# Patient Record
Sex: FEMALE | Race: WHITE | Employment: OTHER | URBAN - NONMETROPOLITAN AREA
[De-identification: names, ages, dates, MRNs, and addresses within clinical notes are randomized per-mention and may not be internally consistent; named-entity substitution may affect disease eponyms.]

---

## 2017-01-06 ENCOUNTER — TELEPHONE (OUTPATIENT)
Dept: PRIMARY CARE CLINIC | Age: 72
End: 2017-01-06

## 2017-01-06 RX ORDER — GUAIFENESIN 600 MG/1
1200 TABLET, EXTENDED RELEASE ORAL 2 TIMES DAILY
COMMUNITY

## 2017-01-06 RX ORDER — ISOSORBIDE DINITRATE 30 MG/1
30 TABLET ORAL 4 TIMES DAILY
COMMUNITY

## 2017-01-06 RX ORDER — VITAMIN E 268 MG
400 CAPSULE ORAL DAILY
COMMUNITY

## 2017-01-06 RX ORDER — THEOPHYLLINE ANHYDROUS 100 MG
100 TABLET, EXTENDED RELEASE 12 HR ORAL 2 TIMES DAILY
COMMUNITY

## 2017-01-06 RX ORDER — CLONAZEPAM 0.5 MG/1
0.5 TABLET ORAL 2 TIMES DAILY PRN
COMMUNITY
End: 2023-01-01

## 2017-01-06 RX ORDER — MEMANTINE HYDROCHLORIDE 5 MG/1
5 TABLET ORAL 2 TIMES DAILY
COMMUNITY
End: 2023-01-01

## 2017-01-06 RX ORDER — FLUTICASONE PROPIONATE 50 MCG
2 SPRAY, SUSPENSION (ML) NASAL DAILY
COMMUNITY

## 2017-01-06 RX ORDER — PROMETHAZINE HYDROCHLORIDE 25 MG/1
25 TABLET ORAL EVERY 6 HOURS PRN
COMMUNITY

## 2017-01-06 RX ORDER — SUCRALFATE 1 G/1
1 TABLET ORAL 4 TIMES DAILY
COMMUNITY

## 2017-01-06 RX ORDER — NITROGLYCERIN 0.4 MG/1
0.4 TABLET SUBLINGUAL
COMMUNITY

## 2017-01-06 RX ORDER — DEXLANSOPRAZOLE 60 MG/1
60 CAPSULE, DELAYED RELEASE ORAL DAILY
COMMUNITY

## 2017-01-06 RX ORDER — MIRTAZAPINE 15 MG/1
15 TABLET, ORALLY DISINTEGRATING ORAL NIGHTLY
COMMUNITY

## 2017-01-06 RX ORDER — TIZANIDINE HYDROCHLORIDE 4 MG/1
4 CAPSULE, GELATIN COATED ORAL 3 TIMES DAILY
COMMUNITY

## 2017-01-06 RX ORDER — OXYCODONE AND ACETAMINOPHEN 7.5; 325 MG/1; MG/1
1 TABLET ORAL EVERY 6 HOURS PRN
COMMUNITY

## 2017-01-06 RX ORDER — AZELASTINE 1 MG/ML
2 SPRAY, METERED NASAL 2 TIMES DAILY
COMMUNITY
End: 2023-01-01 | Stop reason: SDUPTHER

## 2017-01-06 RX ORDER — DULOXETIN HYDROCHLORIDE 60 MG/1
60 CAPSULE, DELAYED RELEASE ORAL DAILY
COMMUNITY
End: 2023-01-01 | Stop reason: SDUPTHER

## 2017-01-06 RX ORDER — FUROSEMIDE 40 MG/1
40 TABLET ORAL 2 TIMES DAILY
COMMUNITY
End: 2023-01-01 | Stop reason: SDUPTHER

## 2017-01-10 ENCOUNTER — HOSPITAL ENCOUNTER (OUTPATIENT)
Dept: GENERAL RADIOLOGY | Age: 72
Discharge: HOME | End: 2017-01-10
Payer: MEDICARE

## 2017-01-10 ENCOUNTER — HOSPITAL ENCOUNTER (OUTPATIENT)
Dept: WOMENS IMAGING | Age: 72
Discharge: HOME | End: 2017-01-10
Payer: MEDICARE

## 2017-01-10 ENCOUNTER — TELEPHONE (OUTPATIENT)
Dept: PRIMARY CARE CLINIC | Age: 72
End: 2017-01-10

## 2017-01-10 DIAGNOSIS — Z78.0 POSTMENOPAUSAL: ICD-10-CM

## 2017-01-10 DIAGNOSIS — M25.552 LEFT HIP PAIN: ICD-10-CM

## 2017-01-10 DIAGNOSIS — Z12.31 ENCOUNTER FOR SCREENING MAMMOGRAM FOR BREAST CANCER: ICD-10-CM

## 2017-01-10 DIAGNOSIS — Z13.820 SCREENING FOR OSTEOPOROSIS: ICD-10-CM

## 2017-01-10 PROCEDURE — 77063 BREAST TOMOSYNTHESIS BI: CPT

## 2017-01-10 PROCEDURE — 77080 DXA BONE DENSITY AXIAL: CPT

## 2017-01-10 PROCEDURE — 73521 X-RAY EXAM HIPS BI 2 VIEWS: CPT

## 2017-01-12 ENCOUNTER — TELEPHONE (OUTPATIENT)
Dept: PRIMARY CARE CLINIC | Age: 72
End: 2017-01-12

## 2017-01-20 ENCOUNTER — OFFICE VISIT (OUTPATIENT)
Dept: PRIMARY CARE CLINIC | Age: 72
End: 2017-01-20
Payer: MEDICARE

## 2017-01-20 VITALS
SYSTOLIC BLOOD PRESSURE: 110 MMHG | DIASTOLIC BLOOD PRESSURE: 70 MMHG | WEIGHT: 117.25 LBS | BODY MASS INDEX: 23.02 KG/M2 | HEIGHT: 60 IN | OXYGEN SATURATION: 97 % | TEMPERATURE: 98.6 F | HEART RATE: 78 BPM

## 2017-01-20 DIAGNOSIS — J44.9 CHRONIC OBSTRUCTIVE PULMONARY DISEASE, UNSPECIFIED COPD TYPE (HCC): ICD-10-CM

## 2017-01-20 DIAGNOSIS — R41.3 MEMORY LOSS: ICD-10-CM

## 2017-01-20 DIAGNOSIS — F41.1 GAD (GENERALIZED ANXIETY DISORDER): ICD-10-CM

## 2017-01-20 DIAGNOSIS — G89.29 CHRONIC BILATERAL LOW BACK PAIN WITH LEFT-SIDED SCIATICA: ICD-10-CM

## 2017-01-20 DIAGNOSIS — Z72.0 TOBACCO ABUSE: ICD-10-CM

## 2017-01-20 DIAGNOSIS — J00 ACUTE NASOPHARYNGITIS: Primary | ICD-10-CM

## 2017-01-20 DIAGNOSIS — M54.42 CHRONIC BILATERAL LOW BACK PAIN WITH LEFT-SIDED SCIATICA: ICD-10-CM

## 2017-01-20 PROCEDURE — G8599 NO ASA/ANTIPLAT THER USE RNG: HCPCS | Performed by: NURSE PRACTITIONER

## 2017-01-20 PROCEDURE — 3014F SCREEN MAMMO DOC REV: CPT | Performed by: NURSE PRACTITIONER

## 2017-01-20 PROCEDURE — 99214 OFFICE O/P EST MOD 30 MIN: CPT | Performed by: NURSE PRACTITIONER

## 2017-01-20 PROCEDURE — 4040F PNEUMOC VAC/ADMIN/RCVD: CPT | Performed by: NURSE PRACTITIONER

## 2017-01-20 PROCEDURE — 1090F PRES/ABSN URINE INCON ASSESS: CPT | Performed by: NURSE PRACTITIONER

## 2017-01-20 PROCEDURE — G8419 CALC BMI OUT NRM PARAM NOF/U: HCPCS | Performed by: NURSE PRACTITIONER

## 2017-01-20 PROCEDURE — 3023F SPIROM DOC REV: CPT | Performed by: NURSE PRACTITIONER

## 2017-01-20 PROCEDURE — G8926 SPIRO NO PERF OR DOC: HCPCS | Performed by: NURSE PRACTITIONER

## 2017-01-20 PROCEDURE — 3017F COLORECTAL CA SCREEN DOC REV: CPT | Performed by: NURSE PRACTITIONER

## 2017-01-20 PROCEDURE — 1123F ACP DISCUSS/DSCN MKR DOCD: CPT | Performed by: NURSE PRACTITIONER

## 2017-01-20 PROCEDURE — G8427 DOCREV CUR MEDS BY ELIG CLIN: HCPCS | Performed by: NURSE PRACTITIONER

## 2017-01-20 PROCEDURE — 4004F PT TOBACCO SCREEN RCVD TLK: CPT | Performed by: NURSE PRACTITIONER

## 2017-01-20 PROCEDURE — G8484 FLU IMMUNIZE NO ADMIN: HCPCS | Performed by: NURSE PRACTITIONER

## 2017-01-20 PROCEDURE — G8399 PT W/DXA RESULTS DOCUMENT: HCPCS | Performed by: NURSE PRACTITIONER

## 2017-01-20 RX ORDER — OXYCODONE AND ACETAMINOPHEN 10; 325 MG/1; MG/1
TABLET ORAL
COMMUNITY
Start: 2017-01-06 | End: 2017-08-29

## 2017-01-20 RX ORDER — ALBUTEROL SULFATE 90 UG/1
2 AEROSOL, METERED RESPIRATORY (INHALATION) EVERY 6 HOURS PRN
Qty: 1 INHALER | Refills: 5 | Status: SHIPPED | OUTPATIENT
Start: 2017-01-20 | End: 2017-10-25 | Stop reason: SDUPTHER

## 2017-01-20 RX ORDER — TIZANIDINE 4 MG/1
4 TABLET ORAL EVERY 8 HOURS PRN
Qty: 90 TABLET | Refills: 3 | Status: SHIPPED | OUTPATIENT
Start: 2017-01-20 | End: 2017-07-11 | Stop reason: SDUPTHER

## 2017-01-20 RX ORDER — MEMANTINE HYDROCHLORIDE 5 MG/1
5 TABLET ORAL 2 TIMES DAILY
Qty: 60 TABLET | Refills: 3 | Status: SHIPPED | OUTPATIENT
Start: 2017-01-20 | End: 2017-05-26 | Stop reason: SDUPTHER

## 2017-01-20 RX ORDER — CLONAZEPAM 0.5 MG/1
0.5 TABLET ORAL 3 TIMES DAILY PRN
Qty: 75 TABLET | Refills: 0 | Status: SHIPPED | OUTPATIENT
Start: 2017-01-20 | End: 2017-02-21 | Stop reason: SDUPTHER

## 2017-01-20 ASSESSMENT — ENCOUNTER SYMPTOMS
SORE THROAT: 0
SHORTNESS OF BREATH: 1
COUGH: 1
EYE REDNESS: 0
SINUS PRESSURE: 0
NAUSEA: 0
DIARRHEA: 0
RHINORRHEA: 1
CONSTIPATION: 1
BACK PAIN: 1
VOMITING: 0
ABDOMINAL PAIN: 1

## 2017-02-08 ENCOUNTER — TELEPHONE (OUTPATIENT)
Dept: PRIMARY CARE CLINIC | Age: 72
End: 2017-02-08

## 2017-02-16 ENCOUNTER — OFFICE VISIT (OUTPATIENT)
Dept: PRIMARY CARE CLINIC | Age: 72
End: 2017-02-16
Payer: MEDICARE

## 2017-02-16 VITALS
HEART RATE: 72 BPM | DIASTOLIC BLOOD PRESSURE: 80 MMHG | WEIGHT: 117 LBS | BODY MASS INDEX: 21.53 KG/M2 | OXYGEN SATURATION: 92 % | HEIGHT: 62 IN | SYSTOLIC BLOOD PRESSURE: 118 MMHG | TEMPERATURE: 98.3 F

## 2017-02-16 DIAGNOSIS — J02.9 SORE THROAT: ICD-10-CM

## 2017-02-16 DIAGNOSIS — R05.9 COUGH: ICD-10-CM

## 2017-02-16 DIAGNOSIS — R11.0 NAUSEA: ICD-10-CM

## 2017-02-16 DIAGNOSIS — B34.9 VIRAL ILLNESS: Primary | ICD-10-CM

## 2017-02-16 LAB
INFLUENZA A ANTIBODY: NORMAL
INFLUENZA B ANTIBODY: NORMAL
S PYO AG THROAT QL: NORMAL

## 2017-02-16 PROCEDURE — 4040F PNEUMOC VAC/ADMIN/RCVD: CPT | Performed by: NURSE PRACTITIONER

## 2017-02-16 PROCEDURE — G8599 NO ASA/ANTIPLAT THER USE RNG: HCPCS | Performed by: NURSE PRACTITIONER

## 2017-02-16 PROCEDURE — 3014F SCREEN MAMMO DOC REV: CPT | Performed by: NURSE PRACTITIONER

## 2017-02-16 PROCEDURE — 99213 OFFICE O/P EST LOW 20 MIN: CPT | Performed by: NURSE PRACTITIONER

## 2017-02-16 PROCEDURE — 87880 STREP A ASSAY W/OPTIC: CPT | Performed by: NURSE PRACTITIONER

## 2017-02-16 PROCEDURE — 4004F PT TOBACCO SCREEN RCVD TLK: CPT | Performed by: NURSE PRACTITIONER

## 2017-02-16 PROCEDURE — 87804 INFLUENZA ASSAY W/OPTIC: CPT | Performed by: NURSE PRACTITIONER

## 2017-02-16 PROCEDURE — 1123F ACP DISCUSS/DSCN MKR DOCD: CPT | Performed by: NURSE PRACTITIONER

## 2017-02-16 PROCEDURE — G8427 DOCREV CUR MEDS BY ELIG CLIN: HCPCS | Performed by: NURSE PRACTITIONER

## 2017-02-16 PROCEDURE — 3017F COLORECTAL CA SCREEN DOC REV: CPT | Performed by: NURSE PRACTITIONER

## 2017-02-16 PROCEDURE — G8419 CALC BMI OUT NRM PARAM NOF/U: HCPCS | Performed by: NURSE PRACTITIONER

## 2017-02-16 PROCEDURE — G8399 PT W/DXA RESULTS DOCUMENT: HCPCS | Performed by: NURSE PRACTITIONER

## 2017-02-16 PROCEDURE — G8484 FLU IMMUNIZE NO ADMIN: HCPCS | Performed by: NURSE PRACTITIONER

## 2017-02-16 PROCEDURE — 1090F PRES/ABSN URINE INCON ASSESS: CPT | Performed by: NURSE PRACTITIONER

## 2017-02-16 RX ORDER — ONDANSETRON 4 MG/1
4 TABLET, FILM COATED ORAL EVERY 12 HOURS PRN
Qty: 30 TABLET | Refills: 1 | Status: SHIPPED | OUTPATIENT
Start: 2017-02-16 | End: 2017-06-02 | Stop reason: SDUPTHER

## 2017-02-16 RX ORDER — BENZONATATE 200 MG/1
200 CAPSULE ORAL 3 TIMES DAILY PRN
Qty: 30 CAPSULE | Refills: 0 | Status: SHIPPED | OUTPATIENT
Start: 2017-02-16 | End: 2017-02-21

## 2017-02-16 ASSESSMENT — ENCOUNTER SYMPTOMS
EYE REDNESS: 0
RHINORRHEA: 1
DIARRHEA: 1
SHORTNESS OF BREATH: 1
CONSTIPATION: 0
VOMITING: 1
COUGH: 1
SORE THROAT: 0
NAUSEA: 1
ABDOMINAL PAIN: 1

## 2017-02-17 ENCOUNTER — TELEPHONE (OUTPATIENT)
Dept: PRIMARY CARE CLINIC | Age: 72
End: 2017-02-17

## 2017-02-21 ENCOUNTER — OFFICE VISIT (OUTPATIENT)
Dept: PRIMARY CARE CLINIC | Age: 72
End: 2017-02-21
Payer: MEDICARE

## 2017-02-21 VITALS
HEART RATE: 96 BPM | OXYGEN SATURATION: 96 % | BODY MASS INDEX: 23.22 KG/M2 | WEIGHT: 118.25 LBS | HEIGHT: 60 IN | TEMPERATURE: 97.5 F | DIASTOLIC BLOOD PRESSURE: 70 MMHG | SYSTOLIC BLOOD PRESSURE: 110 MMHG

## 2017-02-21 DIAGNOSIS — F51.05 INSOMNIA DUE TO OTHER MENTAL DISORDER: Primary | ICD-10-CM

## 2017-02-21 DIAGNOSIS — R05.9 COUGH: ICD-10-CM

## 2017-02-21 DIAGNOSIS — Z95.828 PORT CATHETER IN PLACE: ICD-10-CM

## 2017-02-21 DIAGNOSIS — F99 INSOMNIA DUE TO OTHER MENTAL DISORDER: Primary | ICD-10-CM

## 2017-02-21 DIAGNOSIS — F41.1 GAD (GENERALIZED ANXIETY DISORDER): ICD-10-CM

## 2017-02-21 PROCEDURE — 1123F ACP DISCUSS/DSCN MKR DOCD: CPT | Performed by: NURSE PRACTITIONER

## 2017-02-21 PROCEDURE — G8484 FLU IMMUNIZE NO ADMIN: HCPCS | Performed by: NURSE PRACTITIONER

## 2017-02-21 PROCEDURE — G8599 NO ASA/ANTIPLAT THER USE RNG: HCPCS | Performed by: NURSE PRACTITIONER

## 2017-02-21 PROCEDURE — 4040F PNEUMOC VAC/ADMIN/RCVD: CPT | Performed by: NURSE PRACTITIONER

## 2017-02-21 PROCEDURE — G8399 PT W/DXA RESULTS DOCUMENT: HCPCS | Performed by: NURSE PRACTITIONER

## 2017-02-21 PROCEDURE — G8427 DOCREV CUR MEDS BY ELIG CLIN: HCPCS | Performed by: NURSE PRACTITIONER

## 2017-02-21 PROCEDURE — 3017F COLORECTAL CA SCREEN DOC REV: CPT | Performed by: NURSE PRACTITIONER

## 2017-02-21 PROCEDURE — 3014F SCREEN MAMMO DOC REV: CPT | Performed by: NURSE PRACTITIONER

## 2017-02-21 PROCEDURE — 99213 OFFICE O/P EST LOW 20 MIN: CPT | Performed by: NURSE PRACTITIONER

## 2017-02-21 PROCEDURE — 1090F PRES/ABSN URINE INCON ASSESS: CPT | Performed by: NURSE PRACTITIONER

## 2017-02-21 PROCEDURE — G8420 CALC BMI NORM PARAMETERS: HCPCS | Performed by: NURSE PRACTITIONER

## 2017-02-21 PROCEDURE — 4004F PT TOBACCO SCREEN RCVD TLK: CPT | Performed by: NURSE PRACTITIONER

## 2017-02-21 RX ORDER — CLONAZEPAM 0.5 MG/1
0.5 TABLET ORAL 3 TIMES DAILY PRN
Qty: 75 TABLET | Refills: 0 | Status: SHIPPED | OUTPATIENT
Start: 2017-02-21 | End: 2017-03-28 | Stop reason: SDUPTHER

## 2017-02-21 RX ORDER — TRAZODONE HYDROCHLORIDE 50 MG/1
50 TABLET ORAL NIGHTLY
Qty: 60 TABLET | Refills: 1 | Status: SHIPPED | OUTPATIENT
Start: 2017-02-21 | End: 2017-03-02 | Stop reason: SDUPTHER

## 2017-02-21 RX ORDER — GUAIFENESIN/DEXTROMETHORPHAN 100-10MG/5
10 SYRUP ORAL 3 TIMES DAILY PRN
Qty: 236 ML | Refills: 0 | Status: SHIPPED | OUTPATIENT
Start: 2017-02-21 | End: 2017-03-03

## 2017-02-21 ASSESSMENT — ENCOUNTER SYMPTOMS
SHORTNESS OF BREATH: 0
COUGH: 1
EYE REDNESS: 0
ABDOMINAL PAIN: 1
VOMITING: 0
RHINORRHEA: 1
DIARRHEA: 0
NAUSEA: 1
SORE THROAT: 0
CONSTIPATION: 0

## 2017-02-22 ENCOUNTER — TELEPHONE (OUTPATIENT)
Dept: PRIMARY CARE CLINIC | Age: 72
End: 2017-02-22

## 2017-02-22 DIAGNOSIS — Z00.00 VISIT FOR PREVENTIVE HEALTH EXAMINATION: ICD-10-CM

## 2017-02-22 LAB
ALBUMIN SERPL-MCNC: 3.7 G/DL (ref 3.5–5.2)
ALP BLD-CCNC: 76 U/L (ref 35–104)
ALT SERPL-CCNC: 10 U/L (ref 5–33)
ANION GAP SERPL CALCULATED.3IONS-SCNC: 15 MMOL/L (ref 7–19)
AST SERPL-CCNC: 17 U/L (ref 5–32)
BASOPHILS ABSOLUTE: 0 K/UL (ref 0–0.2)
BASOPHILS RELATIVE PERCENT: 0.4 % (ref 0–1)
BILIRUB SERPL-MCNC: <0.2 MG/DL (ref 0.2–1.2)
BUN BLDV-MCNC: 16 MG/DL (ref 8–23)
CALCIUM SERPL-MCNC: 8.7 MG/DL (ref 8.8–10.2)
CHLORIDE BLD-SCNC: 90 MMOL/L (ref 98–111)
CHOLESTEROL, TOTAL: 172 MG/DL (ref 160–199)
CO2: 22 MMOL/L (ref 22–29)
CREAT SERPL-MCNC: 0.7 MG/DL (ref 0.5–0.9)
EOSINOPHILS ABSOLUTE: 0.1 K/UL (ref 0–0.6)
EOSINOPHILS RELATIVE PERCENT: 2 % (ref 0–5)
GFR NON-AFRICAN AMERICAN: >60
GLOBULIN: 2.7 G/DL
GLUCOSE BLD-MCNC: 86 MG/DL (ref 74–109)
HCT VFR BLD CALC: 31.3 % (ref 37–47)
HDLC SERPL-MCNC: 32 MG/DL (ref 65–121)
HEMOGLOBIN: 10.2 G/DL (ref 12–16)
LDL CHOLESTEROL CALCULATED: 92 MG/DL
LYMPHOCYTES ABSOLUTE: 1.6 K/UL (ref 1.1–4.5)
LYMPHOCYTES RELATIVE PERCENT: 22.8 % (ref 20–40)
MCH RBC QN AUTO: 30.9 PG (ref 27–31)
MCHC RBC AUTO-ENTMCNC: 32.6 G/DL (ref 33–37)
MCV RBC AUTO: 94.8 FL (ref 81–99)
MONOCYTES ABSOLUTE: 0.7 K/UL (ref 0–0.9)
MONOCYTES RELATIVE PERCENT: 10.1 % (ref 0–10)
NEUTROPHILS ABSOLUTE: 4.4 K/UL (ref 1.5–7.5)
NEUTROPHILS RELATIVE PERCENT: 64.7 % (ref 50–65)
PDW BLD-RTO: 14.3 % (ref 11.5–14.5)
PLATELET # BLD: 315 K/UL (ref 130–400)
PMV BLD AUTO: 9.8 FL (ref 7.4–10.4)
POTASSIUM SERPL-SCNC: 4.5 MMOL/L (ref 3.5–5)
RBC # BLD: 3.3 M/UL (ref 4.2–5.4)
SODIUM BLD-SCNC: 127 MMOL/L (ref 136–145)
T4 FREE: 0.9 NG/ML (ref 0.9–1.7)
TOTAL PROTEIN: 6.4 G/DL (ref 6.6–8.7)
TRIGL SERPL-MCNC: 238 MG/DL (ref 150–199)
TSH SERPL DL<=0.05 MIU/L-ACNC: 1.96 UIU/ML (ref 0.27–4.2)
WBC # BLD: 6.8 K/UL (ref 4.8–10.8)

## 2017-02-24 ENCOUNTER — TELEPHONE (OUTPATIENT)
Dept: PRIMARY CARE CLINIC | Age: 72
End: 2017-02-24

## 2017-02-24 ENCOUNTER — OFFICE VISIT (OUTPATIENT)
Dept: PRIMARY CARE CLINIC | Age: 72
End: 2017-02-24
Payer: MEDICARE

## 2017-02-24 VITALS
OXYGEN SATURATION: 94 % | WEIGHT: 120.75 LBS | HEART RATE: 70 BPM | HEIGHT: 60 IN | SYSTOLIC BLOOD PRESSURE: 140 MMHG | DIASTOLIC BLOOD PRESSURE: 90 MMHG | BODY MASS INDEX: 23.71 KG/M2 | TEMPERATURE: 98.5 F

## 2017-02-24 DIAGNOSIS — E87.1 HYPONATREMIA: Primary | ICD-10-CM

## 2017-02-24 LAB
ALBUMIN SERPL-MCNC: 3.6 G/DL (ref 3.5–5.2)
ALP BLD-CCNC: 74 U/L (ref 35–104)
ALT SERPL-CCNC: 7 U/L (ref 5–33)
ANION GAP SERPL CALCULATED.3IONS-SCNC: 15 MMOL/L (ref 7–19)
AST SERPL-CCNC: 14 U/L (ref 5–32)
BILIRUB SERPL-MCNC: <0.2 MG/DL (ref 0.2–1.2)
BUN BLDV-MCNC: 18 MG/DL (ref 8–23)
CALCIUM SERPL-MCNC: 9.1 MG/DL (ref 8.8–10.2)
CHLORIDE BLD-SCNC: 93 MMOL/L (ref 98–111)
CO2: 24 MMOL/L (ref 22–29)
CREAT SERPL-MCNC: 0.8 MG/DL (ref 0.5–0.9)
GFR NON-AFRICAN AMERICAN: >60
GLOBULIN: 2.8 G/DL
GLUCOSE BLD-MCNC: 83 MG/DL (ref 74–109)
POTASSIUM SERPL-SCNC: 4.8 MMOL/L (ref 3.5–5)
SODIUM BLD-SCNC: 132 MMOL/L (ref 136–145)
TOTAL PROTEIN: 6.4 G/DL (ref 6.6–8.7)

## 2017-02-24 PROCEDURE — 3014F SCREEN MAMMO DOC REV: CPT | Performed by: NURSE PRACTITIONER

## 2017-02-24 PROCEDURE — G8399 PT W/DXA RESULTS DOCUMENT: HCPCS | Performed by: NURSE PRACTITIONER

## 2017-02-24 PROCEDURE — 1090F PRES/ABSN URINE INCON ASSESS: CPT | Performed by: NURSE PRACTITIONER

## 2017-02-24 PROCEDURE — G8599 NO ASA/ANTIPLAT THER USE RNG: HCPCS | Performed by: NURSE PRACTITIONER

## 2017-02-24 PROCEDURE — 4040F PNEUMOC VAC/ADMIN/RCVD: CPT | Performed by: NURSE PRACTITIONER

## 2017-02-24 PROCEDURE — 1123F ACP DISCUSS/DSCN MKR DOCD: CPT | Performed by: NURSE PRACTITIONER

## 2017-02-24 PROCEDURE — G8484 FLU IMMUNIZE NO ADMIN: HCPCS | Performed by: NURSE PRACTITIONER

## 2017-02-24 PROCEDURE — 4004F PT TOBACCO SCREEN RCVD TLK: CPT | Performed by: NURSE PRACTITIONER

## 2017-02-24 PROCEDURE — G8428 CUR MEDS NOT DOCUMENT: HCPCS | Performed by: NURSE PRACTITIONER

## 2017-02-24 PROCEDURE — 3017F COLORECTAL CA SCREEN DOC REV: CPT | Performed by: NURSE PRACTITIONER

## 2017-02-24 PROCEDURE — 99213 OFFICE O/P EST LOW 20 MIN: CPT | Performed by: NURSE PRACTITIONER

## 2017-02-24 PROCEDURE — G8420 CALC BMI NORM PARAMETERS: HCPCS | Performed by: NURSE PRACTITIONER

## 2017-02-24 RX ORDER — LINACLOTIDE 145 UG/1
CAPSULE, GELATIN COATED ORAL
COMMUNITY
Start: 2017-02-04 | End: 2017-03-10 | Stop reason: SDUPTHER

## 2017-02-24 ASSESSMENT — ENCOUNTER SYMPTOMS
NAUSEA: 1
SHORTNESS OF BREATH: 0
CONSTIPATION: 0
VOMITING: 0
SORE THROAT: 0
EYE REDNESS: 0
ABDOMINAL PAIN: 1
RHINORRHEA: 1
COUGH: 1
DIARRHEA: 0

## 2017-03-02 ENCOUNTER — OFFICE VISIT (OUTPATIENT)
Dept: PRIMARY CARE CLINIC | Age: 72
End: 2017-03-02
Payer: MEDICARE

## 2017-03-02 VITALS
BODY MASS INDEX: 23.46 KG/M2 | DIASTOLIC BLOOD PRESSURE: 70 MMHG | TEMPERATURE: 98.2 F | HEART RATE: 80 BPM | OXYGEN SATURATION: 95 % | SYSTOLIC BLOOD PRESSURE: 110 MMHG | HEIGHT: 60 IN | WEIGHT: 119.5 LBS

## 2017-03-02 DIAGNOSIS — M81.0 OSTEOPOROSIS: ICD-10-CM

## 2017-03-02 DIAGNOSIS — R05.9 COUGH: ICD-10-CM

## 2017-03-02 DIAGNOSIS — F51.05 INSOMNIA DUE TO OTHER MENTAL DISORDER: ICD-10-CM

## 2017-03-02 DIAGNOSIS — F99 INSOMNIA DUE TO OTHER MENTAL DISORDER: ICD-10-CM

## 2017-03-02 DIAGNOSIS — N39.45 CONTINUOUS LEAKAGE OF URINE: Primary | ICD-10-CM

## 2017-03-02 PROCEDURE — G8420 CALC BMI NORM PARAMETERS: HCPCS | Performed by: NURSE PRACTITIONER

## 2017-03-02 PROCEDURE — G8599 NO ASA/ANTIPLAT THER USE RNG: HCPCS | Performed by: NURSE PRACTITIONER

## 2017-03-02 PROCEDURE — 4004F PT TOBACCO SCREEN RCVD TLK: CPT | Performed by: NURSE PRACTITIONER

## 2017-03-02 PROCEDURE — 4040F PNEUMOC VAC/ADMIN/RCVD: CPT | Performed by: NURSE PRACTITIONER

## 2017-03-02 PROCEDURE — G8399 PT W/DXA RESULTS DOCUMENT: HCPCS | Performed by: NURSE PRACTITIONER

## 2017-03-02 PROCEDURE — 0509F URINE INCON PLAN DOCD: CPT | Performed by: NURSE PRACTITIONER

## 2017-03-02 PROCEDURE — 3014F SCREEN MAMMO DOC REV: CPT | Performed by: NURSE PRACTITIONER

## 2017-03-02 PROCEDURE — 96372 THER/PROPH/DIAG INJ SC/IM: CPT | Performed by: NURSE PRACTITIONER

## 2017-03-02 PROCEDURE — 1123F ACP DISCUSS/DSCN MKR DOCD: CPT | Performed by: NURSE PRACTITIONER

## 2017-03-02 PROCEDURE — 3017F COLORECTAL CA SCREEN DOC REV: CPT | Performed by: NURSE PRACTITIONER

## 2017-03-02 PROCEDURE — G8427 DOCREV CUR MEDS BY ELIG CLIN: HCPCS | Performed by: NURSE PRACTITIONER

## 2017-03-02 PROCEDURE — 99213 OFFICE O/P EST LOW 20 MIN: CPT | Performed by: NURSE PRACTITIONER

## 2017-03-02 PROCEDURE — G8484 FLU IMMUNIZE NO ADMIN: HCPCS | Performed by: NURSE PRACTITIONER

## 2017-03-02 PROCEDURE — 1090F PRES/ABSN URINE INCON ASSESS: CPT | Performed by: NURSE PRACTITIONER

## 2017-03-02 RX ORDER — BENZONATATE 200 MG/1
200 CAPSULE ORAL 3 TIMES DAILY PRN
Qty: 60 CAPSULE | Refills: 0 | Status: SHIPPED | OUTPATIENT
Start: 2017-03-02 | End: 2017-05-08

## 2017-03-02 RX ORDER — TRAZODONE HYDROCHLORIDE 50 MG/1
150 TABLET ORAL NIGHTLY
Qty: 90 TABLET | Refills: 1 | Status: SHIPPED | OUTPATIENT
Start: 2017-03-02 | End: 2017-04-28

## 2017-03-02 RX ORDER — TOLTERODINE 4 MG/1
4 CAPSULE, EXTENDED RELEASE ORAL DAILY
Qty: 30 CAPSULE | Refills: 3 | Status: SHIPPED | OUTPATIENT
Start: 2017-03-02 | End: 2017-04-28 | Stop reason: SDUPTHER

## 2017-03-02 ASSESSMENT — ENCOUNTER SYMPTOMS
NAUSEA: 1
SORE THROAT: 0
ABDOMINAL PAIN: 1
CONSTIPATION: 0
RHINORRHEA: 0
EYE REDNESS: 0
COUGH: 1
DIARRHEA: 0
SHORTNESS OF BREATH: 0
VOMITING: 0

## 2017-03-10 RX ORDER — LINACLOTIDE 145 UG/1
CAPSULE, GELATIN COATED ORAL
Qty: 30 CAPSULE | Refills: 5 | Status: SHIPPED | OUTPATIENT
Start: 2017-03-10 | End: 2017-05-26

## 2017-03-28 ENCOUNTER — OFFICE VISIT (OUTPATIENT)
Dept: PRIMARY CARE CLINIC | Age: 72
End: 2017-03-28
Payer: MEDICARE

## 2017-03-28 VITALS
HEIGHT: 61 IN | HEART RATE: 86 BPM | OXYGEN SATURATION: 94 % | WEIGHT: 114.5 LBS | SYSTOLIC BLOOD PRESSURE: 106 MMHG | DIASTOLIC BLOOD PRESSURE: 74 MMHG | BODY MASS INDEX: 21.62 KG/M2 | TEMPERATURE: 98.3 F

## 2017-03-28 DIAGNOSIS — Z95.828 PORT CATHETER IN PLACE: ICD-10-CM

## 2017-03-28 DIAGNOSIS — N30.00 ACUTE CYSTITIS WITHOUT HEMATURIA: Primary | ICD-10-CM

## 2017-03-28 DIAGNOSIS — G89.29 CHRONIC BILATERAL LOW BACK PAIN WITH LEFT-SIDED SCIATICA: ICD-10-CM

## 2017-03-28 DIAGNOSIS — F32.9 REACTIVE DEPRESSION: ICD-10-CM

## 2017-03-28 DIAGNOSIS — M54.42 CHRONIC BILATERAL LOW BACK PAIN WITH LEFT-SIDED SCIATICA: ICD-10-CM

## 2017-03-28 DIAGNOSIS — F03.90 DEMENTIA WITHOUT BEHAVIORAL DISTURBANCE, UNSPECIFIED DEMENTIA TYPE: ICD-10-CM

## 2017-03-28 DIAGNOSIS — F41.1 GAD (GENERALIZED ANXIETY DISORDER): ICD-10-CM

## 2017-03-28 DIAGNOSIS — R30.0 DYSURIA: ICD-10-CM

## 2017-03-28 LAB
APPEARANCE FLUID: ABNORMAL
BILIRUBIN, POC: ABNORMAL
BLOOD URINE, POC: ABNORMAL
CLARITY, POC: ABNORMAL
COLOR, POC: YELLOW
GLUCOSE URINE, POC: ABNORMAL
KETONES, POC: ABNORMAL
LEUKOCYTE EST, POC: ABNORMAL
NITRITE, POC: ABNORMAL
PH, POC: 5.5
PROTEIN, POC: ABNORMAL
SPECIFIC GRAVITY, POC: 1.01
UROBILINOGEN, POC: 0.2

## 2017-03-28 PROCEDURE — G8419 CALC BMI OUT NRM PARAM NOF/U: HCPCS | Performed by: NURSE PRACTITIONER

## 2017-03-28 PROCEDURE — G8399 PT W/DXA RESULTS DOCUMENT: HCPCS | Performed by: NURSE PRACTITIONER

## 2017-03-28 PROCEDURE — 1123F ACP DISCUSS/DSCN MKR DOCD: CPT | Performed by: NURSE PRACTITIONER

## 2017-03-28 PROCEDURE — G8428 CUR MEDS NOT DOCUMENT: HCPCS | Performed by: NURSE PRACTITIONER

## 2017-03-28 PROCEDURE — G8599 NO ASA/ANTIPLAT THER USE RNG: HCPCS | Performed by: NURSE PRACTITIONER

## 2017-03-28 PROCEDURE — 3017F COLORECTAL CA SCREEN DOC REV: CPT | Performed by: NURSE PRACTITIONER

## 2017-03-28 PROCEDURE — 99214 OFFICE O/P EST MOD 30 MIN: CPT | Performed by: NURSE PRACTITIONER

## 2017-03-28 PROCEDURE — G8484 FLU IMMUNIZE NO ADMIN: HCPCS | Performed by: NURSE PRACTITIONER

## 2017-03-28 PROCEDURE — 4004F PT TOBACCO SCREEN RCVD TLK: CPT | Performed by: NURSE PRACTITIONER

## 2017-03-28 PROCEDURE — 4040F PNEUMOC VAC/ADMIN/RCVD: CPT | Performed by: NURSE PRACTITIONER

## 2017-03-28 PROCEDURE — 81002 URINALYSIS NONAUTO W/O SCOPE: CPT | Performed by: NURSE PRACTITIONER

## 2017-03-28 PROCEDURE — 1090F PRES/ABSN URINE INCON ASSESS: CPT | Performed by: NURSE PRACTITIONER

## 2017-03-28 PROCEDURE — 3014F SCREEN MAMMO DOC REV: CPT | Performed by: NURSE PRACTITIONER

## 2017-03-28 RX ORDER — DONEPEZIL HYDROCHLORIDE 5 MG/1
5 TABLET, FILM COATED ORAL NIGHTLY
Qty: 30 TABLET | Refills: 3 | Status: SHIPPED | OUTPATIENT
Start: 2017-03-28 | End: 2017-08-15 | Stop reason: SDUPTHER

## 2017-03-28 RX ORDER — DULOXETIN HYDROCHLORIDE 60 MG/1
60 CAPSULE, DELAYED RELEASE ORAL 2 TIMES DAILY
Qty: 60 CAPSULE | Refills: 3 | Status: SHIPPED | OUTPATIENT
Start: 2017-03-28 | End: 2017-10-30 | Stop reason: SDUPTHER

## 2017-03-28 RX ORDER — CEFDINIR 300 MG/1
300 CAPSULE ORAL 2 TIMES DAILY
Qty: 20 CAPSULE | Refills: 0 | Status: SHIPPED | OUTPATIENT
Start: 2017-03-28 | End: 2017-04-07

## 2017-03-28 RX ORDER — CLONAZEPAM 0.5 MG/1
0.5 TABLET ORAL 3 TIMES DAILY PRN
Qty: 75 TABLET | Refills: 0 | Status: SHIPPED | OUTPATIENT
Start: 2017-03-28 | End: 2017-04-28 | Stop reason: SDUPTHER

## 2017-03-28 RX ORDER — CLONAZEPAM 0.5 MG/1
0.5 TABLET ORAL 3 TIMES DAILY PRN
Qty: 75 TABLET | Refills: 0 | Status: CANCELLED | OUTPATIENT
Start: 2017-03-28

## 2017-03-28 RX ORDER — CIPROFLOXACIN 500 MG/1
500 TABLET, FILM COATED ORAL 2 TIMES DAILY
Qty: 20 TABLET | Refills: 0 | Status: CANCELLED | OUTPATIENT
Start: 2017-03-28 | End: 2017-04-07

## 2017-03-28 ASSESSMENT — ENCOUNTER SYMPTOMS
DIARRHEA: 0
CONSTIPATION: 0
NAUSEA: 1
VOMITING: 0
EYE REDNESS: 0
SORE THROAT: 0
SHORTNESS OF BREATH: 0
ABDOMINAL PAIN: 1
RHINORRHEA: 0

## 2017-04-28 ENCOUNTER — OFFICE VISIT (OUTPATIENT)
Dept: PRIMARY CARE CLINIC | Age: 72
End: 2017-04-28
Payer: MEDICARE

## 2017-04-28 VITALS
OXYGEN SATURATION: 94 % | SYSTOLIC BLOOD PRESSURE: 154 MMHG | HEART RATE: 98 BPM | DIASTOLIC BLOOD PRESSURE: 94 MMHG | BODY MASS INDEX: 21.76 KG/M2 | TEMPERATURE: 98.9 F | HEIGHT: 62 IN | WEIGHT: 118.25 LBS

## 2017-04-28 DIAGNOSIS — F41.1 GAD (GENERALIZED ANXIETY DISORDER): Primary | ICD-10-CM

## 2017-04-28 DIAGNOSIS — N39.45 CONTINUOUS LEAKAGE OF URINE: ICD-10-CM

## 2017-04-28 DIAGNOSIS — I10 ESSENTIAL HYPERTENSION: ICD-10-CM

## 2017-04-28 DIAGNOSIS — F51.04 PSYCHOPHYSIOLOGICAL INSOMNIA: ICD-10-CM

## 2017-04-28 DIAGNOSIS — Z95.828 PORT CATHETER IN PLACE: ICD-10-CM

## 2017-04-28 PROCEDURE — 1090F PRES/ABSN URINE INCON ASSESS: CPT | Performed by: NURSE PRACTITIONER

## 2017-04-28 PROCEDURE — 4040F PNEUMOC VAC/ADMIN/RCVD: CPT | Performed by: NURSE PRACTITIONER

## 2017-04-28 PROCEDURE — 0509F URINE INCON PLAN DOCD: CPT | Performed by: NURSE PRACTITIONER

## 2017-04-28 PROCEDURE — 4004F PT TOBACCO SCREEN RCVD TLK: CPT | Performed by: NURSE PRACTITIONER

## 2017-04-28 PROCEDURE — G8427 DOCREV CUR MEDS BY ELIG CLIN: HCPCS | Performed by: NURSE PRACTITIONER

## 2017-04-28 PROCEDURE — 1123F ACP DISCUSS/DSCN MKR DOCD: CPT | Performed by: NURSE PRACTITIONER

## 2017-04-28 PROCEDURE — 99213 OFFICE O/P EST LOW 20 MIN: CPT | Performed by: NURSE PRACTITIONER

## 2017-04-28 PROCEDURE — 3017F COLORECTAL CA SCREEN DOC REV: CPT | Performed by: NURSE PRACTITIONER

## 2017-04-28 PROCEDURE — 3014F SCREEN MAMMO DOC REV: CPT | Performed by: NURSE PRACTITIONER

## 2017-04-28 PROCEDURE — G8599 NO ASA/ANTIPLAT THER USE RNG: HCPCS | Performed by: NURSE PRACTITIONER

## 2017-04-28 PROCEDURE — G8419 CALC BMI OUT NRM PARAM NOF/U: HCPCS | Performed by: NURSE PRACTITIONER

## 2017-04-28 PROCEDURE — G8399 PT W/DXA RESULTS DOCUMENT: HCPCS | Performed by: NURSE PRACTITIONER

## 2017-04-28 RX ORDER — TOLTERODINE 4 MG/1
4 CAPSULE, EXTENDED RELEASE ORAL DAILY
Qty: 30 CAPSULE | Refills: 3 | Status: SHIPPED | OUTPATIENT
Start: 2017-04-28 | End: 2017-05-08 | Stop reason: SDUPTHER

## 2017-04-28 RX ORDER — CLONAZEPAM 0.5 MG/1
0.5 TABLET ORAL 3 TIMES DAILY PRN
Qty: 75 TABLET | Refills: 0 | Status: SHIPPED | OUTPATIENT
Start: 2017-04-28 | End: 2017-05-26 | Stop reason: SDUPTHER

## 2017-04-28 RX ORDER — HYDROXYZINE 50 MG/1
50 TABLET, FILM COATED ORAL NIGHTLY PRN
Qty: 30 TABLET | Refills: 3 | Status: SHIPPED | OUTPATIENT
Start: 2017-04-28 | End: 2017-07-07 | Stop reason: SDUPTHER

## 2017-04-28 ASSESSMENT — ENCOUNTER SYMPTOMS
NAUSEA: 1
DIARRHEA: 1
RHINORRHEA: 0
CONSTIPATION: 0
VOMITING: 0
EYE REDNESS: 0
SORE THROAT: 0
SHORTNESS OF BREATH: 0

## 2017-05-04 RX ORDER — FUROSEMIDE 40 MG/1
40 TABLET ORAL 2 TIMES DAILY
Qty: 60 TABLET | Refills: 11 | Status: SHIPPED | OUTPATIENT
Start: 2017-05-04 | End: 2018-01-02 | Stop reason: SDUPTHER

## 2017-05-08 ENCOUNTER — OFFICE VISIT (OUTPATIENT)
Dept: PRIMARY CARE CLINIC | Age: 72
End: 2017-05-08
Payer: MEDICARE

## 2017-05-08 VITALS
BODY MASS INDEX: 20.24 KG/M2 | TEMPERATURE: 98.7 F | WEIGHT: 110 LBS | SYSTOLIC BLOOD PRESSURE: 132 MMHG | HEART RATE: 84 BPM | HEIGHT: 62 IN | OXYGEN SATURATION: 93 % | DIASTOLIC BLOOD PRESSURE: 84 MMHG

## 2017-05-08 DIAGNOSIS — N30.01 ACUTE CYSTITIS WITH HEMATURIA: Primary | ICD-10-CM

## 2017-05-08 DIAGNOSIS — R30.0 DYSURIA: ICD-10-CM

## 2017-05-08 DIAGNOSIS — N39.45 CONTINUOUS LEAKAGE OF URINE: ICD-10-CM

## 2017-05-08 LAB
APPEARANCE FLUID: ABNORMAL
BILIRUBIN, POC: ABNORMAL
BLOOD URINE, POC: ABNORMAL
CLARITY, POC: ABNORMAL
COLOR, POC: ABNORMAL
GLUCOSE URINE, POC: ABNORMAL
KETONES, POC: ABNORMAL
LEUKOCYTE EST, POC: ABNORMAL
NITRITE, POC: ABNORMAL
PH, POC: 5.5
PROTEIN, POC: ABNORMAL
SPECIFIC GRAVITY, POC: 1.02
UROBILINOGEN, POC: 0.2

## 2017-05-08 PROCEDURE — G8599 NO ASA/ANTIPLAT THER USE RNG: HCPCS | Performed by: NURSE PRACTITIONER

## 2017-05-08 PROCEDURE — 99213 OFFICE O/P EST LOW 20 MIN: CPT | Performed by: NURSE PRACTITIONER

## 2017-05-08 PROCEDURE — 1123F ACP DISCUSS/DSCN MKR DOCD: CPT | Performed by: NURSE PRACTITIONER

## 2017-05-08 PROCEDURE — G8427 DOCREV CUR MEDS BY ELIG CLIN: HCPCS | Performed by: NURSE PRACTITIONER

## 2017-05-08 PROCEDURE — G8419 CALC BMI OUT NRM PARAM NOF/U: HCPCS | Performed by: NURSE PRACTITIONER

## 2017-05-08 PROCEDURE — 4004F PT TOBACCO SCREEN RCVD TLK: CPT | Performed by: NURSE PRACTITIONER

## 2017-05-08 PROCEDURE — 4040F PNEUMOC VAC/ADMIN/RCVD: CPT | Performed by: NURSE PRACTITIONER

## 2017-05-08 PROCEDURE — G8399 PT W/DXA RESULTS DOCUMENT: HCPCS | Performed by: NURSE PRACTITIONER

## 2017-05-08 PROCEDURE — 3014F SCREEN MAMMO DOC REV: CPT | Performed by: NURSE PRACTITIONER

## 2017-05-08 PROCEDURE — 81002 URINALYSIS NONAUTO W/O SCOPE: CPT | Performed by: NURSE PRACTITIONER

## 2017-05-08 PROCEDURE — 3017F COLORECTAL CA SCREEN DOC REV: CPT | Performed by: NURSE PRACTITIONER

## 2017-05-08 PROCEDURE — 1090F PRES/ABSN URINE INCON ASSESS: CPT | Performed by: NURSE PRACTITIONER

## 2017-05-08 RX ORDER — CEFDINIR 300 MG/1
300 CAPSULE ORAL 2 TIMES DAILY
Qty: 20 CAPSULE | Refills: 0 | Status: SHIPPED | OUTPATIENT
Start: 2017-05-08 | End: 2017-05-18

## 2017-05-08 RX ORDER — TOLTERODINE 4 MG/1
4 CAPSULE, EXTENDED RELEASE ORAL DAILY
Qty: 30 CAPSULE | Refills: 3 | Status: SHIPPED | OUTPATIENT
Start: 2017-05-08 | End: 2018-01-02 | Stop reason: SDUPTHER

## 2017-05-08 ASSESSMENT — ENCOUNTER SYMPTOMS
NAUSEA: 0
SORE THROAT: 0
DIARRHEA: 0
CONSTIPATION: 0
SINUS PRESSURE: 0
RHINORRHEA: 0
SHORTNESS OF BREATH: 0
VOMITING: 0
COUGH: 0
TROUBLE SWALLOWING: 0
ABDOMINAL PAIN: 0

## 2017-05-17 ENCOUNTER — OFFICE VISIT (OUTPATIENT)
Dept: GASTROENTEROLOGY | Facility: CLINIC | Age: 72
End: 2017-05-17

## 2017-05-17 VITALS
DIASTOLIC BLOOD PRESSURE: 70 MMHG | BODY MASS INDEX: 21.9 KG/M2 | HEART RATE: 74 BPM | WEIGHT: 119 LBS | SYSTOLIC BLOOD PRESSURE: 120 MMHG | HEIGHT: 62 IN | TEMPERATURE: 97 F

## 2017-05-17 DIAGNOSIS — R10.84 GENERALIZED ABDOMINAL PAIN: Primary | ICD-10-CM

## 2017-05-17 DIAGNOSIS — R63.4 WEIGHT LOSS: ICD-10-CM

## 2017-05-17 DIAGNOSIS — R11.0 NAUSEA: ICD-10-CM

## 2017-05-17 PROCEDURE — 99214 OFFICE O/P EST MOD 30 MIN: CPT | Performed by: INTERNAL MEDICINE

## 2017-05-17 RX ORDER — HYDROCODONE BITARTRATE AND ACETAMINOPHEN 10; 325 MG/1; MG/1
1 TABLET ORAL EVERY 6 HOURS PRN
COMMUNITY

## 2017-05-26 ENCOUNTER — OFFICE VISIT (OUTPATIENT)
Dept: PRIMARY CARE CLINIC | Age: 72
End: 2017-05-26
Payer: MEDICARE

## 2017-05-26 VITALS
TEMPERATURE: 98.5 F | HEART RATE: 81 BPM | OXYGEN SATURATION: 96 % | WEIGHT: 110 LBS | DIASTOLIC BLOOD PRESSURE: 94 MMHG | SYSTOLIC BLOOD PRESSURE: 126 MMHG | BODY MASS INDEX: 20.24 KG/M2 | HEIGHT: 62 IN

## 2017-05-26 DIAGNOSIS — M54.50 CHRONIC BILATERAL LOW BACK PAIN WITHOUT SCIATICA: ICD-10-CM

## 2017-05-26 DIAGNOSIS — G89.29 CHRONIC BILATERAL LOW BACK PAIN WITHOUT SCIATICA: ICD-10-CM

## 2017-05-26 DIAGNOSIS — G25.81 RLS (RESTLESS LEGS SYNDROME): Primary | ICD-10-CM

## 2017-05-26 DIAGNOSIS — R41.3 MEMORY LOSS: ICD-10-CM

## 2017-05-26 DIAGNOSIS — K92.1 BLOOD IN STOOL: ICD-10-CM

## 2017-05-26 DIAGNOSIS — F41.1 GAD (GENERALIZED ANXIETY DISORDER): ICD-10-CM

## 2017-05-26 DIAGNOSIS — Z95.828 PORT CATHETER IN PLACE: ICD-10-CM

## 2017-05-26 LAB
ALBUMIN SERPL-MCNC: 4.1 G/DL (ref 3.5–5.2)
ALP BLD-CCNC: 47 U/L (ref 35–104)
ALT SERPL-CCNC: 8 U/L (ref 5–33)
ANION GAP SERPL CALCULATED.3IONS-SCNC: 14 MMOL/L (ref 7–19)
AST SERPL-CCNC: 14 U/L (ref 5–32)
BASOPHILS ABSOLUTE: 0 K/UL (ref 0–0.2)
BASOPHILS RELATIVE PERCENT: 0.5 % (ref 0–1)
BILIRUB SERPL-MCNC: <0.2 MG/DL (ref 0.2–1.2)
BUN BLDV-MCNC: 16 MG/DL (ref 8–23)
CALCIUM SERPL-MCNC: 8.6 MG/DL (ref 8.8–10.2)
CHLORIDE BLD-SCNC: 97 MMOL/L (ref 98–111)
CO2: 21 MMOL/L (ref 22–29)
CREAT SERPL-MCNC: 0.7 MG/DL (ref 0.5–0.9)
EOSINOPHILS ABSOLUTE: 0.1 K/UL (ref 0–0.6)
EOSINOPHILS RELATIVE PERCENT: 1.1 % (ref 0–5)
GFR NON-AFRICAN AMERICAN: >60
GLUCOSE BLD-MCNC: 92 MG/DL (ref 74–109)
HCT VFR BLD CALC: 32.6 % (ref 37–47)
HEMOGLOBIN: 10.3 G/DL (ref 12–16)
LYMPHOCYTES ABSOLUTE: 1.4 K/UL (ref 1.1–4.5)
LYMPHOCYTES RELATIVE PERCENT: 25.5 % (ref 20–40)
MCH RBC QN AUTO: 30.2 PG (ref 27–31)
MCHC RBC AUTO-ENTMCNC: 31.6 G/DL (ref 33–37)
MCV RBC AUTO: 95.6 FL (ref 81–99)
MONOCYTES ABSOLUTE: 0.4 K/UL (ref 0–0.9)
MONOCYTES RELATIVE PERCENT: 6.3 % (ref 0–10)
NEUTROPHILS ABSOLUTE: 3.7 K/UL (ref 1.5–7.5)
NEUTROPHILS RELATIVE PERCENT: 66.2 % (ref 50–65)
PDW BLD-RTO: 15.2 % (ref 11.5–14.5)
PLATELET # BLD: 208 K/UL (ref 130–400)
PMV BLD AUTO: 9.8 FL (ref 7.4–10.4)
POTASSIUM SERPL-SCNC: 4.3 MMOL/L (ref 3.5–5)
RBC # BLD: 3.41 M/UL (ref 4.2–5.4)
SODIUM BLD-SCNC: 132 MMOL/L (ref 136–145)
TOTAL PROTEIN: 6.6 G/DL (ref 6.6–8.7)
WBC # BLD: 5.5 K/UL (ref 4.8–10.8)

## 2017-05-26 PROCEDURE — 1123F ACP DISCUSS/DSCN MKR DOCD: CPT | Performed by: NURSE PRACTITIONER

## 2017-05-26 PROCEDURE — G8599 NO ASA/ANTIPLAT THER USE RNG: HCPCS | Performed by: NURSE PRACTITIONER

## 2017-05-26 PROCEDURE — 4040F PNEUMOC VAC/ADMIN/RCVD: CPT | Performed by: NURSE PRACTITIONER

## 2017-05-26 PROCEDURE — 3014F SCREEN MAMMO DOC REV: CPT | Performed by: NURSE PRACTITIONER

## 2017-05-26 PROCEDURE — G8399 PT W/DXA RESULTS DOCUMENT: HCPCS | Performed by: NURSE PRACTITIONER

## 2017-05-26 PROCEDURE — G8427 DOCREV CUR MEDS BY ELIG CLIN: HCPCS | Performed by: NURSE PRACTITIONER

## 2017-05-26 PROCEDURE — 3017F COLORECTAL CA SCREEN DOC REV: CPT | Performed by: NURSE PRACTITIONER

## 2017-05-26 PROCEDURE — 1090F PRES/ABSN URINE INCON ASSESS: CPT | Performed by: NURSE PRACTITIONER

## 2017-05-26 PROCEDURE — G8419 CALC BMI OUT NRM PARAM NOF/U: HCPCS | Performed by: NURSE PRACTITIONER

## 2017-05-26 PROCEDURE — 4004F PT TOBACCO SCREEN RCVD TLK: CPT | Performed by: NURSE PRACTITIONER

## 2017-05-26 PROCEDURE — 99214 OFFICE O/P EST MOD 30 MIN: CPT | Performed by: NURSE PRACTITIONER

## 2017-05-26 RX ORDER — MEMANTINE HYDROCHLORIDE 5 MG/1
5 TABLET ORAL 2 TIMES DAILY
Qty: 60 TABLET | Refills: 3 | Status: SHIPPED | OUTPATIENT
Start: 2017-05-26 | End: 2017-12-12 | Stop reason: SDUPTHER

## 2017-05-26 RX ORDER — CLONAZEPAM 0.5 MG/1
0.5 TABLET ORAL 3 TIMES DAILY PRN
Qty: 75 TABLET | Refills: 0 | Status: SHIPPED | OUTPATIENT
Start: 2017-05-26 | End: 2017-07-11 | Stop reason: SDUPTHER

## 2017-05-26 RX ORDER — ROPINIROLE 0.25 MG/1
0.25 TABLET, FILM COATED ORAL NIGHTLY
Qty: 60 TABLET | Refills: 3 | Status: SHIPPED | OUTPATIENT
Start: 2017-05-26 | End: 2018-05-15 | Stop reason: SDUPTHER

## 2017-05-26 ASSESSMENT — ENCOUNTER SYMPTOMS
VOMITING: 0
DIARRHEA: 1
WHEEZING: 0
BACK PAIN: 0
SORE THROAT: 0
NAUSEA: 0
SHORTNESS OF BREATH: 0
EYE PAIN: 0
EYE DISCHARGE: 0
CHEST TIGHTNESS: 0
COUGH: 0
ANAL BLEEDING: 0
ABDOMINAL PAIN: 0
BLOOD IN STOOL: 1
RHINORRHEA: 0
EYE REDNESS: 0
CONSTIPATION: 0

## 2017-05-30 ENCOUNTER — TELEPHONE (OUTPATIENT)
Dept: PRIMARY CARE CLINIC | Age: 72
End: 2017-05-30

## 2017-05-30 RX ORDER — TRAZODONE HYDROCHLORIDE 50 MG/1
TABLET ORAL
Qty: 90 TABLET | Refills: 0 | OUTPATIENT
Start: 2017-05-30

## 2017-06-02 ENCOUNTER — TELEPHONE (OUTPATIENT)
Dept: PRIMARY CARE CLINIC | Age: 72
End: 2017-06-02

## 2017-06-02 ENCOUNTER — HOSPITAL ENCOUNTER (OUTPATIENT)
Dept: GENERAL RADIOLOGY | Facility: HOSPITAL | Age: 72
Discharge: HOME | End: 2017-06-02
Attending: INTERNAL MEDICINE

## 2017-06-02 DIAGNOSIS — R11.0 NAUSEA: ICD-10-CM

## 2017-06-02 RX ORDER — ONDANSETRON 4 MG/1
4 TABLET, FILM COATED ORAL EVERY 12 HOURS PRN
Qty: 30 TABLET | Refills: 1 | Status: SHIPPED | OUTPATIENT
Start: 2017-06-02 | End: 2017-08-15

## 2017-06-06 ENCOUNTER — HOSPITAL ENCOUNTER (OUTPATIENT)
Dept: GENERAL RADIOLOGY | Facility: HOSPITAL | Age: 72
Discharge: HOME | End: 2017-06-06
Attending: INTERNAL MEDICINE | Admitting: INTERNAL MEDICINE

## 2017-06-06 PROCEDURE — 74240 X-RAY XM UPR GI TRC 1CNTRST: CPT

## 2017-06-19 ENCOUNTER — PREP FOR SURGERY (OUTPATIENT)
Dept: OTHER | Facility: HOSPITAL | Age: 72
End: 2017-06-19

## 2017-06-19 ENCOUNTER — TELEPHONE (OUTPATIENT)
Dept: GASTROENTEROLOGY | Facility: CLINIC | Age: 72
End: 2017-06-19

## 2017-06-19 DIAGNOSIS — R13.10 DYSPHAGIA, UNSPECIFIED TYPE: Primary | ICD-10-CM

## 2017-06-19 NOTE — TELEPHONE ENCOUNTER
Spoke with pt on phone.    She continues to experience problems  Dr Moon reviewed UGI, recommends proceeding with EGD.  Pt agreeable to this.  She is not on ASA or blood thinner.  Advised her to avoid use of Vit E, MVI, NSAIDs x 5 days prior to procedure.    Please call pt and place on schedule for EGD next week    i have placed order in computer  ty

## 2017-06-27 ENCOUNTER — TELEPHONE (OUTPATIENT)
Dept: GASTROENTEROLOGY | Facility: CLINIC | Age: 72
End: 2017-06-27

## 2017-07-07 DIAGNOSIS — F51.04 PSYCHOPHYSIOLOGICAL INSOMNIA: ICD-10-CM

## 2017-07-07 RX ORDER — HYDROXYZINE 50 MG/1
50 TABLET, FILM COATED ORAL NIGHTLY PRN
Qty: 30 TABLET | Refills: 3 | Status: SHIPPED | OUTPATIENT
Start: 2017-07-07 | End: 2017-09-15 | Stop reason: SDUPTHER

## 2017-07-11 ENCOUNTER — OFFICE VISIT (OUTPATIENT)
Dept: PRIMARY CARE CLINIC | Age: 72
End: 2017-07-11
Payer: MEDICARE

## 2017-07-11 ENCOUNTER — TELEPHONE (OUTPATIENT)
Dept: PRIMARY CARE CLINIC | Age: 72
End: 2017-07-11

## 2017-07-11 VITALS
WEIGHT: 116 LBS | OXYGEN SATURATION: 92 % | SYSTOLIC BLOOD PRESSURE: 132 MMHG | DIASTOLIC BLOOD PRESSURE: 84 MMHG | BODY MASS INDEX: 21.35 KG/M2 | TEMPERATURE: 97.8 F | HEART RATE: 78 BPM | HEIGHT: 62 IN

## 2017-07-11 DIAGNOSIS — F41.1 GAD (GENERALIZED ANXIETY DISORDER): ICD-10-CM

## 2017-07-11 DIAGNOSIS — G89.29 CHRONIC BILATERAL LOW BACK PAIN WITH LEFT-SIDED SCIATICA: ICD-10-CM

## 2017-07-11 DIAGNOSIS — M54.42 CHRONIC BILATERAL LOW BACK PAIN WITH LEFT-SIDED SCIATICA: ICD-10-CM

## 2017-07-11 PROCEDURE — G8420 CALC BMI NORM PARAMETERS: HCPCS | Performed by: NURSE PRACTITIONER

## 2017-07-11 PROCEDURE — 99213 OFFICE O/P EST LOW 20 MIN: CPT | Performed by: NURSE PRACTITIONER

## 2017-07-11 PROCEDURE — 3014F SCREEN MAMMO DOC REV: CPT | Performed by: NURSE PRACTITIONER

## 2017-07-11 PROCEDURE — 3017F COLORECTAL CA SCREEN DOC REV: CPT | Performed by: NURSE PRACTITIONER

## 2017-07-11 PROCEDURE — G8599 NO ASA/ANTIPLAT THER USE RNG: HCPCS | Performed by: NURSE PRACTITIONER

## 2017-07-11 PROCEDURE — 1123F ACP DISCUSS/DSCN MKR DOCD: CPT | Performed by: NURSE PRACTITIONER

## 2017-07-11 PROCEDURE — G8427 DOCREV CUR MEDS BY ELIG CLIN: HCPCS | Performed by: NURSE PRACTITIONER

## 2017-07-11 PROCEDURE — 4004F PT TOBACCO SCREEN RCVD TLK: CPT | Performed by: NURSE PRACTITIONER

## 2017-07-11 PROCEDURE — 1090F PRES/ABSN URINE INCON ASSESS: CPT | Performed by: NURSE PRACTITIONER

## 2017-07-11 PROCEDURE — 4040F PNEUMOC VAC/ADMIN/RCVD: CPT | Performed by: NURSE PRACTITIONER

## 2017-07-11 PROCEDURE — G8399 PT W/DXA RESULTS DOCUMENT: HCPCS | Performed by: NURSE PRACTITIONER

## 2017-07-11 RX ORDER — CLONAZEPAM 0.5 MG/1
0.5 TABLET ORAL 3 TIMES DAILY PRN
Qty: 75 TABLET | Refills: 0 | Status: SHIPPED | OUTPATIENT
Start: 2017-07-11 | End: 2017-08-15 | Stop reason: SDUPTHER

## 2017-07-11 RX ORDER — TIZANIDINE 4 MG/1
4 TABLET ORAL EVERY 8 HOURS PRN
Qty: 90 TABLET | Refills: 3 | Status: SHIPPED | OUTPATIENT
Start: 2017-07-11 | End: 2017-07-11 | Stop reason: SDUPTHER

## 2017-07-11 RX ORDER — TIZANIDINE 4 MG/1
4 TABLET ORAL EVERY 8 HOURS PRN
Qty: 90 TABLET | Refills: 3 | Status: SHIPPED | OUTPATIENT
Start: 2017-07-11 | End: 2017-08-15 | Stop reason: SDUPTHER

## 2017-07-11 ASSESSMENT — ENCOUNTER SYMPTOMS
CHEST TIGHTNESS: 0
EYE PAIN: 0
VOMITING: 0
NAUSEA: 0
COUGH: 0
ABDOMINAL PAIN: 0
SORE THROAT: 0
EYE DISCHARGE: 0
ANAL BLEEDING: 0
RHINORRHEA: 0
WHEEZING: 0
CONSTIPATION: 0
EYE REDNESS: 0
SHORTNESS OF BREATH: 0

## 2017-07-24 ENCOUNTER — HOSPITAL ENCOUNTER (OUTPATIENT)
Facility: HOSPITAL | Age: 72
Setting detail: HOSPITAL OUTPATIENT SURGERY
Discharge: HOME | End: 2017-07-24
Attending: INTERNAL MEDICINE | Admitting: INTERNAL MEDICINE

## 2017-07-24 ENCOUNTER — ANESTHESIA (OUTPATIENT)
Dept: GASTROENTEROLOGY | Facility: HOSPITAL | Age: 72
End: 2017-07-24

## 2017-07-24 ENCOUNTER — ANESTHESIA EVENT (OUTPATIENT)
Dept: GASTROENTEROLOGY | Facility: HOSPITAL | Age: 72
End: 2017-07-24

## 2017-07-24 VITALS
DIASTOLIC BLOOD PRESSURE: 79 MMHG | SYSTOLIC BLOOD PRESSURE: 127 MMHG | BODY MASS INDEX: 21.16 KG/M2 | RESPIRATION RATE: 22 BRPM | HEIGHT: 62 IN | TEMPERATURE: 97.6 F | OXYGEN SATURATION: 97 % | HEART RATE: 74 BPM | WEIGHT: 115 LBS

## 2017-07-24 DIAGNOSIS — R13.10 DYSPHAGIA, UNSPECIFIED TYPE: ICD-10-CM

## 2017-07-24 PROCEDURE — 43239 EGD BIOPSY SINGLE/MULTIPLE: CPT | Performed by: INTERNAL MEDICINE

## 2017-07-24 PROCEDURE — 25010000002 PROPOFOL 10 MG/ML EMULSION: Performed by: NURSE ANESTHETIST, CERTIFIED REGISTERED

## 2017-07-24 PROCEDURE — 87081 CULTURE SCREEN ONLY: CPT | Performed by: INTERNAL MEDICINE

## 2017-07-24 PROCEDURE — 25010000002 HEPARIN FLUSH (PORCINE) 100 UNIT/ML SOLUTION: Performed by: NURSE ANESTHETIST, CERTIFIED REGISTERED

## 2017-07-24 RX ORDER — SODIUM CHLORIDE 0.9 % (FLUSH) 0.9 %
10 SYRINGE (ML) INJECTION AS NEEDED
Status: DISCONTINUED | OUTPATIENT
Start: 2017-07-24 | End: 2017-07-24 | Stop reason: HOSPADM

## 2017-07-24 RX ORDER — LIDOCAINE HYDROCHLORIDE 10 MG/ML
0.5 INJECTION, SOLUTION INFILTRATION; PERINEURAL ONCE AS NEEDED
Status: DISCONTINUED | OUTPATIENT
Start: 2017-07-24 | End: 2017-07-24 | Stop reason: HOSPADM

## 2017-07-24 RX ORDER — LIDOCAINE HYDROCHLORIDE 20 MG/ML
INJECTION, SOLUTION INFILTRATION; PERINEURAL AS NEEDED
Status: DISCONTINUED | OUTPATIENT
Start: 2017-07-24 | End: 2017-07-24 | Stop reason: SURG

## 2017-07-24 RX ORDER — SODIUM CHLORIDE 0.9 % (FLUSH) 0.9 %
3 SYRINGE (ML) INJECTION AS NEEDED
Status: DISCONTINUED | OUTPATIENT
Start: 2017-07-24 | End: 2017-07-24 | Stop reason: HOSPADM

## 2017-07-24 RX ORDER — SODIUM CHLORIDE 9 MG/ML
500 INJECTION, SOLUTION INTRAVENOUS CONTINUOUS PRN
Status: DISCONTINUED | OUTPATIENT
Start: 2017-07-24 | End: 2017-07-24 | Stop reason: HOSPADM

## 2017-07-24 RX ORDER — PROPOFOL 10 MG/ML
VIAL (ML) INTRAVENOUS AS NEEDED
Status: DISCONTINUED | OUTPATIENT
Start: 2017-07-24 | End: 2017-07-24 | Stop reason: SURG

## 2017-07-24 RX ADMIN — LIDOCAINE HYDROCHLORIDE 120 MG: 20 INJECTION, SOLUTION INFILTRATION; PERINEURAL at 12:25

## 2017-07-24 RX ADMIN — SODIUM CHLORIDE 500 ML: 9 INJECTION, SOLUTION INTRAVENOUS at 11:48

## 2017-07-24 RX ADMIN — PROPOFOL 100 MG: 10 INJECTION, EMULSION INTRAVENOUS at 12:25

## 2017-07-24 RX ADMIN — Medication 500 UNITS: at 13:12

## 2017-07-24 NOTE — PLAN OF CARE
Problem: GI Endoscopy (Adult)  Goal: Signs and Symptoms of Listed Potential Problems Will be Absent or Manageable (GI Endoscopy)  Outcome: Outcome(s) achieved Date Met:  07/24/17 07/24/17 1302   GI Endoscopy   Problems Assessed (GI Endoscopy) all   Problems Present (GI Endoscopy) none

## 2017-07-24 NOTE — ANESTHESIA PREPROCEDURE EVALUATION
Anesthesia Evaluation     Patient summary reviewed   NPO Solid Status: > 8 hours  NPO Liquid Status: > 2 hours     Airway   Mallampati: II  TM distance: >3 FB  Neck ROM: full  no difficulty expected  Dental    (+) edentulous    Pulmonary - normal exam   (+) COPD mild, asthma,   Cardiovascular - normal exam  Exercise tolerance: good (4-7 METS)    (+) past MI  >12 months, CAD,       Neuro/Psych  GI/Hepatic/Renal/Endo      Musculoskeletal     Abdominal  - normal exam   Substance History      OB/GYN          Other   (+) arthritis                                     Anesthesia Plan    ASA 3     intravenous induction   Anesthetic plan and risks discussed with patient.    Plan discussed with CRNA.

## 2017-07-24 NOTE — PLAN OF CARE
Problem: Patient Care Overview (Adult)  Goal: Plan of Care Review  Outcome: Outcome(s) achieved Date Met:  07/24/17 07/24/17 1302   Coping/Psychosocial Response Interventions   Plan Of Care Reviewed With patient;friend   Patient Care Overview   Progress improving   Outcome Evaluation   Outcome Summary/Follow up Plan DISCHARGE CRITERIA MET

## 2017-07-24 NOTE — H&P
[facility] Gastroenterology  Pre Procedure History & Physical    Chief Complaint:   Pain    Subjective     HPI:   Nausea    Past Medical History:   Past Medical History:   Diagnosis Date   • Anemia    • Arthritis    • Asthma    • CAD (coronary artery disease)    • CHF (congestive heart failure)    • COPD (chronic obstructive pulmonary disease)    • Depression    • Heart disease    • MI (myocardial infarction)        Past Surgical History:  [unfilled]    Family History:  Family History   Problem Relation Age of Onset   • Colon polyps Neg Hx    • Esophageal cancer Neg Hx        Social History:   reports that she has been smoking Cigarettes.  She has a 36.00 pack-year smoking history. She has never used smokeless tobacco. She reports that she does not drink alcohol or use illicit drugs.    Medications:   Prior to Admission medications    Medication Sig Start Date End Date Taking? Authorizing Provider   azelastine (ASTELIN) 0.1 % nasal spray 2 sprays into each nostril 2 (Two) Times a Day. Use in each nostril as directed   Yes Historical Provider, MD   clonazePAM (KlonoPIN) 0.5 MG tablet Take 0.5 mg by mouth 2 (Two) Times a Day As Needed for seizures.   Yes Historical Provider, MD   dexlansoprazole (DEXILANT) 60 MG capsule Take 60 mg by mouth Daily.   Yes Historical Provider, MD   DULoxetine (CYMBALTA) 60 MG capsule Take 60 mg by mouth Daily.   Yes Historical Provider, MD   fluticasone (FLONASE) 50 MCG/ACT nasal spray 2 sprays into each nostril Daily.   Yes Historical Provider, MD   furosemide (LASIX) 40 MG tablet Take 40 mg by mouth 2 (Two) Times a Day.   Yes Historical Provider, MD   guaiFENesin (MUCINEX) 600 MG 12 hr tablet Take 1,200 mg by mouth 2 (Two) Times a Day.   Yes Historical Provider, MD   HYDROcodone-acetaminophen (NORCO)  MG per tablet Take 1 tablet by mouth Every 6 (Six) Hours As Needed for Moderate Pain (4-6).   Yes Historical Provider, MD   isosorbide dinitrate (ISORDIL) 30 MG tablet Take 30 mg by  "mouth 4 (Four) Times a Day.   Yes Historical Provider, MD   linaclotide (LINZESS) 290 MCG capsule capsule Take 290 mcg by mouth Every Morning Before Breakfast.   Yes Historical Provider, MD   memantine (NAMENDA) 5 MG tablet Take 5 mg by mouth 2 (Two) Times a Day.   Yes Historical Provider, MD   mirtazapine (REMERON SOL-TAB) 15 MG disintegrating tablet Take 15 mg by mouth Every Night.   Yes Historical Provider, MD   mupirocin (BACTROBAN) 2 % ointment Apply  topically 3 (Three) Times a Day.   Yes Historical Provider, MD   oxyCODONE-acetaminophen (PERCOCET) 7.5-325 MG per tablet Take 1 tablet by mouth Every 6 (Six) Hours As Needed.   Yes Historical Provider, MD   Potassium 99 MG tablet Take  by mouth.   Yes Historical Provider, MD   promethazine (PHENERGAN) 25 MG tablet Take 25 mg by mouth Every 6 (Six) Hours As Needed for nausea or vomiting.   Yes Historical Provider, MD   sucralfate (CARAFATE) 1 G tablet Take 1 g by mouth 4 (Four) Times a Day.   Yes Historical Provider, MD   theophylline (THEODUR) 100 MG 12 hr tablet Take 100 mg by mouth 2 (Two) Times a Day.   Yes Historical Provider, MD   TiZANidine (ZANAFLEX) 4 MG capsule Take 4 mg by mouth 3 (Three) Times a Day.   Yes Historical Provider, MD   vitamin E 400 UNIT capsule Take 400 Units by mouth Daily.   Yes Historical Provider, MD   nitroglycerin (NITROSTAT) 0.4 MG SL tablet Place 0.4 mg under the tongue Every 5 (Five) Minutes As Needed for chest pain. Take no more than 3 doses in 15 minutes.    Historical Provider, MD       Allergies:  Codeine and Sulfa antibiotics    Objective     Blood pressure 142/73, pulse 80, temperature 97.6 °F (36.4 °C), temperature source Temporal Artery , resp. rate 20, height 62\" (157.5 cm), weight 115 lb (52.2 kg), SpO2 92 %.    Physical Exam   Constitutional: Pt is oriented to person, place, and in no distress.   HENT: Mouth/Throat: Oropharynx is clear.   Cardiovascular: Normal rate, regular rhythm.    Pulmonary/Chest: Effort normal. " No respiratory distress. No  wheezes.   Abdominal: Soft. Non-distended.  Skin: Skin is warm and dry.   Psychiatric: Mood, memory, affect and judgment appear normal.     Assessment/Plan     Diagnosis:  Nausea    Anticipated Surgical Procedure:  EGD    The risks, benefits, and alternatives of this procedure have been discussed with the patient or the responsible party- the patient understands and agrees to proceed.

## 2017-07-24 NOTE — ANESTHESIA POSTPROCEDURE EVALUATION
"Patient: Christ Obrien    Procedure Summary     Date Anesthesia Start Anesthesia Stop Room / Location    07/24/17 1215 1231  [facility] / [facility]       Procedure Diagnosis Surgeon Provider    ESOPHAGOGASTRODUODENOSCOPY WITH ANESTHESIA (N/A Esophagus) Dysphagia, unspecified type  (Dysphagia, unspecified type [R13.10]) DO Bladimir Frank CRNA          Anesthesia Type: No value filed.  Last vitals  BP        Temp        Pulse       Resp        SpO2   92 % (07/24/17 1143)      Post Anesthesia Care and Evaluation    Patient location during evaluation: PACU  Patient participation: complete - patient participated  Level of consciousness: awake and alert  Pain score: 0  Pain management: adequate  Airway patency: patent  Anesthetic complications: No anesthetic complications    Cardiovascular status: acceptable  Respiratory status: acceptable  Hydration status: acceptable    Comments: Blood pressure 142/73, pulse 80, temperature 97.6 °F (36.4 °C), temperature source Temporal Artery , resp. rate 20, height 62\" (157.5 cm), weight 115 lb (52.2 kg), SpO2 92 %.        "

## 2017-07-24 NOTE — PLAN OF CARE
Problem: Patient Care Overview (Adult)  Goal: Plan of Care Review  Outcome: Ongoing (interventions implemented as appropriate)    07/24/17 1231   Coping/Psychosocial Response Interventions   Plan Of Care Reviewed With patient   Patient Care Overview   Progress progress toward functional goals as expected   Outcome Evaluation   Outcome Summary/Follow up Plan tolerated procedure well

## 2017-07-24 NOTE — PLAN OF CARE
Problem: GI Endoscopy (Adult)  Goal: Signs and Symptoms of Listed Potential Problems Will be Absent or Manageable (GI Endoscopy)  Outcome: Ongoing (interventions implemented as appropriate)    07/24/17 1231   GI Endoscopy   Problems Assessed (GI Endoscopy) all   Problems Present (GI Endoscopy) none

## 2017-07-25 LAB — UREASE TISS QL: NEGATIVE

## 2017-08-15 ENCOUNTER — OFFICE VISIT (OUTPATIENT)
Dept: PRIMARY CARE CLINIC | Age: 72
End: 2017-08-15
Payer: MEDICARE

## 2017-08-15 VITALS
HEIGHT: 62 IN | WEIGHT: 118.25 LBS | HEART RATE: 80 BPM | OXYGEN SATURATION: 96 % | SYSTOLIC BLOOD PRESSURE: 136 MMHG | DIASTOLIC BLOOD PRESSURE: 84 MMHG | BODY MASS INDEX: 21.76 KG/M2 | TEMPERATURE: 98.3 F

## 2017-08-15 DIAGNOSIS — M54.50 CHRONIC BILATERAL LOW BACK PAIN WITHOUT SCIATICA: ICD-10-CM

## 2017-08-15 DIAGNOSIS — L30.9 DERMATITIS: Primary | ICD-10-CM

## 2017-08-15 DIAGNOSIS — K21.9 GASTROESOPHAGEAL REFLUX DISEASE, ESOPHAGITIS PRESENCE NOT SPECIFIED: ICD-10-CM

## 2017-08-15 DIAGNOSIS — F03.90 DEMENTIA WITHOUT BEHAVIORAL DISTURBANCE, UNSPECIFIED DEMENTIA TYPE: ICD-10-CM

## 2017-08-15 DIAGNOSIS — Z95.828 PORT CATHETER IN PLACE: ICD-10-CM

## 2017-08-15 DIAGNOSIS — G89.29 CHRONIC BILATERAL LOW BACK PAIN WITHOUT SCIATICA: ICD-10-CM

## 2017-08-15 DIAGNOSIS — F41.1 GAD (GENERALIZED ANXIETY DISORDER): ICD-10-CM

## 2017-08-15 DIAGNOSIS — R11.0 NAUSEA: ICD-10-CM

## 2017-08-15 PROCEDURE — G8427 DOCREV CUR MEDS BY ELIG CLIN: HCPCS | Performed by: NURSE PRACTITIONER

## 2017-08-15 PROCEDURE — 4040F PNEUMOC VAC/ADMIN/RCVD: CPT | Performed by: NURSE PRACTITIONER

## 2017-08-15 PROCEDURE — 3014F SCREEN MAMMO DOC REV: CPT | Performed by: NURSE PRACTITIONER

## 2017-08-15 PROCEDURE — 4004F PT TOBACCO SCREEN RCVD TLK: CPT | Performed by: NURSE PRACTITIONER

## 2017-08-15 PROCEDURE — 1123F ACP DISCUSS/DSCN MKR DOCD: CPT | Performed by: NURSE PRACTITIONER

## 2017-08-15 PROCEDURE — G8399 PT W/DXA RESULTS DOCUMENT: HCPCS | Performed by: NURSE PRACTITIONER

## 2017-08-15 PROCEDURE — 3017F COLORECTAL CA SCREEN DOC REV: CPT | Performed by: NURSE PRACTITIONER

## 2017-08-15 PROCEDURE — 1090F PRES/ABSN URINE INCON ASSESS: CPT | Performed by: NURSE PRACTITIONER

## 2017-08-15 PROCEDURE — 99214 OFFICE O/P EST MOD 30 MIN: CPT | Performed by: NURSE PRACTITIONER

## 2017-08-15 PROCEDURE — G8599 NO ASA/ANTIPLAT THER USE RNG: HCPCS | Performed by: NURSE PRACTITIONER

## 2017-08-15 PROCEDURE — G8420 CALC BMI NORM PARAMETERS: HCPCS | Performed by: NURSE PRACTITIONER

## 2017-08-15 RX ORDER — ONDANSETRON 4 MG/1
4 TABLET, FILM COATED ORAL DAILY PRN
Qty: 30 TABLET | Refills: 1 | Status: SHIPPED | OUTPATIENT
Start: 2017-08-15 | End: 2017-10-25 | Stop reason: SDUPTHER

## 2017-08-15 RX ORDER — DEXLANSOPRAZOLE 60 MG/1
60 CAPSULE, DELAYED RELEASE ORAL DAILY
Qty: 30 CAPSULE | Refills: 5 | Status: SHIPPED | OUTPATIENT
Start: 2017-08-15 | End: 2018-01-02 | Stop reason: SDUPTHER

## 2017-08-15 RX ORDER — TIZANIDINE 4 MG/1
4 TABLET ORAL EVERY 8 HOURS PRN
Qty: 90 TABLET | Refills: 3 | Status: SHIPPED | OUTPATIENT
Start: 2017-08-15 | End: 2018-01-02 | Stop reason: SDUPTHER

## 2017-08-15 RX ORDER — CLONAZEPAM 0.5 MG/1
0.5 TABLET ORAL 3 TIMES DAILY PRN
Qty: 75 TABLET | Refills: 0 | Status: SHIPPED | OUTPATIENT
Start: 2017-08-15 | End: 2017-09-15 | Stop reason: SDUPTHER

## 2017-08-15 RX ORDER — DONEPEZIL HYDROCHLORIDE 5 MG/1
5 TABLET, FILM COATED ORAL NIGHTLY
Qty: 30 TABLET | Refills: 3 | Status: SHIPPED | OUTPATIENT
Start: 2017-08-15 | End: 2017-11-28 | Stop reason: SDUPTHER

## 2017-08-15 ASSESSMENT — ENCOUNTER SYMPTOMS
CONSTIPATION: 0
VOMITING: 0
BACK PAIN: 1
ABDOMINAL PAIN: 1
DIARRHEA: 0
SHORTNESS OF BREATH: 0
SORE THROAT: 0
NAUSEA: 1
COUGH: 0
RHINORRHEA: 0
EYE REDNESS: 0

## 2017-08-25 ENCOUNTER — TELEPHONE (OUTPATIENT)
Dept: PRIMARY CARE CLINIC | Age: 72
End: 2017-08-25

## 2017-08-29 ENCOUNTER — HOSPITAL ENCOUNTER (EMERGENCY)
Age: 72
Discharge: HOME | End: 2017-08-29
Attending: EMERGENCY MEDICINE
Payer: MEDICARE

## 2017-08-29 ENCOUNTER — APPOINTMENT (OUTPATIENT)
Dept: GENERAL RADIOLOGY | Age: 72
End: 2017-08-29
Payer: MEDICARE

## 2017-08-29 VITALS
RESPIRATION RATE: 18 BRPM | SYSTOLIC BLOOD PRESSURE: 112 MMHG | OXYGEN SATURATION: 93 % | HEART RATE: 92 BPM | TEMPERATURE: 98.3 F | HEIGHT: 62 IN | DIASTOLIC BLOOD PRESSURE: 85 MMHG | BODY MASS INDEX: 21.71 KG/M2 | WEIGHT: 118 LBS

## 2017-08-29 DIAGNOSIS — T40.604A OPIATE OVERDOSE, UNDETERMINED INTENT, INITIAL ENCOUNTER (HCC): Primary | ICD-10-CM

## 2017-08-29 LAB
ALBUMIN SERPL-MCNC: 3.8 G/DL (ref 3.5–5.2)
ALP BLD-CCNC: 70 U/L (ref 35–104)
ALT SERPL-CCNC: 7 U/L (ref 5–33)
ANION GAP SERPL CALCULATED.3IONS-SCNC: 14 MMOL/L (ref 7–19)
AST SERPL-CCNC: 14 U/L (ref 5–32)
BASE EXCESS ARTERIAL: 4.9 MMOL/L (ref -2–2)
BILIRUB SERPL-MCNC: 0.3 MG/DL (ref 0.2–1.2)
BUN BLDV-MCNC: 20 MG/DL (ref 8–23)
CALCIUM SERPL-MCNC: 9.6 MG/DL (ref 8.8–10.2)
CARBOXYHEMOGLOBIN ARTERIAL: 1.3 % (ref 0–5)
CHLORIDE BLD-SCNC: 96 MMOL/L (ref 98–111)
CO2: 27 MMOL/L (ref 22–29)
CREAT SERPL-MCNC: 0.7 MG/DL (ref 0.5–0.9)
GFR NON-AFRICAN AMERICAN: >60
GLUCOSE BLD-MCNC: 112 MG/DL (ref 74–109)
HCO3 ARTERIAL: 29.2 MMOL/L (ref 22–26)
HCT VFR BLD CALC: 36.2 % (ref 37–47)
HEMOGLOBIN, ART, EXTENDED: 11.8 G/DL (ref 12–16)
HEMOGLOBIN: 11.5 G/DL (ref 12–16)
MCH RBC QN AUTO: 31.8 PG (ref 27–31)
MCHC RBC AUTO-ENTMCNC: 31.8 G/DL (ref 33–37)
MCV RBC AUTO: 100 FL (ref 81–99)
METHEMOGLOBIN ARTERIAL: 1.7 %
O2 CONTENT ARTERIAL: 15.9 ML/DL
O2 SAT, ARTERIAL: 94.9 %
O2 THERAPY: ABNORMAL
PCO2 ARTERIAL: 41 MMHG (ref 35–45)
PDW BLD-RTO: 14.5 % (ref 11.5–14.5)
PH ARTERIAL: 7.46 (ref 7.35–7.45)
PLATELET # BLD: 233 K/UL (ref 130–400)
PMV BLD AUTO: 9.2 FL (ref 9.4–12.3)
PO2 ARTERIAL: 96 MMHG (ref 80–100)
POTASSIUM SERPL-SCNC: 4.2 MMOL/L (ref 3.5–5)
POTASSIUM, WHOLE BLOOD: 4.1
RBC # BLD: 3.62 M/UL (ref 4.2–5.4)
SODIUM BLD-SCNC: 137 MMOL/L (ref 136–145)
TOTAL PROTEIN: 7.2 G/DL (ref 6.6–8.7)
TSH SERPL DL<=0.05 MIU/L-ACNC: 0.81 UIU/ML (ref 0.27–4.2)
WBC # BLD: 7.2 K/UL (ref 4.8–10.8)

## 2017-08-29 PROCEDURE — 99282 EMERGENCY DEPT VISIT SF MDM: CPT | Performed by: EMERGENCY MEDICINE

## 2017-08-29 PROCEDURE — 36600 WITHDRAWAL OF ARTERIAL BLOOD: CPT

## 2017-08-29 PROCEDURE — 99285 EMERGENCY DEPT VISIT HI MDM: CPT

## 2017-08-29 PROCEDURE — 85027 COMPLETE CBC AUTOMATED: CPT

## 2017-08-29 PROCEDURE — 84132 ASSAY OF SERUM POTASSIUM: CPT

## 2017-08-29 PROCEDURE — 82803 BLOOD GASES ANY COMBINATION: CPT

## 2017-08-29 PROCEDURE — 36415 COLL VENOUS BLD VENIPUNCTURE: CPT

## 2017-08-29 PROCEDURE — 84443 ASSAY THYROID STIM HORMONE: CPT

## 2017-08-29 PROCEDURE — 71010 XR CHEST PORTABLE: CPT

## 2017-08-29 PROCEDURE — 6360000002 HC RX W HCPCS

## 2017-08-29 PROCEDURE — 80053 COMPREHEN METABOLIC PANEL: CPT

## 2017-08-29 PROCEDURE — 93005 ELECTROCARDIOGRAM TRACING: CPT

## 2017-08-29 RX ORDER — HEPARIN SODIUM (PORCINE) LOCK FLUSH IV SOLN 100 UNIT/ML 100 UNIT/ML
300 SOLUTION INTRAVENOUS ONCE
Status: COMPLETED | OUTPATIENT
Start: 2017-08-29 | End: 2017-08-29

## 2017-08-29 RX ORDER — HEPARIN SODIUM (PORCINE) LOCK FLUSH IV SOLN 100 UNIT/ML 100 UNIT/ML
SOLUTION INTRAVENOUS
Status: COMPLETED
Start: 2017-08-29 | End: 2017-08-29

## 2017-08-29 RX ORDER — MORPHINE SULFATE 15 MG/1
15 TABLET ORAL EVERY 8 HOURS PRN
COMMUNITY
End: 2017-10-25

## 2017-08-29 RX ADMIN — HEPARIN SODIUM (PORCINE) LOCK FLUSH IV SOLN 100 UNIT/ML 300 UNITS: 100 SOLUTION at 13:16

## 2017-08-29 RX ADMIN — SODIUM CHLORIDE, PRESERVATIVE FREE 300 UNITS: 5 INJECTION INTRAVENOUS at 13:16

## 2017-08-29 ASSESSMENT — ENCOUNTER SYMPTOMS
EYE PAIN: 0
CONSTIPATION: 0
ALLERGIC/IMMUNOLOGIC NEGATIVE: 1
EYE DISCHARGE: 0
COUGH: 0
ANAL BLEEDING: 0
RESPIRATORY NEGATIVE: 1
CHOKING: 0
APNEA: 0
EYE REDNESS: 0
ABDOMINAL DISTENTION: 0
ABDOMINAL PAIN: 0

## 2017-08-29 ASSESSMENT — PAIN DESCRIPTION - LOCATION: LOCATION: EYE

## 2017-08-29 ASSESSMENT — PAIN SCALES - GENERAL: PAINLEVEL_OUTOF10: 10

## 2017-08-29 ASSESSMENT — PAIN DESCRIPTION - ORIENTATION: ORIENTATION: RIGHT

## 2017-08-29 ASSESSMENT — PAIN DESCRIPTION - PAIN TYPE: TYPE: ACUTE PAIN

## 2017-08-30 LAB
EKG P AXIS: 75 DEGREES
EKG P-R INTERVAL: 133 MS
EKG Q-T INTERVAL: 372 MS
EKG QRS DURATION: 89 MS
EKG QTC CALCULATION (BAZETT): 445 MS
EKG T AXIS: 67 DEGREES

## 2017-09-15 ENCOUNTER — OFFICE VISIT (OUTPATIENT)
Dept: PRIMARY CARE CLINIC | Age: 72
End: 2017-09-15
Payer: MEDICARE

## 2017-09-15 VITALS
WEIGHT: 119 LBS | TEMPERATURE: 98.7 F | HEIGHT: 62 IN | DIASTOLIC BLOOD PRESSURE: 70 MMHG | SYSTOLIC BLOOD PRESSURE: 110 MMHG | BODY MASS INDEX: 21.9 KG/M2 | HEART RATE: 88 BPM | OXYGEN SATURATION: 91 %

## 2017-09-15 DIAGNOSIS — M81.0 AGE-RELATED OSTEOPOROSIS WITHOUT CURRENT PATHOLOGICAL FRACTURE: ICD-10-CM

## 2017-09-15 DIAGNOSIS — F41.1 GAD (GENERALIZED ANXIETY DISORDER): Primary | ICD-10-CM

## 2017-09-15 DIAGNOSIS — K21.9 GASTROESOPHAGEAL REFLUX DISEASE, ESOPHAGITIS PRESENCE NOT SPECIFIED: ICD-10-CM

## 2017-09-15 DIAGNOSIS — Z72.0 TOBACCO ABUSE: ICD-10-CM

## 2017-09-15 DIAGNOSIS — F51.04 PSYCHOPHYSIOLOGICAL INSOMNIA: ICD-10-CM

## 2017-09-15 DIAGNOSIS — Z95.828 PORT CATHETER IN PLACE: ICD-10-CM

## 2017-09-15 PROCEDURE — G8599 NO ASA/ANTIPLAT THER USE RNG: HCPCS | Performed by: NURSE PRACTITIONER

## 2017-09-15 PROCEDURE — 4004F PT TOBACCO SCREEN RCVD TLK: CPT | Performed by: NURSE PRACTITIONER

## 2017-09-15 PROCEDURE — 3014F SCREEN MAMMO DOC REV: CPT | Performed by: NURSE PRACTITIONER

## 2017-09-15 PROCEDURE — 4005F PHARM THX FOR OP RXD: CPT | Performed by: NURSE PRACTITIONER

## 2017-09-15 PROCEDURE — G8427 DOCREV CUR MEDS BY ELIG CLIN: HCPCS | Performed by: NURSE PRACTITIONER

## 2017-09-15 PROCEDURE — 4040F PNEUMOC VAC/ADMIN/RCVD: CPT | Performed by: NURSE PRACTITIONER

## 2017-09-15 PROCEDURE — 3017F COLORECTAL CA SCREEN DOC REV: CPT | Performed by: NURSE PRACTITIONER

## 2017-09-15 PROCEDURE — 1123F ACP DISCUSS/DSCN MKR DOCD: CPT | Performed by: NURSE PRACTITIONER

## 2017-09-15 PROCEDURE — 1090F PRES/ABSN URINE INCON ASSESS: CPT | Performed by: NURSE PRACTITIONER

## 2017-09-15 PROCEDURE — G8399 PT W/DXA RESULTS DOCUMENT: HCPCS | Performed by: NURSE PRACTITIONER

## 2017-09-15 PROCEDURE — 96372 THER/PROPH/DIAG INJ SC/IM: CPT | Performed by: NURSE PRACTITIONER

## 2017-09-15 PROCEDURE — G8420 CALC BMI NORM PARAMETERS: HCPCS | Performed by: NURSE PRACTITIONER

## 2017-09-15 PROCEDURE — 99214 OFFICE O/P EST MOD 30 MIN: CPT | Performed by: NURSE PRACTITIONER

## 2017-09-15 RX ORDER — HYDROXYZINE 50 MG/1
50 TABLET, FILM COATED ORAL NIGHTLY PRN
Qty: 30 TABLET | Refills: 3 | Status: SHIPPED | OUTPATIENT
Start: 2017-09-15 | End: 2017-11-28 | Stop reason: SDUPTHER

## 2017-09-15 RX ORDER — PROMETHAZINE HYDROCHLORIDE 25 MG/1
25 TABLET ORAL
COMMUNITY
End: 2018-12-18

## 2017-09-15 RX ORDER — CLONAZEPAM 0.5 MG/1
0.5 TABLET ORAL 3 TIMES DAILY PRN
Qty: 75 TABLET | Refills: 0 | Status: SHIPPED | OUTPATIENT
Start: 2017-09-15 | End: 2017-10-25 | Stop reason: SDUPTHER

## 2017-09-15 ASSESSMENT — ENCOUNTER SYMPTOMS
CONSTIPATION: 0
COUGH: 0
ABDOMINAL PAIN: 0
SHORTNESS OF BREATH: 0
NAUSEA: 0
RHINORRHEA: 0
SORE THROAT: 0
VOMITING: 0
DIARRHEA: 0
EYE REDNESS: 0
BACK PAIN: 1

## 2017-10-25 ENCOUNTER — OFFICE VISIT (OUTPATIENT)
Dept: PRIMARY CARE CLINIC | Age: 72
End: 2017-10-25
Payer: MEDICARE

## 2017-10-25 VITALS
OXYGEN SATURATION: 90 % | TEMPERATURE: 98.6 F | BODY MASS INDEX: 21.39 KG/M2 | SYSTOLIC BLOOD PRESSURE: 126 MMHG | HEIGHT: 62 IN | DIASTOLIC BLOOD PRESSURE: 88 MMHG | WEIGHT: 116.25 LBS | HEART RATE: 85 BPM

## 2017-10-25 DIAGNOSIS — M54.41 CHRONIC BILATERAL LOW BACK PAIN WITH BILATERAL SCIATICA: ICD-10-CM

## 2017-10-25 DIAGNOSIS — R11.0 NAUSEA: ICD-10-CM

## 2017-10-25 DIAGNOSIS — Z23 NEED FOR INFLUENZA VACCINATION: ICD-10-CM

## 2017-10-25 DIAGNOSIS — H10.13 ALLERGIC CONJUNCTIVITIS OF BOTH EYES: ICD-10-CM

## 2017-10-25 DIAGNOSIS — Z23 NEED FOR PNEUMOCOCCAL VACCINATION: ICD-10-CM

## 2017-10-25 DIAGNOSIS — J44.9 CHRONIC OBSTRUCTIVE PULMONARY DISEASE, UNSPECIFIED COPD TYPE (HCC): ICD-10-CM

## 2017-10-25 DIAGNOSIS — M54.42 CHRONIC BILATERAL LOW BACK PAIN WITH BILATERAL SCIATICA: ICD-10-CM

## 2017-10-25 DIAGNOSIS — Z95.828 PORT CATHETER IN PLACE: ICD-10-CM

## 2017-10-25 DIAGNOSIS — F41.1 GAD (GENERALIZED ANXIETY DISORDER): Primary | ICD-10-CM

## 2017-10-25 DIAGNOSIS — G89.29 CHRONIC BILATERAL LOW BACK PAIN WITH BILATERAL SCIATICA: ICD-10-CM

## 2017-10-25 PROCEDURE — 3017F COLORECTAL CA SCREEN DOC REV: CPT | Performed by: NURSE PRACTITIONER

## 2017-10-25 PROCEDURE — 4040F PNEUMOC VAC/ADMIN/RCVD: CPT | Performed by: NURSE PRACTITIONER

## 2017-10-25 PROCEDURE — G8926 SPIRO NO PERF OR DOC: HCPCS | Performed by: NURSE PRACTITIONER

## 2017-10-25 PROCEDURE — 1090F PRES/ABSN URINE INCON ASSESS: CPT | Performed by: NURSE PRACTITIONER

## 2017-10-25 PROCEDURE — 90662 IIV NO PRSV INCREASED AG IM: CPT | Performed by: NURSE PRACTITIONER

## 2017-10-25 PROCEDURE — 99214 OFFICE O/P EST MOD 30 MIN: CPT | Performed by: NURSE PRACTITIONER

## 2017-10-25 PROCEDURE — 4004F PT TOBACCO SCREEN RCVD TLK: CPT | Performed by: NURSE PRACTITIONER

## 2017-10-25 PROCEDURE — 3023F SPIROM DOC REV: CPT | Performed by: NURSE PRACTITIONER

## 2017-10-25 PROCEDURE — G0009 ADMIN PNEUMOCOCCAL VACCINE: HCPCS | Performed by: NURSE PRACTITIONER

## 2017-10-25 PROCEDURE — G8427 DOCREV CUR MEDS BY ELIG CLIN: HCPCS | Performed by: NURSE PRACTITIONER

## 2017-10-25 PROCEDURE — 3014F SCREEN MAMMO DOC REV: CPT | Performed by: NURSE PRACTITIONER

## 2017-10-25 PROCEDURE — G0008 ADMIN INFLUENZA VIRUS VAC: HCPCS | Performed by: NURSE PRACTITIONER

## 2017-10-25 PROCEDURE — G8420 CALC BMI NORM PARAMETERS: HCPCS | Performed by: NURSE PRACTITIONER

## 2017-10-25 PROCEDURE — 1123F ACP DISCUSS/DSCN MKR DOCD: CPT | Performed by: NURSE PRACTITIONER

## 2017-10-25 PROCEDURE — G8399 PT W/DXA RESULTS DOCUMENT: HCPCS | Performed by: NURSE PRACTITIONER

## 2017-10-25 PROCEDURE — G8599 NO ASA/ANTIPLAT THER USE RNG: HCPCS | Performed by: NURSE PRACTITIONER

## 2017-10-25 PROCEDURE — G8484 FLU IMMUNIZE NO ADMIN: HCPCS | Performed by: NURSE PRACTITIONER

## 2017-10-25 PROCEDURE — 90732 PPSV23 VACC 2 YRS+ SUBQ/IM: CPT | Performed by: NURSE PRACTITIONER

## 2017-10-25 RX ORDER — ONDANSETRON 4 MG/1
4 TABLET, FILM COATED ORAL DAILY PRN
Qty: 30 TABLET | Refills: 1 | Status: SHIPPED | OUTPATIENT
Start: 2017-10-25 | End: 2017-11-28 | Stop reason: SDUPTHER

## 2017-10-25 RX ORDER — OLOPATADINE HYDROCHLORIDE 2 MG/ML
1 SOLUTION/ DROPS OPHTHALMIC DAILY
Qty: 1 BOTTLE | Refills: 1 | Status: SHIPPED | OUTPATIENT
Start: 2017-10-25 | End: 2018-04-06

## 2017-10-25 RX ORDER — CLONAZEPAM 0.5 MG/1
0.5 TABLET ORAL 3 TIMES DAILY PRN
Qty: 75 TABLET | Refills: 0 | Status: SHIPPED | OUTPATIENT
Start: 2017-10-25 | End: 2017-11-28 | Stop reason: SDUPTHER

## 2017-10-25 RX ORDER — OXYCODONE AND ACETAMINOPHEN 10; 325 MG/1; MG/1
1 TABLET ORAL EVERY 8 HOURS PRN
COMMUNITY
End: 2018-03-13 | Stop reason: ALTCHOICE

## 2017-10-25 RX ORDER — ALBUTEROL SULFATE 90 UG/1
2 AEROSOL, METERED RESPIRATORY (INHALATION) EVERY 6 HOURS PRN
Qty: 1 INHALER | Refills: 5 | Status: SHIPPED | OUTPATIENT
Start: 2017-10-25 | End: 2018-01-02 | Stop reason: SDUPTHER

## 2017-10-25 ASSESSMENT — ENCOUNTER SYMPTOMS
EYE REDNESS: 0
VOMITING: 0
CONSTIPATION: 0
SHORTNESS OF BREATH: 0
COUGH: 0
NAUSEA: 0
BACK PAIN: 1
EYE DISCHARGE: 1
RHINORRHEA: 0
DIARRHEA: 0
SORE THROAT: 0
ABDOMINAL PAIN: 0

## 2017-10-25 NOTE — PATIENT INSTRUCTIONS
most bronchodilators, so they start to act quickly. Always carry your quick-relief inhaler with you in case you need it while you are away from home. ¨ Corticosteroids (prednisone, budesonide). These reduce airway inflammation. They come in pill or inhaled form. You must take these medicines every day for them to work well. · A spacer may help you get more inhaled medicine to your lungs. Ask your doctor or pharmacist if a spacer is right for you. If it is, ask how to use it properly. · Do not take any vitamins, over-the-counter medicine, or herbal products without talking to your doctor first.  · If your doctor prescribed antibiotics, take them as directed. Do not stop taking them just because you feel better. You need to take the full course of antibiotics. · Oxygen therapy boosts the amount of oxygen in your blood and helps you breathe easier. Use the flow rate your doctor has recommended, and do not change it without talking to your doctor first.  Activity  · Get regular exercise. Walking is an easy way to get exercise. Start out slowly, and walk a little more each day. · Pay attention to your breathing. You are exercising too hard if you cannot talk while you are exercising. · Take short rest breaks when doing household chores and other activities. · Learn breathing methods--such as breathing through pursed lips--to help you become less short of breath. · If your doctor has not set you up with a pulmonary rehabilitation program, talk to him or her about whether rehab is right for you. Rehab includes exercise programs, education about your disease and how to manage it, help with diet and other changes, and emotional support. Diet  · Eat regular, healthy meals. Use bronchodilators about 1 hour before you eat to make it easier to eat. Eat several small meals instead of three large ones. Drink beverages at the end of the meal. Avoid foods that are hard to chew.   · Eat foods that contain protein so that you

## 2017-10-25 NOTE — PROGRESS NOTES
05/26/2017 14    Office Visit on 05/08/2017   Component Date Value    Color, UA 05/08/2017 dark yellow     Clarity, UA 05/08/2017 cloud     Glucose, UA POC 05/08/2017 neg     Bilirubin, UA 05/08/2017 neg     Ketones, UA 05/08/2017 neg     Spec Grav, UA 05/08/2017 1.020     Blood, UA POC 05/08/2017 small     pH, UA 05/08/2017 5.5     Protein, UA POC 05/08/2017 30mg     Urobilinogen, UA 05/08/2017 0.2     Leukocytes, UA 05/08/2017 small     Nitrite, UA 05/08/2017 neg     Appearance, Fluid 05/08/2017 Cloudy*     Copies of these are in the chart. Prior to Visit Medications    Medication Sig Taking? Authorizing Provider   oxyCODONE-acetaminophen (PERCOCET)  MG per tablet Take 1 tablet by mouth every 8 hours as needed for Pain .  Yes Historical Provider, MD   clonazePAM (KLONOPIN) 0.5 MG tablet Take 1 tablet by mouth 3 times daily as needed for Anxiety Yes NAVEEN Dior   ondansetron (ZOFRAN) 4 MG tablet Take 1 tablet by mouth daily as needed for Nausea or Vomiting Yes NAVEEN Dior   albuterol sulfate HFA (PROAIR HFA) 108 (90 Base) MCG/ACT inhaler Inhale 2 puffs into the lungs every 6 hours as needed for Wheezing Yes NAVEEN Dior   umeclidinium-vilanterol (ANORO ELLIPTA) 62.5-25 MCG/INH AEPB inhaler Inhale 1 puff into the lungs daily Yes NAVEEN Dior   olopatadine (PATADAY) 0.2 % SOLN ophthalmic solution Apply 1 drop to eye daily Yes NAVEEN Dior   mupirocin (BACTROBAN) 2 % ointment Apply topically Yes Historical Provider, MD   promethazine (PHENERGAN) 25 MG tablet Take 25 mg by mouth Yes Historical Provider, MD   VOLTAREN 1 % GEL  Yes Historical Provider, MD   PROLIA 60 MG/ML SOLN SC injection INJECT 60MG SUBCUTANEOUSLY EVERY 6 MONTHS Yes NAVEEN Dior   donepezil (ARICEPT) 5 MG tablet Take 1 tablet by mouth nightly Yes NAVEEN Dior   tiZANidine (ZANAFLEX) 4 MG tablet Take 1 tablet by mouth every 8 hours as needed (muscle spasm) Yes NAVEEN Peoples dexlansoprazole (DEXILANT) 60 MG CPDR delayed release capsule Take 1 capsule by mouth daily Yes NAVEEN Dior   memantine (NAMENDA) 5 MG tablet Take 1 tablet by mouth 2 times daily Yes NAVEEN Dior   rOPINIRole (REQUIP) 0.25 MG tablet Take 1 tablet by mouth nightly Yes NAVEEN Dior   tolterodine (DETROL LA) 4 MG extended release capsule Take 1 capsule by mouth daily Yes NAVEEN Rodney   furosemide (LASIX) 40 MG tablet Take 1 tablet by mouth 2 times daily Yes NAVEEN Dior   DULoxetine (CYMBALTA) 60 MG extended release capsule Take 1 capsule by mouth 2 times daily Yes NAVEEN Dior   Calcium Carb-Cholecalciferol (CALCIUM 600 + D PO) Take by mouth Yes Historical Provider, MD   azelastine (ASTELIN) 0.1 % nasal spray 2 sprays by Nasal route 2 times daily Use in each nostril as directed Yes NAVEEN Dior   mirtazapine (REMERON) 15 MG tablet  Yes Historical Provider, MD   fluticasone (FLONASE) 50 MCG/ACT nasal spray 1 spray by Nasal route daily Yes NAVEEN Dior   guaiFENesin (MUCINEX) 600 MG extended release tablet Take 1 tablet by mouth 2 times daily Yes NAVEEN Dior   sucralfate (CARAFATE) 1 GM tablet Take 1 g by mouth 3 times daily Yes Historical Provider, MD   isosorbide dinitrate (ISORDIL) 30 MG tablet Take 30 mg by mouth daily Yes Historical Provider, MD   vitamin E 400 UNIT capsule Take 400 Units by mouth daily Yes Historical Provider, MD   Potassium (POTASSIMIN PO) Take 1 tablet by mouth daily  Yes Historical Provider, MD   nitroGLYCERIN (NITROQUICK) 0.4 MG SL tablet Place 0.4 mg under the tongue every 5 minutes as needed. Yes Historical Provider, MD   theophylline CR, 12 hour, (THEOPHYLLINE CR, 12 HOUR,) 300 MG CR tablet Take 300 mg by mouth daily.    Yes Historical Provider, MD       Allergies: Codeine and Sulfa antibiotics    Past Medical History:   Diagnosis Date    Abdominal pain     Anxiety     Arthritis     CAD in native artery     Cerebrovascular disease     CHF (congestive heart failure) (HCC)     Constipation     COPD (chronic obstructive pulmonary disease) (HCC)     Depression     Emphysema     GERD (gastroesophageal reflux disease)     Myocardial infarct, old     Pneumonia     Tobacco abuse        Past Surgical History:   Procedure Laterality Date    ABDOMINAL EXPLORATION SURGERY      APPENDECTOMY      CARDIAC CATHETERIZATION  06/25/10    patent  stent noted in LAD,EF is estimated to be 60%    CARDIAC CATHETERIZATION  7/13/12  MDL    EF  60%    CHOLECYSTECTOMY      COLONOSCOPY  10/2014    Dr Kumari [facility] [place] ENDOSCOPY  10/1/14    Dr Magallanes Rufus: food bezoar; esophagitis, gastritis, duodenitis; biopsies neg h-pylori       Social History   Substance Use Topics    Smoking status: Current Every Day Smoker     Packs/day: 1.00     Years: 36.00     Types: Cigarettes    Smokeless tobacco: Never Used    Alcohol use No       Review of Systems   Constitutional: Negative for chills, fatigue and fever. HENT: Negative for congestion, ear pain, rhinorrhea and sore throat. Eyes: Positive for discharge. Negative for redness. Respiratory: Negative for cough and shortness of breath. Cardiovascular: Negative for chest pain. Gastrointestinal: Negative for abdominal pain, constipation, diarrhea, nausea and vomiting. Musculoskeletal: Positive for arthralgias and back pain (stable). Skin: Negative for rash. Neurological: Negative for dizziness and headaches. Psychiatric/Behavioral: Positive for sleep disturbance (worse). The patient is nervous/anxious (improved with Klonopin). Physical Exam   Constitutional: She appears well-developed. HENT:   Head: Normocephalic. Right Ear: Hearing, tympanic membrane and external ear normal.   Left Ear: Hearing, tympanic membrane and external ear normal.   Nose: Nose normal. No mucosal edema or rhinorrhea.    Mouth/Throat: Oropharynx is clear and moist. No posterior oropharyngeal erythema. Eyes: Right eye exhibits no discharge. Left eye exhibits no discharge. Neck: Normal range of motion. Carotid bruit is not present. Cardiovascular: Normal rate and regular rhythm. Pulmonary/Chest: Effort normal and breath sounds normal. No respiratory distress. She has no wheezes. She has no rales. Port left chest wall--flushed, see below   Abdominal: Soft. Bowel sounds are normal.   Musculoskeletal:        Lumbar back: She exhibits tenderness and pain. Neurological: She is alert. Skin: Skin is dry. No rash noted. Psychiatric: She has a normal mood and affect. Her behavior is normal. Judgment and thought content normal.   Vitals reviewed. ASSESSMENT      ICD-10-CM ICD-9-CM    1. HAILEY (generalized anxiety disorder) F41.1 300.02 clonazePAM (KLONOPIN) 0.5 MG tablet   2. Nausea R11.0 787.02 ondansetron (ZOFRAN) 4 MG tablet   3. Chronic obstructive pulmonary disease, unspecified COPD type ([facility]) J44.9 496 albuterol sulfate HFA (PROAIR HFA) 108 (90 Base) MCG/ACT inhaler      umeclidinium-vilanterol (ANORO ELLIPTA) 62.5-25 MCG/INH AEPB inhaler   4. Port catheter in place Z95.828 V45.89 [place] IRRIG IMPLANTED DRUG DELIVERY DEVICE   5. Need for influenza vaccination Z23 V04.81 INFLUENZA, HIGH DOSE, 65 YRS +, IM, PF, PREFILL SYR, 0.5ML (FLUZONE HD)   6. Allergic conjunctivitis of both eyes H10.13 372.14 olopatadine (PATADAY) 0.2 % SOLN ophthalmic solution   7. Chronic bilateral low back pain with bilateral sciatica M54.42 724.2 Handicap sticker paperwork was given to the patient    M54.41 724.3     G89.29 338.29    8.  Need for pneumococcal vaccination Z23 V03.82 Pneumococcal polysaccharide vaccine 23-valent greater than or equal to 3yo subcutaneous/IM         PLAN    Orders Placed This Encounter   Procedures    INFLUENZA, HIGH DOSE, 65 YRS +, IM, PF, PREFILL SYR, 0.5ML (FLUZONE HD)    Pneumococcal polysaccharide vaccine 23-valent greater than or equal to 3yo 2017  Content Version: 11.3  © 6317-9505 [employer]. Care instructions adapted under license by [facility] ([facility]). If you have questions about a medical condition or this instruction, always ask your healthcare professional. [address] any warranty or liability for your use of this information. Controlled Substances Monitoring: Attestation: The Prescription Monitoring Report for this patient was reviewed today. NAVEEN Bass)  Documentation: Possible medication side effects, risk of tolerance and/or dependence, and alternative treatments discussed. ([id number]) NAVEEN Bass)            Additional Instructions: As always, patient is advised to bring in medication bottles in order to correctly reconcile with our current list.    Laura Tse received counseling on the following healthy behaviors: n/a    Patient given educational materials on dx    I have instructed Jeny to complete a self tracking handout on n/a and instructed them to bring it with them to her next appointment. Discussed use, benefit, and side effects of prescribed medications. Barriers to medication compliance addressed. All patient questions answered. Pt voiced understanding.      NAVEEN Bass

## 2017-10-30 DIAGNOSIS — F32.9 REACTIVE DEPRESSION: ICD-10-CM

## 2017-10-30 DIAGNOSIS — F41.1 GAD (GENERALIZED ANXIETY DISORDER): ICD-10-CM

## 2017-10-30 RX ORDER — DULOXETIN HYDROCHLORIDE 60 MG/1
CAPSULE, DELAYED RELEASE ORAL
Qty: 60 CAPSULE | Refills: 11 | Status: SHIPPED | OUTPATIENT
Start: 2017-10-30 | End: 2017-12-26 | Stop reason: SDUPTHER

## 2017-11-28 ENCOUNTER — OFFICE VISIT (OUTPATIENT)
Dept: PRIMARY CARE CLINIC | Age: 72
End: 2017-11-28
Payer: MEDICARE

## 2017-11-28 VITALS
TEMPERATURE: 97.7 F | HEIGHT: 62 IN | OXYGEN SATURATION: 93 % | DIASTOLIC BLOOD PRESSURE: 70 MMHG | HEART RATE: 74 BPM | SYSTOLIC BLOOD PRESSURE: 110 MMHG | BODY MASS INDEX: 22.31 KG/M2 | WEIGHT: 121.25 LBS

## 2017-11-28 DIAGNOSIS — F41.1 GAD (GENERALIZED ANXIETY DISORDER): Primary | ICD-10-CM

## 2017-11-28 DIAGNOSIS — F03.90 DEMENTIA WITHOUT BEHAVIORAL DISTURBANCE, UNSPECIFIED DEMENTIA TYPE: ICD-10-CM

## 2017-11-28 DIAGNOSIS — F51.04 PSYCHOPHYSIOLOGICAL INSOMNIA: ICD-10-CM

## 2017-11-28 DIAGNOSIS — R11.0 NAUSEA: ICD-10-CM

## 2017-11-28 DIAGNOSIS — Z95.828 PORT CATHETER IN PLACE: ICD-10-CM

## 2017-11-28 PROCEDURE — 1090F PRES/ABSN URINE INCON ASSESS: CPT | Performed by: NURSE PRACTITIONER

## 2017-11-28 PROCEDURE — 4040F PNEUMOC VAC/ADMIN/RCVD: CPT | Performed by: NURSE PRACTITIONER

## 2017-11-28 PROCEDURE — 99213 OFFICE O/P EST LOW 20 MIN: CPT | Performed by: NURSE PRACTITIONER

## 2017-11-28 PROCEDURE — 4004F PT TOBACCO SCREEN RCVD TLK: CPT | Performed by: NURSE PRACTITIONER

## 2017-11-28 PROCEDURE — G8484 FLU IMMUNIZE NO ADMIN: HCPCS | Performed by: NURSE PRACTITIONER

## 2017-11-28 PROCEDURE — 3017F COLORECTAL CA SCREEN DOC REV: CPT | Performed by: NURSE PRACTITIONER

## 2017-11-28 PROCEDURE — G8599 NO ASA/ANTIPLAT THER USE RNG: HCPCS | Performed by: NURSE PRACTITIONER

## 2017-11-28 PROCEDURE — G8427 DOCREV CUR MEDS BY ELIG CLIN: HCPCS | Performed by: NURSE PRACTITIONER

## 2017-11-28 PROCEDURE — 3014F SCREEN MAMMO DOC REV: CPT | Performed by: NURSE PRACTITIONER

## 2017-11-28 PROCEDURE — G8420 CALC BMI NORM PARAMETERS: HCPCS | Performed by: NURSE PRACTITIONER

## 2017-11-28 PROCEDURE — 1123F ACP DISCUSS/DSCN MKR DOCD: CPT | Performed by: NURSE PRACTITIONER

## 2017-11-28 PROCEDURE — G8399 PT W/DXA RESULTS DOCUMENT: HCPCS | Performed by: NURSE PRACTITIONER

## 2017-11-28 RX ORDER — DONEPEZIL HYDROCHLORIDE 5 MG/1
5 TABLET, FILM COATED ORAL NIGHTLY
Qty: 30 TABLET | Refills: 3 | Status: SHIPPED | OUTPATIENT
Start: 2017-11-28 | End: 2018-03-31 | Stop reason: SDUPTHER

## 2017-11-28 RX ORDER — CLONAZEPAM 0.5 MG/1
0.5 TABLET ORAL 3 TIMES DAILY PRN
Qty: 75 TABLET | Refills: 0 | Status: SHIPPED | OUTPATIENT
Start: 2017-11-28 | End: 2018-01-02 | Stop reason: SDUPTHER

## 2017-11-28 RX ORDER — HYDROXYZINE 50 MG/1
50 TABLET, FILM COATED ORAL NIGHTLY PRN
Qty: 30 TABLET | Refills: 3 | Status: SHIPPED | OUTPATIENT
Start: 2017-11-28 | End: 2017-12-08

## 2017-11-28 RX ORDER — ONDANSETRON 4 MG/1
4 TABLET, FILM COATED ORAL DAILY PRN
Qty: 30 TABLET | Refills: 1 | Status: SHIPPED | OUTPATIENT
Start: 2017-11-28 | End: 2017-12-26 | Stop reason: SDUPTHER

## 2017-11-28 ASSESSMENT — ENCOUNTER SYMPTOMS
SHORTNESS OF BREATH: 0
COUGH: 0
DIARRHEA: 0
RHINORRHEA: 0
SORE THROAT: 0
VOMITING: 0
NAUSEA: 1
CONSTIPATION: 0
ABDOMINAL PAIN: 0
EYE DISCHARGE: 0
EYE REDNESS: 0
BACK PAIN: 1

## 2017-11-28 NOTE — PATIENT INSTRUCTIONS
Patient Education          clonazepam  Pronunciation:  Keke Bravo  Brand:  Anthony Dwight  What is the most important information I should know about clonazepam?  You should not use this medicine if you have narrow-angle glaucoma or severe liver disease, or if you are allergic to Valium or a similar medicine. Call your doctor if you have any new or worsening symptoms of depression, unusual changes in behavior, or thoughts about suicide or hurting yourself. Clonazepam may be habit-forming. Never share clonazepam with another person. Keep the medication in a place where others cannot get to it. Selling or giving away this medicine is against the law. What is clonazepam?  Clonazepam is a benzodiazepine (evens-arleth-dye-AZE-eh-peen). Clonazepam affects chemicals in the brain that may be unbalanced. Clonazepam is also a seizure medicine, also called an anti-epileptic drug. Clonazepam is used to treat certain seizure disorders (including absence seizures or Lennox-Gastaut syndrome) in adults and children. Clonazepam is also used to treat panic disorder (including agoraphobia) in adults. Clonazepam may also be used for purposes not listed in this medication guide. What should I discuss with my healthcare provider before taking clonazepam?  You should not take clonazepam if you have:  · narrow-angle glaucoma;  · severe liver disease; or  · a history of allergic reaction to any benzodiazepine, such as diazepam (Valium), alprazolam (Xanax), lorazepam (Ativan), chlordiazepoxide, flurazepam, and others.   To make sure clonazepam is safe for you, tell your doctor if you have:  · kidney or liver disease;  · glaucoma;  · asthma, emphysema, bronchitis, chronic obstructive pulmonary disorder (COPD), or other breathing problems;  · porphyria (a genetic enzyme disorder that causes symptoms affecting the skin or nervous system);  · a history of depression or suicidal thoughts or behavior;  · a history of mental illness, psychosis, law.  Clonazepam should be used for only a short time. Do not take this medication for longer than 9 weeks without your doctor's advice. Swallow the regular clonazepam tablet whole, with a full glass of water. To take the orally disintegrating tablet:  · Keep the tablet in its blister pack until you are ready to take it. Open the package and peel back the foil. Do not push a tablet through the foil or you may damage the tablet. · Use dry hands to remove the tablet and place it in your mouth. · Do not swallow the tablet whole. Allow it to dissolve in your mouth without chewing. · Swallow several times as the tablet dissolves. If desired, you may drink liquid to help swallow the dissolved tablet. If you use this medicine long-term, you may need frequent medical tests. Do not stop using clonazepam suddenly or you could have unpleasant withdrawal symptoms, including a seizure (convulsions). Ask your doctor how to safely stop using this medicine. Call your doctor if this medicine seems to stop working as well in treating your seizures or anxiety symptoms. Seizures are often treated with a combination of drugs. Use all medications as directed by your doctor. Read the medication guide or patient instructions provided with each medication. Do not change your doses or medication schedule without your doctor's advice. Store at room temperature away from moisture, heat, and light. Keep track of the amount of medicine used from each new bottle. Clonazepam is a drug of abuse and you should be aware if anyone is using your medicine improperly or without a prescription. What happens if I miss a dose? Take the missed dose as soon as you remember. Skip the missed dose if it is almost time for your next scheduled dose. Do not take extra medicine to make up the missed dose. What happens if I overdose? Seek emergency medical attention or call the Poison Help line at 1-226.536.5420.   Overdose symptoms may include extreme drowsiness, confusion, muscle weakness, fainting, or coma. What should I avoid while taking clonazepam?  Avoid drinking alcohol. Dangerous side effects could occur. Clonazepam may impair your thinking or reactions. Avoid driving or operating machinery until you know how this medicine will affect you. Dizziness or severe drowsiness can cause falls or other accidents. What are the possible side effects of clonazepam?  Get emergency medical help if you have signs of an allergic reaction: hives; difficulty breathing; swelling of your face, lips, tongue, or throat. Report any new or worsening symptoms to your doctor, such as: mood or behavior changes, anxiety, panic attacks, trouble sleeping, or if you feel impulsive, irritable, agitated, hostile, aggressive, restless, hyperactive (mentally or physically), more depressed, or have thoughts about suicide or hurting yourself. Call your doctor at once if you have:  · new or worsening seizures;  · severe drowsiness;  · unusual changes in mood or behavior;  · confusion, aggression, hallucinations;  · thoughts of suicide or hurting yourself;  · weak or shallow breathing;  · pounding heartbeats or fluttering in your chest; or  · unusual or involuntary eye movements. Common side effects may include:  · feeling tired or depressed;  · drowsiness, dizziness;  · memory problems; or  · problems with balance or coordination. This is not a complete list of side effects and others may occur. Call your doctor for medical advice about side effects. You may report side effects to FDA at 8-828-FDA-6809. What other drugs will affect clonazepam?  Taking clonazepam with other drugs that make you sleepy or slow your breathing can cause dangerous side effects or death. Ask your doctor before taking a sleeping pill, narcotic pain medicine, prescription cough medicine, a muscle relaxer, or medicine for anxiety, depression, or seizures.   Other drugs may interact with clonazepam, including [id number] [facility]az[facility]. Version: 6.04. Revision date: 9/28/2016. Care instructions adapted under license by [facility] If you have questions about a medical condition or this instruction, always ask your healthcare professional. [address] any warranty or liability for your use of this information.

## 2017-11-28 NOTE — PROGRESS NOTES
[address]  [place], [address]  Phone [phone]   Fax [phone]      OFFICE VISIT: 2017    Ap GarciaKenia PARRAB: 1945    Chief Complaint:  Placido Harkins is a 71 y.o. female who is here for Medication Refill and Back Pain     HPI  The patient presents today for follow-up of anxiety. She is taking Cymbalta 60 mg daily & Klonopin 0.5 mg every 8 hours prn. She is needing a refill on Klonopin. Her moods are well controlled. Reports continued low back pain. She is following with pain mgt. Needs port flushed today. height is 5' 2\" (1.575 m) and weight is 121 lb 4 oz (55 kg). Her temporal temperature is 97.7 °F (36.5 °C). Her blood pressure is 110/70 and her pulse is 74. Her oxygen saturation is 93%. Body mass index is 22.18 kg/m².     Results for orders placed or performed during the hospital encounter of 17   CBC   Result Value Ref Range    WBC 7.2 4.8 - 10.8 K/uL    RBC 3.62 (L) 4.20 - 5.40 M/uL    Hemoglobin 11.5 (L) 12.0 - 16.0 g/dL    Hematocrit 36.2 (L) 37.0 - 47.0 %    .0 (H) 81.0 - 99.0 fL    MCH 31.8 (H) 27.0 - 31.0 pg    MCHC 31.8 (L) 33.0 - 37.0 g/dL    RDW 14.5 11.5 - 14.5 %    Platelets 441 692 - 097 K/uL    MPV 9.2 (L) 9.4 - 12.3 fL   Comprehensive Metabolic Panel   Result Value Ref Range    Sodium 137 136 - 145 mmol/L    Potassium 4.2 3.5 - 5.0 mmol/L    Chloride 96 (L) 98 - 111 mmol/L    CO2 27 22 - 29 mmol/L    Anion Gap 14 7 - 19 mmol/L    Glucose 112 (H) 74 - 109 mg/dL    BUN 20 8 - 23 mg/dL    CREATININE 0.7 0.5 - 0.9 mg/dL    GFR Non-African American >60 >60    Calcium 9.6 8.8 - 10.2 mg/dL    Total Protein 7.2 6.6 - 8.7 g/dL    Alb 3.8 3.5 - 5.2 g/dL    Total Bilirubin 0.3 0.2 - 1.2 mg/dL    Alkaline Phosphatase 70 35 - 104 U/L    ALT 7 5 - 33 U/L    AST 14 5 - 32 U/L   TSH without Reflex   Result Value Ref Range    TSH 0.805 0.270 - 4.200 uIU/mL   BLOOD GAS, ARTERIAL   Result Value Ref Range    pH, Arterial 7.460 (H) 7.350 - 7.450    pCO2, Arterial 41.0 35.0 - 45.0 mmHg    pO2, Arterial 96.0 80.0 - 100.0 mmHg    HCO3, Arterial 29.2 (H) 22.0 - 26.0 mmol/L    Base Excess, Arterial 4.9 (H) -2.0 - 2.0 mmol/L    Hemoglobin, Art, Extended 11.8 (L) 12.0 - 16.0 g/dL    O2 Sat, Arterial 94.9 >92 %    Carboxyhgb, Arterial 1.3 0.0 - 5.0 %    Methemoglobin, Arterial 1.7 <1.5 %    O2 Content, Arterial 15.9 Not Established mL/dL    O2 Therapy Unknown    Potassium, Whole Blood   Result Value Ref Range    Potassium, Whole Blood 4.1    EKG 12 Lead   Result Value Ref Range    P-R Interval 133 ms    QRS Duration 89 ms    Q-T Interval 372 ms    QTc Calculation (Bazett) 445 ms    P Axis 75 degrees    T Axis 67 degrees       I have reviewed the following with the Ms. Guy WILCOX [facility]   Lab Review   Admission on 08/29/2017, Discharged on 08/29/2017   Component Date Value    WBC 08/29/2017 7.2     RBC 08/29/2017 3.62*    Hemoglobin 08/29/2017 11.5*    Hematocrit 08/29/2017 36.2*    MCV 08/29/2017 100.0*    MCH 08/29/2017 31.8*    MCHC 08/29/2017 31.8*    RDW 08/29/2017 14.5     Platelets 81/00/0794 233     MPV 08/29/2017 9.2*    Sodium 08/29/2017 137     Potassium 08/29/2017 4.2     Chloride 08/29/2017 96*    CO2 08/29/2017 27     Anion Gap 08/29/2017 14     Glucose 08/29/2017 112*    BUN 08/29/2017 20     CREATININE 08/29/2017 0.7     GFR Non- 08/29/2017 >60     Calcium 08/29/2017 9.6     Total Protein 08/29/2017 7.2     Alb 08/29/2017 3.8     Total Bilirubin 08/29/2017 0.3     Alkaline Phosphatase 08/29/2017 70     ALT 08/29/2017 7     AST 08/29/2017 14     P-R Interval 08/30/2017 133     QRS Duration 08/30/2017 89     Q-T Interval 08/30/2017 372     QTc Calculation (Bazett) 08/30/2017 445     P Axis 08/30/2017 75     T Axis 08/30/2017 67     TSH 08/29/2017 0.805     pH, Arterial 08/29/2017 7.460*    pCO2, Arterial 08/29/2017 41.0     pO2, Arterial 08/29/2017 96.0     HCO3, Arterial 08/29/2017 29.2*    Base Excess, Arterial 08/29/2017 4.9* (DEXILANT) 60 MG CPDR delayed release capsule Take 1 capsule by mouth daily Yes NAVEEN Dior   memantine (NAMENDA) 5 MG tablet Take 1 tablet by mouth 2 times daily Yes NAVEEN Dior   rOPINIRole (REQUIP) 0.25 MG tablet Take 1 tablet by mouth nightly Yes NAVEEN Dior   tolterodine (DETROL LA) 4 MG extended release capsule Take 1 capsule by mouth daily Yes NAVEEN Jang   furosemide (LASIX) 40 MG tablet Take 1 tablet by mouth 2 times daily Yes NAVEEN Dior   Calcium Carb-Cholecalciferol (CALCIUM 600 + D PO) Take by mouth Yes Historical Provider, MD   azelastine (ASTELIN) 0.1 % nasal spray 2 sprays by Nasal route 2 times daily Use in each nostril as directed Yes NAVEEN Dior   mirtazapine (REMERON) 15 MG tablet  Yes Historical Provider, MD   fluticasone (FLONASE) 50 MCG/ACT nasal spray 1 spray by Nasal route daily Yes NAVEEN Dior   guaiFENesin (MUCINEX) 600 MG extended release tablet Take 1 tablet by mouth 2 times daily Yes NAVEEN Dior   sucralfate (CARAFATE) 1 GM tablet Take 1 g by mouth 3 times daily Yes Historical Provider, MD   isosorbide dinitrate (ISORDIL) 30 MG tablet Take 30 mg by mouth daily Yes Historical Provider, MD   vitamin E 400 UNIT capsule Take 400 Units by mouth daily Yes Historical Provider, MD   Potassium (POTASSIMIN PO) Take 1 tablet by mouth daily  Yes Historical Provider, MD   nitroGLYCERIN (NITROQUICK) 0.4 MG SL tablet Place 0.4 mg under the tongue every 5 minutes as needed. Yes Historical Provider, MD   theophylline CR, 12 hour, (THEOPHYLLINE CR, 12 HOUR,) 300 MG CR tablet Take 300 mg by mouth daily.    Yes Historical Provider, MD       Allergies: Codeine and Sulfa antibiotics    Past Medical History:   Diagnosis Date    Abdominal pain     Anxiety     Arthritis     CAD in native artery     Cerebrovascular disease     [facility]est[facility] failure) ([facility])     Constipation     COPD (chronic obstructive pulmonary disease) ([facility] clonazepam?  Clonazepam is a benzodiazepine (evens-arleth-dye-AZE-eh-peen). Clonazepam affects chemicals in the brain that may be unbalanced. Clonazepam is also a seizure medicine, also called an anti-epileptic drug. Clonazepam is used to treat certain seizure disorders (including absence seizures or Lennox-Gastaut syndrome) in adults and children. Clonazepam is also used to treat panic disorder (including agoraphobia) in adults. Clonazepam may also be used for purposes not listed in this medication guide. What should I discuss with my healthcare provider before taking clonazepam?  You should not take clonazepam if you have:  · narrow-angle glaucoma;  · severe liver disease; or  · a history of allergic reaction to any benzodiazepine, such as diazepam (Valium), alprazolam (Xanax), lorazepam (Ativan), chlordiazepoxide, flurazepam, and others. To make sure clonazepam is safe for you, tell your doctor if you have:  · kidney or liver disease;  · glaucoma;  · asthma, emphysema, bronchitis, chronic obstructive pulmonary disorder (COPD), or other breathing problems;  · porphyria (a genetic enzyme disorder that causes symptoms affecting the skin or nervous system);  · a history of depression or suicidal thoughts or behavior;  · a history of mental illness, psychosis, or addiction to drugs or alcohol; or  · if you use a narcotic (opioid) medication. Some people have thoughts about suicide when taking seizure medication. Your doctor should check your progress at regular visits. Your family or other caregivers should also be alert to changes in your mood or symptoms. Follow your doctor's instructions about taking seizure medication if you are pregnant. Do not start or stop taking this medicine without your doctor's advice, and tell your doctor right away if you become pregnant. Clonazepam may cause harm to an unborn baby, and may cause breathing or feeding problems in a .  But having seizures during pregnancy could harm panic attacks, trouble sleeping, or if you feel impulsive, irritable, agitated, hostile, aggressive, restless, hyperactive (mentally or physically), more depressed, or have thoughts about suicide or hurting yourself. Call your doctor at once if you have:  · new or worsening seizures;  · severe drowsiness;  · unusual changes in mood or behavior;  · confusion, aggression, hallucinations;  · thoughts of suicide or hurting yourself;  · weak or shallow breathing;  · pounding heartbeats or fluttering in your chest; or  · unusual or involuntary eye movements. Common side effects may include:  · feeling tired or depressed;  · drowsiness, dizziness;  · memory problems; or  · problems with balance or coordination. This is not a complete list of side effects and others may occur. Call your doctor for medical advice about side effects. You may report side effects to FDA at 8-380-FDA-6529. What other drugs will affect clonazepam?  Taking clonazepam with other drugs that make you sleepy or slow your breathing can cause dangerous side effects or death. Ask your doctor before taking a sleeping pill, narcotic pain medicine, prescription cough medicine, a muscle relaxer, or medicine for anxiety, depression, or seizures. Other drugs may interact with clonazepam, including prescription and over-the-counter medicines, vitamins, and herbal products. Tell each of your health care providers about all medicines you use now and any medicine you start or stop using. Where can I get more information? Your pharmacist can provide more information about clonazepam.    Remember, keep this and all other medicines out of the reach of children, never share your medicines with others, and use this medication only for the indication prescribed. Every effort has been made to ensure that the information provided by [facility] is accurate, up-to-date, and complete, but no guarantee is made to that effect.  Drug information contained herein may be time sensitive. [employer] information has been compiled for use by healthcare practitioners and consumers in the [place] and therefore [employer] does not warrant that uses outside of the [place] are appropriate, unless specifically indicated otherwise. [employer] drug information does not endorse drugs, diagnose patients or recommend therapy. [employer] drug information is an informational resource designed to assist licensed healthcare practitioners in caring for their patients and/or to serve consumers viewing this service as a supplement to, and not a substitute for, the expertise, skill, knowledge and judgment of healthcare practitioners. The absence of a warning for a given drug or drug combination in no way should be construed to indicate that the drug or drug combination is safe, effective or appropriate for any given patient. [facility] does not assume any responsibility for any aspect of healthcare administered with the aid of information [facility] provides. The information contained herein is not intended to cover all possible uses, directions, precautions, warnings, drug interactions, allergic reactions, or adverse effects. If you have questions about the drugs you are taking, check with your doctor, nurse or pharmacist.  Copyright 3794-4272 [employer]. Version: 6.04. Revision date: 9/28/2016. Care instructions adapted under license by [facility] ([facility]). If you have questions about a medical condition or this instruction, always ask your healthcare professional. [place] [address] any warranty or liability for your use of this information. Procedure note:    Larwence Oppenheim in sterile fashion with gripper needle without anesthesia. The port was flushed with normal saline and flushed easily. Blood was drawn back without difficulty. The port was then flushed with heparin and the port was de-accessed.   No blood loss  No complication  No specimen  He

## 2017-12-06 ENCOUNTER — HOSPITAL ENCOUNTER (EMERGENCY)
Age: 72
Discharge: HOME | End: 2017-12-06
Attending: FAMILY MEDICINE
Payer: MEDICARE

## 2017-12-06 VITALS
TEMPERATURE: 97.9 F | WEIGHT: 114 LBS | HEART RATE: 63 BPM | BODY MASS INDEX: 20.98 KG/M2 | RESPIRATION RATE: 14 BRPM | DIASTOLIC BLOOD PRESSURE: 77 MMHG | SYSTOLIC BLOOD PRESSURE: 148 MMHG | OXYGEN SATURATION: 95 % | HEIGHT: 62 IN

## 2017-12-06 DIAGNOSIS — N39.0 BACTERIAL UTI: Primary | ICD-10-CM

## 2017-12-06 DIAGNOSIS — A49.9 BACTERIAL UTI: Primary | ICD-10-CM

## 2017-12-06 LAB
ALBUMIN SERPL-MCNC: 3.4 G/DL (ref 3.5–5.2)
ALP BLD-CCNC: 55 U/L (ref 35–104)
ALT SERPL-CCNC: 6 U/L (ref 5–33)
ANION GAP SERPL CALCULATED.3IONS-SCNC: 7 MMOL/L (ref 7–19)
AST SERPL-CCNC: 10 U/L (ref 5–32)
BACTERIA: ABNORMAL /HPF
BILIRUB SERPL-MCNC: <0.2 MG/DL (ref 0.2–1.2)
BILIRUBIN URINE: ABNORMAL
BLOOD, URINE: ABNORMAL
BUN BLDV-MCNC: 21 MG/DL (ref 8–23)
CALCIUM SERPL-MCNC: 8.6 MG/DL (ref 8.8–10.2)
CHLORIDE BLD-SCNC: 102 MMOL/L (ref 98–111)
CLARITY: ABNORMAL
CO2: 28 MMOL/L (ref 22–29)
COLOR: ABNORMAL
CREAT SERPL-MCNC: 1 MG/DL (ref 0.5–0.9)
EPITHELIAL CELLS, UA: 1 /HPF (ref 0–5)
EPITHELIAL CELLS, UA: ABNORMAL /HPF
GFR NON-AFRICAN AMERICAN: 55
GLUCOSE BLD-MCNC: 81 MG/DL (ref 74–109)
GLUCOSE URINE: NEGATIVE MG/DL
HCT VFR BLD CALC: 32.7 % (ref 37–47)
HEMOGLOBIN: 9.9 G/DL (ref 12–16)
HYALINE CASTS: 15 /HPF (ref 0–8)
KETONES, URINE: NEGATIVE MG/DL
LEUKOCYTE ESTERASE, URINE: ABNORMAL
MCH RBC QN AUTO: 30.2 PG (ref 27–31)
MCHC RBC AUTO-ENTMCNC: 30.3 G/DL (ref 33–37)
MCV RBC AUTO: 99.7 FL (ref 81–99)
NITRITE, URINE: POSITIVE
PDW BLD-RTO: 16.4 % (ref 11.5–14.5)
PH UA: 5.5
PLATELET # BLD: 197 K/UL (ref 130–400)
PMV BLD AUTO: 9.5 FL (ref 9.4–12.3)
POTASSIUM SERPL-SCNC: 5.1 MMOL/L (ref 3.5–5)
PROTEIN UA: 100 MG/DL
RBC # BLD: 3.28 M/UL (ref 4.2–5.4)
RBC UA: ABNORMAL /HPF (ref 0–2)
SODIUM BLD-SCNC: 137 MMOL/L (ref 136–145)
SPECIFIC GRAVITY UA: 1.02
TOTAL PROTEIN: 6.1 G/DL (ref 6.6–8.7)
UROBILINOGEN, URINE: 1 E.U./DL
WBC # BLD: 8.1 K/UL (ref 4.8–10.8)
WBC UA: 888 /HPF (ref 0–5)
WBC UA: ABNORMAL /HPF (ref 0–5)

## 2017-12-06 PROCEDURE — 87186 SC STD MICRODIL/AGAR DIL: CPT

## 2017-12-06 PROCEDURE — 6360000002 HC RX W HCPCS: Performed by: FAMILY MEDICINE

## 2017-12-06 PROCEDURE — 96374 THER/PROPH/DIAG INJ IV PUSH: CPT

## 2017-12-06 PROCEDURE — 36415 COLL VENOUS BLD VENIPUNCTURE: CPT

## 2017-12-06 PROCEDURE — 85027 COMPLETE CBC AUTOMATED: CPT

## 2017-12-06 PROCEDURE — 99284 EMERGENCY DEPT VISIT MOD MDM: CPT

## 2017-12-06 PROCEDURE — 80053 COMPREHEN METABOLIC PANEL: CPT

## 2017-12-06 PROCEDURE — 87086 URINE CULTURE/COLONY COUNT: CPT

## 2017-12-06 PROCEDURE — 99283 EMERGENCY DEPT VISIT LOW MDM: CPT | Performed by: FAMILY MEDICINE

## 2017-12-06 PROCEDURE — 96375 TX/PRO/DX INJ NEW DRUG ADDON: CPT

## 2017-12-06 PROCEDURE — 6370000000 HC RX 637 (ALT 250 FOR IP): Performed by: FAMILY MEDICINE

## 2017-12-06 PROCEDURE — 87077 CULTURE AEROBIC IDENTIFY: CPT

## 2017-12-06 PROCEDURE — 2580000003 HC RX 258: Performed by: FAMILY MEDICINE

## 2017-12-06 PROCEDURE — 81001 URINALYSIS AUTO W/SCOPE: CPT

## 2017-12-06 RX ORDER — HYDROCODONE BITARTRATE AND ACETAMINOPHEN 7.5; 325 MG/1; MG/1
1 TABLET ORAL ONCE
Status: COMPLETED | OUTPATIENT
Start: 2017-12-06 | End: 2017-12-06

## 2017-12-06 RX ORDER — HEPARIN SODIUM (PORCINE) LOCK FLUSH IV SOLN 100 UNIT/ML 100 UNIT/ML
100 SOLUTION INTRAVENOUS ONCE
Status: COMPLETED | OUTPATIENT
Start: 2017-12-06 | End: 2017-12-06

## 2017-12-06 RX ORDER — GABAPENTIN 300 MG/1
300 CAPSULE ORAL 3 TIMES DAILY
COMMUNITY
End: 2018-12-18

## 2017-12-06 RX ORDER — ONDANSETRON 2 MG/ML
4 INJECTION INTRAMUSCULAR; INTRAVENOUS ONCE
Status: COMPLETED | OUTPATIENT
Start: 2017-12-06 | End: 2017-12-06

## 2017-12-06 RX ORDER — 0.9 % SODIUM CHLORIDE 0.9 %
1000 INTRAVENOUS SOLUTION INTRAVENOUS ONCE
Status: COMPLETED | OUTPATIENT
Start: 2017-12-06 | End: 2017-12-06

## 2017-12-06 RX ORDER — SODIUM CHLORIDE 9 MG/ML
INJECTION, SOLUTION INTRAVENOUS CONTINUOUS
Status: DISCONTINUED | OUTPATIENT
Start: 2017-12-06 | End: 2017-12-06 | Stop reason: HOSPADM

## 2017-12-06 RX ORDER — DOXYCYCLINE HYCLATE 100 MG
100 TABLET ORAL 2 TIMES DAILY
Qty: 20 TABLET | Refills: 0 | Status: SHIPPED | OUTPATIENT
Start: 2017-12-06 | End: 2017-12-16

## 2017-12-06 RX ADMIN — WATER 1 G: 1 INJECTION INTRAMUSCULAR; INTRAVENOUS; SUBCUTANEOUS at 17:05

## 2017-12-06 RX ADMIN — SODIUM CHLORIDE 1000 ML: 9 INJECTION, SOLUTION INTRAVENOUS at 17:04

## 2017-12-06 RX ADMIN — HYDROCODONE BITARTRATE AND ACETAMINOPHEN 1 TABLET: 7.5; 325 TABLET ORAL at 17:05

## 2017-12-06 RX ADMIN — HEPARIN 100 UNITS: 100 SYRINGE at 18:32

## 2017-12-06 RX ADMIN — ONDANSETRON 4 MG: 2 INJECTION INTRAMUSCULAR; INTRAVENOUS at 17:05

## 2017-12-06 ASSESSMENT — ENCOUNTER SYMPTOMS
VOMITING: 0
CONSTIPATION: 0
SHORTNESS OF BREATH: 0
WHEEZING: 0
COUGH: 0
PHOTOPHOBIA: 0
SINUS PAIN: 0
BLOOD IN STOOL: 0
CHEST TIGHTNESS: 0
DIARRHEA: 0
NAUSEA: 1
BACK PAIN: 0
ABDOMINAL PAIN: 0
COLOR CHANGE: 0
RHINORRHEA: 0
ABDOMINAL DISTENTION: 0

## 2017-12-06 ASSESSMENT — PAIN SCALES - GENERAL
PAINLEVEL_OUTOF10: 6
PAINLEVEL_OUTOF10: 4

## 2017-12-06 NOTE — ED PROVIDER NOTES
[facility] EMERGENCY DEPT  eMERGENCY dEPARTMENT eNCOUnter      Pt Name: Breonna Downs  MRN: [id number]  [place] 1945  Date of evaluation: 12/6/2017  Provider: Namita Cameron MD    [address] [place] [address]       Chief Complaint   Patient presents with    Urinary Tract Infection     dysuria, decreased output. x 3 days    Abdominal Pain     nausea, tender in 4 quads, emesis         HISTORY OF PRESENT ILLNESS   (Location/Symptom, Timing/Onset, Context/Setting, Quality, Duration, Modifying Factors, Severity)  Note limiting factors. Breonna Downs is a 71 y.o. female who presents to the emergency department Complaining of dysuria for 2-3 days. Patient states she has had these complaints before when she had a urinary tract infection. She notes that nothing really seems to make it worse other than when she does urinate it burns. She states she's had nausea at home but her nausea medicine as fixed those symptoms. She describes some generalized weakness that is worse with activity and improved with rest.     HPI    Nursing Notes were reviewed. REVIEW OF SYSTEMS    (2-9 systems for level 4, 10 or more for level 5)     Review of Systems   Constitutional: Positive for fatigue. Negative for activity change, appetite change, chills, diaphoresis, fever and unexpected weight change. HENT: Negative for congestion, rhinorrhea and sinus pain. Eyes: Negative for photophobia. Respiratory: Negative for cough, chest tightness, shortness of breath and wheezing. Cardiovascular: Negative for chest pain, palpitations and leg swelling. Gastrointestinal: Positive for nausea. Negative for abdominal distention, abdominal pain, blood in stool, constipation, diarrhea and vomiting. Endocrine: Negative for cold intolerance and heat intolerance. Genitourinary: Positive for dysuria, frequency and urgency. Negative for difficulty urinating. Musculoskeletal: Negative for back pain. Skin: Negative for color change, pallor and rash. Neurological: Positive for weakness. Negative for syncope and numbness. Hematological: Does not bruise/bleed easily. Psychiatric/Behavioral: Negative for agitation, behavioral problems, confusion and sleep disturbance. PAST MEDICAL HISTORY     Past Medical History:   Diagnosis Date    Abdominal pain     Anxiety     Arthritis     CAD in native artery     Cerebrovascular disease     CHF (congestive heart failure) (HCC)     Constipation     COPD (chronic obstructive pulmonary disease) (HCC)     Depression     Emphysema     GERD (gastroesophageal reflux disease)     Myocardial infarct, old     Pneumonia     Tobacco abuse          SURGICAL HISTORY       Past Surgical History:   Procedure Laterality Date    ABDOMINAL EXPLORATION SURGERY      APPENDECTOMY      CARDIAC CATHETERIZATION  06/25/10    patent  stent noted in LAD,EF is estimated to be 60%    CARDIAC CATHETERIZATION  7/13/12  [facility]DL    EF  60%    CHOLECYSTECTOMY      COLONOSCOPY  10/2014    Dr Cuca Mary ENDOSCOPY  10/1/14    Dr Cherylene Lemmings: food bezoar; esophagitis, gastritis, duodenitis; biopsies neg h-pylori         CURRENT MEDICATIONS       Previous Medications    ALBUTEROL SULFATE HFA (PROAIR HFA) 108 (90 BASE) MCG/ACT INHALER    Inhale 2 puffs into the lungs every 6 hours as needed for Wheezing    AZELASTINE (ASTELIN) 0.1 % NASAL SPRAY    2 sprays by Nasal route 2 times daily Use in each nostril as directed    CALCIUM CARB-CHOLECALCIFEROL (CALCIUM 600 + D PO)    Take by mouth    CLONAZEPAM (KLONOPIN) 0.5 MG TABLET    Take 1 tablet by mouth 3 times daily as needed for Anxiety .     DEXLANSOPRAZOLE (DEXILANT) 60 MG CPDR DELAYED RELEASE CAPSULE    Take 1 capsule by mouth daily    DONEPEZIL (ARICEPT) 5 MG TABLET    Take 1 tablet by mouth nightly    DULOXETINE (CYMBALTA) 60 MG EXTENDED RELEASE CAPSULE    TAKE 1 CAPSULE BY MOUTH TWICE A DAY    FLUTICASONE (FLONASE) 50 MCG/ACT NASAL SPRAY    1 spray by Nasal route daily    FUROSEMIDE (LASIX) 40 MG TABLET    Take 1 tablet by mouth 2 times daily    GABAPENTIN (NEURONTIN) 300 MG CAPSULE    Take 300 mg by mouth 3 times daily    GUAIFENESIN (MUCINEX) 600 MG EXTENDED RELEASE TABLET    Take 1 tablet by mouth 2 times daily    HYDROXYZINE (ATARAX) 50 MG TABLET    Take 1 tablet by mouth nightly as needed (sleep)    ISOSORBIDE DINITRATE (ISORDIL) 30 MG TABLET    Take 30 mg by mouth daily    MEMANTINE (NAMENDA) 5 MG TABLET    Take 1 tablet by mouth 2 times daily    NITROGLYCERIN (NITROQUICK) 0.4 MG SL TABLET    Place 0.4 mg under the tongue every 5 minutes as needed. OLOPATADINE (PATADAY) 0.2 % SOLN OPHTHALMIC SOLUTION    Apply 1 drop to eye daily    ONDANSETRON (ZOFRAN) 4 MG TABLET    Take 1 tablet by mouth daily as needed for Nausea or Vomiting    OXYCODONE-ACETAMINOPHEN (PERCOCET)  MG PER TABLET    Take 1 tablet by mouth every 8 hours as needed for Pain . PROLIA 60 MG/ML SOLN SC INJECTION    INJECT 60MG SUBCUTANEOUSLY EVERY 6 MONTHS    PROMETHAZINE (PHENERGAN) 25 MG TABLET    Take 25 mg by mouth    ROPINIROLE (REQUIP) 0.25 MG TABLET    Take 1 tablet by mouth nightly    SUCRALFATE (CARAFATE) 1 GM TABLET    Take 1 g by mouth 3 times daily    THEOPHYLLINE CR, 12 HOUR, (THEOPHYLLINE CR, 12 HOUR,) 300 MG CR TABLET    Take 300 mg by mouth daily.       TIZANIDINE (ZANAFLEX) 4 MG TABLET    Take 1 tablet by mouth every 8 hours as needed (muscle spasm)    TOLTERODINE (DETROL LA) 4 MG EXTENDED RELEASE CAPSULE    Take 1 capsule by mouth daily    UMECLIDINIUM-VILANTEROL (ANORO ELLIPTA) 62.5-25 MCG/INH AEPB INHALER    Inhale 1 puff into the lungs daily    VITAMIN E 400 UNIT CAPSULE    Take 400 Units by mouth daily    VOLTAREN 1 % GEL           ALLERGIES     Codeine and Sulfa antibiotics    FAMILY HISTORY       Family History   Problem Relation Age of Onset    Stroke Brother     Stroke Sister     Cancer Brother     Cancer Sister     Diabetes Brother     Diabetes Sister     Coronary Art Dis Other     Hypertension Other     Seizures Child     Colon Cancer Neg Hx     Colon Polyps Neg Hx     Esophageal Cancer Neg Hx     Liver Cancer Neg Hx     Liver Disease Neg Hx     Rectal Cancer Neg Hx     Stomach Cancer Neg Hx           SOCIAL HISTORY       Social History     Social History    Marital status:      Spouse name: N/A    Number of children: N/A    Years of education: N/A     Social History Main Topics    Smoking status: Current Every Day Smoker     Packs/day: 1.00     Years: 36.00     Types: Cigarettes    Smokeless tobacco: Never Used    Alcohol use No    Drug use: No    Sexual activity: Not Asked     Other Topics Concern    None     Social History Narrative    None       SCREENINGS    Cody Coma Scale  Eye Opening: Spontaneous  Best Verbal Response: Oriented  Best Motor Response: Obeys commands  Cody Coma Scale Score: 15        PHYSICAL EXAM    (up to 7 for level 4, 8 or more for level 5)     ED Triage Vitals    BP Temp Temp Source Pulse Resp SpO2 Height Weight   92/71 97.9 °F (36.6 °C) Oral 75 14 (!) 88 % 5' 2\" (1.575 m) 114 lb (51.7 kg)       Physical Exam   Constitutional: She is oriented to person, place, and time. She appears well-developed and well-nourished. No distress. HENT:   Head: Normocephalic. Eyes: Conjunctivae and EOM are normal. Pupils are equal, round, and reactive to light. Neck: Normal range of motion. Neck supple. No tracheal deviation present. No thyromegaly present. Cardiovascular: Normal rate, normal heart sounds and intact distal pulses. Pulmonary/Chest: Effort normal and breath sounds normal.   Abdominal: Soft. Bowel sounds are normal. She exhibits no distension and no mass. There is tenderness in the suprapubic area. There is no rebound and no guarding. Musculoskeletal: Normal range of motion. She exhibits no edema or tenderness.    Neurological: She is alert and oriented to person, place, and time. No cranial nerve deficit. Skin: Skin is warm and dry. No rash noted. She is not diaphoretic. No erythema. No pallor. Psychiatric: She has a normal mood and affect. Her behavior is normal.       DIAGNOSTIC RESULTS     EKG: All EKG's are interpreted by the Emergency Department Physician who either signs or Co-signs this chart in the absence of a cardiologist.        RADIOLOGY:   Non-plain film images such as CT, Ultrasound and MRI are read by the radiologist. Plain radiographic images are visualized and preliminarily interpreted by the emergency physician with the below findings:          No orders to display           LABS:  Labs Reviewed   URINALYSIS - Abnormal; Notable for the following:        Result Value    Clarity, UA TURBID (*)     Bilirubin Urine SMALL (*)     Blood, Urine LARGE (*)     Protein,  (*)     Nitrite, Urine POSITIVE (*)     Leukocyte Esterase, Urine LARGE (*)     All other components within normal limits   CBC - Abnormal; Notable for the following:     RBC 3.28 (*)     Hemoglobin 9.9 (*)     Hematocrit 32.7 (*)     MCV 99.7 (*)     MCHC 30.3 (*)     RDW 16.4 (*)     All other components within normal limits   COMPREHENSIVE METABOLIC PANEL - Abnormal; Notable for the following:     Potassium 5.1 (*)     CREATININE 1.0 (*)     GFR Non-African American 55 (*)     Calcium 8.6 (*)     Total Protein 6.1 (*)     Alb 3.4 (*)     All other components within normal limits   MICROSCOPIC URINALYSIS - Abnormal; Notable for the following:     WBC, UA 31-50 (*)     RBC, UA 6-10 (*)     Hyaline Casts, UA 15 (*)     WBC,  (*)     All other components within normal limits   URINE CULTURE       All other labs were within normal range or not returned as of this dictation.     EMERGENCY DEPARTMENT COURSE and DIFFERENTIAL DIAGNOSIS/MDM:   Vitals:    Vitals:    12/06/17 1601 12/06/17 1623 12/06/17 1701 12/06/17 1732   BP: 98/68 98/68 120/87 (!) 103/59   Pulse:       Resp:       Temp:       TempSrc: SpO2: 92% 93% 92% 98%   Weight:       Height:           MDM  Number of Diagnoses or Management Options  Bacterial UTI: new and requires workup     Amount and/or Complexity of Data Reviewed  Clinical lab tests: ordered and reviewed  Decide to obtain previous medical records or to obtain history from someone other than the patient: yes  Review and summarize past medical records: yes  Independent visualization of images, tracings, or specimens: yes    Risk of Complications, Morbidity, and/or Mortality  Presenting problems: moderate  Diagnostic procedures: moderate  Management options: moderate    Patient Progress  Patient progress: improved      Reassessment  5:50 PM symptoms have improved patient has been tolerating p.o. intake well and requests to go home. The plan is to start p.o. antibiotics at home she is to take her home medications as directed and follow-up with her primary care doctor she and the family expressed no barriers to filling this plan. CONSULTS:  None    PROCEDURES:  Unless otherwise noted below, none     Procedures    FINAL IMPRESSION      1.  Bacterial UTI          DISPOSITION/PLAN   DISPOSITION Decision to Discharge    PATIENT REFERRED TO:  Nemo Pickett, APRN  [address] [facility] [address]  [phone]    On 12/8/2017        DISCHARGE MEDICATIONS:  New Prescriptions    DOXYCYCLINE HYCLATE (VIBRA-TABS) 100 MG TABLET    Take 1 tablet by mouth 2 times daily for 10 days          (Please note that portions of this note were completed with a voice recognition program.  Efforts were made to edit the dictations but occasionally words are mis-transcribed.)    Lona Sotomayor MD (electronically signed)  Attending Emergency Physician          Franki Victor MD  12/06/17 6223

## 2017-12-08 LAB
ORGANISM: ABNORMAL
URINE CULTURE, ROUTINE: ABNORMAL
URINE CULTURE, ROUTINE: ABNORMAL

## 2017-12-12 ENCOUNTER — OFFICE VISIT (OUTPATIENT)
Dept: PRIMARY CARE CLINIC | Age: 72
End: 2017-12-12
Payer: MEDICARE

## 2017-12-12 VITALS
HEART RATE: 73 BPM | OXYGEN SATURATION: 92 % | DIASTOLIC BLOOD PRESSURE: 70 MMHG | TEMPERATURE: 98.2 F | SYSTOLIC BLOOD PRESSURE: 100 MMHG | HEIGHT: 62 IN | BODY MASS INDEX: 23.1 KG/M2 | WEIGHT: 125.5 LBS

## 2017-12-12 DIAGNOSIS — G47.01 INSOMNIA DUE TO MEDICAL CONDITION: ICD-10-CM

## 2017-12-12 DIAGNOSIS — R41.3 MEMORY LOSS: ICD-10-CM

## 2017-12-12 DIAGNOSIS — F33.1 MODERATE EPISODE OF RECURRENT MAJOR DEPRESSIVE DISORDER (HCC): ICD-10-CM

## 2017-12-12 DIAGNOSIS — Z87.440 HX: UTI (URINARY TRACT INFECTION): Primary | ICD-10-CM

## 2017-12-12 LAB
APPEARANCE FLUID: NORMAL
BILIRUBIN, POC: NORMAL
BLOOD URINE, POC: NORMAL
CLARITY, POC: CLEAR
COLOR, POC: YELLOW
GLUCOSE URINE, POC: NORMAL
KETONES, POC: NORMAL
LEUKOCYTE EST, POC: NORMAL
NITRITE, POC: NORMAL
PH, POC: 5
PROTEIN, POC: NORMAL
SPECIFIC GRAVITY, POC: 1.02
UROBILINOGEN, POC: 0.2

## 2017-12-12 PROCEDURE — G8420 CALC BMI NORM PARAMETERS: HCPCS | Performed by: NURSE PRACTITIONER

## 2017-12-12 PROCEDURE — 99213 OFFICE O/P EST LOW 20 MIN: CPT | Performed by: NURSE PRACTITIONER

## 2017-12-12 PROCEDURE — 1123F ACP DISCUSS/DSCN MKR DOCD: CPT | Performed by: NURSE PRACTITIONER

## 2017-12-12 PROCEDURE — 4004F PT TOBACCO SCREEN RCVD TLK: CPT | Performed by: NURSE PRACTITIONER

## 2017-12-12 PROCEDURE — 1090F PRES/ABSN URINE INCON ASSESS: CPT | Performed by: NURSE PRACTITIONER

## 2017-12-12 PROCEDURE — 3014F SCREEN MAMMO DOC REV: CPT | Performed by: NURSE PRACTITIONER

## 2017-12-12 PROCEDURE — 3017F COLORECTAL CA SCREEN DOC REV: CPT | Performed by: NURSE PRACTITIONER

## 2017-12-12 PROCEDURE — G8484 FLU IMMUNIZE NO ADMIN: HCPCS | Performed by: NURSE PRACTITIONER

## 2017-12-12 PROCEDURE — G8399 PT W/DXA RESULTS DOCUMENT: HCPCS | Performed by: NURSE PRACTITIONER

## 2017-12-12 PROCEDURE — G8599 NO ASA/ANTIPLAT THER USE RNG: HCPCS | Performed by: NURSE PRACTITIONER

## 2017-12-12 PROCEDURE — G8427 DOCREV CUR MEDS BY ELIG CLIN: HCPCS | Performed by: NURSE PRACTITIONER

## 2017-12-12 PROCEDURE — 4040F PNEUMOC VAC/ADMIN/RCVD: CPT | Performed by: NURSE PRACTITIONER

## 2017-12-12 RX ORDER — TRAZODONE HYDROCHLORIDE 50 MG/1
50 TABLET ORAL NIGHTLY
Qty: 60 TABLET | Refills: 3 | Status: SHIPPED | OUTPATIENT
Start: 2017-12-12 | End: 2017-12-12 | Stop reason: SDUPTHER

## 2017-12-12 RX ORDER — MEMANTINE HYDROCHLORIDE 10 MG/1
10 TABLET ORAL 2 TIMES DAILY
Qty: 60 TABLET | Refills: 3 | Status: SHIPPED | OUTPATIENT
Start: 2017-12-12 | End: 2017-12-12 | Stop reason: SDUPTHER

## 2017-12-12 ASSESSMENT — ENCOUNTER SYMPTOMS
EYE DISCHARGE: 0
CONSTIPATION: 0
SORE THROAT: 0
COUGH: 0
ABDOMINAL PAIN: 0
DIARRHEA: 0
EYE REDNESS: 0
VOMITING: 0
SHORTNESS OF BREATH: 0
RHINORRHEA: 0
NAUSEA: 1
BACK PAIN: 1

## 2017-12-12 NOTE — PATIENT INSTRUCTIONS
Patient Education        Depression and Chronic Disease: Care Instructions  Your Care Instructions    A chronic disease is one that you have for a long time. Some chronic diseases can be controlled, but they usually cannot be cured. Depression is common in people with chronic diseases, but it often goes unnoticed. Many people have concerns about seeking treatment for a mental health problem. You may think it's a sign of weakness, or you don't want people to know about it. It's important to overcome these reasons for not seeking treatment. Treating depression or anxiety is good for your health. Follow-up care is a key part of your treatment and safety. Be sure to make and go to all appointments, and call your doctor if you are having problems. It's also a good idea to know your test results and keep a list of the medicines you take. How can you care for yourself at home? Watch for symptoms of depression  The symptoms of depression are often subtle at first. You may think they are caused by your disease rather than depression. Or you may think it is normal to be depressed when you have a chronic disease. If you are depressed you may:  · Feel sad or hopeless. · Feel guilty or worthless. · Not enjoy the things you used to enjoy. · Feel hopeless, as though life is not worth living. · Have trouble thinking or remembering. · Have low energy, and you may not eat or sleep well. · Pull away from others. · Think often about death or killing yourself. (Keep the numbers for these national suicide hotlines: 7-600-407-TALK [1-541.796.6176] and 6-849-PBTEWWC [1-549.734.2187]. )  Get treatment  By treating your depression, you can feel more hopeful and have more energy. If you feel better, you may take better care of yourself, so your health may improve. · Talk to your doctor if you have any changes in mood during treatment for your disease. · Ask your doctor for help.  Counseling, antidepressant medicine, or a combination of the two can help most people with depression. Often a combination works best. Counseling can also help you cope with having a chronic disease. When should you call for help? Call 911 anytime you think you may need emergency care. For example, call if:  ? · You feel like hurting yourself or someone else. ? · Someone you know has depression and is about to attempt or is attempting suicide. ?Call your doctor now or seek immediate medical care if:  ? · You hear voices. ? · Someone you know has depression and:  ¨ Starts to give away his or her possessions. ¨ Uses illegal drugs or drinks alcohol heavily. ¨ Talks or writes about death, including writing suicide notes or talking about guns, knives, or pills. ¨ Starts to spend a lot of time alone. ¨ Acts very aggressively or suddenly appears calm. ? Watch closely for changes in your health, and be sure to contact your doctor if:  ? · You do not get better as expected. Where can you learn more? Go to https://transOMIC.TapZilla. org and sign in to your [employer] account. Enter N632 in the Go800 box to learn more about \"Depression and Chronic Disease: Care Instructions. \"     If you do not have an account, please click on the \"Sign Up Now\" link. Current as of: May 12, 2017  Content Version: 11.4  © 9471-0834 Healthwise, Incorporated. Care instructions adapted under license by [facility] ([facility]). If you have questions about a medical condition or this instruction, always ask your healthcare professional. [place] [address] any warranty or liability for your use of this information.

## 2017-12-12 NOTE — PROGRESS NOTES
08/29/2017 0.3     Alkaline Phosphatase 08/29/2017 70     ALT 08/29/2017 7     AST 08/29/2017 14     P-R Interval 08/30/2017 133     QRS Duration 08/30/2017 89     Q-T Interval 08/30/2017 372     QTc Calculation (Bazett) 08/30/2017 445     P Axis 08/30/2017 75     T Axis 08/30/2017 67     TSH 08/29/2017 0.805     pH, Arterial 08/29/2017 7.460*    pCO2, Arterial 08/29/2017 41.0     pO2, Arterial 08/29/2017 96.0     HCO3, Arterial 08/29/2017 29.2*    Base Excess, Arterial 08/29/2017 4.9*    Hemoglobin, Art, Extended 08/29/2017 11.8*    O2 Sat, Arterial 08/29/2017 94.9     Carboxyhgb, Arterial 08/29/2017 1.3     Methemoglobin, Arterial 08/29/2017 1.7     O2 Content, Arterial 08/29/2017 15.9     O2 Therapy 08/29/2017 Unknown     Potassium, Whole Blood 08/29/2017 4.1      Copies of these are in the chart. Prior to Visit Medications    Medication Sig Taking? Authorizing Provider   memantine (NAMENDA) 10 MG tablet Take 1 tablet by mouth 2 times daily Yes NAVEEN Dior   traZODone (DESYREL) 50 MG tablet Take 1 tablet by mouth nightly May take 1-2 tablets at bedtime Yes NAVEEN Dior   gabapentin (NEURONTIN) 300 MG capsule Take 300 mg by mouth 3 times daily Yes Historical Provider, MD   doxycycline hyclate (VIBRA-TABS) 100 MG tablet Take 1 tablet by mouth 2 times daily for 10 days Yes Opal Escobedo MD   clonazePAM (KLONOPIN) 0.5 MG tablet Take 1 tablet by mouth 3 times daily as needed for Anxiety . Yes NAVEEN Dior   donepezil (ARICEPT) 5 MG tablet Take 1 tablet by mouth nightly Yes NAVEEN Dior   ondansetron (ZOFRAN) 4 MG tablet Take 1 tablet by mouth daily as needed for Nausea or Vomiting Yes NAVEEN Dior   DULoxetine (CYMBALTA) 60 MG extended release capsule TAKE 1 CAPSULE BY MOUTH TWICE A DAY Yes NAVEEN Dior   oxyCODONE-acetaminophen (PERCOCET)  MG per tablet Take 1 tablet by mouth every 8 hours as needed for Pain .  Yes Historical Provider, MD   albuterol the tongue every 5 minutes as needed. Yes Historical Provider, MD   theophylline CR, 12 hour, (THEOPHYLLINE CR, 12 HOUR,) 300 MG CR tablet Take 300 mg by mouth daily. Yes Historical Provider, MD       Allergies: Codeine and Sulfa antibiotics    Past Medical History:   Diagnosis Date    Abdominal pain     Anxiety     Arthritis     CAD in native artery     Cerebrovascular disease     CHF (congestive heart failure) (HCC)     Constipation     COPD (chronic obstructive pulmonary disease) (HCC)     Depression     Emphysema     GERD (gastroesophageal reflux disease)     Myocardial infarct, old     Pneumonia     Tobacco abuse        Past Surgical History:   Procedure Laterality Date    ABDOMINAL EXPLORATION SURGERY      APPENDECTOMY      CARDIAC CATHETERIZATION  06/25/10    patent  stent noted in LAD,EF is estimated to be 60%    CARDIAC CATHETERIZATION  7/13/12  [facility]    EF  60%    CHOLECYSTECTOMY      COLONOSCOPY  10/2014    Dr Arturo Mas ENDOSCOPY  10/1/14    Dr Day Setting: food bezoar; esophagitis, gastritis, duodenitis; biopsies neg h-pylori       Social History   Substance Use Topics    Smoking status: Current Every Day Smoker     Packs/day: 1.00     Years: 36.00     Types: Cigarettes    Smokeless tobacco: Never Used    Alcohol use No       Review of Systems   Constitutional: Negative for chills, fatigue and fever. HENT: Negative for congestion, ear pain, rhinorrhea and sore throat. Eyes: Negative for discharge and redness. Respiratory: Negative for cough and shortness of breath. Cardiovascular: Negative for chest pain. Gastrointestinal: Positive for nausea (intermittent, improved). Negative for abdominal pain, constipation, diarrhea and vomiting. Musculoskeletal: Positive for arthralgias and back pain (stable). Skin: Negative for rash. Neurological: Negative for dizziness and headaches.         Memory loss   Psychiatric/Behavioral: they usually cannot be cured. Depression is common in people with chronic diseases, but it often goes unnoticed. Many people have concerns about seeking treatment for a mental health problem. You may think it's a sign of weakness, or you don't want people to know about it. It's important to overcome these reasons for not seeking treatment. Treating depression or anxiety is good for your health. Follow-up care is a key part of your treatment and safety. Be sure to make and go to all appointments, and call your doctor if you are having problems. It's also a good idea to know your test results and keep a list of the medicines you take. How can you care for yourself at home? Watch for symptoms of depression  The symptoms of depression are often subtle at first. You may think they are caused by your disease rather than depression. Or you may think it is normal to be depressed when you have a chronic disease. If you are depressed you may:  · Feel sad or hopeless. · Feel guilty or worthless. · Not enjoy the things you used to enjoy. · Feel hopeless, as though life is not worth living. · Have trouble thinking or remembering. · Have low energy, and you may not eat or sleep well. · Pull away from others. · Think often about death or killing yourself. (Keep the numbers for these national suicide hotlines: 2-958-386-TALK [1-546.802.7939] and 2-125-BVCIUAG [1-403.181.9609]. )  Get treatment  By treating your depression, you can feel more hopeful and have more energy. If you feel better, you may take better care of yourself, so your health may improve. · Talk to your doctor if you have any changes in mood during treatment for your disease. · Ask your doctor for help. Counseling, antidepressant medicine, or a combination of the two can help most people with depression. Often a combination works best. Counseling can also help you cope with having a chronic disease. When should you call for help?   Call 911 anytime you think you may need emergency care. For example, call if:  ? · You feel like hurting yourself or someone else. ? · Someone you know has depression and is about to attempt or is attempting suicide. ?Call your doctor now or seek immediate medical care if:  ? · You hear voices. ? · Someone you know has depression and:  ¨ Starts to give away his or her possessions. ¨ Uses illegal drugs or drinks alcohol heavily. ¨ Talks or writes about death, including writing suicide notes or talking about guns, knives, or pills. ¨ Starts to spend a lot of time alone. ¨ Acts very aggressively or suddenly appears calm. ? Watch closely for changes in your health, and be sure to contact your doctor if:  ? · You do not get better as expected. Where can you learn more? Go to https://Genisphere IncpeSpeakabooseb.Spotivate. org and sign in to your [employer] account. Enter Z163 in the Dtime box to learn more about \"Depression and Chronic Disease: Care Instructions. \"     If you do not have an account, please click on the \"Sign Up Now\" link. Current as of: May 12, 2017  Content Version: 11.4  © 6705-0500 Healthwise, [employer]. Care instructions adapted under license by [facility] ([facility]). If you have questions about a medical condition or this instruction, always ask your healthcare professional. [address] any warranty or liability for your use of this information. Controlled Substances Monitoring:  n/a            Additional Instructions: As always, patient is advised to bring in medication bottles in order to correctly reconcile with our current list.    Mariana Lucas received counseling on the following healthy behaviors: n/a    Patient given educational materials on dx    I have instructed Jeny to complete a self tracking handout on n/a and instructed them to bring it with them to her next appointment. Discussed use, benefit, and side effects of prescribed medications.   Barriers to medication compliance addressed. All patient questions answered. Pt voiced understanding.      Guillermina Terrazas, APRN

## 2017-12-13 RX ORDER — TRAZODONE HYDROCHLORIDE 50 MG/1
50 TABLET ORAL NIGHTLY
Qty: 60 TABLET | Refills: 3 | Status: SHIPPED | OUTPATIENT
Start: 2017-12-13 | End: 2018-01-02

## 2017-12-13 RX ORDER — MEMANTINE HYDROCHLORIDE 10 MG/1
10 TABLET ORAL 2 TIMES DAILY
Qty: 60 TABLET | Refills: 3 | Status: SHIPPED | OUTPATIENT
Start: 2017-12-13 | End: 2018-09-19 | Stop reason: SDUPTHER

## 2017-12-26 DIAGNOSIS — R11.0 NAUSEA: ICD-10-CM

## 2017-12-26 DIAGNOSIS — F41.1 GAD (GENERALIZED ANXIETY DISORDER): ICD-10-CM

## 2017-12-26 DIAGNOSIS — F32.9 REACTIVE DEPRESSION: ICD-10-CM

## 2017-12-26 DIAGNOSIS — J44.9 CHRONIC OBSTRUCTIVE PULMONARY DISEASE, UNSPECIFIED COPD TYPE (HCC): ICD-10-CM

## 2017-12-26 RX ORDER — DULOXETIN HYDROCHLORIDE 60 MG/1
60 CAPSULE, DELAYED RELEASE ORAL 2 TIMES DAILY
Qty: 180 CAPSULE | Refills: 3 | Status: SHIPPED | OUTPATIENT
Start: 2017-12-26 | End: 2018-09-18 | Stop reason: SDUPTHER

## 2017-12-26 RX ORDER — ONDANSETRON 4 MG/1
4 TABLET, FILM COATED ORAL DAILY PRN
Qty: 30 TABLET | Refills: 0 | Status: SHIPPED | OUTPATIENT
Start: 2017-12-26 | End: 2018-11-28 | Stop reason: SDUPTHER

## 2017-12-26 NOTE — TELEPHONE ENCOUNTER
Patient needs new scripts for cymbalta and anoro, she has changed pharmacies  Requested Prescriptions     Pending Prescriptions Disp Refills    DULoxetine (CYMBALTA) 60 MG extended release capsule 180 capsule 3     Sig: Take 1 capsule by mouth 2 times daily    umeclidinium-vilanterol (ANORO ELLIPTA) 62.5-25 MCG/INH AEPB inhaler 1 each 3     Sig: Inhale 1 puff into the lungs daily

## 2018-01-02 ENCOUNTER — TELEPHONE (OUTPATIENT)
Dept: PRIMARY CARE CLINIC | Age: 73
End: 2018-01-02

## 2018-01-02 ENCOUNTER — OFFICE VISIT (OUTPATIENT)
Dept: PRIMARY CARE CLINIC | Age: 73
End: 2018-01-02
Payer: MEDICARE

## 2018-01-02 VITALS
SYSTOLIC BLOOD PRESSURE: 100 MMHG | HEART RATE: 72 BPM | WEIGHT: 116.5 LBS | OXYGEN SATURATION: 95 % | BODY MASS INDEX: 21.44 KG/M2 | DIASTOLIC BLOOD PRESSURE: 70 MMHG | TEMPERATURE: 98.4 F | HEIGHT: 62 IN

## 2018-01-02 DIAGNOSIS — E87.5 HYPERKALEMIA: ICD-10-CM

## 2018-01-02 DIAGNOSIS — G47.01 INSOMNIA DUE TO MEDICAL CONDITION: ICD-10-CM

## 2018-01-02 DIAGNOSIS — K21.9 GASTROESOPHAGEAL REFLUX DISEASE, ESOPHAGITIS PRESENCE NOT SPECIFIED: ICD-10-CM

## 2018-01-02 DIAGNOSIS — Z95.828 PORT CATHETER IN PLACE: ICD-10-CM

## 2018-01-02 DIAGNOSIS — G89.29 CHRONIC BILATERAL LOW BACK PAIN WITHOUT SCIATICA: ICD-10-CM

## 2018-01-02 DIAGNOSIS — F41.1 GAD (GENERALIZED ANXIETY DISORDER): Primary | ICD-10-CM

## 2018-01-02 DIAGNOSIS — E86.0 DEHYDRATION: Primary | ICD-10-CM

## 2018-01-02 DIAGNOSIS — J44.9 CHRONIC OBSTRUCTIVE PULMONARY DISEASE, UNSPECIFIED COPD TYPE (HCC): ICD-10-CM

## 2018-01-02 DIAGNOSIS — G43.111 INTRACTABLE MIGRAINE WITH AURA WITH STATUS MIGRAINOSUS: ICD-10-CM

## 2018-01-02 DIAGNOSIS — Z79.899 MEDICATION MANAGEMENT: ICD-10-CM

## 2018-01-02 DIAGNOSIS — M54.50 CHRONIC BILATERAL LOW BACK PAIN WITHOUT SCIATICA: ICD-10-CM

## 2018-01-02 DIAGNOSIS — N39.45 CONTINUOUS LEAKAGE OF URINE: ICD-10-CM

## 2018-01-02 LAB
ALBUMIN SERPL-MCNC: 3.7 G/DL (ref 3.5–5.2)
ALP BLD-CCNC: 45 U/L (ref 35–104)
ALT SERPL-CCNC: 8 U/L (ref 5–33)
AMPHETAMINE SCREEN, URINE: NORMAL
ANION GAP SERPL CALCULATED.3IONS-SCNC: 13 MMOL/L (ref 7–19)
AST SERPL-CCNC: 15 U/L (ref 5–32)
BARBITURATE SCREEN, URINE: NORMAL
BENZODIAZEPINE SCREEN, URINE: NORMAL
BILIRUB SERPL-MCNC: <0.2 MG/DL (ref 0.2–1.2)
BUN BLDV-MCNC: 37 MG/DL (ref 8–23)
CALCIUM SERPL-MCNC: 8.3 MG/DL (ref 8.8–10.2)
CHLORIDE BLD-SCNC: 103 MMOL/L (ref 98–111)
CO2: 22 MMOL/L (ref 22–29)
COCAINE METABOLITE SCREEN URINE: NORMAL
CREAT SERPL-MCNC: 1.1 MG/DL (ref 0.5–0.9)
GFR NON-AFRICAN AMERICAN: 49
GLUCOSE BLD-MCNC: 83 MG/DL (ref 74–109)
MDMA URINE: NORMAL
METHADONE SCREEN, URINE: NORMAL
METHAMPHETAMINE, URINE: NORMAL
OPIATE SCREEN URINE: POSITIVE
OXYCODONE SCREEN URINE: NORMAL
PHENCYCLIDINE SCREEN URINE: NORMAL
POTASSIUM SERPL-SCNC: 5.1 MMOL/L (ref 3.5–5)
PROPOXYPHENE SCREEN, URINE: NORMAL
SODIUM BLD-SCNC: 138 MMOL/L (ref 136–145)
THC: NORMAL
TOTAL PROTEIN: 6.4 G/DL (ref 6.6–8.7)
TRICYCLIC ANTIDEPRESSANTS, UR: NORMAL

## 2018-01-02 PROCEDURE — 99214 OFFICE O/P EST MOD 30 MIN: CPT | Performed by: NURSE PRACTITIONER

## 2018-01-02 PROCEDURE — 80305 DRUG TEST PRSMV DIR OPT OBS: CPT | Performed by: NURSE PRACTITIONER

## 2018-01-02 PROCEDURE — 96372 THER/PROPH/DIAG INJ SC/IM: CPT | Performed by: NURSE PRACTITIONER

## 2018-01-02 RX ORDER — NITROGLYCERIN 0.4 MG/1
0.4 TABLET SUBLINGUAL EVERY 5 MIN PRN
Qty: 25 TABLET | Refills: 3 | Status: SHIPPED | OUTPATIENT
Start: 2018-01-02 | End: 2019-02-27 | Stop reason: SDUPTHER

## 2018-01-02 RX ORDER — TRAZODONE HYDROCHLORIDE 150 MG/1
150 TABLET ORAL NIGHTLY
Qty: 30 TABLET | Refills: 3 | Status: SHIPPED | OUTPATIENT
Start: 2018-01-02 | End: 2018-03-31 | Stop reason: SDUPTHER

## 2018-01-02 RX ORDER — DEXLANSOPRAZOLE 60 MG/1
60 CAPSULE, DELAYED RELEASE ORAL DAILY
Qty: 30 CAPSULE | Refills: 5 | Status: SHIPPED | OUTPATIENT
Start: 2018-01-02 | End: 2018-05-31 | Stop reason: SDUPTHER

## 2018-01-02 RX ORDER — FUROSEMIDE 40 MG/1
TABLET ORAL
Qty: 60 TABLET | Refills: 11
Start: 2018-01-02 | End: 2018-12-18 | Stop reason: SDUPTHER

## 2018-01-02 RX ORDER — CLONAZEPAM 0.5 MG/1
0.5 TABLET ORAL 3 TIMES DAILY PRN
Qty: 75 TABLET | Refills: 0 | Status: SHIPPED | OUTPATIENT
Start: 2018-01-02 | End: 2018-02-06 | Stop reason: SDUPTHER

## 2018-01-02 RX ORDER — TIZANIDINE 4 MG/1
4 TABLET ORAL EVERY 8 HOURS PRN
Qty: 90 TABLET | Refills: 3 | Status: SHIPPED | OUTPATIENT
Start: 2018-01-02 | End: 2018-03-31 | Stop reason: SDUPTHER

## 2018-01-02 RX ORDER — KETOROLAC TROMETHAMINE 30 MG/ML
30 INJECTION, SOLUTION INTRAMUSCULAR; INTRAVENOUS ONCE
Status: COMPLETED | OUTPATIENT
Start: 2018-01-02 | End: 2018-01-02

## 2018-01-02 RX ORDER — TRAZODONE HYDROCHLORIDE 50 MG/1
50 TABLET ORAL NIGHTLY
Qty: 60 TABLET | Refills: 3 | Status: CANCELLED | OUTPATIENT
Start: 2018-01-02

## 2018-01-02 RX ORDER — ALBUTEROL SULFATE 90 UG/1
2 AEROSOL, METERED RESPIRATORY (INHALATION) EVERY 6 HOURS PRN
Qty: 1 INHALER | Refills: 5 | Status: SHIPPED | OUTPATIENT
Start: 2018-01-02 | End: 2018-08-16 | Stop reason: SDUPTHER

## 2018-01-02 RX ORDER — TOLTERODINE 4 MG/1
4 CAPSULE, EXTENDED RELEASE ORAL DAILY
Qty: 30 CAPSULE | Refills: 3 | Status: SHIPPED | OUTPATIENT
Start: 2018-01-02 | End: 2018-04-06 | Stop reason: SDUPTHER

## 2018-01-02 RX ADMIN — KETOROLAC TROMETHAMINE 30 MG: 30 INJECTION, SOLUTION INTRAMUSCULAR; INTRAVENOUS at 16:03

## 2018-01-02 ASSESSMENT — ENCOUNTER SYMPTOMS
ABDOMINAL PAIN: 0
RHINORRHEA: 0
EYE DISCHARGE: 0
VOMITING: 0
SHORTNESS OF BREATH: 0
BACK PAIN: 1
NAUSEA: 1
COUGH: 0
CONSTIPATION: 0
DIARRHEA: 0
EYE REDNESS: 0
SORE THROAT: 0

## 2018-01-02 NOTE — PROGRESS NOTES
After obtaining consent, and per orders of FAITH HODGE, injection of 30mg TORADOL given in Right upper quad. gluteus by Newman Lennox Whited. Patient tolerated well.

## 2018-01-02 NOTE — PROGRESS NOTES
[address]  [place], [address] [place] [address] Rd  Phone [phone]   Fax [phone]      OFFICE VISIT: 2018    Janet Lopez- : 1945    Chief Complaint:  Federico Pickett is a 71 y.o. female who is here for Medication Refill and Discuss Medications (trazadone)     HPI     ANXIETY:  The patient presents today for monthly follow-up of anxiety. She continues to take Cymbalta 60 mg BID. She takes Klonopin 0.5 mg, 2-3 tablets daily  She is needing a refill on her Klonopin today. INSOMNIA:  She is needing Trazodone 150 mg nightly to sleep. She initially tried Trazodone 50 mg and 100 mg nightly. However, she needing Trazodone 150 mg nightly to sleep. HEADACHES:  Reports increased headaches since last week. \"I have had one every day. \"  \"They put me in bed. \"  Reports a hx of migraines, BUT these had been well controlled until the last week. She previously took Imitrex without improvement in her headaches. Reports nausea and vomiting with the headaches. HYPERKALEMIA:  She had a sightly elevated K+ in the past.  She has not had this rechecked. PORT:  Will need to be flushed today. Will draw repeat CMP. height is 5' 2\" (1.575 m) and weight is 116 lb 8 oz (52.8 kg). Her temporal temperature is 98.4 °F (36.9 °C). Her blood pressure is 100/70 and her pulse is 72. Her oxygen saturation is 95%. Body mass index is 21.31 kg/m². Results for orders placed or performed in visit on 18   POCT Rapid Drug Screen   Result Value Ref Range    Amphetamine Screen, Urine neg     Barbiturate Screen, Urine neg     Benzodiazepine Screen, Urine neg     COCAINE METABOLITE SCREEN URINE neg     THC neg     MDMA URINE neg     Methadone Screen, Urine neg     Opiate Scrn, Ur POSITIVE     Oxycodone Screen, Ur neg     PCP Scrn, Ur neg     Propoxyphene Screen, Urine neg     Tricyclic Antidepressants, Ur neg     Methamphetamine, Urine neg        I have reviewed the following with the Ms.  [address] [facility]   Lab Review   Office Visit on 12/12/2017   Component Date Value    Color, UA 12/12/2017 yellow     Clarity, UA 12/12/2017 clear     Glucose, UA POC 12/12/2017 neg     Bilirubin, UA 12/12/2017 neg     Ketones, UA 12/12/2017 neg     Spec Grav, UA 12/12/2017 1.020     Blood, UA POC 12/12/2017 neg     pH, UA 12/12/2017 5.0     Protein, UA POC 12/12/2017 neg     Urobilinogen, UA 12/12/2017 0.2     Leukocytes, UA 12/12/2017 neg     Nitrite, UA 12/12/2017 neg    Admission on 12/06/2017, Discharged on 12/06/2017   Component Date Value    Color, UA 12/06/2017 DK YELLOW     Clarity, UA 12/06/2017 TURBID*    Glucose, Ur 12/06/2017 Negative     Bilirubin Urine 12/06/2017 SMALL*    Ketones, Urine 12/06/2017 Negative     Specific Gravity, UA 12/06/2017 1.023     Blood, Urine 12/06/2017 LARGE*    pH, UA 12/06/2017 5.5     Protein, UA 12/06/2017 100*    Urobilinogen, Urine 12/06/2017 1.0     Nitrite, Urine 12/06/2017 POSITIVE*    Leukocyte Esterase, Urine 12/06/2017 LARGE*    WBC 12/06/2017 8.1     RBC 12/06/2017 3.28*    Hemoglobin 12/06/2017 9.9*    Hematocrit 12/06/2017 32.7*    MCV 12/06/2017 99.7*    MCH 12/06/2017 30.2     MCHC 12/06/2017 30.3*    RDW 12/06/2017 16.4*    Platelets 43/41/1714 197     MPV 12/06/2017 9.5     Sodium 12/06/2017 137     Potassium 12/06/2017 5.1*    Chloride 12/06/2017 102     CO2 12/06/2017 28     Anion Gap 12/06/2017 7     Glucose 12/06/2017 81     BUN 12/06/2017 21     CREATININE 12/06/2017 1.0*    GFR Non- 12/06/2017 55*    Calcium 12/06/2017 8.6*    Total Protein 12/06/2017 6.1*    Alb 12/06/2017 3.4*    Total Bilirubin 12/06/2017 <0.2     Alkaline Phosphatase 12/06/2017 55     ALT 12/06/2017 6     AST 12/06/2017 10     Urine Culture, Routine 12/08/2017 >100,000 CFU/ml*    Organism 12/08/2017 Escherichia coli*    Urine Culture, Routine 12/08/2017 Heavy growth     WBC, UA 12/06/2017 31-50*    RBC, UA 12/06/2017 6-10*    Epi Cells 12/06/2017 0-2     Bacteria, UA 12/06/2017 3+     Hyaline Casts, UA 12/06/2017 15*    WBC, UA 12/06/2017 888*    Epi Cells 12/06/2017 1    Admission on 08/29/2017, Discharged on 08/29/2017   Component Date Value    WBC 08/29/2017 7.2     RBC 08/29/2017 3.62*    Hemoglobin 08/29/2017 11.5*    Hematocrit 08/29/2017 36.2*    MCV 08/29/2017 100.0*    MCH 08/29/2017 31.8*    MCHC 08/29/2017 31.8*    RDW 08/29/2017 14.5     Platelets 04/28/8318 233     MPV 08/29/2017 9.2*    Sodium 08/29/2017 137     Potassium 08/29/2017 4.2     Chloride 08/29/2017 96*    CO2 08/29/2017 27     Anion Gap 08/29/2017 14     Glucose 08/29/2017 112*    BUN 08/29/2017 20     CREATININE 08/29/2017 0.7     GFR Non- 08/29/2017 >60     Calcium 08/29/2017 9.6     Total Protein 08/29/2017 7.2     Alb 08/29/2017 3.8     Total Bilirubin 08/29/2017 0.3     Alkaline Phosphatase 08/29/2017 70     ALT 08/29/2017 7     AST 08/29/2017 14     P-R Interval 08/30/2017 133     QRS Duration 08/30/2017 89     Q-T Interval 08/30/2017 372     QTc Calculation (Bazett) 08/30/2017 445     P Axis 08/30/2017 75     T Axis 08/30/2017 67     TSH 08/29/2017 0.805     pH, Arterial 08/29/2017 7.460*    pCO2, Arterial 08/29/2017 41.0     pO2, Arterial 08/29/2017 96.0     HCO3, Arterial 08/29/2017 29.2*    Base Excess, Arterial 08/29/2017 4.9*    Hemoglobin, Art, Extended 08/29/2017 11.8*    O2 Sat, Arterial 08/29/2017 94.9     Carboxyhgb, Arterial 08/29/2017 1.3     Methemoglobin, Arterial 08/29/2017 1.7     O2 Content, Arterial 08/29/2017 15.9     O2 Therapy 08/29/2017 Unknown     Potassium, Whole Blood 08/29/2017 4.1      Copies of these are in the chart. Prior to Visit Medications    Medication Sig Taking?  Authorizing Provider   tiZANidine (ZANAFLEX) 4 MG tablet Take 1 tablet by mouth every 8 hours as needed (muscle spasm) Yes NAVEEN Dior   tolterodine (DETROL LA) 4 MG extended release capsule Take 1 capsule by mouth Rapid Drug Screen  Will send for confirmation. UDS was positive for opiates but negative for Klonopin. Will seen for verification   2. Chronic bilateral low back pain without sciatica M54.5 724.2 tiZANidine (ZANAFLEX) 4 MG tablet    G89.29 338.29 POCT Rapid Drug Screen   3. Continuous leakage of urine N39.45 788.37 tolterodine (DETROL LA) 4 MG extended release capsule   4. Chronic obstructive pulmonary disease, unspecified COPD type (HCC) J44.9 496 albuterol sulfate HFA (PROAIR HFA) 108 (90 Base) MCG/ACT inhaler   5. Insomnia due to medical condition G47.01 327.01 clonazePAM (KLONOPIN) 0.5 MG tablet      POCT Rapid Drug Screen      traZODone (DESYREL) 150 MG tablet   6. Gastroesophageal reflux disease, esophagitis presence not specified K21.9 530.81 dexlansoprazole (DEXILANT) 60 MG CPDR delayed release capsule   7. Medication management Z79.899 V58.69 POCT Rapid Drug Screen   8. Port catheter in place Z95.828 V45.89 CT IRRIG IMPLANTED DRUG DELIVERY DEVICE   9. Intractable migraine with aura with status migrainosus G43. 111 346.03 ketorolac (TORADOL) injection 30 mg   10. Hyperkalemia E87.5 276.7 Comprehensive Metabolic Panel         PLAN    Orders Placed This Encounter   Procedures    Comprehensive Metabolic Panel    POCT Rapid Drug Screen    CT IRRIG IMPLANTED DRUG DELIVERY DEVICE         procedure note:    Accessed mediport in sterile fashion with gripper needle without anesthesia. The port was flushed with normal saline and flushed easily. Attempt was made to draw back blood from the port, and CMP was obtained and sent to lab . The port was then flushed with heparin and the port was de-accessed. No blood loss  No complication  No specimen  She tolerated the procedure well without incident. Return in about 1 month (around 2/2/2018), or if symptoms worsen or fail to improve.      Patient Instructions     Patient Education          clonazepam  Pronunciation:  kory Cisneros  Brand:  Petty Smith  What is thoughts about suicide when taking seizure medication. Your doctor should check your progress at regular visits. Your family or other caregivers should also be alert to changes in your mood or symptoms. Follow your doctor's instructions about taking seizure medication if you are pregnant. Do not start or stop taking this medicine without your doctor's advice, and tell your doctor right away if you become pregnant. Clonazepam may cause harm to an unborn baby, and may cause breathing or feeding problems in a . But having seizures during pregnancy could harm both mother and baby. If you are pregnant, your name may be listed on a pregnancy registry. This is to track the outcome of the pregnancy and to evaluate any effects of clonazepam on the baby. Clonazepam can pass into breast milk and may harm a nursing baby. You should not breast-feed while using this medicine. Do not give this medicine to a child without medical advice. Clonazepam is not approved to treat panic disorder in anyone younger than 25years old. The sedative effects of clonazepam may last longer in older adults. Accidental falls are common in elderly patients who take benzodiazepines. Use caution to avoid falling or accidental injury while you are taking clonazepam.  How should I take clonazepam?  Follow all directions on your prescription label. Your doctor may occasionally change your dose to make sure you get the best results. Do not use this medicine in larger or smaller amounts or for longer than recommended. Clonazepam may be habit-forming. Never share this medicine with another person, especially someone with a history of drug abuse or addiction. Keep the medication in a place where others cannot get to it. Misuse of habit-forming medicine can cause addiction, overdose, or death. Selling or giving away this medicine is against the law. Clonazepam should be used for only a short time.  Do not take this medication for longer than 9 weeks without your doctor's advice. Swallow the regular clonazepam tablet whole, with a full glass of water. To take the orally disintegrating tablet:  · Keep the tablet in its blister pack until you are ready to take it. Open the package and peel back the foil. Do not push a tablet through the foil or you may damage the tablet. · Use dry hands to remove the tablet and place it in your mouth. · Do not swallow the tablet whole. Allow it to dissolve in your mouth without chewing. · Swallow several times as the tablet dissolves. If desired, you may drink liquid to help swallow the dissolved tablet. If you use this medicine long-term, you may need frequent medical tests. Do not stop using clonazepam suddenly or you could have unpleasant withdrawal symptoms, including a seizure (convulsions). Ask your doctor how to safely stop using this medicine. Call your doctor if this medicine seems to stop working as well in treating your seizures or anxiety symptoms. Seizures are often treated with a combination of drugs. Use all medications as directed by your doctor. Read the medication guide or patient instructions provided with each medication. Do not change your doses or medication schedule without your doctor's advice. Store at room temperature away from moisture, heat, and light. Keep track of the amount of medicine used from each new bottle. Clonazepam is a drug of abuse and you should be aware if anyone is using your medicine improperly or without a prescription. What happens if I miss a dose? Take the missed dose as soon as you remember. Skip the missed dose if it is almost time for your next scheduled dose. Do not take extra medicine to make up the missed dose. What happens if I overdose? Seek emergency medical attention or call the Poison Help line at 1-360.687.4280. Overdose symptoms may include extreme drowsiness, confusion, muscle weakness, fainting, or coma.   What should I avoid while taking clonazepam?  Avoid drinking alcohol. Dangerous side effects could occur. Clonazepam may impair your thinking or reactions. Avoid driving or operating machinery until you know how this medicine will affect you. Dizziness or severe drowsiness can cause falls or other accidents. What are the possible side effects of clonazepam?  Get emergency medical help if you have signs of an allergic reaction: hives; difficulty breathing; swelling of your face, lips, tongue, or throat. Report any new or worsening symptoms to your doctor, such as: mood or behavior changes, anxiety, panic attacks, trouble sleeping, or if you feel impulsive, irritable, agitated, hostile, aggressive, restless, hyperactive (mentally or physically), more depressed, or have thoughts about suicide or hurting yourself. Call your doctor at once if you have:  · new or worsening seizures;  · severe drowsiness;  · unusual changes in mood or behavior;  · confusion, aggression, hallucinations;  · thoughts of suicide or hurting yourself;  · weak or shallow breathing;  · pounding heartbeats or fluttering in your chest; or  · unusual or involuntary eye movements. Common side effects may include:  · feeling tired or depressed;  · drowsiness, dizziness;  · memory problems; or  · problems with balance or coordination. This is not a complete list of side effects and others may occur. Call your doctor for medical advice about side effects. You may report side effects to FDA at 1-538-FDA-7659. What other drugs will affect clonazepam?  Taking clonazepam with other drugs that make you sleepy or slow your breathing can cause dangerous side effects or death. Ask your doctor before taking a sleeping pill, narcotic pain medicine, prescription cough medicine, a muscle relaxer, or medicine for anxiety, depression, or seizures. Other drugs may interact with clonazepam, including prescription and over-the-counter medicines, vitamins, and herbal products.  Tell each of your

## 2018-01-02 NOTE — PATIENT INSTRUCTIONS
drowsiness, confusion, muscle weakness, fainting, or coma. What should I avoid while taking clonazepam?  Avoid drinking alcohol. Dangerous side effects could occur. Clonazepam may impair your thinking or reactions. Avoid driving or operating machinery until you know how this medicine will affect you. Dizziness or severe drowsiness can cause falls or other accidents. What are the possible side effects of clonazepam?  Get emergency medical help if you have signs of an allergic reaction: hives; difficulty breathing; swelling of your face, lips, tongue, or throat. Report any new or worsening symptoms to your doctor, such as: mood or behavior changes, anxiety, panic attacks, trouble sleeping, or if you feel impulsive, irritable, agitated, hostile, aggressive, restless, hyperactive (mentally or physically), more depressed, or have thoughts about suicide or hurting yourself. Call your doctor at once if you have:  · new or worsening seizures;  · severe drowsiness;  · unusual changes in mood or behavior;  · confusion, aggression, hallucinations;  · thoughts of suicide or hurting yourself;  · weak or shallow breathing;  · pounding heartbeats or fluttering in your chest; or  · unusual or involuntary eye movements. Common side effects may include:  · feeling tired or depressed;  · drowsiness, dizziness;  · memory problems; or  · problems with balance or coordination. This is not a complete list of side effects and others may occur. Call your doctor for medical advice about side effects. You may report side effects to FDA at 0-417-FDA-8898. What other drugs will affect clonazepam?  Taking clonazepam with other drugs that make you sleepy or slow your breathing can cause dangerous side effects or death. Ask your doctor before taking a sleeping pill, narcotic pain medicine, prescription cough medicine, a muscle relaxer, or medicine for anxiety, depression, or seizures.   Other drugs may interact with clonazepam, including prescription and over-the-counter medicines, vitamins, and herbal products. Tell each of your health care providers about all medicines you use now and any medicine you start or stop using. Where can I get more information? Your pharmacist can provide more information about clonazepam.    Remember, keep this and all other medicines out of the reach of children, never share your medicines with others, and use this medication only for the indication prescribed. Every effort has been made to ensure that the information provided by [address] is accurate, up-to-date, and complete, but no guarantee is made to that effect. Drug information contained herein may be time sensitive. [facility] information has been compiled for use by healthcare practitioners and consumers in the [place] and therefore [facility] does not warrant that uses outside of the [place] are appropriate, unless specifically indicated otherwise. [facility]'s drug information does not endorse drugs, diagnose patients or recommend therapy. [facility] drug information is an informational resource designed to assist licensed healthcare practitioners in caring for their patients and/or to serve consumers viewing this service as a supplement to, and not a substitute for, the expertise, skill, knowledge and judgment of healthcare practitioners. The absence of a warning for a given drug or drug combination in no way should be construed to indicate that the drug or drug combination is safe, effective or appropriate for any given patient. [facility] does not assume any responsibility for any aspect of healthcare administered with the aid of information [facility] provides. The information contained herein is not intended to cover all possible uses, directions, precautions, warnings, drug interactions, allergic reactions, or adverse effects.  If you have questions about the drugs you are taking, check with your doctor, nurse or pharmacist.  Copyright

## 2018-01-10 DIAGNOSIS — Z79.899 MEDICATION MANAGEMENT: ICD-10-CM

## 2018-01-10 DIAGNOSIS — F11.20 CHRONIC NARCOTIC DEPENDENCE (HCC): Primary | ICD-10-CM

## 2018-01-10 LAB
6-ACETYLMORPHINE, UR: NORMAL
AMPHETAMINE SCREEN, URINE: NORMAL
BARBITURATE SCREEN, URINE: NORMAL
BENZODIAZEPINE SCREEN, URINE: POSITIVE
CANNABINOID SCREEN URINE: NORMAL
COCAINE METABOLITE, URINE: NORMAL
CREATININE URINE: 104.5 MG/DL
EDDP, URINE: NORMAL
ETHANOL URINE: NORMAL
MDMA URINE: NORMAL
METHADONE SCREEN, URINE: NORMAL
METHAMPHETAMINE, URINE: NORMAL
OPIATES, URINE: POSITIVE
OXYCODONE: NORMAL
PCP: NORMAL
PH, URINE: 5.1
PHENCYCLIDINE, URINE: NORMAL
PROPOXYPHENE, URINE: NORMAL
TRICYCLIC ANTIDEPRESSANTS, UR: NORMAL

## 2018-01-17 ENCOUNTER — TELEPHONE (OUTPATIENT)
Dept: PRIMARY CARE CLINIC | Age: 73
End: 2018-01-17

## 2018-01-17 DIAGNOSIS — G89.29 CHRONIC LOW BACK PAIN, UNSPECIFIED BACK PAIN LATERALITY, WITH SCIATICA PRESENCE UNSPECIFIED: ICD-10-CM

## 2018-01-17 DIAGNOSIS — M54.5 CHRONIC LOW BACK PAIN, UNSPECIFIED BACK PAIN LATERALITY, WITH SCIATICA PRESENCE UNSPECIFIED: ICD-10-CM

## 2018-01-17 DIAGNOSIS — F41.1 GENERALIZED ANXIETY DISORDER: ICD-10-CM

## 2018-01-17 DIAGNOSIS — Z79.899 POLYPHARMACY: Primary | ICD-10-CM

## 2018-01-17 DIAGNOSIS — Z79.899 MEDICATION MANAGEMENT: ICD-10-CM

## 2018-01-18 ENCOUNTER — OFFICE VISIT (OUTPATIENT)
Dept: PRIMARY CARE CLINIC | Age: 73
End: 2018-01-18
Payer: MEDICARE

## 2018-01-18 VITALS
BODY MASS INDEX: 23.37 KG/M2 | HEIGHT: 62 IN | OXYGEN SATURATION: 93 % | TEMPERATURE: 97.8 F | SYSTOLIC BLOOD PRESSURE: 110 MMHG | HEART RATE: 82 BPM | WEIGHT: 127 LBS | DIASTOLIC BLOOD PRESSURE: 80 MMHG

## 2018-01-18 DIAGNOSIS — E87.5 HYPERKALEMIA: ICD-10-CM

## 2018-01-18 DIAGNOSIS — N30.00 ACUTE CYSTITIS WITHOUT HEMATURIA: Primary | ICD-10-CM

## 2018-01-18 DIAGNOSIS — R32 URINARY INCONTINENCE, UNSPECIFIED TYPE: ICD-10-CM

## 2018-01-18 DIAGNOSIS — R30.9 URINARY PAIN: ICD-10-CM

## 2018-01-18 LAB
ALBUMIN SERPL-MCNC: 3.7 G/DL (ref 3.5–5.2)
ALP BLD-CCNC: 56 U/L (ref 35–104)
ALT SERPL-CCNC: 7 U/L (ref 5–33)
ANION GAP SERPL CALCULATED.3IONS-SCNC: 13 MMOL/L (ref 7–19)
APPEARANCE FLUID: ABNORMAL
AST SERPL-CCNC: 14 U/L (ref 5–32)
BILIRUB SERPL-MCNC: <0.2 MG/DL (ref 0.2–1.2)
BILIRUBIN, POC: ABNORMAL
BLOOD URINE, POC: ABNORMAL
BUN BLDV-MCNC: 14 MG/DL (ref 8–23)
CALCIUM SERPL-MCNC: 8.8 MG/DL (ref 8.8–10.2)
CHLORIDE BLD-SCNC: 101 MMOL/L (ref 98–111)
CLARITY, POC: CLEAR
CO2: 25 MMOL/L (ref 22–29)
COLOR, POC: YELLOW
CREAT SERPL-MCNC: 0.8 MG/DL (ref 0.5–0.9)
GFR NON-AFRICAN AMERICAN: >60
GLUCOSE BLD-MCNC: 108 MG/DL (ref 74–109)
GLUCOSE URINE, POC: ABNORMAL
KETONES, POC: ABNORMAL
LEUKOCYTE EST, POC: ABNORMAL
NITRITE, POC: ABNORMAL
PH, POC: 6
POTASSIUM SERPL-SCNC: 4.3 MMOL/L (ref 3.5–5)
PROTEIN, POC: 30
SODIUM BLD-SCNC: 139 MMOL/L (ref 136–145)
SPECIFIC GRAVITY, POC: 1.02
TOTAL PROTEIN: 6.5 G/DL (ref 6.6–8.7)
UROBILINOGEN, POC: 0.2

## 2018-01-18 PROCEDURE — 4004F PT TOBACCO SCREEN RCVD TLK: CPT | Performed by: NURSE PRACTITIONER

## 2018-01-18 PROCEDURE — G8484 FLU IMMUNIZE NO ADMIN: HCPCS | Performed by: NURSE PRACTITIONER

## 2018-01-18 PROCEDURE — 81002 URINALYSIS NONAUTO W/O SCOPE: CPT | Performed by: NURSE PRACTITIONER

## 2018-01-18 PROCEDURE — G8420 CALC BMI NORM PARAMETERS: HCPCS | Performed by: NURSE PRACTITIONER

## 2018-01-18 PROCEDURE — G8599 NO ASA/ANTIPLAT THER USE RNG: HCPCS | Performed by: NURSE PRACTITIONER

## 2018-01-18 PROCEDURE — 3017F COLORECTAL CA SCREEN DOC REV: CPT | Performed by: NURSE PRACTITIONER

## 2018-01-18 PROCEDURE — G8427 DOCREV CUR MEDS BY ELIG CLIN: HCPCS | Performed by: NURSE PRACTITIONER

## 2018-01-18 PROCEDURE — 99213 OFFICE O/P EST LOW 20 MIN: CPT | Performed by: NURSE PRACTITIONER

## 2018-01-18 PROCEDURE — 1090F PRES/ABSN URINE INCON ASSESS: CPT | Performed by: NURSE PRACTITIONER

## 2018-01-18 PROCEDURE — G8399 PT W/DXA RESULTS DOCUMENT: HCPCS | Performed by: NURSE PRACTITIONER

## 2018-01-18 PROCEDURE — 4040F PNEUMOC VAC/ADMIN/RCVD: CPT | Performed by: NURSE PRACTITIONER

## 2018-01-18 PROCEDURE — 1123F ACP DISCUSS/DSCN MKR DOCD: CPT | Performed by: NURSE PRACTITIONER

## 2018-01-18 PROCEDURE — 3014F SCREEN MAMMO DOC REV: CPT | Performed by: NURSE PRACTITIONER

## 2018-01-18 PROCEDURE — 0509F URINE INCON PLAN DOCD: CPT | Performed by: NURSE PRACTITIONER

## 2018-01-18 RX ORDER — HYDROCODONE BITARTRATE AND ACETAMINOPHEN 10; 325 MG/1; MG/1
TABLET ORAL
Refills: 0 | Status: ON HOLD | COMMUNITY
Start: 2018-01-10 | End: 2020-10-30 | Stop reason: HOSPADM

## 2018-01-18 RX ORDER — NITROFURANTOIN 25; 75 MG/1; MG/1
100 CAPSULE ORAL 2 TIMES DAILY
Qty: 20 CAPSULE | Refills: 0 | Status: SHIPPED | OUTPATIENT
Start: 2018-01-18 | End: 2018-01-28

## 2018-01-18 ASSESSMENT — ENCOUNTER SYMPTOMS
ABDOMINAL PAIN: 0
CONSTIPATION: 0
EYE DISCHARGE: 0
SHORTNESS OF BREATH: 0
COUGH: 0
VOMITING: 0
DIARRHEA: 0
BACK PAIN: 1
EYE REDNESS: 0
NAUSEA: 0
RHINORRHEA: 0
SORE THROAT: 0

## 2018-01-18 NOTE — PROGRESS NOTES
pain.   Gastrointestinal: Negative for abdominal pain, constipation, diarrhea, nausea and vomiting. Genitourinary: Positive for dysuria, frequency and urgency. Musculoskeletal: Positive for arthralgias and back pain (stable). Skin: Negative for rash. Neurological: Negative for dizziness and headaches. Psychiatric/Behavioral: Positive for sleep disturbance (improved with Trazodone 150 mg). The patient is nervous/anxious (improved with Klonopin). Physical Exam   Constitutional: She appears well-developed. HENT:   Head: Normocephalic. Right Ear: Hearing and external ear normal.   Left Ear: Hearing and external ear normal.   Nose: Nose normal. No mucosal edema or rhinorrhea. Mouth/Throat: Oropharynx is clear and moist. No posterior oropharyngeal erythema. Neck: Normal range of motion. Carotid bruit is not present. Cardiovascular: Normal rate and regular rhythm. Pulmonary/Chest: Effort normal and breath sounds normal. No respiratory distress. She has no wheezes. She has no rales. Abdominal: Soft. Bowel sounds are normal.   Musculoskeletal:        Lumbar back: She exhibits tenderness and pain. Neurological: She is alert. Skin: Skin is dry. No rash noted. Port in left chest. CMP drawn and then port was flushed   Psychiatric: She has a normal mood and affect. Her behavior is normal. Judgment and thought content normal.   Vitals reviewed. ASSESSMENT      ICD-10-CM ICD-9-CM    1. Acute cystitis without hematuria N30.00 595.0 Urine culture      nitrofurantoin, macrocrystal-monohydrate, (MACROBID) 100 MG capsule   2. Urinary pain R30.9 788.1 POCT Urinalysis no Micro   3. Urinary incontinence, unspecified type R32 788.30    4.  Hyperkalemia E87.5 276.7 Comprehensive Metabolic Panel      PA IRRIG IMPLANTED DRUG DELIVERY DEVICE         PLAN    Orders Placed This Encounter   Procedures    Urine culture    Comprehensive Metabolic Panel    POCT Urinalysis no Micro    PA IRRIG IMPLANTED DRUG DELIVERY DEVICE         procedure note:    Accessed mediport in sterile fashion with gripper needle without anesthesia. The port was flushed with normal saline and flushed easily. Attempt was made to draw back blood from the port, there was successful blood flow & a CMP was obtained. The port was then flushed with heparin and the port was de-accessed. No blood loss  No complication  No specimen  She tolerated the procedure well without incident. REPEAT UA on follow-up    Return if symptoms worsen or fail to improve. Patient Instructions       Patient Education        Urinary Tract Infection in Women: Care Instructions  Your Care Instructions    A urinary tract infection, or UTI, is a general term for an infection anywhere between the kidneys and the urethra (where urine comes out). Most UTIs are bladder infections. They often cause pain or burning when you urinate. UTIs are caused by bacteria and can be cured with antibiotics. Be sure to complete your treatment so that the infection goes away. Follow-up care is a key part of your treatment and safety. Be sure to make and go to all appointments, and call your doctor if you are having problems. It's also a good idea to know your test results and keep a list of the medicines you take. How can you care for yourself at home? · Take your antibiotics as directed. Do not stop taking them just because you feel better. You need to take the full course of antibiotics. · Drink extra water and other fluids for the next day or two. This may help wash out the bacteria that are causing the infection. (If you have kidney, heart, or liver disease and have to limit fluids, talk with your doctor before you increase your fluid intake.)  · Avoid drinks that are carbonated or have caffeine. They can irritate the bladder. · Urinate often. Try to empty your bladder each time.   · To relieve pain, take a hot bath or lay a heating pad set on low over your lower belly or

## 2018-01-19 ENCOUNTER — TELEPHONE (OUTPATIENT)
Dept: PRIMARY CARE CLINIC | Age: 73
End: 2018-01-19

## 2018-01-21 LAB
ORGANISM: ABNORMAL
URINE CULTURE, ROUTINE: ABNORMAL
URINE CULTURE, ROUTINE: ABNORMAL

## 2018-01-22 ENCOUNTER — TELEPHONE (OUTPATIENT)
Dept: PRIMARY CARE CLINIC | Age: 73
End: 2018-01-22

## 2018-01-25 RX ORDER — ISOSORBIDE MONONITRATE 30 MG/1
TABLET, EXTENDED RELEASE ORAL
Qty: 30 TABLET | Refills: 11 | Status: SHIPPED | OUTPATIENT
Start: 2018-01-25 | End: 2019-02-20 | Stop reason: SDUPTHER

## 2018-02-06 ENCOUNTER — OFFICE VISIT (OUTPATIENT)
Dept: PRIMARY CARE CLINIC | Age: 73
End: 2018-02-06
Payer: MEDICARE

## 2018-02-06 VITALS
SYSTOLIC BLOOD PRESSURE: 110 MMHG | WEIGHT: 130.25 LBS | OXYGEN SATURATION: 94 % | DIASTOLIC BLOOD PRESSURE: 70 MMHG | BODY MASS INDEX: 23.82 KG/M2 | HEART RATE: 73 BPM | TEMPERATURE: 98.7 F

## 2018-02-06 DIAGNOSIS — Z95.828 PORT CATHETER IN PLACE: ICD-10-CM

## 2018-02-06 DIAGNOSIS — R50.9 FEVER, UNSPECIFIED FEVER CAUSE: ICD-10-CM

## 2018-02-06 DIAGNOSIS — G47.01 INSOMNIA DUE TO MEDICAL CONDITION: ICD-10-CM

## 2018-02-06 DIAGNOSIS — F41.1 GAD (GENERALIZED ANXIETY DISORDER): Primary | ICD-10-CM

## 2018-02-06 DIAGNOSIS — J06.9 VIRAL UPPER RESPIRATORY TRACT INFECTION: ICD-10-CM

## 2018-02-06 LAB
INFLUENZA A ANTIBODY: NORMAL
INFLUENZA B ANTIBODY: NORMAL

## 2018-02-06 PROCEDURE — 3014F SCREEN MAMMO DOC REV: CPT | Performed by: NURSE PRACTITIONER

## 2018-02-06 PROCEDURE — 1090F PRES/ABSN URINE INCON ASSESS: CPT | Performed by: NURSE PRACTITIONER

## 2018-02-06 PROCEDURE — G8599 NO ASA/ANTIPLAT THER USE RNG: HCPCS | Performed by: NURSE PRACTITIONER

## 2018-02-06 PROCEDURE — 87804 INFLUENZA ASSAY W/OPTIC: CPT | Performed by: NURSE PRACTITIONER

## 2018-02-06 PROCEDURE — G8399 PT W/DXA RESULTS DOCUMENT: HCPCS | Performed by: NURSE PRACTITIONER

## 2018-02-06 PROCEDURE — 3017F COLORECTAL CA SCREEN DOC REV: CPT | Performed by: NURSE PRACTITIONER

## 2018-02-06 PROCEDURE — G8427 DOCREV CUR MEDS BY ELIG CLIN: HCPCS | Performed by: NURSE PRACTITIONER

## 2018-02-06 PROCEDURE — 4040F PNEUMOC VAC/ADMIN/RCVD: CPT | Performed by: NURSE PRACTITIONER

## 2018-02-06 PROCEDURE — 99213 OFFICE O/P EST LOW 20 MIN: CPT | Performed by: NURSE PRACTITIONER

## 2018-02-06 PROCEDURE — G8420 CALC BMI NORM PARAMETERS: HCPCS | Performed by: NURSE PRACTITIONER

## 2018-02-06 PROCEDURE — 4004F PT TOBACCO SCREEN RCVD TLK: CPT | Performed by: NURSE PRACTITIONER

## 2018-02-06 PROCEDURE — G8484 FLU IMMUNIZE NO ADMIN: HCPCS | Performed by: NURSE PRACTITIONER

## 2018-02-06 PROCEDURE — 1123F ACP DISCUSS/DSCN MKR DOCD: CPT | Performed by: NURSE PRACTITIONER

## 2018-02-06 RX ORDER — GUAIFENESIN 600 MG/1
1200 TABLET, EXTENDED RELEASE ORAL 2 TIMES DAILY
Qty: 56 TABLET | Refills: 0 | Status: SHIPPED | OUTPATIENT
Start: 2018-02-06 | End: 2018-02-20

## 2018-02-06 RX ORDER — LORATADINE 10 MG/1
10 TABLET ORAL DAILY
Qty: 30 TABLET | Refills: 5 | Status: SHIPPED | OUTPATIENT
Start: 2018-02-06 | End: 2018-07-10 | Stop reason: SINTOL

## 2018-02-06 RX ORDER — CLONAZEPAM 0.5 MG/1
0.5 TABLET ORAL 3 TIMES DAILY PRN
Qty: 75 TABLET | Refills: 0 | Status: SHIPPED | OUTPATIENT
Start: 2018-02-06 | End: 2018-03-13 | Stop reason: SDUPTHER

## 2018-02-06 ASSESSMENT — ENCOUNTER SYMPTOMS
BACK PAIN: 1
COUGH: 1
DIARRHEA: 1
VOMITING: 1
CONSTIPATION: 0
ABDOMINAL PAIN: 0
WHEEZING: 1
EYE REDNESS: 0
EYE DISCHARGE: 0
NAUSEA: 1
RHINORRHEA: 1
SHORTNESS OF BREATH: 1
SORE THROAT: 1

## 2018-02-06 NOTE — PROGRESS NOTES
01/18/2018 neg     Bilirubin, UA 01/18/2018 neg     Ketones, UA 01/18/2018 neg     Spec Grav, UA 01/18/2018 1.025     Blood, UA POC 01/18/2018 neg     pH, UA 01/18/2018 6.0     Protein, UA POC 01/18/2018 30*    Urobilinogen, UA 01/18/2018 0.2     Leukocytes, UA 01/18/2018 small*    Nitrite, UA 01/18/2018 neg     Urine Culture, Routine 01/21/2018 >100,000 CFU/ml*    Organism 01/21/2018 Escherichia coli*    Urine Culture, Routine 01/21/2018 Moderate growth    Orders Only on 01/17/2018   Component Date Value    Amphetamine Screen, Urine 01/17/2018 neg     Barbiturate Screen, Urine 01/17/2018 neg     Benzodiazepine Screen, U* 01/17/2018 positive     Cannabinoid Scrn, Ur 01/17/2018 neg     Methadone Screen, Urine 01/17/2018 neg     Opiates, Urine 01/17/2018 positive     Oxycodone 01/17/2018 neg     Methamphetamine, Urine 01/17/2018 neg     EDDP, Urine 01/17/2018 neg     pH, Urine 01/17/2018 5.1     Creatinine, Ur 01/17/2018 104.5    Orders Only on 01/10/2018   Component Date Value    Amphetamine Screen, Urine 01/10/2018 neg     Barbiturate Screen, Urine 01/10/2018 neg     Benzodiazepine Screen, U* 01/10/2018 POSITIVE     Cannabinoid Scrn, Ur 01/10/2018 neg     Cocaine Metabolite, Urine 01/10/2018 neg     Methadone Screen, Urine 01/10/2018 neg     Opiates, Urine 01/10/2018 POSITIVE     Oxycodone 01/10/2018 neg     EDDP, Urine 01/10/2018 neg     pH, Urine 01/10/2018 5.1     Creatinine, Ur 01/10/2018 104.5    Orders Only on 01/02/2018   Component Date Value    Sodium 01/02/2018 138     Potassium 01/02/2018 5.1*    Chloride 01/02/2018 103     CO2 01/02/2018 22     Anion Gap 01/02/2018 13     Glucose 01/02/2018 83     BUN 01/02/2018 37*    CREATININE 01/02/2018 1.1*    GFR Non- 01/02/2018 49*    Calcium 01/02/2018 8.3*    Total Protein 01/02/2018 6.4*    Alb 01/02/2018 3.7     Total Bilirubin 01/02/2018 <0.2     Alkaline Phosphatase 01/02/2018 45     ALT normal.   Left Ear: Hearing, tympanic membrane and external ear normal.   Nose: Nose normal. No mucosal edema or rhinorrhea. Mouth/Throat: Oropharynx is clear and moist. No posterior oropharyngeal erythema. Eyes: Right eye exhibits no discharge. Left eye exhibits no discharge. Neck: Normal range of motion. Carotid bruit is not present. Cardiovascular: Normal rate and regular rhythm. Pulmonary/Chest: Effort normal. No respiratory distress. She has no wheezes. She has rhonchi (scattered). She has no rales. Abdominal: Soft. Bowel sounds are normal.   Musculoskeletal:        Lumbar back: She exhibits tenderness and pain. Port flush on the left   Neurological: She is alert. Skin: Skin is dry. No rash noted. Psychiatric: She has a normal mood and affect. Her behavior is normal. Judgment and thought content normal.   Vitals reviewed. ASSESSMENT      ICD-10-CM ICD-9-CM    1. HAILEY (generalized anxiety disorder) F41.1 300.02 clonazePAM (KLONOPIN) 0.5 MG tablet  Continue Cymbalta BID   2. Fever, unspecified fever cause R50.9 780.60 POCT Influenza A/B (negative)   3. 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 Viral upper respiratory tract infection J06.9 465.9 loratadine (CLARITIN) 10 MG tablet    B97.89  guaiFENesin (MUCINEX) 600 MG extended release tablet   5. Port catheter in place Z95.828 V45.89 [place] [facility] IMPLANTED DRUG DELIVERY DEVICE         PLAN    Orders Placed This Encounter   Procedures    POCT Influenza A/B    [place] [facility]RIG IMPLANTED DRUG DELIVERY DEVICE         Procedure note:    Accessed mediport in sterile fashion with gripper needle without anesthesia. The port was flushed with normal saline and flushed easily. Attempts were made to draw back blood from the port, and it was successful. The port was then flushed with heparin and the port was de-accessed.   No blood loss  No complication  No specimen  He tolerated the procedure well without

## 2018-02-15 ENCOUNTER — OFFICE VISIT (OUTPATIENT)
Dept: PRIMARY CARE CLINIC | Age: 73
End: 2018-02-15
Payer: MEDICARE

## 2018-02-15 VITALS
TEMPERATURE: 97.8 F | HEIGHT: 62 IN | SYSTOLIC BLOOD PRESSURE: 100 MMHG | DIASTOLIC BLOOD PRESSURE: 60 MMHG | WEIGHT: 126.5 LBS | HEART RATE: 83 BPM | BODY MASS INDEX: 23.28 KG/M2 | OXYGEN SATURATION: 95 %

## 2018-02-15 DIAGNOSIS — J44.1 CHRONIC OBSTRUCTIVE PULMONARY DISEASE WITH ACUTE EXACERBATION (HCC): Primary | ICD-10-CM

## 2018-02-15 DIAGNOSIS — J40 BRONCHITIS: ICD-10-CM

## 2018-02-15 DIAGNOSIS — F11.20 CHRONIC NARCOTIC DEPENDENCE (HCC): ICD-10-CM

## 2018-02-15 PROCEDURE — G8420 CALC BMI NORM PARAMETERS: HCPCS | Performed by: NURSE PRACTITIONER

## 2018-02-15 PROCEDURE — 4004F PT TOBACCO SCREEN RCVD TLK: CPT | Performed by: NURSE PRACTITIONER

## 2018-02-15 PROCEDURE — 1090F PRES/ABSN URINE INCON ASSESS: CPT | Performed by: NURSE PRACTITIONER

## 2018-02-15 PROCEDURE — 3023F SPIROM DOC REV: CPT | Performed by: NURSE PRACTITIONER

## 2018-02-15 PROCEDURE — G8926 SPIRO NO PERF OR DOC: HCPCS | Performed by: NURSE PRACTITIONER

## 2018-02-15 PROCEDURE — 4040F PNEUMOC VAC/ADMIN/RCVD: CPT | Performed by: NURSE PRACTITIONER

## 2018-02-15 PROCEDURE — G8399 PT W/DXA RESULTS DOCUMENT: HCPCS | Performed by: NURSE PRACTITIONER

## 2018-02-15 PROCEDURE — G8484 FLU IMMUNIZE NO ADMIN: HCPCS | Performed by: NURSE PRACTITIONER

## 2018-02-15 PROCEDURE — G8427 DOCREV CUR MEDS BY ELIG CLIN: HCPCS | Performed by: NURSE PRACTITIONER

## 2018-02-15 PROCEDURE — 3014F SCREEN MAMMO DOC REV: CPT | Performed by: NURSE PRACTITIONER

## 2018-02-15 PROCEDURE — G8599 NO ASA/ANTIPLAT THER USE RNG: HCPCS | Performed by: NURSE PRACTITIONER

## 2018-02-15 PROCEDURE — 1123F ACP DISCUSS/DSCN MKR DOCD: CPT | Performed by: NURSE PRACTITIONER

## 2018-02-15 PROCEDURE — 99213 OFFICE O/P EST LOW 20 MIN: CPT | Performed by: NURSE PRACTITIONER

## 2018-02-15 PROCEDURE — 3017F COLORECTAL CA SCREEN DOC REV: CPT | Performed by: NURSE PRACTITIONER

## 2018-02-15 RX ORDER — DOXYCYCLINE HYCLATE 100 MG/1
100 CAPSULE ORAL 2 TIMES DAILY
Qty: 20 CAPSULE | Refills: 0 | Status: SHIPPED | OUTPATIENT
Start: 2018-02-15 | End: 2018-02-25

## 2018-02-15 ASSESSMENT — ENCOUNTER SYMPTOMS
DIARRHEA: 0
CHEST TIGHTNESS: 1
SINUS PRESSURE: 0
EYE REDNESS: 0
VOMITING: 0
COUGH: 1
SORE THROAT: 0
CONSTIPATION: 0
RHINORRHEA: 1
WHEEZING: 1
ABDOMINAL PAIN: 0
SHORTNESS OF BREATH: 1

## 2018-02-15 NOTE — PROGRESS NOTES
Bilirubin, UA 01/18/2018 neg     Ketones, UA 01/18/2018 neg     Spec Grav, UA 01/18/2018 1.025     Blood, UA POC 01/18/2018 neg     pH, UA 01/18/2018 6.0     Protein, UA POC 01/18/2018 30*    Urobilinogen, UA 01/18/2018 0.2     Leukocytes, UA 01/18/2018 small*    Nitrite, UA 01/18/2018 neg     Urine Culture, Routine 01/21/2018 >100,000 CFU/ml*    Organism 01/21/2018 Escherichia coli*    Urine Culture, Routine 01/21/2018 Moderate growth    Orders Only on 01/17/2018   Component Date Value    Amphetamine Screen, Urine 01/17/2018 neg     Barbiturate Screen, Urine 01/17/2018 neg     Benzodiazepine Screen, U* 01/17/2018 positive     Cannabinoid Scrn, Ur 01/17/2018 neg     Methadone Screen, Urine 01/17/2018 neg     Opiates, Urine 01/17/2018 positive     Oxycodone 01/17/2018 neg     Methamphetamine, Urine 01/17/2018 neg     EDDP, Urine 01/17/2018 neg     pH, Urine 01/17/2018 5.1     Creatinine, Ur 01/17/2018 104.5    Orders Only on 01/10/2018   Component Date Value    Amphetamine Screen, Urine 01/10/2018 neg     Barbiturate Screen, Urine 01/10/2018 neg     Benzodiazepine Screen, U* 01/10/2018 POSITIVE     Cannabinoid Scrn, Ur 01/10/2018 neg     Cocaine Metabolite, Urine 01/10/2018 neg     Methadone Screen, Urine 01/10/2018 neg     Opiates, Urine 01/10/2018 POSITIVE     Oxycodone 01/10/2018 neg     EDDP, Urine 01/10/2018 neg     pH, Urine 01/10/2018 5.1     Creatinine, Ur 01/10/2018 104.5    Orders Only on 01/02/2018   Component Date Value    Sodium 01/02/2018 138     Potassium 01/02/2018 5.1*    Chloride 01/02/2018 103     CO2 01/02/2018 22     Anion Gap 01/02/2018 13     Glucose 01/02/2018 83     BUN 01/02/2018 37*    CREATININE 01/02/2018 1.1*    GFR Non- 01/02/2018 49*    Calcium 01/02/2018 8.3*    Total Protein 01/02/2018 6.4*    Alb 01/02/2018 3.7     Total Bilirubin 01/02/2018 <0.2     Alkaline Phosphatase 01/02/2018 45     ALT 01/02/2018 8     AST furosemide (LASIX) 40 MG tablet Take 1 tablet in morning and 1/2 tablet in evening Yes NAVEEN Dior   ondansetron (ZOFRAN) 4 MG tablet Take 1 tablet by mouth daily as needed for Nausea or Vomiting Yes NAVEEN Dior   DULoxetine (CYMBALTA) 60 MG extended release capsule Take 1 capsule by mouth 2 times daily Yes NAVEEN Mae   umeclidinium-vilanterol (ANORO ELLIPTA) 62.5-25 MCG/INH AEPB inhaler Inhale 1 puff into the lungs daily Yes NAVEEN Fraser   memantine (NAMENDA) 10 MG tablet Take 1 tablet by mouth 2 times daily Yes NAVEEN Dior   gabapentin (NEURONTIN) 300 MG capsule Take 300 mg by mouth 3 times daily Yes Historical Provider, MD   donepezil (ARICEPT) 5 MG tablet Take 1 tablet by mouth nightly Yes NAVEEN Dior   oxyCODONE-acetaminophen (PERCOCET)  MG per tablet Take 1 tablet by mouth every 8 hours as needed for Pain .  Yes Historical Provider, MD   olopatadine (PATADAY) 0.2 % SOLN ophthalmic solution Apply 1 drop to eye daily Yes NAVEEN Dior   promethazine (PHENERGAN) 25 MG tablet Take 25 mg by mouth Yes Historical Provider, MD   VOLTAREN 1 % GEL  Yes Historical Provider, MD   PROLIA 60 MG/ML SOLN SC injection INJECT 60MG SUBCUTANEOUSLY EVERY 6 MONTHS Yes NAVEEN Dior   rOPINIRole (REQUIP) 0.25 MG tablet Take 1 tablet by mouth nightly Yes NAVEEN Dior   Calcium Carb-Cholecalciferol (CALCIUM 600 + D PO) Take by mouth Yes Historical Provider, MD   azelastine (ASTELIN) 0.1 % nasal spray 2 sprays by Nasal route 2 times daily Use in each nostril as directed Yes NAVEEN Dior   fluticasone (FLONASE) 50 MCG/ACT nasal spray 1 spray by Nasal route daily Yes NAVEEN Dior   isosorbide dinitrate (ISORDIL) 30 MG tablet Take 30 mg by mouth daily Yes Historical Provider, MD   vitamin E 400 UNIT capsule Take 400 Units by mouth daily Yes Historical Provider, MD   theophylline CR, 12 hour, (THEOPHYLLINE CR, 12 HOUR,) 300 MG CR tablet

## 2018-02-22 ENCOUNTER — OFFICE VISIT (OUTPATIENT)
Dept: PRIMARY CARE CLINIC | Age: 73
End: 2018-02-22
Payer: MEDICARE

## 2018-02-22 ENCOUNTER — HOSPITAL ENCOUNTER (OUTPATIENT)
Dept: GENERAL RADIOLOGY | Age: 73
Discharge: HOME | End: 2018-02-22
Payer: MEDICARE

## 2018-02-22 ENCOUNTER — TELEPHONE (OUTPATIENT)
Dept: PRIMARY CARE CLINIC | Age: 73
End: 2018-02-22

## 2018-02-22 VITALS
SYSTOLIC BLOOD PRESSURE: 90 MMHG | HEIGHT: 62 IN | TEMPERATURE: 97.8 F | HEART RATE: 91 BPM | DIASTOLIC BLOOD PRESSURE: 60 MMHG | OXYGEN SATURATION: 97 % | BODY MASS INDEX: 22.73 KG/M2 | WEIGHT: 123.5 LBS

## 2018-02-22 DIAGNOSIS — R19.7 DIARRHEA OF PRESUMED INFECTIOUS ORIGIN: ICD-10-CM

## 2018-02-22 DIAGNOSIS — J44.1 CHRONIC OBSTRUCTIVE PULMONARY DISEASE WITH ACUTE EXACERBATION (HCC): ICD-10-CM

## 2018-02-22 DIAGNOSIS — R10.84 GENERALIZED ABDOMINAL PAIN: ICD-10-CM

## 2018-02-22 DIAGNOSIS — J44.1 CHRONIC OBSTRUCTIVE PULMONARY DISEASE WITH ACUTE EXACERBATION (HCC): Primary | ICD-10-CM

## 2018-02-22 PROCEDURE — 71046 X-RAY EXAM CHEST 2 VIEWS: CPT

## 2018-02-22 PROCEDURE — 1090F PRES/ABSN URINE INCON ASSESS: CPT | Performed by: NURSE PRACTITIONER

## 2018-02-22 PROCEDURE — G8926 SPIRO NO PERF OR DOC: HCPCS | Performed by: NURSE PRACTITIONER

## 2018-02-22 PROCEDURE — 99213 OFFICE O/P EST LOW 20 MIN: CPT | Performed by: NURSE PRACTITIONER

## 2018-02-22 PROCEDURE — G8399 PT W/DXA RESULTS DOCUMENT: HCPCS | Performed by: NURSE PRACTITIONER

## 2018-02-22 PROCEDURE — 4004F PT TOBACCO SCREEN RCVD TLK: CPT | Performed by: NURSE PRACTITIONER

## 2018-02-22 PROCEDURE — 3017F COLORECTAL CA SCREEN DOC REV: CPT | Performed by: NURSE PRACTITIONER

## 2018-02-22 PROCEDURE — 3023F SPIROM DOC REV: CPT | Performed by: NURSE PRACTITIONER

## 2018-02-22 PROCEDURE — G8484 FLU IMMUNIZE NO ADMIN: HCPCS | Performed by: NURSE PRACTITIONER

## 2018-02-22 PROCEDURE — G8420 CALC BMI NORM PARAMETERS: HCPCS | Performed by: NURSE PRACTITIONER

## 2018-02-22 PROCEDURE — 3014F SCREEN MAMMO DOC REV: CPT | Performed by: NURSE PRACTITIONER

## 2018-02-22 PROCEDURE — G8599 NO ASA/ANTIPLAT THER USE RNG: HCPCS | Performed by: NURSE PRACTITIONER

## 2018-02-22 PROCEDURE — 1123F ACP DISCUSS/DSCN MKR DOCD: CPT | Performed by: NURSE PRACTITIONER

## 2018-02-22 PROCEDURE — 4040F PNEUMOC VAC/ADMIN/RCVD: CPT | Performed by: NURSE PRACTITIONER

## 2018-02-22 PROCEDURE — G8427 DOCREV CUR MEDS BY ELIG CLIN: HCPCS | Performed by: NURSE PRACTITIONER

## 2018-02-22 ASSESSMENT — ENCOUNTER SYMPTOMS
WHEEZING: 1
SHORTNESS OF BREATH: 1
CONSTIPATION: 0
SORE THROAT: 0
VOMITING: 0
RHINORRHEA: 1
ABDOMINAL PAIN: 1
EYE REDNESS: 0
SINUS PRESSURE: 0
DIARRHEA: 1
COUGH: 1

## 2018-02-22 NOTE — PROGRESS NOTES
01/02/2018 neg     Benzodiazepine Screen, U* 01/02/2018 neg     COCAINE METABOLITE SCREE* 01/02/2018 neg     THC 01/02/2018 neg     MDMA URINE 01/02/2018 neg     Methadone Screen, Urine 01/02/2018 neg     Opiate Scrn, Ur 01/02/2018 POSITIVE     Oxycodone Screen, Ur 01/02/2018 neg     PCP Scrn, Ur 01/02/2018 neg     Propoxyphene Screen, Uri* 50/80/2496 neg     Tricyclic Antidepressant* 74/80/7651 neg     Methamphetamine, Urine 01/02/2018 neg    Office Visit on 12/12/2017   Component Date Value    Color, UA 12/12/2017 yellow     Clarity, UA 12/12/2017 clear     Glucose, UA POC 12/12/2017 neg     Bilirubin, UA 12/12/2017 neg     Ketones, UA 12/12/2017 neg     Spec Grav, UA 12/12/2017 1.020     Blood, UA POC 12/12/2017 neg     pH, UA 12/12/2017 5.0     Protein, UA POC 12/12/2017 neg     Urobilinogen, UA 12/12/2017 0.2     Leukocytes, UA 12/12/2017 neg     Nitrite, UA 12/12/2017 neg    Admission on 12/06/2017, Discharged on 12/06/2017   Component Date Value    Color, UA 12/06/2017 DK YELLOW     Clarity, UA 12/06/2017 TURBID*    Glucose, Ur 12/06/2017 Negative     Bilirubin Urine 12/06/2017 SMALL*    Ketones, Urine 12/06/2017 Negative     Specific Gravity, UA 12/06/2017 1.023     Blood, Urine 12/06/2017 LARGE*    pH, UA 12/06/2017 5.5     Protein, UA 12/06/2017 100*    Urobilinogen, Urine 12/06/2017 1.0     Nitrite, Urine 12/06/2017 POSITIVE*    Leukocyte Esterase, Urine 12/06/2017 LARGE*    WBC 12/06/2017 8.1     RBC 12/06/2017 3.28*    Hemoglobin 12/06/2017 9.9*    Hematocrit 12/06/2017 32.7*    MCV 12/06/2017 99.7*    MCH 12/06/2017 30.2     MCHC 12/06/2017 30.3*    RDW 12/06/2017 16.4*    Platelets 53/52/6804 197     MPV 12/06/2017 9.5     Sodium 12/06/2017 137     Potassium 12/06/2017 5.1*    Chloride 12/06/2017 102     CO2 12/06/2017 28     Anion Gap 12/06/2017 7     Glucose 12/06/2017 81     BUN 12/06/2017 21     CREATININE 12/06/2017 1.0*    GFR Non- American 12/06/2017 55*    Calcium 12/06/2017 8.6*    Total Protein 12/06/2017 6.1*    Alb 12/06/2017 3.4*    Total Bilirubin 12/06/2017 <0.2     Alkaline Phosphatase 12/06/2017 55     ALT 12/06/2017 6     AST 12/06/2017 10     Urine Culture, Routine 12/08/2017 >100,000 CFU/ml*    Organism 12/08/2017 Escherichia coli*    Urine Culture, Routine 12/08/2017 Heavy growth     WBC, UA 12/06/2017 31-50*    RBC, UA 12/06/2017 6-10*    Epi Cells 12/06/2017 0-2     Bacteria, UA 12/06/2017 3+     Hyaline Casts, UA 12/06/2017 15*    WBC, UA 12/06/2017 888*    Epi Cells 12/06/2017 1    Admission on 08/29/2017, Discharged on 08/29/2017   Component Date Value    WBC 08/29/2017 7.2     RBC 08/29/2017 3.62*    Hemoglobin 08/29/2017 11.5*    Hematocrit 08/29/2017 36.2*    MCV 08/29/2017 100.0*    MCH 08/29/2017 31.8*    MCHC 08/29/2017 31.8*    RDW 08/29/2017 14.5     Platelets 74/59/7323 233     MPV 08/29/2017 9.2*    Sodium 08/29/2017 137     Potassium 08/29/2017 4.2     Chloride 08/29/2017 96*    CO2 08/29/2017 27     Anion Gap 08/29/2017 14     Glucose 08/29/2017 112*    BUN 08/29/2017 20     CREATININE 08/29/2017 0.7     GFR Non- 08/29/2017 >60     Calcium 08/29/2017 9.6     Total Protein 08/29/2017 7.2     Alb 08/29/2017 3.8     Total Bilirubin 08/29/2017 0.3     Alkaline Phosphatase 08/29/2017 70     ALT 08/29/2017 7     AST 08/29/2017 14     P-R Interval 08/30/2017 133     QRS Duration 08/30/2017 89     Q-T Interval 08/30/2017 372     QTc Calculation (Bazett) 08/30/2017 445     P Axis 08/30/2017 75     T Axis 08/30/2017 67     TSH 08/29/2017 0.805     pH, Arterial 08/29/2017 7.460*    pCO2, Arterial 08/29/2017 41.0     pO2, Arterial 08/29/2017 96.0     HCO3, Arterial 08/29/2017 29.2*    Base Excess, Arterial 08/29/2017 4.9*    Hemoglobin, Art, Extended 08/29/2017 11.8*    O2 Sat, Arterial 08/29/2017 94.9     Carboxyhgb, Arterial 08/29/2017 1.3     release capsule Take 1 capsule by mouth 2 times daily Yes NAVEEN Yan   umeclidinium-vilanterol (ANORO ELLIPTA) 62.5-25 MCG/INH AEPB inhaler Inhale 1 puff into the lungs daily Yes NAVEEN Yan   memantine (NAMENDA) 10 MG tablet Take 1 tablet by mouth 2 times daily Yes NAVEEN Dior   gabapentin (NEURONTIN) 300 MG capsule Take 300 mg by mouth 3 times daily Yes Historical Provider, MD   donepezil (ARICEPT) 5 MG tablet Take 1 tablet by mouth nightly Yes NAVEEN Dior   oxyCODONE-acetaminophen (PERCOCET)  MG per tablet Take 1 tablet by mouth every 8 hours as needed for Pain . Yes Historical Provider, MD   olopatadine (PATADAY) 0.2 % SOLN ophthalmic solution Apply 1 drop to eye daily Yes NAVEEN Dior   promethazine (PHENERGAN) 25 MG tablet Take 25 mg by mouth Yes Historical Provider, MD   VOLTAREN 1 % GEL  Yes Historical Provider, MD   PROLIA 60 MG/ML SOLN SC injection INJECT 60MG SUBCUTANEOUSLY EVERY 6 MONTHS Yes NAVEEN Dior   rOPINIRole (REQUIP) 0.25 MG tablet Take 1 tablet by mouth nightly Yes NAVEEN Dior   Calcium Carb-Cholecalciferol (CALCIUM 600 + D PO) Take by mouth Yes Historical Provider, MD   azelastine (ASTELIN) 0.1 % nasal spray 2 sprays by Nasal route 2 times daily Use in each nostril as directed Yes NAVEEN Dior   fluticasone (FLONASE) 50 MCG/ACT nasal spray 1 spray by Nasal route daily Yes NAVEEN Dior   isosorbide dinitrate (ISORDIL) 30 MG tablet Take 30 mg by mouth daily Yes Historical Provider, MD   vitamin E 400 UNIT capsule Take 400 Units by mouth daily Yes Historical Provider, MD   theophylline CR, 12 hour, (THEOPHYLLINE CR, 12 HOUR,) 300 MG CR tablet Take 300 mg by mouth daily.    Yes Historical Provider, MD       Allergies: Codeine and Sulfa antibiotics    Past Medical History:   Diagnosis Date    Abdominal pain     Anxiety     Arthritis     CAD in native artery     Cerebrovascular disease     CHF (congestive no wheezes. She has rhonchi (scattered, clears with cough). She has no rales. Abdominal: Soft. Bowel sounds are normal. There is generalized tenderness. Neurological: She is alert. Skin: Skin is dry. Vitals reviewed. ASSESSMENT      ICD-10-CM ICD-9-CM    1. Chronic obstructive pulmonary disease with acute exacerbation (HCC) J44.1 491.21 XR CHEST STANDARD (2 VW)  Limit cigarettes  Continue Mucinex  Finish Doxycyline  Continue inhalers. Continue oxygen   2. Diarrhea of presumed infectious origin A09 009.3 Increase fluids  BRAT diet  Yogurt a day   3. Generalized abdominal pain R10.84 789.07          PLAN    Orders Placed This Encounter   Procedures    XR CHEST STANDARD (2 VW)        Return if symptoms worsen or fail to improve. Patient Instructions       Patient Education        Chronic Obstructive Pulmonary Disease (COPD): Care Instructions  Your Care Instructions    Chronic obstructive pulmonary disease (COPD) is a general term for a group of lung diseases, including emphysema and chronic bronchitis. People with COPD have decreased airflow in and out of the lungs, which makes it hard to breathe. The airways also can get clogged with thick mucus. Cigarette smoking is a major cause of COPD. Although there is no cure for COPD, you can slow its progress. Following your treatment plan and taking care of yourself can help you feel better and live longer. Follow-up care is a key part of your treatment and safety. Be sure to make and go to all appointments, and call your doctor if you are having problems. It's also a good idea to know your test results and keep a list of the medicines you take. How can you care for yourself at home? ?Staying healthy  ? · Do not smoke. This is the most important step you can take to prevent more damage to your lungs. If you need help quitting, talk to your doctor about stop-smoking programs and medicines. These can increase your chances of quitting for good.    ? · Avoid colds and flu. Get a pneumococcal vaccine shot. If you have had one before, ask your doctor whether you need a second dose. Get the flu vaccine every fall. If you must be around people with colds or the flu, wash your hands often. ? · Avoid secondhand smoke, air pollution, and high altitudes. Also avoid cold, dry air and hot, humid air. Stay at home with your windows closed when air pollution is bad. ?Medicines and oxygen therapy  ? · Take your medicines exactly as prescribed. Call your doctor if you think you are having a problem with your medicine. ? · You may be taking medicines such as:  ¨ Bronchodilators. These help open your airways and make breathing easier. Bronchodilators are either short-acting (work for 6 to 9 hours) or long-acting (work for 24 hours). You inhale most bronchodilators, so they start to act quickly. Always carry your quick-relief inhaler with you in case you need it while you are away from home. ¨ Corticosteroids (prednisone, budesonide). These reduce airway inflammation. They come in pill or inhaled form. You must take these medicines every day for them to work well. ? · A spacer may help you get more inhaled medicine to your lungs. Ask your doctor or pharmacist if a spacer is right for you. If it is, ask how to use it properly. ? · Do not take any vitamins, over-the-counter medicine, or herbal products without talking to your doctor first.   ? · If your doctor prescribed antibiotics, take them as directed. Do not stop taking them just because you feel better. You need to take the full course of antibiotics. ? · Oxygen therapy boosts the amount of oxygen in your blood and helps you breathe easier. Use the flow rate your doctor has recommended, and do not change it without talking to your doctor first.   Activity  ? · Get regular exercise. Walking is an easy way to get exercise. Start out slowly, and walk a little more each day. ? · Pay attention to your breathing.  You are exercising too hard if you cannot talk while you are exercising. ? · Take short rest breaks when doing household chores and other activities. ? · Learn breathing methods-such as breathing through pursed lips-to help you become less short of breath. ? · If your doctor has not set you up with a pulmonary rehabilitation program, talk to him or her about whether rehab is right for you. Rehab includes exercise programs, education about your disease and how to manage it, help with diet and other changes, and emotional support. Diet  ? · Eat regular, healthy meals. Use bronchodilators about 1 hour before you eat to make it easier to eat. Eat several small meals instead of three large ones. Drink beverages at the end of the meal. Avoid foods that are hard to chew. ? · Eat foods that contain protein so that you do not lose muscle mass. ? · Talk with your doctor if you gain too much weight or if you lose weight without trying. ?Mental health  ? · Talk to your family, friends, or a therapist about your feelings. It is normal to feel frightened, angry, hopeless, helpless, and even guilty. Talking openly about bad feelings can help you cope. If these feelings last, talk to your doctor. When should you call for help? Call 911 anytime you think you may need emergency care. For example, call if:  ? · You have severe trouble breathing. ?Call your doctor now or seek immediate medical care if:  ? · You have new or worse trouble breathing. ? · You cough up blood. ? · You have a fever. ? Watch closely for changes in your health, and be sure to contact your doctor if:  ? · You cough more deeply or more often, especially if you notice more mucus or a change in the color of your mucus. ? · You have new or worse swelling in your legs or belly. ? · You are not getting better as expected. Where can you learn more? Go to [email or web address] and sign in to your [employer] account.  Enter O407 in

## 2018-02-22 NOTE — PATIENT INSTRUCTIONS
hours). You inhale most bronchodilators, so they start to act quickly. Always carry your quick-relief inhaler with you in case you need it while you are away from home. ¨ Corticosteroids (prednisone, budesonide). These reduce airway inflammation. They come in pill or inhaled form. You must take these medicines every day for them to work well. ? · A spacer may help you get more inhaled medicine to your lungs. Ask your doctor or pharmacist if a spacer is right for you. If it is, ask how to use it properly. ? · Do not take any vitamins, over-the-counter medicine, or herbal products without talking to your doctor first.   ? · If your doctor prescribed antibiotics, take them as directed. Do not stop taking them just because you feel better. You need to take the full course of antibiotics. ? · Oxygen therapy boosts the amount of oxygen in your blood and helps you breathe easier. Use the flow rate your doctor has recommended, and do not change it without talking to your doctor first.   Activity  ? · Get regular exercise. Walking is an easy way to get exercise. Start out slowly, and walk a little more each day. ? · Pay attention to your breathing. You are exercising too hard if you cannot talk while you are exercising. ? · Take short rest breaks when doing household chores and other activities. ? · Learn breathing methods-such as breathing through pursed lips-to help you become less short of breath. ? · If your doctor has not set you up with a pulmonary rehabilitation program, talk to him or her about whether rehab is right for you. Rehab includes exercise programs, education about your disease and how to manage it, help with diet and other changes, and emotional support. Diet  ? · Eat regular, healthy meals. Use bronchodilators about 1 hour before you eat to make it easier to eat. Eat several small meals instead of three large ones. Drink beverages at the end of the meal. Avoid foods that are hard to chew.

## 2018-02-26 ENCOUNTER — TELEPHONE (OUTPATIENT)
Dept: PRIMARY CARE CLINIC | Age: 73
End: 2018-02-26

## 2018-03-08 NOTE — TELEPHONE ENCOUNTER
Patient's daughter voiced understanding  She reports patient is a little better but still has a cough  [facility] for oxygen supplies and nebulizer with supplies

## 2018-03-13 ENCOUNTER — OFFICE VISIT (OUTPATIENT)
Dept: PRIMARY CARE CLINIC | Age: 73
End: 2018-03-13
Payer: MEDICARE

## 2018-03-13 VITALS
TEMPERATURE: 98.4 F | DIASTOLIC BLOOD PRESSURE: 80 MMHG | SYSTOLIC BLOOD PRESSURE: 110 MMHG | WEIGHT: 120 LBS | OXYGEN SATURATION: 97 % | HEART RATE: 140 BPM | BODY MASS INDEX: 22.08 KG/M2 | HEIGHT: 62 IN

## 2018-03-13 DIAGNOSIS — F41.1 GAD (GENERALIZED ANXIETY DISORDER): Primary | ICD-10-CM

## 2018-03-13 DIAGNOSIS — H10.11 ALLERGIC CONJUNCTIVITIS OF RIGHT EYE: ICD-10-CM

## 2018-03-13 DIAGNOSIS — G47.01 INSOMNIA DUE TO MEDICAL CONDITION: ICD-10-CM

## 2018-03-13 DIAGNOSIS — J44.9 CHRONIC OBSTRUCTIVE PULMONARY DISEASE, UNSPECIFIED COPD TYPE (HCC): ICD-10-CM

## 2018-03-13 DIAGNOSIS — F11.20 CHRONIC NARCOTIC DEPENDENCE (HCC): ICD-10-CM

## 2018-03-13 DIAGNOSIS — Z95.828 PORT CATHETER IN PLACE: ICD-10-CM

## 2018-03-13 PROCEDURE — G8427 DOCREV CUR MEDS BY ELIG CLIN: HCPCS | Performed by: NURSE PRACTITIONER

## 2018-03-13 PROCEDURE — 1123F ACP DISCUSS/DSCN MKR DOCD: CPT | Performed by: NURSE PRACTITIONER

## 2018-03-13 PROCEDURE — 4040F PNEUMOC VAC/ADMIN/RCVD: CPT | Performed by: NURSE PRACTITIONER

## 2018-03-13 PROCEDURE — 3023F SPIROM DOC REV: CPT | Performed by: NURSE PRACTITIONER

## 2018-03-13 PROCEDURE — 4004F PT TOBACCO SCREEN RCVD TLK: CPT | Performed by: NURSE PRACTITIONER

## 2018-03-13 PROCEDURE — G8926 SPIRO NO PERF OR DOC: HCPCS | Performed by: NURSE PRACTITIONER

## 2018-03-13 PROCEDURE — 99213 OFFICE O/P EST LOW 20 MIN: CPT | Performed by: NURSE PRACTITIONER

## 2018-03-13 PROCEDURE — G8599 NO ASA/ANTIPLAT THER USE RNG: HCPCS | Performed by: NURSE PRACTITIONER

## 2018-03-13 PROCEDURE — 3017F COLORECTAL CA SCREEN DOC REV: CPT | Performed by: NURSE PRACTITIONER

## 2018-03-13 PROCEDURE — G8399 PT W/DXA RESULTS DOCUMENT: HCPCS | Performed by: NURSE PRACTITIONER

## 2018-03-13 PROCEDURE — 3014F SCREEN MAMMO DOC REV: CPT | Performed by: NURSE PRACTITIONER

## 2018-03-13 PROCEDURE — G8482 FLU IMMUNIZE ORDER/ADMIN: HCPCS | Performed by: NURSE PRACTITIONER

## 2018-03-13 PROCEDURE — 1090F PRES/ABSN URINE INCON ASSESS: CPT | Performed by: NURSE PRACTITIONER

## 2018-03-13 PROCEDURE — G8420 CALC BMI NORM PARAMETERS: HCPCS | Performed by: NURSE PRACTITIONER

## 2018-03-13 RX ORDER — AZELASTINE HYDROCHLORIDE 0.5 MG/ML
1 SOLUTION/ DROPS OPHTHALMIC 2 TIMES DAILY
Qty: 1 BOTTLE | Refills: 1 | Status: SHIPPED | OUTPATIENT
Start: 2018-03-13 | End: 2018-04-06

## 2018-03-13 RX ORDER — CLONAZEPAM 0.5 MG/1
0.5 TABLET ORAL 3 TIMES DAILY PRN
Qty: 75 TABLET | Refills: 0 | Status: SHIPPED | OUTPATIENT
Start: 2018-03-13 | End: 2018-04-06

## 2018-03-13 ASSESSMENT — ENCOUNTER SYMPTOMS
COUGH: 0
EYE DISCHARGE: 1
RHINORRHEA: 0
EYE ITCHING: 1
CONSTIPATION: 0
SORE THROAT: 0
DIARRHEA: 0
VOMITING: 0
SHORTNESS OF BREATH: 0
EYE REDNESS: 1

## 2018-03-13 NOTE — PROGRESS NOTES
CREATININE 01/02/2018 1.1*    GFR Non- 01/02/2018 49*    Calcium 01/02/2018 8.3*    Total Protein 01/02/2018 6.4*    Alb 01/02/2018 3.7     Total Bilirubin 01/02/2018 <0.2     Alkaline Phosphatase 01/02/2018 45     ALT 01/02/2018 8     AST 01/02/2018 15    Office Visit on 01/02/2018   Component Date Value    Amphetamine Screen, Urine 01/02/2018 neg     Barbiturate Screen, Urine 01/02/2018 neg     Benzodiazepine Screen, U* 01/02/2018 neg     COCAINE METABOLITE SCREE* 01/02/2018 neg     THC 01/02/2018 neg     MDMA URINE 01/02/2018 neg     Methadone Screen, Urine 01/02/2018 neg     Opiate Scrn, Ur 01/02/2018 POSITIVE     Oxycodone Screen, Ur 01/02/2018 neg     PCP Scrn, Ur 01/02/2018 neg     Propoxyphene Screen, Uri* 08/70/4598 neg     Tricyclic Antidepressant* 41/26/8055 neg     Methamphetamine, Urine 01/02/2018 neg    Office Visit on 12/12/2017   Component Date Value    Color, UA 12/12/2017 yellow     Clarity, UA 12/12/2017 clear     Glucose, UA POC 12/12/2017 neg     Bilirubin, UA 12/12/2017 neg     Ketones, UA 12/12/2017 neg     Spec Grav, UA 12/12/2017 1.020     Blood, UA POC 12/12/2017 neg     pH, UA 12/12/2017 5.0     Protein, UA POC 12/12/2017 neg     Urobilinogen, UA 12/12/2017 0.2     Leukocytes, UA 12/12/2017 neg     Nitrite, UA 12/12/2017 neg    Admission on 12/06/2017, Discharged on 12/06/2017   Component Date Value    Color, UA 12/06/2017 DK YELLOW     Clarity, UA 12/06/2017 TURBID*    Glucose, Ur 12/06/2017 Negative     Bilirubin Urine 12/06/2017 SMALL*    Ketones, Urine 12/06/2017 Negative     Specific Gravity, UA 12/06/2017 1.023     Blood, Urine 12/06/2017 LARGE*    pH, UA 12/06/2017 5.5     Protein, UA 12/06/2017 100*    Urobilinogen, Urine 12/06/2017 1.0     Nitrite, Urine 12/06/2017 POSITIVE*    Leukocyte Esterase, Urine 12/06/2017 LARGE*    WBC 12/06/2017 8.1     RBC 12/06/2017 3.28*    Hemoglobin 12/06/2017 9.9*    Hematocrit 12/06/2017 32.7*    MCV 12/06/2017 99.7*    MCH 12/06/2017 30.2     MCHC 12/06/2017 30.3*    RDW 12/06/2017 16.4*    Platelets 29/47/3371 197     MPV 12/06/2017 9.5     Sodium 12/06/2017 137     Potassium 12/06/2017 5.1*    Chloride 12/06/2017 102     CO2 12/06/2017 28     Anion Gap 12/06/2017 7     Glucose 12/06/2017 81     BUN 12/06/2017 21     CREATININE 12/06/2017 1.0*    GFR Non- 12/06/2017 55*    Calcium 12/06/2017 8.6*    Total Protein 12/06/2017 6.1*    Alb 12/06/2017 3.4*    Total Bilirubin 12/06/2017 <0.2     Alkaline Phosphatase 12/06/2017 55     ALT 12/06/2017 6     AST 12/06/2017 10     Urine Culture, Routine 12/06/2017 >100,000 CFU/ml*    Organism 12/06/2017 Escherichia coli*    Urine Culture, Routine 12/06/2017 Heavy growth     WBC, UA 12/06/2017 31-50*    RBC, UA 12/06/2017 6-10*    Epi Cells 12/06/2017 0-2     Bacteria, UA 12/06/2017 3+     Hyaline Casts, UA 12/06/2017 15*    WBC, UA 12/06/2017 888*    Epi Cells 12/06/2017 1      Copies of these are in the chart. Prior to Visit Medications    Medication Sig Taking? Authorizing Provider   clonazePAM (KLONOPIN) 0.5 MG tablet Take 1 tablet by mouth 3 times daily as needed for Anxiety for up to 30 days.  Yes NAVEEN Dior   OXYGEN Inhale 2 L into the lungs daily Yes NAVEEN Dior   Oxygen Tubing MISC by Does not apply route Yes NAVEEN Dior   azelastine (OPTIVAR) 0.05 % ophthalmic solution Place 1 drop into the right eye 2 times daily Yes NAVEEN Dior   loratadine (CLARITIN) 10 MG tablet Take 1 tablet by mouth daily Yes NAVEEN Dior   isosorbide mononitrate (IMDUR) 30 MG extended release tablet TAKE ONE TABLET BY MOUTH DAILY Yes Malena Moryl, APRN   HYDROcodone-acetaminophen ([facility])  MG per tablet TAKE ONE TABLET BY MOUTH EVERY 8 HOURS AS NEEDED Yes Historical Provider, MD   tiZANidine (ZANAFLEX) 4 MG tablet Take 1 tablet by mouth every 8 hours as needed (muscle spasm) azelastine (ASTELIN) 0.1 % nasal spray 2 sprays by Nasal route 2 times daily Use in each nostril as directed Yes NAVEEN Dior   fluticasone (FLONASE) 50 MCG/ACT nasal spray 1 spray by Nasal route daily Yes NAVEEN Dior   isosorbide dinitrate (ISORDIL) 30 MG tablet Take 30 mg by mouth daily Yes Historical Provider, MD   vitamin E 400 UNIT capsule Take 400 Units by mouth daily Yes Historical Provider, MD   theophylline CR, 12 hour, (THEOPHYLLINE CR, 12 HOUR,) 300 MG CR tablet Take 300 mg by mouth daily. Yes Historical Provider, MD       Allergies: Codeine and Sulfa antibiotics    Past Medical History:   Diagnosis Date    Abdominal pain     Anxiety     Arthritis     CAD in native artery     Cerebrovascular disease     [facility]     Constipation     COPD [facility]chronic obstructive pulmonary disease) [facility]     Depression     Emphysema     GERD (gastroesophageal reflux disease)     Myocardial infarct, old     Pneumonia     Tobacco abuse        Past Surgical History:   Procedure Laterality Date    ABDOMINAL EXPLORATION SURGERY      APPENDECTOMY      CARDIAC CATHETERIZATION  06/25/10    patent  stent noted in LAD,EF is estimated to be 60%    CARDIAC CATHETERIZATION  7/13/12  [facility]DL    EF  60%    CHOLECYSTECTOMY      COLONOSCOPY  10/2014    Dr Kp Rodriguez ENDOSCOPY  10/1/14    Dr Fleta Najjar: food bezoar; esophagitis, gastritis, duodenitis; biopsies neg h-pylori       Social History   Substance Use Topics    Smoking status: Current Every Day Smoker     Packs/day: 1.00     Years: 36.00     Types: Cigarettes    Smokeless tobacco: Never Used    Alcohol use No       Review of Systems   Constitutional: Negative for chills, fatigue and fever. HENT: Negative for congestion, ear pain, rhinorrhea and sore throat. Eyes: Positive for discharge (right), redness (right) and itching (right).    Respiratory: Negative for cough and shortness of COPD type ([facility] J44.9 496 OXYGEN      Oxygen Tubing MISC      NC PULMONARY STRESS TESTING,SIMPLE   6. Port catheter in place Z95.828 V45.89 NC IRRIG IMPLANTED DRUG DELIVERY DEVICE         PLAN    Orders Placed This Encounter   Procedures    NC IRRIG IMPLANTED DRUG DELIVERY DEVICE          Procedure note:    Accessed mediport in sterile fashion with gripper needle without anesthesia. The port was flushed with normal saline and flushed easily. Blood was drawn back from the port without difficulty. The port was then flushed with heparin and the port was de-accessed. No blood loss  No complication  No specimen  He tolerated the procedure well without incident. OXYGEN TEST:  Resting oxygen on room air: 94%  Walking oxygen on room air: 86%  Walking oxygen with 2L: 93%       Return in about 1 month (around 4/13/2018), or if symptoms worsen or fail to improve. Patient Instructions       Patient Education        Anxiety Disorder: Care Instructions  Your Care Instructions    Anxiety is a normal reaction to stress. Difficult situations can cause you to have symptoms such as sweaty palms and a nervous feeling. In an anxiety disorder, the symptoms are far more severe. Constant worry, muscle tension, trouble sleeping, nausea and diarrhea, and other symptoms can make normal daily activities difficult or impossible. These symptoms may occur for no reason, and they can affect your work, school, or social life. Medicines, counseling, and self-care can all help. Follow-up care is a key part of your treatment and safety. Be sure to make and go to all appointments, and call your doctor if you are having problems. It's also a good idea to know your test results and keep a list of the medicines you take. How can you care for yourself at home? · Take medicines exactly as directed. Call your doctor if you think you are having a problem with your medicine.   · Go to your counseling sessions and follow-up appointments. · Recognize and accept your anxiety. Then, when you are in a situation that makes you anxious, say to yourself, \"This is not an emergency. I feel uncomfortable, but I am not in danger. I can keep going even if I feel anxious. \"  · Be kind to your body:  ¨ Relieve tension with exercise or a massage. ¨ Get enough rest.  ¨ Avoid alcohol, caffeine, nicotine, and illegal drugs. They can increase your anxiety level and cause sleep problems. ¨ Learn and do relaxation techniques. See below for more about these techniques. · Engage your mind. Get out and do something you enjoy. Go to a [employer] movie, or take a walk or hike. Plan your day. Having too much or too little to do can make you anxious. · Keep a record of your symptoms. Discuss your fears with a good friend or family member, or join a support group for people with similar problems. Talking to others sometimes relieves stress. · Get involved in social groups, or volunteer to help others. Being alone sometimes makes things seem worse than they are. · Get at least 30 minutes of exercise on most days of the week to relieve stress. Walking is a good choice. You also may want to do other activities, such as running, swimming, cycling, or playing tennis or team sports. Relaxation techniques  Do relaxation exercises 10 to 20 minutes a day. You can play soothing, relaxing music while you do them, if you wish. · Tell others in your house that you are going to do your relaxation exercises. Ask them not to disturb you. · Find a comfortable place, away from all distractions and noise. · Lie down on your back, or sit with your back straight. · Focus on your breathing. Make it slow and steady. · Breathe in through your nose. Breathe out through either your nose or mouth. · Breathe deeply, filling up the area between your navel and your rib cage. Breathe so that your belly goes up and down. · Do not hold your breath.   · Breathe like this for 5 to 10 minutes. Notice the feeling of calmness throughout your whole body. As you continue to breathe slowly and deeply, relax by doing the following for another 5 to 10 minutes:  · Tighten and relax each muscle group in your body. You can begin at your toes and work your way up to your head. · Imagine your muscle groups relaxing and becoming heavy. · Empty your mind of all thoughts. · Let yourself relax more and more deeply. · Become aware of the state of calmness that surrounds you. · When your relaxation time is over, you can bring yourself back to alertness by moving your fingers and toes and then your hands and feet and then stretching and moving your entire body. Sometimes people fall asleep during relaxation, but they usually wake up shortly afterward. · Always give yourself time to return to full alertness before you drive a car or do anything that might cause an accident if you are not fully alert. Never play a relaxation tape while you drive a car. When should you call for help? Call 911 anytime you think you may need emergency care. For example, call if:  ? · You feel you cannot stop from hurting yourself or someone else. ? Keep the numbers for these national suicide hotlines: 0-941-990-TALK (1-930.908.4994) and 5-541-XLGXGSJ (7-333.972.9943). If you or someone you know talks about suicide or feeling hopeless, get help right away. ? Watch closely for changes in your health, and be sure to contact your doctor if:  ? · You have anxiety or fear that affects your life. ? · You have symptoms of anxiety that are new or different from those you had before. Where can you learn more? Go to [email or web address] and sign in to your [employer] account. Enter P754 in the [employer] box to learn more about \"Anxiety Disorder: Care Instructions. \"     If you do not have an account, please click on the \"Sign Up Now\" link. Current as of:  May 12, 2017  Content Version: 11.5  ©

## 2018-03-31 DIAGNOSIS — G89.29 CHRONIC BILATERAL LOW BACK PAIN WITHOUT SCIATICA: ICD-10-CM

## 2018-03-31 DIAGNOSIS — M54.50 CHRONIC BILATERAL LOW BACK PAIN WITHOUT SCIATICA: ICD-10-CM

## 2018-03-31 DIAGNOSIS — G47.01 INSOMNIA DUE TO MEDICAL CONDITION: ICD-10-CM

## 2018-03-31 DIAGNOSIS — F03.90 DEMENTIA WITHOUT BEHAVIORAL DISTURBANCE, UNSPECIFIED DEMENTIA TYPE: ICD-10-CM

## 2018-04-03 RX ORDER — TIZANIDINE 4 MG/1
4 TABLET ORAL EVERY 8 HOURS PRN
Qty: 90 TABLET | Refills: 3 | Status: SHIPPED | OUTPATIENT
Start: 2018-04-03 | End: 2018-06-12 | Stop reason: SINTOL

## 2018-04-03 RX ORDER — TRAZODONE HYDROCHLORIDE 150 MG/1
150 TABLET ORAL NIGHTLY
Qty: 30 TABLET | Refills: 3 | Status: SHIPPED | OUTPATIENT
Start: 2018-04-03 | End: 2018-08-01 | Stop reason: SDUPTHER

## 2018-04-03 RX ORDER — DONEPEZIL HYDROCHLORIDE 5 MG/1
5 TABLET, FILM COATED ORAL NIGHTLY
Qty: 30 TABLET | Refills: 3 | Status: SHIPPED | OUTPATIENT
Start: 2018-04-03 | End: 2018-08-02 | Stop reason: SDUPTHER

## 2018-04-06 ENCOUNTER — OFFICE VISIT (OUTPATIENT)
Dept: PRIMARY CARE CLINIC | Age: 73
End: 2018-04-06
Payer: MEDICARE

## 2018-04-06 VITALS
BODY MASS INDEX: 22.4 KG/M2 | OXYGEN SATURATION: 95 % | DIASTOLIC BLOOD PRESSURE: 58 MMHG | HEART RATE: 75 BPM | WEIGHT: 121.75 LBS | SYSTOLIC BLOOD PRESSURE: 100 MMHG | TEMPERATURE: 98 F | HEIGHT: 62 IN

## 2018-04-06 DIAGNOSIS — Z95.828 PORT CATHETER IN PLACE: ICD-10-CM

## 2018-04-06 DIAGNOSIS — N39.45 CONTINUOUS LEAKAGE OF URINE: ICD-10-CM

## 2018-04-06 DIAGNOSIS — M81.0 AGE-RELATED OSTEOPOROSIS WITHOUT CURRENT PATHOLOGICAL FRACTURE: ICD-10-CM

## 2018-04-06 DIAGNOSIS — M79.621 PAIN IN RIGHT UPPER ARM: ICD-10-CM

## 2018-04-06 DIAGNOSIS — G47.01 INSOMNIA DUE TO MEDICAL CONDITION: ICD-10-CM

## 2018-04-06 DIAGNOSIS — F41.1 GAD (GENERALIZED ANXIETY DISORDER): Primary | ICD-10-CM

## 2018-04-06 PROCEDURE — 4004F PT TOBACCO SCREEN RCVD TLK: CPT | Performed by: NURSE PRACTITIONER

## 2018-04-06 PROCEDURE — G8599 NO ASA/ANTIPLAT THER USE RNG: HCPCS | Performed by: NURSE PRACTITIONER

## 2018-04-06 PROCEDURE — 3017F COLORECTAL CA SCREEN DOC REV: CPT | Performed by: NURSE PRACTITIONER

## 2018-04-06 PROCEDURE — G8399 PT W/DXA RESULTS DOCUMENT: HCPCS | Performed by: NURSE PRACTITIONER

## 2018-04-06 PROCEDURE — 4040F PNEUMOC VAC/ADMIN/RCVD: CPT | Performed by: NURSE PRACTITIONER

## 2018-04-06 PROCEDURE — 3014F SCREEN MAMMO DOC REV: CPT | Performed by: NURSE PRACTITIONER

## 2018-04-06 PROCEDURE — G8420 CALC BMI NORM PARAMETERS: HCPCS | Performed by: NURSE PRACTITIONER

## 2018-04-06 PROCEDURE — 0509F URINE INCON PLAN DOCD: CPT | Performed by: NURSE PRACTITIONER

## 2018-04-06 PROCEDURE — 1123F ACP DISCUSS/DSCN MKR DOCD: CPT | Performed by: NURSE PRACTITIONER

## 2018-04-06 PROCEDURE — 99213 OFFICE O/P EST LOW 20 MIN: CPT | Performed by: NURSE PRACTITIONER

## 2018-04-06 PROCEDURE — G8427 DOCREV CUR MEDS BY ELIG CLIN: HCPCS | Performed by: NURSE PRACTITIONER

## 2018-04-06 PROCEDURE — 1090F PRES/ABSN URINE INCON ASSESS: CPT | Performed by: NURSE PRACTITIONER

## 2018-04-06 RX ORDER — TOLTERODINE 4 MG/1
4 CAPSULE, EXTENDED RELEASE ORAL DAILY
Qty: 30 CAPSULE | Refills: 3 | Status: SHIPPED | OUTPATIENT
Start: 2018-04-06 | End: 2018-09-18 | Stop reason: SDUPTHER

## 2018-04-06 RX ORDER — CLONAZEPAM 0.5 MG/1
0.5 TABLET ORAL 3 TIMES DAILY PRN
Qty: 75 TABLET | Refills: 0 | Status: SHIPPED | OUTPATIENT
Start: 2018-04-06 | End: 2018-05-15 | Stop reason: SDUPTHER

## 2018-04-06 ASSESSMENT — ENCOUNTER SYMPTOMS
CONSTIPATION: 0
BACK PAIN: 1
DIARRHEA: 0
COUGH: 0
RHINORRHEA: 0
SHORTNESS OF BREATH: 0
VOMITING: 0
SORE THROAT: 0

## 2018-05-01 DIAGNOSIS — J44.9 CHRONIC OBSTRUCTIVE PULMONARY DISEASE, UNSPECIFIED COPD TYPE (HCC): ICD-10-CM

## 2018-05-01 DIAGNOSIS — H10.11 ALLERGIC CONJUNCTIVITIS OF RIGHT EYE: ICD-10-CM

## 2018-05-01 RX ORDER — AZELASTINE HYDROCHLORIDE 0.5 MG/ML
SOLUTION/ DROPS OPHTHALMIC
Qty: 6 ML | OUTPATIENT
Start: 2018-05-01

## 2018-05-15 ENCOUNTER — OFFICE VISIT (OUTPATIENT)
Dept: PRIMARY CARE CLINIC | Age: 73
End: 2018-05-15
Payer: MEDICARE

## 2018-05-15 VITALS
DIASTOLIC BLOOD PRESSURE: 70 MMHG | HEIGHT: 62 IN | TEMPERATURE: 98.1 F | HEART RATE: 71 BPM | BODY MASS INDEX: 24.11 KG/M2 | WEIGHT: 131 LBS | SYSTOLIC BLOOD PRESSURE: 134 MMHG | OXYGEN SATURATION: 90 %

## 2018-05-15 DIAGNOSIS — M79.604 PAIN IN BOTH LOWER EXTREMITIES: ICD-10-CM

## 2018-05-15 DIAGNOSIS — G25.81 RLS (RESTLESS LEGS SYNDROME): ICD-10-CM

## 2018-05-15 DIAGNOSIS — M79.605 PAIN IN BOTH LOWER EXTREMITIES: ICD-10-CM

## 2018-05-15 DIAGNOSIS — Z95.828 PORT CATHETER IN PLACE: ICD-10-CM

## 2018-05-15 DIAGNOSIS — R07.9 CHEST PAIN, UNSPECIFIED TYPE: ICD-10-CM

## 2018-05-15 DIAGNOSIS — G47.01 INSOMNIA DUE TO MEDICAL CONDITION: ICD-10-CM

## 2018-05-15 DIAGNOSIS — F41.1 GAD (GENERALIZED ANXIETY DISORDER): Primary | ICD-10-CM

## 2018-05-15 PROCEDURE — 4004F PT TOBACCO SCREEN RCVD TLK: CPT | Performed by: NURSE PRACTITIONER

## 2018-05-15 PROCEDURE — G8427 DOCREV CUR MEDS BY ELIG CLIN: HCPCS | Performed by: NURSE PRACTITIONER

## 2018-05-15 PROCEDURE — 99213 OFFICE O/P EST LOW 20 MIN: CPT | Performed by: NURSE PRACTITIONER

## 2018-05-15 PROCEDURE — 93000 ELECTROCARDIOGRAM COMPLETE: CPT | Performed by: NURSE PRACTITIONER

## 2018-05-15 PROCEDURE — G8399 PT W/DXA RESULTS DOCUMENT: HCPCS | Performed by: NURSE PRACTITIONER

## 2018-05-15 PROCEDURE — 4040F PNEUMOC VAC/ADMIN/RCVD: CPT | Performed by: NURSE PRACTITIONER

## 2018-05-15 PROCEDURE — 1123F ACP DISCUSS/DSCN MKR DOCD: CPT | Performed by: NURSE PRACTITIONER

## 2018-05-15 PROCEDURE — 1090F PRES/ABSN URINE INCON ASSESS: CPT | Performed by: NURSE PRACTITIONER

## 2018-05-15 PROCEDURE — G8420 CALC BMI NORM PARAMETERS: HCPCS | Performed by: NURSE PRACTITIONER

## 2018-05-15 PROCEDURE — G8599 NO ASA/ANTIPLAT THER USE RNG: HCPCS | Performed by: NURSE PRACTITIONER

## 2018-05-15 PROCEDURE — 3017F COLORECTAL CA SCREEN DOC REV: CPT | Performed by: NURSE PRACTITIONER

## 2018-05-15 RX ORDER — CLONAZEPAM 0.5 MG/1
0.5 TABLET ORAL 3 TIMES DAILY PRN
Qty: 75 TABLET | Refills: 0 | Status: SHIPPED | OUTPATIENT
Start: 2018-05-15 | End: 2018-06-21 | Stop reason: SDUPTHER

## 2018-05-15 RX ORDER — ROPINIROLE 0.25 MG/1
0.25 TABLET, FILM COATED ORAL 3 TIMES DAILY
Qty: 90 TABLET | Refills: 3 | Status: SHIPPED | OUTPATIENT
Start: 2018-05-15 | End: 2018-06-12

## 2018-05-15 ASSESSMENT — ENCOUNTER SYMPTOMS
VOMITING: 0
DIARRHEA: 0
RHINORRHEA: 0
SHORTNESS OF BREATH: 0
COUGH: 0
BACK PAIN: 1
SORE THROAT: 0
CONSTIPATION: 0

## 2018-05-15 ASSESSMENT — PATIENT HEALTH QUESTIONNAIRE - PHQ9
1. LITTLE INTEREST OR PLEASURE IN DOING THINGS: 1
SUM OF ALL RESPONSES TO PHQ QUESTIONS 1-9: 2
2. FEELING DOWN, DEPRESSED OR HOPELESS: 1
SUM OF ALL RESPONSES TO PHQ9 QUESTIONS 1 & 2: 2

## 2018-05-31 DIAGNOSIS — K21.9 GASTROESOPHAGEAL REFLUX DISEASE, ESOPHAGITIS PRESENCE NOT SPECIFIED: ICD-10-CM

## 2018-06-01 RX ORDER — DEXLANSOPRAZOLE 60 MG/1
60 CAPSULE, DELAYED RELEASE ORAL DAILY
Qty: 30 CAPSULE | Refills: 5 | Status: SHIPPED | OUTPATIENT
Start: 2018-06-01 | End: 2019-01-24 | Stop reason: ALTCHOICE

## 2018-06-12 ENCOUNTER — TELEPHONE (OUTPATIENT)
Dept: PRIMARY CARE CLINIC | Age: 73
End: 2018-06-12

## 2018-06-12 ENCOUNTER — OFFICE VISIT (OUTPATIENT)
Dept: PRIMARY CARE CLINIC | Age: 73
End: 2018-06-12
Payer: MEDICARE

## 2018-06-12 VITALS
DIASTOLIC BLOOD PRESSURE: 79 MMHG | WEIGHT: 131 LBS | SYSTOLIC BLOOD PRESSURE: 121 MMHG | HEIGHT: 62 IN | OXYGEN SATURATION: 96 % | BODY MASS INDEX: 24.11 KG/M2 | HEART RATE: 73 BPM | TEMPERATURE: 97.9 F

## 2018-06-12 DIAGNOSIS — J06.9 VIRAL UPPER RESPIRATORY TRACT INFECTION: ICD-10-CM

## 2018-06-12 DIAGNOSIS — R53.1 WEAKNESS: ICD-10-CM

## 2018-06-12 DIAGNOSIS — R07.9 CHEST PAIN, UNSPECIFIED TYPE: ICD-10-CM

## 2018-06-12 DIAGNOSIS — R53.1 WEAKNESS: Primary | ICD-10-CM

## 2018-06-12 DIAGNOSIS — R39.15 URINARY URGENCY: ICD-10-CM

## 2018-06-12 DIAGNOSIS — R30.0 DYSURIA: ICD-10-CM

## 2018-06-12 LAB
ALBUMIN SERPL-MCNC: 3.3 G/DL (ref 3.5–5.2)
ALP BLD-CCNC: 66 U/L (ref 35–104)
ALT SERPL-CCNC: <5 U/L (ref 5–33)
ANION GAP SERPL CALCULATED.3IONS-SCNC: 15 MMOL/L (ref 7–19)
AST SERPL-CCNC: 16 U/L (ref 5–32)
BASOPHILS ABSOLUTE: 0 K/UL (ref 0–0.2)
BASOPHILS RELATIVE PERCENT: 0.4 % (ref 0–1)
BILIRUB SERPL-MCNC: <0.2 MG/DL (ref 0.2–1.2)
BILIRUBIN, POC: NORMAL
BLOOD URINE, POC: NORMAL
BUN BLDV-MCNC: 29 MG/DL (ref 8–23)
CALCIUM SERPL-MCNC: 8.5 MG/DL (ref 8.8–10.2)
CHLORIDE BLD-SCNC: 84 MMOL/L (ref 98–111)
CLARITY, POC: CLEAR
CO2: 24 MMOL/L (ref 22–29)
COLOR, POC: YELLOW
CREAT SERPL-MCNC: 1.6 MG/DL (ref 0.5–0.9)
EOSINOPHILS ABSOLUTE: 0.2 K/UL (ref 0–0.6)
EOSINOPHILS RELATIVE PERCENT: 3.6 % (ref 0–5)
GFR NON-AFRICAN AMERICAN: 32
GLUCOSE BLD-MCNC: 78 MG/DL (ref 74–109)
GLUCOSE URINE, POC: NORMAL
HCT VFR BLD CALC: 30.7 % (ref 37–47)
HEMOGLOBIN: 9.1 G/DL (ref 12–16)
KETONES, POC: NORMAL
LEUKOCYTE EST, POC: NORMAL
LYMPHOCYTES ABSOLUTE: 0.9 K/UL (ref 1.1–4.5)
LYMPHOCYTES RELATIVE PERCENT: 16.7 % (ref 20–40)
MCH RBC QN AUTO: 29.4 PG (ref 27–31)
MCHC RBC AUTO-ENTMCNC: 29.6 G/DL (ref 33–37)
MCV RBC AUTO: 99 FL (ref 81–99)
MONOCYTES ABSOLUTE: 0.6 K/UL (ref 0–0.9)
MONOCYTES RELATIVE PERCENT: 10.2 % (ref 0–10)
NEUTROPHILS ABSOLUTE: 3.8 K/UL (ref 1.5–7.5)
NEUTROPHILS RELATIVE PERCENT: 68.7 % (ref 50–65)
NITRITE, POC: NORMAL
PDW BLD-RTO: 15.2 % (ref 11.5–14.5)
PH, POC: 5.5
PLATELET # BLD: 184 K/UL (ref 130–400)
PMV BLD AUTO: 10.3 FL (ref 9.4–12.3)
POTASSIUM SERPL-SCNC: 4.1 MMOL/L (ref 3.5–5)
PROTEIN, POC: NORMAL
RBC # BLD: 3.1 M/UL (ref 4.2–5.4)
SODIUM BLD-SCNC: 123 MMOL/L (ref 136–145)
SPECIFIC GRAVITY, POC: 1.01
TOTAL PROTEIN: 6.5 G/DL (ref 6.6–8.7)
UROBILINOGEN, POC: 0.2
WBC # BLD: 5.5 K/UL (ref 4.8–10.8)

## 2018-06-12 PROCEDURE — 4004F PT TOBACCO SCREEN RCVD TLK: CPT | Performed by: NURSE PRACTITIONER

## 2018-06-12 PROCEDURE — 1090F PRES/ABSN URINE INCON ASSESS: CPT | Performed by: NURSE PRACTITIONER

## 2018-06-12 PROCEDURE — 4040F PNEUMOC VAC/ADMIN/RCVD: CPT | Performed by: NURSE PRACTITIONER

## 2018-06-12 PROCEDURE — G8420 CALC BMI NORM PARAMETERS: HCPCS | Performed by: NURSE PRACTITIONER

## 2018-06-12 PROCEDURE — G8399 PT W/DXA RESULTS DOCUMENT: HCPCS | Performed by: NURSE PRACTITIONER

## 2018-06-12 PROCEDURE — 99213 OFFICE O/P EST LOW 20 MIN: CPT | Performed by: NURSE PRACTITIONER

## 2018-06-12 PROCEDURE — G8599 NO ASA/ANTIPLAT THER USE RNG: HCPCS | Performed by: NURSE PRACTITIONER

## 2018-06-12 PROCEDURE — G8427 DOCREV CUR MEDS BY ELIG CLIN: HCPCS | Performed by: NURSE PRACTITIONER

## 2018-06-12 PROCEDURE — 81002 URINALYSIS NONAUTO W/O SCOPE: CPT | Performed by: NURSE PRACTITIONER

## 2018-06-12 PROCEDURE — 1123F ACP DISCUSS/DSCN MKR DOCD: CPT | Performed by: NURSE PRACTITIONER

## 2018-06-12 PROCEDURE — 93000 ELECTROCARDIOGRAM COMPLETE: CPT | Performed by: NURSE PRACTITIONER

## 2018-06-12 PROCEDURE — 3017F COLORECTAL CA SCREEN DOC REV: CPT | Performed by: NURSE PRACTITIONER

## 2018-06-12 ASSESSMENT — ENCOUNTER SYMPTOMS
SINUS PRESSURE: 0
VOMITING: 0
BLOOD IN STOOL: 0
CONSTIPATION: 0
DIARRHEA: 0
SORE THROAT: 0
SHORTNESS OF BREATH: 1
RHINORRHEA: 1
EYE REDNESS: 0
NAUSEA: 0
COUGH: 1

## 2018-06-13 ENCOUNTER — TELEPHONE (OUTPATIENT)
Dept: PRIMARY CARE CLINIC | Age: 73
End: 2018-06-13

## 2018-06-14 ENCOUNTER — TELEPHONE (OUTPATIENT)
Dept: PRIMARY CARE CLINIC | Age: 73
End: 2018-06-14

## 2018-06-14 DIAGNOSIS — E86.0 DEHYDRATION: Primary | ICD-10-CM

## 2018-06-14 DIAGNOSIS — E87.5 SERUM POTASSIUM ELEVATED: Primary | ICD-10-CM

## 2018-06-14 DIAGNOSIS — E86.0 DEHYDRATION: ICD-10-CM

## 2018-06-14 LAB
ANION GAP SERPL CALCULATED.3IONS-SCNC: 13 MMOL/L (ref 7–19)
BUN BLDV-MCNC: 14 MG/DL (ref 8–23)
CALCIUM SERPL-MCNC: 9.6 MG/DL (ref 8.8–10.2)
CHLORIDE BLD-SCNC: 94 MMOL/L (ref 98–111)
CO2: 28 MMOL/L (ref 22–29)
CREAT SERPL-MCNC: 0.8 MG/DL (ref 0.5–0.9)
GFR NON-AFRICAN AMERICAN: >60
GLUCOSE BLD-MCNC: 77 MG/DL (ref 74–109)
POTASSIUM SERPL-SCNC: 5.3 MMOL/L (ref 3.5–5)
SODIUM BLD-SCNC: 135 MMOL/L (ref 136–145)

## 2018-06-15 ENCOUNTER — TELEPHONE (OUTPATIENT)
Dept: PRIMARY CARE CLINIC | Age: 73
End: 2018-06-15

## 2018-06-21 ENCOUNTER — OFFICE VISIT (OUTPATIENT)
Dept: PRIMARY CARE CLINIC | Age: 73
End: 2018-06-21
Payer: MEDICARE

## 2018-06-21 ENCOUNTER — TELEPHONE (OUTPATIENT)
Dept: PRIMARY CARE CLINIC | Age: 73
End: 2018-06-21

## 2018-06-21 ENCOUNTER — HOSPITAL ENCOUNTER (OUTPATIENT)
Dept: GENERAL RADIOLOGY | Age: 73
Discharge: HOME | End: 2018-06-21
Payer: MEDICARE

## 2018-06-21 VITALS
DIASTOLIC BLOOD PRESSURE: 79 MMHG | SYSTOLIC BLOOD PRESSURE: 135 MMHG | OXYGEN SATURATION: 90 % | TEMPERATURE: 97.6 F | HEART RATE: 75 BPM | HEIGHT: 62 IN | WEIGHT: 127 LBS | BODY MASS INDEX: 23.37 KG/M2

## 2018-06-21 DIAGNOSIS — E87.5 HYPERKALEMIA: ICD-10-CM

## 2018-06-21 DIAGNOSIS — M81.0 AGE-RELATED OSTEOPOROSIS WITHOUT CURRENT PATHOLOGICAL FRACTURE: ICD-10-CM

## 2018-06-21 DIAGNOSIS — F11.20 CHRONIC NARCOTIC DEPENDENCE (HCC): ICD-10-CM

## 2018-06-21 DIAGNOSIS — I50.9 CHRONIC CONGESTIVE HEART FAILURE, UNSPECIFIED CONGESTIVE HEART FAILURE TYPE: ICD-10-CM

## 2018-06-21 DIAGNOSIS — Z95.828 PORT CATHETER IN PLACE: ICD-10-CM

## 2018-06-21 DIAGNOSIS — M79.621 PAIN IN RIGHT UPPER ARM: ICD-10-CM

## 2018-06-21 DIAGNOSIS — M25.511 ACUTE PAIN OF RIGHT SHOULDER: ICD-10-CM

## 2018-06-21 DIAGNOSIS — Z87.891 PERSONAL HISTORY OF TOBACCO USE: ICD-10-CM

## 2018-06-21 DIAGNOSIS — Z00.00 ROUTINE GENERAL MEDICAL EXAMINATION AT A HEALTH CARE FACILITY: Primary | ICD-10-CM

## 2018-06-21 DIAGNOSIS — F41.1 GAD (GENERALIZED ANXIETY DISORDER): ICD-10-CM

## 2018-06-21 DIAGNOSIS — G47.01 INSOMNIA DUE TO MEDICAL CONDITION: ICD-10-CM

## 2018-06-21 LAB
ALBUMIN SERPL-MCNC: 3.8 G/DL (ref 3.5–5.2)
ALP BLD-CCNC: 64 U/L (ref 35–104)
ALT SERPL-CCNC: 5 U/L (ref 5–33)
ANION GAP SERPL CALCULATED.3IONS-SCNC: 9 MMOL/L (ref 7–19)
AST SERPL-CCNC: 14 U/L (ref 5–32)
BILIRUB SERPL-MCNC: <0.2 MG/DL (ref 0.2–1.2)
BUN BLDV-MCNC: 19 MG/DL (ref 8–23)
CALCIUM SERPL-MCNC: 9.6 MG/DL (ref 8.8–10.2)
CHLORIDE BLD-SCNC: 95 MMOL/L (ref 98–111)
CO2: 30 MMOL/L (ref 22–29)
CREAT SERPL-MCNC: 0.9 MG/DL (ref 0.5–0.9)
GFR NON-AFRICAN AMERICAN: >60
GLUCOSE BLD-MCNC: 76 MG/DL (ref 74–109)
POTASSIUM SERPL-SCNC: 4.8 MMOL/L (ref 3.5–5)
SODIUM BLD-SCNC: 134 MMOL/L (ref 136–145)
TOTAL PROTEIN: 6.9 G/DL (ref 6.6–8.7)

## 2018-06-21 PROCEDURE — 99214 OFFICE O/P EST MOD 30 MIN: CPT | Performed by: NURSE PRACTITIONER

## 2018-06-21 PROCEDURE — G8399 PT W/DXA RESULTS DOCUMENT: HCPCS | Performed by: NURSE PRACTITIONER

## 2018-06-21 PROCEDURE — 3017F COLORECTAL CA SCREEN DOC REV: CPT | Performed by: NURSE PRACTITIONER

## 2018-06-21 PROCEDURE — 1123F ACP DISCUSS/DSCN MKR DOCD: CPT | Performed by: NURSE PRACTITIONER

## 2018-06-21 PROCEDURE — G0296 VISIT TO DETERM LDCT ELIG: HCPCS | Performed by: NURSE PRACTITIONER

## 2018-06-21 PROCEDURE — G0439 PPPS, SUBSEQ VISIT: HCPCS | Performed by: NURSE PRACTITIONER

## 2018-06-21 PROCEDURE — 1090F PRES/ABSN URINE INCON ASSESS: CPT | Performed by: NURSE PRACTITIONER

## 2018-06-21 PROCEDURE — 73030 X-RAY EXAM OF SHOULDER: CPT

## 2018-06-21 PROCEDURE — G8599 NO ASA/ANTIPLAT THER USE RNG: HCPCS | Performed by: NURSE PRACTITIONER

## 2018-06-21 PROCEDURE — G8427 DOCREV CUR MEDS BY ELIG CLIN: HCPCS | Performed by: NURSE PRACTITIONER

## 2018-06-21 PROCEDURE — 4040F PNEUMOC VAC/ADMIN/RCVD: CPT | Performed by: NURSE PRACTITIONER

## 2018-06-21 PROCEDURE — 4004F PT TOBACCO SCREEN RCVD TLK: CPT | Performed by: NURSE PRACTITIONER

## 2018-06-21 PROCEDURE — 73060 X-RAY EXAM OF HUMERUS: CPT

## 2018-06-21 PROCEDURE — G8420 CALC BMI NORM PARAMETERS: HCPCS | Performed by: NURSE PRACTITIONER

## 2018-06-21 RX ORDER — LISINOPRIL 5 MG/1
5 TABLET ORAL DAILY
Qty: 30 TABLET | Refills: 3 | Status: SHIPPED | OUTPATIENT
Start: 2018-06-21 | End: 2018-07-10 | Stop reason: SINTOL

## 2018-06-21 RX ORDER — CLONAZEPAM 0.5 MG/1
0.5 TABLET ORAL 3 TIMES DAILY PRN
Qty: 75 TABLET | Refills: 0 | Status: SHIPPED | OUTPATIENT
Start: 2018-06-21 | End: 2018-07-24 | Stop reason: SDUPTHER

## 2018-06-21 RX ORDER — CARVEDILOL 3.12 MG/1
3.12 TABLET ORAL 2 TIMES DAILY WITH MEALS
Qty: 60 TABLET | Refills: 3 | Status: SHIPPED | OUTPATIENT
Start: 2018-06-21 | End: 2018-07-10 | Stop reason: SINTOL

## 2018-06-21 ASSESSMENT — ENCOUNTER SYMPTOMS
SORE THROAT: 0
CONSTIPATION: 0
BACK PAIN: 1
VOMITING: 0
SHORTNESS OF BREATH: 0
COUGH: 0
DIARRHEA: 0
RHINORRHEA: 0

## 2018-06-21 ASSESSMENT — LIFESTYLE VARIABLES: HOW OFTEN DO YOU HAVE A DRINK CONTAINING ALCOHOL: 0

## 2018-06-21 ASSESSMENT — ANXIETY QUESTIONNAIRES: GAD7 TOTAL SCORE: 3

## 2018-07-10 ENCOUNTER — OFFICE VISIT (OUTPATIENT)
Dept: PRIMARY CARE CLINIC | Age: 73
End: 2018-07-10
Payer: MEDICARE

## 2018-07-10 VITALS
OXYGEN SATURATION: 94 % | HEIGHT: 62 IN | SYSTOLIC BLOOD PRESSURE: 150 MMHG | DIASTOLIC BLOOD PRESSURE: 87 MMHG | WEIGHT: 129 LBS | TEMPERATURE: 97.9 F | HEART RATE: 78 BPM | BODY MASS INDEX: 23.74 KG/M2

## 2018-07-10 DIAGNOSIS — I10 ESSENTIAL HYPERTENSION: ICD-10-CM

## 2018-07-10 DIAGNOSIS — M81.0 OSTEOPOROSIS, UNSPECIFIED OSTEOPOROSIS TYPE, UNSPECIFIED PATHOLOGICAL FRACTURE PRESENCE: ICD-10-CM

## 2018-07-10 DIAGNOSIS — I50.9 CHRONIC CONGESTIVE HEART FAILURE, UNSPECIFIED CONGESTIVE HEART FAILURE TYPE: ICD-10-CM

## 2018-07-10 DIAGNOSIS — F41.1 GAD (GENERALIZED ANXIETY DISORDER): Primary | ICD-10-CM

## 2018-07-10 PROCEDURE — 99213 OFFICE O/P EST LOW 20 MIN: CPT | Performed by: NURSE PRACTITIONER

## 2018-07-10 PROCEDURE — G8599 NO ASA/ANTIPLAT THER USE RNG: HCPCS | Performed by: NURSE PRACTITIONER

## 2018-07-10 PROCEDURE — 4040F PNEUMOC VAC/ADMIN/RCVD: CPT | Performed by: NURSE PRACTITIONER

## 2018-07-10 PROCEDURE — 1090F PRES/ABSN URINE INCON ASSESS: CPT | Performed by: NURSE PRACTITIONER

## 2018-07-10 PROCEDURE — G8427 DOCREV CUR MEDS BY ELIG CLIN: HCPCS | Performed by: NURSE PRACTITIONER

## 2018-07-10 PROCEDURE — 96372 THER/PROPH/DIAG INJ SC/IM: CPT | Performed by: NURSE PRACTITIONER

## 2018-07-10 PROCEDURE — G8420 CALC BMI NORM PARAMETERS: HCPCS | Performed by: NURSE PRACTITIONER

## 2018-07-10 PROCEDURE — 1123F ACP DISCUSS/DSCN MKR DOCD: CPT | Performed by: NURSE PRACTITIONER

## 2018-07-10 PROCEDURE — G8399 PT W/DXA RESULTS DOCUMENT: HCPCS | Performed by: NURSE PRACTITIONER

## 2018-07-10 PROCEDURE — 4004F PT TOBACCO SCREEN RCVD TLK: CPT | Performed by: NURSE PRACTITIONER

## 2018-07-10 PROCEDURE — 3017F COLORECTAL CA SCREEN DOC REV: CPT | Performed by: NURSE PRACTITIONER

## 2018-07-10 RX ORDER — CARVEDILOL 3.12 MG/1
3.12 TABLET ORAL 2 TIMES DAILY WITH MEALS
Qty: 60 TABLET | Refills: 3 | Status: SHIPPED | OUTPATIENT
Start: 2018-07-10 | End: 2018-09-18 | Stop reason: SDUPTHER

## 2018-07-10 RX ORDER — PROMETHAZINE HYDROCHLORIDE 25 MG/1
25 TABLET ORAL
Status: CANCELLED | OUTPATIENT
Start: 2018-07-10

## 2018-07-10 ASSESSMENT — ENCOUNTER SYMPTOMS
BACK PAIN: 1
SORE THROAT: 0
RHINORRHEA: 0
COUGH: 0
VOMITING: 0
CONSTIPATION: 0
SHORTNESS OF BREATH: 0
DIARRHEA: 0

## 2018-07-10 NOTE — PATIENT INSTRUCTIONS
Patient Education          carvedilol  Pronunciation:  ERLIN ve dil ole  Brand:  Coreg, Coreg CR  What is the most important information I should know about carvedilol? You should not take carvedilol if you have asthma, bronchitis, emphysema, severe liver disease, or a serious heart condition such as heart block, \"sick sinus syndrome,\" or slow heart rate (unless you have a pacemaker). What is carvedilol? Carvedilol is a beta-blocker. Beta-blockers affect the heart and circulation (blood flow through arteries and veins). Carvedilol is used to treat heart failure and hypertension (high blood pressure). It is also used after a heart attack that has caused your heart not to pump as well. Carvedilol may also be used for purposes not listed in this medication guide. What should I discuss with my healthcare provider before taking carvedilol? You should not take carvedilol if you are allergic to it, or if you have:  · asthma, bronchitis, emphysema;  · severe liver disease; or  · a serious heart condition such as heart block, \"sick sinus syndrome,\" or slow heart rate (unless you have a pacemaker). To make sure carvedilol is safe for you, tell your doctor if you have:  · diabetes (taking carvedilol can make it harder for you to tell when you have low blood sugar);  · angina (chest pain);  · liver or kidney disease;  · a thyroid disorder;  · pheochromocytoma (tumor of the adrenal gland);  · circulation problems (such as Raynaud's syndrome); or  · a history of allergies. FDA pregnancy category C. It is not known whether carvedilol will harm an unborn baby. Tell your doctor if you are pregnant or plan to become pregnant while using this medication. It is not known whether carvedilol passes into breast milk or if it could harm a nursing baby. You should not breast-feed while you are taking carvedilol. How should I take carvedilol? Follow all directions on your prescription label.  Your doctor may occasionally change happens if I miss a dose? Take the missed dose as soon as you remember. Skip the missed dose if it is almost time for your next scheduled dose. Do not take extra medicine to make up the missed dose. What happens if I overdose? Seek emergency medical attention or call the Poison Help line at 1-269.412.2519. Overdose symptoms may include uneven heartbeats, shortness of breath, bluish-colored fingernails, dizziness, weakness, fainting, and seizure (convulsions). What should I avoid while taking carvedilol? Carvedilol may impair your thinking or reactions. Be careful if you drive or do anything that requires you to be alert. Drinking alcohol can further lower your blood pressure and may increase certain side effects of carvedilol. You should especially avoid drinking alcohol within 2 hours before or after taking extended-release carvedilol (Coreg CR). Avoid getting up too fast from a sitting or lying position, or you may feel dizzy. Get up slowly and steady yourself to prevent a fall. What are the possible side effects of carvedilol? Get emergency medical help if you have any of these signs of an allergic reaction: hives; difficulty breathing; swelling of your face, lips, tongue, or throat. Call your doctor at once if you have a serious side effect such as:  · a light-headed feeling, like you might pass out;  · slow or uneven heartbeats;  · swelling, rapid weight gain, feeling short of breath (even with mild exertion);  · cold feeling or numbness in your fingers or toes;  · chest pain, dry cough, wheezing, chest tightness, trouble breathing; or  · high blood sugar (increased thirst, increased urination, hunger, dry mouth, fruity breath odor, drowsiness, dry skin, blurred vision, weight loss). Common side effects may include:  · weakness, dizziness;  · diarrhea;  · dry eyes;  · tired feeling; or  · weight gain. This is not a complete list of side effects and others may occur.  Call your doctor for medical

## 2018-07-10 NOTE — PROGRESS NOTES
06/21/2018 134*    Potassium 06/21/2018 4.8     Chloride 06/21/2018 95*    CO2 06/21/2018 30*    Anion Gap 06/21/2018 9     Glucose 06/21/2018 76     BUN 06/21/2018 19     CREATININE 06/21/2018 0.9     GFR Non- 06/21/2018 >60     Calcium 06/21/2018 9.6     Total Protein 06/21/2018 6.9     Alb 06/21/2018 3.8     Total Bilirubin 06/21/2018 <0.2     Alkaline Phosphatase 06/21/2018 64     ALT 06/21/2018 5     AST 06/21/2018 14    Orders Only on 06/14/2018   Component Date Value    Sodium 06/14/2018 135*    Potassium 06/14/2018 5.3*    Chloride 06/14/2018 94*    CO2 06/14/2018 28     Anion Gap 06/14/2018 13     Glucose 06/14/2018 77     BUN 06/14/2018 14     CREATININE 06/14/2018 0.8     GFR Non- 06/14/2018 >60     Calcium 06/14/2018 9.6    Orders Only on 06/12/2018   Component Date Value    WBC 06/12/2018 5.5     RBC 06/12/2018 3.10*    Hemoglobin 06/12/2018 9.1*    Hematocrit 06/12/2018 30.7*    MCV 06/12/2018 99.0     MCH 06/12/2018 29.4     MCHC 06/12/2018 29.6*    RDW 06/12/2018 15.2*    Platelets 95/08/2628 184     MPV 06/12/2018 10.3     Neutrophils % 06/12/2018 68.7*    Lymphocytes % 06/12/2018 16.7*    Monocytes % 06/12/2018 10.2*    Eosinophils % 06/12/2018 3.6     Basophils % 06/12/2018 0.4     Neutrophils # 06/12/2018 3.8     Lymphocytes # 06/12/2018 0.9*    Monocytes # 06/12/2018 0.60     Eosinophils # 06/12/2018 0.20     Basophils # 06/12/2018 0.00     Sodium 06/12/2018 123*    Potassium 06/12/2018 4.1     Chloride 06/12/2018 84*    CO2 06/12/2018 24     Anion Gap 06/12/2018 15     Glucose 06/12/2018 78     BUN 06/12/2018 29*    CREATININE 06/12/2018 1.6*    GFR Non- 06/12/2018 32*    Calcium 06/12/2018 8.5*    Total Protein 06/12/2018 6.5*    Alb 06/12/2018 3.3*    Total Bilirubin 06/12/2018 <0.2     Alkaline Phosphatase 06/12/2018 66     ALT 06/12/2018 <5*    AST 06/12/2018 16    Office Visit on 1/2 tablet in evening  Patient taking differently: Take 20 mg by mouth 2 times daily Take 1 tablet in morning and 1/2 tablet in evening Yes NAVEEN Dior   ondansetron (ZOFRAN) 4 MG tablet Take 1 tablet by mouth daily as needed for Nausea or Vomiting Yes NAVEEN Dior   DULoxetine (CYMBALTA) 60 MG extended release capsule Take 1 capsule by mouth 2 times daily Yes NAVEEN Candelario   memantine (NAMENDA) 10 MG tablet Take 1 tablet by mouth 2 times daily Yes NAVEEN Dior   gabapentin (NEURONTIN) 300 MG capsule Take 300 mg by mouth 3 times daily Yes Historical Provider, MD   promethazine (PHENERGAN) 25 MG tablet Take 25 mg by mouth Yes Historical Provider, MD   PROLIA 60 MG/ML SOLN SC injection INJECT 60MG SUBCUTANEOUSLY EVERY 6 MONTHS Yes NAVEEN Dior   Calcium Carb-Cholecalciferol (CALCIUM 600 + D PO) Take by mouth Yes Historical Provider, MD   azelastine (ASTELIN) 0.1 % nasal spray 2 sprays by Nasal route 2 times daily Use in each nostril as directed Yes NAVEEN Dior   fluticasone (FLONASE) 50 MCG/ACT nasal spray 1 spray by Nasal route daily Yes NAVEEN Dior   vitamin E 400 UNIT capsule Take 400 Units by mouth daily Yes Historical Provider, MD   theophylline CR, 12 hour, (THEOPHYLLINE CR, 12 HOUR,) 300 MG CR tablet Take 300 mg by mouth daily.    Yes Historical Provider, MD       Allergies: Codeine and Sulfa antibiotics    Past Medical History:   Diagnosis Date    Abdominal pain     Anxiety     Arthritis     CAD in native artery     Cerebrovascular disease     [facility]     Constipation     [facility]     Depression     Emphysema     GERD (gastroesophageal reflux disease)     Myocardial infarct, old     Pneumonia     Tobacco abuse        Past Surgical History:   Procedure Laterality Date    ABDOMINAL EXPLORATION SURGERY      APPENDECTOMY      CARDIAC CATHETERIZATION  06/25/10    patent stent noted in LAD,EF is estimated to be 60%    CARDIAC CATHETERIZATION  7/13/12  [facility]DL    EF  60%    CHOLECYSTECTOMY      COLONOSCOPY  10/2014    Dr Jessika Cuevas ENDOSCOPY  10/1/14    Dr Meli Ramírez: food bezoar; esophagitis, gastritis, duodenitis; biopsies neg h-pylori       Social History   Substance Use Topics    Smoking status: Current Every Day Smoker     Packs/day: 1.00     Years: 36.00     Types: Cigarettes    Smokeless tobacco: Never Used    Alcohol use No       Review of Systems   Constitutional: Negative for chills, fatigue and fever. HENT: Negative for congestion, ear pain, rhinorrhea and sore throat. Respiratory: Negative for cough and shortness of breath. Cardiovascular: Negative for chest pain. Gastrointestinal: Negative for constipation, diarrhea and vomiting. Musculoskeletal: Positive for arthralgias and back pain. Skin: Negative for rash. Neurological: Negative for dizziness and headaches. Psychiatric/Behavioral: Positive for sleep disturbance (improved on current regimen). The patient is nervous/anxious (improved on current regimen). Physical Exam   Constitutional: She appears well-developed. HENT:   Head: Normocephalic. Right Ear: Hearing, tympanic membrane and external ear normal.   Left Ear: Hearing, tympanic membrane and external ear normal.   Nose: Nose normal. No mucosal edema or rhinorrhea. Mouth/Throat: Oropharynx is clear and moist. No posterior oropharyngeal erythema. Eyes: Right conjunctiva is not injected. Right conjunctiva has no hemorrhage. Neck: Normal range of motion. Carotid bruit is not present. Cardiovascular: Normal rate and regular rhythm. Pulmonary/Chest: Effort normal. No respiratory distress. She has decreased breath sounds in the right lower field and the left lower field. She has no wheezes. She has no rhonchi. She has no rales. Abdominal: Soft.  Bowel sounds are normal.   Musculoskeletal:

## 2018-07-16 ENCOUNTER — TELEPHONE (OUTPATIENT)
Dept: PRIMARY CARE CLINIC | Age: 73
End: 2018-07-16

## 2018-07-16 ENCOUNTER — PROCEDURE VISIT (OUTPATIENT)
Dept: PRIMARY CARE CLINIC | Age: 73
End: 2018-07-16
Payer: MEDICARE

## 2018-07-16 DIAGNOSIS — Z12.11 COLON CANCER SCREENING: Primary | ICD-10-CM

## 2018-07-16 LAB
CONTROL: NORMAL
HEMOCCULT STL QL: NORMAL

## 2018-07-16 PROCEDURE — 82274 ASSAY TEST FOR BLOOD FECAL: CPT | Performed by: NURSE PRACTITIONER

## 2018-07-20 ENCOUNTER — TELEPHONE (OUTPATIENT)
Dept: PRIMARY CARE CLINIC | Age: 73
End: 2018-07-20

## 2018-07-24 DIAGNOSIS — F41.1 GAD (GENERALIZED ANXIETY DISORDER): ICD-10-CM

## 2018-07-24 DIAGNOSIS — G47.01 INSOMNIA DUE TO MEDICAL CONDITION: ICD-10-CM

## 2018-07-24 RX ORDER — CLONAZEPAM 0.5 MG/1
0.5 TABLET ORAL 3 TIMES DAILY PRN
Qty: 75 TABLET | Refills: 0 | Status: SHIPPED | OUTPATIENT
Start: 2018-07-24 | End: 2018-08-16 | Stop reason: SDUPTHER

## 2018-07-24 NOTE — TELEPHONE ENCOUNTER
Patient was last seen 7/10/18  It was not time to refill at her last appointment  Requested Prescriptions     Pending Prescriptions Disp Refills    clonazePAM (KLONOPIN) 0.5 MG tablet 75 tablet 0     Sig: Take 1 tablet by mouth 3 times daily as needed for Anxiety for up to 30 days. Celestino Key

## 2018-08-01 DIAGNOSIS — G47.01 INSOMNIA DUE TO MEDICAL CONDITION: ICD-10-CM

## 2018-08-02 ENCOUNTER — OFFICE VISIT (OUTPATIENT)
Dept: PRIMARY CARE CLINIC | Age: 73
End: 2018-08-02
Payer: MEDICARE

## 2018-08-02 VITALS
OXYGEN SATURATION: 96 % | SYSTOLIC BLOOD PRESSURE: 112 MMHG | HEART RATE: 74 BPM | WEIGHT: 136 LBS | BODY MASS INDEX: 25.03 KG/M2 | HEIGHT: 62 IN | TEMPERATURE: 97.6 F | DIASTOLIC BLOOD PRESSURE: 80 MMHG

## 2018-08-02 DIAGNOSIS — N30.00 ACUTE CYSTITIS WITHOUT HEMATURIA: Primary | ICD-10-CM

## 2018-08-02 DIAGNOSIS — Z91.81 AT HIGH RISK FOR FALLS: ICD-10-CM

## 2018-08-02 DIAGNOSIS — Z13.31 POSITIVE DEPRESSION SCREENING: ICD-10-CM

## 2018-08-02 DIAGNOSIS — R30.0 DYSURIA: ICD-10-CM

## 2018-08-02 DIAGNOSIS — F03.90 DEMENTIA WITHOUT BEHAVIORAL DISTURBANCE, UNSPECIFIED DEMENTIA TYPE: ICD-10-CM

## 2018-08-02 LAB
APPEARANCE FLUID: ABNORMAL
BILIRUBIN, POC: ABNORMAL
BLOOD URINE, POC: ABNORMAL
CLARITY, POC: CLEAR
COLOR, POC: YELLOW
GLUCOSE URINE, POC: ABNORMAL
KETONES, POC: ABNORMAL
LEUKOCYTE EST, POC: ABNORMAL
NITRITE, POC: ABNORMAL
PH, POC: 6
PROTEIN, POC: ABNORMAL
SPECIFIC GRAVITY, POC: 1.01
UROBILINOGEN, POC: 0.2

## 2018-08-02 PROCEDURE — 1090F PRES/ABSN URINE INCON ASSESS: CPT | Performed by: NURSE PRACTITIONER

## 2018-08-02 PROCEDURE — G8431 POS CLIN DEPRES SCRN F/U DOC: HCPCS | Performed by: NURSE PRACTITIONER

## 2018-08-02 PROCEDURE — G8599 NO ASA/ANTIPLAT THER USE RNG: HCPCS | Performed by: NURSE PRACTITIONER

## 2018-08-02 PROCEDURE — 81002 URINALYSIS NONAUTO W/O SCOPE: CPT | Performed by: NURSE PRACTITIONER

## 2018-08-02 PROCEDURE — 3017F COLORECTAL CA SCREEN DOC REV: CPT | Performed by: NURSE PRACTITIONER

## 2018-08-02 PROCEDURE — 4040F PNEUMOC VAC/ADMIN/RCVD: CPT | Performed by: NURSE PRACTITIONER

## 2018-08-02 PROCEDURE — 1123F ACP DISCUSS/DSCN MKR DOCD: CPT | Performed by: NURSE PRACTITIONER

## 2018-08-02 PROCEDURE — G8399 PT W/DXA RESULTS DOCUMENT: HCPCS | Performed by: NURSE PRACTITIONER

## 2018-08-02 PROCEDURE — G8420 CALC BMI NORM PARAMETERS: HCPCS | Performed by: NURSE PRACTITIONER

## 2018-08-02 PROCEDURE — 1101F PT FALLS ASSESS-DOCD LE1/YR: CPT | Performed by: NURSE PRACTITIONER

## 2018-08-02 PROCEDURE — G8428 CUR MEDS NOT DOCUMENT: HCPCS | Performed by: NURSE PRACTITIONER

## 2018-08-02 PROCEDURE — 99214 OFFICE O/P EST MOD 30 MIN: CPT | Performed by: NURSE PRACTITIONER

## 2018-08-02 PROCEDURE — 4004F PT TOBACCO SCREEN RCVD TLK: CPT | Performed by: NURSE PRACTITIONER

## 2018-08-02 RX ORDER — CIPROFLOXACIN 500 MG/1
500 TABLET, FILM COATED ORAL 2 TIMES DAILY
Qty: 20 TABLET | Refills: 0 | Status: CANCELLED | OUTPATIENT
Start: 2018-08-02 | End: 2018-08-12

## 2018-08-02 RX ORDER — TRAZODONE HYDROCHLORIDE 150 MG/1
150 TABLET ORAL NIGHTLY
Qty: 30 TABLET | Refills: 3 | Status: SHIPPED | OUTPATIENT
Start: 2018-08-02 | End: 2018-12-31 | Stop reason: SDUPTHER

## 2018-08-02 RX ORDER — PHENAZOPYRIDINE HYDROCHLORIDE 100 MG/1
200 TABLET, FILM COATED ORAL 2 TIMES DAILY PRN
Qty: 6 TABLET | Refills: 0 | Status: SHIPPED | OUTPATIENT
Start: 2018-08-02 | End: 2018-08-05

## 2018-08-02 RX ORDER — NITROFURANTOIN 25; 75 MG/1; MG/1
100 CAPSULE ORAL 2 TIMES DAILY
Qty: 20 CAPSULE | Refills: 0 | Status: SHIPPED | OUTPATIENT
Start: 2018-08-02 | End: 2018-08-12

## 2018-08-02 ASSESSMENT — ENCOUNTER SYMPTOMS
CONSTIPATION: 0
BLOOD IN STOOL: 0
EYE DISCHARGE: 0
RHINORRHEA: 0
ABDOMINAL PAIN: 0
WHEEZING: 0
NAUSEA: 1
EYE REDNESS: 0
SORE THROAT: 0
TROUBLE SWALLOWING: 0
DIARRHEA: 0
COUGH: 0

## 2018-08-02 NOTE — PROGRESS NOTES
[address]  [place], [address] [place] [address] Rd  Phone [phone]   Fax [phone]      OFFICE VISIT: 8/2/2018    Pascual Knig is a 68 y.o. female who presents today for her medical conditions/complaints as noted below. Pascual King is c/o of Dysuria (x 3 or 4 days- has been using cranberry pills) and Urinary Frequency        HPI:     Dysuria    This is a new problem. Episode onset: 3 days. The problem occurs every urination. The problem has been gradually worsening. The quality of the pain is described as aching and burning. The maximum temperature recorded prior to her arrival was 100 - 100.9 F. Associated symptoms include chills, flank pain, frequency, hesitancy, nausea and urgency. She has tried increased fluids for the symptoms. The treatment provided no relief.        Past Medical History:   Diagnosis Date    Abdominal pain     Anxiety     Arthritis     CAD in native artery     Cerebrovascular disease     CHF (congestive heart failure) (HCC)     Constipation     COPD (chronic obstructive pulmonary disease) (HCC)     Depression     Emphysema     GERD (gastroesophageal reflux disease)     Myocardial infarct, old     Pneumonia     Tobacco abuse       Past Surgical History:   Procedure Laterality Date    ABDOMINAL EXPLORATION SURGERY      APPENDECTOMY      CARDIAC CATHETERIZATION  06/25/10    patent  stent noted in LAD,EF is estimated to be 60%    CARDIAC CATHETERIZATION  7/13/12  MDL    EF  60%    CHOLECYSTECTOMY      COLONOSCOPY  10/2014    Dr Castillo Sickle ENDOSCOPY  10/1/14    Dr Robertson Fam: food bezoar; esophagitis, gastritis, duodenitis; biopsies neg h-pylori       Family History   Problem Relation Age of Onset    Stroke Brother     Stroke Sister     Cancer Brother     Cancer Sister     Diabetes Brother     Diabetes Sister     Coronary Art Dis Other     Hypertension Other     Seizures Child     Colon Cancer Neg Hx     Colon Polyps Neg Hx the tongue every 5 minutes as needed for Chest pain 25 tablet 3    furosemide (LASIX) 40 MG tablet Take 1 tablet in morning and 1/2 tablet in evening (Patient taking differently: Take 20 mg by mouth 2 times daily Take 1 tablet in morning and 1/2 tablet in evening) 60 tablet 11    ondansetron (ZOFRAN) 4 MG tablet Take 1 tablet by mouth daily as needed for Nausea or Vomiting 30 tablet 0    DULoxetine (CYMBALTA) 60 MG extended release capsule Take 1 capsule by mouth 2 times daily 180 capsule 3    memantine (NAMENDA) 10 MG tablet Take 1 tablet by mouth 2 times daily 60 tablet 3    gabapentin (NEURONTIN) 300 MG capsule Take 300 mg by mouth 3 times daily      promethazine (PHENERGAN) 25 MG tablet Take 25 mg by mouth      PROLIA 60 MG/ML SOLN SC injection INJECT 60MG SUBCUTANEOUSLY EVERY 6 MONTHS 1 mL 1    Calcium Carb-Cholecalciferol (CALCIUM 600 + D PO) Take by mouth      azelastine (ASTELIN) 0.1 % nasal spray 2 sprays by Nasal route 2 times daily Use in each nostril as directed 1 Bottle 3    fluticasone (FLONASE) 50 MCG/ACT nasal spray 1 spray by Nasal route daily 1 Bottle 3    vitamin E 400 UNIT capsule Take 400 Units by mouth daily      theophylline CR, 12 hour, (THEOPHYLLINE CR, 12 HOUR,) 300 MG CR tablet Take 300 mg by mouth daily. No current facility-administered medications for this visit.       Allergies   Allergen Reactions    Codeine     Sulfa Antibiotics        Health Maintenance   Topic Date Due    Hepatitis C screen  1945    DTaP/Tdap/Td vaccine (1 - Tdap) 04/06/1965    Shingles Vaccine (1 of 2 - 2 Dose Series) 04/06/1996    Low dose CT lung screening  04/06/2001    Flu vaccine (1) 09/01/2018    Breast cancer screen  01/10/2019    Potassium monitoring  06/21/2019    Creatinine monitoring  06/21/2019    Lipid screen  02/21/2022    Colon cancer screen colonoscopy  07/24/2027    DEXA (modify frequency per FRAX score)  Completed    Pneumococcal low/med risk  Completed Subjective:      Review of Systems   Constitutional: Positive for chills. Negative for appetite change and unexpected weight change. HENT: Negative for congestion, rhinorrhea, sore throat and trouble swallowing. Eyes: Negative for discharge and redness. Respiratory: Negative for cough and wheezing. Cardiovascular: Negative for chest pain. Gastrointestinal: Positive for nausea. Negative for abdominal pain, blood in stool, constipation and diarrhea. Genitourinary: Positive for dysuria, flank pain, frequency, hesitancy and urgency. Musculoskeletal: Positive for arthralgias. Skin: Negative for rash. Neurological: Negative for weakness. Hematological: Negative for adenopathy. Objective:     Physical Exam   Constitutional: She appears well-developed. HENT:   Head: Normocephalic. Right Ear: Hearing, tympanic membrane and external ear normal.   Left Ear: Hearing, tympanic membrane and external ear normal.   Nose: Nose normal. No mucosal edema or rhinorrhea. Mouth/Throat: Oropharynx is clear and moist. No posterior oropharyngeal erythema. Eyes: Right conjunctiva is not injected. Right conjunctiva has no hemorrhage. Neck: Normal range of motion. Carotid bruit is not present. Cardiovascular: Normal rate and regular rhythm. Pulmonary/Chest: Effort normal. No respiratory distress. She has decreased breath sounds in the right lower field and the left lower field. She has no wheezes. She has no rhonchi. She has no rales. Abdominal: Soft. Bowel sounds are normal.   Musculoskeletal:        Lumbar back: She exhibits tenderness and pain. Neurological: She is alert. Skin: Skin is dry. No rash noted. Psychiatric: She has a normal mood and affect. Her behavior is normal. Judgment and thought content normal.   Vitals reviewed.     /80   Pulse 74   Temp 97.6 °F (36.4 °C) (Temporal)   Ht 5' 2\" (1.575 m)   Wt 136 lb (61.7 kg)   SpO2 96%   BMI 24.87 kg/m²     Assessment: ICD-10-CM ICD-9-CM    1. Acute cystitis without hematuria N30.00 595.0    2. Dysuria R30.0 788.1 POCT Urinalysis no Micro   3. Positive depression screening Z13.89 796.4 Positive Screen for Clinical Depression with a Documented Follow-up Plan    4. At high risk for falls Z91.81 V15.88        Plan:   Follow up if not improving, symptoms worsen, or if fever, chills, or lower back pain develop. Return in about 2 weeks (around 8/16/2018) for recheck urine. Orders Placed This Encounter   Procedures    POCT Urinalysis no Micro    Positive Screen for Clinical Depression with a Documented Follow-up Plan      Orders Placed This Encounter   Medications    phenazopyridine (PYRIDIUM) 100 MG tablet     Sig: Take 2 tablets by mouth 2 times daily as needed for Pain     Dispense:  6 tablet     Refill:  0    nitrofurantoin, macrocrystal-monohydrate, (MACROBID) 100 MG capsule     Sig: Take 1 capsule by mouth 2 times daily for 10 days     Dispense:  20 capsule     Refill:  0         Discussed use, benefit, and side effects of prescribed medications. All patient questions answered. Pt voiced understanding. Reviewed health maintenance. .  Patient agreed with treatment plan. Follow up as directed. There are no Patient Instructions on file for this visit.       Electronically signed by NAVEEN Bush on 8/2/2018 at 3:27 PM

## 2018-08-03 RX ORDER — DONEPEZIL HYDROCHLORIDE 5 MG/1
5 TABLET, FILM COATED ORAL NIGHTLY
Qty: 30 TABLET | Refills: 5 | Status: SHIPPED | OUTPATIENT
Start: 2018-08-03 | End: 2019-03-27 | Stop reason: SDUPTHER

## 2018-08-16 ENCOUNTER — OFFICE VISIT (OUTPATIENT)
Dept: PRIMARY CARE CLINIC | Age: 73
End: 2018-08-16
Payer: MEDICARE

## 2018-08-16 VITALS
DIASTOLIC BLOOD PRESSURE: 80 MMHG | OXYGEN SATURATION: 93 % | HEART RATE: 73 BPM | TEMPERATURE: 97.6 F | BODY MASS INDEX: 24.48 KG/M2 | WEIGHT: 133 LBS | SYSTOLIC BLOOD PRESSURE: 130 MMHG | HEIGHT: 62 IN

## 2018-08-16 DIAGNOSIS — J01.90 ACUTE NON-RECURRENT SINUSITIS, UNSPECIFIED LOCATION: Primary | ICD-10-CM

## 2018-08-16 DIAGNOSIS — Z95.828 PORT CATHETER IN PLACE: ICD-10-CM

## 2018-08-16 DIAGNOSIS — Z87.440 H/O URINARY TRACT INFECTION: ICD-10-CM

## 2018-08-16 DIAGNOSIS — R53.82 CHRONIC FATIGUE: ICD-10-CM

## 2018-08-16 DIAGNOSIS — J44.9 CHRONIC OBSTRUCTIVE PULMONARY DISEASE, UNSPECIFIED COPD TYPE (HCC): ICD-10-CM

## 2018-08-16 DIAGNOSIS — G47.01 INSOMNIA DUE TO MEDICAL CONDITION: ICD-10-CM

## 2018-08-16 DIAGNOSIS — D64.9 ANEMIA, UNSPECIFIED TYPE: ICD-10-CM

## 2018-08-16 DIAGNOSIS — J40 BRONCHITIS: ICD-10-CM

## 2018-08-16 DIAGNOSIS — F41.1 GAD (GENERALIZED ANXIETY DISORDER): ICD-10-CM

## 2018-08-16 LAB
ALBUMIN SERPL-MCNC: 4 G/DL (ref 3.5–5.2)
ALP BLD-CCNC: 77 U/L (ref 35–104)
ALT SERPL-CCNC: 8 U/L (ref 5–33)
ANION GAP SERPL CALCULATED.3IONS-SCNC: 10 MMOL/L (ref 7–19)
APPEARANCE FLUID: CLEAR
AST SERPL-CCNC: 14 U/L (ref 5–32)
BASOPHILS ABSOLUTE: 0 K/UL (ref 0–0.2)
BASOPHILS RELATIVE PERCENT: 0.5 % (ref 0–1)
BILIRUB SERPL-MCNC: <0.2 MG/DL (ref 0.2–1.2)
BILIRUBIN, POC: NORMAL
BLOOD URINE, POC: NORMAL
BUN BLDV-MCNC: 22 MG/DL (ref 8–23)
CALCIUM SERPL-MCNC: 9.8 MG/DL (ref 8.8–10.2)
CHLORIDE BLD-SCNC: 101 MMOL/L (ref 98–111)
CLARITY, POC: CLEAR
CO2: 25 MMOL/L (ref 22–29)
COLOR, POC: YELLOW
CREAT SERPL-MCNC: 1 MG/DL (ref 0.5–0.9)
EOSINOPHILS ABSOLUTE: 0.2 K/UL (ref 0–0.6)
EOSINOPHILS RELATIVE PERCENT: 3.2 % (ref 0–5)
GFR NON-AFRICAN AMERICAN: 54
GLUCOSE BLD-MCNC: 81 MG/DL (ref 74–109)
GLUCOSE URINE, POC: NORMAL
HCT VFR BLD CALC: 32.9 % (ref 37–47)
HEMOGLOBIN: 9.8 G/DL (ref 12–16)
KETONES, POC: NORMAL
LEUKOCYTE EST, POC: NORMAL
LYMPHOCYTES ABSOLUTE: 1.2 K/UL (ref 1.1–4.5)
LYMPHOCYTES RELATIVE PERCENT: 19.7 % (ref 20–40)
MCH RBC QN AUTO: 29.8 PG (ref 27–31)
MCHC RBC AUTO-ENTMCNC: 29.8 G/DL (ref 33–37)
MCV RBC AUTO: 100 FL (ref 81–99)
MONOCYTES ABSOLUTE: 0.7 K/UL (ref 0–0.9)
MONOCYTES RELATIVE PERCENT: 11.2 % (ref 0–10)
NEUTROPHILS ABSOLUTE: 3.9 K/UL (ref 1.5–7.5)
NEUTROPHILS RELATIVE PERCENT: 65.1 % (ref 50–65)
NITRITE, POC: NORMAL
PDW BLD-RTO: 16 % (ref 11.5–14.5)
PH, POC: 5.5
PLATELET # BLD: 226 K/UL (ref 130–400)
PMV BLD AUTO: 10.1 FL (ref 9.4–12.3)
POTASSIUM SERPL-SCNC: 5.4 MMOL/L (ref 3.5–5)
PROTEIN, POC: NORMAL
RBC # BLD: 3.29 M/UL (ref 4.2–5.4)
SODIUM BLD-SCNC: 136 MMOL/L (ref 136–145)
SPECIFIC GRAVITY, POC: 1.02
T4 FREE: 0.9 NG/DL (ref 0.9–1.7)
TOTAL PROTEIN: 7.1 G/DL (ref 6.6–8.7)
TSH SERPL DL<=0.05 MIU/L-ACNC: 1.57 UIU/ML (ref 0.27–4.2)
UROBILINOGEN, POC: 0.2
WBC # BLD: 6 K/UL (ref 4.8–10.8)

## 2018-08-16 PROCEDURE — G8420 CALC BMI NORM PARAMETERS: HCPCS | Performed by: NURSE PRACTITIONER

## 2018-08-16 PROCEDURE — 3017F COLORECTAL CA SCREEN DOC REV: CPT | Performed by: NURSE PRACTITIONER

## 2018-08-16 PROCEDURE — 1101F PT FALLS ASSESS-DOCD LE1/YR: CPT | Performed by: NURSE PRACTITIONER

## 2018-08-16 PROCEDURE — G8427 DOCREV CUR MEDS BY ELIG CLIN: HCPCS | Performed by: NURSE PRACTITIONER

## 2018-08-16 PROCEDURE — 99214 OFFICE O/P EST MOD 30 MIN: CPT | Performed by: NURSE PRACTITIONER

## 2018-08-16 PROCEDURE — 1123F ACP DISCUSS/DSCN MKR DOCD: CPT | Performed by: NURSE PRACTITIONER

## 2018-08-16 PROCEDURE — 1090F PRES/ABSN URINE INCON ASSESS: CPT | Performed by: NURSE PRACTITIONER

## 2018-08-16 PROCEDURE — 4004F PT TOBACCO SCREEN RCVD TLK: CPT | Performed by: NURSE PRACTITIONER

## 2018-08-16 PROCEDURE — 4040F PNEUMOC VAC/ADMIN/RCVD: CPT | Performed by: NURSE PRACTITIONER

## 2018-08-16 PROCEDURE — G8926 SPIRO NO PERF OR DOC: HCPCS | Performed by: NURSE PRACTITIONER

## 2018-08-16 PROCEDURE — G8599 NO ASA/ANTIPLAT THER USE RNG: HCPCS | Performed by: NURSE PRACTITIONER

## 2018-08-16 PROCEDURE — G8399 PT W/DXA RESULTS DOCUMENT: HCPCS | Performed by: NURSE PRACTITIONER

## 2018-08-16 PROCEDURE — 81002 URINALYSIS NONAUTO W/O SCOPE: CPT | Performed by: NURSE PRACTITIONER

## 2018-08-16 PROCEDURE — 3023F SPIROM DOC REV: CPT | Performed by: NURSE PRACTITIONER

## 2018-08-16 RX ORDER — CEFDINIR 300 MG/1
300 CAPSULE ORAL 2 TIMES DAILY
Qty: 20 CAPSULE | Refills: 0 | Status: SHIPPED | OUTPATIENT
Start: 2018-08-16 | End: 2018-08-26

## 2018-08-16 RX ORDER — GUAIFENESIN 600 MG/1
1200 TABLET, EXTENDED RELEASE ORAL 2 TIMES DAILY
Qty: 120 TABLET | Refills: 3 | Status: SHIPPED | OUTPATIENT
Start: 2018-08-16 | End: 2018-09-15

## 2018-08-16 RX ORDER — ALBUTEROL SULFATE 90 UG/1
2 AEROSOL, METERED RESPIRATORY (INHALATION) EVERY 6 HOURS PRN
Qty: 1 INHALER | Refills: 5 | Status: SHIPPED | OUTPATIENT
Start: 2018-08-16 | End: 2018-10-18 | Stop reason: SDUPTHER

## 2018-08-16 RX ORDER — CLONAZEPAM 0.5 MG/1
0.5 TABLET ORAL 3 TIMES DAILY PRN
Qty: 75 TABLET | Refills: 0 | Status: SHIPPED | OUTPATIENT
Start: 2018-08-16 | End: 2018-09-18 | Stop reason: SDUPTHER

## 2018-08-16 ASSESSMENT — ENCOUNTER SYMPTOMS
RHINORRHEA: 1
WHEEZING: 1
VOMITING: 0
SINUS PRESSURE: 1
CONSTIPATION: 0
BACK PAIN: 1
SHORTNESS OF BREATH: 1
DIARRHEA: 0
COUGH: 1
SORE THROAT: 1

## 2018-08-16 NOTE — PROGRESS NOTES
[address]  [place], [address] [place] [address] Rd  Phone [phone]   Fax [phone]      OFFICE VISIT: 2018    [place] Sep- : 1945    Chief Complaint:  Marisol Middleton is a 68 y.o. female who is here for Follow-up (UTI); Cough; and Congestion     HPI  The patient presents today for monthly follow-up of anxiety. COUGH/CONGESTION:  Reports worsening cough and congestion over the last 3-5 days. Reports SOA. Reports wheezing. Reports nasal drainage, PND and sinus pressure. She reports a productive cough. She continues on Anoro daily and uses ProAir prn. ANXIETY:  She continues on Cymbalta 60 mg BID. She is also taking Klonopin 0.5 mg prn. She is taking 2-3x/day. She is needing a refill on Klonopin today. FATIGUE:  Reports worsening fatigue. She was noted to be anemic in 2018. She hasn't had any further work-up since then. UTI:  She was recently treated with Macrobid for UTI. Her urinary symptoms have resolved. Her UA is WNL today. height is 5' 2\" (1.575 m) and weight is 133 lb (60.3 kg). Her temporal temperature is 97.6 °F (36.4 °C). Her blood pressure is 130/80 and her pulse is 73. Her oxygen saturation is 93%. Body mass index is 24.33 kg/m². Results for orders placed or performed in visit on 18   POCT Urinalysis no Micro   Result Value Ref Range    Color, UA yellow     Clarity, UA clear     Glucose, UA POC neg     Bilirubin, UA neg     Ketones, UA neg     Spec Grav, UA 1.020     Blood, UA POC neg     pH, UA 5.5     Protein, UA POC neg     Urobilinogen, UA 0.2     Leukocytes, UA neg     Nitrite, UA neg     Appearance, Fluid Clear Clear, Slightly Cloudy       I have reviewed the following with the Ms.  [address] [facility]   Lab Review   Office Visit on 2018   Component Date Value    Color, UA 2018 yellow     Clarity, UA 2018 clear     Glucose, UA POC 2018 neg     Bilirubin, UA 2018 neg     Ketones, UA 2018 neg     Spec Grav, UA 2018 1.010     Blood, UA POC 08/02/2018 neg     pH, UA 08/02/2018 6.0     Protein, UA POC 08/02/2018 neg     Urobilinogen, UA 08/02/2018 0.2     Leukocytes, UA 08/02/2018 trace     Nitrite, UA 08/02/2018 neg    Procedure visit on 07/16/2018   Component Date Value    OCCULT BLOOD FECAL 07/16/2018 neg    Orders Only on 06/21/2018   Component Date Value    Sodium 06/21/2018 134*    Potassium 06/21/2018 4.8     Chloride 06/21/2018 95*    CO2 06/21/2018 30*    Anion Gap 06/21/2018 9     Glucose 06/21/2018 76     BUN 06/21/2018 19     CREATININE 06/21/2018 0.9     GFR Non- 06/21/2018 >60     Calcium 06/21/2018 9.6     Total Protein 06/21/2018 6.9     Alb 06/21/2018 3.8     Total Bilirubin 06/21/2018 <0.2     Alkaline Phosphatase 06/21/2018 64     ALT 06/21/2018 5     AST 06/21/2018 14    Orders Only on 06/14/2018   Component Date Value    Sodium 06/14/2018 135*    Potassium 06/14/2018 5.3*    Chloride 06/14/2018 94*    CO2 06/14/2018 28     Anion Gap 06/14/2018 13     Glucose 06/14/2018 77     BUN 06/14/2018 14     CREATININE 06/14/2018 0.8     GFR Non- 06/14/2018 >60     Calcium 06/14/2018 9.6    Orders Only on 06/12/2018   Component Date Value    WBC 06/12/2018 5.5     RBC 06/12/2018 3.10*    Hemoglobin 06/12/2018 9.1*    Hematocrit 06/12/2018 30.7*    MCV 06/12/2018 99.0     MCH 06/12/2018 29.4     MCHC 06/12/2018 29.6*    RDW 06/12/2018 15.2*    Platelets 45/33/3550 184     MPV 06/12/2018 10.3     Neutrophils % 06/12/2018 68.7*    Lymphocytes % 06/12/2018 16.7*    Monocytes % 06/12/2018 10.2*    Eosinophils % 06/12/2018 3.6     Basophils % 06/12/2018 0.4     Neutrophils # 06/12/2018 3.8     Lymphocytes # 06/12/2018 0.9*    Monocytes # 06/12/2018 0.60     Eosinophils # 06/12/2018 0.20     Basophils # 06/12/2018 0.00     Sodium 06/12/2018 123*    Potassium 06/12/2018 4.1     Chloride 06/12/2018 84*    CO2 06/12/2018 24     Anion Gap daily Yes NAVEEN Dior   umeclidinium-vilanterol (ANORO ELLIPTA) 62.5-25 MCG/INH AEPB inhaler Inhale 1 puff into the lungs daily Yes NAVEEN Jones   tolterodine (DETROL LA) 4 MG extended release capsule Take 1 capsule by mouth daily Yes NAVEEN Dior   VOLTAREN 1 % GEL Apply 2 g topically 4 times daily as needed for Pain Yes NAVEEN Dior   OXYGEN Inhale 2 L into the lungs daily Yes NAVEEN Dior   isosorbide mononitrate (IMDUR) 30 MG extended release tablet TAKE ONE TABLET BY MOUTH DAILY Yes NAVEEN Dior   HYDROcodone-acetaminophen (Cassi Pipes)  MG per tablet TAKE ONE TABLET BY MOUTH EVERY 8 HOURS AS NEEDED Yes Historical Provider, MD   nitroGLYCERIN (NITROQUICK) 0.4 MG SL tablet Place 1 tablet under the tongue every 5 minutes as needed for Chest pain Yes NAVEEN Dior   furosemide (LASIX) 40 MG tablet Take 1 tablet in morning and 1/2 tablet in evening  Patient taking differently: Take 20 mg by mouth 2 times daily Take 1 tablet in morning and 1/2 tablet in evening Yes NAVEEN Dior   ondansetron (ZOFRAN) 4 MG tablet Take 1 tablet by mouth daily as needed for Nausea or Vomiting Yes NAVEEN Dior   DULoxetine (CYMBALTA) 60 MG extended release capsule Take 1 capsule by mouth 2 times daily Yes NAVEEN Jones   memantine (NAMENDA) 10 MG tablet Take 1 tablet by mouth 2 times daily Yes NAVEEN Dior   gabapentin (NEURONTIN) 300 MG capsule Take 300 mg by mouth 3 times daily Yes Historical Provider, MD   promethazine (PHENERGAN) 25 MG tablet Take 25 mg by mouth Yes Historical Provider, MD   PROLIA 60 MG/ML SOLN SC injection INJECT 60MG SUBCUTANEOUSLY EVERY 6 MONTHS Yes NAVEEN Dior   Calcium Carb-Cholecalciferol (CALCIUM 600 + D PO) Take by mouth Yes Historical Provider, MD   azelastine (ASTELIN) 0.1 % nasal spray 2 sprays by Nasal route 2 times daily Use in each nostril as directed Yes NAVEEN Dior   fluticasone (FLONASE) 50 8. 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 Port catheter in place Z95.828 V45.89 ND IRRIG IMPLANTED DRUG DELIVERY DEVICE         PLAN    Orders Placed This Encounter   Procedures    CBC Auto Differential    T4, Free    TSH without Reflex    Comprehensive Metabolic Panel    POCT Urinalysis no Micro    ND IRRIG IMPLANTED DRUG DELIVERY DEVICE          procedure note:    Accessed mediport in sterile fashion with gripper needle without anesthesia. The port was flushed with normal saline and flushed easily. Blood was drawn back and labs were obtained. The port was then flushed with heparin and the port was de-accessed. No blood loss  No complication  No specimen  She tolerated the procedure well without incident. Return in about 1 month (around 9/16/2018), or if symptoms worsen or fail to improve. Patient Instructions       Patient Education        Bronchitis: Care Instructions  Your Care Instructions    Bronchitis is inflammation of the bronchial tubes, which carry air to the lungs. The tubes swell and produce mucus, or phlegm. The mucus and inflamed bronchial tubes make you cough. You may have trouble breathing. Most cases of bronchitis are caused by viruses like those that cause colds. Antibiotics usually do not help and they may be harmful. Bronchitis usually develops rapidly and lasts about 2 to 3 weeks in otherwise healthy people. Follow-up care is a key part of your treatment and safety. Be sure to make and go to all appointments, and call your doctor if you are having problems. It's also a good idea to know your test results and keep a list of the medicines you take. How can you care for yourself at home? · Take all medicines exactly as prescribed. Call your doctor if you think you are having a problem with your medicine.   · Get some extra rest.  · Take an over-the-counter pain medicine, such as acetaminophen (Tylenol), ibuprofen (Advil, Motrin), or naproxen (Aleve) to reduce fever and relieve body aches. Read and follow all instructions on the label. · Do not take two or more pain medicines at the same time unless the doctor told you to. Many pain medicines have acetaminophen, which is Tylenol. Too much acetaminophen (Tylenol) can be harmful. · Take an over-the-counter cough medicine that contains dextromethorphan to help quiet a dry, hacking cough so that you can sleep. Avoid cough medicines that have more than one active ingredient. Read and follow all instructions on the label. · Breathe moist air from a humidifier, hot shower, or sink filled with hot water. The heat and moisture will thin mucus so you can cough it out. · Do not smoke. Smoking can make bronchitis worse. If you need help quitting, talk to your doctor about stop-smoking programs and medicines. These can increase your chances of quitting for good. When should you call for help? Call 911 anytime you think you may need emergency care. For example, call if:    · You have severe trouble breathing.    Call your doctor now or seek immediate medical care if:    · You have new or worse trouble breathing.     · You cough up dark brown or bloody mucus (sputum).     · You have a new or higher fever.     · You have a new rash.    Watch closely for changes in your health, and be sure to contact your doctor if:    · You cough more deeply or more often, especially if you notice more mucus or a change in the color of your mucus.     · You are not getting better as expected. Where can you learn more? Go to https://Joome.OLED-T. org and sign in to your [facility] account. Enter H333 in the [employer]le[employer] box to learn more about \"Bronchitis: Care Instructions. \"     If you do not have an account, please click on the \"Sign Up Now\" link. Current as of: December 6, 2017  Content Version: 11.7  © 4865-4167 [employer].  Care instructions adapted under license by [facility] ([facility]). If you have questions about a medical condition or this instruction, always ask your healthcare professional. [address] any warranty or liability for your use of this information. Patient Education        Chronic Obstructive Pulmonary Disease (COPD): Care Instructions  Your Care Instructions    Chronic obstructive pulmonary disease (COPD) is a general term for a group of lung diseases, including emphysema and chronic bronchitis. People with COPD have decreased airflow in and out of the lungs, which makes it hard to breathe. The airways also can get clogged with thick mucus. Cigarette smoking is a major cause of COPD. Although there is no cure for COPD, you can slow its progress. Following your treatment plan and taking care of yourself can help you feel better and live longer. Follow-up care is a key part of your treatment and safety. Be sure to make and go to all appointments, and call your doctor if you are having problems. It's also a good idea to know your test results and keep a list of the medicines you take. How can you care for yourself at home?   Staying healthy    · Do not smoke. This is the most important step you can take to prevent more damage to your lungs. If you need help quitting, talk to your doctor about stop-smoking programs and medicines. These can increase your chances of quitting for good.     · Avoid colds and flu. Get a pneumococcal vaccine shot. If you have had one before, ask your doctor whether you need a second dose. Get the flu vaccine every fall. If you must be around people with colds or the flu, wash your hands often.     · Avoid secondhand smoke, air pollution, and high altitudes. Also avoid cold, dry air and hot, humid air. Stay at home with your windows closed when air pollution is bad.    Medicines and oxygen therapy    · Take your medicines exactly as prescribed.  Call your doctor if you think you are having a problem with your medicine.     · You may be taking medicines such as:  ¨ Bronchodilators. These help open your airways and make breathing easier. Bronchodilators are either short-acting (work for 6 to 9 hours) or long-acting (work for 24 hours). You inhale most bronchodilators, so they start to act quickly. Always carry your quick-relief inhaler with you in case you need it while you are away from home. ¨ Corticosteroids (prednisone, budesonide). These reduce airway inflammation. They come in pill or inhaled form. You must take these medicines every day for them to work well.     · A spacer may help you get more inhaled medicine to your lungs. Ask your doctor or pharmacist if a spacer is right for you. If it is, ask how to use it properly.     · Do not take any vitamins, over-the-counter medicine, or herbal products without talking to your doctor first.     · If your doctor prescribed antibiotics, take them as directed. Do not stop taking them just because you feel better. You need to take the full course of antibiotics.     · Oxygen therapy boosts the amount of oxygen in your blood and helps you breathe easier. Use the flow rate your doctor has recommended, and do not change it without talking to your doctor first.   Activity    · Get regular exercise. Walking is an easy way to get exercise. Start out slowly, and walk a little more each day.     · Pay attention to your breathing. You are exercising too hard if you cannot talk while you are exercising.     · Take short rest breaks when doing household chores and other activities.     · Learn breathing methods-such as breathing through pursed lips-to help you become less short of breath.     · If your doctor has not set you up with a pulmonary rehabilitation program, talk to him or her about whether rehab is right for you. Rehab includes exercise programs, education about your disease and how to manage it, help with diet and other changes, and emotional support.    Diet    · Eat regular, healthy meals. Use bronchodilators about 1 hour before you eat to make it easier to eat. Eat several small meals instead of three large ones. Drink beverages at the end of the meal. Avoid foods that are hard to chew.     · Eat foods that contain protein so that you do not lose muscle mass.     · Talk with your doctor if you gain too much weight or if you lose weight without trying.    Mental health    · Talk to your family, friends, or a therapist about your feelings. It is normal to feel frightened, angry, hopeless, helpless, and even guilty. Talking openly about bad feelings can help you cope. If these feelings last, talk to your doctor. When should you call for help? Call 911 anytime you think you may need emergency care. For example, call if:    · You have severe trouble breathing.    Call your doctor now or seek immediate medical care if:    · You have new or worse trouble breathing.     · You cough up blood.     · You have a fever.    Watch closely for changes in your health, and be sure to contact your doctor if:    · You cough more deeply or more often, especially if you notice more mucus or a change in the color of your mucus.     · You have new or worse swelling in your legs or belly.     · You are not getting better as expected. Where can you learn more? Go to https://Cardiosonic.Beatpacking. org and sign in to your [employer] account. Enter O421 in the [facility] box to learn more about \"Chronic Obstructive Pulmonary Disease (COPD): Care Instructions. \"     If you do not have an account, please click on the \"Sign Up Now\" link. Current as of: December 6, 2017  Content Version: 11.7  © 8683-8190 [employer]. Care instructions adapted under license by [facility] ([facility]).  If you have questions about a medical condition or this instruction, always ask your healthcare professional. [address]  any warranty or liability for your use of this information. Controlled Substances Monitoring:  Reviewed            Additional Instructions: As always, patient is advised to bring in medication bottles in order to correctly reconcile with our current list.    Vy Harris received counseling on the following healthy behaviors: n/a    Patient given educational materials on dx    I have instructed Jeny to complete a self tracking handout on n/a and instructed them to bring it with them to her next appointment. Discussed use, benefit, and side effects of prescribed medications. Barriers to medication compliance addressed. All patient questions answered. Pt voiced understanding.      Jeff Mendoza, NAVEEN

## 2018-08-16 NOTE — PATIENT INSTRUCTIONS
or worse trouble breathing.     · You cough up blood.     · You have a fever.    Watch closely for changes in your health, and be sure to contact your doctor if:    · You cough more deeply or more often, especially if you notice more mucus or a change in the color of your mucus.     · You have new or worse swelling in your legs or belly.     · You are not getting better as expected. Where can you learn more? Go to [email or web address] and sign in to your [employer] account. Enter Y380 in the [employer] box to learn more about \"Chronic Obstructive Pulmonary Disease (COPD): Care Instructions. \"     If you do not have an account, please click on the \"Sign Up Now\" link. Current as of: December 6, 2017  Content Version: 11.7  © 6289-9740 [employer]. Care instructions adapted under license by [facility] [place] ([facility] [place]). If you have questions about a medical condition or this instruction, always ask your healthcare professional. [place] [address] any warranty or liability for your use of this information.

## 2018-08-17 ENCOUNTER — TELEPHONE (OUTPATIENT)
Dept: PRIMARY CARE CLINIC | Age: 73
End: 2018-08-17

## 2018-08-17 NOTE — TELEPHONE ENCOUNTER
----- Message from NAVEEN Hawkins sent at 8/17/2018  8:06 AM CDT -----  Please notify patient the following:  Thyroid is within normal limits  CMP: Electrolytes are within normal limits except for an elevated potassium. Is the patient taking a potassium supplement? I do not see one listed on her medication list. If the patient taking a multivitamin? If so I recommend the patient hold any over-the-counter potassium, recommend increased water consumption, and recommend repeat BMP in 1 week. Kidney function is stable. Liver function is within normal limits  CBC: White blood cell count, infection fighting cells, is within normal limits. Hemoglobin and hematocrit, oxygen caring cells, are low but are actually improved from 2 months ago. The size of your red blood cells are a little enlarged. Recommend checking a vitamin B12 and folate. Also recommend checking an iron and ferritin due to the low H&H.  In July patient performed a fit test that was negative for blood loss

## 2018-09-18 ENCOUNTER — TELEPHONE (OUTPATIENT)
Dept: PRIMARY CARE CLINIC | Age: 73
End: 2018-09-18

## 2018-09-18 ENCOUNTER — OFFICE VISIT (OUTPATIENT)
Dept: PRIMARY CARE CLINIC | Age: 73
End: 2018-09-18
Payer: MEDICARE

## 2018-09-18 VITALS
WEIGHT: 129 LBS | OXYGEN SATURATION: 95 % | TEMPERATURE: 97.6 F | HEIGHT: 62 IN | BODY MASS INDEX: 23.74 KG/M2 | DIASTOLIC BLOOD PRESSURE: 93 MMHG | SYSTOLIC BLOOD PRESSURE: 157 MMHG | HEART RATE: 90 BPM

## 2018-09-18 DIAGNOSIS — F32.9 REACTIVE DEPRESSION: ICD-10-CM

## 2018-09-18 DIAGNOSIS — N39.45 CONTINUOUS LEAKAGE OF URINE: ICD-10-CM

## 2018-09-18 DIAGNOSIS — I50.9 CONGESTIVE HEART FAILURE, UNSPECIFIED HF CHRONICITY, UNSPECIFIED HEART FAILURE TYPE (HCC): ICD-10-CM

## 2018-09-18 DIAGNOSIS — R53.82 CHRONIC FATIGUE: Primary | ICD-10-CM

## 2018-09-18 DIAGNOSIS — F41.1 GAD (GENERALIZED ANXIETY DISORDER): ICD-10-CM

## 2018-09-18 DIAGNOSIS — G47.01 INSOMNIA DUE TO MEDICAL CONDITION: ICD-10-CM

## 2018-09-18 DIAGNOSIS — I10 ESSENTIAL HYPERTENSION: ICD-10-CM

## 2018-09-18 DIAGNOSIS — R53.82 CHRONIC FATIGUE: ICD-10-CM

## 2018-09-18 DIAGNOSIS — J44.9 CHRONIC OBSTRUCTIVE PULMONARY DISEASE, UNSPECIFIED COPD TYPE (HCC): ICD-10-CM

## 2018-09-18 LAB
ALBUMIN SERPL-MCNC: 4.2 G/DL (ref 3.5–5.2)
ALP BLD-CCNC: 65 U/L (ref 35–104)
ALT SERPL-CCNC: 6 U/L (ref 5–33)
ANION GAP SERPL CALCULATED.3IONS-SCNC: 15 MMOL/L (ref 7–19)
AST SERPL-CCNC: 14 U/L (ref 5–32)
BASOPHILS ABSOLUTE: 0 K/UL (ref 0–0.2)
BASOPHILS RELATIVE PERCENT: 0.6 % (ref 0–1)
BILIRUB SERPL-MCNC: <0.2 MG/DL (ref 0.2–1.2)
BUN BLDV-MCNC: 15 MG/DL (ref 8–23)
CALCIUM SERPL-MCNC: 9.5 MG/DL (ref 8.8–10.2)
CHLORIDE BLD-SCNC: 93 MMOL/L (ref 98–111)
CO2: 23 MMOL/L (ref 22–29)
CREAT SERPL-MCNC: 0.8 MG/DL (ref 0.5–0.9)
EOSINOPHILS ABSOLUTE: 0.1 K/UL (ref 0–0.6)
EOSINOPHILS RELATIVE PERCENT: 1.7 % (ref 0–5)
GFR NON-AFRICAN AMERICAN: >60
GLUCOSE BLD-MCNC: 82 MG/DL (ref 74–109)
HCT VFR BLD CALC: 36 % (ref 37–47)
HEMOGLOBIN: 10.9 G/DL (ref 12–16)
LYMPHOCYTES ABSOLUTE: 1.7 K/UL (ref 1.1–4.5)
LYMPHOCYTES RELATIVE PERCENT: 23.1 % (ref 20–40)
MCH RBC QN AUTO: 29.1 PG (ref 27–31)
MCHC RBC AUTO-ENTMCNC: 30.3 G/DL (ref 33–37)
MCV RBC AUTO: 96.3 FL (ref 81–99)
MONOCYTES ABSOLUTE: 0.5 K/UL (ref 0–0.9)
MONOCYTES RELATIVE PERCENT: 7.3 % (ref 0–10)
NEUTROPHILS ABSOLUTE: 4.8 K/UL (ref 1.5–7.5)
NEUTROPHILS RELATIVE PERCENT: 66.9 % (ref 50–65)
PDW BLD-RTO: 14.9 % (ref 11.5–14.5)
PLATELET # BLD: 283 K/UL (ref 130–400)
PMV BLD AUTO: 9.9 FL (ref 9.4–12.3)
POTASSIUM SERPL-SCNC: 4.8 MMOL/L (ref 3.5–5)
RBC # BLD: 3.74 M/UL (ref 4.2–5.4)
SODIUM BLD-SCNC: 131 MMOL/L (ref 136–145)
TOTAL PROTEIN: 7.3 G/DL (ref 6.6–8.7)
TSH SERPL DL<=0.05 MIU/L-ACNC: 1.39 UIU/ML (ref 0.27–4.2)
WBC # BLD: 7.1 K/UL (ref 4.8–10.8)

## 2018-09-18 PROCEDURE — 1090F PRES/ABSN URINE INCON ASSESS: CPT | Performed by: NURSE PRACTITIONER

## 2018-09-18 PROCEDURE — G8599 NO ASA/ANTIPLAT THER USE RNG: HCPCS | Performed by: NURSE PRACTITIONER

## 2018-09-18 PROCEDURE — 0509F URINE INCON PLAN DOCD: CPT | Performed by: NURSE PRACTITIONER

## 2018-09-18 PROCEDURE — 3017F COLORECTAL CA SCREEN DOC REV: CPT | Performed by: NURSE PRACTITIONER

## 2018-09-18 PROCEDURE — 99213 OFFICE O/P EST LOW 20 MIN: CPT | Performed by: NURSE PRACTITIONER

## 2018-09-18 PROCEDURE — 3023F SPIROM DOC REV: CPT | Performed by: NURSE PRACTITIONER

## 2018-09-18 PROCEDURE — 4040F PNEUMOC VAC/ADMIN/RCVD: CPT | Performed by: NURSE PRACTITIONER

## 2018-09-18 PROCEDURE — G8420 CALC BMI NORM PARAMETERS: HCPCS | Performed by: NURSE PRACTITIONER

## 2018-09-18 PROCEDURE — 1123F ACP DISCUSS/DSCN MKR DOCD: CPT | Performed by: NURSE PRACTITIONER

## 2018-09-18 PROCEDURE — G8399 PT W/DXA RESULTS DOCUMENT: HCPCS | Performed by: NURSE PRACTITIONER

## 2018-09-18 PROCEDURE — 4004F PT TOBACCO SCREEN RCVD TLK: CPT | Performed by: NURSE PRACTITIONER

## 2018-09-18 PROCEDURE — 1101F PT FALLS ASSESS-DOCD LE1/YR: CPT | Performed by: NURSE PRACTITIONER

## 2018-09-18 PROCEDURE — G8926 SPIRO NO PERF OR DOC: HCPCS | Performed by: NURSE PRACTITIONER

## 2018-09-18 PROCEDURE — G8427 DOCREV CUR MEDS BY ELIG CLIN: HCPCS | Performed by: NURSE PRACTITIONER

## 2018-09-18 RX ORDER — CLONAZEPAM 0.5 MG/1
0.5 TABLET ORAL 3 TIMES DAILY PRN
Qty: 75 TABLET | Refills: 0 | Status: SHIPPED | OUTPATIENT
Start: 2018-09-18 | End: 2018-10-18 | Stop reason: SDUPTHER

## 2018-09-18 RX ORDER — TOLTERODINE 4 MG/1
4 CAPSULE, EXTENDED RELEASE ORAL DAILY
Qty: 30 CAPSULE | Refills: 5 | Status: SHIPPED | OUTPATIENT
Start: 2018-09-18 | End: 2018-12-18

## 2018-09-18 RX ORDER — DULOXETIN HYDROCHLORIDE 60 MG/1
60 CAPSULE, DELAYED RELEASE ORAL 2 TIMES DAILY
Qty: 180 CAPSULE | Refills: 3 | Status: SHIPPED | OUTPATIENT
Start: 2018-09-18 | End: 2019-10-04 | Stop reason: SDUPTHER

## 2018-09-18 RX ORDER — CARVEDILOL 3.12 MG/1
3.12 TABLET ORAL 2 TIMES DAILY WITH MEALS
Qty: 60 TABLET | Refills: 5 | Status: SHIPPED | OUTPATIENT
Start: 2018-09-18 | End: 2019-05-09 | Stop reason: SDUPTHER

## 2018-09-18 ASSESSMENT — ENCOUNTER SYMPTOMS
BACK PAIN: 1
CONSTIPATION: 0
SHORTNESS OF BREATH: 0
VOMITING: 0
RHINORRHEA: 0
COUGH: 1
DIARRHEA: 0
SORE THROAT: 0
WHEEZING: 0
SINUS PRESSURE: 0

## 2018-09-18 NOTE — PATIENT INSTRUCTIONS
Patient Education          duloxetine  Pronunciation:  hardik SAGE 25 e teen  Brand:  Cortes What[employer] Saba  What is the most important information I should know about duloxetine? Do not take duloxetine within 5 days before or 14 days after you have used an MAO inhibitor, such as isocarboxazid, linezolid, methylene blue injection, phenelzine, rasagiline, selegiline, or tranylcypromine. Some young people have thoughts about suicide when first taking an antidepressant. Stay alert to changes in your mood or symptoms. Report any new or worsening symptoms to your doctor. Do not stop using duloxetine without first talking to your doctor. What is duloxetine? Duloxetine is a selective serotonin and norepinephrine reuptake inhibitor antidepressant (SSNRI). Duloxetine affects chemicals in the brain that may be unbalanced in people with depression. Duloxetine is used to treat major depressive disorder in adults. Duloxetine is also used to treat general anxiety disorder in adults and children who are at least 9years old. Duloxetine is also used in adults to treat fibromyalgia (a chronic pain disorder), or chronic muscle or joint pain (such as low back pain and osteoarthritis pain). Duloxetine is also used to treat pain caused by nerve damage in adults with diabetes (diabetic neuropathy). Duloxetine may also be used for purposes not listed in this medication guide. What should I discuss with my healthcare provider before taking duloxetine? You should not use duloxetine if you are allergic to it. Do not take duloxetine within 5 days before or 14 days after you have used an MAO inhibitor, such as isocarboxazid, linezolid, methylene blue injection, phenelzine, rasagiline, selegiline, or tranylcypromine. A dangerous drug interaction could occur. Some medicines can interact with duloxetine and cause a serious condition called serotonin syndrome.  Be sure your doctor knows if you also take stimulant medicine, opioid medicine, herbal products, or medicine for depression, mental illness, Parkinson's disease, migraine headaches, serious infections, or prevention of nausea and vomiting. Ask your doctor before making any changes in how or when you take your medications. To make sure duloxetine is safe for you, tell your doctor if you have ever had:  · liver or kidney disease;  · seizures or epilepsy;  · a bleeding or blood clotting disorder;  · high blood pressure;  · narrow-angle glaucoma;  · bipolar disorder (manic depression); or  · drug addiction or suicidal thoughts. Some young people have thoughts about suicide when first taking an antidepressant. Your doctor will need to check your progress at regular visits while you are using duloxetine. Your family or other caregivers should also be alert to changes in your mood or symptoms. It is not known whether duloxetine will harm an unborn baby. However, duloxetine may cause problems in a  if you take the medicine during the third trimester of pregnancy. Tell your doctor if you are pregnant or plan to become pregnant while using this medicine. If you are pregnant, your name may be listed on a pregnancy registry. This is to track the outcome of the pregnancy and to evaluate any effects of duloxetine on the baby. Duloxetine can pass into breast milk, but effects on the nursing baby are not known. Tell your doctor if you are breast-feeding. Duloxetine is not approved for use by anyone younger than 25years old. How should I take duloxetine? Follow all directions on your prescription label. Do not take this medicine in larger or smaller amounts or for longer than recommended. You may take duloxetine with or without food. Do not crush, chew, break, or open an extended-release capsule. Swallow it whole. It may take 1 to 4 weeks before your symptoms improve. Keep using the medication as directed. Do not stop using duloxetine without first talking to your doctor.   You may have

## 2018-09-18 NOTE — PROGRESS NOTES
[address]  [place], [address] [place] [address] Rd  Phone [phone]   Fax [phone]      OFFICE VISIT: 2018    Michel Carrier- : 1945    Chief Complaint:  Papo Worley is a 68 y.o. female who is here for 1 Month Follow-Up; Medication Refill; Fatigue (\" I've been so weak, I feel like my body is shutting down\"); and Discuss Medications (depression )     HPI  The patient presents today for monthly follow-up. FATIGUE:  \"All I have been doing is lying in bed. \"  She has moved out from her daughter's house and she is now living with her granddaughter. \"Cassie told me that no body loved me and none of the family cared about me. \"  \"I feel like my body is shutting down. \"    DEPRESSION:  She has recently had to move. \"I am out of all my medications. \"  \"I have not had a way to the pharmacy. \"  She stopped her Cymbalta 60 mg BID suddenly. ANXIETY:  She still has a few Klonopin. She is needing a refill on this medication. This helps calm her down. She will be due for refill on 2018. She has cut down on her smoking. She is needing a refill on some of her medications. Pain management \"flushed\" her port. height is 5' 2\" (1.575 m) and weight is 129 lb (58.5 kg). Her temporal temperature is 97.6 °F (36.4 °C). Her blood pressure is 157/93 (abnormal) and her pulse is 90. Her oxygen saturation is 95%. Body mass index is 23.59 kg/m².     Results for orders placed or performed in visit on 18   CBC Auto Differential   Result Value Ref Range    WBC 6.0 4.8 - 10.8 K/uL    RBC 3.29 (L) 4.20 - 5.40 M/uL    Hemoglobin 9.8 (L) 12.0 - 16.0 g/dL    Hematocrit 32.9 (L) 37.0 - 47.0 %    .0 (H) 81.0 - 99.0 fL    MCH 29.8 27.0 - 31.0 pg    MCHC 29.8 (L) 33.0 - 37.0 g/dL    RDW 16.0 (H) 11.5 - 14.5 %    Platelets 637 173 - 418 K/uL    MPV 10.1 9.4 - 12.3 fL    Neutrophils % 65.1 (H) 50.0 - 65.0 %    Lymphocytes % 19.7 (L) 20.0 - 40.0 %    Monocytes % 11.2 (H) 0.0 - 10.0 %    Eosinophils % 3.2 0.0 - 5.0 % Basophils % 0.5 0.0 - 1.0 %    Neutrophils # 3.9 1.5 - 7.5 K/uL    Lymphocytes # 1.2 1.1 - 4.5 K/uL    Monocytes # 0.70 0.00 - 0.90 K/uL    Eosinophils # 0.20 0.00 - 0.60 K/uL    Basophils # 0.00 0.00 - 0.20 K/uL   T4, Free   Result Value Ref Range    T4 Free 0.9 0.9 - 1.7 ng/dL   TSH without Reflex   Result Value Ref Range    TSH 1.570 0.270 - 4.200 uIU/mL   Comprehensive Metabolic Panel   Result Value Ref Range    Sodium 136 136 - 145 mmol/L    Potassium 5.4 (H) 3.5 - 5.0 mmol/L    Chloride 101 98 - 111 mmol/L    CO2 25 22 - 29 mmol/L    Anion Gap 10 7 - 19 mmol/L    Glucose 81 74 - 109 mg/dL    BUN 22 8 - 23 mg/dL    CREATININE 1.0 (H) 0.5 - 0.9 mg/dL    GFR Non-African American 54 (A) >60    Calcium 9.8 8.8 - 10.2 mg/dL    Total Protein 7.1 6.6 - 8.7 g/dL    Alb 4.0 3.5 - 5.2 g/dL    Total Bilirubin <0.2 0.2 - 1.2 mg/dL    Alkaline Phosphatase 77 35 - 104 U/L    ALT 8 5 - 33 U/L    AST 14 5 - 32 U/L       I have reviewed the following with the Ms. Mcguire   Lab Review   Orders Only on 08/16/2018   Component Date Value    WBC 08/16/2018 6.0     RBC 08/16/2018 3.29*    Hemoglobin 08/16/2018 9.8*    Hematocrit 08/16/2018 32.9*    MCV 08/16/2018 100.0*    MCH 08/16/2018 29.8     MCHC 08/16/2018 29.8*    RDW 08/16/2018 16.0*    Platelets 84/08/4779 226     MPV 08/16/2018 10.1     Neutrophils % 08/16/2018 65.1*    Lymphocytes % 08/16/2018 19.7*    Monocytes % 08/16/2018 11.2*    Eosinophils % 08/16/2018 3.2     Basophils % 08/16/2018 0.5     Neutrophils # 08/16/2018 3.9     Lymphocytes # 08/16/2018 1.2     Monocytes # 08/16/2018 0.70     Eosinophils # 08/16/2018 0.20     Basophils # 08/16/2018 0.00     T4 Free 08/16/2018 0.9     TSH 08/16/2018 1.570     Sodium 08/16/2018 136     Potassium 08/16/2018 5.4*    Chloride 08/16/2018 101     CO2 08/16/2018 25     Anion Gap 08/16/2018 10     Glucose 08/16/2018 81     BUN 08/16/2018 22     CREATININE 08/16/2018 1.0*    GFR Non- 1 tablet in morning and 1/2 tablet in evening Yes NAVEEN Dior   ondansetron (ZOFRAN) 4 MG tablet Take 1 tablet by mouth daily as needed for Nausea or Vomiting Yes NAVEEN Dior   memantine (NAMENDA) 10 MG tablet Take 1 tablet by mouth 2 times daily Yes NAVEEN Dior   gabapentin (NEURONTIN) 300 MG capsule Take 300 mg by mouth 3 times daily Yes Historical Provider, MD   promethazine (PHENERGAN) 25 MG tablet Take 25 mg by mouth Yes Historical Provider, MD   Calcium Carb-Cholecalciferol (CALCIUM 600 + D PO) Take by mouth Yes Historical Provider, MD   azelastine (ASTELIN) 0.1 % nasal spray 2 sprays by Nasal route 2 times daily Use in each nostril as directed Yes NAVEEN Dior   fluticasone (FLONASE) 50 MCG/ACT nasal spray 1 spray by Nasal route daily Yes NAVEEN Dior   vitamin E 400 UNIT capsule Take 400 Units by mouth daily Yes Historical Provider, MD   theophylline CR, 12 hour, (THEOPHYLLINE CR, 12 HOUR,) 300 MG CR tablet Take 300 mg by mouth daily.    Yes Historical Provider, MD       Allergies: Codeine and Sulfa antibiotics    Past Medical History:   Diagnosis Date    Abdominal pain     Anxiety     Arthritis     CAD in native artery     Cerebrovascular disease     [facility]estive heart failure) [facility])     Constipation     COPD (chronic obstructive pulmonary disease) ([facility])     Depression     Emphysema     GERD (gastroesophageal reflux disease)     Myocardial infarct, old     Pneumonia     Tobacco abuse        Past Surgical History:   Procedure Laterality Date    ABDOMINAL EXPLORATION SURGERY      APPENDECTOMY      CARDIAC CATHETERIZATION  06/25/10    patent  stent noted in LAD,EF is estimated to be 60%    CARDIAC CATHETERIZATION  7/13/12  MDL    EF  60%    CHOLECYSTECTOMY      COLONOSCOPY  10/2014    Dr Larson [place] ENDOSCOPY  10/1/14    Dr Cheng Brush: food bezoar; esophagitis, gastritis, duodenitis; biopsies neg h-pylori       Social History   Substance Use Topics    Smoking status: Current Every Day Smoker     Packs/day: 1.00     Years: 36.00     Types: Cigarettes    Smokeless tobacco: Never Used    Alcohol use No       Review of Systems   Constitutional: Positive for fatigue. Negative for chills and fever. HENT: Negative for congestion, ear pain, postnasal drip, rhinorrhea, sinus pressure and sore throat. Respiratory: Positive for cough. Negative for shortness of breath and wheezing. Cardiovascular: Negative for chest pain. Gastrointestinal: Negative for constipation, diarrhea and vomiting. Musculoskeletal: Positive for arthralgias and back pain. Skin: Negative for rash. Neurological: Negative for dizziness and headaches. Psychiatric/Behavioral: Positive for sleep disturbance (improved on current regimen). The patient is nervous/anxious (worse currently due to circumstances). Physical Exam   Constitutional: She appears well-developed. HENT:   Head: Normocephalic. Right Ear: Hearing, tympanic membrane and external ear normal.   Left Ear: Hearing, tympanic membrane and external ear normal.   Nose: Nose normal. No mucosal edema or rhinorrhea. Mouth/Throat: Oropharynx is clear and moist. No posterior oropharyngeal erythema. Eyes: Right conjunctiva is not injected. Right conjunctiva has no hemorrhage. Neck: Normal range of motion. Carotid bruit is not present. Cardiovascular: Normal rate and regular rhythm. Pulmonary/Chest: Effort normal. No respiratory distress. She has decreased breath sounds in the right lower field and the left lower field. She has no wheezes. She has no rhonchi. She has no rales. Abdominal: Soft. Bowel sounds are normal.   Musculoskeletal:        Lumbar back: She exhibits tenderness and pain. Neurological: She is alert. Skin: Skin is dry. No rash noted. Psychiatric: She has a normal mood and affect.  Her behavior is normal. Judgment and thought content normal.   Vitals reviewed. ASSESSMENT      ICD-10-CM ICD-9-CM    1. Chronic fatigue R53.82 780.79 CBC Auto Differential      Comprehensive Metabolic Panel      TSH without Reflex   2. HAILEY (generalized anxiety disorder) F41.1 300.02 clonazePAM (KLONOPIN) 0.5 MG tablet      DULoxetine (CYMBALTA) 60 MG extended release capsule   3. 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 Essential hypertension I10 401.9 carvedilol (COREG) 3.125 MG tablet   5. Chronic obstructive pulmonary disease, unspecified COPD type ([facility]) J44.9 496 umeclidinium-vilanterol (ANORO ELLIPTA) 62.5-25 MCG/INH AEPB inhaler   6. Continuous leakage of urine N39.45 788.37 tolterodine (DETROL LA) 4 MG extended release capsule   7. Reactive depression F32.9 300.4 DULoxetine (CYMBALTA) 60 MG extended release capsule  I feel some of her symptoms are secondary to suddenly stopping all of her medications. The patient reports that she did not have a ride to the pharmacy. Her depression has been significantly worse however she stopped Cymbalta 60 mg twice daily abruptly. 8. Congestive heart failure, unspecified HF chronicity, unspecified heart failure type ([facility] [address] I50.9 428.0 carvedilol (COREG) 3.125 MG tablet         PLAN    Orders Placed This Encounter   Procedures    CBC Auto Differential    Comprehensive Metabolic Panel    TSH without Reflex        Return in about 1 month (around 10/18/2018), or if symptoms worsen or fail to improve. Patient Instructions     Patient Education          duloxetine  Pronunciation:  hardik SAGE 25 e teen  Brand:  [employer]  What is the most important information I should know about duloxetine? Do not take duloxetine within 5 days before or 14 days after you have used an MAO inhibitor, such as isocarboxazid, linezolid, methylene blue injection, phenelzine, rasagiline, selegiline, or tranylcypromine. Some young people have thoughts about suicide when first taking an antidepressant.  Stay alert to changes in your mood or symptoms. Report any new or worsening symptoms to your doctor. Do not stop using duloxetine without first talking to your doctor. What is duloxetine? Duloxetine is a selective serotonin and norepinephrine reuptake inhibitor antidepressant (SSNRI). Duloxetine affects chemicals in the brain that may be unbalanced in people with depression. Duloxetine is used to treat major depressive disorder in adults. Duloxetine is also used to treat general anxiety disorder in adults and children who are at least 9years old. Duloxetine is also used in adults to treat fibromyalgia (a chronic pain disorder), or chronic muscle or joint pain (such as low back pain and osteoarthritis pain). Duloxetine is also used to treat pain caused by nerve damage in adults with diabetes (diabetic neuropathy). Duloxetine may also be used for purposes not listed in this medication guide. What should I discuss with my healthcare provider before taking duloxetine? You should not use duloxetine if you are allergic to it. Do not take duloxetine within 5 days before or 14 days after you have used an MAO inhibitor, such as isocarboxazid, linezolid, methylene blue injection, phenelzine, rasagiline, selegiline, or tranylcypromine. A dangerous drug interaction could occur. Some medicines can interact with duloxetine and cause a serious condition called serotonin syndrome. Be sure your doctor knows if you also take stimulant medicine, opioid medicine, herbal products, or medicine for depression, mental illness, Parkinson's disease, migraine headaches, serious infections, or prevention of nausea and vomiting. Ask your doctor before making any changes in how or when you take your medications.    To make sure duloxetine is safe for you, tell your doctor if you have ever had:  · liver or kidney disease;  · seizures or epilepsy;  · a bleeding or blood clotting disorder;  · high blood pressure;  · narrow-angle

## 2018-09-19 ENCOUNTER — TELEPHONE (OUTPATIENT)
Dept: PRIMARY CARE CLINIC | Age: 73
End: 2018-09-19

## 2018-09-19 DIAGNOSIS — R41.3 MEMORY LOSS: ICD-10-CM

## 2018-09-19 RX ORDER — MEMANTINE HYDROCHLORIDE 10 MG/1
10 TABLET ORAL 2 TIMES DAILY
Qty: 60 TABLET | Refills: 11 | Status: SHIPPED | OUTPATIENT
Start: 2018-09-19 | End: 2019-10-04 | Stop reason: SDUPTHER

## 2018-10-08 ENCOUNTER — OFFICE VISIT (OUTPATIENT)
Dept: UROLOGY | Facility: CLINIC | Age: 73
End: 2018-10-08

## 2018-10-08 VITALS
DIASTOLIC BLOOD PRESSURE: 66 MMHG | WEIGHT: 115 LBS | SYSTOLIC BLOOD PRESSURE: 128 MMHG | HEIGHT: 62 IN | BODY MASS INDEX: 21.16 KG/M2

## 2018-10-08 DIAGNOSIS — N39.41 URGE INCONTINENCE: ICD-10-CM

## 2018-10-08 DIAGNOSIS — R31.0 GROSS HEMATURIA: ICD-10-CM

## 2018-10-08 DIAGNOSIS — R31.0 GROSS HEMATURIA: Primary | ICD-10-CM

## 2018-10-08 DIAGNOSIS — R35.0 FREQUENCY OF URINATION: ICD-10-CM

## 2018-10-08 DIAGNOSIS — N39.46 MIXED STRESS AND URGE URINARY INCONTINENCE: Primary | ICD-10-CM

## 2018-10-08 LAB
BILIRUB BLD-MCNC: NEGATIVE MG/DL
CLARITY, POC: CLEAR
COLOR UR: YELLOW
GLUCOSE UR STRIP-MCNC: NEGATIVE MG/DL
KETONES UR QL: NEGATIVE
LEUKOCYTE EST, POC: NEGATIVE
NITRITE UR-MCNC: NEGATIVE MG/ML
PH UR: 6 [PH] (ref 5–8)
PROT UR STRIP-MCNC: NEGATIVE MG/DL
RBC # UR STRIP: ABNORMAL /UL
SP GR UR: 1.01 (ref 1–1.03)
UROBILINOGEN UR QL: NORMAL

## 2018-10-08 PROCEDURE — 51798 US URINE CAPACITY MEASURE: CPT | Performed by: UROLOGY

## 2018-10-08 PROCEDURE — 81001 URINALYSIS AUTO W/SCOPE: CPT | Performed by: UROLOGY

## 2018-10-08 PROCEDURE — 99204 OFFICE O/P NEW MOD 45 MIN: CPT | Performed by: UROLOGY

## 2018-10-08 NOTE — PROGRESS NOTES
Subjective    Ms. Obrien is 73 y.o. female    CHIEF COMPLAINT: Incontinence    The patient has a several month year history of incontinence on close questioning this appears to be urge incontinence she also has frequency and nocturia she cannot make it to the bathroom she is not been on any medications for this before she has had a previous hysterectomy otherwise no  surgeries        She does not have a significant amount of stress incontinence according to the patient and maybe a little bit but most of its of frequency urgency and urge incontinence.    She also has a history of gross hematuria is been intermittent the last time she saw this was about a month ago she does smoke significantly she has not had any associated symptoms along with this      History of Present Illness      The following portions of the patient's history were reviewed and updated as appropriate: allergies, current medications, past family history, past medical history, past social history, past surgical history and problem list.    Review of Systems   Constitutional: Negative.  Negative for appetite change, diaphoresis and fever.   HENT: Negative.  Negative for facial swelling and sore throat.    Eyes: Negative.  Negative for discharge and visual disturbance.   Respiratory: Negative.  Negative for cough and shortness of breath.    Cardiovascular: Negative.  Negative for chest pain and leg swelling.   Gastrointestinal: Negative.  Negative for anal bleeding and vomiting.   Endocrine: Negative.  Negative for cold intolerance and heat intolerance.   Genitourinary: Positive for frequency, hematuria and urgency. Negative for flank pain and pelvic pain.   Musculoskeletal: Negative.  Negative for back pain and gait problem.   Skin: Negative.  Negative for pallor and rash.   Allergic/Immunologic: Negative for food allergies.   Neurological: Negative.  Negative for seizures and headaches.   Hematological: Negative.  Negative for adenopathy. Does  not bruise/bleed easily.   Psychiatric/Behavioral: Negative.  Negative for dysphoric mood, self-injury and suicidal ideas.         Current Outpatient Prescriptions:   •  azelastine (ASTELIN) 0.1 % nasal spray, 2 sprays into each nostril 2 (Two) Times a Day. Use in each nostril as directed, Disp: , Rfl:   •  clonazePAM (KlonoPIN) 0.5 MG tablet, Take 0.5 mg by mouth 2 (Two) Times a Day As Needed for seizures., Disp: , Rfl:   •  dexlansoprazole (DEXILANT) 60 MG capsule, Take 60 mg by mouth Daily., Disp: , Rfl:   •  DULoxetine (CYMBALTA) 60 MG capsule, Take 60 mg by mouth Daily., Disp: , Rfl:   •  fluticasone (FLONASE) 50 MCG/ACT nasal spray, 2 sprays into each nostril Daily., Disp: , Rfl:   •  furosemide (LASIX) 40 MG tablet, Take 40 mg by mouth 2 (Two) Times a Day., Disp: , Rfl:   •  guaiFENesin (MUCINEX) 600 MG 12 hr tablet, Take 1,200 mg by mouth 2 (Two) Times a Day., Disp: , Rfl:   •  HYDROcodone-acetaminophen (NORCO)  MG per tablet, Take 1 tablet by mouth Every 6 (Six) Hours As Needed for Moderate Pain (4-6)., Disp: , Rfl:   •  isosorbide dinitrate (ISORDIL) 30 MG tablet, Take 30 mg by mouth 4 (Four) Times a Day., Disp: , Rfl:   •  memantine (NAMENDA) 5 MG tablet, Take 5 mg by mouth 2 (Two) Times a Day., Disp: , Rfl:   •  mirtazapine (REMERON SOL-TAB) 15 MG disintegrating tablet, Take 15 mg by mouth Every Night., Disp: , Rfl:   •  mupirocin (BACTROBAN) 2 % ointment, Apply  topically 3 (Three) Times a Day., Disp: , Rfl:   •  nitroglycerin (NITROSTAT) 0.4 MG SL tablet, Place 0.4 mg under the tongue Every 5 (Five) Minutes As Needed for chest pain. Take no more than 3 doses in 15 minutes., Disp: , Rfl:   •  Potassium 99 MG tablet, Take  by mouth., Disp: , Rfl:   •  promethazine (PHENERGAN) 25 MG tablet, Take 25 mg by mouth Every 6 (Six) Hours As Needed for nausea or vomiting., Disp: , Rfl:   •  sucralfate (CARAFATE) 1 G tablet, Take 1 g by mouth 4 (Four) Times a Day., Disp: , Rfl:   •  theophylline (THEODUR) 100  "MG 12 hr tablet, Take 100 mg by mouth 2 (Two) Times a Day., Disp: , Rfl:   •  TiZANidine (ZANAFLEX) 4 MG capsule, Take 4 mg by mouth 3 (Three) Times a Day., Disp: , Rfl:   •  vitamin E 400 UNIT capsule, Take 400 Units by mouth Daily., Disp: , Rfl:   •  linaclotide (LINZESS) 290 MCG capsule capsule, Take 290 mcg by mouth Every Morning Before Breakfast., Disp: , Rfl:   •  oxyCODONE-acetaminophen (PERCOCET) 7.5-325 MG per tablet, Take 1 tablet by mouth Every 6 (Six) Hours As Needed., Disp: , Rfl:     Past Medical History:   Diagnosis Date   • Anemia    • Arthritis    • Asthma    • CAD (coronary artery disease)    • CHF (congestive heart failure) (CMS/HCC)    • COPD (chronic obstructive pulmonary disease) (CMS/HCC)    • Depression    • Heart disease    • MI (myocardial infarction) (CMS/[facility])        Past Surgical History:   Procedure Laterality Date   • APPENDECTOMY     • CARDIAC CATHETERIZATION      EF%60   • CHOLECYSTECTOMY     • COLONOSCOPY  04/15/2011   • COLONOSCOPY  10/20/2014    Dr Villalobos   • ENDOSCOPY  10/01/2014    Dr Villalobos   • ENDOSCOPY N/A 7/24/2017    Procedure: ESOPHAGOGASTRODUODENOSCOPY WITH ANESTHESIA;  Surgeon: Delvin Moon DO;  Location: [facility] ENDOSCOPY;  Service:    • EXPLORATORY LAPAROTOMY      abdomen   • HYSTERECTOMY         Social History     Social History   • Marital status: Single     Social History Main Topics   • Smoking status: Current Every Day Smoker     Packs/day: 1.00     Years: 36.00     Types: Cigarettes   • Smokeless tobacco: Never Used   • Alcohol use No   • Drug use: No     Other Topics Concern   • Not on file       Family History   Problem Relation Age of Onset   • Colon polyps Neg Hx    • Esophageal cancer Neg Hx        Objective    /66   Ht 157.5 cm (62\")   Wt 52.2 kg (115 lb)   BMI 21.03 kg/m²     Physical Exam   Constitutional: She is oriented to person, place, and time. She appears well-developed and well-nourished. No distress.   HENT:   Head: Normocephalic and " atraumatic.   Right Ear: Ear canal normal.   Left Ear: Ear canal normal.   Nose: Nose normal. No nasal deformity. No epistaxis.   Mouth/Throat: Mucous membranes are not pale, not dry and not cyanotic. Normal dentition. No oropharyngeal exudate.   Eyes: Conjunctivae are normal. No scleral icterus.   Neck: Trachea normal. No tracheal tenderness present. No tracheal deviation present. No thyroid mass and no thyromegaly present.   Cardiovascular: Normal rate and regular rhythm.    Pulmonary/Chest: Effort normal. No accessory muscle usage. No respiratory distress. She has wheezes. Chest wall is not dull to percussion (No flatness or hyperresonance). She exhibits no mass and no tenderness.   Cough On palpation, no tactile fremitus. All movements are symmetric. No intercostal retraction noted.    Abdominal: Soft. Normal appearance. She exhibits no distension and no mass. There is no hepatosplenomegaly. There is no tenderness. No hernia.   Abdomen is soft there is an upper midline scar consistent with cholecystectomy scar in the lower midline scar consistent with a hysterectomy   Rectal examination or stool specimen is not indicated.    Musculoskeletal:   Normal gait and station. The spine, ribs, and pelvis are examined. No obvious misalignment or asymmetry. ROM is reasonable for age. No instability. No obvious atrophy, flaccidity or spasticity.    Lymphadenopathy:     She has no cervical adenopathy.        Right: No inguinal adenopathy present.        Left: No inguinal adenopathy present.   Neurological: She is alert and oriented to person, place, and time.   Skin: Skin is warm, dry and intact. No lesion and no rash noted. She is not diaphoretic. No cyanosis. No pallor. Nails show no clubbing.   On palpation, there were no induration, subcutaneous nodules, or tightening   Psychiatric: Her speech is normal and behavior is normal. Judgment and thought content normal. Her mood appears not anxious. Her affect is not labile.  She does not exhibit a depressed mood.   Vitals reviewed.        Admission on 07/24/2017, Discharged on 07/24/2017   Component Date Value Ref Range Status   • Urease 07/24/2017 Negative  Negative Final       Results for orders placed or performed in visit on 10/08/18   POC Urinalysis Dipstick, Multipro   Result Value Ref Range    Color Yellow Yellow, Straw, Dark Yellow, Alysa    Clarity, UA Clear Clear    Glucose, UA Negative Negative, 1000 mg/dL (3+) mg/dL    Bilirubin Negative Negative    Ketones, UA Negative Negative    Specific Gravity  1.015 1.005 - 1.030    Blood, UA Trace (A) Negative    pH, Urine 6.0 5.0 - 8.0    Protein, POC Negative Negative mg/dL    Urobilinogen, UA Normal Normal    Nitrite, UA Negative Negative    Leukocytes Negative Negative       Assessment and Plan  Estimation of residual urine via abdominal ultrasound  Residual Urine: 87 ml  Indication: incontinence  Position: Supine  Examination: Incremental scanning of the suprapubic area using 3 MHz transducer using copious amounts of acoustic gel.   Findings: An anechoic area was demonstrated which represented the bladder, with measurement of residual urine as noted. I inspected this myself. In that the residual urine was stable or insignificant, no treatment will be necessary at this time.           Christ was seen today for urinary incontinence.    Diagnoses and all orders for this visit:    Mixed stress and urge urinary incontinence  -     POC Urinalysis Dipstick, Multipro    Urge incontinence    Frequency of urination    Gross hematuria  -     CT Abdomen Pelvis With & Without Contrast; Future    Plan--for incontinence I think this is classic overactive bladder urgency incontinence one ahead and gave her some samples of Toviaz 4 mg take once a day I gave her enough for 1 month's supply.    As far as her  hematuria with that have come in and get a CT urogram and cystoscopy in a month that way we see how she is doing with her medications as  well

## 2018-10-11 ENCOUNTER — TELEPHONE (OUTPATIENT)
Dept: PRIMARY CARE CLINIC | Age: 73
End: 2018-10-11

## 2018-10-11 RX ORDER — ONDANSETRON 4 MG/1
4 TABLET, ORALLY DISINTEGRATING ORAL EVERY 8 HOURS PRN
Qty: 12 TABLET | Refills: 0 | Status: SHIPPED | OUTPATIENT
Start: 2018-10-11 | End: 2018-12-18

## 2018-10-15 ENCOUNTER — TELEPHONE (OUTPATIENT)
Dept: PRIMARY CARE CLINIC | Age: 73
End: 2018-10-15

## 2018-10-15 NOTE — TELEPHONE ENCOUNTER
Daniel with [facility] called to see if pt still needs Prolia before they ship it here. They can not reach pt.

## 2018-10-18 ENCOUNTER — OFFICE VISIT (OUTPATIENT)
Dept: PRIMARY CARE CLINIC | Age: 73
End: 2018-10-18
Payer: MEDICARE

## 2018-10-18 VITALS
DIASTOLIC BLOOD PRESSURE: 88 MMHG | HEIGHT: 62 IN | BODY MASS INDEX: 23.74 KG/M2 | OXYGEN SATURATION: 95 % | WEIGHT: 129 LBS | SYSTOLIC BLOOD PRESSURE: 150 MMHG | TEMPERATURE: 98.1 F | HEART RATE: 71 BPM

## 2018-10-18 DIAGNOSIS — G47.01 INSOMNIA DUE TO MEDICAL CONDITION: ICD-10-CM

## 2018-10-18 DIAGNOSIS — D64.9 LOW HEMATOCRIT: ICD-10-CM

## 2018-10-18 DIAGNOSIS — R71.0 DECREASED HEMOGLOBIN: ICD-10-CM

## 2018-10-18 DIAGNOSIS — I10 ESSENTIAL HYPERTENSION: ICD-10-CM

## 2018-10-18 DIAGNOSIS — Z95.828 PORT-A-CATH IN PLACE: ICD-10-CM

## 2018-10-18 DIAGNOSIS — F41.1 GAD (GENERALIZED ANXIETY DISORDER): Primary | ICD-10-CM

## 2018-10-18 DIAGNOSIS — M79.604 PAIN IN BOTH LOWER EXTREMITIES: ICD-10-CM

## 2018-10-18 DIAGNOSIS — E87.5 SERUM POTASSIUM ELEVATED: ICD-10-CM

## 2018-10-18 DIAGNOSIS — M79.605 PAIN IN BOTH LOWER EXTREMITIES: ICD-10-CM

## 2018-10-18 DIAGNOSIS — Z23 NEED FOR INFLUENZA VACCINATION: ICD-10-CM

## 2018-10-18 DIAGNOSIS — J44.9 CHRONIC OBSTRUCTIVE PULMONARY DISEASE, UNSPECIFIED COPD TYPE (HCC): ICD-10-CM

## 2018-10-18 DIAGNOSIS — D75.89 MACROCYTIC: ICD-10-CM

## 2018-10-18 LAB
ANION GAP SERPL CALCULATED.3IONS-SCNC: 11 MMOL/L (ref 7–19)
BUN BLDV-MCNC: 16 MG/DL (ref 8–23)
CALCIUM SERPL-MCNC: 9.1 MG/DL (ref 8.8–10.2)
CHLORIDE BLD-SCNC: 103 MMOL/L (ref 98–111)
CO2: 25 MMOL/L (ref 22–29)
CREAT SERPL-MCNC: 0.7 MG/DL (ref 0.5–0.9)
FERRITIN: 48.1 NG/ML (ref 13–150)
FOLATE: 5.4 NG/ML (ref 4.8–37.3)
GFR NON-AFRICAN AMERICAN: >60
GLUCOSE BLD-MCNC: 80 MG/DL (ref 74–109)
IRON: 53 UG/DL (ref 37–145)
POTASSIUM SERPL-SCNC: 4 MMOL/L (ref 3.5–5)
SODIUM BLD-SCNC: 139 MMOL/L (ref 136–145)
VITAMIN B-12: 539 PG/ML (ref 211–946)

## 2018-10-18 PROCEDURE — 3023F SPIROM DOC REV: CPT | Performed by: NURSE PRACTITIONER

## 2018-10-18 PROCEDURE — 99214 OFFICE O/P EST MOD 30 MIN: CPT | Performed by: NURSE PRACTITIONER

## 2018-10-18 PROCEDURE — G8427 DOCREV CUR MEDS BY ELIG CLIN: HCPCS | Performed by: NURSE PRACTITIONER

## 2018-10-18 PROCEDURE — G0008 ADMIN INFLUENZA VIRUS VAC: HCPCS | Performed by: NURSE PRACTITIONER

## 2018-10-18 PROCEDURE — G8420 CALC BMI NORM PARAMETERS: HCPCS | Performed by: NURSE PRACTITIONER

## 2018-10-18 PROCEDURE — G8482 FLU IMMUNIZE ORDER/ADMIN: HCPCS | Performed by: NURSE PRACTITIONER

## 2018-10-18 PROCEDURE — 90662 IIV NO PRSV INCREASED AG IM: CPT | Performed by: NURSE PRACTITIONER

## 2018-10-18 PROCEDURE — G8926 SPIRO NO PERF OR DOC: HCPCS | Performed by: NURSE PRACTITIONER

## 2018-10-18 PROCEDURE — 1123F ACP DISCUSS/DSCN MKR DOCD: CPT | Performed by: NURSE PRACTITIONER

## 2018-10-18 PROCEDURE — 4040F PNEUMOC VAC/ADMIN/RCVD: CPT | Performed by: NURSE PRACTITIONER

## 2018-10-18 PROCEDURE — 4004F PT TOBACCO SCREEN RCVD TLK: CPT | Performed by: NURSE PRACTITIONER

## 2018-10-18 PROCEDURE — G8399 PT W/DXA RESULTS DOCUMENT: HCPCS | Performed by: NURSE PRACTITIONER

## 2018-10-18 PROCEDURE — G8599 NO ASA/ANTIPLAT THER USE RNG: HCPCS | Performed by: NURSE PRACTITIONER

## 2018-10-18 PROCEDURE — 3017F COLORECTAL CA SCREEN DOC REV: CPT | Performed by: NURSE PRACTITIONER

## 2018-10-18 PROCEDURE — 1101F PT FALLS ASSESS-DOCD LE1/YR: CPT | Performed by: NURSE PRACTITIONER

## 2018-10-18 PROCEDURE — 1090F PRES/ABSN URINE INCON ASSESS: CPT | Performed by: NURSE PRACTITIONER

## 2018-10-18 RX ORDER — HYDROXYZINE HYDROCHLORIDE 25 MG/1
25 TABLET, FILM COATED ORAL NIGHTLY PRN
Qty: 30 TABLET | Refills: 3 | Status: SHIPPED | OUTPATIENT
Start: 2018-10-18 | End: 2018-11-20

## 2018-10-18 RX ORDER — CLONAZEPAM 0.5 MG/1
0.5 TABLET ORAL 3 TIMES DAILY PRN
Qty: 75 TABLET | Refills: 0 | Status: SHIPPED | OUTPATIENT
Start: 2018-10-18 | End: 2018-11-20 | Stop reason: SDUPTHER

## 2018-10-18 RX ORDER — ALBUTEROL SULFATE 90 UG/1
2 AEROSOL, METERED RESPIRATORY (INHALATION) EVERY 6 HOURS PRN
Qty: 1 INHALER | Refills: 5 | Status: SHIPPED | OUTPATIENT
Start: 2018-10-18 | End: 2019-02-27 | Stop reason: SDUPTHER

## 2018-10-18 ASSESSMENT — ENCOUNTER SYMPTOMS
BACK PAIN: 1
SORE THROAT: 0
SHORTNESS OF BREATH: 1
DIARRHEA: 0
COUGH: 1
RHINORRHEA: 0
CONSTIPATION: 0
VOMITING: 0

## 2018-10-18 NOTE — PROGRESS NOTES
neg     pH, UA 08/02/2018 6.0     Protein, UA POC 08/02/2018 neg     Urobilinogen, UA 08/02/2018 0.2     Leukocytes, UA 08/02/2018 trace     Nitrite, UA 08/02/2018 neg    Procedure visit on 07/16/2018   Component Date Value    OCCULT BLOOD FECAL 07/16/2018 neg    Orders Only on 06/21/2018   Component Date Value    Sodium 06/21/2018 134*    Potassium 06/21/2018 4.8     Chloride 06/21/2018 95*    CO2 06/21/2018 30*    Anion Gap 06/21/2018 9     Glucose 06/21/2018 76     BUN 06/21/2018 19     CREATININE 06/21/2018 0.9     GFR Non- 06/21/2018 >60     Calcium 06/21/2018 9.6     Total Protein 06/21/2018 6.9     Alb 06/21/2018 3.8     Total Bilirubin 06/21/2018 <0.2     Alkaline Phosphatase 06/21/2018 64     ALT 06/21/2018 5     AST 06/21/2018 14    Orders Only on 06/14/2018   Component Date Value    Sodium 06/14/2018 135*    Potassium 06/14/2018 5.3*    Chloride 06/14/2018 94*    CO2 06/14/2018 28     Anion Gap 06/14/2018 13     Glucose 06/14/2018 77     BUN 06/14/2018 14     CREATININE 06/14/2018 0.8     GFR Non- 06/14/2018 >60     Calcium 06/14/2018 9.6    Orders Only on 06/12/2018   Component Date Value    WBC 06/12/2018 5.5     RBC 06/12/2018 3.10*    Hemoglobin 06/12/2018 9.1*    Hematocrit 06/12/2018 30.7*    MCV 06/12/2018 99.0     MCH 06/12/2018 29.4     MCHC 06/12/2018 29.6*    RDW 06/12/2018 15.2*    Platelets 63/38/2901 184     MPV 06/12/2018 10.3     Neutrophils % 06/12/2018 68.7*    Lymphocytes % 06/12/2018 16.7*    Monocytes % 06/12/2018 10.2*    Eosinophils % 06/12/2018 3.6     Basophils % 06/12/2018 0.4     Neutrophils # 06/12/2018 3.8     Lymphocytes # 06/12/2018 0.9*    Monocytes # 06/12/2018 0.60     Eosinophils # 06/12/2018 0.20     Basophils # 06/12/2018 0.00     Sodium 06/12/2018 123*    Potassium 06/12/2018 4.1     Chloride 06/12/2018 84*    CO2 06/12/2018 24     Anion Gap 06/12/2018 15     Glucose 06/12/2018 78     BUN 06/12/2018 29*    CREATININE 06/12/2018 1.6*    GFR Non- 06/12/2018 32*    Calcium 06/12/2018 8.5*    Total Protein 06/12/2018 6.5*    Alb 06/12/2018 3.3*    Total Bilirubin 06/12/2018 <0.2     Alkaline Phosphatase 06/12/2018 66     ALT 06/12/2018 <5*    AST 06/12/2018 16    Office Visit on 06/12/2018   Component Date Value    Color, UA 06/12/2018 yellow     Clarity, UA 06/12/2018 clear     Glucose, UA POC 06/12/2018 neg     Bilirubin, UA 06/12/2018 neg     Ketones, UA 06/12/2018 neg     Spec Grav, UA 06/12/2018 1.015     Blood, UA POC 06/12/2018 neg     pH, UA 06/12/2018 5.5     Protein, UA POC 06/12/2018 neg     Urobilinogen, UA 06/12/2018 0.2     Leukocytes, UA 06/12/2018 neg     Nitrite, UA 06/12/2018 neg      Copies of these are in the chart. Prior to Visit Medications    Medication Sig Taking? Authorizing Provider   clonazePAM (KLONOPIN) 0.5 MG tablet Take 1 tablet by mouth 3 times daily as needed for Anxiety for up to 30 days. NAVEEN Oliveira   albuterol sulfate HFA (PROAIR HFA) 108 (90 Base) MCG/ACT inhaler Inhale 2 puffs into the lungs every 6 hours as needed for Wheezing Yes NAVEEN Dior   hydrOXYzine (ATARAX) 25 MG tablet Take 1 tablet by mouth nightly as needed (sleep) Yes NAVEEN Dior   ondansetron (ZOFRAN ODT) 4 MG disintegrating tablet Take 1 tablet by mouth every 8 hours as needed for Nausea or Vomiting Yes NAVEEN Dior   memantine (NAMENDA) 10 MG tablet Take 1 tablet by mouth 2 times daily Yes NAVEEN Dior   carvedilol (COREG) 3.125 MG tablet Take 1 tablet by mouth 2 times daily (with meals) Yes NAVEEN Dior   umeclidinium-vilanterol (ANORO ELLIPTA) 62.5-25 MCG/INH AEPB inhaler Inhale 1 puff into the lungs daily Yes NAVEEN Dior   tolterodine (DETROL LA) 4 MG extended release capsule Take 1 capsule by mouth daily Yes NAVEEN Dior   DULoxetine (CYMBALTA) 60 MG extended release capsule Take 1 capsule by mouth 2 times daily Yes NAVEEN Dior   denosumab (PROLIA) 60 MG/ML SOLN SC injection Inject 60mg subq every 6  months Yes NAVEEN Dior   donepezil (ARICEPT) 5 MG tablet Take 1 tablet by mouth nightly Yes NAVEEN Kumar   traZODone (DESYREL) 150 MG tablet Take 1 tablet by mouth nightly Yes NAVEEN Dior   dexlansoprazole (DEXILANT) 60 MG CPDR delayed release capsule Take 1 capsule by mouth daily Yes NAVEEN Dior   VOLTAREN 1 % GEL Apply 2 g topically 4 times daily as needed for Pain Yes NAVEEN Dior   OXYGEN Inhale 2 L into the lungs daily Yes NAVEEN Dior   isosorbide mononitrate (IMDUR) 30 MG extended release tablet TAKE ONE TABLET BY MOUTH DAILY Yes NAVEEN Dior   HYDROcodone-acetaminophen ([facility])  MG per tablet TAKE ONE TABLET BY MOUTH EVERY 8 HOURS AS NEEDED Yes Historical Provider, MD   nitroGLYCERIN (NITROQUICK) 0.4 MG SL tablet Place 1 tablet under the tongue every 5 minutes as needed for Chest pain Yes NAVEEN Dior   furosemide (LASIX) 40 MG tablet Take 1 tablet in morning and 1/2 tablet in evening  Patient taking differently: Take 20 mg by mouth 2 times daily Take 1 tablet in morning and 1/2 tablet in evening Yes NAVEEN Dior   ondansetron (ZOFRAN) 4 MG tablet Take 1 tablet by mouth daily as needed for Nausea or Vomiting Yes NAVEEN Dior   gabapentin (NEURONTIN) 300 MG capsule Take 300 mg by mouth 3 times daily Yes Historical Provider, MD   promethazine (PHENERGAN) 25 MG tablet Take 25 mg by mouth Yes Historical Provider, MD   Calcium Carb-Cholecalciferol (CALCIUM 600 + D PO) Take by mouth Yes Historical Provider, MD   azelastine (ASTELIN) 0.1 % nasal spray 2 sprays by Nasal route 2 times daily Use in each nostril as directed Yes NAVEEN Dior   fluticasone (FLONASE) 50 MCG/ACT nasal spray 1 spray by Nasal route daily Yes NAVEEN Dior   vitamin E 400 UNIT capsule Take 400 Units by mouth daily Yes Historical

## 2018-10-19 ENCOUNTER — TELEPHONE (OUTPATIENT)
Dept: PRIMARY CARE CLINIC | Age: 73
End: 2018-10-19

## 2018-11-06 ENCOUNTER — PROCEDURE VISIT (OUTPATIENT)
Dept: UROLOGY | Facility: CLINIC | Age: 73
End: 2018-11-06

## 2018-11-06 ENCOUNTER — HOSPITAL ENCOUNTER (OUTPATIENT)
Dept: CT IMAGING | Facility: HOSPITAL | Age: 73
Discharge: HOME | End: 2018-11-06
Attending: UROLOGY | Admitting: UROLOGY

## 2018-11-06 DIAGNOSIS — N39.41 URGE INCONTINENCE: Primary | ICD-10-CM

## 2018-11-06 DIAGNOSIS — R35.0 FREQUENCY OF URINATION: ICD-10-CM

## 2018-11-06 DIAGNOSIS — R31.0 GROSS HEMATURIA: ICD-10-CM

## 2018-11-06 PROCEDURE — 52000 CYSTOURETHROSCOPY: CPT | Performed by: UROLOGY

## 2018-11-06 PROCEDURE — 74176 CT ABD & PELVIS W/O CONTRAST: CPT

## 2018-11-06 NOTE — PROGRESS NOTES
Pre- operative diagnosis:  Hematuria and Lower urinary tract symptoms    Post operative diagnosis:  Same    Procedure:  The patient was prepped and draped in a normal sterile fashion.  The urethra was anesthetized with 2% lidocaine jelly.  A rigid cystoscope was introduced per urethra.      Urethra:  Normal    Bladder:  There is no evidence of a stone, foreign body or mass within the bladder.  The bladder is minimally trabeculated.  The bladder neck is without contracture., Normal mucosa and No bladder tumor    Ureteral orifices:  Normal position bilaterally and Clear efflux bilaterally    Patient tolerated the procedure well    Complications: none    Blood loss: minimal    Follow up:    Routine follow up    The patient was improved on Toviaz 4 mg and a bumper up to 8 mg she was comfortable she had no further questions

## 2018-11-20 ENCOUNTER — OFFICE VISIT (OUTPATIENT)
Dept: PRIMARY CARE CLINIC | Age: 73
End: 2018-11-20
Payer: MEDICARE

## 2018-11-20 VITALS
BODY MASS INDEX: 23.37 KG/M2 | TEMPERATURE: 97.9 F | DIASTOLIC BLOOD PRESSURE: 81 MMHG | OXYGEN SATURATION: 91 % | HEIGHT: 62 IN | HEART RATE: 82 BPM | SYSTOLIC BLOOD PRESSURE: 137 MMHG | WEIGHT: 127 LBS

## 2018-11-20 DIAGNOSIS — Z95.828 PORT-A-CATH IN PLACE: ICD-10-CM

## 2018-11-20 DIAGNOSIS — F11.20 CHRONIC NARCOTIC DEPENDENCE (HCC): ICD-10-CM

## 2018-11-20 DIAGNOSIS — F41.1 GAD (GENERALIZED ANXIETY DISORDER): Primary | ICD-10-CM

## 2018-11-20 DIAGNOSIS — G47.01 INSOMNIA DUE TO MEDICAL CONDITION: ICD-10-CM

## 2018-11-20 PROCEDURE — 1090F PRES/ABSN URINE INCON ASSESS: CPT | Performed by: NURSE PRACTITIONER

## 2018-11-20 PROCEDURE — 1123F ACP DISCUSS/DSCN MKR DOCD: CPT | Performed by: NURSE PRACTITIONER

## 2018-11-20 PROCEDURE — 4040F PNEUMOC VAC/ADMIN/RCVD: CPT | Performed by: NURSE PRACTITIONER

## 2018-11-20 PROCEDURE — G8482 FLU IMMUNIZE ORDER/ADMIN: HCPCS | Performed by: NURSE PRACTITIONER

## 2018-11-20 PROCEDURE — G8420 CALC BMI NORM PARAMETERS: HCPCS | Performed by: NURSE PRACTITIONER

## 2018-11-20 PROCEDURE — G8399 PT W/DXA RESULTS DOCUMENT: HCPCS | Performed by: NURSE PRACTITIONER

## 2018-11-20 PROCEDURE — G8599 NO ASA/ANTIPLAT THER USE RNG: HCPCS | Performed by: NURSE PRACTITIONER

## 2018-11-20 PROCEDURE — 3017F COLORECTAL CA SCREEN DOC REV: CPT | Performed by: NURSE PRACTITIONER

## 2018-11-20 PROCEDURE — 4004F PT TOBACCO SCREEN RCVD TLK: CPT | Performed by: NURSE PRACTITIONER

## 2018-11-20 PROCEDURE — G8427 DOCREV CUR MEDS BY ELIG CLIN: HCPCS | Performed by: NURSE PRACTITIONER

## 2018-11-20 PROCEDURE — 99213 OFFICE O/P EST LOW 20 MIN: CPT | Performed by: NURSE PRACTITIONER

## 2018-11-20 PROCEDURE — 96523 IRRIG DRUG DELIVERY DEVICE: CPT | Performed by: NURSE PRACTITIONER

## 2018-11-20 PROCEDURE — 1101F PT FALLS ASSESS-DOCD LE1/YR: CPT | Performed by: NURSE PRACTITIONER

## 2018-11-20 RX ORDER — NEBULIZER ACCESSORIES
1 KIT MISCELLANEOUS ONCE
Qty: 1 KIT | Refills: 0 | Status: SHIPPED | OUTPATIENT
Start: 2018-11-20 | End: 2019-01-18

## 2018-11-20 RX ORDER — CLONAZEPAM 0.5 MG/1
0.5 TABLET ORAL 3 TIMES DAILY PRN
Qty: 75 TABLET | Refills: 0 | Status: SHIPPED | OUTPATIENT
Start: 2018-11-20 | End: 2018-12-18 | Stop reason: SDUPTHER

## 2018-11-20 ASSESSMENT — ENCOUNTER SYMPTOMS
SORE THROAT: 0
SHORTNESS OF BREATH: 1
DIARRHEA: 0
BACK PAIN: 1
COUGH: 0
CONSTIPATION: 0
RHINORRHEA: 0
VOMITING: 0

## 2018-11-20 NOTE — PROGRESS NOTES
139     Potassium 10/18/2018 4.0     Chloride 10/18/2018 103     CO2 10/18/2018 25     Anion Gap 10/18/2018 11     Glucose 10/18/2018 80     BUN 10/18/2018 16     CREATININE 10/18/2018 0.7     GFR Non- 10/18/2018 >60     Calcium 10/18/2018 9.1     Ferritin 10/18/2018 48.1     Iron 10/18/2018 53     Vitamin B-12 10/18/2018 539     Folate 10/18/2018 5.4    Orders Only on 09/18/2018   Component Date Value    WBC 09/18/2018 7.1     RBC 09/18/2018 3.74*    Hemoglobin 09/18/2018 10.9*    Hematocrit 09/18/2018 36.0*    MCV 09/18/2018 96.3     MCH 09/18/2018 29.1     MCHC 09/18/2018 30.3*    RDW 09/18/2018 14.9*    Platelets 50/52/9360 283     MPV 09/18/2018 9.9     Neutrophils % 09/18/2018 66.9*    Lymphocytes % 09/18/2018 23.1     Monocytes % 09/18/2018 7.3     Eosinophils % 09/18/2018 1.7     Basophils % 09/18/2018 0.6     Neutrophils # 09/18/2018 4.8     Lymphocytes # 09/18/2018 1.7     Monocytes # 09/18/2018 0.50     Eosinophils # 09/18/2018 0.10     Basophils # 09/18/2018 0.00     Sodium 09/18/2018 131*    Potassium 09/18/2018 4.8     Chloride 09/18/2018 93*    CO2 09/18/2018 23     Anion Gap 09/18/2018 15     Glucose 09/18/2018 82     BUN 09/18/2018 15     CREATININE 09/18/2018 0.8     GFR Non- 09/18/2018 >60     Calcium 09/18/2018 9.5     Total Protein 09/18/2018 7.3     Alb 09/18/2018 4.2     Total Bilirubin 09/18/2018 <0.2     Alkaline Phosphatase 09/18/2018 65     ALT 09/18/2018 6     AST 09/18/2018 14     TSH 09/18/2018 1.390    Orders Only on 08/16/2018   Component Date Value    WBC 08/16/2018 6.0     RBC 08/16/2018 3.29*    Hemoglobin 08/16/2018 9.8*    Hematocrit 08/16/2018 32.9*    MCV 08/16/2018 100.0*    MCH 08/16/2018 29.8     MCHC 08/16/2018 29.8*    RDW 08/16/2018 16.0*    Platelets 07/57/3922 226     MPV 08/16/2018 10.1     Neutrophils % 08/16/2018 65.1*    Lymphocytes % 08/16/2018 19.7*    Monocytes % NAVEEN   denosumab (PROLIA) 60 MG/ML SOLN SC injection Inject 60mg subq every 6  months Yes NAVEEN Dior   donepezil (ARICEPT) 5 MG tablet Take 1 tablet by mouth nightly Yes NAVEEN Jernigan   traZODone (DESYREL) 150 MG tablet Take 1 tablet by mouth nightly Yes NAVEEN Dior   dexlansoprazole (DEXILANT) 60 MG CPDR delayed release capsule Take 1 capsule by mouth daily Yes NAVEEN Dior   VOLTAREN 1 % GEL Apply 2 g topically 4 times daily as needed for Pain Yes NAVEEN Dior   OXYGEN Inhale 2 L into the lungs daily Yes NAVEEN Dior   isosorbide mononitrate (IMDUR) 30 MG extended release tablet TAKE ONE TABLET BY MOUTH DAILY Yes NAVEEN Dior   HYDROcodone-acetaminophen ([facility])  MG per tablet TAKE ONE TABLET BY MOUTH EVERY 8 HOURS AS NEEDED Yes Historical Provider, MD   nitroGLYCERIN (NITROQUICK) 0.4 MG SL tablet Place 1 tablet under the tongue every 5 minutes as needed for Chest pain Yes NAVEEN Dior   furosemide (LASIX) 40 MG tablet Take 1 tablet in morning and 1/2 tablet in evening  Patient taking differently: Take 20 mg by mouth 2 times daily Take 1 tablet in morning and 1/2 tablet in evening Yes NAVEEN Dior   ondansetron (ZOFRAN) 4 MG tablet Take 1 tablet by mouth daily as needed for Nausea or Vomiting Yes NAVEEN Dior   gabapentin (NEURONTIN) 300 MG capsule Take 300 mg by mouth 3 times daily Yes Historical Provider, MD   promethazine (PHENERGAN) 25 MG tablet Take 25 mg by mouth Yes Historical Provider, MD   Calcium Carb-Cholecalciferol (CALCIUM 600 + D PO) Take by mouth Yes Historical Provider, MD   azelastine (ASTELIN) 0.1 % nasal spray 2 sprays by Nasal route 2 times daily Use in each nostril as directed Yes NAVEEN Dior   fluticasone (FLONASE) 50 MCG/ACT nasal spray 1 spray by Nasal route daily Yes NAVEEN Dior   vitamin E 400 UNIT capsule Take 400 Units by mouth daily Yes Historical Provider, MD   theophylline CR, 12 hour,

## 2018-11-28 DIAGNOSIS — R11.0 NAUSEA: ICD-10-CM

## 2018-11-28 RX ORDER — ONDANSETRON 4 MG/1
4 TABLET, FILM COATED ORAL DAILY PRN
Qty: 30 TABLET | Refills: 0 | Status: SHIPPED | OUTPATIENT
Start: 2018-11-28 | End: 2018-12-18 | Stop reason: SDUPTHER

## 2018-12-18 ENCOUNTER — OFFICE VISIT (OUTPATIENT)
Dept: PRIMARY CARE CLINIC | Age: 73
End: 2018-12-18
Payer: MEDICARE

## 2018-12-18 VITALS
TEMPERATURE: 96.3 F | HEART RATE: 81 BPM | SYSTOLIC BLOOD PRESSURE: 119 MMHG | WEIGHT: 124 LBS | DIASTOLIC BLOOD PRESSURE: 79 MMHG | BODY MASS INDEX: 24.35 KG/M2 | OXYGEN SATURATION: 90 % | HEIGHT: 60 IN

## 2018-12-18 DIAGNOSIS — K31.84 NONDIABETIC GASTROPARESIS: ICD-10-CM

## 2018-12-18 DIAGNOSIS — F41.1 GAD (GENERALIZED ANXIETY DISORDER): ICD-10-CM

## 2018-12-18 DIAGNOSIS — Z95.828 PORT-A-CATH IN PLACE: ICD-10-CM

## 2018-12-18 DIAGNOSIS — R32 URINARY INCONTINENCE, UNSPECIFIED TYPE: ICD-10-CM

## 2018-12-18 DIAGNOSIS — R11.0 NAUSEA: ICD-10-CM

## 2018-12-18 DIAGNOSIS — G47.01 INSOMNIA DUE TO MEDICAL CONDITION: ICD-10-CM

## 2018-12-18 DIAGNOSIS — F11.20 CHRONIC NARCOTIC DEPENDENCE (HCC): ICD-10-CM

## 2018-12-18 DIAGNOSIS — R63.0 DECREASED APPETITE: Primary | ICD-10-CM

## 2018-12-18 DIAGNOSIS — R63.0 DECREASED APPETITE: ICD-10-CM

## 2018-12-18 DIAGNOSIS — F51.01 PRIMARY INSOMNIA: ICD-10-CM

## 2018-12-18 DIAGNOSIS — J44.9 CHRONIC OBSTRUCTIVE PULMONARY DISEASE, UNSPECIFIED COPD TYPE (HCC): ICD-10-CM

## 2018-12-18 LAB
ALBUMIN SERPL-MCNC: 3.8 G/DL (ref 3.5–5.2)
ALP BLD-CCNC: 56 U/L (ref 35–104)
ALT SERPL-CCNC: <5 U/L (ref 5–33)
ANION GAP SERPL CALCULATED.3IONS-SCNC: 18 MMOL/L (ref 7–19)
AST SERPL-CCNC: 12 U/L (ref 5–32)
BASOPHILS ABSOLUTE: 0.1 K/UL (ref 0–0.2)
BASOPHILS RELATIVE PERCENT: 0.9 % (ref 0–1)
BILIRUB SERPL-MCNC: <0.2 MG/DL (ref 0.2–1.2)
BUN BLDV-MCNC: 21 MG/DL (ref 8–23)
CALCIUM SERPL-MCNC: 9.5 MG/DL (ref 8.8–10.2)
CHLORIDE BLD-SCNC: 98 MMOL/L (ref 98–111)
CO2: 22 MMOL/L (ref 22–29)
CREAT SERPL-MCNC: 1 MG/DL (ref 0.5–0.9)
EOSINOPHILS ABSOLUTE: 0.2 K/UL (ref 0–0.6)
EOSINOPHILS RELATIVE PERCENT: 2.8 % (ref 0–5)
GFR NON-AFRICAN AMERICAN: 54
GLUCOSE BLD-MCNC: 83 MG/DL (ref 74–109)
HCT VFR BLD CALC: 37.2 % (ref 37–47)
HEMOGLOBIN: 11.1 G/DL (ref 12–16)
LYMPHOCYTES ABSOLUTE: 1.5 K/UL (ref 1.1–4.5)
LYMPHOCYTES RELATIVE PERCENT: 24.2 % (ref 20–40)
MCH RBC QN AUTO: 29.9 PG (ref 27–31)
MCHC RBC AUTO-ENTMCNC: 29.8 G/DL (ref 33–37)
MCV RBC AUTO: 100.3 FL (ref 81–99)
MONOCYTES ABSOLUTE: 0.5 K/UL (ref 0–0.9)
MONOCYTES RELATIVE PERCENT: 7.3 % (ref 0–10)
NEUTROPHILS ABSOLUTE: 4.1 K/UL (ref 1.5–7.5)
NEUTROPHILS RELATIVE PERCENT: 64.3 % (ref 50–65)
PDW BLD-RTO: 15.4 % (ref 11.5–14.5)
PLATELET # BLD: 270 K/UL (ref 130–400)
PMV BLD AUTO: 10.2 FL (ref 9.4–12.3)
POTASSIUM SERPL-SCNC: 4.1 MMOL/L (ref 3.5–5)
RBC # BLD: 3.71 M/UL (ref 4.2–5.4)
SODIUM BLD-SCNC: 138 MMOL/L (ref 136–145)
TOTAL PROTEIN: 7 G/DL (ref 6.6–8.7)
WBC # BLD: 6.3 K/UL (ref 4.8–10.8)

## 2018-12-18 PROCEDURE — 3023F SPIROM DOC REV: CPT | Performed by: NURSE PRACTITIONER

## 2018-12-18 PROCEDURE — 1123F ACP DISCUSS/DSCN MKR DOCD: CPT | Performed by: NURSE PRACTITIONER

## 2018-12-18 PROCEDURE — 99214 OFFICE O/P EST MOD 30 MIN: CPT | Performed by: NURSE PRACTITIONER

## 2018-12-18 PROCEDURE — 4040F PNEUMOC VAC/ADMIN/RCVD: CPT | Performed by: NURSE PRACTITIONER

## 2018-12-18 PROCEDURE — G8926 SPIRO NO PERF OR DOC: HCPCS | Performed by: NURSE PRACTITIONER

## 2018-12-18 PROCEDURE — 96523 IRRIG DRUG DELIVERY DEVICE: CPT | Performed by: NURSE PRACTITIONER

## 2018-12-18 PROCEDURE — G8420 CALC BMI NORM PARAMETERS: HCPCS | Performed by: NURSE PRACTITIONER

## 2018-12-18 PROCEDURE — G8482 FLU IMMUNIZE ORDER/ADMIN: HCPCS | Performed by: NURSE PRACTITIONER

## 2018-12-18 PROCEDURE — 1101F PT FALLS ASSESS-DOCD LE1/YR: CPT | Performed by: NURSE PRACTITIONER

## 2018-12-18 PROCEDURE — 4004F PT TOBACCO SCREEN RCVD TLK: CPT | Performed by: NURSE PRACTITIONER

## 2018-12-18 PROCEDURE — 0509F URINE INCON PLAN DOCD: CPT | Performed by: NURSE PRACTITIONER

## 2018-12-18 PROCEDURE — G8599 NO ASA/ANTIPLAT THER USE RNG: HCPCS | Performed by: NURSE PRACTITIONER

## 2018-12-18 PROCEDURE — G8399 PT W/DXA RESULTS DOCUMENT: HCPCS | Performed by: NURSE PRACTITIONER

## 2018-12-18 PROCEDURE — G8427 DOCREV CUR MEDS BY ELIG CLIN: HCPCS | Performed by: NURSE PRACTITIONER

## 2018-12-18 PROCEDURE — 3017F COLORECTAL CA SCREEN DOC REV: CPT | Performed by: NURSE PRACTITIONER

## 2018-12-18 PROCEDURE — 1090F PRES/ABSN URINE INCON ASSESS: CPT | Performed by: NURSE PRACTITIONER

## 2018-12-18 RX ORDER — CLONAZEPAM 0.5 MG/1
0.5 TABLET ORAL 3 TIMES DAILY PRN
Qty: 75 TABLET | Refills: 0 | Status: SHIPPED | OUTPATIENT
Start: 2018-12-18 | End: 2019-01-18 | Stop reason: SDUPTHER

## 2018-12-18 RX ORDER — ONDANSETRON 4 MG/1
4 TABLET, FILM COATED ORAL DAILY PRN
Qty: 40 TABLET | Refills: 1 | Status: SHIPPED | OUTPATIENT
Start: 2018-12-18 | End: 2019-03-19 | Stop reason: SDUPTHER

## 2018-12-18 RX ORDER — FUROSEMIDE 20 MG/1
20 TABLET ORAL DAILY
Qty: 30 TABLET | Refills: 5 | Status: ON HOLD
Start: 2018-12-18 | End: 2020-07-18 | Stop reason: HOSPADM

## 2018-12-18 RX ORDER — FESOTERODINE FUMARATE 8 MG/1
8 TABLET, EXTENDED RELEASE ORAL DAILY
Qty: 30 TABLET | Refills: 5 | Status: SHIPPED | OUTPATIENT
Start: 2018-12-18 | End: 2019-01-18

## 2018-12-18 ASSESSMENT — ENCOUNTER SYMPTOMS
DIARRHEA: 0
NAUSEA: 1
CONSTIPATION: 0
BACK PAIN: 1
SORE THROAT: 0
VOMITING: 0
RHINORRHEA: 0
ABDOMINAL PAIN: 1
COUGH: 0
SHORTNESS OF BREATH: 1

## 2018-12-18 ASSESSMENT — PATIENT HEALTH QUESTIONNAIRE - PHQ9
2. FEELING DOWN, DEPRESSED OR HOPELESS: 0
SUM OF ALL RESPONSES TO PHQ9 QUESTIONS 1 & 2: 0
1. LITTLE INTEREST OR PLEASURE IN DOING THINGS: 0
SUM OF ALL RESPONSES TO PHQ QUESTIONS 1-9: 0
SUM OF ALL RESPONSES TO PHQ QUESTIONS 1-9: 0

## 2018-12-18 NOTE — PATIENT INSTRUCTIONS
hours). You inhale most bronchodilators, so they start to act quickly. Always carry your quick-relief inhaler with you in case you need it while you are away from home. ? Corticosteroids (prednisone, budesonide). These reduce airway inflammation. They come in pill or inhaled form. You must take these medicines every day for them to work well.     · A spacer may help you get more inhaled medicine to your lungs. Ask your doctor or pharmacist if a spacer is right for you. If it is, ask how to use it properly.     · Do not take any vitamins, over-the-counter medicine, or herbal products without talking to your doctor first.     · If your doctor prescribed antibiotics, take them as directed. Do not stop taking them just because you feel better. You need to take the full course of antibiotics.     · Oxygen therapy boosts the amount of oxygen in your blood and helps you breathe easier. Use the flow rate your doctor has recommended, and do not change it without talking to your doctor first.   Activity    · Get regular exercise. Walking is an easy way to get exercise. Start out slowly, and walk a little more each day.     · Pay attention to your breathing. You are exercising too hard if you cannot talk while you are exercising.     · Take short rest breaks when doing household chores and other activities.     · Learn breathing methods--such as breathing through pursed lips--to help you become less short of breath.     · If your doctor has not set you up with a pulmonary rehabilitation program, talk to him or her about whether rehab is right for you. Rehab includes exercise programs, education about your disease and how to manage it, help with diet and other changes, and emotional support. Diet    · Eat regular, healthy meals. Use bronchodilators about 1 hour before you eat to make it easier to eat. Eat several small meals instead of three large ones.  Drink beverages at the end of the meal. Avoid foods that are hard to chew.     · Eat foods that contain protein so that you do not lose muscle mass.     · Talk with your doctor if you gain too much weight or if you lose weight without trying.    Mental health    · Talk to your family, friends, or a therapist about your feelings. It is normal to feel frightened, angry, hopeless, helpless, and even guilty. Talking openly about bad feelings can help you cope. If these feelings last, talk to your doctor. When should you call for help? Call 911 anytime you think you may need emergency care. For example, call if:    · You have severe trouble breathing.    Call your doctor now or seek immediate medical care if:    · You have new or worse trouble breathing.     · You cough up blood.     · You have a fever.    Watch closely for changes in your health, and be sure to contact your doctor if:    · You cough more deeply or more often, especially if you notice more mucus or a change in the color of your mucus.     · You have new or worse swelling in your legs or belly.     · You are not getting better as expected. Where can you learn more? Go to https://[email or web address]. org and sign in to your [employer] account. Enter W422 in the [employer] box to learn more about \"Chronic Obstructive Pulmonary Disease (COPD): Care Instructions. \"     If you do not have an account, please click on the \"Sign Up Now\" link. Current as of: December 6, 2017  Content Version: 11.8  © 3085-7803 [employer], Incorporated. Care instructions adapted under license by [facility] ([facility]). If you have questions about a medical condition or this instruction, always ask your healthcare professional. [place] [address] any warranty or liability for your use of this information.

## 2018-12-18 NOTE — PROGRESS NOTES
American 09/18/2018 >60     Calcium 09/18/2018 9.5     Total Protein 09/18/2018 7.3     Alb 09/18/2018 4.2     Total Bilirubin 09/18/2018 <0.2     Alkaline Phosphatase 09/18/2018 65     ALT 09/18/2018 6     AST 09/18/2018 14     TSH 09/18/2018 1.390    Orders Only on 08/16/2018   Component Date Value    WBC 08/16/2018 6.0     RBC 08/16/2018 3.29*    Hemoglobin 08/16/2018 9.8*    Hematocrit 08/16/2018 32.9*    MCV 08/16/2018 100.0*    MCH 08/16/2018 29.8     MCHC 08/16/2018 29.8*    RDW 08/16/2018 16.0*    Platelets 71/20/7844 226     MPV 08/16/2018 10.1     Neutrophils % 08/16/2018 65.1*    Lymphocytes % 08/16/2018 19.7*    Monocytes % 08/16/2018 11.2*    Eosinophils % 08/16/2018 3.2     Basophils % 08/16/2018 0.5     Neutrophils # 08/16/2018 3.9     Lymphocytes # 08/16/2018 1.2     Monocytes # 08/16/2018 0.70     Eosinophils # 08/16/2018 0.20     Basophils # 08/16/2018 0.00     T4 Free 08/16/2018 0.9     TSH 08/16/2018 1.570     Sodium 08/16/2018 136     Potassium 08/16/2018 5.4*    Chloride 08/16/2018 101     CO2 08/16/2018 25     Anion Gap 08/16/2018 10     Glucose 08/16/2018 81     BUN 08/16/2018 22     CREATININE 08/16/2018 1.0*    GFR Non- 08/16/2018 54*    Calcium 08/16/2018 9.8     Total Protein 08/16/2018 7.1     Alb 08/16/2018 4.0     Total Bilirubin 08/16/2018 <0.2     Alkaline Phosphatase 08/16/2018 77     ALT 08/16/2018 8     AST 08/16/2018 14    Office Visit on 08/16/2018   Component Date Value    Color, UA 08/16/2018 yellow     Clarity, UA 08/16/2018 clear     Glucose, UA POC 08/16/2018 neg     Bilirubin, UA 08/16/2018 neg     Ketones, UA 08/16/2018 neg     Spec Grav, UA 08/16/2018 1.020     Blood, UA POC 08/16/2018 neg     pH, UA 08/16/2018 5.5     Protein, UA POC 08/16/2018 neg     Urobilinogen, UA 08/16/2018 0.2     Leukocytes, UA 08/16/2018 neg     Nitrite, UA 08/16/2018 neg     Appearance, Fluid 08/16/2018 Clear    Office then flushed with heparin and the port was de-accessed. No blood loss  No complication  No specimen  He tolerated the procedure well without incident. Return in about 1 month (around 1/18/2019), or if symptoms worsen or fail to improve. Patient Instructions       Patient Education        Chronic Obstructive Pulmonary Disease (COPD): Care Instructions  Your Care Instructions    Chronic obstructive pulmonary disease (COPD) is a general term for a group of lung diseases, including emphysema and chronic bronchitis. People with COPD have decreased airflow in and out of the lungs, which makes it hard to breathe. The airways also can get clogged with thick mucus. Cigarette smoking is a major cause of COPD. Although there is no cure for COPD, you can slow its progress. Following your treatment plan and taking care of yourself can help you feel better and live longer. Follow-up care is a key part of your treatment and safety. Be sure to make and go to all appointments, and call your doctor if you are having problems. It's also a good idea to know your test results and keep a list of the medicines you take. How can you care for yourself at home?   Staying healthy    · Do not smoke. This is the most important step you can take to prevent more damage to your lungs. If you need help quitting, talk to your doctor about stop-smoking programs and medicines. These can increase your chances of quitting for good.     · Avoid colds and flu. Get a pneumococcal vaccine shot. If you have had one before, ask your doctor whether you need a second dose. Get the flu vaccine every fall. If you must be around people with colds or the flu, wash your hands often.     · Avoid secondhand smoke, air pollution, and high altitudes. Also avoid cold, dry air and hot, humid air. Stay at home with your windows closed when air pollution is bad.    Medicines and oxygen therapy    · Take your medicines exactly as prescribed.  Call your doctor if you think you are having a problem with your medicine.     · You may be taking medicines such as:  ? Bronchodilators. These help open your airways and make breathing easier. Bronchodilators are either short-acting (work for 6 to 9 hours) or long-acting (work for 24 hours). You inhale most bronchodilators, so they start to act quickly. Always carry your quick-relief inhaler with you in case you need it while you are away from home. ? Corticosteroids (prednisone, budesonide). These reduce airway inflammation. They come in pill or inhaled form. You must take these medicines every day for them to work well.     · A spacer may help you get more inhaled medicine to your lungs. Ask your doctor or pharmacist if a spacer is right for you. If it is, ask how to use it properly.     · Do not take any vitamins, over-the-counter medicine, or herbal products without talking to your doctor first.     · If your doctor prescribed antibiotics, take them as directed. Do not stop taking them just because you feel better. You need to take the full course of antibiotics.     · Oxygen therapy boosts the amount of oxygen in your blood and helps you breathe easier. Use the flow rate your doctor has recommended, and do not change it without talking to your doctor first.   Activity    · Get regular exercise. Walking is an easy way to get exercise. Start out slowly, and walk a little more each day.     · Pay attention to your breathing. You are exercising too hard if you cannot talk while you are exercising.     · Take short rest breaks when doing household chores and other activities.     · Learn breathing methods--such as breathing through pursed lips--to help you become less short of breath.     · If your doctor has not set you up with a pulmonary rehabilitation program, talk to him or her about whether rehab is right for you.  Rehab includes exercise programs, education about your disease and how to manage it, help with diet and other

## 2018-12-20 ENCOUNTER — TELEPHONE (OUTPATIENT)
Dept: PRIMARY CARE CLINIC | Age: 73
End: 2018-12-20

## 2018-12-31 DIAGNOSIS — G47.01 INSOMNIA DUE TO MEDICAL CONDITION: ICD-10-CM

## 2018-12-31 RX ORDER — TRAZODONE HYDROCHLORIDE 150 MG/1
150 TABLET ORAL NIGHTLY
Qty: 30 TABLET | Refills: 5 | Status: SHIPPED | OUTPATIENT
Start: 2018-12-31 | End: 2019-07-25 | Stop reason: SDUPTHER

## 2019-01-15 ENCOUNTER — OFFICE VISIT (OUTPATIENT)
Dept: UROLOGY | Facility: CLINIC | Age: 74
End: 2019-01-15

## 2019-01-15 VITALS — TEMPERATURE: 98.2 F | WEIGHT: 115 LBS | BODY MASS INDEX: 21.16 KG/M2 | HEIGHT: 62 IN

## 2019-01-15 DIAGNOSIS — R31.0 GROSS HEMATURIA: ICD-10-CM

## 2019-01-15 DIAGNOSIS — N39.41 URGE INCONTINENCE: Primary | ICD-10-CM

## 2019-01-15 DIAGNOSIS — R35.0 FREQUENCY OF URINATION: ICD-10-CM

## 2019-01-15 PROCEDURE — 99213 OFFICE O/P EST LOW 20 MIN: CPT | Performed by: UROLOGY

## 2019-01-15 RX ORDER — FESOTERODINE FUMARATE 8 MG/1
8 TABLET, EXTENDED RELEASE ORAL
Qty: 90 TABLET | Refills: 5 | Status: SHIPPED | OUTPATIENT
Start: 2019-01-15

## 2019-01-15 NOTE — PROGRESS NOTES
Subjective    Ms. Obrien is 73 y.o. female    CHIEF COMPLAINT: Urge incontinence\  \  Patient comes back to see me today she is doing quite well I have been bumped her dose up to Toviaz 8 mg dose and very very pleased with this and doing very well with that she is not having any significant side effects whatsoever he still just some mild symptoms only now urgency frequency urgent continence.    She does have a previous history of gross hematuria but she is had no further unfortunate she cannot produce urine force today      History of Present Illness      The following portions of the patient's history were reviewed and updated as appropriate: allergies, current medications, past family history, past medical history, past social history, past surgical history and problem list.    Review of Systems   Constitutional: Negative for chills, fatigue and fever.   Respiratory: Negative for apnea, cough, shortness of breath and wheezing.    Cardiovascular: Negative for chest pain and palpitations.   Gastrointestinal: Negative for abdominal distention, abdominal pain, anal bleeding, blood in stool and nausea.   Genitourinary: Negative for difficulty urinating, dysuria, flank pain, frequency, hematuria and urgency.   Psychiatric/Behavioral: Negative for agitation, behavioral problems and confusion.         Current Outpatient Medications:   •  azelastine (ASTELIN) 0.1 % nasal spray, 2 sprays into each nostril 2 (Two) Times a Day. Use in each nostril as directed, Disp: , Rfl:   •  clonazePAM (KlonoPIN) 0.5 MG tablet, Take 0.5 mg by mouth 2 (Two) Times a Day As Needed for seizures., Disp: , Rfl:   •  dexlansoprazole (DEXILANT) 60 MG capsule, Take 60 mg by mouth Daily., Disp: , Rfl:   •  DULoxetine (CYMBALTA) 60 MG capsule, Take 60 mg by mouth Daily., Disp: , Rfl:   •  fluticasone (FLONASE) 50 MCG/ACT nasal spray, 2 sprays into each nostril Daily., Disp: , Rfl:   •  furosemide (LASIX) 40 MG tablet, Take 40 mg by mouth 2 (Two)  Times a Day., Disp: , Rfl:   •  guaiFENesin (MUCINEX) 600 MG 12 hr tablet, Take 1,200 mg by mouth 2 (Two) Times a Day., Disp: , Rfl:   •  HYDROcodone-acetaminophen (NORCO)  MG per tablet, Take 1 tablet by mouth Every 6 (Six) Hours As Needed for Moderate Pain (4-6)., Disp: , Rfl:   •  isosorbide dinitrate (ISORDIL) 30 MG tablet, Take 30 mg by mouth 4 (Four) Times a Day., Disp: , Rfl:   •  linaclotide (LINZESS) 290 MCG capsule capsule, Take 290 mcg by mouth Every Morning Before Breakfast., Disp: , Rfl:   •  memantine (NAMENDA) 5 MG tablet, Take 5 mg by mouth 2 (Two) Times a Day., Disp: , Rfl:   •  mirtazapine (REMERON SOL-TAB) 15 MG disintegrating tablet, Take 15 mg by mouth Every Night., Disp: , Rfl:   •  mupirocin (BACTROBAN) 2 % ointment, Apply  topically 3 (Three) Times a Day., Disp: , Rfl:   •  nitroglycerin (NITROSTAT) 0.4 MG SL tablet, Place 0.4 mg under the tongue Every 5 (Five) Minutes As Needed for chest pain. Take no more than 3 doses in 15 minutes., Disp: , Rfl:   •  oxyCODONE-acetaminophen (PERCOCET) 7.5-325 MG per tablet, Take 1 tablet by mouth Every 6 (Six) Hours As Needed., Disp: , Rfl:   •  Potassium 99 MG tablet, Take  by mouth., Disp: , Rfl:   •  promethazine (PHENERGAN) 25 MG tablet, Take 25 mg by mouth Every 6 (Six) Hours As Needed for nausea or vomiting., Disp: , Rfl:   •  sucralfate (CARAFATE) 1 G tablet, Take 1 g by mouth 4 (Four) Times a Day., Disp: , Rfl:   •  theophylline (THEODUR) 100 MG 12 hr tablet, Take 100 mg by mouth 2 (Two) Times a Day., Disp: , Rfl:   •  TiZANidine (ZANAFLEX) 4 MG capsule, Take 4 mg by mouth 3 (Three) Times a Day., Disp: , Rfl:   •  vitamin E 400 UNIT capsule, Take 400 Units by mouth Daily., Disp: , Rfl:   •  fesoterodine fumarate (TOVIAZ ER) 8 MG tablet sustained-release 24 hour tablet, Take 1 tablet by mouth Daily., Disp: 90 tablet, Rfl: 5    Past Medical History:   Diagnosis Date   • Anemia    • Arthritis    • Asthma    • CAD (coronary artery disease)    •  "CHF (congestive heart failure) (CMS/HCC)    • COPD (chronic obstructive pulmonary disease) (CMS/HCC)    • Depression    • Heart disease    • MI (myocardial infarction) (CMS/[facility])        Past Surgical History:   Procedure Laterality Date   • APPENDECTOMY     • CARDIAC CATHETERIZATION      EF%60   • CHOLECYSTECTOMY     • COLONOSCOPY  04/15/2011   • COLONOSCOPY  10/20/2014    Dr Villalobos   • ENDOSCOPY  10/01/2014    Dr Villalobos   • ENDOSCOPY N/A 7/24/2017    Procedure: ESOPHAGOGASTRODUODENOSCOPY WITH ANESTHESIA;  Surgeon: Delvin Moon DO;  Location: [facility] ENDOSCOPY;  Service:    • EXPLORATORY LAPAROTOMY      abdomen   • HYSTERECTOMY         Social History     Socioeconomic History   • Marital status: Single     Spouse name: Not on file   • Number of children: Not on file   • Years of education: Not on file   • Highest education level: Not on file   Tobacco Use   • Smoking status: Current Every Day Smoker     Packs/day: 1.00     Years: 36.00     Pack years: 36.00     Types: Cigarettes   • Smokeless tobacco: Never Used   Substance and Sexual Activity   • Alcohol use: No   • Drug use: No       Family History   Problem Relation Age of Onset   • Colon polyps Neg Hx    • Esophageal cancer Neg Hx        Objective    Temp 98.2 °F (36.8 °C)   Ht 157.5 cm (62\")   Wt 52.2 kg (115 lb)   BMI 21.03 kg/m²     Physical Exam      Office Visit on 10/08/2018   Component Date Value Ref Range Status   • Color 10/08/2018 Yellow  Yellow, Straw, Dark Yellow, Alysa Final   • Clarity, UA 10/08/2018 Clear  Clear Final   • Glucose, UA 10/08/2018 Negative  Negative, 1000 mg/dL (3+) mg/dL Final   • Bilirubin 10/08/2018 Negative  Negative Final   • Ketones, UA 10/08/2018 Negative  Negative Final   • Specific Gravity  10/08/2018 1.015  1.005 - 1.030 Final   • Blood, UA 10/08/2018 Trace* Negative Final   • pH, Urine 10/08/2018 6.0  5.0 - 8.0 Final   • Protein, POC 10/08/2018 Negative  Negative mg/dL Final   • Urobilinogen, UA 10/08/2018 Normal  " Normal Final   • Nitrite, UA 10/08/2018 Negative  Negative Final   • Leukocytes 10/08/2018 Negative  Negative Final       Results for orders placed or performed in visit on 10/08/18   POC Urinalysis Dipstick, Multipro   Result Value Ref Range    Color Yellow Yellow, Straw, Dark Yellow, Alysa    Clarity, UA Clear Clear    Glucose, UA Negative Negative, 1000 mg/dL (3+) mg/dL    Bilirubin Negative Negative    Ketones, UA Negative Negative    Specific Gravity  1.015 1.005 - 1.030    Blood, UA Trace (A) Negative    pH, Urine 6.0 5.0 - 8.0    Protein, POC Negative Negative mg/dL    Urobilinogen, UA Normal Normal    Nitrite, UA Negative Negative    Leukocytes Negative Negative       Assessment and Plan    Diagnoses and all orders for this visit:    Urge incontinence  -     Cancel: POC Urinalysis Dipstick, Multipro  -     fesoterodine fumarate (TOVIAZ ER) 8 MG tablet sustained-release 24 hour tablet; Take 1 tablet by mouth Daily.    Frequency of urination    Gross hematuria    Plan--the patient seems to be significantly improved with Toviaz I am undergo ahead and refill that for her we will see her back again in 1 year if things worsen she will let us know.    Again she is had no further hematuria gross hematuria so we will see her back again in a year for that as well call sooner of course when necessary

## 2019-01-18 ENCOUNTER — OFFICE VISIT (OUTPATIENT)
Dept: PRIMARY CARE CLINIC | Age: 74
End: 2019-01-18
Payer: MEDICARE

## 2019-01-18 VITALS
HEIGHT: 60 IN | DIASTOLIC BLOOD PRESSURE: 82 MMHG | BODY MASS INDEX: 24.54 KG/M2 | TEMPERATURE: 98 F | WEIGHT: 125 LBS | OXYGEN SATURATION: 91 % | HEART RATE: 87 BPM | SYSTOLIC BLOOD PRESSURE: 127 MMHG

## 2019-01-18 DIAGNOSIS — G47.01 INSOMNIA DUE TO MEDICAL CONDITION: ICD-10-CM

## 2019-01-18 DIAGNOSIS — F41.1 GAD (GENERALIZED ANXIETY DISORDER): Primary | ICD-10-CM

## 2019-01-18 DIAGNOSIS — Z95.828 PORT-A-CATH IN PLACE: ICD-10-CM

## 2019-01-18 PROCEDURE — 1101F PT FALLS ASSESS-DOCD LE1/YR: CPT | Performed by: NURSE PRACTITIONER

## 2019-01-18 PROCEDURE — 99213 OFFICE O/P EST LOW 20 MIN: CPT | Performed by: NURSE PRACTITIONER

## 2019-01-18 PROCEDURE — G8599 NO ASA/ANTIPLAT THER USE RNG: HCPCS | Performed by: NURSE PRACTITIONER

## 2019-01-18 PROCEDURE — 4004F PT TOBACCO SCREEN RCVD TLK: CPT | Performed by: NURSE PRACTITIONER

## 2019-01-18 PROCEDURE — G8420 CALC BMI NORM PARAMETERS: HCPCS | Performed by: NURSE PRACTITIONER

## 2019-01-18 PROCEDURE — G8427 DOCREV CUR MEDS BY ELIG CLIN: HCPCS | Performed by: NURSE PRACTITIONER

## 2019-01-18 PROCEDURE — G8482 FLU IMMUNIZE ORDER/ADMIN: HCPCS | Performed by: NURSE PRACTITIONER

## 2019-01-18 PROCEDURE — 1090F PRES/ABSN URINE INCON ASSESS: CPT | Performed by: NURSE PRACTITIONER

## 2019-01-18 PROCEDURE — 3017F COLORECTAL CA SCREEN DOC REV: CPT | Performed by: NURSE PRACTITIONER

## 2019-01-18 PROCEDURE — 4040F PNEUMOC VAC/ADMIN/RCVD: CPT | Performed by: NURSE PRACTITIONER

## 2019-01-18 PROCEDURE — 1123F ACP DISCUSS/DSCN MKR DOCD: CPT | Performed by: NURSE PRACTITIONER

## 2019-01-18 PROCEDURE — G8399 PT W/DXA RESULTS DOCUMENT: HCPCS | Performed by: NURSE PRACTITIONER

## 2019-01-18 RX ORDER — OLOPATADINE HYDROCHLORIDE 2 MG/ML
1 SOLUTION/ DROPS OPHTHALMIC DAILY
Qty: 1 BOTTLE | Refills: 5 | Status: SHIPPED | OUTPATIENT
Start: 2019-01-18 | End: 2019-02-27 | Stop reason: SDUPTHER

## 2019-01-18 RX ORDER — OLOPATADINE HYDROCHLORIDE 2 MG/ML
1 SOLUTION/ DROPS OPHTHALMIC DAILY
COMMUNITY
End: 2019-01-18 | Stop reason: SDUPTHER

## 2019-01-18 RX ORDER — CLONAZEPAM 0.5 MG/1
0.5 TABLET ORAL 3 TIMES DAILY PRN
Qty: 75 TABLET | Refills: 0 | Status: SHIPPED | OUTPATIENT
Start: 2019-01-18 | End: 2019-02-27 | Stop reason: SDUPTHER

## 2019-01-18 RX ORDER — GABAPENTIN 100 MG/1
300 CAPSULE ORAL 2 TIMES DAILY
COMMUNITY
End: 2020-01-21 | Stop reason: DRUGHIGH

## 2019-01-18 RX ORDER — HYDROXYZINE HYDROCHLORIDE 25 MG/1
12.5 TABLET, FILM COATED ORAL NIGHTLY PRN
Qty: 15 TABLET | Refills: 3 | Status: SHIPPED | OUTPATIENT
Start: 2019-01-18 | End: 2019-01-28

## 2019-01-18 ASSESSMENT — ENCOUNTER SYMPTOMS
SHORTNESS OF BREATH: 1
ABDOMINAL PAIN: 0
VOMITING: 0
DIARRHEA: 0
CONSTIPATION: 0
RHINORRHEA: 0
SORE THROAT: 0
NAUSEA: 1
COUGH: 0
BACK PAIN: 1

## 2019-01-24 DIAGNOSIS — K21.9 GASTROESOPHAGEAL REFLUX DISEASE, ESOPHAGITIS PRESENCE NOT SPECIFIED: Primary | ICD-10-CM

## 2019-01-24 RX ORDER — PANTOPRAZOLE SODIUM 20 MG/1
20 TABLET, DELAYED RELEASE ORAL
Qty: 30 TABLET | Refills: 5 | Status: SHIPPED | OUTPATIENT
Start: 2019-01-24 | End: 2019-07-25 | Stop reason: SDUPTHER

## 2019-02-20 RX ORDER — ISOSORBIDE MONONITRATE 30 MG/1
TABLET, EXTENDED RELEASE ORAL
Qty: 30 TABLET | Refills: 5 | Status: SHIPPED | OUTPATIENT
Start: 2019-02-20 | End: 2019-10-04 | Stop reason: SDUPTHER

## 2019-02-27 ENCOUNTER — OFFICE VISIT (OUTPATIENT)
Dept: PRIMARY CARE CLINIC | Age: 74
End: 2019-02-27
Payer: MEDICARE

## 2019-02-27 VITALS
HEART RATE: 87 BPM | OXYGEN SATURATION: 96 % | WEIGHT: 124 LBS | BODY MASS INDEX: 24.35 KG/M2 | SYSTOLIC BLOOD PRESSURE: 111 MMHG | DIASTOLIC BLOOD PRESSURE: 77 MMHG | HEIGHT: 60 IN | TEMPERATURE: 97.8 F

## 2019-02-27 DIAGNOSIS — R07.9 CHEST PAIN, UNSPECIFIED TYPE: ICD-10-CM

## 2019-02-27 DIAGNOSIS — H10.13 ALLERGIC CONJUNCTIVITIS OF BOTH EYES: ICD-10-CM

## 2019-02-27 DIAGNOSIS — I95.1 ORTHOSTATIC HYPOTENSION: ICD-10-CM

## 2019-02-27 DIAGNOSIS — R42 DIZZINESS: ICD-10-CM

## 2019-02-27 DIAGNOSIS — R42 DIZZINESS: Primary | ICD-10-CM

## 2019-02-27 DIAGNOSIS — Z95.828 PORT-A-CATH IN PLACE: ICD-10-CM

## 2019-02-27 DIAGNOSIS — J44.9 CHRONIC OBSTRUCTIVE PULMONARY DISEASE, UNSPECIFIED COPD TYPE (HCC): ICD-10-CM

## 2019-02-27 DIAGNOSIS — F41.1 GAD (GENERALIZED ANXIETY DISORDER): ICD-10-CM

## 2019-02-27 DIAGNOSIS — G47.01 INSOMNIA DUE TO MEDICAL CONDITION: ICD-10-CM

## 2019-02-27 LAB
ALBUMIN SERPL-MCNC: 3.9 G/DL (ref 3.5–5.2)
ALP BLD-CCNC: 61 U/L (ref 35–104)
ALT SERPL-CCNC: 5 U/L (ref 5–33)
ANION GAP SERPL CALCULATED.3IONS-SCNC: 14 MMOL/L (ref 7–19)
AST SERPL-CCNC: 12 U/L (ref 5–32)
BASOPHILS ABSOLUTE: 0.1 K/UL (ref 0–0.2)
BASOPHILS RELATIVE PERCENT: 1.1 % (ref 0–1)
BILIRUB SERPL-MCNC: <0.2 MG/DL (ref 0.2–1.2)
BUN BLDV-MCNC: 20 MG/DL (ref 8–23)
CALCIUM SERPL-MCNC: 9.1 MG/DL (ref 8.8–10.2)
CHLORIDE BLD-SCNC: 99 MMOL/L (ref 98–111)
CO2: 26 MMOL/L (ref 22–29)
CREAT SERPL-MCNC: 1 MG/DL (ref 0.5–0.9)
EOSINOPHILS ABSOLUTE: 0.2 K/UL (ref 0–0.6)
EOSINOPHILS RELATIVE PERCENT: 4.1 % (ref 0–5)
GFR NON-AFRICAN AMERICAN: 54
GLUCOSE BLD-MCNC: 87 MG/DL (ref 74–109)
HCT VFR BLD CALC: 35.9 % (ref 37–47)
HEMOGLOBIN: 10.7 G/DL (ref 12–16)
LYMPHOCYTES ABSOLUTE: 1.9 K/UL (ref 1.1–4.5)
LYMPHOCYTES RELATIVE PERCENT: 33.3 % (ref 20–40)
MCH RBC QN AUTO: 29.9 PG (ref 27–31)
MCHC RBC AUTO-ENTMCNC: 29.8 G/DL (ref 33–37)
MCV RBC AUTO: 100.3 FL (ref 81–99)
MONOCYTES ABSOLUTE: 0.5 K/UL (ref 0–0.9)
MONOCYTES RELATIVE PERCENT: 9.5 % (ref 0–10)
NEUTROPHILS ABSOLUTE: 2.9 K/UL (ref 1.5–7.5)
NEUTROPHILS RELATIVE PERCENT: 51.8 % (ref 50–65)
PDW BLD-RTO: 15 % (ref 11.5–14.5)
PLATELET # BLD: 201 K/UL (ref 130–400)
PMV BLD AUTO: 10 FL (ref 9.4–12.3)
POTASSIUM SERPL-SCNC: 4.5 MMOL/L (ref 3.5–5)
RBC # BLD: 3.58 M/UL (ref 4.2–5.4)
SODIUM BLD-SCNC: 139 MMOL/L (ref 136–145)
TOTAL PROTEIN: 6.9 G/DL (ref 6.6–8.7)
WBC # BLD: 5.6 K/UL (ref 4.8–10.8)

## 2019-02-27 PROCEDURE — G8399 PT W/DXA RESULTS DOCUMENT: HCPCS | Performed by: NURSE PRACTITIONER

## 2019-02-27 PROCEDURE — 1090F PRES/ABSN URINE INCON ASSESS: CPT | Performed by: NURSE PRACTITIONER

## 2019-02-27 PROCEDURE — G8420 CALC BMI NORM PARAMETERS: HCPCS | Performed by: NURSE PRACTITIONER

## 2019-02-27 PROCEDURE — 4004F PT TOBACCO SCREEN RCVD TLK: CPT | Performed by: NURSE PRACTITIONER

## 2019-02-27 PROCEDURE — 1101F PT FALLS ASSESS-DOCD LE1/YR: CPT | Performed by: NURSE PRACTITIONER

## 2019-02-27 PROCEDURE — 96523 IRRIG DRUG DELIVERY DEVICE: CPT | Performed by: NURSE PRACTITIONER

## 2019-02-27 PROCEDURE — G8599 NO ASA/ANTIPLAT THER USE RNG: HCPCS | Performed by: NURSE PRACTITIONER

## 2019-02-27 PROCEDURE — G8926 SPIRO NO PERF OR DOC: HCPCS | Performed by: NURSE PRACTITIONER

## 2019-02-27 PROCEDURE — 3017F COLORECTAL CA SCREEN DOC REV: CPT | Performed by: NURSE PRACTITIONER

## 2019-02-27 PROCEDURE — 1123F ACP DISCUSS/DSCN MKR DOCD: CPT | Performed by: NURSE PRACTITIONER

## 2019-02-27 PROCEDURE — 99214 OFFICE O/P EST MOD 30 MIN: CPT | Performed by: NURSE PRACTITIONER

## 2019-02-27 PROCEDURE — G8427 DOCREV CUR MEDS BY ELIG CLIN: HCPCS | Performed by: NURSE PRACTITIONER

## 2019-02-27 PROCEDURE — 3023F SPIROM DOC REV: CPT | Performed by: NURSE PRACTITIONER

## 2019-02-27 PROCEDURE — 4040F PNEUMOC VAC/ADMIN/RCVD: CPT | Performed by: NURSE PRACTITIONER

## 2019-02-27 PROCEDURE — G8482 FLU IMMUNIZE ORDER/ADMIN: HCPCS | Performed by: NURSE PRACTITIONER

## 2019-02-27 RX ORDER — ALBUTEROL SULFATE 90 UG/1
2 AEROSOL, METERED RESPIRATORY (INHALATION) EVERY 6 HOURS PRN
Qty: 1 INHALER | Refills: 5 | Status: SHIPPED | OUTPATIENT
Start: 2019-02-27 | End: 2019-03-01 | Stop reason: SDUPTHER

## 2019-02-27 RX ORDER — FESOTERODINE FUMARATE 8 MG/1
TABLET, FILM COATED, EXTENDED RELEASE ORAL
Refills: 5 | COMMUNITY
Start: 2019-02-16 | End: 2019-06-10 | Stop reason: SDUPTHER

## 2019-02-27 RX ORDER — NITROGLYCERIN 0.4 MG/1
0.4 TABLET SUBLINGUAL EVERY 5 MIN PRN
Qty: 25 TABLET | Refills: 3 | Status: SHIPPED | OUTPATIENT
Start: 2019-02-27 | End: 2021-04-01 | Stop reason: SDUPTHER

## 2019-02-27 RX ORDER — HYDROXYZINE HYDROCHLORIDE 25 MG/1
TABLET, FILM COATED ORAL
Refills: 3 | COMMUNITY
Start: 2019-02-22 | End: 2019-05-25 | Stop reason: SDUPTHER

## 2019-02-27 RX ORDER — OLOPATADINE HYDROCHLORIDE 2 MG/ML
1 SOLUTION/ DROPS OPHTHALMIC DAILY
Qty: 1 BOTTLE | Refills: 5 | Status: SHIPPED | OUTPATIENT
Start: 2019-02-27 | End: 2020-03-18

## 2019-02-27 RX ORDER — CLONAZEPAM 0.5 MG/1
0.5 TABLET ORAL 3 TIMES DAILY PRN
Qty: 75 TABLET | Refills: 0 | Status: SHIPPED | OUTPATIENT
Start: 2019-02-27 | End: 2019-04-09 | Stop reason: SDUPTHER

## 2019-02-27 ASSESSMENT — ENCOUNTER SYMPTOMS
SHORTNESS OF BREATH: 1
ABDOMINAL PAIN: 0
EYE REDNESS: 0
WHEEZING: 1
NAUSEA: 0
VOMITING: 0
DIARRHEA: 1
RHINORRHEA: 1
CONSTIPATION: 0
SORE THROAT: 0
COUGH: 1

## 2019-02-28 ENCOUNTER — TELEPHONE (OUTPATIENT)
Dept: PRIMARY CARE CLINIC | Age: 74
End: 2019-02-28

## 2019-02-28 DIAGNOSIS — F11.20 CHRONIC NARCOTIC DEPENDENCE (HCC): ICD-10-CM

## 2019-02-28 DIAGNOSIS — J44.9 CHRONIC OBSTRUCTIVE PULMONARY DISEASE, UNSPECIFIED COPD TYPE (HCC): ICD-10-CM

## 2019-03-01 RX ORDER — NEBULIZER ACCESSORIES
1 KIT MISCELLANEOUS DAILY
Qty: 1 KIT | Refills: 0 | Status: SHIPPED | OUTPATIENT
Start: 2019-03-01 | End: 2020-02-25

## 2019-03-01 RX ORDER — ALBUTEROL SULFATE 90 UG/1
2 AEROSOL, METERED RESPIRATORY (INHALATION) EVERY 6 HOURS PRN
Qty: 1 INHALER | Refills: 5 | Status: SHIPPED | OUTPATIENT
Start: 2019-03-01 | End: 2019-10-09 | Stop reason: SDUPTHER

## 2019-03-14 ENCOUNTER — OFFICE VISIT (OUTPATIENT)
Dept: PRIMARY CARE CLINIC | Age: 74
End: 2019-03-14
Payer: MEDICARE

## 2019-03-14 VITALS
HEIGHT: 60 IN | HEART RATE: 87 BPM | DIASTOLIC BLOOD PRESSURE: 83 MMHG | TEMPERATURE: 98.4 F | WEIGHT: 119 LBS | BODY MASS INDEX: 23.36 KG/M2 | OXYGEN SATURATION: 92 % | SYSTOLIC BLOOD PRESSURE: 140 MMHG

## 2019-03-14 DIAGNOSIS — M25.511 CHRONIC RIGHT SHOULDER PAIN: Primary | ICD-10-CM

## 2019-03-14 DIAGNOSIS — R09.02 OXYGEN DESATURATION: ICD-10-CM

## 2019-03-14 DIAGNOSIS — M81.0 AGE-RELATED OSTEOPOROSIS WITHOUT CURRENT PATHOLOGICAL FRACTURE: ICD-10-CM

## 2019-03-14 DIAGNOSIS — D75.89 MACROCYTIC: ICD-10-CM

## 2019-03-14 DIAGNOSIS — G89.29 CHRONIC RIGHT SHOULDER PAIN: Primary | ICD-10-CM

## 2019-03-14 DIAGNOSIS — J44.9 CHRONIC OBSTRUCTIVE PULMONARY DISEASE, UNSPECIFIED COPD TYPE (HCC): ICD-10-CM

## 2019-03-14 PROCEDURE — 96372 THER/PROPH/DIAG INJ SC/IM: CPT | Performed by: NURSE PRACTITIONER

## 2019-03-14 PROCEDURE — 3017F COLORECTAL CA SCREEN DOC REV: CPT | Performed by: NURSE PRACTITIONER

## 2019-03-14 PROCEDURE — G8420 CALC BMI NORM PARAMETERS: HCPCS | Performed by: NURSE PRACTITIONER

## 2019-03-14 PROCEDURE — 99213 OFFICE O/P EST LOW 20 MIN: CPT | Performed by: NURSE PRACTITIONER

## 2019-03-14 PROCEDURE — 3023F SPIROM DOC REV: CPT | Performed by: NURSE PRACTITIONER

## 2019-03-14 PROCEDURE — 20610 DRAIN/INJ JOINT/BURSA W/O US: CPT | Performed by: NURSE PRACTITIONER

## 2019-03-14 PROCEDURE — 1090F PRES/ABSN URINE INCON ASSESS: CPT | Performed by: NURSE PRACTITIONER

## 2019-03-14 PROCEDURE — 1123F ACP DISCUSS/DSCN MKR DOCD: CPT | Performed by: NURSE PRACTITIONER

## 2019-03-14 PROCEDURE — G8399 PT W/DXA RESULTS DOCUMENT: HCPCS | Performed by: NURSE PRACTITIONER

## 2019-03-14 PROCEDURE — 1101F PT FALLS ASSESS-DOCD LE1/YR: CPT | Performed by: NURSE PRACTITIONER

## 2019-03-14 PROCEDURE — 4040F PNEUMOC VAC/ADMIN/RCVD: CPT | Performed by: NURSE PRACTITIONER

## 2019-03-14 PROCEDURE — G8599 NO ASA/ANTIPLAT THER USE RNG: HCPCS | Performed by: NURSE PRACTITIONER

## 2019-03-14 PROCEDURE — G8427 DOCREV CUR MEDS BY ELIG CLIN: HCPCS | Performed by: NURSE PRACTITIONER

## 2019-03-14 PROCEDURE — G8482 FLU IMMUNIZE ORDER/ADMIN: HCPCS | Performed by: NURSE PRACTITIONER

## 2019-03-14 PROCEDURE — G8926 SPIRO NO PERF OR DOC: HCPCS | Performed by: NURSE PRACTITIONER

## 2019-03-14 PROCEDURE — 4004F PT TOBACCO SCREEN RCVD TLK: CPT | Performed by: NURSE PRACTITIONER

## 2019-03-14 PROCEDURE — G0444 DEPRESSION SCREEN ANNUAL: HCPCS | Performed by: NURSE PRACTITIONER

## 2019-03-14 RX ORDER — METHYLPREDNISOLONE ACETATE 40 MG/ML
40 INJECTION, SUSPENSION INTRA-ARTICULAR; INTRALESIONAL; INTRAMUSCULAR; SOFT TISSUE ONCE
Status: COMPLETED | OUTPATIENT
Start: 2019-03-14 | End: 2019-03-14

## 2019-03-14 RX ADMIN — METHYLPREDNISOLONE ACETATE 40 MG: 40 INJECTION, SUSPENSION INTRA-ARTICULAR; INTRALESIONAL; INTRAMUSCULAR; SOFT TISSUE at 16:10

## 2019-03-14 ASSESSMENT — PATIENT HEALTH QUESTIONNAIRE - PHQ9
10. IF YOU CHECKED OFF ANY PROBLEMS, HOW DIFFICULT HAVE THESE PROBLEMS MADE IT FOR YOU TO DO YOUR WORK, TAKE CARE OF THINGS AT HOME, OR GET ALONG WITH OTHER PEOPLE: 0
2. FEELING DOWN, DEPRESSED OR HOPELESS: 0
SUM OF ALL RESPONSES TO PHQ QUESTIONS 1-9: 8
9. THOUGHTS THAT YOU WOULD BE BETTER OFF DEAD, OR OF HURTING YOURSELF: 0
8. MOVING OR SPEAKING SO SLOWLY THAT OTHER PEOPLE COULD HAVE NOTICED. OR THE OPPOSITE, BEING SO FIGETY OR RESTLESS THAT YOU HAVE BEEN MOVING AROUND A LOT MORE THAN USUAL: 0
7. TROUBLE CONCENTRATING ON THINGS, SUCH AS READING THE NEWSPAPER OR WATCHING TELEVISION: 0
3. TROUBLE FALLING OR STAYING ASLEEP: 2
SUM OF ALL RESPONSES TO PHQ9 QUESTIONS 1 & 2: 3
4. FEELING TIRED OR HAVING LITTLE ENERGY: 3
1. LITTLE INTEREST OR PLEASURE IN DOING THINGS: 3
6. FEELING BAD ABOUT YOURSELF - OR THAT YOU ARE A FAILURE OR HAVE LET YOURSELF OR YOUR FAMILY DOWN: 0
5. POOR APPETITE OR OVEREATING: 0
SUM OF ALL RESPONSES TO PHQ QUESTIONS 1-9: 8

## 2019-03-14 ASSESSMENT — ENCOUNTER SYMPTOMS
EYE REDNESS: 0
NAUSEA: 0
DIARRHEA: 0
CONSTIPATION: 0
WHEEZING: 0
SHORTNESS OF BREATH: 1
COUGH: 1
ABDOMINAL PAIN: 0
SORE THROAT: 0
VOMITING: 0
RHINORRHEA: 0

## 2019-03-18 ENCOUNTER — TELEPHONE (OUTPATIENT)
Dept: PRIMARY CARE CLINIC | Age: 74
End: 2019-03-18

## 2019-03-19 DIAGNOSIS — K31.84 NONDIABETIC GASTROPARESIS: ICD-10-CM

## 2019-03-19 DIAGNOSIS — R11.0 NAUSEA: ICD-10-CM

## 2019-03-19 RX ORDER — ONDANSETRON 4 MG/1
TABLET, FILM COATED ORAL
Qty: 40 TABLET | Refills: 1 | Status: SHIPPED | OUTPATIENT
Start: 2019-03-19 | End: 2019-06-14 | Stop reason: SDUPTHER

## 2019-03-27 DIAGNOSIS — F03.90 DEMENTIA WITHOUT BEHAVIORAL DISTURBANCE, UNSPECIFIED DEMENTIA TYPE: ICD-10-CM

## 2019-03-27 RX ORDER — DONEPEZIL HYDROCHLORIDE 5 MG/1
5 TABLET, FILM COATED ORAL NIGHTLY
Qty: 90 TABLET | Refills: 3 | Status: SHIPPED | OUTPATIENT
Start: 2019-03-27 | End: 2020-04-17 | Stop reason: SDUPTHER

## 2019-04-09 ENCOUNTER — OFFICE VISIT (OUTPATIENT)
Dept: PRIMARY CARE CLINIC | Age: 74
End: 2019-04-09
Payer: MEDICARE

## 2019-04-09 VITALS
OXYGEN SATURATION: 92 % | HEIGHT: 60 IN | TEMPERATURE: 98.3 F | WEIGHT: 127 LBS | SYSTOLIC BLOOD PRESSURE: 122 MMHG | DIASTOLIC BLOOD PRESSURE: 80 MMHG | BODY MASS INDEX: 24.94 KG/M2 | HEART RATE: 83 BPM

## 2019-04-09 DIAGNOSIS — I95.1 ORTHOSTATIC HYPOTENSION: ICD-10-CM

## 2019-04-09 DIAGNOSIS — F41.1 GAD (GENERALIZED ANXIETY DISORDER): Primary | ICD-10-CM

## 2019-04-09 DIAGNOSIS — M25.511 CHRONIC RIGHT SHOULDER PAIN: ICD-10-CM

## 2019-04-09 DIAGNOSIS — Z95.828 PORT-A-CATH IN PLACE: ICD-10-CM

## 2019-04-09 DIAGNOSIS — F11.20 CHRONIC NARCOTIC DEPENDENCE (HCC): ICD-10-CM

## 2019-04-09 DIAGNOSIS — G47.01 INSOMNIA DUE TO MEDICAL CONDITION: ICD-10-CM

## 2019-04-09 DIAGNOSIS — Z79.899 MEDICATION MANAGEMENT: ICD-10-CM

## 2019-04-09 DIAGNOSIS — G89.29 CHRONIC RIGHT SHOULDER PAIN: ICD-10-CM

## 2019-04-09 DIAGNOSIS — J44.9 CHRONIC OBSTRUCTIVE PULMONARY DISEASE, UNSPECIFIED COPD TYPE (HCC): ICD-10-CM

## 2019-04-09 LAB
AMPHETAMINE SCREEN, URINE: NORMAL
BARBITURATE SCREEN, URINE: NORMAL
BENZODIAZEPINE SCREEN, URINE: NORMAL
BUPRENORPHINE URINE: NORMAL
COCAINE METABOLITE SCREEN URINE: NORMAL
GABAPENTIN SCREEN, URINE: NORMAL
MDMA URINE: NORMAL
METHADONE SCREEN, URINE: NORMAL
METHAMPHETAMINE, URINE: NORMAL
OPIATE SCREEN URINE: POSITIVE
OXYCODONE SCREEN URINE: NORMAL
PHENCYCLIDINE SCREEN URINE: NORMAL
PROPOXYPHENE SCREEN, URINE: NORMAL
THC SCREEN, URINE: NORMAL
TRICYCLIC ANTIDEPRESSANTS, UR: NORMAL

## 2019-04-09 PROCEDURE — 4004F PT TOBACCO SCREEN RCVD TLK: CPT | Performed by: NURSE PRACTITIONER

## 2019-04-09 PROCEDURE — G8420 CALC BMI NORM PARAMETERS: HCPCS | Performed by: NURSE PRACTITIONER

## 2019-04-09 PROCEDURE — 1090F PRES/ABSN URINE INCON ASSESS: CPT | Performed by: NURSE PRACTITIONER

## 2019-04-09 PROCEDURE — 3017F COLORECTAL CA SCREEN DOC REV: CPT | Performed by: NURSE PRACTITIONER

## 2019-04-09 PROCEDURE — 1123F ACP DISCUSS/DSCN MKR DOCD: CPT | Performed by: NURSE PRACTITIONER

## 2019-04-09 PROCEDURE — G8427 DOCREV CUR MEDS BY ELIG CLIN: HCPCS | Performed by: NURSE PRACTITIONER

## 2019-04-09 PROCEDURE — 80305 DRUG TEST PRSMV DIR OPT OBS: CPT | Performed by: NURSE PRACTITIONER

## 2019-04-09 PROCEDURE — G8599 NO ASA/ANTIPLAT THER USE RNG: HCPCS | Performed by: NURSE PRACTITIONER

## 2019-04-09 PROCEDURE — 99214 OFFICE O/P EST MOD 30 MIN: CPT | Performed by: NURSE PRACTITIONER

## 2019-04-09 PROCEDURE — G8399 PT W/DXA RESULTS DOCUMENT: HCPCS | Performed by: NURSE PRACTITIONER

## 2019-04-09 PROCEDURE — 96523 IRRIG DRUG DELIVERY DEVICE: CPT | Performed by: NURSE PRACTITIONER

## 2019-04-09 PROCEDURE — 4040F PNEUMOC VAC/ADMIN/RCVD: CPT | Performed by: NURSE PRACTITIONER

## 2019-04-09 PROCEDURE — G8926 SPIRO NO PERF OR DOC: HCPCS | Performed by: NURSE PRACTITIONER

## 2019-04-09 PROCEDURE — 3023F SPIROM DOC REV: CPT | Performed by: NURSE PRACTITIONER

## 2019-04-09 RX ORDER — CLONAZEPAM 0.5 MG/1
0.5 TABLET ORAL 3 TIMES DAILY PRN
Qty: 75 TABLET | Refills: 0 | Status: SHIPPED | OUTPATIENT
Start: 2019-04-09 | End: 2019-05-14 | Stop reason: SDUPTHER

## 2019-04-09 ASSESSMENT — ENCOUNTER SYMPTOMS
VOMITING: 0
EYE REDNESS: 0
COUGH: 1
ABDOMINAL PAIN: 0
DIARRHEA: 0
SHORTNESS OF BREATH: 1
NAUSEA: 0
RHINORRHEA: 0
SORE THROAT: 0
CONSTIPATION: 0
WHEEZING: 0

## 2019-04-09 NOTE — PATIENT INSTRUCTIONS

## 2019-04-09 NOTE — PROGRESS NOTES
Comprehensive Metabolic Panel   Result Value Ref Range    Sodium 139 136 - 145 mmol/L    Potassium 4.5 3.5 - 5.0 mmol/L    Chloride 99 98 - 111 mmol/L    CO2 26 22 - 29 mmol/L    Anion Gap 14 7 - 19 mmol/L    Glucose 87 74 - 109 mg/dL    BUN 20 8 - 23 mg/dL    CREATININE 1.0 (H) 0.5 - 0.9 mg/dL    GFR Non-African American 54 (A) >60    Calcium 9.1 8.8 - 10.2 mg/dL    Total Protein 6.9 6.6 - 8.7 g/dL    Alb 3.9 3.5 - 5.2 g/dL    Total Bilirubin <0.2 0.2 - 1.2 mg/dL    Alkaline Phosphatase 61 35 - 104 U/L    ALT 5 5 - 33 U/L    AST 12 5 - 32 U/L     have reviewed the following with the Ms. Mcguire   Lab Review   Orders Only on 02/27/2019   Component Date Value    WBC 02/27/2019 5.6     RBC 02/27/2019 3.58*    Hemoglobin 02/27/2019 10.7*    Hematocrit 02/27/2019 35.9*    MCV 02/27/2019 100.3*    MCH 02/27/2019 29.9     MCHC 02/27/2019 29.8*    RDW 02/27/2019 15.0*    Platelets 35/26/9145 201     MPV 02/27/2019 10.0     Neutrophils % 02/27/2019 51.8     Lymphocytes % 02/27/2019 33.3     Monocytes % 02/27/2019 9.5     Eosinophils % 02/27/2019 4.1     Basophils % 02/27/2019 1.1*    Neutrophils # 02/27/2019 2.9     Lymphocytes # 02/27/2019 1.9     Monocytes # 02/27/2019 0.50     Eosinophils # 02/27/2019 0.20     Basophils # 02/27/2019 0.10     Sodium 02/27/2019 139     Potassium 02/27/2019 4.5     Chloride 02/27/2019 99     CO2 02/27/2019 26     Anion Gap 02/27/2019 14     Glucose 02/27/2019 87     BUN 02/27/2019 20     CREATININE 02/27/2019 1.0*    GFR Non- 02/27/2019 54*    Calcium 02/27/2019 9.1     Total Protein 02/27/2019 6.9     Alb 02/27/2019 3.9     Total Bilirubin 02/27/2019 <0.2     Alkaline Phosphatase 02/27/2019 61     ALT 02/27/2019 5     AST 02/27/2019 12    Orders Only on 12/18/2018   Component Date Value    WBC 12/18/2018 6.3     RBC 12/18/2018 3.71*    Hemoglobin 12/18/2018 11.1*    Hematocrit 12/18/2018 37.2     MCV 12/18/2018 100.[facility] 12/18/2018 29.9     MCHC 12/18/2018 29.8*    RDW 12/18/2018 15.4*    Platelets 75/91/0561 270     MPV 12/18/2018 10.2     Neutrophils % 12/18/2018 64.3     Lymphocytes % 12/18/2018 24.2     Monocytes % 12/18/2018 7.3     Eosinophils % 12/18/2018 2.8     Basophils % 12/18/2018 0.9     Neutrophils # 12/18/2018 4.1     Lymphocytes # 12/18/2018 1.5     Monocytes # 12/18/2018 0.50     Eosinophils # 12/18/2018 0.20     Basophils # 12/18/2018 0.10     Sodium 12/18/2018 138     Potassium 12/18/2018 4.1     Chloride 12/18/2018 98     CO2 12/18/2018 22     Anion Gap 12/18/2018 18     Glucose 12/18/2018 83     BUN 12/18/2018 21     CREATININE 12/18/2018 1.0*    GFR Non- 12/18/2018 54*    Calcium 12/18/2018 9.5     Total Protein 12/18/2018 7.0     Alb 12/18/2018 3.8     Total Bilirubin 12/18/2018 <0.2     Alkaline Phosphatase 12/18/2018 56     ALT 12/18/2018 <5*    AST 12/18/2018 12    Orders Only on 10/18/2018   Component Date Value    Sodium 10/18/2018 139     Potassium 10/18/2018 4.0     Chloride 10/18/2018 103     CO2 10/18/2018 25     Anion Gap 10/18/2018 11     Glucose 10/18/2018 80     BUN 10/18/2018 16     CREATININE 10/18/2018 0.7     GFR Non- 10/18/2018 >60     Calcium 10/18/2018 9.1     Ferritin 10/18/2018 48.1     Iron 10/18/2018 53     Vitamin B-12 10/18/2018 539     Folate 10/18/2018 5.4      Copies of these are in the chart. Prior to Visit Medications    Medication Sig Taking? Authorizing Provider   clonazePAM (KLONOPIN) 0.5 MG tablet Take 1 tablet by mouth 3 times daily as needed for Anxiety for up to 30 days.  Yes NAVEEN Dior   donepezil (ARICEPT) 5 MG tablet Take 1 tablet by mouth nightly Yes NAVEEN Dior   ondansetron (ZOFRAN) 4 MG tablet TAKE ONE TABLET BY MOUTH DAILY AS NEEDED FOR NAUSEA AND VOMITING Yes NAVEEN Dior   Respiratory Therapy Supplies (NEBULIZER/TUBING/MOUTHPIECE) KIT 1 kit by Does not apply route daily Yes NAVEEN Dior   albuterol sulfate HFA (PROAIR HFA) 108 (90 Base) MCG/ACT inhaler Inhale 2 puffs into the lungs every 6 hours as needed for Wheezing Yes NAVEEN Dior   albuterol (PROVENTIL) (5 MG/ML) 0.5% nebulizer solution Take 0.5 mLs by nebulization every 6 hours as needed for Wheezing Yes NAVEEN Dior   TOVIAZ 8 MG TB24 TAKE ONE TABLET BY MOUTH DAILY Yes Historical Provider, MD   hydrOXYzine (ATARAX) 25 MG tablet TAKE 1/2 TABLET BY MOUTH NIGHTLY AS NEEDED FOR SLEEP Yes Historical Provider, MD   diphenhydrAMINE (SIMPLY SLEEP) 25 MG tablet Take 25 mg by mouth nightly as needed for Sleep Yes Historical Provider, MD   nitroGLYCERIN (NITROQUICK) 0.4 MG SL tablet Place 1 tablet under the tongue every 5 minutes as needed for Chest pain Yes NAVEEN Dior   olopatadine (PATADAY) 0.2 % SOLN ophthalmic solution Place 1 drop into both eyes daily Yes NAVEEN Dior   umeclidinium-vilanterol (ANORO ELLIPTA) 62.5-25 MCG/INH AEPB inhaler Inhale 1 puff into the lungs daily Yes NAVEEN Dior   isosorbide mononitrate (IMDUR) 30 MG extended release tablet TAKE ONE TABLET BY MOUTH DAILY Yes NAVEEN Dior   pantoprazole (PROTONIX) 20 MG tablet Take 1 tablet by mouth every morning (before breakfast) Yes NAVEEN Dior   gabapentin (NEURONTIN) 100 MG capsule Take 100 mg by mouth 3 times daily. . Yes Historical Provider, MD   denosumab (PROLIA) 60 MG/ML SOLN SC injection Inject 60mg subq every 6  months Yes NAVEEN Dior   traZODone (DESYREL) 150 MG tablet Take 1 tablet by mouth nightly Yes PANCHITO Lara DO   furosemide (LASIX) 20 MG tablet Take 1 tablet by mouth daily Yes NAVEEN Dior   memantine (NAMENDA) 10 MG tablet Take 1 tablet by mouth 2 times daily Yes NAVEEN Dior   carvedilol (COREG) 3.125 MG tablet Take 1 tablet by mouth 2 times daily (with meals) Yes NAVEEN Dior   DULoxetine (CYMBALTA) 60 MG extended release capsule Take 1 capsule by mouth 2 times HENT: Negative for congestion, ear pain, rhinorrhea and sore throat. Eyes: Negative for redness. Respiratory: Positive for cough (stable) and shortness of breath (stable). Negative for wheezing. Cardiovascular: Negative for chest pain. Gastrointestinal: Negative for abdominal pain, constipation, diarrhea, nausea and vomiting. Genitourinary: Negative for dysuria and urgency. Musculoskeletal: Negative for arthralgias (right shoulder pain improved). Skin: Negative for rash. Neurological: Negative for dizziness and headaches. Psychiatric/Behavioral: Positive for sleep disturbance (stable on current regimen). The patient is nervous/anxious (stable with current regimen). Physical Exam   Constitutional: She appears well-developed. HENT:   Head: Normocephalic. Right Ear: Hearing, tympanic membrane and external ear normal.   Left Ear: Hearing, tympanic membrane and external ear normal.   Nose: Nose normal. No mucosal edema or rhinorrhea. Mouth/Throat: Oropharynx is clear and moist. No posterior oropharyngeal erythema. Eyes: Right conjunctiva is not injected. Right conjunctiva has no hemorrhage. Neck: Normal range of motion. Carotid bruit is not present. Cardiovascular: Normal rate and regular rhythm. Pulmonary/Chest: Effort normal. No respiratory distress. She has in the right lower field and the left lower field. She has no wheezes. She has rhonchi (scattered, mild). She has no rales. Abdominal: Soft. Bowel sounds are normal.   Musculoskeletal:        Lumbar back: She exhibits tenderness. Neurological: She is alert. Skin: Skin is dry. No rash noted. Psychiatric: She has a normal mood and affect. Her behavior is normal. Judgment and thought content normal.   Vitals reviewed. ASSESSMENT      ICD-10-CM    1.  HAILEY (generalized anxiety disorder) F41.1 clonazePAM (KLONOPIN) 0.5 MG tablet (patient took Klonopin last night)     POCT Rapid Drug Screen  Continue Cymbalta BID 2. 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 Chronic narcotic dependence (HCC) F11.20 Smoking cessation recommended    4. Chronic obstructive pulmonary disease, unspecified COPD type ([facility] [address] J44.9 Continue Anoro  Smoking cessation recommended    5. Port-A-Cath in place Z95.828  Port flushed without difficulty  See below   6. Orthostatic hypotension I95.1  Much improved  Continue with slow position changes   7. Chronic right shoulder pain M25.511 Much improved after shoulder/joint injection     G89.29    8. Medication management Z79.899 POCT Rapid Drug Screen: Consistent for opioids however benzodiazepines were negative. Long discussion with the patient, that our rapid drug screen cups are often negative for benzodiazepines. We will send for confirmation. PLAN    Orders Placed This Encounter   Procedures    POCT Rapid Drug Screen          procedure note:    Accessed mediport in sterile fashion with gripper needle without anesthesia. The port was flushed with normal saline and flushed easily. Blood was drawn back from the port without difficulty. The port was then flushed with heparin and the port was de-accessed. No blood loss  No complication  No specimen  He tolerated the procedure well without incident. Return in about 1 month (around 5/9/2019), or if symptoms worsen or fail to improve. Patient Instructions       Patient Education        Chronic Obstructive Pulmonary Disease (COPD): Care Instructions  Your Care Instructions    Chronic obstructive pulmonary disease (COPD) is a general term for a group of lung diseases, including emphysema and chronic bronchitis. People with COPD have decreased airflow in and out of the lungs, which makes it hard to breathe. The airways also can get clogged with thick mucus. Cigarette smoking is a major cause of COPD. Although there is no cure for COPD, you can slow its progress.  Following your treatment plan and taking care of yourself can help you feel better and live longer. Follow-up care is a key part of your treatment and safety. Be sure to make and go to all appointments, and call your doctor if you are having problems. It's also a good idea to know your test results and keep a list of the medicines you take. How can you care for yourself at home?   Staying healthy    · Do not smoke. This is the most important step you can take to prevent more damage to your lungs. If you need help quitting, talk to your doctor about stop-smoking programs and medicines. These can increase your chances of quitting for good.     · Avoid colds and flu. Get a pneumococcal vaccine shot. If you have had one before, ask your doctor whether you need a second dose. Get the flu vaccine every fall. If you must be around people with colds or the flu, wash your hands often.     · Avoid secondhand smoke, air pollution, and high altitudes. Also avoid cold, dry air and hot, humid air. Stay at home with your windows closed when air pollution is bad.    Medicines and oxygen therapy    · Take your medicines exactly as prescribed. Call your doctor if you think you are having a problem with your medicine.     · You may be taking medicines such as:  ? Bronchodilators. These help open your airways and make breathing easier. Bronchodilators are either short-acting (work for 6 to 9 hours) or long-acting (work for 24 hours). You inhale most bronchodilators, so they start to act quickly. Always carry your quick-relief inhaler with you in case you need it while you are away from home. ? Corticosteroids (prednisone, budesonide). These reduce airway inflammation. They come in pill or inhaled form. You must take these medicines every day for them to work well.     · A spacer may help you get more inhaled medicine to your lungs. Ask your doctor or pharmacist if a spacer is right for you.  If it is, ask how to use it properly.     · Do not take any vitamins, over-the-counter medicine, or herbal products without talking to your doctor first.     · If your doctor prescribed antibiotics, take them as directed. Do not stop taking them just because you feel better. You need to take the full course of antibiotics.     · Oxygen therapy boosts the amount of oxygen in your blood and helps you breathe easier. Use the flow rate your doctor has recommended, and do not change it without talking to your doctor first.   Activity    · Get regular exercise. Walking is an easy way to get exercise. Start out slowly, and walk a little more each day.     · Pay attention to your breathing. You are exercising too hard if you cannot talk while you are exercising.     · Take short rest breaks when doing household chores and other activities.     · Learn breathing methods--such as breathing through pursed lips--to help you become less short of breath.     · If your doctor has not set you up with a pulmonary rehabilitation program, talk to him or her about whether rehab is right for you. Rehab includes exercise programs, education about your disease and how to manage it, help with diet and other changes, and emotional support. Diet    · Eat regular, healthy meals. Use bronchodilators about 1 hour before you eat to make it easier to eat. Eat several small meals instead of three large ones. Drink beverages at the end of the meal. Avoid foods that are hard to chew.     · Eat foods that contain protein so that you do not lose muscle mass.     · Talk with your doctor if you gain too much weight or if you lose weight without trying.    Mental health    · Talk to your family, friends, or a therapist about your feelings. It is normal to feel frightened, angry, hopeless, helpless, and even guilty. Talking openly about bad feelings can help you cope. If these feelings last, talk to your doctor. When should you call for help? Call 911 anytime you think you may need emergency care. For example, call if:    · You have severe trouble breathing.    Call your doctor now or seek immediate medical care if:    · You have new or worse trouble breathing.     · You cough up blood.     · You have a fever.    Watch closely for changes in your health, and be sure to contact your doctor if:    · You cough more deeply or more often, especially if you notice more mucus or a change in the color of your mucus.     · You have new or worse swelling in your legs or belly.     · You are not getting better as expected. Where can you learn more? Go to [email or web address] and sign in to your [employer] account. Enter Z852 in the [employer] box to learn more about \"Chronic Obstructive Pulmonary Disease (COPD): Care Instructions. \"     If you do not have an account, please click on the \"Sign Up Now\" link. Current as of: September 5, 2018  Content Version: 11.9  © 3159-6134 [email or web address]. Care instructions adapted under license by [facility] [employer] [facility] ([facility]). If you have questions about a medical condition or this instruction, always ask your healthcare professional. [place] [address] any warranty or liability for your use of this information. Quality & Risk Score Accuracy    Visit Dx:  F11.20 - Chronic narcotic dependence ([facility] [address]  Assessment and plan:  Stable based upon symptoms and exam. Continue current treatment plan and follow up at least yearly. Last edited 04/09/19 14:05 CDT by NAVEEN Palacios             Controlled Substances Monitoring: Attestation: The Prescription Monitoring Report for this patient was reviewed today. ([id number]) NAVEEN Palacios)            Additional Instructions: As always, patient is advised to bring in medication bottles in order to correctly reconcile with our current list.    Charli Kwok received counseling on the following healthy behaviors: smoking cessation    Patient given educational materials on dx    I have instructed Jeny to complete a self tracking handout on n/a and instructed them to bring it with them to her next appointment. Discussed use, benefit, and side effects of prescribed medications. Barriers to medication compliance addressed. All patient questions answered. Pt voiced understanding.      Lori Kwok, APRN

## 2019-04-25 ENCOUNTER — TELEPHONE (OUTPATIENT)
Dept: PRIMARY CARE CLINIC | Age: 74
End: 2019-04-25

## 2019-04-25 LAB
6-ACETYLMORPHINE, UR: NORMAL
AMPHETAMINE SCREEN, URINE: NORMAL
BARBITURATE SCREEN, URINE: NORMAL
BENZODIAZEPINE SCREEN, URINE: POSITIVE
CANNABINOID SCREEN URINE: NORMAL
COCAINE METABOLITE, URINE: NORMAL
CREATININE URINE: 77.7 MG/DL
EDDP, URINE: NORMAL
ETHANOL URINE: NORMAL
MDMA URINE: NORMAL
METHADONE SCREEN, URINE: NORMAL
METHAMPHETAMINE, URINE: NORMAL
OPIATES, URINE: POSITIVE
OXYCODONE: NORMAL
PCP: NORMAL
PH, URINE: 6.78
PHENCYCLIDINE, URINE: NORMAL
PROPOXYPHENE, URINE: NORMAL
TRICYCLIC ANTIDEPRESSANTS, UR: NORMAL

## 2019-04-25 NOTE — TELEPHONE ENCOUNTER
----- Message from NAVEEN Griffiths sent at 4/25/2019 12:05 PM CDT -----  Urine drug screen does show the patient has been taking Klonopin and [place] Qiu

## 2019-04-25 NOTE — TELEPHONE ENCOUNTER
Pt daughter Gisselle Loya answers and says that pt is asleep and ask me give her results. I did not see a current HIPAA form on file. I told her to make sure they fill out one the next time they are in the office and to please have pt call us back to get results.

## 2019-05-09 DIAGNOSIS — I50.9 CONGESTIVE HEART FAILURE, UNSPECIFIED HF CHRONICITY, UNSPECIFIED HEART FAILURE TYPE (HCC): ICD-10-CM

## 2019-05-09 DIAGNOSIS — I10 ESSENTIAL HYPERTENSION: ICD-10-CM

## 2019-05-09 RX ORDER — CARVEDILOL 3.12 MG/1
TABLET ORAL
Qty: 60 TABLET | Refills: 0 | Status: SHIPPED | OUTPATIENT
Start: 2019-05-09 | End: 2019-06-06 | Stop reason: SDUPTHER

## 2019-05-09 NOTE — TELEPHONE ENCOUNTER
Received fax from pharmacy requesting refill on pts medication(s). Pt was last seen in office on 4/9/2019  and has a follow up scheduled for 5/14/2019. Will send request to  [facility] ([place], [place])  for authorization.      Requested Prescriptions     Pending Prescriptions Disp Refills    carvedilol (COREG) 3.125 MG tablet [Pharmacy Med Name: CARVEDILOL 3.125 MG TAB SD] 60 tablet 0     Sig: TAKE ONE TABLET BY MOUTH 2 TIMES A DAY WITH MEALS

## 2019-05-14 ENCOUNTER — OFFICE VISIT (OUTPATIENT)
Dept: PRIMARY CARE CLINIC | Age: 74
End: 2019-05-14
Payer: MEDICARE

## 2019-05-14 VITALS
WEIGHT: 128 LBS | TEMPERATURE: 97.9 F | HEIGHT: 60 IN | OXYGEN SATURATION: 92 % | BODY MASS INDEX: 25.13 KG/M2 | SYSTOLIC BLOOD PRESSURE: 140 MMHG | HEART RATE: 75 BPM | DIASTOLIC BLOOD PRESSURE: 93 MMHG

## 2019-05-14 DIAGNOSIS — G89.29 CHRONIC RIGHT SHOULDER PAIN: ICD-10-CM

## 2019-05-14 DIAGNOSIS — Z95.828 PORT-A-CATH IN PLACE: ICD-10-CM

## 2019-05-14 DIAGNOSIS — M25.511 CHRONIC RIGHT SHOULDER PAIN: ICD-10-CM

## 2019-05-14 DIAGNOSIS — F41.1 GAD (GENERALIZED ANXIETY DISORDER): Primary | ICD-10-CM

## 2019-05-14 DIAGNOSIS — F11.90 CHRONIC, CONTINUOUS USE OF OPIOIDS: ICD-10-CM

## 2019-05-14 DIAGNOSIS — J44.9 CHRONIC OBSTRUCTIVE PULMONARY DISEASE, UNSPECIFIED COPD TYPE (HCC): ICD-10-CM

## 2019-05-14 DIAGNOSIS — F11.20 CHRONIC NARCOTIC DEPENDENCE (HCC): ICD-10-CM

## 2019-05-14 DIAGNOSIS — G47.01 INSOMNIA DUE TO MEDICAL CONDITION: ICD-10-CM

## 2019-05-14 DIAGNOSIS — Z12.39 SCREENING FOR BREAST CANCER: ICD-10-CM

## 2019-05-14 PROCEDURE — G8419 CALC BMI OUT NRM PARAM NOF/U: HCPCS | Performed by: NURSE PRACTITIONER

## 2019-05-14 PROCEDURE — 4004F PT TOBACCO SCREEN RCVD TLK: CPT | Performed by: NURSE PRACTITIONER

## 2019-05-14 PROCEDURE — 1123F ACP DISCUSS/DSCN MKR DOCD: CPT | Performed by: NURSE PRACTITIONER

## 2019-05-14 PROCEDURE — G8926 SPIRO NO PERF OR DOC: HCPCS | Performed by: NURSE PRACTITIONER

## 2019-05-14 PROCEDURE — G8599 NO ASA/ANTIPLAT THER USE RNG: HCPCS | Performed by: NURSE PRACTITIONER

## 2019-05-14 PROCEDURE — 4040F PNEUMOC VAC/ADMIN/RCVD: CPT | Performed by: NURSE PRACTITIONER

## 2019-05-14 PROCEDURE — 3017F COLORECTAL CA SCREEN DOC REV: CPT | Performed by: NURSE PRACTITIONER

## 2019-05-14 PROCEDURE — 99214 OFFICE O/P EST MOD 30 MIN: CPT | Performed by: NURSE PRACTITIONER

## 2019-05-14 PROCEDURE — 1090F PRES/ABSN URINE INCON ASSESS: CPT | Performed by: NURSE PRACTITIONER

## 2019-05-14 PROCEDURE — G8427 DOCREV CUR MEDS BY ELIG CLIN: HCPCS | Performed by: NURSE PRACTITIONER

## 2019-05-14 PROCEDURE — G8399 PT W/DXA RESULTS DOCUMENT: HCPCS | Performed by: NURSE PRACTITIONER

## 2019-05-14 PROCEDURE — 3023F SPIROM DOC REV: CPT | Performed by: NURSE PRACTITIONER

## 2019-05-14 RX ORDER — CLONAZEPAM 0.5 MG/1
0.5 TABLET ORAL 3 TIMES DAILY PRN
Qty: 75 TABLET | Refills: 0 | Status: SHIPPED | OUTPATIENT
Start: 2019-05-14 | End: 2019-06-14 | Stop reason: SDUPTHER

## 2019-05-14 RX ORDER — NALOXONE HYDROCHLORIDE 4 MG/.1ML
1 SPRAY NASAL PRN
Qty: 1 EACH | Refills: 1 | Status: SHIPPED | OUTPATIENT
Start: 2019-05-14 | End: 2020-02-25

## 2019-05-14 ASSESSMENT — ENCOUNTER SYMPTOMS
WHEEZING: 0
SHORTNESS OF BREATH: 1
VOMITING: 0
NAUSEA: 0
CONSTIPATION: 0
COUGH: 1
DIARRHEA: 0
SORE THROAT: 0
RHINORRHEA: 0
EYE REDNESS: 0
ABDOMINAL PAIN: 0

## 2019-05-14 NOTE — PATIENT INSTRUCTIONS

## 2019-05-14 NOTE — PROGRESS NOTES
02/27/2019 4.1     Basophils % 02/27/2019 1.1*    Neutrophils # 02/27/2019 2.9     Lymphocytes # 02/27/2019 1.9     Monocytes # 02/27/2019 0.50     Eosinophils # 02/27/2019 0.20     Basophils # 02/27/2019 0.10     Sodium 02/27/2019 139     Potassium 02/27/2019 4.5     Chloride 02/27/2019 99     CO2 02/27/2019 26     Anion Gap 02/27/2019 14     Glucose 02/27/2019 87     BUN 02/27/2019 20     CREATININE 02/27/2019 1.0*    GFR Non- 02/27/2019 54*    Calcium 02/27/2019 9.1     Total Protein 02/27/2019 6.9     Alb 02/27/2019 3.9     Total Bilirubin 02/27/2019 <0.2     Alkaline Phosphatase 02/27/2019 61     ALT 02/27/2019 5     AST 02/27/2019 12    Orders Only on 12/18/2018   Component Date Value    WBC 12/18/2018 6.3     RBC 12/18/2018 3.71*    Hemoglobin 12/18/2018 11.1*    Hematocrit 12/18/2018 37.2     MCV 12/18/2018 100.3*    MCH 12/18/2018 29.9     MCHC 12/18/2018 29.8*    RDW 12/18/2018 15.4*    Platelets 62/41/6362 270     MPV 12/18/2018 10.2     Neutrophils % 12/18/2018 64.3     Lymphocytes % 12/18/2018 24.2     Monocytes % 12/18/2018 7.3     Eosinophils % 12/18/2018 2.8     Basophils % 12/18/2018 0.9     Neutrophils # 12/18/2018 4.1     Lymphocytes # 12/18/2018 1.5     Monocytes # 12/18/2018 0.50     Eosinophils # 12/18/2018 0.20     Basophils # 12/18/2018 0.10     Sodium 12/18/2018 138     Potassium 12/18/2018 4.1     Chloride 12/18/2018 98     CO2 12/18/2018 22     Anion Gap 12/18/2018 18     Glucose 12/18/2018 83     BUN 12/18/2018 21     CREATININE 12/18/2018 1.0*    GFR Non- 12/18/2018 54*    Calcium 12/18/2018 9.5     Total Protein 12/18/2018 7.0     Alb 12/18/2018 3.8     Total Bilirubin 12/18/2018 <0.2     Alkaline Phosphatase 12/18/2018 56     ALT 12/18/2018 <5*    AST 12/18/2018 12      Copies of these are in the chart. Prior to Visit Medications    Medication Sig Taking?  Authorizing Provider   carvedilol by mouth 2 times daily Yes NAVEEN Dior   OXYGEN Inhale 2 L into the lungs daily Yes NAVEEN Dior   HYDROcodone-acetaminophen (NORCO)  MG per tablet TAKE ONE TABLET BY MOUTH EVERY 8 HOURS AS NEEDED- dr galarza Yes Historical Provider, MD   clonazePAM (KLONOPIN) 0.5 MG tablet Take 1 tablet by mouth 3 times daily as needed for Anxiety for up to 30 days.  Yes NAVEEN Dior   albuterol (PROVENTIL) (5 MG/ML) 0.5% nebulizer solution Take 0.5 mLs by nebulization every 6 hours as needed for Wheezing Yes NAVEEN Dior   naloxone (NARCAN) 4 MG/0.1ML LIQD nasal spray 1 spray by Nasal route as needed for Opioid Reversal Yes NAVEEN Dior   Fluticasone-Umeclidin-Vilant 100-62.5-25 MCG/INH AEPB Inhale 1 puff into the lungs daily Yes NAVEEN Dior       Allergies: Codeine and Sulfa antibiotics    Past Medical History:   Diagnosis Date    Abdominal pain     Anxiety     Arthritis     CAD in native artery     Cerebrovascular disease     [facility]est[facility])     Constipation     COPD (chronic obstructive pulmonary disease) ([facility] [address]     Depression     Emphysema     GERD (gastroesophageal reflux disease)     Myocardial infarct, old     Pneumonia     Tobacco abuse        Past Surgical History:   Procedure Laterality Date    ABDOMINAL EXPLORATION SURGERY      APPENDECTOMY      CARDIAC CATHETERIZATION  06/25/10    patent  stent noted in LAD,EF is estimated to be 60%    CARDIAC CATHETERIZATION  7/13/12  MDL    EF  60%    CHOLECYSTECTOMY      COLONOSCOPY  10/2014    Dr Ortez Kg ENDOSCOPY  10/1/14    Dr Ceferino Rodriguez: food bezoar; esophagitis, gastritis, duodenitis; biopsies neg h-pylori       Social History     Tobacco Use    Smoking status: Current Every Day Smoker     Packs/day: 1.00     Years: 36.00     Pack years: 36.00     Types: Cigarettes     Start date: 1978    Smokeless tobacco: Never Used   Substance Use Topics    Alcohol use: No     Alcohol/week: 0.0 oz       Family History   Problem Relation Age of Onset   [facility] Stroke Brother     Stroke Sister     Cancer Brother     Cancer Sister     Diabetes Brother     Diabetes Sister     Coronary Art Dis Other     Hypertension Other     Seizures Child     Colon Cancer Neg Hx     Colon Polyps Neg Hx     Esophageal Cancer Neg Hx     Liver Cancer Neg Hx     Liver Disease Neg Hx     Rectal Cancer Neg Hx     Stomach Cancer Neg Hx        Review of Systems   Constitutional: Negative for chills, fatigue and fever. HENT: Negative for congestion, ear pain, rhinorrhea and sore throat. Eyes: Negative for redness. Respiratory: Positive for cough (stable) and shortness of breath (stable). Negative for wheezing. Cardiovascular: Negative for chest pain. Gastrointestinal: Negative for abdominal pain, constipation, diarrhea, nausea and vomiting. Genitourinary: Negative for dysuria and urgency. Musculoskeletal: Negative for arthralgias (right shoulder pain improved). Skin: Negative for rash. Neurological: Negative for dizziness and headaches. Psychiatric/Behavioral: Positive for sleep disturbance (stable on current regimen). The patient is nervous/anxious (stable with current regimen). Physical Exam   Constitutional: She appears well-developed. HENT:   Head: Normocephalic. Right Ear: Hearing, tympanic membrane and external ear normal.   Left Ear: Hearing, tympanic membrane and external ear normal.   Nose: Nose normal. No mucosal edema or rhinorrhea. Mouth/Throat: Oropharynx is clear and moist. No posterior oropharyngeal erythema. Eyes: Right conjunctiva is not injected. Right conjunctiva has no hemorrhage. Neck: Normal range of motion. Carotid bruit is not present. Cardiovascular: Normal rate and regular rhythm. Pulmonary/Chest: Effort normal. No respiratory distress. She has in the right lower field and the left lower field. She has no wheezes.  She has patient was reviewed today. ([id number]) [facility], APRN)            Additional Instructions: As always, patient is advised to bring in medication bottles in order to correctly reconcile with our current list.    Gatito Madrid received counseling on the following healthy behaviors: n/a    Patient given educational materials on dx    I have instructed Jeny to complete a self tracking handout on n/a and instructed them to bring it with them to her next appointment. Discussed use, benefit, and side effects of prescribed medications. Barriers to medication compliance addressed. All patient questions answered. Pt voiced understanding.      [facility], APRN

## 2019-05-28 RX ORDER — HYDROXYZINE HYDROCHLORIDE 25 MG/1
12.5 TABLET, FILM COATED ORAL NIGHTLY
Qty: 15 TABLET | Refills: 3 | Status: SHIPPED | OUTPATIENT
Start: 2019-05-28 | End: 2019-11-20

## 2019-06-06 DIAGNOSIS — I10 ESSENTIAL HYPERTENSION: ICD-10-CM

## 2019-06-06 DIAGNOSIS — I50.9 CONGESTIVE HEART FAILURE, UNSPECIFIED HF CHRONICITY, UNSPECIFIED HEART FAILURE TYPE (HCC): ICD-10-CM

## 2019-06-06 RX ORDER — CARVEDILOL 3.12 MG/1
3.12 TABLET ORAL 2 TIMES DAILY WITH MEALS
Qty: 60 TABLET | Refills: 11 | Status: SHIPPED | OUTPATIENT
Start: 2019-06-06 | End: 2020-06-05

## 2019-06-06 NOTE — TELEPHONE ENCOUNTER
Received fax from pharmacy requesting refill on pts medication(s). Pt was last seen in office on 5/14/2019  and has a follow up scheduled for 6/14/2019. Will send [facility] to  [facility]  for patient.      Requested Prescriptions     Pending Prescriptions Disp Refills    carvedilol (COREG) 3.125 MG tablet [Pharmacy Med Name: CARVEDILOL 3.125 MG TAB SD] 60 tablet 0     Sig: TAKE ONE TABLET BY MOUTH 2 TIMES A DAY WITH MEALS

## 2019-06-10 RX ORDER — FESOTERODINE FUMARATE 8 MG/1
8 TABLET, EXTENDED RELEASE ORAL DAILY
Qty: 30 TABLET | Refills: 5 | Status: SHIPPED | OUTPATIENT
Start: 2019-06-10 | End: 2019-12-04 | Stop reason: SDUPTHER

## 2019-06-14 ENCOUNTER — OFFICE VISIT (OUTPATIENT)
Dept: PRIMARY CARE CLINIC | Age: 74
End: 2019-06-14
Payer: MEDICARE

## 2019-06-14 VITALS
BODY MASS INDEX: 23.75 KG/M2 | WEIGHT: 121 LBS | HEART RATE: 95 BPM | SYSTOLIC BLOOD PRESSURE: 121 MMHG | DIASTOLIC BLOOD PRESSURE: 82 MMHG | TEMPERATURE: 97.4 F | OXYGEN SATURATION: 95 % | HEIGHT: 60 IN

## 2019-06-14 DIAGNOSIS — I10 ESSENTIAL HYPERTENSION: ICD-10-CM

## 2019-06-14 DIAGNOSIS — Z72.0 TOBACCO ABUSE: ICD-10-CM

## 2019-06-14 DIAGNOSIS — R09.02 OXYGEN DESATURATION: ICD-10-CM

## 2019-06-14 DIAGNOSIS — K31.84 NONDIABETIC GASTROPARESIS: ICD-10-CM

## 2019-06-14 DIAGNOSIS — Z95.828 PORT-A-CATH IN PLACE: ICD-10-CM

## 2019-06-14 DIAGNOSIS — G47.01 INSOMNIA DUE TO MEDICAL CONDITION: ICD-10-CM

## 2019-06-14 DIAGNOSIS — J44.1 CHRONIC OBSTRUCTIVE PULMONARY DISEASE WITH ACUTE EXACERBATION (HCC): Primary | ICD-10-CM

## 2019-06-14 DIAGNOSIS — F41.1 GAD (GENERALIZED ANXIETY DISORDER): ICD-10-CM

## 2019-06-14 DIAGNOSIS — R11.0 NAUSEA: ICD-10-CM

## 2019-06-14 PROCEDURE — 4004F PT TOBACCO SCREEN RCVD TLK: CPT | Performed by: NURSE PRACTITIONER

## 2019-06-14 PROCEDURE — G8420 CALC BMI NORM PARAMETERS: HCPCS | Performed by: NURSE PRACTITIONER

## 2019-06-14 PROCEDURE — 3023F SPIROM DOC REV: CPT | Performed by: NURSE PRACTITIONER

## 2019-06-14 PROCEDURE — G8599 NO ASA/ANTIPLAT THER USE RNG: HCPCS | Performed by: NURSE PRACTITIONER

## 2019-06-14 PROCEDURE — G8926 SPIRO NO PERF OR DOC: HCPCS | Performed by: NURSE PRACTITIONER

## 2019-06-14 PROCEDURE — 3017F COLORECTAL CA SCREEN DOC REV: CPT | Performed by: NURSE PRACTITIONER

## 2019-06-14 PROCEDURE — 4040F PNEUMOC VAC/ADMIN/RCVD: CPT | Performed by: NURSE PRACTITIONER

## 2019-06-14 PROCEDURE — 1123F ACP DISCUSS/DSCN MKR DOCD: CPT | Performed by: NURSE PRACTITIONER

## 2019-06-14 PROCEDURE — 1090F PRES/ABSN URINE INCON ASSESS: CPT | Performed by: NURSE PRACTITIONER

## 2019-06-14 PROCEDURE — 96523 IRRIG DRUG DELIVERY DEVICE: CPT | Performed by: NURSE PRACTITIONER

## 2019-06-14 PROCEDURE — G8427 DOCREV CUR MEDS BY ELIG CLIN: HCPCS | Performed by: NURSE PRACTITIONER

## 2019-06-14 PROCEDURE — G8399 PT W/DXA RESULTS DOCUMENT: HCPCS | Performed by: NURSE PRACTITIONER

## 2019-06-14 PROCEDURE — 99214 OFFICE O/P EST MOD 30 MIN: CPT | Performed by: NURSE PRACTITIONER

## 2019-06-14 RX ORDER — ONDANSETRON 4 MG/1
4 TABLET, FILM COATED ORAL EVERY 8 HOURS PRN
Qty: 40 TABLET | Refills: 1 | Status: SHIPPED | OUTPATIENT
Start: 2019-06-14 | End: 2021-01-07

## 2019-06-14 RX ORDER — DOXYCYCLINE HYCLATE 100 MG/1
100 CAPSULE ORAL 2 TIMES DAILY
Qty: 20 CAPSULE | Refills: 0 | Status: SHIPPED | OUTPATIENT
Start: 2019-06-14 | End: 2019-06-24

## 2019-06-14 RX ORDER — CLONAZEPAM 0.5 MG/1
0.5 TABLET ORAL 3 TIMES DAILY PRN
Qty: 75 TABLET | Refills: 0 | Status: SHIPPED | OUTPATIENT
Start: 2019-06-14 | End: 2019-08-06 | Stop reason: SDUPTHER

## 2019-06-14 ASSESSMENT — ENCOUNTER SYMPTOMS
CONSTIPATION: 0
EYE REDNESS: 0
SORE THROAT: 0
RHINORRHEA: 0
SINUS PRESSURE: 0
COUGH: 1
VOMITING: 0
ABDOMINAL PAIN: 0
NAUSEA: 0
WHEEZING: 1
DIARRHEA: 0
SHORTNESS OF BREATH: 1
SINUS PAIN: 0

## 2019-06-14 NOTE — PATIENT INSTRUCTIONS
Patient Education        Chronic Obstructive Pulmonary Disease (COPD) Flare-Ups: Care Instructions  Your Care Instructions    Chronic obstructive pulmonary disease (COPD) is a lung disease that makes it hard to breathe. It is caused by damage to the lungs over many years, usually from smoking. COPD is often a mix of two diseases:  · Chronic bronchitis: The airways that carry air to the lungs (bronchial tubes) get inflamed and make a lot of mucus. This can narrow or block the airways. · Emphysema: In a healthy person, the tiny air sacs in the lungs are like balloons. As you breathe in and out, they get bigger and smaller to move air through your lungs. But with emphysema, these air sacs are damaged and lose their stretch. Less air gets in and out of the lungs. Many people with COPD have attacks called flare-ups or exacerbations. This is when your usual symptoms quickly get worse and stay worse. The doctor has checked you carefully. But problems can develop later. If you notice any problems or new symptoms, get medical treatment right away. Follow-up care is a key part of your treatment and safety. Be sure to make and go to all appointments, and call your doctor if you are having problems. It's also a good idea to know your test results and keep a list of the medicines you take. How can you care for yourself at home? · Be safe with medicines. Take your medicines exactly as prescribed. Call your doctor if you think you are having a problem with your medicine. You may be taking medicines such as:  ? Bronchodilators. These help open your airways and make breathing easier. ? Corticosteroids. These reduce airway inflammation. They may be given as pills, in a vein, or in an inhaled form. You may go home with pills in addition to an inhaler that you already use. · A spacer may help you get more inhaled medicine to your lungs. Ask your doctor or pharmacist if a spacer is right for you.  If it is, ask how to use it

## 2019-06-14 NOTE — PROGRESS NOTES
[facility]ai Stephanie Hurtado  Phone [phone]   Fax [phone]      OFFICE VISIT: 2019    Jing Shore- : 1945    Chief Complaint:Jeny is a 69 y.o. female who is here for 1 Month Follow-Up (anxiety); Medication Refill; Fatigue; Cough; and Congestion     HPI   COPD:  The patient presents today for evaluation of cough, congestion and fatigue. She is not wearing her home oxygen today. Her oxygen level is 84% on RA. O2 Sat is 95% on 2L  Reports wheezing. Reports PND. \"I have been spitting up old green, yellow stuff. \"  She has been using her Trelegy daily. She is using albuterol nebulizer prn. She continues to smoke    MOODS:  She has been taking Cymbalta 60 mg BID and Klonopin 0.5 mg/prn. Moods are stable. She needs her Klonopin refilled today. HTN:  Well controlled. She is taking Coreg 3.125 mg BID    INSOMNIA:  She is sleeping well with Trazodone 150 mg nightly. height is 5' (1.524 m) and weight is 121 lb (54.9 kg). Her temporal temperature is 97.4 °F (36.3 °C). Her blood pressure is 121/82 and her pulse is 95. Her oxygen saturation is 95%. Body mass index is 23.63 kg/m². Results for orders placed or performed in visit on 19   Urine Drug Screen   Result Value Ref Range    Amphetamine Screen, Urine neg     Barbiturate Screen, Urine neg     Benzodiazepine Screen, Urine positive     Cannabinoid Scrn, Ur neg     Cocaine Metabolite, Urine neg     Methadone Screen, Urine neg     Opiates, Urine positive     PCP neg     Propoxyphene, Urine neg     Phencyclidine, Urine neg     Oxycodone neg     Methamphetamine, Urine neg     Tricyclic Antidepressants, Urine neg     MDMA, Urine neg     6-Acetylmorphine, Ur neg     EDDP, Urine neg     Ethanol, Ur neg     pH, Urine 6.78     Creatinine, Ur 77.7 mg/dL     have reviewed the following with the MsMalachi  Maynor   Lab Review   Orders Only on 2019   Component Date Value    Amphetamine Screen, Urine 2019 neg     Barbiturate Screen, Urine 04/09/2019 neg     Benzodiazepine Screen, U* 04/09/2019 positive     Cannabinoid Scrn, Ur 04/09/2019 neg     Cocaine Metabolite, Urine 04/09/2019 neg     Methadone Screen, Urine 04/09/2019 neg     Opiates, Urine 04/09/2019 positive     PCP 04/09/2019 neg     Propoxyphene, Urine 04/09/2019 neg     Phencyclidine, Urine 04/09/2019 neg     Oxycodone 04/09/2019 neg     Methamphetamine, Urine 59/00/7956 neg     Tricyclic Antidepressant* 71/60/7572 neg     MDMA, Urine 04/09/2019 neg     6-Acetylmorphine, Ur 04/09/2019 neg     EDDP, Urine 04/09/2019 neg     Ethanol, Ur 04/09/2019 neg     pH, Urine 04/09/2019 6.78     Creatinine, Ur 04/09/2019 77.7    Office Visit on 04/09/2019   Component Date Value    Amphetamine Screen, Urine 04/09/2019 neg     Barbiturate Screen, Urine 04/09/2019 neg     Benzodiazepine Screen, U* 04/09/2019 neg     Buprenorphine Urine 04/09/2019 neg     Cocaine Metabolite Scree* 04/09/2019 neg     Gabapentin Screen, Urine 04/09/2019 neg     MDMA, Urine 04/09/2019 neg     Methamphetamine, Urine 04/09/2019 neg     Methadone Screen, Urine 04/09/2019 neg     Opiate Scrn, Ur 04/09/2019 positive     Oxycodone Screen, Ur 04/09/2019 neg     PCP Screen, Urine 04/09/2019 neg     Propoxyphene Screen, Uri* 04/09/2019 neg     THC Screen, Urine 08/63/0285 neg     Tricyclic Antidepressant* 89/41/1036 neg    Orders Only on 02/27/2019   Component Date Value    WBC 02/27/2019 5.6     RBC 02/27/2019 3.58*    Hemoglobin 02/27/2019 10.7*    Hematocrit 02/27/2019 35.9*    MCV 02/27/2019 100.3*    MCH 02/27/2019 29.9     MCHC 02/27/2019 29.8*    RDW 02/27/2019 15.0*    Platelets 22/08/6490 201     MPV 02/27/2019 10.0     Neutrophils % 02/27/2019 51.8     Lymphocytes % 02/27/2019 33.3     Monocytes % 02/27/2019 9.5     Eosinophils % 02/27/2019 4.1     Basophils % 02/27/2019 1.1*    Neutrophils # 02/27/2019 2.9     Lymphocytes # 02/27/2019 1.9     Monocytes # 02/27/2019 0.50     Eosinophils # 02/27/2019 0.20     Basophils # 02/27/2019 0.10     Sodium 02/27/2019 139     Potassium 02/27/2019 4.5     Chloride 02/27/2019 99     CO2 02/27/2019 26     Anion Gap 02/27/2019 14     Glucose 02/27/2019 87     BUN 02/27/2019 20     CREATININE 02/27/2019 1.0*    GFR Non- 02/27/2019 54*    Calcium 02/27/2019 9.1     Total Protein 02/27/2019 6.9     Alb 02/27/2019 3.9     Total Bilirubin 02/27/2019 <0.2     Alkaline Phosphatase 02/27/2019 61     ALT 02/27/2019 5     AST 02/27/2019 12    Orders Only on 12/18/2018   Component Date Value    WBC 12/18/2018 6.3     RBC 12/18/2018 3.71*    Hemoglobin 12/18/2018 11.1*    Hematocrit 12/18/2018 37.2     MCV 12/18/2018 100.3*    MCH 12/18/2018 29.9     MCHC 12/18/2018 29.8*    RDW 12/18/2018 15.4*    Platelets 09/08/3316 270     MPV 12/18/2018 10.2     Neutrophils % 12/18/2018 64.3     Lymphocytes % 12/18/2018 24.2     Monocytes % 12/18/2018 7.3     Eosinophils % 12/18/2018 2.8     Basophils % 12/18/2018 0.9     Neutrophils # 12/18/2018 4.1     Lymphocytes # 12/18/2018 1.5     Monocytes # 12/18/2018 0.50     Eosinophils # 12/18/2018 0.20     Basophils # 12/18/2018 0.10     Sodium 12/18/2018 138     Potassium 12/18/2018 4.1     Chloride 12/18/2018 98     CO2 12/18/2018 22     Anion Gap 12/18/2018 18     Glucose 12/18/2018 83     BUN 12/18/2018 21     CREATININE 12/18/2018 1.0*    GFR Non- 12/18/2018 54*    Calcium 12/18/2018 9.5     Total Protein 12/18/2018 7.0     Alb 12/18/2018 3.8     Total Bilirubin 12/18/2018 <0.2     Alkaline Phosphatase 12/18/2018 56     ALT 12/18/2018 <5*    AST 12/18/2018 12      Copies of these are in the chart. Prior to Visit Medications    Medication Sig Taking? Authorizing Provider   clonazePAM (KLONOPIN) 0.5 MG tablet Take 1 tablet by mouth 3 times daily as needed for Anxiety for up to 30 days.  Yes Terese Rainey, APRN doxycycline hyclate (VIBRAMYCIN) 100 MG capsule Take 1 capsule by mouth 2 times daily for 10 days Yes NAVEEN Dior   Fesoterodine Fumarate ER (TOVIAZ) 8 MG TB24 Take 8 mg by mouth daily Yes NAVEEN Ponce   carvedilol (COREG) 3.125 MG tablet Take 1 tablet by mouth 2 times daily (with meals) Yes NAVEEN Ponce   hydrOXYzine (ATARAX) 25 MG tablet Take 0.5 tablets by mouth nightly Yes NAVEEN Dior   albuterol (PROVENTIL) (5 MG/ML) 0.5% nebulizer solution Take 0.5 mLs by nebulization every 6 hours as needed for Wheezing Yes NAVEEN Dior   naloxone (NARCAN) 4 MG/0.1ML LIQD nasal spray 1 spray by Nasal route as needed for Opioid Reversal Yes NAVEEN Dior   Fluticasone-Umeclidin-Vilant 100-62.5-25 MCG/INH AEPB Inhale 1 puff into the lungs daily Yes NAVEEN Dior   donepezil (ARICEPT) 5 MG tablet Take 1 tablet by mouth nightly Yes NAVEEN Dior   ondansetron (ZOFRAN) 4 MG tablet TAKE ONE TABLET BY MOUTH DAILY AS NEEDED FOR NAUSEA AND VOMITING Yes NAVEEN Dior   Respiratory Therapy Supplies (NEBULIZER/TUBING/MOUTHPIECE) KIT 1 kit by Does not apply route daily Yes NAVEEN Dior   albuterol sulfate HFA (PROAIR HFA) 108 (90 Base) MCG/ACT inhaler Inhale 2 puffs into the lungs every 6 hours as needed for Wheezing Yes NAVEEN Dior   diphenhydrAMINE (SIMPLY SLEEP) 25 MG tablet Take 25 mg by mouth nightly as needed for Sleep Yes Historical Provider, MD   nitroGLYCERIN (NITROQUICK) 0.4 MG SL tablet Place 1 tablet under the tongue every 5 minutes as needed for Chest pain Yes NAVEEN Dior   olopatadine (PATADAY) 0.2 % SOLN ophthalmic solution Place 1 drop into both eyes daily Yes NAVEEN Dior   isosorbide mononitrate (IMDUR) 30 MG extended release tablet TAKE ONE TABLET BY MOUTH DAILY Yes NAVEEN Dior   pantoprazole (PROTONIX) 20 MG tablet Take 1 tablet by mouth every morning (before breakfast) Yes NAVEEN Dior   gabapentin (NEURONTIN) 100 MG capsule Take 100 mg by mouth 3 times daily. . Yes Historical Provider, MD   denosumab (PROLIA) 60 MG/ML SOLN SC injection Inject 60mg subq every 6  months Yes NAVEEN Dior   traZODone (DESYREL) 150 MG tablet Take 1 tablet by mouth nightly Yes PANCHITO Lagunas,    furosemide (LASIX) 20 MG tablet Take 1 tablet by mouth daily Yes NAVEEN Dior   memantine (NAMENDA) 10 MG tablet Take 1 tablet by mouth 2 times daily Yes NAVEEN Dior   DULoxetine (CYMBALTA) 60 MG extended release capsule Take 1 capsule by mouth 2 times daily Yes NAVEEN Dior   OXYGEN Inhale 2 L into the lungs daily Yes NAVEEN Dior   HYDROcodone-acetaminophen (NORCO)  MG per tablet TAKE ONE TABLET BY MOUTH EVERY 8 HOURS Rachel Byers- dr galarza Yes Historical Provider, MD       Allergies: Codeine and Sulfa antibiotics    Past Medical History:   Diagnosis Date    Abdominal pain     Anxiety     Arthritis     CAD in native artery     Cerebrovascular disease     [facility]     Constipation     [facility] obstructive [facility]) [facility] [address]     Depression     Emphysema     GERD (gastroesophageal reflux disease)     Myocardial infarct, old     Pneumonia     Tobacco abuse        Past Surgical History:   Procedure Laterality Date    ABDOMINAL EXPLORATION SURGERY      APPENDECTOMY      CARDIAC CATHETERIZATION  06/25/10    patent  stent noted in LAD,EF is estimated to be 60%    CARDIAC CATHETERIZATION  7/13/12  MDL    EF  60%    CHOLECYSTECTOMY      COLONOSCOPY  10/2014    Dr Do [facility]    HYSTERECTOMY      UPPER GASTROINTESTINAL ENDOSCOPY  10/1/14    Dr [facility]: food bezoar; esophagitis, gastritis, duodenitis; biopsies neg h-pylori       Social History     Tobacco Use    Smoking status: Current Every Day Smoker     Packs/day: 1.00     Years: 36.00     Pack years: 36.00     Types: Cigarettes     Start date: 1978    Smokeless tobacco: Never Used   Substance Use Topics    Alcohol use: No     Alcohol/week: 0.0 oz       Family History   Problem Relation Age of Onset   [place] Stroke Brother     Stroke Sister     Cancer Brother     Cancer Sister     Diabetes Brother     Diabetes Sister     Coronary Art Dis Other     Hypertension Other     Seizures Child     Colon Cancer Neg Hx     Colon Polyps Neg Hx     Esophageal Cancer Neg Hx     Liver Cancer Neg Hx     Liver Disease Neg Hx     Rectal Cancer Neg Hx     Stomach Cancer Neg Hx        Review of Systems   Constitutional: Positive for fatigue. Negative for chills and fever. HENT: Positive for congestion and postnasal drip. Negative for ear pain, rhinorrhea, sinus pressure, sinus pain and sore throat. Eyes: Negative for redness. Respiratory: Positive for cough (productive), shortness of breath (chronic, but acutely worse) and wheezing. Cardiovascular: Negative for chest pain. Gastrointestinal: Negative for abdominal pain, constipation, diarrhea, nausea and vomiting. Genitourinary: Negative for dysuria and urgency. Musculoskeletal: Negative for arthralgias. Skin: Negative for rash. Neurological: Negative for dizziness and headaches. Psychiatric/Behavioral: Positive for sleep disturbance (stable on current regimen). The patient is nervous/anxious (stable with current regimen). Physical Exam   Constitutional: She appears well-developed. HENT:   Head: Normocephalic. Right Ear: Hearing, tympanic membrane and external ear normal.   Left Ear: Hearing, tympanic membrane and external ear normal.   Nose: Nose normal. No mucosal edema or rhinorrhea. Mouth/Throat: Oropharynx is clear and moist. No posterior oropharyngeal erythema. Eyes: Right conjunctiva is not injected. Right conjunctiva has no hemorrhage. Neck: Normal range of motion. Carotid bruit is not present. Cardiovascular: Normal rate and regular rhythm. Pulmonary/Chest: Effort normal. No respiratory distress.  She has decreased breath sounds (throughout). She has wheezes (scattered). She has rhonchi (scattered, mild). She has no rales. Abdominal: Soft. Bowel sounds are normal.   Musculoskeletal:        Lumbar back: She exhibits tenderness (chronic). Neurological: She is alert. Skin: Skin is dry. No rash noted. Psychiatric: She has a normal mood and affect. Her behavior is normal. Judgment and thought content normal.   Vitals reviewed. ASSESSMENT      ICD-10-CM    1. Chronic obstructive pulmonary disease with acute exacerbation (HCC) J44.1 doxycycline hyclate (VIBRAMYCIN) 100 MG capsule  Continue Trelegy  Wear home oxygen as prescribed  Albuterol nebs prn   2. HAILEY (generalized anxiety disorder) F41.1 clonazePAM (KLONOPIN) 0.5 MG tablet  Continue Cymbalta 60 mg BID   3. Insomnia due to medical condition G47.01 clonazePAM (KLONOPIN) 0.5 MG tablet  Continue Trazodone 150 mg nightly   4. Essential hypertension I10 The current medical regimen is effective;  continue present plan and medications. Patient is asked to monitor BP at home or work, several times per month and return with written values at next office visit. 5. Port-A-Cath in place Z95.828 ME IRRIG IMPLANTED DRUG DELIVERY DEVICE   6. Oxygen desaturation R09.02  Wear home oxygen as prescribed   7. Tobacco abuse Z72.0  Smoking cessation recommended         PLAN    Orders Placed This Encounter   Procedures    ME IRRIG IMPLANTED DRUG DELIVERY DEVICE          procedure note:    Accessed mediport in sterile fashion with gripper needle without anesthesia. The port was flushed with normal saline and flushed easily. Blood was drawn from the port without difficulty. The port was then flushed with heparin and the port was de-accessed. No blood loss  No complication  No specimen  She tolerated the procedure well without incident. Return in about 6 weeks (around 7/26/2019), or if symptoms worsen or fail to improve.      Patient Instructions       Patient Education Chronic Obstructive Pulmonary Disease (COPD) Flare-Ups: Care Instructions  Your Care Instructions    Chronic obstructive pulmonary disease (COPD) is a lung disease that makes it hard to breathe. It is caused by damage to the lungs over many years, usually from smoking. COPD is often a mix of two diseases:  · Chronic bronchitis: The airways that carry air to the lungs (bronchial tubes) get inflamed and make a lot of mucus. This can narrow or block the airways. · Emphysema: In a healthy person, the tiny air sacs in the lungs are like balloons. As you breathe in and out, they get bigger and smaller to move air through your lungs. But with emphysema, these air sacs are damaged and lose their stretch. Less air gets in and out of the lungs. Many people with COPD have attacks called flare-ups or exacerbations. This is when your usual symptoms quickly get worse and stay worse. The doctor has checked you carefully. But problems can develop later. If you notice any problems or new symptoms, get medical treatment right away. Follow-up care is a key part of your treatment and safety. Be sure to make and go to all appointments, and call your doctor if you are having problems. It's also a good idea to know your test results and keep a list of the medicines you take. How can you care for yourself at home? · Be safe with medicines. Take your medicines exactly as prescribed. Call your doctor if you think you are having a problem with your medicine. You may be taking medicines such as:  ? Bronchodilators. These help open your airways and make breathing easier. ? Corticosteroids. These reduce airway inflammation. They may be given as pills, in a vein, or in an inhaled form. You may go home with pills in addition to an inhaler that you already use. · A spacer may help you get more inhaled medicine to your lungs. Ask your doctor or pharmacist if a spacer is right for you. If it is, ask how to use it properly.   · If your doctor prescribed antibiotics, take them as directed. Do not stop taking them just because you feel better. You need to take the full course of antibiotics. · If your doctor prescribed oxygen, use the flow rate your doctor has recommended. Do not change it without talking to your doctor first.  · Do not smoke. Smoking makes COPD worse. If you need help quitting, talk to your doctor about stop-smoking programs and medicines. These can increase your chances of quitting for good. When should you call for help? Call 911 anytime you think you may need emergency care. For example, call if:    · You have severe trouble breathing.    Call your doctor now or seek immediate medical care if:    · You have new or worse trouble breathing.     · Your coughing or wheezing gets worse.     · You cough up dark brown or bloody mucus (sputum).     · You have a new or higher fever.    Watch closely for changes in your health, and be sure to contact your doctor if:    · You notice more mucus or a change in the color of your mucus.     · You need to use your antibiotic or steroid pills.     · You do not get better as expected. Where can you learn more? Go to https://[employer]. org and sign in to your Integration Management account. Enter K706 in the [employer] box to learn more about \"Chronic Obstructive Pulmonary Disease (COPD) Flare-Ups: Care Instructions. \"     If you do not have an account, please click on the \"Sign Up Now\" link. Current as of: September 5, 2018  Content Version: 12.0  © 1001-6359 Healthwise, Incorporated. Care instructions adapted under license by [facility] ([facility]). If you have questions about a medical condition or this instruction, always ask your healthcare professional. [place] [address] any warranty or liability for your use of this information.                        Controlled Substances Monitoring: Attestation: The Prescription Monitoring Report for this patient was reviewed ADJIT.([id number]) [facility], APRN)            Additional Instructions: As always, patient is advised to bring in medication bottles in order to correctly reconcile with our current list.    Gatito Madrid received counseling on the following healthy behaviors: n/a    Patient given educational materials on dx    I have instructed Jeny to complete a self tracking handout on n/a and instructed them to bring it with them to her next appointment. Discussed use, benefit, and side effects of prescribed medications. Barriers to medication compliance addressed. All patient questions answered. Pt voiced understanding.      [facility], APRN

## 2019-07-25 DIAGNOSIS — K21.9 GASTROESOPHAGEAL REFLUX DISEASE, ESOPHAGITIS PRESENCE NOT SPECIFIED: ICD-10-CM

## 2019-07-25 DIAGNOSIS — G47.01 INSOMNIA DUE TO MEDICAL CONDITION: ICD-10-CM

## 2019-07-25 RX ORDER — PANTOPRAZOLE SODIUM 20 MG/1
20 TABLET, DELAYED RELEASE ORAL
Qty: 90 TABLET | Refills: 3 | Status: SHIPPED | OUTPATIENT
Start: 2019-07-25 | End: 2020-05-15 | Stop reason: SDUPTHER

## 2019-07-25 RX ORDER — TRAZODONE HYDROCHLORIDE 150 MG/1
150 TABLET ORAL NIGHTLY
Qty: 90 TABLET | Refills: 3 | Status: SHIPPED | OUTPATIENT
Start: 2019-07-25 | End: 2020-05-15 | Stop reason: SDUPTHER

## 2019-08-01 ENCOUNTER — TELEPHONE (OUTPATIENT)
Dept: PRIMARY CARE CLINIC | Age: 74
End: 2019-08-01

## 2019-08-06 ENCOUNTER — OFFICE VISIT (OUTPATIENT)
Dept: PRIMARY CARE CLINIC | Age: 74
End: 2019-08-06
Payer: MEDICARE

## 2019-08-06 VITALS
OXYGEN SATURATION: 93 % | DIASTOLIC BLOOD PRESSURE: 88 MMHG | BODY MASS INDEX: 23.56 KG/M2 | HEART RATE: 82 BPM | WEIGHT: 120 LBS | HEIGHT: 60 IN | SYSTOLIC BLOOD PRESSURE: 141 MMHG | TEMPERATURE: 98.2 F

## 2019-08-06 DIAGNOSIS — Z00.00 ROUTINE GENERAL MEDICAL EXAMINATION AT A HEALTH CARE FACILITY: ICD-10-CM

## 2019-08-06 DIAGNOSIS — F41.1 GAD (GENERALIZED ANXIETY DISORDER): ICD-10-CM

## 2019-08-06 DIAGNOSIS — Z95.828 PORT-A-CATH IN PLACE: ICD-10-CM

## 2019-08-06 DIAGNOSIS — H91.93 BILATERAL HEARING LOSS, UNSPECIFIED HEARING LOSS TYPE: ICD-10-CM

## 2019-08-06 DIAGNOSIS — G62.9 NEUROPATHY: ICD-10-CM

## 2019-08-06 DIAGNOSIS — Z87.891 PERSONAL HISTORY OF TOBACCO USE, PRESENTING HAZARDS TO HEALTH: ICD-10-CM

## 2019-08-06 DIAGNOSIS — Z00.00 ROUTINE GENERAL MEDICAL EXAMINATION AT A HEALTH CARE FACILITY: Primary | ICD-10-CM

## 2019-08-06 DIAGNOSIS — I50.9 CONGESTIVE HEART FAILURE, UNSPECIFIED HF CHRONICITY, UNSPECIFIED HEART FAILURE TYPE (HCC): ICD-10-CM

## 2019-08-06 DIAGNOSIS — G47.01 INSOMNIA DUE TO MEDICAL CONDITION: ICD-10-CM

## 2019-08-06 DIAGNOSIS — Z72.89 OTHER PROBLEMS RELATED TO LIFESTYLE: ICD-10-CM

## 2019-08-06 DIAGNOSIS — D75.89 MACROCYTIC: ICD-10-CM

## 2019-08-06 LAB
ALBUMIN SERPL-MCNC: 3.8 G/DL (ref 3.5–5.2)
ALP BLD-CCNC: 60 U/L (ref 35–104)
ALT SERPL-CCNC: <5 U/L (ref 5–33)
ANION GAP SERPL CALCULATED.3IONS-SCNC: 11 MMOL/L (ref 7–19)
AST SERPL-CCNC: 14 U/L (ref 5–32)
BASOPHILS ABSOLUTE: 0.1 K/UL (ref 0–0.2)
BASOPHILS RELATIVE PERCENT: 0.8 % (ref 0–1)
BILIRUB SERPL-MCNC: <0.2 MG/DL (ref 0.2–1.2)
BUN BLDV-MCNC: 11 MG/DL (ref 8–23)
CALCIUM SERPL-MCNC: 9.4 MG/DL (ref 8.8–10.2)
CHLORIDE BLD-SCNC: 91 MMOL/L (ref 98–111)
CO2: 30 MMOL/L (ref 22–29)
CREAT SERPL-MCNC: 0.8 MG/DL (ref 0.5–0.9)
EOSINOPHILS ABSOLUTE: 0.2 K/UL (ref 0–0.6)
EOSINOPHILS RELATIVE PERCENT: 3.1 % (ref 0–5)
FOLATE: 4.3 NG/ML (ref 4.8–37.3)
GFR NON-AFRICAN AMERICAN: >60
GLUCOSE BLD-MCNC: 93 MG/DL (ref 74–109)
HCT VFR BLD CALC: 33.2 % (ref 37–47)
HEMOGLOBIN: 10.1 G/DL (ref 12–16)
HEPATITIS C ANTIBODY INTERPRETATION: REACTIVE
LYMPHOCYTES ABSOLUTE: 1.3 K/UL (ref 1.1–4.5)
LYMPHOCYTES RELATIVE PERCENT: 21.2 % (ref 20–40)
MCH RBC QN AUTO: 30.1 PG (ref 27–31)
MCHC RBC AUTO-ENTMCNC: 30.4 G/DL (ref 33–37)
MCV RBC AUTO: 98.8 FL (ref 81–99)
MONOCYTES ABSOLUTE: 0.6 K/UL (ref 0–0.9)
MONOCYTES RELATIVE PERCENT: 9.3 % (ref 0–10)
NEUTROPHILS ABSOLUTE: 4 K/UL (ref 1.5–7.5)
NEUTROPHILS RELATIVE PERCENT: 65.4 % (ref 50–65)
PDW BLD-RTO: 14.6 % (ref 11.5–14.5)
PLATELET # BLD: 251 K/UL (ref 130–400)
PMV BLD AUTO: 10.4 FL (ref 9.4–12.3)
POTASSIUM SERPL-SCNC: 4.9 MMOL/L (ref 3.5–5)
RBC # BLD: 3.36 M/UL (ref 4.2–5.4)
SODIUM BLD-SCNC: 132 MMOL/L (ref 136–145)
T4 FREE: 1.1 NG/DL (ref 0.9–1.7)
TOTAL PROTEIN: 6.8 G/DL (ref 6.6–8.7)
TSH SERPL DL<=0.05 MIU/L-ACNC: 2.04 UIU/ML (ref 0.27–4.2)
VITAMIN B-12: 724 PG/ML (ref 211–946)
WBC # BLD: 6.1 K/UL (ref 4.8–10.8)

## 2019-08-06 PROCEDURE — 99214 OFFICE O/P EST MOD 30 MIN: CPT | Performed by: NURSE PRACTITIONER

## 2019-08-06 PROCEDURE — 4004F PT TOBACCO SCREEN RCVD TLK: CPT | Performed by: NURSE PRACTITIONER

## 2019-08-06 PROCEDURE — G8599 NO ASA/ANTIPLAT THER USE RNG: HCPCS | Performed by: NURSE PRACTITIONER

## 2019-08-06 PROCEDURE — 1090F PRES/ABSN URINE INCON ASSESS: CPT | Performed by: NURSE PRACTITIONER

## 2019-08-06 PROCEDURE — 3017F COLORECTAL CA SCREEN DOC REV: CPT | Performed by: NURSE PRACTITIONER

## 2019-08-06 PROCEDURE — G8427 DOCREV CUR MEDS BY ELIG CLIN: HCPCS | Performed by: NURSE PRACTITIONER

## 2019-08-06 PROCEDURE — G8420 CALC BMI NORM PARAMETERS: HCPCS | Performed by: NURSE PRACTITIONER

## 2019-08-06 PROCEDURE — G0439 PPPS, SUBSEQ VISIT: HCPCS | Performed by: NURSE PRACTITIONER

## 2019-08-06 PROCEDURE — 99406 BEHAV CHNG SMOKING 3-10 MIN: CPT | Performed by: NURSE PRACTITIONER

## 2019-08-06 PROCEDURE — 4040F PNEUMOC VAC/ADMIN/RCVD: CPT | Performed by: NURSE PRACTITIONER

## 2019-08-06 PROCEDURE — 1123F ACP DISCUSS/DSCN MKR DOCD: CPT | Performed by: NURSE PRACTITIONER

## 2019-08-06 PROCEDURE — G8399 PT W/DXA RESULTS DOCUMENT: HCPCS | Performed by: NURSE PRACTITIONER

## 2019-08-06 RX ORDER — GABAPENTIN 100 MG/1
100 CAPSULE ORAL 3 TIMES DAILY
Status: CANCELLED | OUTPATIENT
Start: 2019-08-06

## 2019-08-06 RX ORDER — CLONAZEPAM 0.5 MG/1
0.5 TABLET ORAL 3 TIMES DAILY PRN
Qty: 75 TABLET | Refills: 0 | Status: SHIPPED | OUTPATIENT
Start: 2019-08-06 | End: 2019-09-10 | Stop reason: SDUPTHER

## 2019-08-06 ASSESSMENT — ENCOUNTER SYMPTOMS
EYE REDNESS: 0
COUGH: 1
NAUSEA: 0
CONSTIPATION: 0
VOMITING: 0
SHORTNESS OF BREATH: 1
DIARRHEA: 0
WHEEZING: 0
ABDOMINAL PAIN: 0
SINUS PRESSURE: 0
SORE THROAT: 0
SINUS PAIN: 0
RHINORRHEA: 0

## 2019-08-06 ASSESSMENT — PATIENT HEALTH QUESTIONNAIRE - PHQ9
SUM OF ALL RESPONSES TO PHQ QUESTIONS 1-9: 0
SUM OF ALL RESPONSES TO PHQ QUESTIONS 1-9: 0

## 2019-08-06 ASSESSMENT — LIFESTYLE VARIABLES: HOW OFTEN DO YOU HAVE A DRINK CONTAINING ALCOHOL: 0

## 2019-08-06 NOTE — PATIENT INSTRUCTIONS
Instructions    Chronic obstructive pulmonary disease (COPD) is a general term for a group of lung diseases, including emphysema and chronic bronchitis. People with COPD have decreased airflow in and out of the lungs, which makes it hard to breathe. The airways also can get clogged with thick mucus. Cigarette smoking is a major cause of COPD. Although there is no cure for COPD, you can slow its progress. Following your treatment plan and taking care of yourself can help you feel better and live longer. Follow-up care is a key part of your treatment and safety. Be sure to make and go to all appointments, and call your doctor if you are having problems. It's also a good idea to know your test results and keep a list of the medicines you take. How can you care for yourself at home?   Staying healthy    Do not smoke. This is the most important step you can take to prevent more damage to your lungs. If you need help quitting, talk to your doctor about stop-smoking programs and medicines. These can increase your chances of quitting for good.     Avoid colds and flu. Get a pneumococcal vaccine shot. If you have had one before, ask your doctor whether you need a second dose. Get the flu vaccine every fall. If you must be around people with colds or the flu, wash your hands often.     Avoid secondhand smoke, air pollution, and high altitudes. Also avoid cold, dry air and hot, humid air. Stay at home with your windows closed when air pollution is bad.    Medicines and oxygen therapy    Take your medicines exactly as prescribed. Call your doctor if you think you are having a problem with your medicine.     You may be taking medicines such as:  Bronchodilators. These help open your airways and make breathing easier. Bronchodilators are either short-acting (work for 6 to 9 hours) or long-acting (work for 24 hours). You inhale most bronchodilators, so they start to act quickly.  Always carry your quick-relief inhaler with you lose weight without trying.    Mental health    Talk to your family, friends, or a therapist about your feelings. It is normal to feel frightened, angry, hopeless, helpless, and even guilty. Talking openly about bad feelings can help you cope. If these feelings last, talk to your doctor. When should you call for help? Call 911 anytime you think you may need emergency care. For example, call if:    You have severe trouble breathing.    Call your doctor now or seek immediate medical care if:    You have new or worse trouble breathing.     You cough up blood.     You have a fever.    Watch closely for changes in your health, and be sure to contact your doctor if:    You cough more deeply or more often, especially if you notice more mucus or a change in the color of your mucus.     You have new or worse swelling in your legs or belly.     You are not getting better as expected. Where can you learn more? Go to https://[employer]. org and sign in to your [employer] account. Enter A783 in the [employer] box to learn more about \"Chronic Obstructive Pulmonary Disease (COPD): Care Instructions. \"     If you do not have an account, please click on the \"Sign Up Now\" link. Current as of: September 5, 2018  Content Version: 12.0  © 5540-3564 Healthwise, Incorporated. Care instructions adapted under license by [facility] ([facility]). If you have questions about a medical condition or this instruction, always ask your healthcare professional. [place] [address] any warranty or liability for your use of this information.

## 2019-08-06 NOTE — PROGRESS NOTES
Medicare Annual Wellness Visit  Name: Joey Ingram Date: 2019   MRN: [id number] Sex: Female   Age: 69 y.o. Ethnicity: Non-/Non    : 1945 Race: Seng Mosquera is here for Medicare AWV and Medication Refill    Screenings for behavioral, psychosocial and functional/safety risks, and cognitive dysfunction are all negative except as indicated below. These results, as well as other patient data from the [address] form, are documented in Flowsheets linked to this Encounter. Allergies   Allergen Reactions    Codeine     Sulfa Antibiotics    ro  Prior to Visit Medications    Medication Sig Taking? Authorizing Provider   clonazePAM (KLONOPIN) 0.5 MG tablet Take 1 tablet by mouth 3 times daily as needed for Anxiety for up to 30 days.  Yes NAVEEN Dior   pantoprazole (PROTONIX) 20 MG tablet Take 1 tablet by mouth every morning (before breakfast) Yes NAVEEN Dior   traZODone (DESYREL) 150 MG tablet Take 1 tablet by mouth nightly Yes NAVEEN Dior   ondansetron (ZOFRAN) 4 MG tablet Take 1 tablet by mouth every 8 hours as needed for Nausea or Vomiting Yes NAVEEN Dior   Fesoterodine Fumarate ER (TOVIAZ) 8 MG TB24 Take 8 mg by mouth daily Yes NAVEEN Golden   carvedilol (COREG) 3.125 MG tablet Take 1 tablet by mouth 2 times daily (with meals) Yes NAVEEN Golden   hydrOXYzine (ATARAX) 25 MG tablet Take 0.5 tablets by mouth nightly Yes NAVEEN Dior   albuterol (PROVENTIL) (5 MG/ML) 0.5% nebulizer solution Take 0.5 mLs by nebulization every 6 hours as needed for Wheezing Yes NAVEEN Dior   naloxone (NARCAN) 4 MG/0.1ML LIQD nasal spray 1 spray by Nasal route as needed for Opioid Reversal Yes NAVEEN Dior   Fluticasone-Umeclidin-Vilant 100-62.5-25 MCG/INH AEPB Inhale 1 puff into the lungs daily Yes NAVEEN Dior   donepezil (ARICEPT) 5 MG tablet Take 1 tablet by mouth nightly Yes Alli Long, APRN Respiratory Therapy Supplies (NEBULIZER/TUBING/MOUTHPIECE) KIT 1 kit by Does not apply route daily Yes NAVEEN Dior   albuterol sulfate HFA (PROAIR HFA) 108 (90 Base) MCG/ACT inhaler Inhale 2 puffs into the lungs every 6 hours as needed for Wheezing Yes NAVEEN Dior   diphenhydrAMINE (SIMPLY SLEEP) 25 MG tablet Take 25 mg by mouth nightly as needed for Sleep Yes Historical Provider, MD   nitroGLYCERIN (NITROQUICK) 0.4 MG SL tablet Place 1 tablet under the tongue every 5 minutes as needed for Chest pain Yes NAVEEN Dior   olopatadine (PATADAY) 0.2 % SOLN ophthalmic solution Place 1 drop into both eyes daily Yes NAVEEN Dior   isosorbide mononitrate (IMDUR) 30 MG extended release tablet TAKE ONE TABLET BY MOUTH DAILY Yes NAVEEN Dior   gabapentin (NEURONTIN) 100 MG capsule Take 100 mg by mouth 3 times daily. . Yes Historical Provider, MD   denosumab (PROLIA) 60 MG/ML SOLN SC injection Inject 60mg subq every 6  months Yes NAVEEN Dior   furosemide (LASIX) 20 MG tablet Take 1 tablet by mouth daily Yes NAVEEN Dior   memantine (NAMENDA) 10 MG tablet Take 1 tablet by mouth 2 times daily Yes NAVEEN Dior   DULoxetine (CYMBALTA) 60 MG extended release capsule Take 1 capsule by mouth 2 times daily Yes NAVEEN Dior   OXYGEN Inhale 2 L into the lungs daily Yes NAVEEN Dior   HYDROcodone-acetaminophen (NORCO)  MG per tablet TAKE ONE TABLET BY MOUTH EVERY 8 HOURS AS NEEDED- dr galarza Yes Historical Provider, MD chiang   clonazePAM (KLONOPIN) 0.5 MG tablet Take 1 tablet by mouth 3 times daily as needed for Anxiety for up to 30 days.  Yes NAVEEN Dior   pantoprazole (PROTONIX) 20 MG tablet Take 1 tablet by mouth every morning (before breakfast) Yes NAVEEN Dior   traZODone (DESYREL) 150 MG tablet Take 1 tablet by mouth nightly Yes NAVEEN Dior   ondansetron (ZOFRAN) 4 MG tablet Take 1 tablet by mouth every 8 hours as needed for Nausea or Vomiting Yes NAVEEN Dior   Fesoterodine Fumarate ER (TOVIAZ) 8 MG TB24 Take 8 mg by mouth daily Yes NAVEEN Conner   carvedilol (COREG) 3.125 MG tablet Take 1 tablet by mouth 2 times daily (with meals) Yes NAVEEN Conner   hydrOXYzine (ATARAX) 25 MG tablet Take 0.5 tablets by mouth nightly Yes NAVEEN Dior   albuterol (PROVENTIL) (5 MG/ML) 0.5% nebulizer solution Take 0.5 mLs by nebulization every 6 hours as needed for Wheezing Yes NAVEEN Dior   naloxone (NARCAN) 4 MG/0.1ML LIQD nasal spray 1 spray by Nasal route as needed for Opioid Reversal Yes NAVEEN Dior   Fluticasone-Umeclidin-Vilant 100-62.5-25 MCG/INH AEPB Inhale 1 puff into the lungs daily Yes NAVEEN Dior   donepezil (ARICEPT) 5 MG tablet Take 1 tablet by mouth nightly Yes NAVEEN Dior   Respiratory Therapy Supplies (NEBULIZER/TUBING/MOUTHPIECE) KIT 1 kit by Does not apply route daily Yes NAVEEN Dior   albuterol sulfate HFA (PROAIR HFA) 108 (90 Base) MCG/ACT inhaler Inhale 2 puffs into the lungs every 6 hours as needed for Wheezing Yes NAVEEN Dior   diphenhydrAMINE (SIMPLY SLEEP) 25 MG tablet Take 25 mg by mouth nightly as needed for Sleep Yes Historical Provider, MD   nitroGLYCERIN (NITROQUICK) 0.4 MG SL tablet Place 1 tablet under the tongue every 5 minutes as needed for Chest pain Yes NAVEEN Dior   olopatadine (PATADAY) 0.2 % SOLN ophthalmic solution Place 1 drop into both eyes daily Yes NAVEEN Dior   isosorbide mononitrate (IMDUR) 30 MG extended release tablet TAKE ONE TABLET BY MOUTH DAILY Yes NAVEEN Dior   gabapentin (NEURONTIN) 100 MG capsule Take 100 mg by mouth 3 times daily. . Yes Historical Provider, MD   denosumab (PROLIA) 60 MG/ML SOLN SC injection Inject 60mg subq every 6  months Yes NAVEEN Dior   furosemide (LASIX) 20 MG tablet Take 1 tablet by mouth daily Yes NAVEEN Dior   memantine (NAMENDA) 10 MG tablet Take 1 tablet by mouth 2 Encounters:   08/06/19 120 lb (54.4 kg)   06/14/19 121 lb (54.9 kg)   05/14/19 128 lb (58.1 kg)     Vitals:    08/06/19 1339 08/06/19 1356 08/06/19 1419   BP: (!) 146/90 (!) 159/96 (!) 141/88   Pulse: 82 80 82   Temp: 98.2 °F (36.8 °C)     TempSrc: Temporal     SpO2: 93%     Weight: 120 lb (54.4 kg)     Height: 5' (1.524 m)       Body mass index is 23.44 kg/m². Based upon direct observation of the patient, evaluation of cognition reveals recent and remote memory intact. Patient's complete Health Risk Assessment and screening values have been reviewed and are found in Flowsheets. The following problems were reviewed today and where indicated follow up appointments were made and/or referrals ordered. Positive Risk Factor Screenings with Interventions:     Fall Risk:  Timed Up and Go Test > 12 seconds? (Complete if either Fall Risk answers are Yes): (!) yes  2 or more falls in past year?: (!) yes  Fall with injury in past year?: no  Fall Risk Interventions:    · Home safety tips provided  · Patient defers PT for strength and balance training    Cognitive:   Words recalled: 1 Word Recalled  Clock Drawing Test (CDT) Score: (!) Abnormal  Total Score Interpretation: Positive Mini-Cog  Cognitive Impairment Interventions:  · Patient declines any further evaluation/treatment for cognitive impairment    Substance Abuse:  Social History     Tobacco History     Smoking Status  Current Every Day Smoker Smoking Start Date  1/1/1978 Smoking Frequency  1 pack/day for 36 years (36 pk yrs) Smoking Tobacco Type  Cigarettes    Smokeless Tobacco Use  Never Used          Alcohol History     Alcohol Use Status  No          Drug Use     Drug Use Status  No          Sexual Activity     Sexually Active  Not Asked               Audit Questionnaire: Screen for Alcohol Misuse  How often do you have a drink containing alcohol?: Never  Substance Abuse Interventions:  · Tobacco abuse:  tobacco cessation tips and resources Interventions:  · Patient declines any further evaluation/treatment for this issue   · Her daughter helps with these activities    Personalized Preventive Plan   Current Health Maintenance Status  Immunization History   Administered Date(s) Administered    Influenza Virus Vaccine 10/06/2014    Influenza, High Dose (Fluzone 65 yrs and older) 10/25/2017, 10/18/2018    Pneumococcal Conjugate 13-valent ([id number]) 2016    Pneumococcal Conjugate Vaccine 10/06/2014    Pneumococcal Polysaccharide ([id number]) 10/25/2017        Health Maintenance   Topic Date Due    Hepatitis C screen  1945    DTaP/Tdap/Td vaccine (1 - Tdap) 1965    Shingles Vaccine (1 of 2) 1996    Low dose CT lung screening  2001    Breast cancer screen  01/10/2019    Annual Wellness Visit (AWV)  2019    Flu vaccine (1) 2019    Potassium monitoring  2020    Creatinine monitoring  2020    Colon cancer screen colonoscopy  2021    Lipid screen  2022    DEXA (modify frequency per FRAX score)  Completed    Pneumococcal 65+ years Vaccine  Completed     Recommendations for Preventive Services Due: see orders and patient instructions/AVS.  . Recommended screening schedule for the next 5-10 years is provided to the patient in written form: see Patient Instructions/AVS.          [address]  Phone [phone]   Fax [phone]      OFFICE VISIT: 2019    Mauricio Canales- : 1945    Chief Complaint:Jeny is a 69 y.o. female who is here for Medicare AWV and Medication Refill     HPI  The patient presents today for Medicare Annual Wellness Exam.    MOODS:  She is taking Cymbalta 60 mg BID and Klonopin 0.5 mg BID-TID/prn  Moods are stable. She is requesting a refill on Klonopin today. Last fill was 2019, #75    COPD:  She is taking Trelegy once a day. She is wearing her oxygen at night and when she lies down to take a nap.   She is now Urine 04/09/2019 neg     Ethanol, Ur 04/09/2019 neg     pH, Urine 04/09/2019 6.78     Creatinine, Ur 04/09/2019 77.7    Office Visit on 04/09/2019   Component Date Value    Amphetamine Screen, Urine 04/09/2019 neg     Barbiturate Screen, Urine 04/09/2019 neg     Benzodiazepine Screen, U* 04/09/2019 neg     Buprenorphine Urine 04/09/2019 neg     Cocaine Metabolite Scree* 04/09/2019 neg     Gabapentin Screen, Urine 04/09/2019 neg     MDMA, Urine 04/09/2019 neg     Methamphetamine, Urine 04/09/2019 neg     Methadone Screen, Urine 04/09/2019 neg     Opiate Scrn, Ur 04/09/2019 positive     Oxycodone Screen, Ur 04/09/2019 neg     PCP Screen, Urine 04/09/2019 neg     Propoxyphene Screen, Uri* 04/09/2019 neg     THC Screen, Urine 55/50/2795 neg     Tricyclic Antidepressant* 09/75/6541 neg    Orders Only on 02/27/2019   Component Date Value    WBC 02/27/2019 5.6     RBC 02/27/2019 3.58*    Hemoglobin 02/27/2019 10.7*    Hematocrit 02/27/2019 35.9*    MCV 02/27/2019 100.3*    MCH 02/27/2019 29.9     MCHC 02/27/2019 29.8*    RDW 02/27/2019 15.0*    Platelets 72/25/4698 201     MPV 02/27/2019 10.0     Neutrophils % 02/27/2019 51.8     Lymphocytes % 02/27/2019 33.3     Monocytes % 02/27/2019 9.5     Eosinophils % 02/27/2019 4.1     Basophils % 02/27/2019 1.1*    Neutrophils # 02/27/2019 2.9     Lymphocytes # 02/27/2019 1.9     Monocytes # 02/27/2019 0.50     Eosinophils # 02/27/2019 0.20     Basophils # 02/27/2019 0.10     Sodium 02/27/2019 139     Potassium 02/27/2019 4.5     Chloride 02/27/2019 99     CO2 02/27/2019 26     Anion Gap 02/27/2019 14     Glucose 02/27/2019 87     BUN 02/27/2019 20     CREATININE 02/27/2019 1.0*    GFR Non- 02/27/2019 54*    Calcium 02/27/2019 9.1     Total Protein 02/27/2019 6.9     Alb 02/27/2019 3.9     Total Bilirubin 02/27/2019 <0.2     Alkaline Phosphatase 02/27/2019 61     ALT 02/27/2019 5     AST 02/27/2019 12      Copies CESSATION INTERMEDIATE 3-10 MINUTES [[id number]]    [place] IRRIG IMPLANTED DRUG DELIVERY DEVICE      Approximately 5 minutes of education was provided about quit smoking and the harms of tobacco.  Patient does show understanding. Patient has  the desire to quit smoking in the near future. procedure note:    Accessed mediport in sterile fashion with gripper needle without anesthesia. The port was flushed with normal saline and flushed easily. Blood was drawn from the port & labs were obtained and sent down to the laboratory. The port was then flushed with heparin and the port was de-accessed. No blood loss  No complication  No specimen  She tolerated the procedure well without incident. Return in about 6 weeks (around 9/17/2019), or if symptoms worsen or fail to improve, for Medicare Annual Wellness Visit in 1 year. Patient Instructions       Personalized Preventive Plan for Ebony Jensen - 8/6/2019  Medicare offers a range of preventive health benefits. Some of the tests and screenings are paid in full while other may be subject to a deductible, co-insurance, and/or copay. Some of these benefits include a comprehensive review of your medical history including lifestyle, illnesses that may run in your family, and various assessments and screenings as appropriate. After reviewing your medical record and screening and assessments performed today your provider may have ordered immunizations, labs, imaging, and/or referrals for you. A list of these orders (if applicable) as well as your Preventive Care list are included within your After Visit Summary for your review. Other Preventive Recommendations:    · A preventive eye exam performed by an eye specialist is recommended every 1-2 years to screen for glaucoma; cataracts, macular degeneration, and other eye disorders. · A preventive dental visit is recommended every 6 months.   · Try to get at least 150 minutes of exercise per week or 10,000 exercising.     Take short rest breaks when doing household chores and other activities.     Learn breathing methods--such as breathing through pursed lips--to help you become less short of breath.     If your doctor has not set you up with a pulmonary rehabilitation program, talk to him or her about whether rehab is right for you. Rehab includes exercise programs, education about your disease and how to manage it, help with diet and other changes, and emotional support. Diet    Eat regular, healthy meals. Use bronchodilators about 1 hour before you eat to make it easier to eat. Eat several small meals instead of three large ones. Drink beverages at the end of the meal. Avoid foods that are hard to chew.     Eat foods that contain protein so that you do not lose muscle mass.     Talk with your doctor if you gain too much weight or if you lose weight without trying.    Mental health    Talk to your family, friends, or a therapist about your feelings. It is normal to feel frightened, angry, hopeless, helpless, and even guilty. Talking openly about bad feelings can help you cope. If these feelings last, talk to your doctor. When should you call for help? Call 911 anytime you think you may need emergency care. For example, call if:    You have severe trouble breathing.    Call your doctor now or seek immediate medical care if:    You have new or worse trouble breathing.     You cough up blood.     You have a fever.    Watch closely for changes in your health, and be sure to contact your doctor if:    You cough more deeply or more often, especially if you notice more mucus or a change in the color of your mucus.     You have new or worse swelling in your legs or belly.     You are not getting better as expected. Where can you learn more? Go to [email or web address] and sign in to your [facility] [employer] account.  Enter P649 in the [employer] box to learn more about \"Chronic Obstructive

## 2019-08-07 ENCOUNTER — TELEPHONE (OUTPATIENT)
Dept: OTOLARYNGOLOGY | Age: 74
End: 2019-08-07

## 2019-08-07 ENCOUNTER — TELEPHONE (OUTPATIENT)
Dept: PRIMARY CARE CLINIC | Age: 74
End: 2019-08-07

## 2019-08-07 DIAGNOSIS — R76.8 HEPATITIS C ANTIBODY POSITIVE IN BLOOD: Primary | ICD-10-CM

## 2019-08-28 DIAGNOSIS — M81.0 OSTEOPOROSIS, UNSPECIFIED OSTEOPOROSIS TYPE, UNSPECIFIED PATHOLOGICAL FRACTURE PRESENCE: Primary | ICD-10-CM

## 2019-09-10 ENCOUNTER — OFFICE VISIT (OUTPATIENT)
Dept: PRIMARY CARE CLINIC | Age: 74
End: 2019-09-10
Payer: MEDICARE

## 2019-09-10 ENCOUNTER — HOSPITAL ENCOUNTER (OUTPATIENT)
Dept: GENERAL RADIOLOGY | Age: 74
Discharge: HOME | End: 2019-09-10
Payer: MEDICARE

## 2019-09-10 VITALS
TEMPERATURE: 97.9 F | OXYGEN SATURATION: 91 % | WEIGHT: 120 LBS | HEIGHT: 60 IN | DIASTOLIC BLOOD PRESSURE: 80 MMHG | BODY MASS INDEX: 23.56 KG/M2 | SYSTOLIC BLOOD PRESSURE: 126 MMHG | HEART RATE: 79 BPM

## 2019-09-10 DIAGNOSIS — Z95.828 PORT-A-CATH IN PLACE: ICD-10-CM

## 2019-09-10 DIAGNOSIS — J44.9 CHRONIC OBSTRUCTIVE PULMONARY DISEASE, UNSPECIFIED COPD TYPE (HCC): ICD-10-CM

## 2019-09-10 DIAGNOSIS — M25.551 PAIN OF RIGHT HIP JOINT: ICD-10-CM
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
## 2019-09-10 DIAGNOSIS — G47.01 INSOMNIA DUE TO MEDICAL CONDITION: ICD-10-CM

## 2019-09-10 DIAGNOSIS — F41.1 GAD (GENERALIZED ANXIETY DISORDER): ICD-10-CM

## 2019-09-10 PROCEDURE — 3017F COLORECTAL CA SCREEN DOC REV: CPT | Performed by: NURSE PRACTITIONER

## 2019-09-10 PROCEDURE — G8926 SPIRO NO PERF OR DOC: HCPCS | Performed by: NURSE PRACTITIONER

## 2019-09-10 PROCEDURE — 1090F PRES/ABSN URINE INCON ASSESS: CPT | Performed by: NURSE PRACTITIONER

## 2019-09-10 PROCEDURE — 1123F ACP DISCUSS/DSCN MKR DOCD: CPT | Performed by: NURSE PRACTITIONER

## 2019-09-10 PROCEDURE — G8427 DOCREV CUR MEDS BY ELIG CLIN: HCPCS | Performed by: NURSE PRACTITIONER

## 2019-09-10 PROCEDURE — G8420 CALC BMI NORM PARAMETERS: HCPCS | Performed by: NURSE PRACTITIONER

## 2019-09-10 PROCEDURE — 4040F PNEUMOC VAC/ADMIN/RCVD: CPT | Performed by: NURSE PRACTITIONER

## 2019-09-10 PROCEDURE — 73502 X-RAY EXAM HIP UNI 2-3 VIEWS: CPT

## 2019-09-10 PROCEDURE — G8599 NO ASA/ANTIPLAT THER USE RNG: HCPCS | Performed by: NURSE PRACTITIONER

## 2019-09-10 PROCEDURE — 3023F SPIROM DOC REV: CPT | Performed by: NURSE PRACTITIONER

## 2019-09-10 PROCEDURE — 99214 OFFICE O/P EST MOD 30 MIN: CPT | Performed by: NURSE PRACTITIONER

## 2019-09-10 PROCEDURE — G8399 PT W/DXA RESULTS DOCUMENT: HCPCS | Performed by: NURSE PRACTITIONER

## 2019-09-10 PROCEDURE — 4004F PT TOBACCO SCREEN RCVD TLK: CPT | Performed by: NURSE PRACTITIONER

## 2019-09-10 PROCEDURE — 96523 IRRIG DRUG DELIVERY DEVICE: CPT | Performed by: NURSE PRACTITIONER

## 2019-09-10 RX ORDER — CLONAZEPAM 0.5 MG/1
0.5 TABLET ORAL 3 TIMES DAILY PRN
Qty: 75 TABLET | Refills: 0 | Status: SHIPPED | OUTPATIENT
Start: 2019-09-10 | End: 2019-11-20 | Stop reason: SDUPTHER

## 2019-09-10 ASSESSMENT — ENCOUNTER SYMPTOMS
SHORTNESS OF BREATH: 1
ABDOMINAL PAIN: 0
VOMITING: 0
WHEEZING: 0
COUGH: 1
SINUS PAIN: 0
EYE REDNESS: 0
RHINORRHEA: 0
DIARRHEA: 0
SINUS PRESSURE: 0
NAUSEA: 0
CONSTIPATION: 0
SORE THROAT: 0

## 2019-09-10 NOTE — PROGRESS NOTES
0.8     GFR Non- 08/06/2019 >60     Calcium 08/06/2019 9.4     Total Protein 08/06/2019 6.8     Alb 08/06/2019 3.8     Total Bilirubin 08/06/2019 <0.2     Alkaline Phosphatase 08/06/2019 60     ALT 08/06/2019 <5*    AST 08/06/2019 14     Folate 08/06/2019 4.3*    Vitamin B-12 08/06/2019 724    Orders Only on 04/25/2019   Component Date Value    Amphetamine Screen, Urine 04/09/2019 neg     Barbiturate Screen, Urine 04/09/2019 neg     Benzodiazepine Screen, U* 04/09/2019 positive     Cannabinoid Scrn, Ur 04/09/2019 neg     Cocaine Metabolite, Urine 04/09/2019 neg     Methadone Screen, Urine 04/09/2019 neg     Opiates, Urine 04/09/2019 positive     PCP 04/09/2019 neg     Propoxyphene, Urine 04/09/2019 neg     Phencyclidine, Urine 04/09/2019 neg     Oxycodone 04/09/2019 neg     Methamphetamine, Urine 63/77/6041 neg     Tricyclic Antidepressant* 31/51/7490 neg     MDMA, Urine 04/09/2019 neg     6-Acetylmorphine, Ur 04/09/2019 neg     EDDP, Urine 04/09/2019 neg     Ethanol, Ur 04/09/2019 neg     pH, Urine 04/09/2019 6.78     Creatinine, Ur 04/09/2019 77.7    Office Visit on 04/09/2019   Component Date Value    Amphetamine Screen, Urine 04/09/2019 neg     Barbiturate Screen, Urine 04/09/2019 neg     Benzodiazepine Screen, U* 04/09/2019 neg     Buprenorphine Urine 04/09/2019 neg     Cocaine Metabolite Scree* 04/09/2019 neg     Gabapentin Screen, Urine 04/09/2019 neg     MDMA, Urine 04/09/2019 neg     Methamphetamine, Urine 04/09/2019 neg     Methadone Screen, Urine 04/09/2019 neg     Opiate Scrn, Ur 04/09/2019 positive     Oxycodone Screen, Ur 04/09/2019 neg     PCP Screen, Urine 04/09/2019 neg     Propoxyphene Screen, Uri* 04/09/2019 neg     THC Screen, Urine 28/15/9618 neg     Tricyclic Antidepressant* 87/68/5988 neg      Copies of these are in the chart. Prior to Visit Medications    Medication Sig Taking?  Authorizing Provider   clonazePAM (KLONOPIN) 0.5 MG She has decreased breath sounds (throughout). She has no wheezes. She has no rhonchi. She has no rales. Abdominal: Soft. Bowel sounds are normal.   Musculoskeletal:        Right hip: She exhibits decreased range of motion and tenderness. Lumbar back: She exhibits tenderness (chronic). Neurological: She is alert. Skin: Skin is dry. No rash noted. Psychiatric: She has a normal mood and affect. Her behavior is normal. Judgment and thought content normal.   Vitals reviewed. ASSESSMENT      ICD-10-CM    1. Fall, initial encounter [address] XXXA XR HIP 1 VW W PELVIS RIGHT   2. HAILEY (generalized anxiety disorder) F41.1 clonazePAM (KLONOPIN) 0.5 MG tablet  Continue Cymbalta   3. 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 Pain of right hip joint M25.551 XR HIP 1 VW W PELVIS RIGHT   5. Port-A-Cath in place Z95.828 [place] IRRIG IMPLANTED DRUG DELIVERY DEVICE   6. Chronic obstructive pulmonary disease, unspecified COPD type ([facility] [address] J44.9  Continue Trelegy         PLAN    Orders Placed This Encounter   Procedures    XR HIP 1 VW W PELVIS RIGHT    [place] IRR[facility] IMPLANTED DRUG DELIVERY DEVICE          Procedure note:    Accessed mediport in sterile fashion with gripper needle without anesthesia. The port was flushed with normal saline and flushed easily. Blood from the port was withdrew and then flushed without difficulty. The port was then flushed with heparin and the port was de-accessed. No blood loss  No complication  No specimen  She tolerated the procedure well without incident. Return in about 1 month (around 10/10/2019), or if symptoms worsen or fail to improve. Patient Instructions       Patient Education        Chronic Obstructive Pulmonary Disease (COPD): Care Instructions  Your Care Instructions    Chronic obstructive pulmonary disease (COPD) is a general term for a group of lung diseases, including emphysema and chronic bronchitis.  People with COPD have

## 2019-09-11 ENCOUNTER — TELEPHONE (OUTPATIENT)
Dept: PRIMARY CARE CLINIC | Age: 74
End: 2019-09-11

## 2019-10-04 DIAGNOSIS — F32.9 REACTIVE DEPRESSION: ICD-10-CM

## 2019-10-04 DIAGNOSIS — F41.1 GAD (GENERALIZED ANXIETY DISORDER): ICD-10-CM

## 2019-10-04 DIAGNOSIS — R41.3 MEMORY LOSS: ICD-10-CM

## 2019-10-04 RX ORDER — MEMANTINE HYDROCHLORIDE 10 MG/1
10 TABLET ORAL 2 TIMES DAILY
Qty: 180 TABLET | Refills: 3 | Status: SHIPPED | OUTPATIENT
Start: 2019-10-04 | End: 2020-10-07

## 2019-10-04 RX ORDER — DULOXETIN HYDROCHLORIDE 60 MG/1
60 CAPSULE, DELAYED RELEASE ORAL 2 TIMES DAILY
Qty: 180 CAPSULE | Refills: 3 | Status: SHIPPED | OUTPATIENT
Start: 2019-10-04 | End: 2020-02-19 | Stop reason: SDUPTHER

## 2019-10-04 RX ORDER — ISOSORBIDE MONONITRATE 30 MG/1
30 TABLET, EXTENDED RELEASE ORAL DAILY
Qty: 90 TABLET | Refills: 3 | Status: SHIPPED | OUTPATIENT
Start: 2019-10-04 | End: 2020-10-07

## 2019-10-09 DIAGNOSIS — J44.9 CHRONIC OBSTRUCTIVE PULMONARY DISEASE, UNSPECIFIED COPD TYPE (HCC): ICD-10-CM

## 2019-10-10 RX ORDER — ALBUTEROL SULFATE 90 UG/1
2 AEROSOL, METERED RESPIRATORY (INHALATION) EVERY 6 HOURS PRN
Qty: 3 INHALER | Refills: 3 | Status: SHIPPED | OUTPATIENT
Start: 2019-10-10 | End: 2020-11-09

## 2019-10-23 ENCOUNTER — TELEPHONE (OUTPATIENT)
Dept: PRIMARY CARE CLINIC | Age: 74
End: 2019-10-23

## 2019-10-29 ENCOUNTER — TELEPHONE (OUTPATIENT)
Dept: PRIMARY CARE CLINIC | Age: 74
End: 2019-10-29

## 2019-11-20 ENCOUNTER — OFFICE VISIT (OUTPATIENT)
Dept: PRIMARY CARE CLINIC | Age: 74
End: 2019-11-20
Payer: MEDICARE

## 2019-11-20 VITALS
BODY MASS INDEX: 23.56 KG/M2 | HEART RATE: 88 BPM | DIASTOLIC BLOOD PRESSURE: 86 MMHG | OXYGEN SATURATION: 91 % | WEIGHT: 120 LBS | SYSTOLIC BLOOD PRESSURE: 136 MMHG | TEMPERATURE: 97.9 F | HEIGHT: 60 IN

## 2019-11-20 DIAGNOSIS — I10 ESSENTIAL HYPERTENSION: ICD-10-CM

## 2019-11-20 DIAGNOSIS — Z23 NEED FOR IMMUNIZATION AGAINST INFLUENZA: ICD-10-CM

## 2019-11-20 DIAGNOSIS — R53.82 CHRONIC FATIGUE: ICD-10-CM

## 2019-11-20 DIAGNOSIS — G47.01 INSOMNIA DUE TO MEDICAL CONDITION: ICD-10-CM

## 2019-11-20 DIAGNOSIS — J44.1 CHRONIC OBSTRUCTIVE PULMONARY DISEASE WITH ACUTE EXACERBATION (HCC): ICD-10-CM

## 2019-11-20 DIAGNOSIS — F41.1 GAD (GENERALIZED ANXIETY DISORDER): Primary | ICD-10-CM

## 2019-11-20 DIAGNOSIS — Z95.828 PORT-A-CATH IN PLACE: ICD-10-CM

## 2019-11-20 DIAGNOSIS — F32.9 REACTIVE DEPRESSION: ICD-10-CM

## 2019-11-20 PROBLEM — R31.0 GROSS HEMATURIA: Status: ACTIVE | Noted: 2018-10-08

## 2019-11-20 PROBLEM — R35.0 FREQUENCY OF URINATION: Status: ACTIVE | Noted: 2018-10-08

## 2019-11-20 PROBLEM — N39.41 URGE INCONTINENCE: Status: ACTIVE | Noted: 2018-10-08

## 2019-11-20 LAB
ALBUMIN SERPL-MCNC: 3.6 G/DL (ref 3.5–5.2)
ALP BLD-CCNC: 61 U/L (ref 35–104)
ALT SERPL-CCNC: <5 U/L (ref 5–33)
ANION GAP SERPL CALCULATED.3IONS-SCNC: 10 MMOL/L (ref 7–19)
AST SERPL-CCNC: 12 U/L (ref 5–32)
BASOPHILS ABSOLUTE: 0 K/UL (ref 0–0.2)
BASOPHILS RELATIVE PERCENT: 0.6 % (ref 0–1)
BILIRUB SERPL-MCNC: <0.2 MG/DL (ref 0.2–1.2)
BUN BLDV-MCNC: 17 MG/DL (ref 8–23)
CALCIUM SERPL-MCNC: 9.1 MG/DL (ref 8.8–10.2)
CHLORIDE BLD-SCNC: 97 MMOL/L (ref 98–111)
CO2: 27 MMOL/L (ref 22–29)
CREAT SERPL-MCNC: 0.9 MG/DL (ref 0.5–0.9)
EOSINOPHILS ABSOLUTE: 0.3 K/UL (ref 0–0.6)
EOSINOPHILS RELATIVE PERCENT: 4 % (ref 0–5)
GFR NON-AFRICAN AMERICAN: >60
GLUCOSE BLD-MCNC: 88 MG/DL (ref 74–109)
HCT VFR BLD CALC: 34.7 % (ref 37–47)
HEMOGLOBIN: 10.1 G/DL (ref 12–16)
IMMATURE GRANULOCYTES #: 0 K/UL
LYMPHOCYTES ABSOLUTE: 1.8 K/UL (ref 1.1–4.5)
LYMPHOCYTES RELATIVE PERCENT: 26.2 % (ref 20–40)
MCH RBC QN AUTO: 29.8 PG (ref 27–31)
MCHC RBC AUTO-ENTMCNC: 29.1 G/DL (ref 33–37)
MCV RBC AUTO: 102.4 FL (ref 81–99)
MONOCYTES ABSOLUTE: 0.5 K/UL (ref 0–0.9)
MONOCYTES RELATIVE PERCENT: 7.6 % (ref 0–10)
NEUTROPHILS ABSOLUTE: 4.1 K/UL (ref 1.5–7.5)
NEUTROPHILS RELATIVE PERCENT: 61.2 % (ref 50–65)
PDW BLD-RTO: 14.8 % (ref 11.5–14.5)
PLATELET # BLD: 220 K/UL (ref 130–400)
PMV BLD AUTO: 10 FL (ref 9.4–12.3)
POTASSIUM SERPL-SCNC: 4.3 MMOL/L (ref 3.5–5)
RBC # BLD: 3.39 M/UL (ref 4.2–5.4)
SODIUM BLD-SCNC: 134 MMOL/L (ref 136–145)
TOTAL PROTEIN: 6.8 G/DL (ref 6.6–8.7)
TSH SERPL DL<=0.05 MIU/L-ACNC: 2.77 UIU/ML (ref 0.27–4.2)
WBC # BLD: 6.7 K/UL (ref 4.8–10.8)

## 2019-11-20 PROCEDURE — 1090F PRES/ABSN URINE INCON ASSESS: CPT | Performed by: NURSE PRACTITIONER

## 2019-11-20 PROCEDURE — 3023F SPIROM DOC REV: CPT | Performed by: NURSE PRACTITIONER

## 2019-11-20 PROCEDURE — G8399 PT W/DXA RESULTS DOCUMENT: HCPCS | Performed by: NURSE PRACTITIONER

## 2019-11-20 PROCEDURE — 90653 IIV ADJUVANT VACCINE IM: CPT | Performed by: NURSE PRACTITIONER

## 2019-11-20 PROCEDURE — G8599 NO ASA/ANTIPLAT THER USE RNG: HCPCS | Performed by: NURSE PRACTITIONER

## 2019-11-20 PROCEDURE — G8482 FLU IMMUNIZE ORDER/ADMIN: HCPCS | Performed by: NURSE PRACTITIONER

## 2019-11-20 PROCEDURE — 4040F PNEUMOC VAC/ADMIN/RCVD: CPT | Performed by: NURSE PRACTITIONER

## 2019-11-20 PROCEDURE — 99214 OFFICE O/P EST MOD 30 MIN: CPT | Performed by: NURSE PRACTITIONER

## 2019-11-20 PROCEDURE — G8427 DOCREV CUR MEDS BY ELIG CLIN: HCPCS | Performed by: NURSE PRACTITIONER

## 2019-11-20 PROCEDURE — G8420 CALC BMI NORM PARAMETERS: HCPCS | Performed by: NURSE PRACTITIONER

## 2019-11-20 PROCEDURE — G0008 ADMIN INFLUENZA VIRUS VAC: HCPCS | Performed by: NURSE PRACTITIONER

## 2019-11-20 PROCEDURE — 3017F COLORECTAL CA SCREEN DOC REV: CPT | Performed by: NURSE PRACTITIONER

## 2019-11-20 PROCEDURE — 4004F PT TOBACCO SCREEN RCVD TLK: CPT | Performed by: NURSE PRACTITIONER

## 2019-11-20 PROCEDURE — G8926 SPIRO NO PERF OR DOC: HCPCS | Performed by: NURSE PRACTITIONER

## 2019-11-20 PROCEDURE — 1123F ACP DISCUSS/DSCN MKR DOCD: CPT | Performed by: NURSE PRACTITIONER

## 2019-11-20 RX ORDER — CLONAZEPAM 0.5 MG/1
0.5 TABLET ORAL 3 TIMES DAILY PRN
Qty: 75 TABLET | Refills: 0 | Status: SHIPPED | OUTPATIENT
Start: 2019-11-20 | End: 2020-01-21 | Stop reason: SDUPTHER

## 2019-11-20 ASSESSMENT — ENCOUNTER SYMPTOMS
RHINORRHEA: 0
CONSTIPATION: 0
SINUS PAIN: 0
DIARRHEA: 0
WHEEZING: 0
NAUSEA: 0
ABDOMINAL PAIN: 0
COUGH: 1
EYE REDNESS: 0
SINUS PRESSURE: 0
VOMITING: 0
SORE THROAT: 0
SHORTNESS OF BREATH: 1

## 2019-11-21 ENCOUNTER — TELEPHONE (OUTPATIENT)
Dept: PRIMARY CARE CLINIC | Age: 74
End: 2019-11-21

## 2019-12-05 RX ORDER — FESOTERODINE FUMARATE 8 MG/1
1 TABLET, EXTENDED RELEASE ORAL DAILY
Qty: 90 TABLET | Refills: 1 | Status: SHIPPED | OUTPATIENT
Start: 2019-12-05 | End: 2020-04-17 | Stop reason: SDUPTHER

## 2020-01-21 ENCOUNTER — OFFICE VISIT (OUTPATIENT)
Dept: PRIMARY CARE CLINIC | Age: 75
End: 2020-01-21
Payer: MEDICARE

## 2020-01-21 VITALS
OXYGEN SATURATION: 92 % | HEART RATE: 77 BPM | DIASTOLIC BLOOD PRESSURE: 60 MMHG | TEMPERATURE: 97.9 F | SYSTOLIC BLOOD PRESSURE: 125 MMHG | BODY MASS INDEX: 23.07 KG/M2 | HEIGHT: 60 IN | WEIGHT: 117.5 LBS

## 2020-01-21 DIAGNOSIS — M81.0 OSTEOPOROSIS, UNSPECIFIED OSTEOPOROSIS TYPE, UNSPECIFIED PATHOLOGICAL FRACTURE PRESENCE: ICD-10-CM

## 2020-01-21 DIAGNOSIS — M81.0 AGE-RELATED OSTEOPOROSIS WITHOUT CURRENT PATHOLOGICAL FRACTURE: ICD-10-CM

## 2020-01-21 LAB
ALBUMIN SERPL-MCNC: 3.8 G/DL (ref 3.5–5.2)
ALP BLD-CCNC: 76 U/L (ref 35–104)
ALT SERPL-CCNC: 5 U/L (ref 5–33)
ANION GAP SERPL CALCULATED.3IONS-SCNC: 13 MMOL/L (ref 7–19)
AST SERPL-CCNC: 13 U/L (ref 5–32)
BILIRUB SERPL-MCNC: <0.2 MG/DL (ref 0.2–1.2)
BUN BLDV-MCNC: 21 MG/DL (ref 8–23)
CALCIUM SERPL-MCNC: 9.2 MG/DL (ref 8.8–10.2)
CHLORIDE BLD-SCNC: 93 MMOL/L (ref 98–111)
CO2: 25 MMOL/L (ref 22–29)
CREAT SERPL-MCNC: 0.9 MG/DL (ref 0.5–0.9)
GFR NON-AFRICAN AMERICAN: >60
GLUCOSE BLD-MCNC: 85 MG/DL (ref 74–109)
POTASSIUM SERPL-SCNC: 4.4 MMOL/L (ref 3.5–5)
SODIUM BLD-SCNC: 131 MMOL/L (ref 136–145)
TOTAL PROTEIN: 7.1 G/DL (ref 6.6–8.7)

## 2020-01-21 PROCEDURE — G8399 PT W/DXA RESULTS DOCUMENT: HCPCS | Performed by: NURSE PRACTITIONER

## 2020-01-21 PROCEDURE — G8427 DOCREV CUR MEDS BY ELIG CLIN: HCPCS | Performed by: NURSE PRACTITIONER

## 2020-01-21 PROCEDURE — 99213 OFFICE O/P EST LOW 20 MIN: CPT | Performed by: NURSE PRACTITIONER

## 2020-01-21 PROCEDURE — G8420 CALC BMI NORM PARAMETERS: HCPCS | Performed by: NURSE PRACTITIONER

## 2020-01-21 PROCEDURE — 4004F PT TOBACCO SCREEN RCVD TLK: CPT | Performed by: NURSE PRACTITIONER

## 2020-01-21 PROCEDURE — 96523 IRRIG DRUG DELIVERY DEVICE: CPT | Performed by: NURSE PRACTITIONER

## 2020-01-21 PROCEDURE — 1123F ACP DISCUSS/DSCN MKR DOCD: CPT | Performed by: NURSE PRACTITIONER

## 2020-01-21 PROCEDURE — 3017F COLORECTAL CA SCREEN DOC REV: CPT | Performed by: NURSE PRACTITIONER

## 2020-01-21 PROCEDURE — 1090F PRES/ABSN URINE INCON ASSESS: CPT | Performed by: NURSE PRACTITIONER

## 2020-01-21 PROCEDURE — 4040F PNEUMOC VAC/ADMIN/RCVD: CPT | Performed by: NURSE PRACTITIONER

## 2020-01-21 PROCEDURE — G8482 FLU IMMUNIZE ORDER/ADMIN: HCPCS | Performed by: NURSE PRACTITIONER

## 2020-01-21 RX ORDER — GABAPENTIN 300 MG/1
300 CAPSULE ORAL 3 TIMES DAILY
COMMUNITY
Start: 2020-01-10 | End: 2021-07-01 | Stop reason: DRUGHIGH

## 2020-01-21 RX ORDER — CLONAZEPAM 0.5 MG/1
0.5 TABLET ORAL 3 TIMES DAILY PRN
Qty: 75 TABLET | Refills: 0 | Status: SHIPPED | OUTPATIENT
Start: 2020-01-21 | End: 2020-02-19

## 2020-01-21 ASSESSMENT — ENCOUNTER SYMPTOMS
SINUS PRESSURE: 0
SINUS PAIN: 0
WHEEZING: 0
VOMITING: 0
EYE REDNESS: 0
NAUSEA: 0
DIARRHEA: 0
ABDOMINAL PAIN: 0
COUGH: 1
SHORTNESS OF BREATH: 1
RHINORRHEA: 0
CONSTIPATION: 0
SORE THROAT: 0

## 2020-01-21 NOTE — PROGRESS NOTES
Chai Stephanie Cabral  Phone [phone]   Fax [phone]      OFFICE VISIT: 2020    Adelia Heads- : 1945    Chief Complaint:Jeny is a 69 y.o. female who is here for Discuss Medications and Medication Refill     HPI   ANXIETY:  The patient presents today for follow-up. She continues on Cymbalta 60 mg BID and Klonopin 0.5 mg prn. She is now back in her house. Moods are stable on this regimen. She is requesting a refill today. Last fill of Klonopin was on 2019 #75    COUGH:  Reports a worsening cough. Reports SOA. She is not wearing her oxygen today. She is wearing her oxygen at night and if she lies down to take a nap.  92% oxygen saturation. She continue on Trelegy. She is needing albuterol 3x a day. Denies a fever. height is 5' (1.524 m) and weight is 117 lb 8 oz (53.3 kg). Her temporal temperature is 97.9 °F (36.6 °C). Her blood pressure is 125/60 and her pulse is 77. Her oxygen saturation is 92%. Body mass index is 22.95 kg/m².     Results for orders placed or performed in visit on 19   TSH without Reflex   Result Value Ref Range    TSH 2.770 0.270 - 4.200 uIU/mL   Comprehensive Metabolic Panel   Result Value Ref Range    Sodium 134 (L) 136 - 145 mmol/L    Potassium 4.3 3.5 - 5.0 mmol/L    Chloride 97 (L) 98 - 111 mmol/L    CO2 27 22 - 29 mmol/L    Anion Gap 10 7 - 19 mmol/L    Glucose 88 74 - 109 mg/dL    BUN 17 8 - 23 mg/dL    CREATININE 0.9 0.5 - 0.9 mg/dL    GFR Non-African American >60 >60    Calcium 9.1 8.8 - 10.2 mg/dL    Total Protein 6.8 6.6 - 8.7 g/dL    Alb 3.6 3.5 - 5.2 g/dL    Total Bilirubin <0.2 0.2 - 1.2 mg/dL    Alkaline Phosphatase 61 35 - 104 U/L    ALT <5 (A) 5 - 33 U/L    AST 12 5 - 32 U/L   CBC Auto Differential   Result Value Ref Range    WBC 6.7 4.8 - 10.8 K/uL    RBC 3.39 (L) 4.20 - 5.40 M/uL    Hemoglobin 10.1 (L) 12.0 - 16.0 g/dL    Hematocrit 34.7 (L) 37.0 - 47.0 %    .4 (H) 81.0 - 99.0 fL    MCH 29.8 27.0 - 31.0 pg    MCHC 29.1 (L) 33.0 - 37.0 g/dL    RDW 14.8 (H) 11.5 - 14.5 %    Platelets 159 126 - 231 K/uL    MPV 10.0 9.4 - 12.3 fL    Neutrophils % 61.2 50.0 - 65.0 %    Lymphocytes % 26.2 20.0 - 40.0 %    Monocytes % 7.6 0.0 - 10.0 %    Eosinophils % 4.0 0.0 - 5.0 %    Basophils % 0.6 0.0 - 1.0 %    Neutrophils Absolute 4.1 1.5 - 7.5 K/uL    Immature Granulocytes # 0.0 K/uL    Lymphocytes Absolute 1.8 1.1 - 4.5 K/uL    Monocytes Absolute 0.50 0.00 - 0.90 K/uL    Eosinophils Absolute 0.30 0.00 - 0.60 K/uL    Basophils Absolute 0.00 0.00 - 0.20 K/uL     have reviewed the following with the Ms. Mcguire   Lab Review   Orders Only on 11/20/2019   Component Date Value    TSH 11/20/2019 2.770     Sodium 11/20/2019 134*    Potassium 11/20/2019 4.3     Chloride 11/20/2019 97*    CO2 11/20/2019 27     Anion Gap 11/20/2019 10     Glucose 11/20/2019 88     BUN 11/20/2019 17     CREATININE 11/20/2019 0.9     GFR Non- 11/20/2019 >60     Calcium 11/20/2019 9.1     Total Protein 11/20/2019 6.8     Alb 11/20/2019 3.6     Total Bilirubin 11/20/2019 <0.2     Alkaline Phosphatase 11/20/2019 61     ALT 11/20/2019 <5*    AST 11/20/2019 12     WBC 11/20/2019 6.7     RBC 11/20/2019 3.39*    Hemoglobin 11/20/2019 10.1*    Hematocrit 11/20/2019 34.7*    MCV 11/20/2019 102.4*    MCH 11/20/2019 29.8     MCHC 11/20/2019 29.1*    RDW 11/20/2019 14.8*    Platelets 53/02/1948 220     MPV 11/20/2019 10.0     Neutrophils % 11/20/2019 61.2     Lymphocytes % 11/20/2019 26.2     Monocytes % 11/20/2019 7.6     Eosinophils % 11/20/2019 4.0     Basophils % 11/20/2019 0.6     Neutrophils Absolute 11/20/2019 4.1     Immature Granulocytes # 11/20/2019 0.0     Lymphocytes Absolute 11/20/2019 1.8     Monocytes Absolute 11/20/2019 0.50     Eosinophils Absolute 11/20/2019 0.30     Basophils Absolute 11/20/2019 0.00    Orders Only on 08/06/2019   Component Date Value    Hep C Ab Interp 08/06/2019 Reactive*    T4 Free 08/06/2019 1.1     TSH 08/06/2019 2.040     WBC 08/06/2019 6.1     RBC 08/06/2019 3.36*    Hemoglobin 08/06/2019 10.1*    Hematocrit 08/06/2019 33.2*    MCV 08/06/2019 98.8     MCH 08/06/2019 30.1     MCHC 08/06/2019 30.4*    RDW 08/06/2019 14.6*    Platelets 65/48/2637 251     MPV 08/06/2019 10.4     Neutrophils % 08/06/2019 65.4*    Lymphocytes % 08/06/2019 21.2     Monocytes % 08/06/2019 9.3     Eosinophils % 08/06/2019 3.1     Basophils % 08/06/2019 0.8     Neutrophils Absolute 08/06/2019 4.0     Lymphocytes Absolute 08/06/2019 1.3     Monocytes Absolute 08/06/2019 0.60     Eosinophils Absolute 08/06/2019 0.20     Basophils Absolute 08/06/2019 0.10     Sodium 08/06/2019 132*    Potassium 08/06/2019 4.9     Chloride 08/06/2019 91*    CO2 08/06/2019 30*    Anion Gap 08/06/2019 11     Glucose 08/06/2019 93     BUN 08/06/2019 11     CREATININE 08/06/2019 0.8     GFR Non- 08/06/2019 >60     Calcium 08/06/2019 9.4     Total Protein 08/06/2019 6.8     Alb 08/06/2019 3.8     Total Bilirubin 08/06/2019 <0.2     Alkaline Phosphatase 08/06/2019 60     ALT 08/06/2019 <5*    AST 08/06/2019 14     Folate 08/06/2019 4.3*    Vitamin B-12 08/06/2019 724      Copies of these are in the chart. Prior to Visit Medications    Medication Sig Taking? Authorizing Provider   gabapentin (NEURONTIN) 300 MG capsule  Yes Historical Provider, MD   clonazePAM (KLONOPIN) 0.5 MG tablet Take 1 tablet by mouth 3 times daily as needed for Anxiety for up to 30 days.  Yes NAVEEN Dior   Fesoterodine Fumarate ER (TOVIAZ) 8 MG TB24 Take 1 tablet by mouth daily Yes NAVEEN Dior   albuterol sulfate HFA (VENTOLIN HFA) 108 (90 Base) MCG/ACT inhaler Inhale 2 puffs into the lungs every 6 hours as needed for Wheezing Yes NAVEEN Venegas   isosorbide mononitrate (IMDUR) 30 MG extended release tablet Take 1 tablet by mouth daily Yes NAVEEN Dior   DULoxetine (CYMBALTA) 60 MG extended release capsule Take 1 capsule by mouth 2 times daily Yes NAVEEN Dior   memantine (NAMENDA) 10 MG tablet Take 1 tablet by mouth 2 times daily Yes NAVEEN Dior   denosumab (PROLIA) 60 MG/ML SOSY SC injection INJECT 1ML UNDER THE SKIN EVERY 6 MONTHS Yes NAVEEN Dior   pantoprazole (PROTONIX) 20 MG tablet Take 1 tablet by mouth every morning (before breakfast) Yes NAVEEN Dior   traZODone (DESYREL) 150 MG tablet Take 1 tablet by mouth nightly Yes NAVEEN Dior   ondansetron (ZOFRAN) 4 MG tablet Take 1 tablet by mouth every 8 hours as needed for Nausea or Vomiting Yes NAVEEN Dior   carvedilol (COREG) 3.125 MG tablet Take 1 tablet by mouth 2 times daily (with meals) Yes NAVEEN Saldaña   albuterol (PROVENTIL) (5 MG/ML) 0.5% nebulizer solution Take 0.5 mLs by nebulization every 6 hours as needed for Wheezing Yes NAVEEN Dior   naloxone (NARCAN) 4 MG/0.1ML LIQD nasal spray 1 spray by Nasal route as needed for Opioid Reversal Yes NAVEEN Dior   Fluticasone-Umeclidin-Vilant 100-62.5-25 MCG/INH AEPB Inhale 1 puff into the lungs daily Yes NAVEEN Dior   donepezil (ARICEPT) 5 MG tablet Take 1 tablet by mouth nightly Yes NAVEEN Dior   Respiratory Therapy Supplies (NEBULIZER/TUBING/MOUTHPIECE) KIT 1 kit by Does not apply route daily Yes NAVEEN Dior   diphenhydrAMINE (SIMPLY SLEEP) 25 MG tablet Take 25 mg by mouth nightly as needed for Sleep Yes Historical Provider, MD   nitroGLYCERIN (NITROQUICK) 0.4 MG SL tablet Place 1 tablet under the tongue every 5 minutes as needed for Chest pain Yes NAVEEN Dior   olopatadine (PATADAY) 0.2 % SOLN ophthalmic solution Place 1 drop into both eyes daily Yes NAVEEN Dior   furosemide (LASIX) 20 MG tablet Take 1 tablet by mouth daily Yes NAVEEN Dior   OXYGEN Inhale 2 L into the lungs daily Yes NAVEEN Dior   HYDROcodone-acetaminophen (NORCO)  MG per tablet due to medical condition G47.01 clonazePAM (KLONOPIN) 0.5 MG tablet   3. Age-related osteoporosis without current pathological fracture M81.0 Comprehensive Metabolic Panel  Awaiting Prolia injection if calcium is normal   4.    5. Port-A-Cath in place    COPD Z95.828 SD IRRIG IMPLANTED DRUG DELIVERY DEVICE  Continue Trelegy  Continue albuterol prn  Continue oxygen         PLAN    Orders Placed This Encounter   Procedures    Comprehensive Metabolic Panel    SD IRRIG IMPLANTED DRUG DELIVERY DEVICE          Procedure note:    Accessed mediport in sterile fashion with gripper needle without anesthesia. The port was flushed with normal saline and flushed easily. Attempts were made to draw back blood from the port, and it was successful. Labs obtained and taken to lab. The port was then flushed with heparin and the port was de-accessed. No blood loss  No complication  No specimen  She tolerated the procedure well without incident. Return in about 1 month (around 2/21/2020), or if symptoms worsen or fail to improve. There are no Patient Instructions on file for this visit. Controlled Substances Monitoring: Attestation: The Prescription Monitoring Report for this patient was reviewed today. ([id number]) NAVEEN Villareal)            Additional Instructions: As always, patient is advised to bring in medication bottles in order to correctly reconcile with our current list.    Jackie Vazquez received counseling on the following healthy behaviors: n/a    Patient given educational materials on dx    I have instructed Jeny to complete a self tracking handout on n/a and instructed them to bring it with them to her next appointment. Discussed use, benefit, and side effects of prescribed medications. Barriers to medication compliance addressed. All patient questions answered. Pt voiced understanding.      Dannie Rodríguez, NAVEEN

## 2020-01-22 ENCOUNTER — TELEPHONE (OUTPATIENT)
Dept: PRIMARY CARE CLINIC | Age: 75
End: 2020-01-22

## 2020-01-28 ENCOUNTER — HOSPITAL ENCOUNTER (OUTPATIENT)
Dept: GENERAL RADIOLOGY | Age: 75
Discharge: HOME | End: 2020-01-28
Payer: MEDICARE

## 2020-01-28 ENCOUNTER — OFFICE VISIT (OUTPATIENT)
Dept: PRIMARY CARE CLINIC | Age: 75
End: 2020-01-28
Payer: MEDICARE

## 2020-01-28 ENCOUNTER — TELEPHONE (OUTPATIENT)
Dept: PRIMARY CARE CLINIC | Age: 75
End: 2020-01-28

## 2020-01-28 VITALS
OXYGEN SATURATION: 92 % | DIASTOLIC BLOOD PRESSURE: 90 MMHG | BODY MASS INDEX: 22.97 KG/M2 | SYSTOLIC BLOOD PRESSURE: 138 MMHG | TEMPERATURE: 98.3 F | HEIGHT: 60 IN | WEIGHT: 117 LBS | HEART RATE: 74 BPM

## 2020-01-28 PROCEDURE — 96372 THER/PROPH/DIAG INJ SC/IM: CPT | Performed by: NURSE PRACTITIONER

## 2020-01-28 PROCEDURE — G8427 DOCREV CUR MEDS BY ELIG CLIN: HCPCS | Performed by: NURSE PRACTITIONER

## 2020-01-28 PROCEDURE — 99213 OFFICE O/P EST LOW 20 MIN: CPT | Performed by: NURSE PRACTITIONER

## 2020-01-28 PROCEDURE — G8420 CALC BMI NORM PARAMETERS: HCPCS | Performed by: NURSE PRACTITIONER

## 2020-01-28 PROCEDURE — G8399 PT W/DXA RESULTS DOCUMENT: HCPCS | Performed by: NURSE PRACTITIONER

## 2020-01-28 PROCEDURE — 71275 CT ANGIOGRAPHY CHEST: CPT

## 2020-01-28 PROCEDURE — 1090F PRES/ABSN URINE INCON ASSESS: CPT | Performed by: NURSE PRACTITIONER

## 2020-01-28 PROCEDURE — 4004F PT TOBACCO SCREEN RCVD TLK: CPT | Performed by: NURSE PRACTITIONER

## 2020-01-28 PROCEDURE — 3017F COLORECTAL CA SCREEN DOC REV: CPT | Performed by: NURSE PRACTITIONER

## 2020-01-28 PROCEDURE — G8482 FLU IMMUNIZE ORDER/ADMIN: HCPCS | Performed by: NURSE PRACTITIONER

## 2020-01-28 PROCEDURE — 1123F ACP DISCUSS/DSCN MKR DOCD: CPT | Performed by: NURSE PRACTITIONER

## 2020-01-28 PROCEDURE — 6360000004 HC RX CONTRAST MEDICATION: Performed by: NURSE PRACTITIONER

## 2020-01-28 PROCEDURE — 4040F PNEUMOC VAC/ADMIN/RCVD: CPT | Performed by: NURSE PRACTITIONER

## 2020-01-28 RX ORDER — DOXYCYCLINE HYCLATE 100 MG/1
100 CAPSULE ORAL 2 TIMES DAILY
Qty: 20 CAPSULE | Refills: 0 | Status: SHIPPED | OUTPATIENT
Start: 2020-01-28 | End: 2020-02-07

## 2020-01-28 RX ADMIN — IOPAMIDOL 90 ML: 755 INJECTION, SOLUTION INTRAVENOUS at 15:01

## 2020-01-28 ASSESSMENT — ENCOUNTER SYMPTOMS
CONSTIPATION: 0
VOMITING: 0
EYE REDNESS: 0
SORE THROAT: 0
SHORTNESS OF BREATH: 1
DIARRHEA: 0
WHEEZING: 0
COUGH: 1
RHINORRHEA: 0

## 2020-01-28 NOTE — PROGRESS NOTES
[facility] [employer]  Phone [phone]   Fax [phone]      OFFICE VISIT: 2020    Everton Lozoya- : 1945    Chief Complaint:Jeny is a 69 y.o. female who is here for Cough     HPI  The patient presents today for evaluation of a cough. She started coughing up blood on . The blood was bright red. She has been staying on her oxygen even during the day. She was seen in the ED on  night at [place], [place]. She had a chest x-ray and CT scan of chest in ED. She was not started on any medications. Hx of emphysema. Denies any fever. Denies any blood in stool. Denies any blood in nose. She has not had her morning medications today. No new medication over the counter. She continues on her Protonix daily. She has been taking Albuterol 3x/day. LABS, 2020:  BNP: 16.7  CMP: glucose, 93   BUN: 17   Creat 0.9   Na: 137   K: 4.8  Troponin: negative  CKMB: 0.9  D-Dimer: 732 (elevated)  WBC: 7.0  Hgb: 10.1  Hct: 32.6  Plt: 225    CT Chest:   Bilateral apical pleural thickening, R>L  COPD with moderate emphysematous changes  Nodules on the right, 7 mm, 6 mm & 11mm  Left nodules: 4mm & 5.6 mm    Chest X-ray: No active disease     height is 5' (1.524 m) and weight is 117 lb (53.1 kg). Her temporal temperature is 98.3 °F (36.8 °C). Her blood pressure is 138/90 (abnormal) and her pulse is 74. Her oxygen saturation is 92%. Body mass index is 22.85 kg/m².     Results for orders placed or performed in visit on 20   Comprehensive Metabolic Panel   Result Value Ref Range    Sodium 131 (L) 136 - 145 mmol/L    Potassium 4.4 3.5 - 5.0 mmol/L    Chloride 93 (L) 98 - 111 mmol/L    CO2 25 22 - 29 mmol/L    Anion Gap 13 7 - 19 mmol/L    Glucose 85 74 - 109 mg/dL    BUN 21 8 - 23 mg/dL    CREATININE 0.9 0.5 - 0.9 mg/dL    GFR Non-African American >60 >60    Calcium 9.2 8.8 - 10.2 mg/dL    Total Protein 7.1 6.6 - 8.7 g/dL    Alb 3.8 3.5 - 5.2 g/dL    Total Bilirubin <0.2 0.2 - 1.2 Date Value    Hep C Ab Interp 08/06/2019 Reactive*    T4 Free 08/06/2019 1.1     TSH 08/06/2019 2.040     WBC 08/06/2019 6.1     RBC 08/06/2019 3.36*    Hemoglobin 08/06/2019 10.1*    Hematocrit 08/06/2019 33.2*    MCV 08/06/2019 98.8     MCH 08/06/2019 30.1     MCHC 08/06/2019 30.4*    RDW 08/06/2019 14.6*    Platelets 20/38/9031 251     MPV 08/06/2019 10.4     Neutrophils % 08/06/2019 65.4*    Lymphocytes % 08/06/2019 21.2     Monocytes % 08/06/2019 9.3     Eosinophils % 08/06/2019 3.1     Basophils % 08/06/2019 0.8     Neutrophils Absolute 08/06/2019 4.0     Lymphocytes Absolute 08/06/2019 1.3     Monocytes Absolute 08/06/2019 0.60     Eosinophils Absolute 08/06/2019 0.20     Basophils Absolute 08/06/2019 0.10     Sodium 08/06/2019 132*    Potassium 08/06/2019 4.9     Chloride 08/06/2019 91*    CO2 08/06/2019 30*    Anion Gap 08/06/2019 11     Glucose 08/06/2019 93     BUN 08/06/2019 11     CREATININE 08/06/2019 0.8     GFR Non- 08/06/2019 >60     Calcium 08/06/2019 9.4     Total Protein 08/06/2019 6.8     Alb 08/06/2019 3.8     Total Bilirubin 08/06/2019 <0.2     Alkaline Phosphatase 08/06/2019 60     ALT 08/06/2019 <5*    AST 08/06/2019 14     Folate 08/06/2019 4.3*    Vitamin B-12 08/06/2019 724      Copies of these are in the chart. Prior to Visit Medications    Medication Sig Taking? Authorizing Provider   doxycycline hyclate (VIBRAMYCIN) 100 MG capsule Take 1 capsule by mouth 2 times daily for 10 days Yes NAVEEN Dior   gabapentin (NEURONTIN) 300 MG capsule  Yes Historical Provider, MD   clonazePAM (KLONOPIN) 0.5 MG tablet Take 1 tablet by mouth 3 times daily as needed for Anxiety for up to 30 days.  Yes NAVEEN Dior   Fesoterodine Fumarate ER (TOVIAZ) 8 MG TB24 Take 1 tablet by mouth daily Yes NAVEEN Dior   albuterol sulfate HFA (VENTOLIN HFA) 108 (90 Base) MCG/ACT inhaler Inhale 2 puffs into the lungs every 6 hours as needed for APRN   HYDROcodone-acetaminophen ([address])  MG per tablet TAKE ONE TABLET BY MOUTH EVERY 8 HOURS AS NEEDED- dr galarza Yes Historical Provider, MD       Allergies: Codeine and Sulfa antibiotics    Past Medical History:   Diagnosis Date    Abdominal pain     Anxiety     Arthritis     CAD in native artery     Cerebrovascular disease     CHF (congestive heart failure) (HCC)     Constipation     COPD (chronic obstructive pulmonary disease) (HCC)     Depression     Emphysema     GERD (gastroesophageal reflux disease)     Myocardial infarct, old     Pneumonia     Tobacco abuse        Past Surgical History:   Procedure Laterality Date    ABDOMINAL EXPLORATION SURGERY      APPENDECTOMY      CARDIAC CATHETERIZATION  06/25/10    patent  stent noted in LAD,EF is estimated to be 60%    CARDIAC CATHETERIZATION  7/13/12  MDL    EF  60%    CHOLECYSTECTOMY      COLONOSCOPY  10/2014    Dr Lea Fung ENDOSCOPY  10/1/14    Dr Rick Ramírez: food bezoar; esophagitis, gastritis, duodenitis; biopsies neg h-pylori       Social History     Tobacco Use    Smoking status: Current Every Day Smoker     Packs/day: 1.00     Years: 36.00     Pack years: 36.00     Types: Cigarettes     Start date: 1978    Smokeless tobacco: Never Used   Substance Use Topics    Alcohol use: No     Alcohol/week: 0.0 standard drinks       Family History   Problem Relation Age of Onset    Stroke Brother     Stroke Sister     Cancer Brother    [facility] Cancer Sister     Diabetes Brother     Diabetes Sister     Coronary Art Dis Other     Hypertension Other     Seizures Child     Colon Cancer Neg Hx     Colon Polyps Neg Hx     Esophageal Cancer Neg Hx     Liver Cancer Neg Hx     Liver Disease Neg Hx     Rectal Cancer Neg Hx     Stomach Cancer Neg Hx        Review of Systems   Constitutional: Positive for appetite change (decreased) and fatigue. Negative for activity change, chills and fever.    HENT: Negative for congestion, ear pain, rhinorrhea and sore throat. Eyes: Negative for redness. Respiratory: Positive for cough (hemoptysis) and shortness of breath. Negative for wheezing. Cardiovascular: Negative for chest pain. Gastrointestinal: Negative for constipation, diarrhea and vomiting. Skin: Negative for rash. Neurological: Positive for weakness. Negative for dizziness and headaches. Psychiatric/Behavioral: Positive for sleep disturbance. Physical Exam  Vitals signs reviewed. Constitutional:       Appearance: She is well-developed. HENT:      Head: Normocephalic. Right Ear: Tympanic membrane and external ear normal.      Left Ear: Tympanic membrane and external ear normal.      Nose: Congestion and rhinorrhea (crusting, NC in place at 2L) present. Neck:      Musculoskeletal: Normal range of motion. Cardiovascular:      Rate and Rhythm: Normal rate and regular rhythm. Pulmonary:      Effort: Pulmonary effort is normal.      Breath sounds: Decreased breath sounds (throughout) and rhonchi (scattered) present. No wheezing or rales. Abdominal:      General: Bowel sounds are normal.      Palpations: Abdomen is soft. Skin:     General: Skin is dry. Neurological:      Mental Status: She is alert. Psychiatric:         Mood and Affect: Mood normal.         Behavior: Behavior normal.         Thought Content: Thought content normal.         Judgment: Judgment normal.       ASSESSMENT      ICD-10-CM    1. COPD exacerbation (HCC) J44.1 doxycycline hyclate (VIBRAMYCIN) 100 MG capsule   2. Hemoptysis R04.2 CTA Pulmonary W and WO Contrast   3. Cough R05 CTA Pulmonary W and WO Contrast   4. Weakness R53.1    5. Elevated d-dimer R79.89 CTA Pulmonary W and WO Contrast   6. Hypoxia R09.02 CTA Pulmonary W and WO Contrast   7. Tobacco abuse disorder Z72.0 CTA Pulmonary W and WO Contrast   8.  Osteoporosis, unspecified osteoporosis type, unspecified pathological fracture presence M81.0 denosumab (PROLIA) SC injection 60 mg         PLAN    Orders Placed This Encounter   Procedures    CTA Pulmonary W and WO Contrast        Return if symptoms worsen or fail to improve. Patient Instructions       Patient Education        Coughing Up Blood: Care Instructions  Your Care Instructions    Coughing up blood can be frightening. The blood may come from the lungs, stomach, or throat. You may cough up a few thin streaks of bright red blood. This probably is not a cause for concern. Coughing up large amounts of bright red blood or rust-colored mucus from the lungs can be a symptom of a more serious condition. Several conditions can make you cough up blood from the lungs. These include bronchitis and pneumonia, or more serious problems such as cancer or a blood clot in the lung (pulmonary embolus). Depending on what is causing your cough, it may go away after the illness is treated. Your doctor may tell you not to suppress the cough with cough medicine if it is better for you to cough up the blood and spit it out. Follow-up care is a key part of your treatment and safety. Be sure to make and go to all appointments, and call your doctor if you are having problems. It's also a good idea to know your test results and keep a list of the medicines you take. How can you care for yourself at home? · Make a note of when and for how long you cough up blood. Also note if you are coughing up spit with a small amount of blood, or mostly blood. Take this information to your next appointment with your doctor. · Increase your fluid intake to at least 8 to 10 glasses of water every day. This helps keep the mucus thin and helps you cough it up. If you have kidney, heart, or liver disease and have to limit fluids, talk with your doctor before you increase your fluid intake. · If your doctor prescribed antibiotics, take them as directed. Do not stop taking them just because you feel better.  You need to take the full course of antibiotics. · Do not take cough medicine without your doctor's guidance. They can cause problems if you have other health problems. They can also interact with other medicine. · Do not smoke or use other forms of tobacco, especially while you have a cough. Smoking can make coughing worse. If you need help quitting, talk to your doctor about stop-smoking programs and medicines. These can increase your chances of quitting for good. · Avoid exposure to smoke, dust, or other pollutants. When should you call for help? Call 911 anytime you think you may need emergency care. For example, call if:    · You have sudden chest pain and shortness of breath.     · You have severe trouble breathing.    Call your doctor now or seek immediate medical care if:    · You have wheezing and difficulty breathing.     · You are dizzy or lightheaded, or you feel like you may faint.     · You cough up clots of blood.    Watch closely for changes in your health, and be sure to contact your doctor if:    · You do not get better as expected.     · You have any new symptoms, such as chest pain with difficulty breathing or a fever. Where can you learn more? Go to [email or web address] org and sign in to your [employer] account. Enter M550 in the [employer] box to learn more about \"Coughing Up Blood: Care Instructions. \"     If you do not have an account, please click on the \"Sign Up Now\" link. Current as of: June 26, 2019  Content Version: 12.3  © 8637-8078 [employer]. Care instructions adapted under license by [facility] ([facility]). If you have questions about a medical condition or this instruction, always ask your healthcare professional. [place] [address] any warranty or liability for your use of this information.                        Controlled Substances Monitoring:  n/a            Additional Instructions: As always, patient is advised to bring in medication bottles in

## 2020-01-28 NOTE — PATIENT INSTRUCTIONS
forms of tobacco, especially while you have a cough. Smoking can make coughing worse. If you need help quitting, talk to your doctor about stop-smoking programs and medicines. These can increase your chances of quitting for good. · Avoid exposure to smoke, dust, or other pollutants. When should you call for help? Call 911 anytime you think you may need emergency care. For example, call if:    · You have sudden chest pain and shortness of breath.     · You have severe trouble breathing.    Call your doctor now or seek immediate medical care if:    · You have wheezing and difficulty breathing.     · You are dizzy or lightheaded, or you feel like you may faint.     · You cough up clots of blood.    Watch closely for changes in your health, and be sure to contact your doctor if:    · You do not get better as expected.     · You have any new symptoms, such as chest pain with difficulty breathing or a fever. Where can you learn more? Go to [email or web address]. org and sign in to your [employer] account. Enter M550 in the [employer] box to learn more about \"Coughing Up Blood: Care Instructions. \"     If you do not have an account, please click on the \"Sign Up Now\" link. Current as of: June 26, 2019  Content Version: 12.3  © 7255-3364 [employer]. Care instructions adapted under license by [facility] ([facility]). If you have questions about a medical condition or this instruction, always ask your healthcare professional. [place] [address] any warranty or liability for your use of this information.

## 2020-01-30 ENCOUNTER — TELEPHONE (OUTPATIENT)
Dept: PRIMARY CARE CLINIC | Age: 75
End: 2020-01-30

## 2020-02-19 ENCOUNTER — TELEPHONE (OUTPATIENT)
Dept: PRIMARY CARE CLINIC | Age: 75
End: 2020-02-19

## 2020-02-19 ENCOUNTER — APPOINTMENT (OUTPATIENT)
Dept: CT IMAGING | Age: 75
DRG: 193 | End: 2020-02-19
Payer: MEDICARE

## 2020-02-19 ENCOUNTER — APPOINTMENT (OUTPATIENT)
Dept: GENERAL RADIOLOGY | Age: 75
DRG: 193 | End: 2020-02-19
Payer: MEDICARE

## 2020-02-19 ENCOUNTER — HOSPITAL ENCOUNTER (INPATIENT)
Age: 75
LOS: 2 days | Discharge: HOME HEALTH | DRG: 193 | End: 2020-02-21
Attending: EMERGENCY MEDICINE | Admitting: INTERNAL MEDICINE
Payer: MEDICARE

## 2020-02-19 ENCOUNTER — OFFICE VISIT (OUTPATIENT)
Dept: PRIMARY CARE CLINIC | Age: 75
End: 2020-02-19
Payer: MEDICARE

## 2020-02-19 VITALS
DIASTOLIC BLOOD PRESSURE: 66 MMHG | OXYGEN SATURATION: 91 % | HEART RATE: 84 BPM | SYSTOLIC BLOOD PRESSURE: 96 MMHG | BODY MASS INDEX: 23.16 KG/M2 | HEIGHT: 60 IN | TEMPERATURE: 97.4 F | WEIGHT: 118 LBS

## 2020-02-19 DIAGNOSIS — R41.0 CONFUSION: ICD-10-CM

## 2020-02-19 LAB
ALBUMIN SERPL-MCNC: 3 G/DL (ref 3.5–5.2)
ALBUMIN SERPL-MCNC: 3.2 G/DL (ref 3.5–5.2)
ALP BLD-CCNC: 75 U/L (ref 35–104)
ALP BLD-CCNC: 80 U/L (ref 35–104)
ALT SERPL-CCNC: 6 U/L (ref 5–33)
ALT SERPL-CCNC: <5 U/L (ref 5–33)
AMPHETAMINE SCREEN, URINE: NORMAL
ANION GAP SERPL CALCULATED.3IONS-SCNC: 11 MMOL/L (ref 7–19)
ANION GAP SERPL CALCULATED.3IONS-SCNC: 13 MMOL/L (ref 7–19)
AST SERPL-CCNC: 10 U/L (ref 5–32)
AST SERPL-CCNC: 11 U/L (ref 5–32)
BARBITURATE SCREEN, URINE: NORMAL
BASOPHILS ABSOLUTE: 0 K/UL (ref 0–0.2)
BASOPHILS ABSOLUTE: 0 K/UL (ref 0–0.2)
BASOPHILS RELATIVE PERCENT: 0.2 % (ref 0–1)
BASOPHILS RELATIVE PERCENT: 0.2 % (ref 0–1)
BENZODIAZEPINE SCREEN, URINE: NORMAL
BILIRUB SERPL-MCNC: <0.2 MG/DL (ref 0.2–1.2)
BILIRUB SERPL-MCNC: <0.2 MG/DL (ref 0.2–1.2)
BILIRUBIN URINE: NEGATIVE
BILIRUBIN, POC: ABNORMAL
BLOOD URINE, POC: ABNORMAL
BLOOD, URINE: NEGATIVE
BUN BLDV-MCNC: 33 MG/DL (ref 8–23)
BUN BLDV-MCNC: 34 MG/DL (ref 8–23)
BUPRENORPHINE URINE: NORMAL
CALCIUM SERPL-MCNC: 7.4 MG/DL (ref 8.8–10.2)
CALCIUM SERPL-MCNC: 7.6 MG/DL (ref 8.8–10.2)
CHLORIDE BLD-SCNC: 90 MMOL/L (ref 98–111)
CHLORIDE BLD-SCNC: 92 MMOL/L (ref 98–111)
CLARITY, POC: CLEAR
CLARITY: ABNORMAL
CO2: 23 MMOL/L (ref 22–29)
CO2: 24 MMOL/L (ref 22–29)
COCAINE METABOLITE SCREEN URINE: NORMAL
COLOR, POC: ABNORMAL
COLOR: YELLOW
CREAT SERPL-MCNC: 1 MG/DL (ref 0.5–0.9)
CREAT SERPL-MCNC: 1 MG/DL (ref 0.5–0.9)
EOSINOPHILS ABSOLUTE: 0.1 K/UL (ref 0–0.6)
EOSINOPHILS ABSOLUTE: 0.1 K/UL (ref 0–0.6)
EOSINOPHILS RELATIVE PERCENT: 0.4 % (ref 0–5)
EOSINOPHILS RELATIVE PERCENT: 1 % (ref 0–5)
GABAPENTIN SCREEN, URINE: NORMAL
GFR NON-AFRICAN AMERICAN: 54
GFR NON-AFRICAN AMERICAN: 54
GLUCOSE BLD-MCNC: 87 MG/DL (ref 74–109)
GLUCOSE BLD-MCNC: 96 MG/DL (ref 74–109)
GLUCOSE URINE, POC: ABNORMAL
GLUCOSE URINE: NEGATIVE MG/DL
HCT VFR BLD CALC: 28.6 % (ref 37–47)
HCT VFR BLD CALC: 30 % (ref 37–47)
HEMOGLOBIN: 8.6 G/DL (ref 12–16)
HEMOGLOBIN: 8.9 G/DL (ref 12–16)
IMMATURE GRANULOCYTES #: 0.1 K/UL
IMMATURE GRANULOCYTES #: 0.1 K/UL
KETONES, POC: ABNORMAL
KETONES, URINE: NEGATIVE MG/DL
LEUKOCYTE EST, POC: ABNORMAL
LEUKOCYTE ESTERASE, URINE: NEGATIVE
LYMPHOCYTES ABSOLUTE: 1.2 K/UL (ref 1.1–4.5)
LYMPHOCYTES ABSOLUTE: 1.2 K/UL (ref 1.1–4.5)
LYMPHOCYTES RELATIVE PERCENT: 10.3 % (ref 20–40)
LYMPHOCYTES RELATIVE PERCENT: 9 % (ref 20–40)
MCH RBC QN AUTO: 29.3 PG (ref 27–31)
MCH RBC QN AUTO: 29.4 PG (ref 27–31)
MCHC RBC AUTO-ENTMCNC: 29.7 G/DL (ref 33–37)
MCHC RBC AUTO-ENTMCNC: 30.1 G/DL (ref 33–37)
MCV RBC AUTO: 97.3 FL (ref 81–99)
MCV RBC AUTO: 99 FL (ref 81–99)
MDMA URINE: NORMAL
METHADONE SCREEN, URINE: NORMAL
METHAMPHETAMINE, URINE: NORMAL
MONOCYTES ABSOLUTE: 0.7 K/UL (ref 0–0.9)
MONOCYTES ABSOLUTE: 0.9 K/UL (ref 0–0.9)
MONOCYTES RELATIVE PERCENT: 6.3 % (ref 0–10)
MONOCYTES RELATIVE PERCENT: 6.7 % (ref 0–10)
NEUTROPHILS ABSOLUTE: 11.2 K/UL (ref 1.5–7.5)
NEUTROPHILS ABSOLUTE: 9.4 K/UL (ref 1.5–7.5)
NEUTROPHILS RELATIVE PERCENT: 81.7 % (ref 50–65)
NEUTROPHILS RELATIVE PERCENT: 82.7 % (ref 50–65)
NITRITE, POC: ABNORMAL
NITRITE, URINE: NEGATIVE
OPIATE SCREEN URINE: POSITIVE
OXYCODONE SCREEN URINE: NORMAL
PDW BLD-RTO: 15.7 % (ref 11.5–14.5)
PDW BLD-RTO: 15.9 % (ref 11.5–14.5)
PH UA: 6 (ref 5–8)
PH, POC: 5.5
PHENCYCLIDINE SCREEN URINE: NORMAL
PLATELET # BLD: 207 K/UL (ref 130–400)
PLATELET # BLD: 220 K/UL (ref 130–400)
PMV BLD AUTO: 10.1 FL (ref 9.4–12.3)
PMV BLD AUTO: 10.3 FL (ref 9.4–12.3)
POTASSIUM REFLEX MAGNESIUM: 3.8 MMOL/L (ref 3.5–5)
POTASSIUM SERPL-SCNC: 4 MMOL/L (ref 3.5–5)
PROPOXYPHENE SCREEN, URINE: NORMAL
PROTEIN UA: ABNORMAL MG/DL
PROTEIN, POC: 30
RBC # BLD: 2.94 M/UL (ref 4.2–5.4)
RBC # BLD: 3.03 M/UL (ref 4.2–5.4)
SODIUM BLD-SCNC: 126 MMOL/L (ref 136–145)
SODIUM BLD-SCNC: 127 MMOL/L (ref 136–145)
SPECIFIC GRAVITY UA: 1.01 (ref 1–1.03)
SPECIFIC GRAVITY, POC: 1.02
THC SCREEN, URINE: NORMAL
TOTAL PROTEIN: 6.3 G/DL (ref 6.6–8.7)
TOTAL PROTEIN: 6.4 G/DL (ref 6.6–8.7)
TRICYCLIC ANTIDEPRESSANTS, UR: NORMAL
TSH SERPL DL<=0.05 MIU/L-ACNC: 2.06 UIU/ML (ref 0.27–4.2)
URINE REFLEX TO CULTURE: ABNORMAL
UROBILINOGEN, POC: 0.2
UROBILINOGEN, URINE: 0.2 E.U./DL
WBC # BLD: 11.5 K/UL (ref 4.8–10.8)
WBC # BLD: 13.5 K/UL (ref 4.8–10.8)

## 2020-02-19 PROCEDURE — 1090F PRES/ABSN URINE INCON ASSESS: CPT | Performed by: NURSE PRACTITIONER

## 2020-02-19 PROCEDURE — 81003 URINALYSIS AUTO W/O SCOPE: CPT

## 2020-02-19 PROCEDURE — G8420 CALC BMI NORM PARAMETERS: HCPCS | Performed by: NURSE PRACTITIONER

## 2020-02-19 PROCEDURE — 96523 IRRIG DRUG DELIVERY DEVICE: CPT | Performed by: NURSE PRACTITIONER

## 2020-02-19 PROCEDURE — 93005 ELECTROCARDIOGRAM TRACING: CPT | Performed by: EMERGENCY MEDICINE

## 2020-02-19 PROCEDURE — G8482 FLU IMMUNIZE ORDER/ADMIN: HCPCS | Performed by: NURSE PRACTITIONER

## 2020-02-19 PROCEDURE — 2580000003 HC RX 258: Performed by: EMERGENCY MEDICINE

## 2020-02-19 PROCEDURE — 1123F ACP DISCUSS/DSCN MKR DOCD: CPT | Performed by: NURSE PRACTITIONER

## 2020-02-19 PROCEDURE — 81002 URINALYSIS NONAUTO W/O SCOPE: CPT | Performed by: NURSE PRACTITIONER

## 2020-02-19 PROCEDURE — 4040F PNEUMOC VAC/ADMIN/RCVD: CPT | Performed by: NURSE PRACTITIONER

## 2020-02-19 PROCEDURE — 1210000000 HC MED SURG R&B

## 2020-02-19 PROCEDURE — 70450 CT HEAD/BRAIN W/O DYE: CPT

## 2020-02-19 PROCEDURE — 36415 COLL VENOUS BLD VENIPUNCTURE: CPT

## 2020-02-19 PROCEDURE — 87040 BLOOD CULTURE FOR BACTERIA: CPT

## 2020-02-19 PROCEDURE — G8399 PT W/DXA RESULTS DOCUMENT: HCPCS | Performed by: NURSE PRACTITIONER

## 2020-02-19 PROCEDURE — 80053 COMPREHEN METABOLIC PANEL: CPT

## 2020-02-19 PROCEDURE — 80305 DRUG TEST PRSMV DIR OPT OBS: CPT | Performed by: NURSE PRACTITIONER

## 2020-02-19 PROCEDURE — 99214 OFFICE O/P EST MOD 30 MIN: CPT | Performed by: NURSE PRACTITIONER

## 2020-02-19 PROCEDURE — 3017F COLORECTAL CA SCREEN DOC REV: CPT | Performed by: NURSE PRACTITIONER

## 2020-02-19 PROCEDURE — 71045 X-RAY EXAM CHEST 1 VIEW: CPT

## 2020-02-19 PROCEDURE — 85025 COMPLETE CBC W/AUTO DIFF WBC: CPT

## 2020-02-19 PROCEDURE — 99285 EMERGENCY DEPT VISIT HI MDM: CPT

## 2020-02-19 PROCEDURE — 4004F PT TOBACCO SCREEN RCVD TLK: CPT | Performed by: NURSE PRACTITIONER

## 2020-02-19 PROCEDURE — G8427 DOCREV CUR MEDS BY ELIG CLIN: HCPCS | Performed by: NURSE PRACTITIONER

## 2020-02-19 RX ORDER — SODIUM CHLORIDE 9 MG/ML
1000 INJECTION, SOLUTION INTRAVENOUS CONTINUOUS
Status: DISCONTINUED | OUTPATIENT
Start: 2020-02-19 | End: 2020-02-20

## 2020-02-19 RX ORDER — DULOXETIN HYDROCHLORIDE 60 MG/1
60 CAPSULE, DELAYED RELEASE ORAL DAILY
Qty: 30 CAPSULE | Refills: 5 | Status: SHIPPED | OUTPATIENT
Start: 2020-02-19 | End: 2020-10-12

## 2020-02-19 RX ADMIN — SODIUM CHLORIDE 1000 ML: 9 INJECTION, SOLUTION INTRAVENOUS at 21:30

## 2020-02-19 ASSESSMENT — ENCOUNTER SYMPTOMS
SHORTNESS OF BREATH: 0
VOMITING: 0
SORE THROAT: 0
COUGH: 1
DIARRHEA: 0
VOMITING: 0
VISUAL CHANGE: 0
RHINORRHEA: 0
CONSTIPATION: 0
EYE REDNESS: 0
DIARRHEA: 0
SHORTNESS OF BREATH: 0
NAUSEA: 0
WHEEZING: 0

## 2020-02-19 NOTE — PATIENT INSTRUCTIONS
you can lose your balance and fall. · Talk to your doctor if you have numbness in your feet. Preventing falls at home  · Remove raised doorway thresholds, throw rugs, and clutter. Repair loose carpet or raised areas in the floor. · Move furniture and electrical cords to keep them out of walking paths. · Use nonskid floor wax, and wipe up spills right away, especially on ceramic tile floors. · If you use a walker or cane, put rubber tips on it. If you use crutches, clean the bottoms of them regularly with an abrasive pad, such as steel wool. · Keep your house well lit, especially [place], and outside walkways. Use night-lights in areas such as hallways and bathrooms. Add extra light switches or use remote switches (such as switches that go on or off when you clap your hands) to make it easier to turn lights on if you have to get up during the night. · Install sturdy handrails on stairways. · Move items in your cabinets so that the things you use a lot are on the lower shelves (about waist level). · Keep a cordless phone and a flashlight with new batteries by your bed. If possible, put a phone in each of the main rooms of your house, or carry a cell phone in case you fall and cannot reach a phone. Or, you can wear a device around your neck or wrist. You push a button that sends a signal for help. · Wear low-heeled shoes that fit well and give your feet good support. Use footwear with nonskid soles. Check the heels and soles of your shoes for wear. Repair or replace worn heels or soles. · Do not wear socks without shoes on wood floors. · Walk on the grass when the sidewalks are slippery. If you live in an area that gets snow and ice in the winter, sprinkle salt on slippery steps and sidewalks. Preventing falls in the bath  · Install grab bars and nonskid mats inside and outside your shower or tub and near the toilet and sinks. · Use shower chairs and bath benches.   · Use a hand-held shower head that will allow you to sit while showering. · Get into a tub or shower by putting the weaker leg in first. Get out of a tub or shower with your strong side first.  · Repair loose toilet seats and consider installing a raised toilet seat to make getting on and off the toilet easier. · Keep your bathroom door unlocked while you are in the shower. Where can you learn more? Go to [email or web address] and sign in to your [employer] account. Enter [phone] in the [employer] box to learn more about \"Preventing Falls: Care Instructions. \"     If you do not have an account, please click on the \"Sign Up Now\" link. Current as of: August 6, 2019  Content Version: 12.3  © 3012-5343 Healthwise, Incorporated. Care instructions adapted under license by [facility] ([facility]). If you have questions about a medical condition or this instruction, always ask your healthcare professional. [address] any warranty or liability for your use of this information.

## 2020-02-19 NOTE — LETTER
Whats most important for you to know is that your Medicare rights and benefits wont change because your health care provider is participating in [address] [place]. Medicare will keep covering all of your medically necessary services. Even though Medicare will pay your doctor in a different way under BPCI Advanced, how much you have to pay wont change. Health care providers and suppliers who are enrolled in Medicare will submit their Medicare claims like they always have. Youll have all the same Medicare rights and protections, including the right to choose which hospital, doctor, or other health care provider you see. If you dont want to get care from a health care provider whos participating in [address] [place], then youll have to choose a different health care provider whos not participating in the Model. How can I give feedback about my health care? Medicare might ask you to take a voluntary survey about the services and care you received from [facility] during your hospital stay or outpatient procedure and for a specific period of time afterwards. You can decide whether you want to take the voluntary survey, but if you do, itll help Medicare make BPCI Advanced and the care of other Medicare patients better. If you have concerns or complaints about your care, you can:   · Talk to your doctor or health care provider. · Contact your Beneficiary and Family Centered Care Quality Improvement   Organization [facility]). You can get your BFCC-QIOs phone number  at Medicare.gov/contacts or by calling 1-800-MEDICARE. TTY users can  call 9-606.810.5740. Where can I learn more about B[employer] Advanced? Learn more about BPCI Advanced at https://innovation.cms.gov/initiatives/bpci-advanced/:  · A list of all the hospitals and physician group practices in the country participating in [facility].   · All of the inpatient and outpatient Clinical Episodes that are currently included under BPCI Advanced. A Clinical Episode is a grouping of medical conditions or diagnoses that are included in the [address] [facility].

## 2020-02-19 NOTE — PROGRESS NOTES
[facility]ai Stephanie Sow  Phone [phone]   Fax [phone]      OFFICE VISIT: 2020    Tonio Marcel- : 1945    Chief Complaint:Jeny is a 69 y.o. female who is here for Cough; Congestion; and Fall     HPI   FALLS:  The patient presents today for evaluation of falls. She fell 5 days ago. She fell by her door. She \"hit the floor. \"   She did not hit her head. A couple of days later, she fell in her bedroom. She hit her head on her chest   She denies losing consciousness. She rolled out of her bed last night. INSOMNIA:  She has been taking Trazodone 150 mg nightly and 3 over the counter sleep aids. Will recommend to STOP over the counter sleep aid. She is wearing her oxygen at night. She has several sedating medications on her medication list.  We will need to adjust some of these meds. CONFUSION  Denied HA, dizziness or lightheadedness. This morning she was confused. She thought her daughter was just getting back from her appointment this morning. (Her daughter's appointment was yesterday)     height is 5' (1.524 m) and weight is 118 lb (53.5 kg). Her temporal temperature is 97.4 °F (36.3 °C). Her blood pressure is 96/66 and her pulse is 84. Her oxygen saturation is 91%. Body mass index is 23.05 kg/m².     Results for orders placed or performed in visit on 20   POCT Rapid Drug Screen   Result Value Ref Range    Amphetamine Screen, Urine neg     Barbiturate Screen, Urine neg     Benzodiazepine Screen, Urine neg     Buprenorphine Urine neg     Cocaine Metabolite Screen, Urine neg     Gabapentin Screen, Urine neg     MDMA, Urine neg     Methamphetamine, Urine neg     Methadone Screen, Urine neg     Opiate Scrn, Ur positive     Oxycodone Screen, Ur neg     PCP Screen, Urine neg     Propoxyphene Screen, Urine neg     THC Screen, Urine neg     Tricyclic Antidepressants, Urine neg    POCT Urinalysis no Micro   Result Value Ref Range    Color, UA dmitry Monocytes Absolute 11/20/2019 0.50     Eosinophils Absolute 11/20/2019 0.30     Basophils Absolute 11/20/2019 0.00      Copies of these are in the chart. Prior to Visit Medications    Medication Sig Taking? Authorizing Provider   DULoxetine (CYMBALTA) 60 MG extended release capsule Take 1 capsule by mouth daily Yes NAVEEN Dior   gabapentin (NEURONTIN) 300 MG capsule Take 300 mg by mouth 2 times daily. Yes Historical Provider, MD   clonazePAM (KLONOPIN) 0.5 MG tablet Take 1 tablet by mouth 3 times daily as needed for Anxiety for up to 30 days.  Yes NAVEEN Dior   Fesoterodine Fumarate ER (TOVIAZ) 8 MG TB24 Take 1 tablet by mouth daily Yes NAVEEN Dior   albuterol sulfate HFA (VENTOLIN HFA) 108 (90 Base) MCG/ACT inhaler Inhale 2 puffs into the lungs every 6 hours as needed for Wheezing Yes NAVEEN Araya   isosorbide mononitrate (IMDUR) 30 MG extended release tablet Take 1 tablet by mouth daily Yes NAVEEN Dior   memantine (NAMENDA) 10 MG tablet Take 1 tablet by mouth 2 times daily Yes NAVEEN Dior   pantoprazole (PROTONIX) 20 MG tablet Take 1 tablet by mouth every morning (before breakfast) Yes NAVEEN Dior   traZODone (DESYREL) 150 MG tablet Take 1 tablet by mouth nightly Yes NAVEEN Dior   ondansetron (ZOFRAN) 4 MG tablet Take 1 tablet by mouth every 8 hours as needed for Nausea or Vomiting Yes NAVEEN Dior   carvedilol (COREG) 3.125 MG tablet Take 1 tablet by mouth 2 times daily (with meals) Yes NAVEEN Araya   albuterol (PROVENTIL) (5 MG/ML) 0.5% nebulizer solution Take 0.5 mLs by nebulization every 6 hours as needed for Wheezing Yes NAVEEN Dior   naloxone (NARCAN) 4 MG/0.1ML LIQD nasal spray 1 spray by Nasal route as needed for Opioid Reversal Yes NAVEEN Dior   Fluticasone-Umeclidin-Vilant 100-62.5-25 MCG/INH AEPB Inhale 1 puff into the lungs daily Yes NAVEEN Dior   donepezil (ARICEPT) 5 MG tablet Take 1 tablet by mouth nightly Yes NAVEEN Dior   Respiratory Therapy Supplies (NEBULIZER/TUBING/MOUTHPIECE) KIT 1 kit by Does not apply route daily Yes NAVEEN Dior   nitroGLYCERIN (NITROQUICK) 0.4 MG SL tablet Place 1 tablet under the tongue every 5 minutes as needed for Chest pain Yes NAVEEN Dior   olopatadine (PATADAY) 0.2 % SOLN ophthalmic solution Place 1 drop into both eyes daily Yes NAVEEN Dior   furosemide (LASIX) 20 MG tablet Take 1 tablet by mouth daily Yes NAVEEN Dior   OXYGEN Inhale 2 L into the lungs daily Yes NAVEEN Dior   HYDROcodone-acetaminophen (NORCO)  MG per tablet TAKE ONE TABLET BY MOUTH EVERY 8 HOURS Susy galarza Yes Historical Provider, MD       Allergies: Codeine and Sulfa antibiotics    Past Medical History:   Diagnosis Date    Abdominal pain     Anxiety     Arthritis     CAD in native artery     Cerebrovascular disease     [facility]estive [facility] failure) ([facility])     Constipation     COPD ([facility]ronic obstructive pulmonary disease) ([facility] [address]     Depression     Emphysema     GERD (gastroesophageal reflux disease)     Myocardial infarct, old     Pneumonia     Tobacco abuse        Past Surgical History:   Procedure Laterality Date    ABDOMINAL EXPLORATION SURGERY      APPENDECTOMY      CARDIAC CATHETERIZATION  06/25/10    patent  stent noted in LAD,EF is estimated to be 60%    CARDIAC CATHETERIZATION  7/13/12  MDL    EF  60%    CHOLECYSTECTOMY      COLONOSCOPY  10/2014    Dr Evangelina Ahumada ENDOSCOPY  10/1/14    Dr Isabel Madrid: food bezoar; esophagitis, gastritis, duodenitis; biopsies neg h-pylori       Social History     Tobacco Use    Smoking status: Current Every Day Smoker     Packs/day: 1.00     Years: 36.00     Pack years: 36.00     Types: Cigarettes     Start date: 1978    Smokeless tobacco: Never Used   Substance Use Topics    Alcohol use: No     Alcohol/week: 0.0 standard drinks       Family History   Problem Relation Age of Onset    Stroke Brother     Stroke Sister     Cancer Brother    [facility] Cancer Sister     Diabetes Brother     Diabetes Sister     Coronary Art Dis Other     Hypertension Other     Seizures Child     Colon Cancer Neg Hx     Colon Polyps Neg Hx     Esophageal Cancer Neg Hx     Liver Cancer Neg Hx     Liver Disease Neg Hx     Rectal Cancer Neg Hx     Stomach Cancer Neg Hx        Review of Systems   Constitutional: Positive for appetite change (decreased). Negative for activity change, chills, fatigue and fever. HENT: Negative for congestion, ear pain, rhinorrhea and sore throat. Eyes: Negative for redness. Respiratory: Positive for cough. Negative for shortness of breath and wheezing. Cardiovascular: Negative for chest pain. Gastrointestinal: Negative for constipation, diarrhea and vomiting. Genitourinary: Negative for dysuria, frequency and urgency. Skin: Negative for rash. Neurological: Positive for tremors and weakness. Negative for dizziness, light-headedness and headaches. Fall x3   Psychiatric/Behavioral: Positive for sleep disturbance. Physical Exam  Vitals signs reviewed. Constitutional:       Appearance: She is well-developed. She is ill-appearing. Comments: In a wheelchair   HENT:      Head: Normocephalic. Right Ear: Hearing, tympanic membrane and external ear normal.      Left Ear: Hearing, tympanic membrane and external ear normal.      Nose: Congestion and rhinorrhea (crusting, NC in place at 2L) present. No mucosal edema. Mouth/Throat:      Pharynx: No posterior oropharyngeal erythema. Neck:      Musculoskeletal: Normal range of motion. Vascular: No carotid bruit. Cardiovascular:      Rate and Rhythm: Normal rate and regular rhythm. Pulmonary:      Effort: Pulmonary effort is normal. No respiratory distress. Breath sounds: Decreased breath sounds (throughout) and rhonchi (scattered) present.  No wheezing or rales. Chest:       Abdominal:      General: Bowel sounds are normal.      Palpations: Abdomen is soft. Musculoskeletal:      Lumbar back: She exhibits tenderness (chronic). Skin:     General: Skin is dry. Findings: No rash. Neurological:      Mental Status: She is alert and oriented to person, place, and time. Comments: Oriented to self, place and month   Psychiatric:         Mood and Affect: Mood normal.         Behavior: Behavior normal.         Thought Content: Thought content normal.         Judgment: Judgment normal.       ASSESSMENT      ICD-10-CM    1. Confusion R41.0 Urine Culture     POCT Rapid Drug Screen: CONSISTENT, positive for opiates     POCT Urinalysis no Micro     CBC Auto Differential     Comprehensive Metabolic Panel     TSH without Reflex   2. HAILEY (generalized anxiety disorder) F41.1 DULoxetine (CYMBALTA) 60 MG extended release capsule (DECREASE to once daily)   3. Reactive depression F32.9 DULoxetine (CYMBALTA) 60 MG extended release capsule   4. Medication management Z79.899 POCT Rapid Drug Screen   5. At high risk for falls Z91.81 Fall precautions discussed and recommended   6. Weakness R53.1 Fall precautions   7. 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 Port-A-Cath in place Z95.828 ND IRRIG IMPLANTED DRUG DELIVERY DEVICE         Procedure note:    Accessed mediport in sterile fashion with gripper needle without anesthesia. The port was flushed with normal saline and flushed easily. Blood drawn from port, labs obtained. The port was then flushed with heparin & saline and the port was de-accessed. No blood loss  No complication  No specimen  She tolerated the procedure well without incident.        PLAN    Orders Placed This Encounter   Procedures    Urine Culture    CBC Auto Differential    Comprehensive Metabolic Panel    TSH without Reflex    POCT Rapid Drug Screen    POCT senses. · Ask your doctor whether calluses or corns on your feet need to be removed. If you wear loose-fitting shoes because of calluses or corns, you can lose your balance and fall. · Talk to your doctor if you have numbness in your feet. Preventing falls at home  · Remove raised doorway thresholds, throw rugs, and clutter. Repair loose carpet or raised areas in the floor. · Move furniture and electrical cords to keep them out of walking paths. · Use nonskid floor wax, and wipe up spills right away, especially on ceramic tile floors. · If you use a walker or cane, put rubber tips on it. If you use crutches, clean the bottoms of them regularly with an abrasive pad, such as steel wool. · Keep your house well lit, especially [facility], and outside walkways. Use night-lights in areas such as hallways and bathrooms. Add extra light switches or use remote switches (such as switches that go on or off when you clap your hands) to make it easier to turn lights on if you have to get up during the night. · Install sturdy handrails on stairways. · Move items in your cabinets so that the things you use a lot are on the lower shelves (about waist level). · Keep a cordless phone and a flashlight with new batteries by your bed. If possible, put a phone in each of the main rooms of your house, or carry a cell phone in case you fall and cannot reach a phone. Or, you can wear a device around your neck or wrist. You push a button that sends a signal for help. · Wear low-heeled shoes that fit well and give your feet good support. Use footwear with nonskid soles. Check the heels and soles of your shoes for wear. Repair or replace worn heels or soles. · Do not wear socks without shoes on wood floors. · Walk on the grass when the sidewalks are slippery. If you live in an area that gets snow and ice in the winter, sprinkle salt on slippery steps and sidewalks.   Preventing falls in the bath  · Install grab bars and

## 2020-02-20 PROBLEM — J18.9 PNEUMONIA DUE TO INFECTIOUS ORGANISM: Status: ACTIVE | Noted: 2020-02-20

## 2020-02-20 PROBLEM — Z51.5 PALLIATIVE CARE PATIENT: Status: ACTIVE | Noted: 2020-02-20

## 2020-02-20 LAB
ALBUMIN SERPL-MCNC: 2.9 G/DL (ref 3.5–5.2)
ALBUMIN SERPL-MCNC: 3 G/DL (ref 3.5–5.2)
ALP BLD-CCNC: 67 U/L (ref 35–104)
ALP BLD-CCNC: 75 U/L (ref 35–104)
ALP BLD-CCNC: 77 U/L (ref 35–104)
ALP BLD-CCNC: 99 U/L (ref 35–104)
ALT SERPL-CCNC: 5 U/L (ref 5–33)
ALT SERPL-CCNC: 6 U/L (ref 5–33)
AMMONIA: 30 UMOL/L (ref 11–51)
ANION GAP SERPL CALCULATED.3IONS-SCNC: 10 MMOL/L (ref 7–19)
ANION GAP SERPL CALCULATED.3IONS-SCNC: 11 MMOL/L (ref 7–19)
ANION GAP SERPL CALCULATED.3IONS-SCNC: 8 MMOL/L (ref 7–19)
ANION GAP SERPL CALCULATED.3IONS-SCNC: 9 MMOL/L (ref 7–19)
AST SERPL-CCNC: 10 U/L (ref 5–32)
AST SERPL-CCNC: 10 U/L (ref 5–32)
AST SERPL-CCNC: 11 U/L (ref 5–32)
AST SERPL-CCNC: 8 U/L (ref 5–32)
BILIRUB SERPL-MCNC: <0.2 MG/DL (ref 0.2–1.2)
BUN BLDV-MCNC: 19 MG/DL (ref 8–23)
BUN BLDV-MCNC: 22 MG/DL (ref 8–23)
BUN BLDV-MCNC: 23 MG/DL (ref 8–23)
BUN BLDV-MCNC: 23 MG/DL (ref 8–23)
CALCIUM SERPL-MCNC: 7.2 MG/DL (ref 8.8–10.2)
CALCIUM SERPL-MCNC: 7.5 MG/DL (ref 8.8–10.2)
CALCIUM SERPL-MCNC: 7.7 MG/DL (ref 8.8–10.2)
CALCIUM SERPL-MCNC: 7.8 MG/DL (ref 8.8–10.2)
CHLORIDE BLD-SCNC: 100 MMOL/L (ref 98–111)
CHLORIDE BLD-SCNC: 102 MMOL/L (ref 98–111)
CHLORIDE BLD-SCNC: 103 MMOL/L (ref 98–111)
CHLORIDE BLD-SCNC: 99 MMOL/L (ref 98–111)
CO2: 25 MMOL/L (ref 22–29)
CO2: 26 MMOL/L (ref 22–29)
CREAT SERPL-MCNC: 0.7 MG/DL (ref 0.5–0.9)
CREAT SERPL-MCNC: 0.8 MG/DL (ref 0.5–0.9)
EKG P AXIS: 77 DEGREES
EKG P-R INTERVAL: 138 MS
EKG Q-T INTERVAL: 376 MS
EKG QRS DURATION: 94 MS
EKG QTC CALCULATION (BAZETT): 414 MS
EKG T AXIS: 70 DEGREES
GFR NON-AFRICAN AMERICAN: >60
GLUCOSE BLD-MCNC: 104 MG/DL (ref 74–109)
GLUCOSE BLD-MCNC: 107 MG/DL (ref 74–109)
GLUCOSE BLD-MCNC: 188 MG/DL (ref 74–109)
GLUCOSE BLD-MCNC: 209 MG/DL (ref 74–109)
HCT VFR BLD CALC: 28.6 % (ref 37–47)
HEMOGLOBIN: 8.6 G/DL (ref 12–16)
MCH RBC QN AUTO: 29.3 PG (ref 27–31)
MCHC RBC AUTO-ENTMCNC: 30.1 G/DL (ref 33–37)
MCV RBC AUTO: 97.3 FL (ref 81–99)
PDW BLD-RTO: 15.5 % (ref 11.5–14.5)
PLATELET # BLD: 208 K/UL (ref 130–400)
PMV BLD AUTO: 9.8 FL (ref 9.4–12.3)
POTASSIUM REFLEX MAGNESIUM: 3.8 MMOL/L (ref 3.5–5)
POTASSIUM REFLEX MAGNESIUM: 3.9 MMOL/L (ref 3.5–5)
POTASSIUM REFLEX MAGNESIUM: 4 MMOL/L (ref 3.5–5)
POTASSIUM REFLEX MAGNESIUM: 4.4 MMOL/L (ref 3.5–5)
RBC # BLD: 2.94 M/UL (ref 4.2–5.4)
SODIUM BLD-SCNC: 134 MMOL/L (ref 136–145)
SODIUM BLD-SCNC: 135 MMOL/L (ref 136–145)
SODIUM BLD-SCNC: 136 MMOL/L (ref 136–145)
SODIUM BLD-SCNC: 138 MMOL/L (ref 136–145)
TOTAL PROTEIN: 6.1 G/DL (ref 6.6–8.7)
TOTAL PROTEIN: 6.1 G/DL (ref 6.6–8.7)
TOTAL PROTEIN: 6.3 G/DL (ref 6.6–8.7)
TOTAL PROTEIN: 6.5 G/DL (ref 6.6–8.7)
TSH SERPL DL<=0.05 MIU/L-ACNC: 2.33 UIU/ML (ref 0.27–4.2)
VITAMIN B-12: 484 PG/ML (ref 211–946)
WBC # BLD: 9.7 K/UL (ref 4.8–10.8)

## 2020-02-20 PROCEDURE — 1210000000 HC MED SURG R&B

## 2020-02-20 PROCEDURE — 94640 AIRWAY INHALATION TREATMENT: CPT

## 2020-02-20 PROCEDURE — 96375 TX/PRO/DX INJ NEW DRUG ADDON: CPT

## 2020-02-20 PROCEDURE — 82140 ASSAY OF AMMONIA: CPT

## 2020-02-20 PROCEDURE — 82607 VITAMIN B-12: CPT

## 2020-02-20 PROCEDURE — 80053 COMPREHEN METABOLIC PANEL: CPT

## 2020-02-20 PROCEDURE — 85027 COMPLETE CBC AUTOMATED: CPT

## 2020-02-20 PROCEDURE — 2580000003 HC RX 258: Performed by: EMERGENCY MEDICINE

## 2020-02-20 PROCEDURE — 87040 BLOOD CULTURE FOR BACTERIA: CPT

## 2020-02-20 PROCEDURE — 6360000002 HC RX W HCPCS: Performed by: HOSPITALIST

## 2020-02-20 PROCEDURE — 2700000000 HC OXYGEN THERAPY PER DAY

## 2020-02-20 PROCEDURE — 36415 COLL VENOUS BLD VENIPUNCTURE: CPT

## 2020-02-20 PROCEDURE — 6370000000 HC RX 637 (ALT 250 FOR IP): Performed by: HOSPITALIST

## 2020-02-20 PROCEDURE — 84443 ASSAY THYROID STIM HORMONE: CPT

## 2020-02-20 PROCEDURE — 96374 THER/PROPH/DIAG INJ IV PUSH: CPT

## 2020-02-20 PROCEDURE — 6370000000 HC RX 637 (ALT 250 FOR IP): Performed by: INTERNAL MEDICINE

## 2020-02-20 PROCEDURE — 6360000002 HC RX W HCPCS: Performed by: EMERGENCY MEDICINE

## 2020-02-20 PROCEDURE — 2580000003 HC RX 258: Performed by: HOSPITALIST

## 2020-02-20 RX ORDER — POTASSIUM CHLORIDE 20 MEQ/1
40 TABLET, EXTENDED RELEASE ORAL PRN
Status: DISCONTINUED | OUTPATIENT
Start: 2020-02-20 | End: 2020-02-21 | Stop reason: HOSPADM

## 2020-02-20 RX ORDER — MAGNESIUM SULFATE IN WATER 40 MG/ML
2 INJECTION, SOLUTION INTRAVENOUS PRN
Status: DISCONTINUED | OUTPATIENT
Start: 2020-02-20 | End: 2020-02-21 | Stop reason: HOSPADM

## 2020-02-20 RX ORDER — POTASSIUM CHLORIDE 7.45 MG/ML
10 INJECTION INTRAVENOUS PRN
Status: DISCONTINUED | OUTPATIENT
Start: 2020-02-20 | End: 2020-02-21 | Stop reason: HOSPADM

## 2020-02-20 RX ORDER — ACETAMINOPHEN 650 MG/1
650 SUPPOSITORY RECTAL EVERY 6 HOURS PRN
Status: DISCONTINUED | OUTPATIENT
Start: 2020-02-20 | End: 2020-02-21 | Stop reason: HOSPADM

## 2020-02-20 RX ORDER — OLOPATADINE HYDROCHLORIDE 2 MG/ML
1 SOLUTION/ DROPS OPHTHALMIC DAILY
Status: DISCONTINUED | OUTPATIENT
Start: 2020-02-20 | End: 2020-02-20 | Stop reason: CLARIF

## 2020-02-20 RX ORDER — SODIUM CHLORIDE 0.9 % (FLUSH) 0.9 %
10 SYRINGE (ML) INJECTION EVERY 12 HOURS SCHEDULED
Status: DISCONTINUED | OUTPATIENT
Start: 2020-02-20 | End: 2020-02-21 | Stop reason: HOSPADM

## 2020-02-20 RX ORDER — IPRATROPIUM BROMIDE AND ALBUTEROL SULFATE 2.5; .5 MG/3ML; MG/3ML
1 SOLUTION RESPIRATORY (INHALATION)
Status: DISCONTINUED | OUTPATIENT
Start: 2020-02-20 | End: 2020-02-21 | Stop reason: HOSPADM

## 2020-02-20 RX ORDER — PANTOPRAZOLE SODIUM 20 MG/1
20 TABLET, DELAYED RELEASE ORAL
Status: DISCONTINUED | OUTPATIENT
Start: 2020-02-20 | End: 2020-02-21 | Stop reason: HOSPADM

## 2020-02-20 RX ORDER — PREDNISONE 20 MG/1
40 TABLET ORAL DAILY
Status: DISCONTINUED | OUTPATIENT
Start: 2020-02-20 | End: 2020-02-21 | Stop reason: HOSPADM

## 2020-02-20 RX ORDER — SODIUM CHLORIDE 0.9 % (FLUSH) 0.9 %
10 SYRINGE (ML) INJECTION PRN
Status: DISCONTINUED | OUTPATIENT
Start: 2020-02-20 | End: 2020-02-21 | Stop reason: HOSPADM

## 2020-02-20 RX ORDER — SODIUM CHLORIDE 1000 MG
1 TABLET, SOLUBLE MISCELLANEOUS
Status: DISCONTINUED | OUTPATIENT
Start: 2020-02-20 | End: 2020-02-20 | Stop reason: SDUPTHER

## 2020-02-20 RX ORDER — METHYLPREDNISOLONE SODIUM SUCCINATE 40 MG/ML
40 INJECTION, POWDER, LYOPHILIZED, FOR SOLUTION INTRAMUSCULAR; INTRAVENOUS DAILY
Status: DISCONTINUED | OUTPATIENT
Start: 2020-02-20 | End: 2020-02-20

## 2020-02-20 RX ORDER — SODIUM CHLORIDE 1000 MG
1 TABLET, SOLUBLE MISCELLANEOUS
Status: DISCONTINUED | OUTPATIENT
Start: 2020-02-20 | End: 2020-02-20

## 2020-02-20 RX ORDER — HYDROCODONE BITARTRATE AND ACETAMINOPHEN 10; 325 MG/1; MG/1
1 TABLET ORAL EVERY 8 HOURS PRN
Status: DISCONTINUED | OUTPATIENT
Start: 2020-02-20 | End: 2020-02-21 | Stop reason: HOSPADM

## 2020-02-20 RX ORDER — DONEPEZIL HYDROCHLORIDE 5 MG/1
5 TABLET, FILM COATED ORAL NIGHTLY
Status: DISCONTINUED | OUTPATIENT
Start: 2020-02-20 | End: 2020-02-21 | Stop reason: HOSPADM

## 2020-02-20 RX ORDER — KETOTIFEN FUMARATE 0.35 MG/ML
1 SOLUTION/ DROPS OPHTHALMIC 2 TIMES DAILY
Status: DISCONTINUED | OUTPATIENT
Start: 2020-02-20 | End: 2020-02-21 | Stop reason: HOSPADM

## 2020-02-20 RX ORDER — GABAPENTIN 300 MG/1
300 CAPSULE ORAL 2 TIMES DAILY
Status: DISCONTINUED | OUTPATIENT
Start: 2020-02-20 | End: 2020-02-21 | Stop reason: HOSPADM

## 2020-02-20 RX ORDER — PROMETHAZINE HYDROCHLORIDE 25 MG/1
12.5 TABLET ORAL EVERY 6 HOURS PRN
Status: DISCONTINUED | OUTPATIENT
Start: 2020-02-20 | End: 2020-02-21 | Stop reason: HOSPADM

## 2020-02-20 RX ORDER — ONDANSETRON 2 MG/ML
4 INJECTION INTRAMUSCULAR; INTRAVENOUS EVERY 6 HOURS PRN
Status: DISCONTINUED | OUTPATIENT
Start: 2020-02-20 | End: 2020-02-21 | Stop reason: HOSPADM

## 2020-02-20 RX ORDER — ISOSORBIDE MONONITRATE 30 MG/1
30 TABLET, EXTENDED RELEASE ORAL DAILY
Status: DISCONTINUED | OUTPATIENT
Start: 2020-02-20 | End: 2020-02-21 | Stop reason: HOSPADM

## 2020-02-20 RX ORDER — ACETAMINOPHEN 325 MG/1
650 TABLET ORAL EVERY 6 HOURS PRN
Status: DISCONTINUED | OUTPATIENT
Start: 2020-02-20 | End: 2020-02-21 | Stop reason: HOSPADM

## 2020-02-20 RX ADMIN — DONEPEZIL HYDROCHLORIDE 5 MG: 5 TABLET, FILM COATED ORAL at 20:55

## 2020-02-20 RX ADMIN — GABAPENTIN 300 MG: 300 CAPSULE ORAL at 20:55

## 2020-02-20 RX ADMIN — IPRATROPIUM BROMIDE AND ALBUTEROL SULFATE 1 AMPULE: .5; 3 SOLUTION RESPIRATORY (INHALATION) at 19:23

## 2020-02-20 RX ADMIN — PREDNISONE 40 MG: 20 TABLET ORAL at 09:11

## 2020-02-20 RX ADMIN — SODIUM CHLORIDE, PRESERVATIVE FREE 10 ML: 5 INJECTION INTRAVENOUS at 09:11

## 2020-02-20 RX ADMIN — IPRATROPIUM BROMIDE AND ALBUTEROL SULFATE 1 AMPULE: .5; 3 SOLUTION RESPIRATORY (INHALATION) at 11:14

## 2020-02-20 RX ADMIN — KETOTIFEN FUMARATE 1 DROP: 0.35 SOLUTION/ DROPS OPHTHALMIC at 20:55

## 2020-02-20 RX ADMIN — IPRATROPIUM BROMIDE AND ALBUTEROL SULFATE 1 AMPULE: .5; 3 SOLUTION RESPIRATORY (INHALATION) at 06:59

## 2020-02-20 RX ADMIN — HYDROCODONE BITARTRATE AND ACETAMINOPHEN 1 TABLET: 10; 325 TABLET ORAL at 05:20

## 2020-02-20 RX ADMIN — CEFTRIAXONE 1 G: 1 INJECTION, POWDER, FOR SOLUTION INTRAMUSCULAR; INTRAVENOUS at 00:29

## 2020-02-20 RX ADMIN — IPRATROPIUM BROMIDE AND ALBUTEROL SULFATE 1 AMPULE: .5; 3 SOLUTION RESPIRATORY (INHALATION) at 15:17

## 2020-02-20 RX ADMIN — SODIUM CHLORIDE, PRESERVATIVE FREE 10 ML: 5 INJECTION INTRAVENOUS at 20:55

## 2020-02-20 RX ADMIN — ENOXAPARIN SODIUM 40 MG: 40 INJECTION SUBCUTANEOUS at 09:11

## 2020-02-20 RX ADMIN — PANTOPRAZOLE SODIUM 20 MG: 20 TABLET, DELAYED RELEASE ORAL at 09:11

## 2020-02-20 RX ADMIN — Medication 500 MG: at 00:29

## 2020-02-20 RX ADMIN — KETOTIFEN FUMARATE 1 DROP: 0.35 SOLUTION/ DROPS OPHTHALMIC at 09:11

## 2020-02-20 RX ADMIN — ISOSORBIDE MONONITRATE 30 MG: 30 TABLET, EXTENDED RELEASE ORAL at 09:11

## 2020-02-20 RX ADMIN — GABAPENTIN 300 MG: 300 CAPSULE ORAL at 09:11

## 2020-02-20 ASSESSMENT — PAIN DESCRIPTION - ONSET
ONSET: ON-GOING
ONSET: ON-GOING

## 2020-02-20 ASSESSMENT — PAIN DESCRIPTION - LOCATION
LOCATION: BACK
LOCATION: BACK

## 2020-02-20 ASSESSMENT — PAIN DESCRIPTION - ORIENTATION
ORIENTATION: LOWER
ORIENTATION: LOWER

## 2020-02-20 ASSESSMENT — PAIN DESCRIPTION - PAIN TYPE
TYPE: CHRONIC PAIN
TYPE: CHRONIC PAIN

## 2020-02-20 ASSESSMENT — PAIN SCALES - GENERAL
PAINLEVEL_OUTOF10: 9
PAINLEVEL_OUTOF10: 0
PAINLEVEL_OUTOF10: 9
PAINLEVEL_OUTOF10: 0
PAINLEVEL_OUTOF10: 0

## 2020-02-20 ASSESSMENT — PAIN DESCRIPTION - PROGRESSION
CLINICAL_PROGRESSION: NOT CHANGED
CLINICAL_PROGRESSION: NOT CHANGED

## 2020-02-20 ASSESSMENT — PAIN - FUNCTIONAL ASSESSMENT
PAIN_FUNCTIONAL_ASSESSMENT: ACTIVITIES ARE NOT PREVENTED
PAIN_FUNCTIONAL_ASSESSMENT: ACTIVITIES ARE NOT PREVENTED

## 2020-02-20 ASSESSMENT — PAIN DESCRIPTION - DESCRIPTORS
DESCRIPTORS: DULL;CONSTANT
DESCRIPTORS: CONSTANT;DULL

## 2020-02-20 ASSESSMENT — PAIN DESCRIPTION - FREQUENCY
FREQUENCY: CONTINUOUS
FREQUENCY: CONTINUOUS

## 2020-02-20 NOTE — TELEPHONE ENCOUNTER
----- Message from [employer], APRN sent at 2/19/2020  7:16 PM CST -----  Thyroid is within normal limits  CMP: Sodium has decreased further. This can sometimes lead to confusion. Also appears the patient is dehydrated. I know the patient does not drink much water and due to the acute confusion and decreased sodium I recommend the patient be seen in the ED for IV fluids. This will also help with the dehydration. CBC: White blood cell count, infection fighting cells is elevated. As well as there is been a decline in your hemoglobin and hematocrit which is concerning for acute anemia and possible blood loss.   Again recommend the patient be seen in ED for IV fluids and further recommendations pending their evaluation

## 2020-02-20 NOTE — PROGRESS NOTES
MD Kelli        HYDROcodone-acetaminophen [facility])  MG per tablet 1 tablet  1 tablet Oral Q8H PRN Debora Castano MD   1 tablet at 02/20/20 0520    ketotifen (ZADITOR) 0.025 % ophthalmic solution 1 drop  1 drop Both Eyes BID Debora Castano MD        predniSONE (DELTASONE) tablet 40 mg  40 mg Oral Daily Mack Belle MD               donepezil  5 mg Oral Nightly    pantoprazole  20 mg Oral QAM AC    isosorbide mononitrate  30 mg Oral Daily    gabapentin  300 mg Oral BID    sodium chloride flush  10 mL Intravenous 2 times per day    enoxaparin  40 mg Subcutaneous Daily    ipratropium-albuterol  1 ampule Inhalation Q4H WA    [START ON 2/21/2020] cefTRIAXone (ROCEPHIN) IV  1 g Intravenous Q24H    [START ON 2/21/2020] azithromycin  500 mg Intravenous Q24H    ketotifen  1 drop Both Eyes BID    predniSONE  40 mg Oral Daily     sodium chloride flush, acetaminophen, acetaminophen, magnesium hydroxide, promethazine, ondansetron, potassium chloride **OR** potassium alternative oral replacement **OR** potassium chloride, potassium chloride, magnesium sulfate, HYDROcodone-acetaminophen  DIET GENERAL;       Labs:   CBC with DIFF:  Recent Labs     02/19/20  1418 02/19/20  2120 02/20/20  0515   WBC 13.5* 11.5* 9.7   RBC 3.03* 2.94* 2.94*   HGB 8.9* 8.6* 8.6*   HCT 30.0* 28.6* 28.6*   MCV 99.0 97.3 97.3   MCH 29.4 29.3 29.3   MCHC 29.7* 30.1* 30.1*   RDW 15.9* 15.7* 15.5*    207 208   MPV 10.3 10.1 9.8   NEUTOPHILPCT 82.7* 81.7*  --    LYMPHOPCT 9.0* 10.3*  --    MONOPCT 6.7 6.3  --    EOSRELPCT 0.4 1.0  --    BASOPCT 0.2 0.2  --    NEUTROABS 11.2* 9.4*  --    LYMPHSABS 1.2 1.2  --    MONOSABS 0.90 0.70  --    EOSABS 0.10 0.10  --    BASOSABS 0.00 0.00  --        CMP/BMP:  Recent Labs     02/19/20  1418 02/19/20  2120 02/20/20  0515   * 127* 134*   K 4.0 3.8 3.8   CL 90* 92* 100   CO2 23 24 25   ANIONGAP 13 11 9   GLUCOSE 87 96 104   BUN 34* 33* 23   CREATININE 1.0* 1.0* 0.7   LABGLOM 54* 54* DLP in mGycm= 726. Noncontrast head CT with no comparison. Axial, sagittal, and coronal noncontrast CT imaging of the head. The visualized paranasal sinuses are clear and normally aerated. The mastoid air cells are clear. The brain and ventricles have an age-appropriate appearance. Mild cortical atrophy and moderate small vessel disease. There is no hemorrhage or mass-effect. No acute infarct is seen. No calvarial abnormality. 1. No acute intracranial abnormality is seen. Signed by Dr Juvenal Juarez on 2/19/2020 9:43 PM    Xr Chest Portable    Result Date: 2/19/2020  XR CHEST PORTABLE 2/19/2020 9:34 PM History: Dizziness. Portable chest x-ray compared with 2/22/2018. Chronic interstitial lung disease with mild patchy infiltrate within the perihilar and lower right lung. Normal heart and mediastinum. Unchanged left subclavian port. 1. Chronic lung changes with mild patchy infiltrate within the mid and lower right lung. Signed by Dr Juvenal Juarez on 2/19/2020 9:34 PM    Cta Pulmonary W Contrast    Result Date: 1/28/2020  CTA PULMONARY W CONTRAST 1/28/2020 3:05 PM History: Hemoptysis. Cough. Elevated d-dimer. In order to have a CT radiation dose as low as reasonably achievable Automated Exposure Control was utilized for adjustment of the mA and/or KV according to patient size. DLP in mGycm= 183. CT angiography protocol. CT imaging with bolus IV contrast injection. Under concurrent supervision axial, sagittal, coronal, and three-dimensional data sets were constructed. Normal heart size. There is no thoracic aortic dissection. There is mild diffuse prominence of the thoracic aorta. The ascending aorta measures 37 mm. No mediastinal mass. Symmetric well opacified pulmonary arteries. No pulmonary embolism. Hyperexpanded lungs with chronic interstitial disease. Upper lobe emphysema. Right apical scarring noted. No pneumonia is seen. 1. No pulmonary embolism. 2. Severe chronic lung changes.  Signed by motion. General: No swelling, tenderness, deformity or signs of injury. Right lower leg: No edema. Left lower leg: No edema. Skin:     General: Skin is warm and dry. Capillary Refill: Capillary refill takes less than 2 seconds. Coloration: Skin is not jaundiced or pale. Findings: No bruising, erythema, lesion or rash. Neurological:      General: No focal deficit present. Mental Status: She is alert. Cranial Nerves: No cranial nerve deficit. Sensory: No sensory deficit. Motor: No weakness. Coordination: Coordination normal.      Comments: AAO x2   Psychiatric:         Mood and Affect: Mood normal.         Behavior: Behavior normal.         Thought Content: Thought content normal.         Judgment: Judgment normal.         Assessment/plan:         Hospital Problems           Last Modified POA    * (Principal) Pneumonia due to infectious organism 2/20/2020 Yes    Mental confusion 2/20/2020 Yes    CAD in native artery 2/20/2020 Yes    Palliative care patient 2/20/2020 Yes    COPD (chronic obstructive pulmonary disease) ([facility] [address] 2/20/2020 Yes          Principal Problem:    Pneumonia due to infectious organism  Active Problems:    Mental confusion    CAD in native artery    Palliative care patient    COPD (chronic obstructive pulmonary disease) ([facility] [address]  Resolved Problems:    * No resolved hospital problems. *    AMS - improved  Pneumonia, due to on specified organism.    -Afebrile   - Leukocytosis (WBC: 13.5)   - Initial Chest x-ray  - 02/19/2020, reporting \"chronic lung changes with mild patchy infiltrate within the mid and lower right lung\"   -Check Procalcitonin level   - Blood culture    - Sputum culture   Ceftriaxone and Azithromycin   - Oxygen supplementation to keep SPO2 > 92%   - Bronchodilators as needed   - Dextromethorphan/Guaifenesin for symptomatic relief   - Acetaminophen 650 mg PO Q6H/PRN for fever   - Continue to monitor    Hyponatremia  · Initial sodium of 126 (02/19/2020)  · Rapidly improved to 134 (02/20/2020), with normal saline IV fluids  · Will hold IV fluids for now, to avoid overcorrection  · Continue fluid restriction    History of COPD, on 2 L home O2  · -Stable  · Continue oxygen supplementation, as needed to keep SPO2 >89  · - Nebulized Bronchodilators scheduled and on as-needed basis  · - Systemic Steroids    · --> Prednisone 40 mg p.o. daily x5 days  · - Continue to monitor          Continue management of other chronic medical conditions - see above and orders. Advance Directive: Full Code    DIET GENERAL;     DVT prophylaxis: Enoxaparin    Consults Made:   PALLIATIVE CARE EVAL    Discharge planning: tbd    Time Spent is 35 mins in the examination, evaluation, counseling and review of medications, assessment and plan.      Electronically signed by   Elsie Thompson MD, MPH,   Internal Medicine Hospitalist   2/20/2020 8:59 AM

## 2020-02-20 NOTE — ED PROVIDER NOTES
[facility] 3 ANAM/VAS/MED  eMERGENCY dEPARTMENT eNCOUnter      Pt Name: Tona Hoffman  MRN: [id number]  [place] 1945  Date of evaluation: 2/19/2020  Provider: Francisco Deng MD    [address] [place] [address]       Chief Complaint   Patient presents with    Dizziness     Pt arrived to the ed with c/o dizziness and \"my sodium is out of wack. \" Pt went to the doctor today because she had been dizzy and falling and the doctor called her tonight and told her to go to the ER because of her sodium. HISTORY OF PRESENT ILLNESS   (Location/Symptom, Timing/Onset,Context/Setting, Quality, Duration, Modifying Factors, Severity)  Note limiting factors. Tona Hoffman is a 69 y.o. female who presents to the emergency department      The history is provided by the patient and a relative. Dizziness   Quality:  Imbalance (with several falls past week - no injuries)  Severity:  Moderate  Onset quality:  Sudden  Timing:  Constant  Progression:  Unchanged  Chronicity:  New  Context comment:  More confusion past 2 days. PCP told to come to ER for sodium. Relieved by:  None tried  Worsened by:  Nothing  Ineffective treatments:  None tried  Associated symptoms: no chest pain, no diarrhea, no headaches, no nausea, no palpitations, no shortness of breath, no syncope, no vision changes, no vomiting and no weakness    Risk factors comment:  Dementia      NursingNotes were reviewed. REVIEW OF SYSTEMS    (2-9 systems for level 4, 10 or more for level 5)     Review of Systems   Respiratory: Negative for shortness of breath. Cardiovascular: Negative for chest pain, palpitations and syncope. Gastrointestinal: Negative for diarrhea, nausea and vomiting. Neurological: Positive for dizziness. Negative for weakness and headaches. All other systems reviewed and are negative. Except as noted above the remainder of the review of systems was reviewed and negative.        PAST MEDICAL HISTORY     Past Medical History:   Diagnosis Date  Abdominal pain     Anxiety     Arthritis     CAD in native artery     Cerebrovascular disease     [facility]congestive [facility] failure) [facility]     Constipation     COPD (chronic obstructive pulmonary disease) ([facility])     Depression     Emphysema     GERD (gastroesophageal reflux disease)     Myocardial infarct, old     Palliative care patient 02/20/2020    Pneumonia     Tobacco abuse          SURGICALHISTORY       Past Surgical History:   Procedure Laterality Date    ABDOMINAL EXPLORATION SURGERY      APPENDECTOMY      CARDIAC CATHETERIZATION  06/25/10    patent  stent noted in LAD,EF is estimated to be 60%    CARDIAC CATHETERIZATION  7/13/12  MDL    EF  60%    CHOLECYSTECTOMY      COLONOSCOPY  10/2014    Dr Mela Johnson UPPER GASTROINTESTINAL ENDOSCOPY  10/1/14    Dr Anastacio Licona: food bezoar; esophagitis, gastritis, duodenitis; biopsies neg h-pylori         CURRENT MEDICATIONS       Current Discharge Medication List      CONTINUE these medications which have NOT CHANGED    Details   DULoxetine (CYMBALTA) 60 MG extended release capsule Take 1 capsule by mouth daily  Qty: 30 capsule, Refills: 5    Comments: $  Associated Diagnoses: HAILEY (generalized anxiety disorder); Reactive depression      gabapentin (NEURONTIN) 300 MG capsule Take 300 mg by mouth 2 times daily.        Fesoterodine Fumarate ER (TOVIAZ) 8 MG TB24 Take 1 tablet by mouth daily  Qty: 90 tablet, Refills: 1      albuterol sulfate HFA (VENTOLIN HFA) 108 (90 Base) MCG/ACT inhaler Inhale 2 puffs into the lungs every 6 hours as needed for Wheezing  Qty: 3 Inhaler, Refills: 3    Comments: $  Associated Diagnoses: Chronic obstructive pulmonary disease, unspecified COPD type ([facility])      isosorbide mononitrate (IMDUR) 30 MG extended release tablet Take 1 tablet by mouth daily  Qty: 90 tablet, Refills: 3    Comments: $      memantine (NAMENDA) 10 MG tablet Take 1 tablet by mouth 2 times daily  Qty: 180 tablet, Refills: 3    Comments: $  Associated Diagnoses: Memory loss      pantoprazole (PROTONIX) 20 MG tablet Take 1 tablet by mouth every morning (before breakfast)  Qty: 90 tablet, Refills: 3    Comments: $  Associated Diagnoses: Gastroesophageal reflux disease, esophagitis presence not specified      traZODone (DESYREL) 150 MG tablet Take 1 tablet by mouth nightly  Qty: 90 tablet, Refills: 3    Comments: $  Associated Diagnoses: Insomnia due to medical condition      carvedilol (COREG) 3.125 MG tablet Take 1 tablet by mouth 2 times daily (with meals)  Qty: 60 tablet, Refills: 11    Associated Diagnoses: Essential hypertension; Congestive heart failure, unspecified HF chronicity, unspecified heart failure type (HCC)      albuterol (PROVENTIL) (5 MG/ML) 0.5% nebulizer solution Take 0.5 mLs by nebulization every 6 hours as needed for Wheezing  Qty: 120 each, Refills: 3    Associated Diagnoses: Chronic narcotic dependence (HCC)      donepezil (ARICEPT) 5 MG tablet Take 1 tablet by mouth nightly  Qty: 90 tablet, Refills: 3    Comments: This prescription was filled on 3/27/2019. Any refills authorized will be placed on file. Associated Diagnoses: Dementia without behavioral disturbance, unspecified dementia type ([facility])      olopatadine (PATADAY) 0.2 % SOLN ophthalmic solution Place 1 drop into both eyes daily  Qty: 1 Bottle, Refills: 5    Associated Diagnoses: Allergic conjunctivitis of both eyes      OXYGEN Inhale 2 L into the lungs daily  Qty: 1 each, Refills: 11    Associated Diagnoses: Chronic narcotic dependence ([facility] [address] Chronic obstructive pulmonary disease, unspecified COPD type (HCC)      HYDROcodone-acetaminophen (NORCO)  MG per tablet TAKE ONE TABLET BY MOUTH EVERY 8 HOURS AS NEEDED- dr michell Blanco: 0      ondansetron (ZOFRAN) 4 MG tablet Take 1 tablet by mouth every 8 hours as needed for Nausea or Vomiting  Qty: 40 tablet, Refills: 1    Comments: This prescription was filled on 3/19/2019.  Any refills rate and regular rhythm. Heart sounds: Normal heart sounds. Pulmonary:      Effort: Pulmonary effort is normal.      Breath sounds: Normal breath sounds. Musculoskeletal:      Right lower leg: No edema. Left lower leg: No edema. Skin:     General: Skin is warm and dry. Neurological:      General: No focal deficit present. Mental Status: She is alert. Cranial Nerves: No cranial nerve deficit. Comments: Can't name year or president. Does name watch and its function. No drift, finger to nose intact bilaterally. Tremor noted (chronic)         DIAGNOSTIC RESULTS     EKG: All EKG's are interpreted by the Emergency Department Physician who either signs or Co-signsthis chart in the absence of a cardiologist.        RADIOLOGY:   Excell Mulling such as CT, Ultrasound and MRI are read by the radiologist. Plain radiographic images are visualized and preliminarily interpreted by the emergency physician with the below findings:        Interpretation per the Radiologist below, if available at the time ofthis note:    CT Head WO Contrast   Final Result   1. No acute intracranial abnormality is seen. Signed by Dr Domenic Collazo on 2/19/2020 9:43 PM      XR CHEST PORTABLE   Final Result   1. Chronic lung changes with mild patchy infiltrate within the mid and   lower right lung.    Signed by Dr Domenic Collazo on 2/19/2020 9:34 PM            ED BEDSIDE ULTRASOUND:   Performed by ED Physician - none    LABS:  Labs Reviewed   CBC WITH AUTO DIFFERENTIAL - Abnormal; Notable for the following components:       Result Value    WBC 11.5 (*)     RBC 2.94 (*)     Hemoglobin 8.6 (*)     Hematocrit 28.6 (*)     MCHC 30.1 (*)     RDW 15.7 (*)     Neutrophils % 81.7 (*)     Lymphocytes % 10.3 (*)     Neutrophils Absolute 9.4 (*)     All other components within normal limits   COMPREHENSIVE METABOLIC PANEL W/ REFLEX TO MG FOR LOW K - Abnormal; Notable for the following components:    Sodium 127 (*)     Chloride 92 (*)     BUN 33 (*)     CREATININE 1.0 (*)     GFR Non-African American 54 (*)     Calcium 7.4 (*)     Total Protein 6.3 (*)     Alb 3.0 (*)     All other components within normal limits   URINE RT REFLEX TO CULTURE - Abnormal; Notable for the following components:    Clarity, UA CLOUDY (*)     Protein, UA TRACE (*)     All other components within normal limits   COMPREHENSIVE METABOLIC PANEL W/ REFLEX TO MG FOR LOW K - Abnormal; Notable for the following components:    Sodium 134 (*)     Calcium 7.2 (*)     Total Protein 6.1 (*)     Alb 2.9 (*)     All other components within normal limits    Narrative:     Collection has been rescheduled by [facility] at 02/20/2020 05:09 Reason: CBN    COMPREHENSIVE METABOLIC PANEL W/ REFLEX TO MG FOR LOW K - Abnormal; Notable for the following components:    Calcium 7.5 (*)     Total Protein 6.3 (*)     Alb 3.0 (*)     All other components within normal limits   CBC - Abnormal; Notable for the following components:    RBC 2.94 (*)     Hemoglobin 8.6 (*)     Hematocrit 28.6 (*)     MCHC 30.1 (*)     RDW 15.5 (*)     All other components within normal limits    Narrative:     Collection has been rescheduled by [facility] at 02/20/2020 05:09 Reason: CBN    COMPREHENSIVE METABOLIC PANEL W/ REFLEX TO MG FOR LOW K - Abnormal; Notable for the following components:    Sodium 135 (*)     Glucose 209 (*)     Calcium 7.8 (*)     Total Protein 6.5 (*)     Alb 3.0 (*)     All other components within normal limits   CULTURE, BLOOD 1   CULTURE, BLOOD 2   CULTURE, BLOOD 1   CULTURE, BLOOD 2   TSH WITHOUT REFLEX    Narrative:     Collection has been rescheduled by [facility] at 02/20/2020 05:09 Reason: CBN    VITAMIN B12    Narrative:     Collection has been rescheduled by [facility] at 02/20/2020 05:09 Reason: CBN    AMMONIA    Narrative:     Collection has been rescheduled by [facility] at 02/20/2020 05:09 Reason: CBN    COMPREHENSIVE METABOLIC PANEL W/ REFLEX TO MG FOR LOW K   COMPREHENSIVE

## 2020-02-20 NOTE — CARE COORDINATION
Date / Time of Evaluation: 2/20/2020 12:38 PM  Assessment Completed by: Jovi Garrett    Patient Admission Status: inpatient     Current PCP:  NAVEEN Joy    Initial Assessment Completed at bedside with:  patient    Emergency Contacts:  Extended Emergency Contact Information  Primary Emergency Contact: [address] of [address] [place] [address] Phone: [phone]  Relation: Child  Secondary Emergency Contact: Nahum [address] of [address] [place] [address] Phone: [phone]  Mobile Phone: [phone]  Relation: Child    Advance Directives: Code Status:  Full Code    Financial:  Payor: Sully [employer] / Plan: MEDICARE PART A AND B / Product Type: *No Product type* /     Pre-Cert required for SNF:  no    Pharmacy:   [address]  [address] [place] [address]  Phone: [phone] Fax: [phone]    [address], [address] [phone] [facility] [phone]  [place] [place] [place] [address]  Phone: [phone] Fax: [address] [phone] [facility] [phone]  [address] [place] [address]  Phone: [phone] Fax: [phone]      Potential assistance purchasing medications? no    ADLS:  Support System:  Family Members, Children    Current Home Environment:  Home with oldest daughter   Steps:  3 going into home     Plans to RETURN to current housing: yes  Barriers to RETURNING to current housing:  mobility    Currently ACTIVE with Home Health CARE:  No, but pt would like [facility] upon [address]:      DME Provider: pt states she has a walker at home     Had [address] prior to admission:  Yes- only wears at night   [address]:    Unknown pt stated ([employer] [facility]?     Informed of need to bring portable home O2 tank to hospital on day of DISCHARGE:  no  Name of person committed to bringing portable

## 2020-02-20 NOTE — H&P
[facility]      Hospitalist - History & Physical      PCP: NAVEEN Castaneda    Date of Admission: 2/19/2020    Date of Service: 2/19/2020    Chief Complaint:  Was told by PCP to come to the nearest ER because her Na was 126,     History Of Present Illness: The patient is a 69 y.o. female with PMH of Dementia, COPD 2 LNC O2 dependent who continues to smoke, h/o CAD s/p stenting , anxiety depression who presented to her PCP with confusion for 2 days dizziness and balance issues, was told to come to ER  because her Na was 126, in ER repeat Na was 127, TSH -ve, UDS -ve   UA -ve, CXR ? RLL pneumonia, UA -ve     Past Medical History:        Diagnosis Date    Abdominal pain     Anxiety     Arthritis     CAD in native artery     Cerebrovascular disease     [facility] failure[facility])     Constipation     COPD (chronic obstructive pulmonary disease) (HCC)     Depression     Emphysema     GERD (gastroesophageal reflux disease)     Myocardial infarct, old     Pneumonia     Tobacco abuse        Past Surgical History:        Procedure Laterality Date    ABDOMINAL EXPLORATION SURGERY      APPENDECTOMY      CARDIAC CATHETERIZATION  06/25/10    patent  stent noted in LAD,EF is estimated to be 60%    CARDIAC CATHETERIZATION  7/13/12  MDL    EF  60%    CHOLECYSTECTOMY      COLONOSCOPY  10/2014    Dr Mayra Rosas    HYSTERECTOMY      UPPER GASTROINTESTINAL ENDOSCOPY  10/1/14    Dr Mayra Rosas: food bezoar; esophagitis, gastritis, duodenitis; biopsies neg h-pylori       Home Medications:  Prior to Admission medications    Medication Sig Start Date End Date Taking? Authorizing Provider   DULoxetine (CYMBALTA) 60 MG extended release capsule Take 1 capsule by mouth daily 2/19/20 3/20/20 Yes NAVEEN Dior   gabapentin (NEURONTIN) 300 MG capsule Take 300 mg by mouth 2 times daily.   1/10/20  Yes Historical Provider, MD   Fesoterodine Fumarate ER (TOVIAZ) 8 MG TB24 Take 1 tablet by mouth daily 12/5/19  Yes NAVEEN Dior   albuterol sulfate HFA (VENTOLIN HFA) 108 (90 Base) MCG/ACT inhaler Inhale 2 puffs into the lungs every 6 hours as needed for Wheezing 10/10/19  Yes NAVEEN Araya   isosorbide mononitrate (IMDUR) 30 MG extended release tablet Take 1 tablet by mouth daily 10/4/19  Yes NAVEEN Dior   memantine (NAMENDA) 10 MG tablet Take 1 tablet by mouth 2 times daily 10/4/19  Yes NAVEEN Dior   pantoprazole (PROTONIX) 20 MG tablet Take 1 tablet by mouth every morning (before breakfast) 7/25/19  Yes NAVEEN Dior   traZODone (DESYREL) 150 MG tablet Take 1 tablet by mouth nightly 7/25/19  Yes NAVEEN Dior   carvedilol (COREG) 3.125 MG tablet Take 1 tablet by mouth 2 times daily (with meals) 6/6/19  Yes NAVEEN Araya   albuterol (PROVENTIL) (5 MG/ML) 0.5% nebulizer solution Take 0.5 mLs by nebulization every 6 hours as needed for Wheezing 5/14/19  Yes NAVEEN Dior   donepezil (ARICEPT) 5 MG tablet Take 1 tablet by mouth nightly 3/27/19  Yes NAVEEN Dior   olopatadine (PATADAY) 0.2 % SOLN ophthalmic solution Place 1 drop into both eyes daily 2/27/19  Yes NAVEEN Dior   OXYGEN Inhale 2 L into the lungs daily 3/13/18  Yes NAVEEN Dior   HYDROcodone-acetaminophen (Estill Brought)  MG per tablet TAKE ONE TABLET BY MOUTH EVERY 8 HOURS AS NEEDED- dr galarza 1/10/18  Yes Historical Provider, MD   ondansetron (ZOFRAN) 4 MG tablet Take 1 tablet by mouth every 8 hours as needed for Nausea or Vomiting 6/14/19   NAVEEN Dior   naloxone (NARCAN) 4 MG/0.1ML LIQD nasal spray 1 spray by Nasal route as needed for Opioid Reversal 5/14/19   NAVEEN Dior   Fluticasone-Umeclidin-Vilant 100-62.5-25 MCG/INH AEPB Inhale 1 puff into the lungs daily 5/14/19   NAVEEN Dior   Respiratory Therapy Supplies (NEBULIZER/TUBING/MOUTHPIECE) KIT 1 kit by Does not apply route daily 3/1/19   NAVEEN Dior   nitroGLYCERIN (NITROQUICK) 0.4 MG SL tablet Place 1 tablet under the tongue every 5 minutes as needed for Chest pain 2/27/19   NAVEEN Dior   furosemide (LASIX) 20 MG tablet Take 1 tablet by mouth daily 12/18/18   Malena NAVEEN Peña       Allergies:    Codeine and Sulfa antibiotics    Social History:      Tobacco:   reports that she has been smoking cigarettes. She started smoking about 42 years ago. She has a 36.00 pack-year smoking history. She has never used smokeless tobacco.  Alcohol:   reports no history of alcohol use. Illicit Drugs: no     Family History:      Problem Relation Age of Onset    Stroke Brother     Stroke Sister     Cancer Brother     Cancer Sister     Diabetes Brother     Diabetes Sister     Coronary Art Dis Other     Hypertension Other     Seizures Child     Colon Cancer Neg Hx     Colon Polyps Neg Hx     Esophageal Cancer Neg Hx     Liver Cancer Neg Hx     Liver Disease Neg Hx     Rectal Cancer Neg Hx     Stomach Cancer Neg Hx        Review of Systems:   Unable to provide, confused     Physical Examination:      /84   Pulse 83   Temp 97.7 °F (36.5 °C) (Oral)   Resp 19   Ht 5' 1\" (1.549 m)   Wt 120 lb (54.4 kg)   SpO2 92%   BMI 22.67 kg/m²   General appearance: No apparent distress, appears stated age and cooperative. Well developed and well groomed. HEENT: Normal cephalic, atraumatic without obvious deformity. Pupils equal, round, and reactive to light. Extra ocular muscles intact. Conjunctivae/corneas clear. Normal ears and nose. Neck: Supple, with full range of motion. No jugular venous distention. Trachea midline. Thyroid no masses noted. Respiratory: scattered wheezing   Cardiovascular: Regular rate and rhythm with normal S1/S2 without murmurs, rubs or gallops  Abdomen: Soft, non-tender, non-distended with normal bowel sounds. Musculoskeletal: No clubbing, cyanosis or edema bilaterally. Full range of motion without deformity in 4 extremities.   Neurologic: grossly non-focal.  Psychiatric: confusion     Diagnostic Data:  Imaging:   CT Head WO Contrast   Final Result   1. No acute intracranial abnormality is seen. Signed by Dr Jenna Demspey on 2/19/2020 9:43 PM      XR CHEST PORTABLE   Final Result   1. Chronic lung changes with mild patchy infiltrate within the mid and   lower right lung. Signed by Dr Jenna Dempsey on 2/19/2020 9:34 PM        CBC:  Recent Labs     02/19/20  1418 02/19/20 2120   WBC 13.5* 11.5*   HGB 8.9* 8.6*   HCT 30.0* 28.6*    207     BMP:  Recent Labs     02/19/20  1418 02/19/20 2120   * 127*   K 4.0 3.8   CL 90* 92*   CO2 23 24   BUN 34* 33*   CREATININE 1.0* 1.0*   CALCIUM 7.6* 7.4*     Recent Labs     02/19/20  1418 02/19/20 2120   AST 10 11   ALT <5* 6   BILITOT <0.2 <0.2   ALKPHOS 75 80     Coag Panel: No results for input(s): INR, PROTIME, APTT in the last 72 hours. Cardiac Enzymes: No results for input(s): Jeffrey Bowler in the last 72 hours. ABGs:  Lab Results   Component Value Date    PHART 7.460 08/29/2017    PO2ART 96.0 08/29/2017    FTE6JZI 41.0 08/29/2017     Urinalysis:  Lab Results   Component Value Date    NITRU Negative 02/19/2020    WBCUA 31-50 12/06/2017    WBCUA 888 12/06/2017    BACTERIA 3+ 12/06/2017    RBCUA 6-10 12/06/2017    BLOODU Negative 02/19/2020    SPECGRAV 1.012 02/19/2020    GLUCOSEU Negative 02/19/2020       Active Hospital Problems    Diagnosis Date Noted    Mental confusion [R41.0] 02/19/2020       Assessment and Plan: Active Problems:    Mental confusion  Resolved Problems:    * No resolved hospital problems.  *    Metabolic encephalopathy 2 to hyponatremia, admit, hold antidepressants avoid free water, IVF, sodium tablets, monitor Na, check B12 ammonia  PN BC RT abx  AECOPD RT steroids abx   DVT prophylaxis with Lovenox         [facility]  2/19/2020  11:11 PM

## 2020-02-21 VITALS
WEIGHT: 118.9 LBS | HEART RATE: 81 BPM | RESPIRATION RATE: 16 BRPM | HEIGHT: 61 IN | DIASTOLIC BLOOD PRESSURE: 82 MMHG | BODY MASS INDEX: 22.45 KG/M2 | SYSTOLIC BLOOD PRESSURE: 118 MMHG | TEMPERATURE: 98.1 F | OXYGEN SATURATION: 92 %

## 2020-02-21 PROBLEM — R41.0 MENTAL CONFUSION: Status: RESOLVED | Noted: 2020-02-19 | Resolved: 2020-02-21

## 2020-02-21 LAB
ALBUMIN SERPL-MCNC: 2.9 G/DL (ref 3.5–5.2)
ALP BLD-CCNC: 74 U/L (ref 35–104)
ALT SERPL-CCNC: 6 U/L (ref 5–33)
ANION GAP SERPL CALCULATED.3IONS-SCNC: 13 MMOL/L (ref 7–19)
AST SERPL-CCNC: 11 U/L (ref 5–32)
BASOPHILS ABSOLUTE: 0 K/UL (ref 0–0.2)
BASOPHILS RELATIVE PERCENT: 0 % (ref 0–1)
BILIRUB SERPL-MCNC: <0.2 MG/DL (ref 0.2–1.2)
BUN BLDV-MCNC: 22 MG/DL (ref 8–23)
CALCIUM SERPL-MCNC: 8 MG/DL (ref 8.8–10.2)
CHLORIDE BLD-SCNC: 98 MMOL/L (ref 98–111)
CO2: 24 MMOL/L (ref 22–29)
CREAT SERPL-MCNC: 0.8 MG/DL (ref 0.5–0.9)
EOSINOPHILS ABSOLUTE: 0 K/UL (ref 0–0.6)
EOSINOPHILS RELATIVE PERCENT: 0.3 % (ref 0–5)
GFR NON-AFRICAN AMERICAN: >60
GLUCOSE BLD-MCNC: 161 MG/DL (ref 74–109)
HCT VFR BLD CALC: 26.5 % (ref 37–47)
HEMOGLOBIN: 8.1 G/DL (ref 12–16)
IMMATURE GRANULOCYTES #: 0 K/UL
LYMPHOCYTES ABSOLUTE: 0.6 K/UL (ref 1.1–4.5)
LYMPHOCYTES RELATIVE PERCENT: 7 % (ref 20–40)
MCH RBC QN AUTO: 28.9 PG (ref 27–31)
MCHC RBC AUTO-ENTMCNC: 30.6 G/DL (ref 33–37)
MCV RBC AUTO: 94.6 FL (ref 81–99)
MONOCYTES ABSOLUTE: 0.3 K/UL (ref 0–0.9)
MONOCYTES RELATIVE PERCENT: 3.2 % (ref 0–10)
NEUTROPHILS ABSOLUTE: 7 K/UL (ref 1.5–7.5)
NEUTROPHILS RELATIVE PERCENT: 89 % (ref 50–65)
ORGANISM: ABNORMAL
PDW BLD-RTO: 15.9 % (ref 11.5–14.5)
PLATELET # BLD: 259 K/UL (ref 130–400)
PMV BLD AUTO: 10.4 FL (ref 9.4–12.3)
POTASSIUM REFLEX MAGNESIUM: 4 MMOL/L (ref 3.5–5)
RBC # BLD: 2.8 M/UL (ref 4.2–5.4)
SODIUM BLD-SCNC: 135 MMOL/L (ref 136–145)
TOTAL PROTEIN: 6.5 G/DL (ref 6.6–8.7)
URINE CULTURE, ROUTINE: ABNORMAL
URINE CULTURE, ROUTINE: ABNORMAL
WBC # BLD: 7.9 K/UL (ref 4.8–10.8)

## 2020-02-21 PROCEDURE — 94640 AIRWAY INHALATION TREATMENT: CPT

## 2020-02-21 PROCEDURE — 2700000000 HC OXYGEN THERAPY PER DAY

## 2020-02-21 PROCEDURE — 6370000000 HC RX 637 (ALT 250 FOR IP): Performed by: INTERNAL MEDICINE

## 2020-02-21 PROCEDURE — 36415 COLL VENOUS BLD VENIPUNCTURE: CPT

## 2020-02-21 PROCEDURE — 2580000003 HC RX 258: Performed by: HOSPITALIST

## 2020-02-21 PROCEDURE — 6370000000 HC RX 637 (ALT 250 FOR IP): Performed by: HOSPITALIST

## 2020-02-21 PROCEDURE — 85025 COMPLETE CBC W/AUTO DIFF WBC: CPT

## 2020-02-21 PROCEDURE — 80053 COMPREHEN METABOLIC PANEL: CPT

## 2020-02-21 PROCEDURE — 6360000002 HC RX W HCPCS: Performed by: HOSPITALIST

## 2020-02-21 RX ORDER — PREDNISONE 20 MG/1
40 TABLET ORAL DAILY
Qty: 6 TABLET | Refills: 0 | Status: SHIPPED | OUTPATIENT
Start: 2020-02-22 | End: 2020-02-25

## 2020-02-21 RX ORDER — CEFDINIR 300 MG/1
300 CAPSULE ORAL 2 TIMES DAILY
Qty: 14 CAPSULE | Refills: 0 | Status: SHIPPED | OUTPATIENT
Start: 2020-02-21 | End: 2020-02-28

## 2020-02-21 RX ADMIN — HYDROCODONE BITARTRATE AND ACETAMINOPHEN 1 TABLET: 10; 325 TABLET ORAL at 12:50

## 2020-02-21 RX ADMIN — AZITHROMYCIN 500 MG: 500 INJECTION, POWDER, LYOPHILIZED, FOR SOLUTION INTRAVENOUS at 02:04

## 2020-02-21 RX ADMIN — ENOXAPARIN SODIUM 40 MG: 40 INJECTION SUBCUTANEOUS at 08:30

## 2020-02-21 RX ADMIN — PREDNISONE 40 MG: 20 TABLET ORAL at 08:30

## 2020-02-21 RX ADMIN — GABAPENTIN 300 MG: 300 CAPSULE ORAL at 08:30

## 2020-02-21 RX ADMIN — SODIUM CHLORIDE, PRESERVATIVE FREE 10 ML: 5 INJECTION INTRAVENOUS at 08:30

## 2020-02-21 RX ADMIN — IPRATROPIUM BROMIDE AND ALBUTEROL SULFATE 1 AMPULE: .5; 3 SOLUTION RESPIRATORY (INHALATION) at 15:13

## 2020-02-21 RX ADMIN — IPRATROPIUM BROMIDE AND ALBUTEROL SULFATE 1 AMPULE: .5; 3 SOLUTION RESPIRATORY (INHALATION) at 06:59

## 2020-02-21 RX ADMIN — IPRATROPIUM BROMIDE AND ALBUTEROL SULFATE 1 AMPULE: .5; 3 SOLUTION RESPIRATORY (INHALATION) at 10:45

## 2020-02-21 RX ADMIN — PANTOPRAZOLE SODIUM 20 MG: 20 TABLET, DELAYED RELEASE ORAL at 08:30

## 2020-02-21 RX ADMIN — ONDANSETRON 4 MG: 2 INJECTION INTRAMUSCULAR; INTRAVENOUS at 12:50

## 2020-02-21 RX ADMIN — CEFTRIAXONE 1 G: 1 INJECTION, POWDER, FOR SOLUTION INTRAMUSCULAR; INTRAVENOUS at 02:04

## 2020-02-21 RX ADMIN — KETOTIFEN FUMARATE 1 DROP: 0.35 SOLUTION/ DROPS OPHTHALMIC at 08:33

## 2020-02-21 RX ADMIN — ISOSORBIDE MONONITRATE 30 MG: 30 TABLET, EXTENDED RELEASE ORAL at 08:30

## 2020-02-21 ASSESSMENT — PAIN SCALES - GENERAL
PAINLEVEL_OUTOF10: 6
PAINLEVEL_OUTOF10: 0

## 2020-02-21 ASSESSMENT — PAIN DESCRIPTION - PAIN TYPE: TYPE: CHRONIC PAIN

## 2020-02-21 ASSESSMENT — PAIN DESCRIPTION - ONSET: ONSET: AWAKENED FROM SLEEP

## 2020-02-21 ASSESSMENT — PAIN DESCRIPTION - LOCATION: LOCATION: BACK

## 2020-02-21 ASSESSMENT — PAIN DESCRIPTION - ORIENTATION: ORIENTATION: LOWER

## 2020-02-21 NOTE — PLAN OF CARE
Problem: Falls - Risk of:  Goal: Will remain free from falls  Description  Will remain free from falls  2/21/2020 0408 by Kimberlyn Hernandez RN  Outcome: Ongoing  2/20/2020 1459 by Lani Bell RN  Outcome: Ongoing  Goal: Absence of physical injury  Description  Absence of physical injury  2/21/2020 0408 by Kimberlyn Hernandez RN  Outcome: Ongoing  2/20/2020 1459 by Lani Bell RN  Outcome: Ongoing     Problem: Pain:  Goal: Pain level will decrease  Description  Pain level will decrease  2/21/2020 0408 by Kimberlyn Hernandez RN  Outcome: Ongoing  2/20/2020 1459 by Lani Bell RN  Outcome: Ongoing  Goal: Control of acute pain  Description  Control of acute pain  2/21/2020 0408 by Kimberlyn Hernandez RN  Outcome: Ongoing  2/20/2020 1459 by Lani Bell RN  Outcome: Ongoing  Goal: Control of chronic pain  Description  Control of chronic pain  2/21/2020 0408 by Kimberlyn Hernandez RN  Outcome: Ongoing  2/20/2020 1459 by Lani Bell RN  Outcome: Ongoing  Goal: Patient's pain/discomfort is manageable  Description  Patient's pain/discomfort is manageable  2/21/2020 0408 by Kimberlyn Hernandez RN  Outcome: Ongoing  2/20/2020 1459 by Lani Bell RN  Outcome: Ongoing     Problem: Safety:  Goal: Free from accidental physical injury  Description  Free from accidental physical injury  2/21/2020 0408 by Kimberlyn Hernandez RN  Outcome: Ongoing  2/20/2020 1459 by Lani Bell RN  Outcome: Ongoing  Goal: Free from intentional harm  Description  Free from intentional harm  2/21/2020 0408 by Kimberlyn Hernandez RN  Outcome: Ongoing  2/20/2020 1459 by Lani Bell RN  Outcome: Ongoing     Problem: Daily Care:  Goal: Daily care needs are met  Description  Daily care needs are met  2/21/2020 0408 by Kimberlyn Hernandez RN  Outcome: Ongoing  2/20/2020 1459 by Lani Bell RN  Outcome: Ongoing     Problem: Skin Integrity:  Goal: Skin integrity will stabilize  Description  Skin integrity will stabilize  2/21/2020 0408 by Norm Hyde Leakage - Risk Of:  Goal: Demonstration of organized thought processes  Description  Demonstration of organized thought processes  Outcome: Ongoing     Problem: Violence - Risk of, Self/Other-Directed:  Goal: Knowledge of developmental care interventions  Description  Absence of violence  Outcome: Ongoing

## 2020-02-21 NOTE — DISCHARGE SUMMARY
Mirlande Graham  :  1945  MRN:  [id number]    Admit date:  2020  Discharge date:  2020       Admitting Physician:  Pineda Burciaga MD    Advance Directive: Full Code    Consults Made:   PALLIATIVE CARE EVAL  IP CONSULT TO HOME CARE NEEDS      Primary Care Physician:  William Zhou, NAVEEN    Discharge Diagnoses:  Principal Problem:    Pneumonia due to infectious organism  Active Problems:    CAD in native artery    Palliative care patient    COPD (chronic obstructive pulmonary disease) ([address]  Resolved Problems:    Mental confusion            Pertinent Labs:  CBC with DIFF:  Recent Labs     20  1418 20  2120 20  0515 20  1126   WBC 13.5* 11.5* 9.7 7.9   RBC 3.03* 2.94* 2.94* 2.80*   HGB 8.9* 8.6* 8.6* 8.1*   HCT 30.0* 28.6* 28.6* 26.5*   MCV 99.0 97.3 97.3 94.6   MCH 29.4 29.3 29.3 28.9   MCHC 29.7* 30.1* 30.1* 30.6*   RDW 15.9* 15.7* 15.5* 15.9*    207 208 259   MPV 10.3 10.1 9.8 10.4   NEUTOPHILPCT 82.7* 81.7*  --  89.0*   LYMPHOPCT 9.0* 10.3*  --  7.0*   MONOPCT 6.7 6.3  --  3.2   EOSRELPCT 0.4 1.0  --  0.3   BASOPCT 0.2 0.2  --  0.0   NEUTROABS 11.2* 9.4*  --  7.0   LYMPHSABS 1.2 1.2  --  0.6*   MONOSABS 0.90 0.70  --  0.30   EOSABS 0.10 0.10  --  0.00   BASOSABS 0.00 0.00  --  0.00       CMP/BMP:  Recent Labs     20  1800 20  2311 20  0400   * 136 135*   K 4.0 4.4 4.0   CL 99 102 98   CO2 25 26 24   ANIONGAP 11 8 13   GLUCOSE 209* 188* 161*   BUN 22 23 22   CREATININE 0.8 0.8 0.8   LABGLOM >60 >60 >60   CALCIUM 7.8* 7.7* 8.0*   PROT 6.5* 6.1* 6.5*   LABALBU 3.0* 3.0* 2.9*   BILITOT <0.2 <0.2 <0.2   ALKPHOS 77 67 74   ALT 6 5 6   AST 10 8 11         CRP:  No results for input(s): CRP in the last 72 hours. Sed Rate:  No results for input(s): SEDRATE in the last 72 hours.       HgBA1c:  No components found for: HGBA1C  FLP:    Lab Results   Component Value Date    TRIG 238 2017    HDL 32 2017    LDLCALC 92 2017     TSH:    Lab Results   Component Value Date    TSH 2.330 02/20/2020     Troponin T: No results for input(s): TROPONINI in the last 72 hours. Pro-BNP: No results for input(s): BNP in the last 72 hours. INR: No results for input(s): INR in the last 72 hours. ABGs:   Lab Results   Component Value Date    PHART 7.460 08/29/2017    PO2ART 96.0 08/29/2017    EGT5CPI 41.0 08/29/2017     UA:  Recent Labs     02/19/20 02/19/20  2238   NITRITE neg  --    COLORU dmitry YELLOW   PHUR 5.5 6.0   CLARITYU clear CLOUDY*   SPECGRAV 1.025 1.012   LEUKOCYTESUR neg Negative   UROBILINOGEN  --  0.2   BILIRUBINUR neg Negative   BLOODU neg Negative   GLUCOSEU neg Negative         Culture Results:    No results for input(s): CXSURG in the last 720 hours. Blood Culture Recent:   Recent Labs     02/19/20  2119   BC No Growth to date. Any change in status will be called. Cultures:   No results for input(s): CULTURE in the last 72 hours. Recent Labs     02/19/20 2119 02/20/20  0026   BC No Growth to date. Any change in status will be called. --    BLOODCULT2  --  No Growth to date. Any change in status will be called. No results for input(s): CXSURG in the last 72 hours. No results for input(s): MG, PHOS in the last 72 hours. Recent Labs     02/20/20  1800 02/20/20  2311 02/21/20  0400   AST 10 8 11   ALT 6 5 6   BILITOT <0.2 <0.2 <0.2   ALKPHOS 77 67 74           Significant Diagnostic Studies:   Ct Head Wo Contrast    Result Date: 2/19/2020  CT HEAD WO CONTRAST 2/19/2020 9:42 PM History: Dizziness. Fall injury. In order to have a CT radiation dose as low as reasonably achievable Automated Exposure Control was utilized for adjustment of the mA and/or KV according to patient size. DLP in mGycm= 726. Noncontrast head CT with no comparison. Axial, sagittal, and coronal noncontrast CT imaging of the head. The visualized paranasal sinuses are clear and normally aerated. The mastoid air cells are clear.  The brain and ventricles have 20 MG tablet  Take 1 tablet by mouth every morning (before breakfast)             predniSONE (DELTASONE) 20 MG tablet  Take 2 tablets by mouth daily for 3 doses             Respiratory Therapy Supplies (NEBULIZER/TUBING/MOUTHPIECE) KIT  1 kit by Does not apply route daily             traZODone (DESYREL) 150 MG tablet  Take 1 tablet by mouth nightly                 Discharge Instructions: Follow up with NAVEEN Castaneda in 7 days. Take medications as directed. Resume activity as tolerated. Diet: DIET GENERAL; Dental Soft        DISCHARGE STATUS:    Condition: Good  Disposition: Patient is medically stable and will be discharged Home    Extended Emergency Contact Information  Primary Emergency Contact: Vanessa Dahl [address] Phone: [phone]  Relation: Child  Secondary Emergency Contact: Geovanna Jacobs [address] Phone: [phone]  Mobile Phone: [phone]  Relation: Child       Time Spent on discharge is more than 32 mins in the examination, evaluation, counseling and review of medications and discharge plan. Electronically signed by   Anabella Garcia MD, MPH,   Internal Medicine Hospitalist   2/21/2020 3:22 PM      Thank you NAVEEN Castaneda for the opportunity to be involved in this patient's care. If you have any questions or concerns please feel free to contact me at [phone]        EMR Dragon/Transcription disclaimer:   Much of this encounter note is an electronic transcription/translation of spoken language to printed text.  The electronic translation of spoken language may permit erroneous, or at times, nonsensical words or phrases to be inadvertently transcribed; although attempts have made to review the note for such errors, some may still exist. n/a

## 2020-02-21 NOTE — CARE COORDINATION
Spoke with patient regarding MD orders for State mental health facility services. Patient agreeable and has chosen [facility]. Referral Faxed. [address] [place] [address] [phone]. [phone]. Please notify [address] [place] [address] when patient discharges and fax DC Summary,  DC med list and any new State mental health facility orders. The Patient and/or patient representative was provided with a choice of provider and agrees   with the discharge plan. [x] Yes [] No    Freedom of choice list was provided with basic dialogue that supports the patient's individualized plan of care/goals, treatment preferences and shares the quality data associated with the providers.  [x] Yes [] No  Electronically signed by Rosa Maria Cantu RN on 2/21/2020 at 12:12 PM

## 2020-02-24 ENCOUNTER — CARE COORDINATION (OUTPATIENT)
Dept: CASE MANAGEMENT | Age: 75
End: 2020-02-24

## 2020-02-24 ENCOUNTER — TELEPHONE (OUTPATIENT)
Dept: PRIMARY CARE CLINIC | Age: 75
End: 2020-02-24

## 2020-02-24 NOTE — CARE COORDINATION
Ty [facility] Transitions Initial Follow Up Call    Call within 2 business days of discharge: Yes    Patient: Love Hutton Patient : 1945   MRN: [id number]  Reason for Admission:   Discharge Date: 20 RARS: Readmission Risk Score: 26      Last Discharge [facility]       Complaint Diagnosis Description Type Department Provider    20 Dizziness Balance problems . .. ED to Hosp-Admission (Discharged) (ADMITTED) [facility] Anabella Garcia MD; Caitie Freire .. Spoke with: Shruthi Lauren, patient's daughter     Facility: [place] [address]    Non-face-to-face services provided:  Reviewed encounter information for continuity of care prior to follow up phone call - chart notes, consults, progress notes, test results, med list, appointments, AVS, other information. Care Transitions 24 Hour Call    Do you have any ongoing symptoms?:  No  Do you have a copy of your discharge instructions?:  Yes  Do you have all of your prescriptions and are they filled?:  Yes  Have you been contacted by a [address]?:  No  Have you scheduled your follow up appointment?:  Yes  How are you going to get to your appointment?:  Car - family or friend to transport  Were you discharged with any Home Care or Post Acute Services:  Yes  Post Acute Services:  [facility] [address] you feel like you have everything you need to keep you well at home?:  Yes  Care Transitions Interventions                                 Follow Up : Spoke with patient's daughter regarding initial follow up phone call after discharge. She says she is doing fair. She says the [facility] home care nurse is in the home currently. She states that patient was able to obtain her medications, but she did not have time to review, as nurse was visiting. She asked if CTN could call back at another time. Advised would call back tomorrow.  She says patient is not having any breathing difficulty, wears home oxygen at bedtime at  2LPM.

## 2020-02-24 NOTE — TELEPHONE ENCOUNTER
Shelly Jara with [facility] called to see if okay to order speech therapy to help pt with her breathing issues      Robel, PT with [facility] called stating that she has admitted pt to [facility] for PT to see her PT x 2 for 2 weeks then weekly for 2 weeks.

## 2020-02-25 ENCOUNTER — OFFICE VISIT (OUTPATIENT)
Dept: PRIMARY CARE CLINIC | Age: 75
End: 2020-02-25
Payer: MEDICARE

## 2020-02-25 ENCOUNTER — CARE COORDINATION (OUTPATIENT)
Dept: CASE MANAGEMENT | Age: 75
End: 2020-02-25

## 2020-02-25 VITALS
WEIGHT: 116.8 LBS | HEART RATE: 72 BPM | DIASTOLIC BLOOD PRESSURE: 70 MMHG | BODY MASS INDEX: 22.05 KG/M2 | SYSTOLIC BLOOD PRESSURE: 112 MMHG | OXYGEN SATURATION: 94 % | TEMPERATURE: 98.9 F | HEIGHT: 61 IN

## 2020-02-25 DIAGNOSIS — E87.1 HYPONATREMIA: ICD-10-CM

## 2020-02-25 DIAGNOSIS — D64.9 ACUTE ON CHRONIC ANEMIA: ICD-10-CM

## 2020-02-25 LAB
ALBUMIN SERPL-MCNC: 3.5 G/DL (ref 3.5–5.2)
ALP BLD-CCNC: 64 U/L (ref 35–104)
ALT SERPL-CCNC: 7 U/L (ref 5–33)
ANION GAP SERPL CALCULATED.3IONS-SCNC: 11 MMOL/L (ref 7–19)
AST SERPL-CCNC: 10 U/L (ref 5–32)
BASOPHILS ABSOLUTE: 0 K/UL (ref 0–0.2)
BASOPHILS RELATIVE PERCENT: 0.3 % (ref 0–1)
BILIRUB SERPL-MCNC: <0.2 MG/DL (ref 0.2–1.2)
BLOOD CULTURE, ROUTINE: NORMAL
BUN BLDV-MCNC: 17 MG/DL (ref 8–23)
CALCIUM SERPL-MCNC: 8.3 MG/DL (ref 8.8–10.2)
CHLORIDE BLD-SCNC: 95 MMOL/L (ref 98–111)
CO2: 27 MMOL/L (ref 22–29)
CREAT SERPL-MCNC: 0.8 MG/DL (ref 0.5–0.9)
CULTURE, BLOOD 2: NORMAL
EOSINOPHILS ABSOLUTE: 0.2 K/UL (ref 0–0.6)
EOSINOPHILS RELATIVE PERCENT: 2.1 % (ref 0–5)
FERRITIN: 69 NG/ML (ref 13–150)
GFR NON-AFRICAN AMERICAN: >60
GLUCOSE BLD-MCNC: 91 MG/DL (ref 74–109)
HCT VFR BLD CALC: 32.1 % (ref 37–47)
HEMOGLOBIN: 9.6 G/DL (ref 12–16)
IMMATURE GRANULOCYTES #: 0.1 K/UL
IRON: 43 UG/DL (ref 37–145)
LYMPHOCYTES ABSOLUTE: 1.8 K/UL (ref 1.1–4.5)
LYMPHOCYTES RELATIVE PERCENT: 24.8 % (ref 20–40)
MCH RBC QN AUTO: 28.9 PG (ref 27–31)
MCHC RBC AUTO-ENTMCNC: 29.9 G/DL (ref 33–37)
MCV RBC AUTO: 96.7 FL (ref 81–99)
MONOCYTES ABSOLUTE: 0.9 K/UL (ref 0–0.9)
MONOCYTES RELATIVE PERCENT: 11.9 % (ref 0–10)
NEUTROPHILS ABSOLUTE: 4.3 K/UL (ref 1.5–7.5)
NEUTROPHILS RELATIVE PERCENT: 59.4 % (ref 50–65)
PDW BLD-RTO: 16.2 % (ref 11.5–14.5)
PLATELET # BLD: 393 K/UL (ref 130–400)
PMV BLD AUTO: 9.8 FL (ref 9.4–12.3)
POTASSIUM SERPL-SCNC: 3.9 MMOL/L (ref 3.5–5)
RBC # BLD: 3.32 M/UL (ref 4.2–5.4)
SODIUM BLD-SCNC: 133 MMOL/L (ref 136–145)
TOTAL PROTEIN: 6.6 G/DL (ref 6.6–8.7)
WBC # BLD: 7.2 K/UL (ref 4.8–10.8)

## 2020-02-25 PROCEDURE — 99495 TRANSJ CARE MGMT MOD F2F 14D: CPT | Performed by: NURSE PRACTITIONER

## 2020-02-25 PROCEDURE — 1111F DSCHRG MED/CURRENT MED MERGE: CPT | Performed by: NURSE PRACTITIONER

## 2020-02-25 NOTE — PROGRESS NOTES
Post-Discharge Transitional Care Management Services or Hospital Follow Up      Terressa January   YOB: 1945    Date of Office Visit:  2/25/2020  Date of Hospital Admission: 2/19/20  Date of Hospital Discharge: 2/21/20  Risk of hospital readmission (high >=14%.  Medium >=10%) :Readmission Risk Score: 26      Care management risk score Rising risk (score 2-5) and Complex Care (Scores >=6): 3     Non face to face  following discharge, date last encounter closed (first attempt may have been earlier): 2/24/2020  2:53 PM    Call initiated 2 business days of discharge: Yes    Patient Active Problem List   Diagnosis    CHF [facility]congestive heart failure) ([facility] [address]    CAD in native artery    Chest pain    Chronic constipation    Nondiabetic gastroparesis    Generalized abdominal pain    Nausea & vomiting    Chronic narcotic dependence ([facility] [address]    Polypharmacy    Frequency of urination    Gross hematuria    Urge incontinence    Palliative care patient    Pneumonia due to infectious organism    COPD [facility]ch[facility] obstructive pulmonary disease) [facility]       Allergies   Allergen Reactions    Codeine Nausea Only    Sulfa Antibiotics Other (See Comments)     \"makes me tremble'       Medications listed as ordered at the time of discharge from hospital   Cele Cumsharyn JAYNE   [place] Medication Instructions ESTEFANIA:    Printed on:02/25/20 1210   Medication Information                      albuterol (PROVENTIL) (5 MG/ML) 0.5% nebulizer solution  Take 0.5 mLs by nebulization every 6 hours as needed for Wheezing             albuterol sulfate HFA (VENTOLIN HFA) 108 (90 Base) MCG/ACT inhaler  Inhale 2 puffs into the lungs every 6 hours as needed for Wheezing             carvedilol (COREG) 3.125 MG tablet  Take 1 tablet by mouth 2 times daily (with meals)             cefdinir (OMNICEF) 300 MG capsule  Take 1 capsule by mouth 2 times daily for 7 days             donepezil (ARICEPT) 5 MG tablet  Take 1 tablet by mouth nightly DULoxetine (CYMBALTA) 60 MG extended release capsule  Take 1 capsule by mouth daily             Fesoterodine Fumarate ER (TOVIAZ) 8 MG TB24  Take 1 tablet by mouth daily             fluticasone-umeclidin-vilant (TRELEGY ELLIPTA) 100-62.5-25 MCG/INH AEPB  Inhale 1 puff into the lungs daily             furosemide (LASIX) 20 MG tablet  Take 1 tablet by mouth daily             gabapentin (NEURONTIN) 300 MG capsule  Take 300 mg by mouth 2 times daily. HYDROcodone-acetaminophen (NORCO)  MG per tablet  TAKE ONE TABLET BY MOUTH EVERY 8 HOURS AS NEEDED- dr galarza             isosorbide mononitrate (IMDUR) 30 MG extended release tablet  Take 1 tablet by mouth daily             memantine (NAMENDA) 10 MG tablet  Take 1 tablet by mouth 2 times daily             nitroGLYCERIN (NITROQUICK) 0.4 MG SL tablet  Place 1 tablet under the tongue every 5 minutes as needed for Chest pain             olopatadine (PATADAY) 0.2 % SOLN ophthalmic solution  Place 1 drop into both eyes daily             ondansetron (ZOFRAN) 4 MG tablet  Take 1 tablet by mouth every 8 hours as needed for Nausea or Vomiting             OXYGEN  Inhale 2 L into the lungs daily             pantoprazole (PROTONIX) 20 MG tablet  Take 1 tablet by mouth every morning (before breakfast)             traZODone (DESYREL) 150 MG tablet  Take 1 tablet by mouth nightly                   Medications marked \"taking\" at this time  Outpatient Medications Marked as Taking for the 2/25/20 encounter (Office Visit) with NAVEEN Weeks   Medication Sig Dispense Refill    fluticasone-umeclidin-vilant (TRELEGY ELLIPTA) 100-62.5-25 MCG/INH AEPB Inhale 1 puff into the lungs daily 1 each 5    cefdinir (OMNICEF) 300 MG capsule Take 1 capsule by mouth 2 times daily for 7 days 14 capsule 0    DULoxetine (CYMBALTA) 60 MG extended release capsule Take 1 capsule by mouth daily 30 capsule 5    gabapentin (NEURONTIN) 300 MG capsule Take 300 mg by mouth 2 times daily. normal without polyps  Neck: supple and non-tender without mass, no cervical lymphadenopathy  Pulmonary/Chest: decreased breath sounds bilaterally- normal air movement, no respiratory distress, scattered rhonchi and mild wheezes  Port in left chest. Blood drawn from port then flushed with heparin and saline. Patient tolerated. Cardiovascular: normal rate, regular rhythm, normal S1 and S2, no carotid bruits  Abdomen: soft, non-tender, non-distended, normal bowel sounds  Extremities: no cyanosis, clubbing or edema  Musculoskeletal: normal range of motion, no joint swelling, deformity or tenderness  Neurologic: no cranial nerve deficit, coordination and speech normal    Assessment/Plan:  1. Hyponatremia  -Resolved at time of discharge.  -Confusion had resolved at hospital discharge  - Comprehensive Metabolic Panel; Future  - MT DISCHARGE MEDS RECONCILED W/ CURRENT OUTPATIENT MED LIST    2. Acute on chronic anemia  - Comprehensive Metabolic Panel; Future  - CBC Auto Differential; Future  - Ferritin; Future  - Iron; Future  - MT DISCHARGE MEDS RECONCILED W/ CURRENT OUTPATIENT MED LIST  -Denies any recent blood loss    3. Pneumonia of right lower lobe due to infectious organism ([facility] [address]  -Improving  -Finish entire course of Omnicef  -Continue Trelegy  -Continue albuterol nebs as needed  - MT DISCHARGE MEDS RECONCILED W/ CURRENT OUTPATIENT MED LIST    4. HAILEY (generalized anxiety disorder)  -Continue Cymbalta ONCE daily  -Okay to restart Klonopin as needed  -Patient's daughter to give patient's Klonopin as needed  - MT DISCHARGE MEDS RECONCILED W/ CURRENT OUTPATIENT MED LIST    5. Insomnia due to other mental disorder  -Continue trazodone nightly  -Do NOT restart over-the-counter sleep aids  - MT DISCHARGE MEDS RECONCILED W/ CURRENT OUTPATIENT MED LIST    6.  Port-A-Cath in place  - MT IRRIG IMPLANTED DRUG DELIVERY DEVICE        Medical Decision Making: moderate complexity    Keep follow-up appointment in 3 weeks

## 2020-02-26 ENCOUNTER — TELEPHONE (OUTPATIENT)
Dept: PRIMARY CARE CLINIC | Age: 75
End: 2020-02-26

## 2020-02-26 NOTE — TELEPHONE ENCOUNTER
----- Message from NAVEEN Gustafson sent at 2/25/2020  3:55 PM CST -----  Iron and iron stores are within normal limits  CMP: Sodium is mildly low at 133. This is something we want to monitor. Sugar is within normal limits. Normal kidney function and normal liver function  CBC: White blood cell count, infection fighting cells, is within normal limits. Hemoglobin hematocrit, oxygen-carrying cells, are low but are improved from 4 days ago. This is great news.

## 2020-02-26 NOTE — TELEPHONE ENCOUNTER
Called patient, spoke with: Patient regarding the results of the patients most recent labs. I advised Child/Children of Hillary Peña recommendations.    Child/Children did voice understanding

## 2020-02-27 ENCOUNTER — CARE COORDINATION (OUTPATIENT)
Dept: CASE MANAGEMENT | Age: 75
End: 2020-02-27

## 2020-02-27 NOTE — CARE COORDINATION
[facility] Transitions Follow Up Call    2020    Patient: Raymond Prather  Patient : 1945   MRN: [id number]  Reason for Admission:   Discharge Date: 20 RARS: Readmission Risk Score: 26         Spoke with: N/A    Care Transitions Subsequent and Final Call    Subsequent and Final Calls  Are you currently active with any services?:  Home Health  Care Transitions Interventions                          Other Interventions: Follow Up : Attempt #2 to make contact with Jeny for an initial follow up call post discharge from the hospital without success. Unable to leave a message regarding intent of call and call back information. Will try again at a later time.      Future Appointments   Date Time Provider Abelardo Singleton   3/18/2020 12:00 PM NAVEEN Warren [facility]   3/19/2020 12:30 PM Enrique Greene, [address] [facility]

## 2020-03-05 ENCOUNTER — CARE COORDINATION (OUTPATIENT)
Dept: CASE MANAGEMENT | Age: 75
End: 2020-03-05

## 2020-03-05 RX ORDER — OLOPATADINE HYDROCHLORIDE 2 MG/ML
SOLUTION/ DROPS OPHTHALMIC
Qty: 2.5 ML | Refills: 0 | OUTPATIENT
Start: 2020-03-05

## 2020-03-12 ENCOUNTER — CARE COORDINATION (OUTPATIENT)
Dept: CASE MANAGEMENT | Age: 75
End: 2020-03-12

## 2020-03-17 RX ORDER — CLONAZEPAM 0.5 MG/1
0.5 TABLET ORAL 3 TIMES DAILY PRN
Qty: 75 TABLET | Refills: 0 | Status: SHIPPED | OUTPATIENT
Start: 2020-03-17 | End: 2020-04-17 | Stop reason: SDUPTHER

## 2020-03-18 RX ORDER — OLOPATADINE HYDROCHLORIDE 2 MG/ML
1 SOLUTION/ DROPS OPHTHALMIC DAILY
Qty: 2.5 ML | Refills: 5 | Status: SHIPPED | OUTPATIENT
Start: 2020-03-18 | End: 2020-11-09

## 2020-03-18 NOTE — TELEPHONE ENCOUNTER
Pt seen 2/25/20.       Requested Prescriptions     Pending Prescriptions Disp Refills    olopatadine (PATADAY) 0.2 % SOLN ophthalmic solution [Pharmacy Med Name: OLOPATADINE 0.2%  2.5ML] 2.5 mL 0     Sig: INSTILL 1 DROP INTO BOTH EYES ONCE DAILY

## 2020-03-20 ENCOUNTER — TELEPHONE (OUTPATIENT)
Dept: PRIMARY CARE CLINIC | Age: 75
End: 2020-03-20

## 2020-03-24 ENCOUNTER — CARE COORDINATION (OUTPATIENT)
Dept: CASE MANAGEMENT | Age: 75
End: 2020-03-24

## 2020-03-24 NOTE — CARE COORDINATION
Ty [facility] Transitions Follow Up Call    3/24/2020    Patient: Arsh Rodriguez  Patient : 1945   MRN: <[id number]>  Reason for Admission:   Discharge Date: 20 RARS: Readmission Risk Score: 26    Follow Up: Attempted to contact patient for BPCI-A follow up. Unable to reach patient. Left message on voicemail with contact information and request for call back.       Future Appointments   Date Time Provider Abelardo Singleton   2020  2:30 PM [facility]

## 2020-03-25 ENCOUNTER — VIRTUAL VISIT (OUTPATIENT)
Dept: PRIMARY CARE CLINIC | Age: 75
End: 2020-03-25
Payer: MEDICARE

## 2020-03-25 ENCOUNTER — TELEPHONE (OUTPATIENT)
Dept: PRIMARY CARE CLINIC | Age: 75
End: 2020-03-25

## 2020-03-25 PROCEDURE — G2012 BRIEF CHECK IN BY MD/QHP: HCPCS | Performed by: NURSE PRACTITIONER

## 2020-03-25 RX ORDER — DOXYCYCLINE HYCLATE 100 MG/1
100 CAPSULE ORAL 2 TIMES DAILY
Qty: 20 CAPSULE | Refills: 0 | Status: SHIPPED | OUTPATIENT
Start: 2020-03-25 | End: 2020-04-04

## 2020-03-25 ASSESSMENT — ENCOUNTER SYMPTOMS
COUGH: 1
SHORTNESS OF BREATH: 1

## 2020-03-25 NOTE — TELEPHONE ENCOUNTER
Roc Moran calls and leaves a message that they are needing a updated address and phone number. I called and let a message on pt daughter vm with Pennyrile number to call and give them that information.

## 2020-03-25 NOTE — PROGRESS NOTES
Chai Stephanie Zaragoza  Phone [phone]   Fax [phone]      OFFICE VISIT: 3/25/2020    Love Hutton- : 1945    Chief Complaint:Jeny is a 69 y.o. female who is here for Cough     HPI  The patient is called for a telehealth visit. Spoke with patient's daughter. Patient's daughter reports that the patient has developed a worsening cough over the last couple of days. Patient's daughter reports chills but no measurable fever. Reports increased shortness of breath  Reports the cough has been productive. Denies confusion  Reports the patient has not been using her Trelegy  The patient has been using albuterol nebs as needed  She has been wearing her oxygen intermittently during the day and at night. Patient was recently admitted to the hospital for pneumonia.     vitals were not taken for this visit. There is no height or weight on file to calculate BMI.     Results for orders placed or performed in visit on 20   Comprehensive Metabolic Panel   Result Value Ref Range    Sodium 133 (L) 136 - 145 mmol/L    Potassium 3.9 3.5 - 5.0 mmol/L    Chloride 95 (L) 98 - 111 mmol/L    CO2 27 22 - 29 mmol/L    Anion Gap 11 7 - 19 mmol/L    Glucose 91 74 - 109 mg/dL    BUN 17 8 - 23 mg/dL    CREATININE 0.8 0.5 - 0.9 mg/dL    GFR Non-African American >60 >60    Calcium 8.3 (L) 8.8 - 10.2 mg/dL    Total Protein 6.6 6.6 - 8.7 g/dL    Alb 3.5 3.5 - 5.2 g/dL    Total Bilirubin <0.2 0.2 - 1.2 mg/dL    Alkaline Phosphatase 64 35 - 104 U/L    ALT 7 5 - 33 U/L    AST 10 5 - 32 U/L   CBC Auto Differential   Result Value Ref Range    WBC 7.2 4.8 - 10.8 K/uL    RBC 3.32 (L) 4.20 - 5.40 M/uL    Hemoglobin 9.6 (L) 12.0 - 16.0 g/dL    Hematocrit 32.1 (L) 37.0 - 47.0 %    MCV 96.7 81.0 - 99.0 fL    MCH 28.9 27.0 - 31.0 pg    MCHC 29.9 (L) 33.0 - 37.0 g/dL    RDW 16.2 (H) 11.5 - 14.5 %    Platelets 920 941 - 221 K/uL    MPV 9.8 9.4 - 12.3 fL    Neutrophils % 59.4 50.0 - 65.0 %    Lymphocytes % 24.8 20.0 - 40.0 %    Monocytes % 11.9 (H) 0.0 - 10.0 %    Eosinophils % 2.1 0.0 - 5.0 %    Basophils % 0.3 0.0 - 1.0 %    Neutrophils Absolute 4.3 1.5 - 7.5 K/uL    Immature Granulocytes # 0.1 K/uL    Lymphocytes Absolute 1.8 1.1 - 4.5 K/uL    Monocytes Absolute 0.90 0.00 - 0.90 K/uL    Eosinophils Absolute 0.20 0.00 - 0.60 K/uL    Basophils Absolute 0.00 0.00 - 0.20 K/uL   Ferritin   Result Value Ref Range    Ferritin 69.0 13.0 - 150.0 ng/mL   Iron   Result Value Ref Range    Iron 43 37 - 145 ug/dL     have reviewed the following with the Ms. Mcguire   Lab Review   Orders Only on 02/25/2020   Component Date Value    Sodium 02/25/2020 133*    Potassium 02/25/2020 3.9     Chloride 02/25/2020 95*    CO2 02/25/2020 27     Anion Gap 02/25/2020 11     Glucose 02/25/2020 91     BUN 02/25/2020 17     CREATININE 02/25/2020 0.8     GFR Non- 02/25/2020 >60     Calcium 02/25/2020 8.3*    Total Protein 02/25/2020 6.6     Alb 02/25/2020 3.5     Total Bilirubin 02/25/2020 <0.2     Alkaline Phosphatase 02/25/2020 64     ALT 02/25/2020 7     AST 02/25/2020 10     WBC 02/25/2020 7.2     RBC 02/25/2020 3.32*    Hemoglobin 02/25/2020 9.6*    Hematocrit 02/25/2020 32.1*    MCV 02/25/2020 96.7     MCH 02/25/2020 28.9     MCHC 02/25/2020 29.9*    RDW 02/25/2020 16.2*    Platelets 81/08/7174 393     MPV 02/25/2020 9.8     Neutrophils % 02/25/2020 59.4     Lymphocytes % 02/25/2020 24.8     Monocytes % 02/25/2020 11.9*    Eosinophils % 02/25/2020 2.1     Basophils % 02/25/2020 0.3     Neutrophils Absolute 02/25/2020 4.3     Immature Granulocytes # 02/25/2020 0.1     Lymphocytes Absolute 02/25/2020 1.8     Monocytes Absolute 02/25/2020 0.90     Eosinophils Absolute 02/25/2020 0.20     Basophils Absolute 02/25/2020 0.00     Ferritin 02/25/2020 69.0     Iron 02/25/2020 43    Admission on 02/19/2020, Discharged on 02/21/2020   Component Date Value    WBC 02/19/2020 11.5*    RBC 02/19/2020 2.94*    5 days of incubation.      Sodium 02/20/2020 134*    Potassium reflex Magnesi* 02/20/2020 3.8     Chloride 02/20/2020 100     CO2 02/20/2020 25     Anion Gap 02/20/2020 9     Glucose 02/20/2020 104     BUN 02/20/2020 23     CREATININE 02/20/2020 0.7     GFR Non- 02/20/2020 >60     Calcium 02/20/2020 7.2*    Total Protein 02/20/2020 6.1*    Alb 02/20/2020 2.9*    Total Bilirubin 02/20/2020 <0.2     Alkaline Phosphatase 02/20/2020 75     ALT 02/20/2020 5     AST 02/20/2020 10     Sodium 02/20/2020 138     Potassium reflex Magnesi* 02/20/2020 3.9     Chloride 02/20/2020 103     CO2 02/20/2020 25     Anion Gap 02/20/2020 10     Glucose 02/20/2020 107     BUN 02/20/2020 19     CREATININE 02/20/2020 0.8     GFR Non- 02/20/2020 >60     Calcium 02/20/2020 7.5*    Total Protein 02/20/2020 6.3*    Alb 02/20/2020 3.0*    Total Bilirubin 02/20/2020 <0.2     Alkaline Phosphatase 02/20/2020 99     ALT 02/20/2020 5     AST 02/20/2020 11     WBC 02/20/2020 9.7     RBC 02/20/2020 2.94*    Hemoglobin 02/20/2020 8.6*    Hematocrit 02/20/2020 28.6*    MCV 02/20/2020 97.3     MCH 02/20/2020 29.3     MCHC 02/20/2020 30.1*    RDW 02/20/2020 15.5*    Platelets 93/62/6790 208     MPV 02/20/2020 9.8     TSH 02/20/2020 2.330     Vitamin B-12 02/20/2020 484     Ammonia 02/20/2020 30     Sodium 02/20/2020 135*    Potassium reflex Magnesi* 02/20/2020 4.0     Chloride 02/20/2020 99     CO2 02/20/2020 25     Anion Gap 02/20/2020 11     Glucose 02/20/2020 209*    BUN 02/20/2020 22     CREATININE 02/20/2020 0.8     GFR Non- 02/20/2020 >60     Calcium 02/20/2020 7.8*    Total Protein 02/20/2020 6.5*    Alb 02/20/2020 3.0*    Total Bilirubin 02/20/2020 <0.2     Alkaline Phosphatase 02/20/2020 77     ALT 02/20/2020 6     AST 02/20/2020 10     Sodium 02/20/2020 136     Potassium reflex Magnesi* 02/20/2020 4.4     Chloride 02/20/2020 102     CO2 02/20/2020 26     Anion Gap 02/20/2020 8     Glucose 02/20/2020 188*    BUN 02/20/2020 23     CREATININE 02/20/2020 0.8     GFR Non- 02/20/2020 >60     Calcium 02/20/2020 7.7*    Total Protein 02/20/2020 6.1*    Alb 02/20/2020 3.0*    Total Bilirubin 02/20/2020 <0.2     Alkaline Phosphatase 02/20/2020 67     ALT 02/20/2020 5     AST 02/20/2020 8     Sodium 02/21/2020 135*    Potassium reflex Magnesi* 02/21/2020 4.0     Chloride 02/21/2020 98     CO2 02/21/2020 24     Anion Gap 02/21/2020 13     Glucose 02/21/2020 161*    BUN 02/21/2020 22     CREATININE 02/21/2020 0.8     GFR Non- 02/21/2020 >60     Calcium 02/21/2020 8.0*    Total Protein 02/21/2020 6.5*    Alb 02/21/2020 2.9*    Total Bilirubin 02/21/2020 <0.2     Alkaline Phosphatase 02/21/2020 74     ALT 02/21/2020 6     AST 02/21/2020 11     WBC 02/21/2020 7.9     RBC 02/21/2020 2.80*    Hemoglobin 02/21/2020 8.1*    Hematocrit 02/21/2020 26.5*    MCV 02/21/2020 94.6     MCH 02/21/2020 28.9     MCHC 02/21/2020 30.6*    RDW 02/21/2020 15.9*    Platelets 17/48/6556 259     MPV 02/21/2020 10.4     Neutrophils % 02/21/2020 89.0*    Lymphocytes % 02/21/2020 7.0*    Monocytes % 02/21/2020 3.2     Eosinophils % 02/21/2020 0.3     Basophils % 02/21/2020 0.0     Neutrophils Absolute 02/21/2020 7.0     Immature Granulocytes # 02/21/2020 0.0     Lymphocytes Absolute 02/21/2020 0.6*    Monocytes Absolute 02/21/2020 0.30     Eosinophils Absolute 02/21/2020 0.00     Basophils Absolute 02/21/2020 0.00    Orders Only on 02/19/2020   Component Date Value    Urine Culture, Routine 02/19/2020 <5,000 CFU/ml mixed skin/urogenital gerry.  No further workup*    Organism 02/19/2020 Proteus species*    Urine Culture, Routine 02/19/2020                      Value:One colony  No further workup     Orders Only on 02/19/2020   Component Date Value    WBC 02/19/2020 13.5*    RBC 02/19/2020 3.03*    Hemoglobin 02/19/2020 8.9*    Hematocrit 02/19/2020 30.0*    MCV 02/19/2020 99.0     MCH 02/19/2020 29.4     MCHC 02/19/2020 29.7*    RDW 02/19/2020 15.9*    Platelets 13/92/3463 220     MPV 02/19/2020 10.3     Neutrophils % 02/19/2020 82.7*    Lymphocytes % 02/19/2020 9.0*    Monocytes % 02/19/2020 6.7     Eosinophils % 02/19/2020 0.4     Basophils % 02/19/2020 0.2     Neutrophils Absolute 02/19/2020 11.2*    Immature Granulocytes # 02/19/2020 0.1     Lymphocytes Absolute 02/19/2020 1.2     Monocytes Absolute 02/19/2020 0.90     Eosinophils Absolute 02/19/2020 0.10     Basophils Absolute 02/19/2020 0.00     TSH 02/19/2020 2.060     Sodium 02/19/2020 126*    Potassium 02/19/2020 4.0     Chloride 02/19/2020 90*    CO2 02/19/2020 23     Anion Gap 02/19/2020 13     Glucose 02/19/2020 87     BUN 02/19/2020 34*    CREATININE 02/19/2020 1.0*    GFR Non- 02/19/2020 54*    Calcium 02/19/2020 7.6*    Total Protein 02/19/2020 6.4*    Alb 02/19/2020 3.2*    Total Bilirubin 02/19/2020 <0.2     Alkaline Phosphatase 02/19/2020 75     ALT 02/19/2020 <5*    AST 02/19/2020 10    Office Visit on 02/19/2020   Component Date Value    Amphetamine Screen, Urine 02/19/2020 neg     Barbiturate Screen, Urine 02/19/2020 neg     Benzodiazepine Screen, U* 02/19/2020 neg     Buprenorphine Urine 02/19/2020 neg     Cocaine Metabolite Scree* 02/19/2020 neg     Gabapentin Screen, Urine 02/19/2020 neg     MDMA, Urine 02/19/2020 neg     Methamphetamine, Urine 02/19/2020 neg     Methadone Screen, Urine 02/19/2020 neg     Opiate Scrn, Ur 02/19/2020 positive     Oxycodone Screen, Ur 02/19/2020 neg     PCP Screen, Urine 02/19/2020 neg     Propoxyphene Screen, Uri* 02/19/2020 neg     THC Screen, Urine 55/16/1500 neg     Tricyclic Antidepressant* 22/17/9498 neg     Color, UA 02/19/2020 dmitry     Clarity, UA 02/19/2020 clear     Glucose, UA POC 02/19/2020 neg     Bilirubin, UA 02/19/2020 Jeny to complete a self tracking handout on n/a and instructed them to bring it with them to her next appointment. Discussed use, benefit, and side effects of prescribed medications. Barriers to medication compliance addressed. All patient questions answered. Pt voiced understanding.      NAVEEN Wilkerson

## 2020-03-31 ENCOUNTER — TELEPHONE (OUTPATIENT)
Dept: PRIMARY CARE CLINIC | Age: 75
End: 2020-03-31

## 2020-04-01 ENCOUNTER — VIRTUAL VISIT (OUTPATIENT)
Dept: PRIMARY CARE CLINIC | Age: 75
End: 2020-04-01
Payer: MEDICARE

## 2020-04-01 PROCEDURE — 1090F PRES/ABSN URINE INCON ASSESS: CPT | Performed by: NURSE PRACTITIONER

## 2020-04-01 PROCEDURE — 99213 OFFICE O/P EST LOW 20 MIN: CPT | Performed by: NURSE PRACTITIONER

## 2020-04-01 PROCEDURE — 4040F PNEUMOC VAC/ADMIN/RCVD: CPT | Performed by: NURSE PRACTITIONER

## 2020-04-01 PROCEDURE — 1123F ACP DISCUSS/DSCN MKR DOCD: CPT | Performed by: NURSE PRACTITIONER

## 2020-04-01 PROCEDURE — G8428 CUR MEDS NOT DOCUMENT: HCPCS | Performed by: NURSE PRACTITIONER

## 2020-04-01 PROCEDURE — 3017F COLORECTAL CA SCREEN DOC REV: CPT | Performed by: NURSE PRACTITIONER

## 2020-04-01 PROCEDURE — G8399 PT W/DXA RESULTS DOCUMENT: HCPCS | Performed by: NURSE PRACTITIONER

## 2020-04-01 ASSESSMENT — ENCOUNTER SYMPTOMS
SHORTNESS OF BREATH: 0
COUGH: 0

## 2020-04-01 NOTE — PROGRESS NOTES
Chai Stephanie Curiel  Phone [phone]   Fax [phone] VISIT: 2020    Jason Orlando- : 1945    Chief Complaint:Jeny is a 69 y.o. female who is here for Mouth Lesions     HPI  The patient was called for video conference via [email or web address]. me  Patient was noted to have some lesions on both upper and lower lips. There are also a couple lesions between her upper lip and the bottom of her nose. These were noticed 3 days ago  Denies a history of cold sores  Reports the areas do hurt  Reports there is some crusting and redness noted to these lesions. She has been putting Carmex on these lesions, but it has not been helping  She has not had any new ones appear within the last 24 hours  Denies a temperature  Breathing is improving  She is currently on doxycycline for acute respiratory infection. vitals were not taken for this visit. There is no height or weight on file to calculate BMI.     Results for orders placed or performed in visit on 20   Comprehensive Metabolic Panel   Result Value Ref Range    Sodium 133 (L) 136 - 145 mmol/L    Potassium 3.9 3.5 - 5.0 mmol/L    Chloride 95 (L) 98 - 111 mmol/L    CO2 27 22 - 29 mmol/L    Anion Gap 11 7 - 19 mmol/L    Glucose 91 74 - 109 mg/dL    BUN 17 8 - 23 mg/dL    CREATININE 0.8 0.5 - 0.9 mg/dL    GFR Non-African American >60 >60    Calcium 8.3 (L) 8.8 - 10.2 mg/dL    Total Protein 6.6 6.6 - 8.7 g/dL    Alb 3.5 3.5 - 5.2 g/dL    Total Bilirubin <0.2 0.2 - 1.2 mg/dL    Alkaline Phosphatase 64 35 - 104 U/L    ALT 7 5 - 33 U/L    AST 10 5 - 32 U/L   CBC Auto Differential   Result Value Ref Range    WBC 7.2 4.8 - 10.8 K/uL    RBC 3.32 (L) 4.20 - 5.40 M/uL    Hemoglobin 9.6 (L) 12.0 - 16.0 g/dL    Hematocrit 32.1 (L) 37.0 - 47.0 %    MCV 96.7 81.0 - 99.0 fL    MCH 28.9 27.0 - 31.0 pg    MCHC 29.9 (L) 33.0 - 37.0 g/dL    RDW 16.2 (H) 11.5 - 14.5 %    Platelets 008 978 - 690 K/uL    MPV 9.8 9.4 - 12.3 fL    Neutrophils % incubation.  Culture, Blood 2 02/20/2020 No growth after 5 days of incubation.      Sodium 02/20/2020 134*    Potassium reflex Magnesi* 02/20/2020 3.8     Chloride 02/20/2020 100     CO2 02/20/2020 25     Anion Gap 02/20/2020 9     Glucose 02/20/2020 104     BUN 02/20/2020 23     CREATININE 02/20/2020 0.7     GFR Non- 02/20/2020 >60     Calcium 02/20/2020 7.2*    Total Protein 02/20/2020 6.1*    Alb 02/20/2020 2.9*    Total Bilirubin 02/20/2020 <0.2     Alkaline Phosphatase 02/20/2020 75     ALT 02/20/2020 5     AST 02/20/2020 10     Sodium 02/20/2020 138     Potassium reflex Magnesi* 02/20/2020 3.9     Chloride 02/20/2020 103     CO2 02/20/2020 25     Anion Gap 02/20/2020 10     Glucose 02/20/2020 107     BUN 02/20/2020 19     CREATININE 02/20/2020 0.8     GFR Non- 02/20/2020 >60     Calcium 02/20/2020 7.5*    Total Protein 02/20/2020 6.3*    Alb 02/20/2020 3.0*    Total Bilirubin 02/20/2020 <0.2     Alkaline Phosphatase 02/20/2020 99     ALT 02/20/2020 5     AST 02/20/2020 11     WBC 02/20/2020 9.7     RBC 02/20/2020 2.94*    Hemoglobin 02/20/2020 8.6*    Hematocrit 02/20/2020 28.6*    MCV 02/20/2020 97.3     MCH 02/20/2020 29.3     MCHC 02/20/2020 30.1*    RDW 02/20/2020 15.5*    Platelets 83/51/9279 208     MPV 02/20/2020 9.8     TSH 02/20/2020 2.330     Vitamin B-12 02/20/2020 484     Ammonia 02/20/2020 30     Sodium 02/20/2020 135*    Potassium reflex Magnesi* 02/20/2020 4.0     Chloride 02/20/2020 99     CO2 02/20/2020 25     Anion Gap 02/20/2020 11     Glucose 02/20/2020 209*    BUN 02/20/2020 22     CREATININE 02/20/2020 0.8     GFR Non- 02/20/2020 >60     Calcium 02/20/2020 7.8*    Total Protein 02/20/2020 6.5*    Alb 02/20/2020 3.0*    Total Bilirubin 02/20/2020 <0.2     Alkaline Phosphatase 02/20/2020 77     ALT 02/20/2020 6     AST 02/20/2020 10     Sodium 02/20/2020 136     Potassium reflex  WBC 02/19/2020 13.5*    RBC 02/19/2020 3.03*    Hemoglobin 02/19/2020 8.9*    Hematocrit 02/19/2020 30.0*    MCV 02/19/2020 99.0     MCH 02/19/2020 29.4     MCHC 02/19/2020 29.7*    RDW 02/19/2020 15.9*    Platelets 51/62/9982 220     MPV 02/19/2020 10.3     Neutrophils % 02/19/2020 82.7*    Lymphocytes % 02/19/2020 9.0*    Monocytes % 02/19/2020 6.7     Eosinophils % 02/19/2020 0.4     Basophils % 02/19/2020 0.2     Neutrophils Absolute 02/19/2020 11.2*    Immature Granulocytes # 02/19/2020 0.1     Lymphocytes Absolute 02/19/2020 1.2     Monocytes Absolute 02/19/2020 0.90     Eosinophils Absolute 02/19/2020 0.10     Basophils Absolute 02/19/2020 0.00     TSH 02/19/2020 2.060     Sodium 02/19/2020 126*    Potassium 02/19/2020 4.0     Chloride 02/19/2020 90*    CO2 02/19/2020 23     Anion Gap 02/19/2020 13     Glucose 02/19/2020 87     BUN 02/19/2020 34*    CREATININE 02/19/2020 1.0*    GFR Non- 02/19/2020 54*    Calcium 02/19/2020 7.6*    Total Protein 02/19/2020 6.4*    Alb 02/19/2020 3.2*    Total Bilirubin 02/19/2020 <0.2     Alkaline Phosphatase 02/19/2020 75     ALT 02/19/2020 <5*    AST 02/19/2020 10    Office Visit on 02/19/2020   Component Date Value    Amphetamine Screen, Urine 02/19/2020 neg     Barbiturate Screen, Urine 02/19/2020 neg     Benzodiazepine Screen, U* 02/19/2020 neg     Buprenorphine Urine 02/19/2020 neg     Cocaine Metabolite Scree* 02/19/2020 neg     Gabapentin Screen, Urine 02/19/2020 neg     MDMA, Urine 02/19/2020 neg     Methamphetamine, Urine 02/19/2020 neg     Methadone Screen, Urine 02/19/2020 neg     Opiate Scrn, Ur 02/19/2020 positive     Oxycodone Screen, Ur 02/19/2020 neg     PCP Screen, Urine 02/19/2020 neg     Propoxyphene Screen, Uri* 02/19/2020 neg     THC Screen, Urine 92/58/9740 neg     Tricyclic Antidepressant* 18/29/7733 neg     Color, UA 02/19/2020 dmitry     Clarity, UA 02/19/2020 clear     drinks       Family History   Problem Relation Age of Onset   Bud Al Stroke Brother     Stroke Sister    Bud Al Cancer Brother    Bud Al Cancer Sister     Diabetes Brother     Diabetes Sister     Coronary Art Dis Other     Hypertension Other     Seizures Child     Colon Cancer Neg Hx     Colon Polyps Neg Hx     Esophageal Cancer Neg Hx     Liver Cancer Neg Hx     Liver Disease Neg Hx     Rectal Cancer Neg Hx     Stomach Cancer Neg Hx        Review of Systems   Constitutional: Negative for fever. HENT:        Upper and lower lip lesions   Respiratory: Negative for cough (improving) and shortness of breath (improving). Physical Exam  Constitutional:       Appearance: Normal appearance. She is normal weight. HENT:      Head: Normocephalic. Nose: Nose normal.      Mouth/Throat:      Comments: It is difficult to perform exam of these lesions due to the lighting in the patient's room. It does appear there are 2 lesions in the central upper lip between the lip and the bottom of the nose and 1 lesion in the central bottom lip. Lesions do appear to be red with mild crusting  Neck:      Musculoskeletal: Normal range of motion. Pulmonary:      Effort: Pulmonary effort is normal. No respiratory distress. Neurological:      Mental Status: She is alert. Psychiatric:         Mood and Affect: Mood normal.         Behavior: Behavior normal.         Thought Content: Thought content normal.         Judgment: Judgment normal.       ASSESSMENT      ICD-10-CM    1. Oral lesion (possible impetigo) K13.70 mupirocin (BACTROBAN) 2 % ointment  Finish entire course of Doxcycline    Due to patients co-morbidities and risk for potential exposure of COVID 19 pandemic, Telehealth was initiated. 10 minutes were spent on the phone with patient. Provider performed history of present illness and review of systems. Diagnosis and treatment plan was discussed with patient.  Pharmacy of choice was reviewed along with past medical

## 2020-04-07 ENCOUNTER — CARE COORDINATION (OUTPATIENT)
Dept: CASE MANAGEMENT | Age: 75
End: 2020-04-07

## 2020-04-07 NOTE — CARE COORDINATION
[facility] Transitions Follow Up Call    2020    Patient: Fabio Saldaña  Patient : 1945   MRN: <[id number]>  Reason for Admission:   Discharge Date: 20 RARS: Readmission Risk Score: 26    Follow Up: Attempted to contact patient for BPCI-A follow up. Unable to reach patient. Left message on voicemail with contact information and request for call back.       Future Appointments   Date Time Provider Abelardo Singleton   2020  2:30 PM [address] [facility]       Loni Easton RN

## 2020-04-17 ENCOUNTER — VIRTUAL VISIT (OUTPATIENT)
Dept: PRIMARY CARE CLINIC | Age: 75
End: 2020-04-17
Payer: MEDICARE

## 2020-04-17 PROCEDURE — G8428 CUR MEDS NOT DOCUMENT: HCPCS | Performed by: NURSE PRACTITIONER

## 2020-04-17 PROCEDURE — 4040F PNEUMOC VAC/ADMIN/RCVD: CPT | Performed by: NURSE PRACTITIONER

## 2020-04-17 PROCEDURE — 3017F COLORECTAL CA SCREEN DOC REV: CPT | Performed by: NURSE PRACTITIONER

## 2020-04-17 PROCEDURE — 1123F ACP DISCUSS/DSCN MKR DOCD: CPT | Performed by: NURSE PRACTITIONER

## 2020-04-17 PROCEDURE — 99214 OFFICE O/P EST MOD 30 MIN: CPT | Performed by: NURSE PRACTITIONER

## 2020-04-17 PROCEDURE — 1090F PRES/ABSN URINE INCON ASSESS: CPT | Performed by: NURSE PRACTITIONER

## 2020-04-17 PROCEDURE — G8399 PT W/DXA RESULTS DOCUMENT: HCPCS | Performed by: NURSE PRACTITIONER

## 2020-04-17 RX ORDER — CLONAZEPAM 0.5 MG/1
0.5 TABLET ORAL 3 TIMES DAILY PRN
Qty: 75 TABLET | Refills: 0 | Status: SHIPPED | OUTPATIENT
Start: 2020-04-17 | End: 2020-06-16 | Stop reason: SDUPTHER

## 2020-04-17 RX ORDER — DONEPEZIL HYDROCHLORIDE 5 MG/1
5 TABLET, FILM COATED ORAL NIGHTLY
Qty: 90 TABLET | Refills: 3 | Status: SHIPPED | OUTPATIENT
Start: 2020-04-17 | End: 2021-01-22

## 2020-04-17 RX ORDER — FESOTERODINE FUMARATE 8 MG/1
1 TABLET, FILM COATED, EXTENDED RELEASE ORAL DAILY
Qty: 90 TABLET | Refills: 1 | Status: SHIPPED | OUTPATIENT
Start: 2020-04-17 | End: 2020-05-15 | Stop reason: SDUPTHER

## 2020-04-17 ASSESSMENT — ENCOUNTER SYMPTOMS
RHINORRHEA: 0
SORE THROAT: 0
DIARRHEA: 0
SINUS PRESSURE: 0
COUGH: 1
WHEEZING: 0
NAUSEA: 0
ABDOMINAL PAIN: 0
SHORTNESS OF BREATH: 0
VOMITING: 0

## 2020-04-17 NOTE — PROGRESS NOTES
02/25/2020 1.8     Monocytes Absolute 02/25/2020 0.90     Eosinophils Absolute 02/25/2020 0.20     Basophils Absolute 02/25/2020 0.00     Ferritin 02/25/2020 69.0     Iron 02/25/2020 43    Admission on 02/19/2020, Discharged on 02/21/2020   Component Date Value    WBC 02/19/2020 11.5*    RBC 02/19/2020 2.94*    Hemoglobin 02/19/2020 8.6*    Hematocrit 02/19/2020 28.6*    MCV 02/19/2020 97.3     MCH 02/19/2020 29.3     MCHC 02/19/2020 30.1*    RDW 02/19/2020 15.7*    Platelets 89/93/4626 207     MPV 02/19/2020 10.1     Neutrophils % 02/19/2020 81.7*    Lymphocytes % 02/19/2020 10.3*    Monocytes % 02/19/2020 6.3     Eosinophils % 02/19/2020 1.0     Basophils % 02/19/2020 0.2     Neutrophils Absolute 02/19/2020 9.4*    Immature Granulocytes # 02/19/2020 0.1     Lymphocytes Absolute 02/19/2020 1.2     Monocytes Absolute 02/19/2020 0.70     Eosinophils Absolute 02/19/2020 0.10     Basophils Absolute 02/19/2020 0.00     Sodium 02/19/2020 127*    Potassium reflex Magnesi* 02/19/2020 3.8     Chloride 02/19/2020 92*    CO2 02/19/2020 24     Anion Gap 02/19/2020 11     Glucose 02/19/2020 96     BUN 02/19/2020 33*    CREATININE 02/19/2020 1.0*    GFR Non- 02/19/2020 54*    Calcium 02/19/2020 7.4*    Total Protein 02/19/2020 6.3*    Alb 02/19/2020 3.0*    Total Bilirubin 02/19/2020 <0.2     Alkaline Phosphatase 02/19/2020 80     ALT 02/19/2020 6     AST 02/19/2020 11     P-R Interval 02/19/2020 138     QRS Duration 02/19/2020 94     Q-T Interval 02/19/2020 376     QTc Calculation (Bazett) 02/19/2020 414     P Axis 02/19/2020 77     T Axis 02/19/2020 70     Color, UA 02/19/2020 YELLOW     Clarity, UA 02/19/2020 CLOUDY*    Glucose, Ur 02/19/2020 Negative     Bilirubin Urine 02/19/2020 Negative     Ketones, Urine 02/19/2020 Negative     Specific Gravity, UA 02/19/2020 1.012     Blood, Urine 02/19/2020 Negative     pH, UA 02/19/2020 6.0     Protein, UA 02/19/2020 TRACE*    Urobilinogen, Urine 02/19/2020 0.2     Nitrite, Urine 02/19/2020 Negative     Leukocyte Esterase, Urine 02/19/2020 Negative     Urine Reflex to Culture 02/19/2020 Not Indicated     Blood Culture, Routine 02/19/2020 No growth after 5 days of incubation.  Culture, Blood 2 02/20/2020 No growth after 5 days of incubation.      Sodium 02/20/2020 134*    Potassium reflex Magnesi* 02/20/2020 3.8     Chloride 02/20/2020 100     CO2 02/20/2020 25     Anion Gap 02/20/2020 9     Glucose 02/20/2020 104     BUN 02/20/2020 23     CREATININE 02/20/2020 0.7     GFR Non- 02/20/2020 >60     Calcium 02/20/2020 7.2*    Total Protein 02/20/2020 6.1*    Alb 02/20/2020 2.9*    Total Bilirubin 02/20/2020 <0.2     Alkaline Phosphatase 02/20/2020 75     ALT 02/20/2020 5     AST 02/20/2020 10     Sodium 02/20/2020 138     Potassium reflex Magnesi* 02/20/2020 3.9     Chloride 02/20/2020 103     CO2 02/20/2020 25     Anion Gap 02/20/2020 10     Glucose 02/20/2020 107     BUN 02/20/2020 19     CREATININE 02/20/2020 0.8     GFR Non- 02/20/2020 >60     Calcium 02/20/2020 7.5*    Total Protein 02/20/2020 6.3*    Alb 02/20/2020 3.0*    Total Bilirubin 02/20/2020 <0.2     Alkaline Phosphatase 02/20/2020 99     ALT 02/20/2020 5     AST 02/20/2020 11     WBC 02/20/2020 9.7     RBC 02/20/2020 2.94*    Hemoglobin 02/20/2020 8.6*    Hematocrit 02/20/2020 28.6*    MCV 02/20/2020 97.3     MCH 02/20/2020 29.3     MCHC 02/20/2020 30.1*    RDW 02/20/2020 15.5*    Platelets 05/80/6253 208     MPV 02/20/2020 9.8     TSH 02/20/2020 2.330     Vitamin B-12 02/20/2020 484     Ammonia 02/20/2020 30     Sodium 02/20/2020 135*    Potassium reflex Magnesi* 02/20/2020 4.0     Chloride 02/20/2020 99     CO2 02/20/2020 25     Anion Gap 02/20/2020 11     Glucose 02/20/2020 209*    BUN 02/20/2020 22     CREATININE 02/20/2020 0.8     GFR Non- Culture, Routine 02/19/2020 <5,000 CFU/ml mixed skin/urogenital gerry.  No further workup*    Organism 02/19/2020 Proteus species*    Urine Culture, Routine 02/19/2020                      Value:One colony  No further workup     Orders Only on 02/19/2020   Component Date Value    WBC 02/19/2020 13.5*    RBC 02/19/2020 3.03*    Hemoglobin 02/19/2020 8.9*    Hematocrit 02/19/2020 30.0*    MCV 02/19/2020 99.0     MCH 02/19/2020 29.4     MCHC 02/19/2020 29.7*    RDW 02/19/2020 15.9*    Platelets 44/54/7650 220     MPV 02/19/2020 10.3     Neutrophils % 02/19/2020 82.7*    Lymphocytes % 02/19/2020 9.0*    Monocytes % 02/19/2020 6.7     Eosinophils % 02/19/2020 0.4     Basophils % 02/19/2020 0.2     Neutrophils Absolute 02/19/2020 11.2*    Immature Granulocytes # 02/19/2020 0.1     Lymphocytes Absolute 02/19/2020 1.2     Monocytes Absolute 02/19/2020 0.90     Eosinophils Absolute 02/19/2020 0.10     Basophils Absolute 02/19/2020 0.00     TSH 02/19/2020 2.060     Sodium 02/19/2020 126*    Potassium 02/19/2020 4.0     Chloride 02/19/2020 90*    CO2 02/19/2020 23     Anion Gap 02/19/2020 13     Glucose 02/19/2020 87     BUN 02/19/2020 34*    CREATININE 02/19/2020 1.0*    GFR Non- 02/19/2020 54*    Calcium 02/19/2020 7.6*    Total Protein 02/19/2020 6.4*    Alb 02/19/2020 3.2*    Total Bilirubin 02/19/2020 <0.2     Alkaline Phosphatase 02/19/2020 75     ALT 02/19/2020 <5*    AST 02/19/2020 10    Office Visit on 02/19/2020   Component Date Value    Amphetamine Screen, Urine 02/19/2020 neg     Barbiturate Screen, Urine 02/19/2020 neg     Benzodiazepine Screen, U* 02/19/2020 neg     Buprenorphine Urine 02/19/2020 neg     Cocaine Metabolite Scree* 02/19/2020 neg     Gabapentin Screen, Urine 02/19/2020 neg     MDMA, Urine 02/19/2020 neg     Methamphetamine, Urine 02/19/2020 neg     Methadone Screen, Urine 02/19/2020 neg     Opiate Scrn, Ur 02/19/2020 positive     needed for Nausea or Vomiting  NAVEEN Dior   carvedilol (COREG) 3.125 MG tablet Take 1 tablet by mouth 2 times daily (with meals)  NAVEEN Dumont   albuterol (PROVENTIL) (5 MG/ML) 0.5% nebulizer solution Take 0.5 mLs by nebulization every 6 hours as needed for Wheezing  NAVEEN Dior   nitroGLYCERIN (NITROQUICK) 0.4 MG SL tablet Place 1 tablet under the tongue every 5 minutes as needed for Chest pain  NAVEEN Dior   furosemide (LASIX) 20 MG tablet Take 1 tablet by mouth daily  NAVEEN Dior   OXYGEN Inhale 2 L into the lungs daily  NAVEEN Dior   HYDROcodone-acetaminophen (NORCO)  MG per tablet TAKE ONE TABLET BY MOUTH EVERY 8 HOURS AS NEEDED- dr galarza  Historical Provider, MD       Allergies: Codeine and Sulfa antibiotics    Past Medical History:   Diagnosis Date    Abdominal pain     Anxiety     Arthritis     CAD in native artery     Cerebrovascular disease     [facility]     Constipation     COPD (chronic obstructive pulmonary disease) ([address]     Depression     Emphysema     GERD (gastroesophageal reflux disease)     Myocardial infarct, old     Palliative care patient 02/20/2020    Pneumonia     Tobacco abuse        Past Surgical History:   Procedure Laterality Date    ABDOMINAL EXPLORATION SURGERY      APPENDECTOMY      CARDIAC CATHETERIZATION  06/25/10    patent  stent noted in [facility],EF is estimated to be 60%    CARDIAC CATHETERIZATION  7/13/12  [facility]    EF  60%    CHOLECYSTECTOMY      COLONOSCOPY  10/2014    Dr Km Xavier      UPPER GASTROINTESTINAL ENDOSCOPY  10/1/14    Dr Nima Hamilton: food bezoar; esophagitis, gastritis, duodenitis; biopsies neg h-pylori       Social History     Tobacco Use    Smoking status: Current Every Day Smoker     Packs/day: 1.00     Years: 36.00     Pack years: 36.00     Types: Cigarettes     Start date: 1    Smokeless tobacco: Never Used Preparedness and Response Supplemental Appropriations Act, this Virtual Visit was conducted with patient's (and/or legal guardian's) consent, to reduce the patient's risk of exposure to COVID-19 and provide necessary medical care. The patient (and/or legal guardian) has also been advised to contact this office for worsening conditions or problems, and seek emergency medical treatment and/or call 911 if deemed necessary. Services were provided through a video synchronous discussion virtually to substitute for in-person clinic visit. Patient and provider were located at their individual homes. --NAVEEN Prince on 4/17/2020 at 2:47 PM    An electronic signature was used to authenticate this note. Controlled Substances Monitoring: Attestation: The Prescription Monitoring Report for this patient was reviewed today. ([id number]) NAVEEN Prince)

## 2020-04-23 ENCOUNTER — CARE COORDINATION (OUTPATIENT)
Dept: CASE MANAGEMENT | Age: 75
End: 2020-04-23

## 2020-05-07 ENCOUNTER — CARE COORDINATION (OUTPATIENT)
Dept: CASE MANAGEMENT | Age: 75
End: 2020-05-07

## 2020-05-15 ENCOUNTER — VIRTUAL VISIT (OUTPATIENT)
Dept: PRIMARY CARE CLINIC | Age: 75
End: 2020-05-15
Payer: MEDICARE

## 2020-05-15 PROCEDURE — 1090F PRES/ABSN URINE INCON ASSESS: CPT | Performed by: NURSE PRACTITIONER

## 2020-05-15 PROCEDURE — 1123F ACP DISCUSS/DSCN MKR DOCD: CPT | Performed by: NURSE PRACTITIONER

## 2020-05-15 PROCEDURE — G8399 PT W/DXA RESULTS DOCUMENT: HCPCS | Performed by: NURSE PRACTITIONER

## 2020-05-15 PROCEDURE — 3017F COLORECTAL CA SCREEN DOC REV: CPT | Performed by: NURSE PRACTITIONER

## 2020-05-15 PROCEDURE — G8428 CUR MEDS NOT DOCUMENT: HCPCS | Performed by: NURSE PRACTITIONER

## 2020-05-15 PROCEDURE — 4040F PNEUMOC VAC/ADMIN/RCVD: CPT | Performed by: NURSE PRACTITIONER

## 2020-05-15 PROCEDURE — 99406 BEHAV CHNG SMOKING 3-10 MIN: CPT | Performed by: NURSE PRACTITIONER

## 2020-05-15 PROCEDURE — 99214 OFFICE O/P EST MOD 30 MIN: CPT | Performed by: NURSE PRACTITIONER

## 2020-05-15 RX ORDER — PANTOPRAZOLE SODIUM 40 MG/1
40 TABLET, DELAYED RELEASE ORAL
Qty: 30 TABLET | Refills: 5 | Status: SHIPPED | OUTPATIENT
Start: 2020-05-15 | End: 2020-11-25

## 2020-05-15 RX ORDER — TRAZODONE HYDROCHLORIDE 150 MG/1
150 TABLET ORAL NIGHTLY
Qty: 90 TABLET | Refills: 3 | Status: SHIPPED | OUTPATIENT
Start: 2020-05-15 | End: 2021-07-01 | Stop reason: SDUPTHER

## 2020-05-15 RX ORDER — GUAIFENESIN 600 MG/1
1200 TABLET, EXTENDED RELEASE ORAL 2 TIMES DAILY
Qty: 120 TABLET | Refills: 5 | Status: SHIPPED | OUTPATIENT
Start: 2020-05-15 | End: 2020-08-26 | Stop reason: SDUPTHER

## 2020-05-15 RX ORDER — FESOTERODINE FUMARATE 8 MG/1
1 TABLET, FILM COATED, EXTENDED RELEASE ORAL DAILY
Qty: 90 TABLET | Refills: 1 | Status: SHIPPED | OUTPATIENT
Start: 2020-05-15 | End: 2021-03-09

## 2020-05-15 ASSESSMENT — ENCOUNTER SYMPTOMS
VOMITING: 0
SORE THROAT: 0
COUGH: 1
DIARRHEA: 0
RHINORRHEA: 0
WHEEZING: 0
SINUS PRESSURE: 0
SHORTNESS OF BREATH: 0
NAUSEA: 0
ABDOMINAL PAIN: 0

## 2020-05-15 NOTE — PROGRESS NOTES
02/20/2020 77     ALT 02/20/2020 6     AST 02/20/2020 10     Sodium 02/20/2020 136     Potassium reflex Magnesi* 02/20/2020 4.4     Chloride 02/20/2020 102     CO2 02/20/2020 26     Anion Gap 02/20/2020 8     Glucose 02/20/2020 188*    BUN 02/20/2020 23     CREATININE 02/20/2020 0.8     GFR Non- 02/20/2020 >60     Calcium 02/20/2020 7.7*    Total Protein 02/20/2020 6.1*    Alb 02/20/2020 3.0*    Total Bilirubin 02/20/2020 <0.2     Alkaline Phosphatase 02/20/2020 67     ALT 02/20/2020 5     AST 02/20/2020 8     Sodium 02/21/2020 135*    Potassium reflex Magnesi* 02/21/2020 4.0     Chloride 02/21/2020 98     CO2 02/21/2020 24     Anion Gap 02/21/2020 13     Glucose 02/21/2020 161*    BUN 02/21/2020 22     CREATININE 02/21/2020 0.8     GFR Non- 02/21/2020 >60     Calcium 02/21/2020 8.0*    Total Protein 02/21/2020 6.5*    Alb 02/21/2020 2.9*    Total Bilirubin 02/21/2020 <0.2     Alkaline Phosphatase 02/21/2020 74     ALT 02/21/2020 6     AST 02/21/2020 11     WBC 02/21/2020 7.9     RBC 02/21/2020 2.80*    Hemoglobin 02/21/2020 8.1*    Hematocrit 02/21/2020 26.5*    MCV 02/21/2020 94.6     MCH 02/21/2020 28.9     MCHC 02/21/2020 30.6*    RDW 02/21/2020 15.9*    Platelets 34/90/0922 259     MPV 02/21/2020 10.4     Neutrophils % 02/21/2020 89.0*    Lymphocytes % 02/21/2020 7.0*    Monocytes % 02/21/2020 3.2     Eosinophils % 02/21/2020 0.3     Basophils % 02/21/2020 0.0     Neutrophils Absolute 02/21/2020 7.0     Immature Granulocytes # 02/21/2020 0.0     Lymphocytes Absolute 02/21/2020 0.6*    Monocytes Absolute 02/21/2020 0.30     Eosinophils Absolute 02/21/2020 0.00     Basophils Absolute 02/21/2020 0.00    Orders Only on 02/19/2020   Component Date Value    Urine Culture, Routine 02/19/2020 <5,000 CFU/ml mixed skin/urogenital gerry.  No further workup*    Organism 02/19/2020 Proteus species*    Urine Culture, Routine 02/19/2020 Value:One colony  No further workup     Orders Only on 02/19/2020   Component Date Value    WBC 02/19/2020 13.5*    RBC 02/19/2020 3.03*    Hemoglobin 02/19/2020 8.9*    Hematocrit 02/19/2020 30.0*    MCV 02/19/2020 99.0     MCH 02/19/2020 29.4     MCHC 02/19/2020 29.7*    RDW 02/19/2020 15.9*    Platelets 26/64/1337 220     MPV 02/19/2020 10.3     Neutrophils % 02/19/2020 82.7*    Lymphocytes % 02/19/2020 9.0*    Monocytes % 02/19/2020 6.7     Eosinophils % 02/19/2020 0.4     Basophils % 02/19/2020 0.2     Neutrophils Absolute 02/19/2020 11.2*    Immature Granulocytes # 02/19/2020 0.1     Lymphocytes Absolute 02/19/2020 1.2     Monocytes Absolute 02/19/2020 0.90     Eosinophils Absolute 02/19/2020 0.10     Basophils Absolute 02/19/2020 0.00     TSH 02/19/2020 2.060     Sodium 02/19/2020 126*    Potassium 02/19/2020 4.0     Chloride 02/19/2020 90*    CO2 02/19/2020 23     Anion Gap 02/19/2020 13     Glucose 02/19/2020 87     BUN 02/19/2020 34*    CREATININE 02/19/2020 1.0*    GFR Non- 02/19/2020 54*    Calcium 02/19/2020 7.6*    Total Protein 02/19/2020 6.4*    Alb 02/19/2020 3.2*    Total Bilirubin 02/19/2020 <0.2     Alkaline Phosphatase 02/19/2020 75     ALT 02/19/2020 <5*    AST 02/19/2020 10    Office Visit on 02/19/2020   Component Date Value    Amphetamine Screen, Urine 02/19/2020 neg     Barbiturate Screen, Urine 02/19/2020 neg     Benzodiazepine Screen, U* 02/19/2020 neg     Buprenorphine Urine 02/19/2020 neg     Cocaine Metabolite Scree* 02/19/2020 neg     Gabapentin Screen, Urine 02/19/2020 neg     MDMA, Urine 02/19/2020 neg     Methamphetamine, Urine 02/19/2020 neg     Methadone Screen, Urine 02/19/2020 neg     Opiate Scrn, Ur 02/19/2020 positive     Oxycodone Screen, Ur 02/19/2020 neg     PCP Screen, Urine 02/19/2020 neg     Propoxyphene Screen, Uri* 02/19/2020 neg     THC Screen, Urine 38/02/8766 neg     Tricyclic Neutrophils Absolute 11/20/2019 4.1     Immature Granulocytes # 11/20/2019 0.0     Lymphocytes Absolute 11/20/2019 1.8     Monocytes Absolute 11/20/2019 0.50     Eosinophils Absolute 11/20/2019 0.30     Basophils Absolute 11/20/2019 0.00      Copies of these are in the chart. Prior to Visit Medications    Medication Sig Taking? Authorizing Provider   pantoprazole (PROTONIX) 40 MG tablet Take 1 tablet by mouth every morning (before breakfast) Yes NAVEEN Dior   guaiFENesin (MUCINEX) 600 MG extended release tablet Take 2 tablets by mouth 2 times daily Yes NAVEEN Dior   traZODone (DESYREL) 150 MG tablet Take 1 tablet by mouth nightly Yes NAVEEN Dior   Fesoterodine Fumarate ER (TOVIAZ) 8 MG TB24 Take 1 tablet by mouth daily Yes NAVEEN Dior   donepezil (ARICEPT) 5 MG tablet Take 1 tablet by mouth nightly  NAVEEN Dior   clonazePAM (KLONOPIN) 0.5 MG tablet Take 1 tablet by mouth 3 times daily as needed for Anxiety for up to 30 days. NAVEEN Dior   olopatadine (PATADAY) 0.2 % SOLN ophthalmic solution Place 1 drop into both eyes daily  NAVEEN Dior   fluticasone-umeclidin-vilant (TRELEGY ELLIPTA) 100-62.5-25 MCG/INH AEPB Inhale 1 puff into the lungs daily  NAVEEN Dior   DULoxetine (CYMBALTA) 60 MG extended release capsule Take 1 capsule by mouth daily  NAVEEN Dior   gabapentin (NEURONTIN) 300 MG capsule Take 300 mg by mouth 2 times daily.    Historical Provider, MD   albuterol sulfate HFA (VENTOLIN HFA) 108 (90 Base) MCG/ACT inhaler Inhale 2 puffs into the lungs every 6 hours as needed for Wheezing  NAVEEN Callahan   isosorbide mononitrate (IMDUR) 30 MG extended release tablet Take 1 tablet by mouth daily  NAVEEN Dior   memantine (NAMENDA) 10 MG tablet Take 1 tablet by mouth 2 times daily  NAVEEN Dior   ondansetron (ZOFRAN) 4 MG tablet Take 1 tablet by mouth every 8 hours as needed for Nausea or Vomiting  NAVEEN Edgar carvedilol (COREG) 3.125 MG tablet Take 1 tablet by mouth 2 times daily (with meals)  NAVEEN Varela   albuterol (PROVENTIL) (5 MG/ML) 0.5% nebulizer solution Take 0.5 mLs by nebulization every 6 hours as needed for Wheezing  NAVEEN Dior   nitroGLYCERIN (NITROQUICK) 0.4 MG SL tablet Place 1 tablet under the tongue every 5 minutes as needed for Chest pain  NAVEEN Dior   furosemide (LASIX) 20 MG tablet Take 1 tablet by mouth daily  NAVEEN Dior   OXYGEN Inhale 2 L into the lungs daily  NAVEEN Dior   HYDROcodone-acetaminophen (NORCO)  MG per tablet TAKE ONE TABLET BY MOUTH EVERY 8 HOURS AS NEEDED- dr galarza  Historical Provider, MD       Allergies: Codeine and Sulfa antibiotics    Past Medical History:   Diagnosis Date    Abdominal pain     Anxiety     Arthritis     CAD in native artery     Cerebrovascular disease     [facility]     Constipation     [facility] obstructive [facility]) [facility] [address]     Depression     Emphysema     GERD (gastroesophageal reflux disease)     Myocardial infarct, old     Palliative care patient 02/20/2020    Pneumonia     Tobacco abuse        Past Surgical History:   Procedure Laterality Date    ABDOMINAL EXPLORATION SURGERY      APPENDECTOMY      CARDIAC CATHETERIZATION  06/25/10    patent  stent noted in LAD,EF is estimated to be 60%    CARDIAC CATHETERIZATION  7/13/12  MDL    EF  60%    CHOLECYSTECTOMY      COLONOSCOPY  10/2014    Dr Pieter Reyes      UPPER GASTROINTESTINAL ENDOSCOPY  10/1/14    Dr Lehman Distance: food bezoar; esophagitis, gastritis, duodenitis; biopsies neg h-pylori       Social History     Tobacco Use    Smoking status: Current Every Day Smoker     Packs/day: 1.00     Years: 36.00     Pack years: 36.00     Types: Cigarettes     Start date: 1978    Smokeless tobacco: Never Used   Substance Use Topics    Alcohol use:  No

## 2020-05-20 ENCOUNTER — CARE COORDINATION (OUTPATIENT)
Dept: CASE MANAGEMENT | Age: 75
End: 2020-05-20

## 2020-05-20 NOTE — CARE COORDINATION
[facility] Transitions Follow Up Call    2020    Patient: Skye Corral  Patient : 1945   MRN: [id number]  Reason for Admission:   Discharge Date: 20 RARS: Readmission Risk Score: 26      Follow Up: Attempted to contact patient for final BPCI-A follow up. Unable to reach patient. Left message on voicemail with contact information and request for call back.       Future Appointments   Date Time Provider Abelardo Singleton   2020  1:00 PM Pushpa Sheth, [address] [place], [place] [address]P[place]       Gerson Sena RN

## 2020-06-02 RX ORDER — DENOSUMAB 60 MG/ML
INJECTION SUBCUTANEOUS
Qty: 1 ML | Refills: 1 | Status: SHIPPED | OUTPATIENT
Start: 2020-06-02 | End: 2020-10-28 | Stop reason: ALTCHOICE

## 2020-06-05 RX ORDER — CARVEDILOL 3.12 MG/1
3.12 TABLET ORAL 2 TIMES DAILY WITH MEALS
Qty: 60 TABLET | Refills: 11 | Status: SHIPPED | OUTPATIENT
Start: 2020-06-05 | End: 2021-06-01 | Stop reason: SDUPTHER

## 2020-06-16 ENCOUNTER — VIRTUAL VISIT (OUTPATIENT)
Dept: PRIMARY CARE CLINIC | Age: 75
End: 2020-06-16
Payer: MEDICARE

## 2020-06-16 PROBLEM — F33.1 MODERATE EPISODE OF RECURRENT MAJOR DEPRESSIVE DISORDER (HCC): Status: ACTIVE | Noted: 2020-06-16

## 2020-06-16 PROCEDURE — G8399 PT W/DXA RESULTS DOCUMENT: HCPCS | Performed by: NURSE PRACTITIONER

## 2020-06-16 PROCEDURE — 1123F ACP DISCUSS/DSCN MKR DOCD: CPT | Performed by: NURSE PRACTITIONER

## 2020-06-16 PROCEDURE — 3017F COLORECTAL CA SCREEN DOC REV: CPT | Performed by: NURSE PRACTITIONER

## 2020-06-16 PROCEDURE — G8427 DOCREV CUR MEDS BY ELIG CLIN: HCPCS | Performed by: NURSE PRACTITIONER

## 2020-06-16 PROCEDURE — 1090F PRES/ABSN URINE INCON ASSESS: CPT | Performed by: NURSE PRACTITIONER

## 2020-06-16 PROCEDURE — 4040F PNEUMOC VAC/ADMIN/RCVD: CPT | Performed by: NURSE PRACTITIONER

## 2020-06-16 PROCEDURE — 99214 OFFICE O/P EST MOD 30 MIN: CPT | Performed by: NURSE PRACTITIONER

## 2020-06-16 RX ORDER — CLONAZEPAM 0.5 MG/1
0.5 TABLET ORAL 3 TIMES DAILY PRN
Qty: 75 TABLET | Refills: 0 | Status: SHIPPED | OUTPATIENT
Start: 2020-06-16 | End: 2021-02-04 | Stop reason: SDUPTHER

## 2020-06-16 ASSESSMENT — ENCOUNTER SYMPTOMS
WHEEZING: 0
RHINORRHEA: 0
COUGH: 1
DIARRHEA: 0
SORE THROAT: 0
NAUSEA: 0
ABDOMINAL PAIN: 0
VOMITING: 0
SINUS PRESSURE: 0
SHORTNESS OF BREATH: 1

## 2020-06-16 NOTE — PROGRESS NOTES
[address]  [place]  Phone [phone]   Fax [phone] VISIT: 2020    Sofya Cortez- : 1945    Chief Complaint:Jeny is a 77 y.o. female who is here for Anxiety     HPI  The patient is called for a video conference via doxy. me. ANXIETY/DEPRESSION:  She continues on Cymbalta 60 mg and Klonopin 0.5 mg prn. Moods are stable  Last fill of Klonopin #75 was 2020  She needs a refill this month. COPD:  \"I am breathing pretty good. \"  She continues on Trelegy. She takes albuterol prn. Cough is improved. She continues on oxygen. She continues to smoke 1 ppd. CHRONIC PAIN:  Reports continued lumbar back pain & increased leg pain. There is pain if she is sitting or walking  She reports continued back pain. She some mild improvement in her Norco 10 mg and Neurontin 300 mg  She continues to follow with pain management    CHF:  Reports mild leg swelling. The swelling improves overnight. She continues on Coreg 3.125 mg BID & Imdur 30 mg daily  Reports mild intermittent chest pain. However she reports the pain has not been bad enough that she has needed nitroglycerin    Acid reflux is improved with Protonix 40 mg daily     vitals were not taken for this visit. There is no height or weight on file to calculate BMI.     Results for orders placed or performed in visit on 20   Comprehensive Metabolic Panel   Result Value Ref Range    Sodium 133 (L) 136 - 145 mmol/L    Potassium 3.9 3.5 - 5.0 mmol/L    Chloride 95 (L) 98 - 111 mmol/L    CO2 27 22 - 29 mmol/L    Anion Gap 11 7 - 19 mmol/L    Glucose 91 74 - 109 mg/dL    BUN 17 8 - 23 mg/dL    CREATININE 0.8 0.5 - 0.9 mg/dL    GFR Non-African American >60 >60    Calcium 8.3 (L) 8.8 - 10.2 mg/dL    Total Protein 6.6 6.6 - 8.7 g/dL    Alb 3.5 3.5 - 5.2 g/dL    Total Bilirubin <0.2 0.2 - 1.2 mg/dL    Alkaline Phosphatase 64 35 - 104 U/L    ALT 7 5 - 33 U/L    AST 10 5 - 32 U/L   CBC Auto Differential Result Value Ref Range    WBC 7.2 4.8 - 10.8 K/uL    RBC 3.32 (L) 4.20 - 5.40 M/uL    Hemoglobin 9.6 (L) 12.0 - 16.0 g/dL    Hematocrit 32.1 (L) 37.0 - 47.0 %    MCV 96.7 81.0 - 99.0 fL    MCH 28.9 27.0 - 31.0 pg    MCHC 29.9 (L) 33.0 - 37.0 g/dL    RDW 16.2 (H) 11.5 - 14.5 %    Platelets 560 647 - 623 K/uL    MPV 9.8 9.4 - 12.3 fL    Neutrophils % 59.4 50.0 - 65.0 %    Lymphocytes % 24.8 20.0 - 40.0 %    Monocytes % 11.9 (H) 0.0 - 10.0 %    Eosinophils % 2.1 0.0 - 5.0 %    Basophils % 0.3 0.0 - 1.0 %    Neutrophils Absolute 4.3 1.5 - 7.5 K/uL    Immature Granulocytes # 0.1 K/uL    Lymphocytes Absolute 1.8 1.1 - 4.5 K/uL    Monocytes Absolute 0.90 0.00 - 0.90 K/uL    Eosinophils Absolute 0.20 0.00 - 0.60 K/uL    Basophils Absolute 0.00 0.00 - 0.20 K/uL   Ferritin   Result Value Ref Range    Ferritin 69.0 13.0 - 150.0 ng/mL   Iron   Result Value Ref Range    Iron 43 37 - 145 ug/dL     have reviewed the following with the Ms. Mcguire   Lab Review   Orders Only on 02/25/2020   Component Date Value    Sodium 02/25/2020 133*    Potassium 02/25/2020 3.9     Chloride 02/25/2020 95*    CO2 02/25/2020 27     Anion Gap 02/25/2020 11     Glucose 02/25/2020 91     BUN 02/25/2020 17     CREATININE 02/25/2020 0.8     GFR Non- 02/25/2020 >60     Calcium 02/25/2020 8.3*    Total Protein 02/25/2020 6.6     Alb 02/25/2020 3.5     Total Bilirubin 02/25/2020 <0.2     Alkaline Phosphatase 02/25/2020 64     ALT 02/25/2020 7     AST 02/25/2020 10     WBC 02/25/2020 7.2     RBC 02/25/2020 3.32*    Hemoglobin 02/25/2020 9.6*    Hematocrit 02/25/2020 32.1*    MCV 02/25/2020 96.7     MCH 02/25/2020 28.9     MCHC 02/25/2020 29.9*    RDW 02/25/2020 16.2*    Platelets 00/29/2601 393     MPV 02/25/2020 9.8     Neutrophils % 02/25/2020 59.4     Lymphocytes % 02/25/2020 24.8     Monocytes % 02/25/2020 11.9*    Eosinophils % 02/25/2020 2.1     Basophils % 02/25/2020 0.3     Neutrophils Absolute  Blood, Urine 02/19/2020 Negative     pH, UA 02/19/2020 6.0     Protein, UA 02/19/2020 TRACE*    Urobilinogen, Urine 02/19/2020 0.2     Nitrite, Urine 02/19/2020 Negative     Leukocyte Esterase, Urine 02/19/2020 Negative     Urine Reflex to Culture 02/19/2020 Not Indicated     Blood Culture, Routine 02/19/2020 No growth after 5 days of incubation.  Culture, Blood 2 02/20/2020 No growth after 5 days of incubation.      Sodium 02/20/2020 134*    Potassium reflex Magnesi* 02/20/2020 3.8     Chloride 02/20/2020 100     CO2 02/20/2020 25     Anion Gap 02/20/2020 9     Glucose 02/20/2020 104     BUN 02/20/2020 23     CREATININE 02/20/2020 0.7     GFR Non- 02/20/2020 >60     Calcium 02/20/2020 7.2*    Total Protein 02/20/2020 6.1*    Alb 02/20/2020 2.9*    Total Bilirubin 02/20/2020 <0.2     Alkaline Phosphatase 02/20/2020 75     ALT 02/20/2020 5     AST 02/20/2020 10     Sodium 02/20/2020 138     Potassium reflex Magnesi* 02/20/2020 3.9     Chloride 02/20/2020 103     CO2 02/20/2020 25     Anion Gap 02/20/2020 10     Glucose 02/20/2020 107     BUN 02/20/2020 19     CREATININE 02/20/2020 0.8     GFR Non- 02/20/2020 >60     Calcium 02/20/2020 7.5*    Total Protein 02/20/2020 6.3*    Alb 02/20/2020 3.0*    Total Bilirubin 02/20/2020 <0.2     Alkaline Phosphatase 02/20/2020 99     ALT 02/20/2020 5     AST 02/20/2020 11     WBC 02/20/2020 9.7     RBC 02/20/2020 2.94*    Hemoglobin 02/20/2020 8.6*    Hematocrit 02/20/2020 28.6*    MCV 02/20/2020 97.3     MCH 02/20/2020 29.3     MCHC 02/20/2020 30.1*    RDW 02/20/2020 15.5*    Platelets 73/49/3537 208     MPV 02/20/2020 9.8     TSH 02/20/2020 2.330     Vitamin B-12 02/20/2020 484     Ammonia 02/20/2020 30     Sodium 02/20/2020 135*    Potassium reflex Magnesi* 02/20/2020 4.0     Chloride 02/20/2020 99     CO2 02/20/2020 25     Anion Gap 02/20/2020 11     Glucose 02/20/2020 209*   

## 2020-07-15 ENCOUNTER — APPOINTMENT (OUTPATIENT)
Dept: CT IMAGING | Age: 75
DRG: 193 | End: 2020-07-15
Payer: MEDICARE

## 2020-07-15 ENCOUNTER — HOSPITAL ENCOUNTER (INPATIENT)
Age: 75
LOS: 3 days | Discharge: HOME HEALTH | DRG: 193 | End: 2020-07-18
Attending: EMERGENCY MEDICINE | Admitting: INTERNAL MEDICINE
Payer: MEDICARE

## 2020-07-15 ENCOUNTER — APPOINTMENT (OUTPATIENT)
Dept: GENERAL RADIOLOGY | Age: 75
DRG: 193 | End: 2020-07-15
Payer: MEDICARE

## 2020-07-15 PROBLEM — J18.9 PNEUMONIA: Status: ACTIVE | Noted: 2020-07-15

## 2020-07-15 LAB
ALBUMIN SERPL-MCNC: 3.7 G/DL (ref 3.5–5.2)
ALP BLD-CCNC: 68 U/L (ref 35–104)
ALT SERPL-CCNC: 5 U/L (ref 5–33)
ANION GAP SERPL CALCULATED.3IONS-SCNC: 12 MMOL/L (ref 7–19)
AST SERPL-CCNC: 11 U/L (ref 5–32)
BASE EXCESS ARTERIAL: 4.7 MMOL/L (ref -2–2)
BASOPHILS ABSOLUTE: 0 K/UL (ref 0–0.2)
BASOPHILS RELATIVE PERCENT: 0.3 % (ref 0–1)
BILIRUB SERPL-MCNC: <0.2 MG/DL (ref 0.2–1.2)
BUN BLDV-MCNC: 35 MG/DL (ref 8–23)
CALCIUM SERPL-MCNC: 9.2 MG/DL (ref 8.8–10.2)
CARBOXYHEMOGLOBIN ARTERIAL: 6.2 % (ref 0–5)
CHLORIDE BLD-SCNC: 93 MMOL/L (ref 98–111)
CO2: 27 MMOL/L (ref 22–29)
CREAT SERPL-MCNC: 1.4 MG/DL (ref 0.5–0.9)
EOSINOPHILS ABSOLUTE: 0 K/UL (ref 0–0.6)
EOSINOPHILS RELATIVE PERCENT: 0.2 % (ref 0–5)
GFR AFRICAN AMERICAN: 44
GFR NON-AFRICAN AMERICAN: 37
GLUCOSE BLD-MCNC: 104 MG/DL (ref 74–109)
HCO3 ARTERIAL: 31.8 MMOL/L (ref 22–26)
HCT VFR BLD CALC: 36.1 % (ref 37–47)
HEMOGLOBIN, ART, EXTENDED: 11.2 G/DL (ref 12–16)
HEMOGLOBIN: 10.9 G/DL (ref 12–16)
IMMATURE GRANULOCYTES #: 0 K/UL
LYMPHOCYTES ABSOLUTE: 1.2 K/UL (ref 1.1–4.5)
LYMPHOCYTES RELATIVE PERCENT: 9.7 % (ref 20–40)
MCH RBC QN AUTO: 29.3 PG (ref 27–31)
MCHC RBC AUTO-ENTMCNC: 30.2 G/DL (ref 33–37)
MCV RBC AUTO: 97 FL (ref 81–99)
METHEMOGLOBIN ARTERIAL: 2.5 %
MONOCYTES ABSOLUTE: 0.8 K/UL (ref 0–0.9)
MONOCYTES RELATIVE PERCENT: 6.7 % (ref 0–10)
NEUTROPHILS ABSOLUTE: 10.1 K/UL (ref 1.5–7.5)
NEUTROPHILS RELATIVE PERCENT: 82.8 % (ref 50–65)
O2 CONTENT ARTERIAL: 8.8 ML/DL
O2 SAT, ARTERIAL: 56.1 %
O2 THERAPY: ABNORMAL
PCO2 ARTERIAL: 59 MMHG (ref 35–45)
PDW BLD-RTO: 15.8 % (ref 11.5–14.5)
PH ARTERIAL: 7.34 (ref 7.35–7.45)
PLATELET # BLD: 175 K/UL (ref 130–400)
PMV BLD AUTO: 10.3 FL (ref 9.4–12.3)
PO2 ARTERIAL: 28 MMHG (ref 80–100)
POTASSIUM REFLEX MAGNESIUM: 4.5 MMOL/L (ref 3.5–5)
POTASSIUM, WHOLE BLOOD: 4.2
RBC # BLD: 3.72 M/UL (ref 4.2–5.4)
SODIUM BLD-SCNC: 132 MMOL/L (ref 136–145)
TOTAL PROTEIN: 6.9 G/DL (ref 6.6–8.7)
TROPONIN: <0.01 NG/ML (ref 0–0.03)
WBC # BLD: 12.2 K/UL (ref 4.8–10.8)

## 2020-07-15 PROCEDURE — 70450 CT HEAD/BRAIN W/O DYE: CPT

## 2020-07-15 PROCEDURE — 1210000000 HC MED SURG R&B

## 2020-07-15 PROCEDURE — 82803 BLOOD GASES ANY COMBINATION: CPT

## 2020-07-15 PROCEDURE — 93005 ELECTROCARDIOGRAM TRACING: CPT | Performed by: EMERGENCY MEDICINE

## 2020-07-15 PROCEDURE — 6360000004 HC RX CONTRAST MEDICATION: Performed by: EMERGENCY MEDICINE

## 2020-07-15 PROCEDURE — 85025 COMPLETE CBC W/AUTO DIFF WBC: CPT

## 2020-07-15 PROCEDURE — 84132 ASSAY OF SERUM POTASSIUM: CPT

## 2020-07-15 PROCEDURE — 6360000002 HC RX W HCPCS: Performed by: EMERGENCY MEDICINE

## 2020-07-15 PROCEDURE — 36600 WITHDRAWAL OF ARTERIAL BLOOD: CPT

## 2020-07-15 PROCEDURE — 36415 COLL VENOUS BLD VENIPUNCTURE: CPT

## 2020-07-15 PROCEDURE — 87040 BLOOD CULTURE FOR BACTERIA: CPT

## 2020-07-15 PROCEDURE — 94640 AIRWAY INHALATION TREATMENT: CPT

## 2020-07-15 PROCEDURE — 71101 X-RAY EXAM UNILAT RIBS/CHEST: CPT

## 2020-07-15 PROCEDURE — 71275 CT ANGIOGRAPHY CHEST: CPT

## 2020-07-15 PROCEDURE — 80053 COMPREHEN METABOLIC PANEL: CPT

## 2020-07-15 PROCEDURE — U0003 INFECTIOUS AGENT DETECTION BY NUCLEIC ACID (DNA OR RNA); SEVERE ACUTE RESPIRATORY SYNDROME CORONAVIRUS 2 (SARS-COV-2) (CORONAVIRUS DISEASE [COVID-19]), AMPLIFIED PROBE TECHNIQUE, MAKING USE OF HIGH THROUGHPUT TECHNOLOGIES AS DESCRIBED BY CMS-2020-01-R: HCPCS

## 2020-07-15 PROCEDURE — 99285 EMERGENCY DEPT VISIT HI MDM: CPT

## 2020-07-15 PROCEDURE — 2580000003 HC RX 258: Performed by: EMERGENCY MEDICINE

## 2020-07-15 PROCEDURE — 6370000000 HC RX 637 (ALT 250 FOR IP): Performed by: EMERGENCY MEDICINE

## 2020-07-15 PROCEDURE — 84484 ASSAY OF TROPONIN QUANT: CPT

## 2020-07-15 RX ORDER — IPRATROPIUM BROMIDE AND ALBUTEROL SULFATE 2.5; .5 MG/3ML; MG/3ML
1 SOLUTION RESPIRATORY (INHALATION) ONCE
Status: COMPLETED | OUTPATIENT
Start: 2020-07-15 | End: 2020-07-15

## 2020-07-15 RX ORDER — METHYLPREDNISOLONE SODIUM SUCCINATE 40 MG/ML
40 INJECTION, POWDER, LYOPHILIZED, FOR SOLUTION INTRAMUSCULAR; INTRAVENOUS ONCE
Status: COMPLETED | OUTPATIENT
Start: 2020-07-15 | End: 2020-07-15

## 2020-07-15 RX ADMIN — METHYLPREDNISOLONE SODIUM SUCCINATE 40 MG: 40 INJECTION, POWDER, FOR SOLUTION INTRAMUSCULAR; INTRAVENOUS at 22:25

## 2020-07-15 RX ADMIN — CEFTRIAXONE 1 G: 1 INJECTION, POWDER, FOR SOLUTION INTRAMUSCULAR; INTRAVENOUS at 22:26

## 2020-07-15 RX ADMIN — IPRATROPIUM BROMIDE AND ALBUTEROL SULFATE 1 AMPULE: .5; 3 SOLUTION RESPIRATORY (INHALATION) at 19:28

## 2020-07-15 RX ADMIN — IOPAMIDOL 90 ML: 755 INJECTION, SOLUTION INTRAVENOUS at 20:08

## 2020-07-15 RX ADMIN — Medication 500 MG: at 22:26

## 2020-07-15 ASSESSMENT — ENCOUNTER SYMPTOMS
WHEEZING: 0
SHORTNESS OF BREATH: 1
VOMITING: 0
COUGH: 0

## 2020-07-15 ASSESSMENT — PAIN SCALES - GENERAL: PAINLEVEL_OUTOF10: 4

## 2020-07-15 NOTE — PROGRESS NOTES
Ref Range & Units  07/15/20 3449    pH, Arterial  7.350 - 7.450  7.340Low      pCO2, Arterial  35.0 - 45.0 mmHg  59. 0High      pO2, Arterial  80.0 - 100.0 mmHg  28. 0Low Panic      HCO3, Arterial  22.0 - 26.0 mmol/L  31.8High      Base Excess, Arterial  -2.0 - 2.0 mmol/L  4.7High      Hemoglobin, Art, Extended  12.0 - 16.0 g/dL  11.2Low      O2 Sat, Arterial  >92 %  56.1Low Panic      Carboxyhgb, Arterial  0.0 - 5.0 %  6.2High      Comment:      0.0-1.5   (Smokers 1.5-5.0)    Methemoglobin, Arterial  <1.5 %  2.5    O2 Content, Arterial  Not Established mL/dL  8.8      RA  RR 24  ROSE TEST NP  SITE RB

## 2020-07-15 NOTE — ED TRIAGE NOTES
Pt to ED with c/o change in mental status with left side and back tori post fall fro bed last night Pt and family deny LOC

## 2020-07-16 PROBLEM — R09.02 HYPOXIA: Status: ACTIVE | Noted: 2020-07-16

## 2020-07-16 LAB
BASOPHILS ABSOLUTE: 0 K/UL (ref 0–0.2)
BASOPHILS RELATIVE PERCENT: 0 % (ref 0–1)
EKG P AXIS: 71 DEGREES
EKG P-R INTERVAL: 146 MS
EKG Q-T INTERVAL: 362 MS
EKG QRS DURATION: 88 MS
EKG QTC CALCULATION (BAZETT): 395 MS
EKG T AXIS: 52 DEGREES
EOSINOPHILS ABSOLUTE: 0 K/UL (ref 0–0.6)
EOSINOPHILS RELATIVE PERCENT: 0 % (ref 0–5)
HCT VFR BLD CALC: 34.8 % (ref 37–47)
HEMOGLOBIN: 10.6 G/DL (ref 12–16)
IMMATURE GRANULOCYTES #: 0 K/UL
LYMPHOCYTES ABSOLUTE: 0.4 K/UL (ref 1.1–4.5)
LYMPHOCYTES RELATIVE PERCENT: 4.7 % (ref 20–40)
MAGNESIUM: 1.9 MG/DL (ref 1.6–2.4)
MCH RBC QN AUTO: 29.2 PG (ref 27–31)
MCHC RBC AUTO-ENTMCNC: 30.5 G/DL (ref 33–37)
MCV RBC AUTO: 95.9 FL (ref 81–99)
MONOCYTES ABSOLUTE: 0.1 K/UL (ref 0–0.9)
MONOCYTES RELATIVE PERCENT: 1 % (ref 0–10)
NEUTROPHILS ABSOLUTE: 7.2 K/UL (ref 1.5–7.5)
NEUTROPHILS RELATIVE PERCENT: 93.8 % (ref 50–65)
PDW BLD-RTO: 15.6 % (ref 11.5–14.5)
PLATELET # BLD: 172 K/UL (ref 130–400)
PMV BLD AUTO: 10.1 FL (ref 9.4–12.3)
RBC # BLD: 3.63 M/UL (ref 4.2–5.4)
SARS-COV-2, PCR: NOT DETECTED
WBC # BLD: 7.7 K/UL (ref 4.8–10.8)

## 2020-07-16 PROCEDURE — 6370000000 HC RX 637 (ALT 250 FOR IP): Performed by: INTERNAL MEDICINE

## 2020-07-16 PROCEDURE — 6370000000 HC RX 637 (ALT 250 FOR IP): Performed by: HOSPITALIST

## 2020-07-16 PROCEDURE — 2580000003 HC RX 258: Performed by: INTERNAL MEDICINE

## 2020-07-16 PROCEDURE — 85025 COMPLETE CBC W/AUTO DIFF WBC: CPT

## 2020-07-16 PROCEDURE — 1210000000 HC MED SURG R&B

## 2020-07-16 PROCEDURE — 83735 ASSAY OF MAGNESIUM: CPT

## 2020-07-16 PROCEDURE — 2700000000 HC OXYGEN THERAPY PER DAY

## 2020-07-16 PROCEDURE — 2580000003 HC RX 258: Performed by: HOSPITALIST

## 2020-07-16 PROCEDURE — 6360000002 HC RX W HCPCS: Performed by: INTERNAL MEDICINE

## 2020-07-16 PROCEDURE — 94640 AIRWAY INHALATION TREATMENT: CPT

## 2020-07-16 RX ORDER — METHYLPREDNISOLONE SODIUM SUCCINATE 40 MG/ML
40 INJECTION, POWDER, LYOPHILIZED, FOR SOLUTION INTRAMUSCULAR; INTRAVENOUS DAILY
Status: DISCONTINUED | OUTPATIENT
Start: 2020-07-16 | End: 2020-07-17

## 2020-07-16 RX ORDER — DONEPEZIL HYDROCHLORIDE 5 MG/1
5 TABLET, FILM COATED ORAL NIGHTLY
Status: DISCONTINUED | OUTPATIENT
Start: 2020-07-16 | End: 2020-07-18 | Stop reason: HOSPADM

## 2020-07-16 RX ORDER — PANTOPRAZOLE SODIUM 40 MG/1
40 TABLET, DELAYED RELEASE ORAL
Status: DISCONTINUED | OUTPATIENT
Start: 2020-07-17 | End: 2020-07-18 | Stop reason: HOSPADM

## 2020-07-16 RX ORDER — CARVEDILOL 3.12 MG/1
3.12 TABLET ORAL 2 TIMES DAILY WITH MEALS
Status: DISCONTINUED | OUTPATIENT
Start: 2020-07-16 | End: 2020-07-18 | Stop reason: HOSPADM

## 2020-07-16 RX ORDER — IPRATROPIUM BROMIDE AND ALBUTEROL SULFATE 2.5; .5 MG/3ML; MG/3ML
1 SOLUTION RESPIRATORY (INHALATION)
Status: DISCONTINUED | OUTPATIENT
Start: 2020-07-16 | End: 2020-07-18 | Stop reason: HOSPADM

## 2020-07-16 RX ORDER — MEMANTINE HYDROCHLORIDE 5 MG/1
10 TABLET ORAL DAILY
Status: DISCONTINUED | OUTPATIENT
Start: 2020-07-16 | End: 2020-07-18 | Stop reason: HOSPADM

## 2020-07-16 RX ORDER — DULOXETIN HYDROCHLORIDE 60 MG/1
60 CAPSULE, DELAYED RELEASE ORAL DAILY
Status: DISCONTINUED | OUTPATIENT
Start: 2020-07-16 | End: 2020-07-18 | Stop reason: HOSPADM

## 2020-07-16 RX ORDER — GABAPENTIN 300 MG/1
300 CAPSULE ORAL 2 TIMES DAILY
Status: DISCONTINUED | OUTPATIENT
Start: 2020-07-16 | End: 2020-07-18 | Stop reason: HOSPADM

## 2020-07-16 RX ORDER — SODIUM CHLORIDE, SODIUM LACTATE, POTASSIUM CHLORIDE, CALCIUM CHLORIDE 600; 310; 30; 20 MG/100ML; MG/100ML; MG/100ML; MG/100ML
INJECTION, SOLUTION INTRAVENOUS CONTINUOUS
Status: DISCONTINUED | OUTPATIENT
Start: 2020-07-16 | End: 2020-07-17

## 2020-07-16 RX ADMIN — PIPERACILLIN SODIUM AND TAZOBACTAM SODIUM 3.38 G: 3; .375 INJECTION, POWDER, LYOPHILIZED, FOR SOLUTION INTRAVENOUS at 15:40

## 2020-07-16 RX ADMIN — GABAPENTIN 300 MG: 300 CAPSULE ORAL at 21:06

## 2020-07-16 RX ADMIN — GABAPENTIN 300 MG: 300 CAPSULE ORAL at 10:41

## 2020-07-16 RX ADMIN — MEMANTINE HYDROCHLORIDE 10 MG: 5 TABLET ORAL at 15:41

## 2020-07-16 RX ADMIN — PIPERACILLIN SODIUM AND TAZOBACTAM SODIUM 3.38 G: 3; .375 INJECTION, POWDER, LYOPHILIZED, FOR SOLUTION INTRAVENOUS at 10:17

## 2020-07-16 RX ADMIN — IPRATROPIUM BROMIDE AND ALBUTEROL SULFATE 1 AMPULE: .5; 3 SOLUTION RESPIRATORY (INHALATION) at 14:47

## 2020-07-16 RX ADMIN — IPRATROPIUM BROMIDE AND ALBUTEROL SULFATE 1 AMPULE: .5; 3 SOLUTION RESPIRATORY (INHALATION) at 10:54

## 2020-07-16 RX ADMIN — IPRATROPIUM BROMIDE AND ALBUTEROL SULFATE 1 AMPULE: .5; 3 SOLUTION RESPIRATORY (INHALATION) at 07:30

## 2020-07-16 RX ADMIN — IPRATROPIUM BROMIDE AND ALBUTEROL SULFATE 1 AMPULE: .5; 3 SOLUTION RESPIRATORY (INHALATION) at 19:11

## 2020-07-16 RX ADMIN — CARVEDILOL 3.12 MG: 3.12 TABLET, FILM COATED ORAL at 10:47

## 2020-07-16 RX ADMIN — CARVEDILOL 3.12 MG: 3.12 TABLET, FILM COATED ORAL at 15:41

## 2020-07-16 RX ADMIN — DONEPEZIL HYDROCHLORIDE 5 MG: 5 TABLET, FILM COATED ORAL at 21:06

## 2020-07-16 RX ADMIN — METHYLPREDNISOLONE SODIUM SUCCINATE 40 MG: 40 INJECTION, POWDER, FOR SOLUTION INTRAMUSCULAR; INTRAVENOUS at 10:17

## 2020-07-16 RX ADMIN — DULOXETINE 60 MG: 60 CAPSULE, DELAYED RELEASE ORAL at 10:41

## 2020-07-16 RX ADMIN — SODIUM CHLORIDE, POTASSIUM CHLORIDE, SODIUM LACTATE AND CALCIUM CHLORIDE: 600; 310; 30; 20 INJECTION, SOLUTION INTRAVENOUS at 15:41

## 2020-07-16 ASSESSMENT — PAIN SCALES - GENERAL: PAINLEVEL_OUTOF10: 0

## 2020-07-16 NOTE — PLAN OF CARE
Problem: Falls - Risk of:  Goal: Will remain free from falls  Description: Will remain free from falls  7/16/2020 1723 by Enma Weber RN  Outcome: Ongoing  7/16/2020 0439 by Sammi Taylor RN  Outcome: Ongoing  Goal: Absence of physical injury  Description: Absence of physical injury  7/16/2020 1723 by Enma Weber RN  Outcome: Ongoing  7/16/2020 0439 by Sammi Taylor RN  Outcome: Ongoing     Problem: Skin Integrity:  Goal: Will show no infection signs and symptoms  Description: Will show no infection signs and symptoms  7/16/2020 1723 by Enma Weber RN  Outcome: Ongoing  7/16/2020 0439 by Sammi Taylor RN  Outcome: Ongoing  Goal: Absence of new skin breakdown  Description: Absence of new skin breakdown  7/16/2020 1723 by Enma Weber RN  Outcome: Ongoing  7/16/2020 0439 by Sammi Taylor RN  Outcome: Ongoing

## 2020-07-16 NOTE — PROGRESS NOTES
Progress Note    Date:7/16/2020       Room:0418/418-02  Patient Name:Jeny Mcguire     YOB: 1945     Age:75 y.o. Subjective   Interval History Status: not changed. 59-year-old lady with a history of coronary artery disease, COPD, depression, arthritis, GERD, tobacco use disorder who presented to the hospital after experiencing a fall at home and noted to have pneumonia. Patient currently be treated for acute hypoxic respiratory failure secondary to pneumonia. CTA noted to be negative for PE. This morning today she is having some abdominal pain that started after she had a fall denies any chest pain, shortness of breath nausea, vomiting or dizziness. Review of Systems   Review of Systems  12 point system reviewed and negative except as stated above. Medications   Scheduled Meds:    piperacillin-tazobactam  3.375 g Intravenous Q8H    methylPREDNISolone  40 mg Intravenous Daily    ipratropium-albuterol  1 ampule Inhalation Q4H WA    carvedilol  3.125 mg Oral BID WC    donepezil  5 mg Oral Nightly    DULoxetine  60 mg Oral Daily    gabapentin  300 mg Oral BID    [START ON 7/17/2020] pantoprazole  40 mg Oral QAM AC     Continuous Infusions:   PRN Meds:     Past History    Past Medical History:   has a past medical history of Abdominal pain, Anxiety, Arthritis, CAD in native artery, Cerebrovascular disease, CHF (congestive heart failure) ([facility] [address] Constipation, COPD (chronic obstructive pulmonary disease) ([facility] [address] Depression, Emphysema, GERD (gastroesophageal reflux disease), Myocardial infarct, old, Palliative care patient, Pneumonia, and Tobacco abuse. Social History:   reports that she has been smoking cigarettes. She started smoking about 42 years ago. She has a 36.00 pack-year smoking history. She has never used smokeless tobacco. She reports that she does not drink alcohol or use drugs.      Family History:   Family History   Problem Relation Age of Onset    Stroke Brother  Stroke Sister     Cancer Brother     Cancer Sister     Diabetes Brother     Diabetes Sister     Coronary Art Dis Other     Hypertension Other     Seizures Child     Colon Cancer Neg Hx     Colon Polyps Neg Hx     Esophageal Cancer Neg Hx     Liver Cancer Neg Hx     Liver Disease Neg Hx     Rectal Cancer Neg Hx     Stomach Cancer Neg Hx        Physical Examination      Vitals:  /74   Pulse 75   Temp 97.5 °F (36.4 °C) (Temporal)   Resp 16   Ht 5' 2\" (1.575 m)   Wt 118 lb 11.2 oz (53.8 kg)   SpO2 94%   BMI 21.71 kg/m²   Temp (24hrs), Av.6 °F (36.4 °C), Min:97.1 °F (36.2 °C), Max:98.1 °F (36.7 °C)      I/O (24Hr): No intake or output data in the 24 hours ending 20 1345    Physical Exam  General: No acute distress lying comfortably in bed. HEENT: Atraumatic normocephalic  Cardiac: Normal S1-S2, + murmur  Respiratory: clear To auscultation bilaterally, + rhonchi with some mild expiratory wheezing. Abdomen: nontender to palpation, no organomegaly noted. Extremities: no tenderness, no edema, moves all extremities  Psych: Affect normal and good eye contact      Labs/Imaging/Diagnostics   Labs:  CBC:  Recent Labs     07/15/20  1849 07/16/20  1020   WBC 12.2* 7.7   RBC 3.72* 3.63*   HGB 10.9* 10.6*   HCT 36.1* 34.8*   MCV 97.0 95.9   RDW 15.8* 15.6*    172     CHEMISTRIES:  Recent Labs     07/15/20  1839 07/15/20  1849 07/16/20  1020   NA  --  132*  --    K 4.2 4.5  --    CL  --  93*  --    CO2  --  27  --    BUN  --  35*  --    CREATININE  --  1.4*  --    GLUCOSE  --  104  --    MG  --   --  1.9     PT/INR:No results for input(s): PROTIME, INR in the last 72 hours. APTT:No results for input(s): APTT in the last 72 hours.   LIVER PROFILE:  Recent Labs     07/15/20  1849   AST 11   ALT 5   BILITOT <0.2   ALKPHOS 68       Imaging Last 24 Hours:  Xr Ribs Left Include Chest (min 3 Views)    Result Date: 7/15/2020  EXAMINATION:  XR RIBS LEFT INCLUDE CHEST (MIN 3 VIEWS)  7/15/2020 the thoracic aorta. There is mild coronary artery calcification. The pulmonary arteries are dilated. No pulmonary embolus is visualized. There are small mediastinal lymph nodes that are not pathologically enlarged. There are small hilar lymph nodes. LUNGS: There is pleural and parenchymal scarring in the lung apices. There are patchy infiltrates in the lung apices. There is additional infiltrate in the lingular segment of the left upper lobe. There are patchy peripheral infiltrates in both lower lobes. There is bronchial wall thickening. There is centrilobular emphysema. BONES/SOFT TISSUES/: There are degenerative changes of the spine. No acute bony abnormality is seen. UPPER ABDOMEN: Prior cholecystectomy. There is a partially imaged 8mm probable cyst in the liver on image 163 of series 5. Wall thickening of the visualized stomach is likely an artifact of under distention. 1. No CT evidence of pulmonary embolus. The pulmonary arteries are dilated. 2. Atheromatous disease of the thoracic aorta. Mild coronary artery calcification. Cardiomegaly. 3. Bilateral infiltrates in the upper lobes, lower lobes and lingula. These are likely infectious/inflammatory. 4. Centrilobular emphysema. 5. Prior cholecystectomy. Partially imaged probable liver cyst on the last axial image. The full report of this exam was immediately signed and available to the emergency room. The patient is currently in the emergency room. Signed by Dr Lilly Roberts on 7/15/2020 8:30 PM        Assessment        Hospital Problems           Last Modified POA    * (Principal) Pneumonia 7/16/2020 Yes    COPD (chronic obstructive pulmonary disease) ([facility] [address] 7/16/2020 Yes    Hypoxia 7/16/2020 Yes          Plan:          Acute on Chronic hypoxic respiratory failure secondary to pneumonia  Mild COPD exacerbation on 2 L of oxygen at baseline  CTA chest negative for PE but noted to have bilateral upper and lower lobe infiltrates.   Continue Zosyn   Follow blood cultures  Solu-Medrol 40 mg IV daily  Breathing treatments as needed  Wean oxygen as tolerated  COVID noted to be negative    Acute kidney injury  Likely secondary to infection superimposed with dehydration  Lactated Ringer at 100 cc an hour  Monitor closely for improvement    Tobacco use disorder    History of depression and anxiety  Continue duloxetine    GERD: Continue PPI    History of CVA  History of coronary artery disease  Continue Coreg, hold Imdur given marginal blood pressure, not on aspirin or statin.     Arthritis          Electronically signed by Yue Palomino MD on 7/16/20 at 1:45 PM CDT

## 2020-07-16 NOTE — ED NOTES
Transported pt to [facility]; bedside report given;  Hospitalist Md made aware of pt arrival

## 2020-07-16 NOTE — ED PROVIDER NOTES
[facility] 4 ONCOLOGY UNIT  eMERGENCY dEPARTMENT eNCOUnter      Pt Name: Josselin Salinas  MRN: [id number]  [place] 1945  Date of evaluation: 7/15/2020  Provider: Juno Shah MD    CHIEF COMPLAINT       Chief Complaint   Patient presents with    Altered Mental Status     Pt to ED with c/o change in mental status after fall from bed last night          HISTORY OF PRESENT ILLNESS   (Location/Symptom, Timing/Onset,Context/Setting, Quality, Duration, Modifying Factors, Severity)  Note limiting factors. Josselin Salinas is a 77 y.o. female who presents to the emergency department      The history is provided by the patient and a relative. History limited by: dementia. Shortness of Breath   Severity:  Severe  Onset quality:  Gradual  Duration: today. Timing:  Constant  Progression:  Worsening  Chronicity:  Recurrent (COPD)  Context comment:  Radha Luis out of bed last pm on left side, c/o left lateral rib pain  Relieved by:  Nothing  Worsened by:  Nothing  Ineffective treatments:  None tried  Associated symptoms: chest pain    Associated symptoms: no cough, no diaphoresis, no fever, no vomiting and no wheezing    Risk factors comment:  CAD, CHF, COPD      NursingNotes were reviewed. REVIEW OF SYSTEMS    (2-9 systems for level 4, 10 or more for level 5)     Review of Systems   Constitutional: Negative for diaphoresis and fever. Respiratory: Positive for shortness of breath. Negative for cough and wheezing. Cardiovascular: Positive for chest pain. Gastrointestinal: Negative for vomiting. Psychiatric/Behavioral: Positive for confusion (increased today). All other systems reviewed and are negative. Except as noted above the remainder of the review of systems was reviewed and negative.        PAST MEDICAL HISTORY     Past Medical History:   Diagnosis Date    Abdominal pain     Anxiety     Arthritis     CAD in native artery     Cerebrovascular disease     CHF [facility]congestive heart failure) ([facility] [address]     Constipation     COPD (chronic obstructive pulmonary disease) (HCC)     Depression     Emphysema     GERD (gastroesophageal reflux disease)     Myocardial infarct, old     Palliative care patient 02/20/2020    Pneumonia     Tobacco abuse          SURGICALHISTORY       Past Surgical History:   Procedure Laterality Date    ABDOMINAL EXPLORATION SURGERY      APPENDECTOMY      CARDIAC CATHETERIZATION  06/25/10    patent  stent noted in LAD,EF is estimated to be 60%    CARDIAC CATHETERIZATION  7/13/12  MDL    EF  60%    CHOLECYSTECTOMY      COLONOSCOPY  10/2014    Dr Reece Carnes ENDOSCOPY  10/1/14    Dr eRid Gess: food bezoar; esophagitis, gastritis, duodenitis; biopsies neg h-pylori         CURRENT MEDICATIONS       Current Discharge Medication List      CONTINUE these medications which have NOT CHANGED    Details   clonazePAM (KLONOPIN) 0.5 MG tablet Take 1 tablet by mouth 3 times daily as needed for Anxiety for up to 30 days. Qty: 75 tablet, Refills: 0    Associated Diagnoses: HAILEY (generalized anxiety disorder);  Insomnia due to medical condition      carvedilol (COREG) 3.125 MG tablet Take 1 tablet by mouth 2 times daily (with meals)  Qty: 60 tablet, Refills: 11    Associated Diagnoses: Essential hypertension; Congestive heart failure, unspecified HF chronicity, unspecified heart failure type ([facility])      denosumab (PROLIA) 60 MG/ML SOSY SC injection INJECT 1ML UNDER THE SKIN EVERY 6 MONTHS  Qty: 1 mL, Refills: 1      pantoprazole (PROTONIX) 40 MG tablet Take 1 tablet by mouth every morning (before breakfast)  Qty: 30 tablet, Refills: 5    Comments: $  Associated Diagnoses: Gastroesophageal reflux disease, esophagitis presence not specified      traZODone (DESYREL) 150 MG tablet Take 1 tablet by mouth nightly  Qty: 90 tablet, Refills: 3    Comments: $  Associated Diagnoses: Insomnia due to medical condition      Fesoterodine Fumarate ER (TOVIAZ) 8 MG TB24 Take 1 tablet by mouth daily  Qty: 90 tablet, Refills: 1      donepezil (ARICEPT) 5 MG tablet Take 1 tablet by mouth nightly  Qty: 90 tablet, Refills: 3    Comments: This prescription was filled on 3/27/2019. Any refills authorized will be placed on file. Associated Diagnoses: Dementia without behavioral disturbance, unspecified dementia type ([facility])      olopatadine (PATADAY) 0.2 % SOLN ophthalmic solution Place 1 drop into both eyes daily  Qty: 2.5 mL, Refills: 5    Associated Diagnoses: Allergic conjunctivitis of both eyes      fluticasone-umeclidin-vilant (TRELEGY ELLIPTA) 100-62.5-25 MCG/INH AEPB Inhale 1 puff into the lungs daily  Qty: 1 each, Refills: 5    Associated Diagnoses: Chronic narcotic dependence ([facility] [address] Chronic obstructive pulmonary disease, unspecified COPD type ([facility])      DULoxetine (CYMBALTA) 60 MG extended release capsule Take 1 capsule by mouth daily  Qty: 30 capsule, Refills: 5    Comments: $  Associated Diagnoses: HAILEY (generalized anxiety disorder); Reactive depression      gabapentin (NEURONTIN) 300 MG capsule Take 300 mg by mouth 2 times daily. albuterol sulfate HFA (VENTOLIN HFA) 108 (90 Base) MCG/ACT inhaler Inhale 2 puffs into the lungs every 6 hours as needed for Wheezing  Qty: 3 Inhaler, Refills: 3    Comments: $  Associated Diagnoses: Chronic obstructive pulmonary disease, unspecified COPD type ([facility])      isosorbide mononitrate (IMDUR) 30 MG extended release tablet Take 1 tablet by mouth daily  Qty: 90 tablet, Refills: 3    Comments: $      memantine (NAMENDA) 10 MG tablet Take 1 tablet by mouth 2 times daily  Qty: 180 tablet, Refills: 3    Comments: $  Associated Diagnoses: Memory loss      ondansetron (ZOFRAN) 4 MG tablet Take 1 tablet by mouth every 8 hours as needed for Nausea or Vomiting  Qty: 40 tablet, Refills: 1    Comments: This prescription was filled on 3/19/2019. Any refills authorized will be placed on file.   Associated Diagnoses: Nausea; Nondiabetic gastroparesis      albuterol (PROVENTIL) (5 MG/ML) 0.5% nebulizer solution Take 0.5 mLs by nebulization every 6 hours as needed for Wheezing  Qty: 120 each, Refills: 3    Associated Diagnoses: Chronic narcotic dependence (HCC)      nitroGLYCERIN (NITROQUICK) 0.4 MG SL tablet Place 1 tablet under the tongue every 5 minutes as needed for Chest pain  Qty: 25 tablet, Refills: 3    Associated Diagnoses: Chest pain, unspecified type      furosemide (LASIX) 20 MG tablet Take 1 tablet by mouth daily  Qty: 30 tablet, Refills: 5      OXYGEN Inhale 2 L into the lungs daily  Qty: 1 each, Refills: 11    Associated Diagnoses: Chronic narcotic dependence ([facility] [address]  Chronic obstructive pulmonary disease, unspecified COPD type ([facility] [address]      HYDROcodone-acetaminophen (NORCO)  MG per tablet TAKE ONE TABLET BY MOUTH EVERY 8 HOURS AS NEEDED- dr michell Cason Joaquim: 0             ALLERGIES     Codeine and Sulfa antibiotics    FAMILY HISTORY       Family History   Problem Relation Age of Onset    Stroke Brother     Stroke Sister    [place] Cancer Brother    [place] Cancer Sister     Diabetes Brother     Diabetes Sister     Coronary Art Dis Other     Hypertension Other     Seizures Child     Colon Cancer Neg Hx     Colon Polyps Neg Hx     Esophageal Cancer Neg Hx     Liver Cancer Neg Hx     Liver Disease Neg Hx     Rectal Cancer Neg Hx     Stomach Cancer Neg Hx           SOCIAL HISTORY       Social History     Socioeconomic History    Marital status:      Spouse name: None    Number of children: None    Years of education: None    Highest education level: None   Occupational History    None   Social Needs    Financial resource strain: None    Food insecurity     Worry: None     Inability: None    Transportation needs     Medical: None     Non-medical: None   Tobacco Use    Smoking status: Current Every Day Smoker     Packs/day: 1.00     Years: 36.00     Pack years: 36.00     Types: Cigarettes     Start date: 1978  Smokeless tobacco: Never Used   Substance and Sexual Activity    Alcohol use: No     Alcohol/week: 0.0 standard drinks    Drug use: No    Sexual activity: None   Lifestyle    Physical activity     Days per week: None     Minutes per session: None    Stress: None   Relationships    Social connections     Talks on phone: None     Gets together: None     Attends Anabaptism service: None     Active member of club or organization: None     Attends meetings of clubs or organizations: None     Relationship status: None    Intimate partner violence     Fear of current or ex partner: None     Emotionally abused: None     Physically abused: None     Forced sexual activity: None   Other Topics Concern    None   Social History Narrative    None       SCREENINGS    Cody Coma Scale  Eye Opening: Spontaneous  Best Verbal Response: Oriented  Best Motor Response: Obeys commands  Sheridan Coma Scale Score: 15 @FLOW([id number])@      PHYSICAL EXAM    (up to 7 for level 4, 8 or more for level 5)     ED Triage Vitals   BP Temp Temp src Pulse Resp SpO2 Height Weight   07/15/20 1802 07/15/20 1802 -- 07/15/20 1802 07/15/20 1802 07/15/20 1831 07/15/20 1802 07/15/20 1802   88/62 98.1 °F (36.7 °C)  86 20 (!) 57 % 5' 2\" (1.575 m) 125 lb (56.7 kg)       Physical Exam  Vitals signs and nursing note reviewed. Constitutional:       General: She is in acute distress (mod). Appearance: She is normal weight. She is toxic-appearing. She is not diaphoretic. HENT:      Nose: Nose normal.      Mouth/Throat:      Mouth: Mucous membranes are moist.      Pharynx: Oropharynx is clear. Eyes:      Conjunctiva/sclera: Conjunctivae normal.   Neck:      Musculoskeletal: Normal range of motion and neck supple. Cardiovascular:      Rate and Rhythm: Normal rate and regular rhythm. Heart sounds: Normal heart sounds. Pulmonary:      Effort: Respiratory distress (mod) present. Breath sounds: Rales (faint left) present.  No wheezing or rhonchi. Comments: Diminished bilaterally  Chest:      Chest wall: Tenderness (Tmod left lateral chest wall, no crepitus) present. Abdominal:      Tenderness: There is no abdominal tenderness. Musculoskeletal:      Right lower leg: No edema. Left lower leg: No edema. Skin:     General: Skin is warm and dry. Neurological:      General: No focal deficit present. Mental Status: She is alert. Comments: Year 1980, Obama president, names watch and its function         DIAGNOSTIC RESULTS     EKG: All EKG's are interpreted by the Emergency Department Physician who either signs or Co-signsthis chart in the absence of a cardiologist.    EKG: Regular rate and rhythm. Normal P waves and CT interval. Normal QRS. Normal QT interval. No ST elevation or depression. This EKG was interpreted by me. RADIOLOGY:   Non-plain filmimages such as CT, Ultrasound and MRI are read by the radiologist. Plain radiographic images are visualized and preliminarily interpreted by the emergency physician with the below findings:        Interpretation per the Radiologist below, if available at the time ofthis note:    CTA PULMONARY W CONTRAST   Final Result   1. No CT evidence of pulmonary embolus. The pulmonary arteries are   dilated. 2. Atheromatous disease of the thoracic aorta. Mild coronary artery   calcification. Cardiomegaly. 3. Bilateral infiltrates in the upper lobes, lower lobes and lingula. These are likely infectious/inflammatory. 4. Centrilobular emphysema. 5. Prior cholecystectomy. Partially imaged probable liver cyst on the   last axial image. The full report of this exam was immediately signed and available to   the emergency room. The patient is currently in the emergency room. Signed by Dr Gustavo Zhang on 7/15/2020 8:30 PM      CT Head WO Contrast   Final Result   1. No hemorrhage, edema or mass effect. 2. Atrophy and chronic small vessel ischemic white matter disease.    The full report of this exam was immediately signed and available to   the emergency room. The patient is currently in the emergency room. Signed by Dr Gutierrez Prado on 7/15/2020 8:22 PM      XR RIBS LEFT INCLUDE CHEST (MIN 3 VIEWS)   Final Result   1. No displaced rib fracture is seen. 2. Poor inspiration. Minimal opacity in the lung bases likely due to   atelectasis.    Signed by Dr Gutierrez Prado on 7/15/2020 7:16 PM            ED BEDSIDE ULTRASOUND:   Performed by ED Physician - none    LABS:  Labs Reviewed   CBC WITH AUTO DIFFERENTIAL - Abnormal; Notable for the following components:       Result Value    WBC 12.2 (*)     RBC 3.72 (*)     Hemoglobin 10.9 (*)     Hematocrit 36.1 (*)     MCHC 30.2 (*)     RDW 15.8 (*)     Neutrophils % 82.8 (*)     Lymphocytes % 9.7 (*)     Neutrophils Absolute 10.1 (*)     All other components within normal limits   COMPREHENSIVE METABOLIC PANEL W/ REFLEX TO MG FOR LOW K - Abnormal; Notable for the following components:    Sodium 132 (*)     Chloride 93 (*)     BUN 35 (*)     CREATININE 1.4 (*)     GFR Non- 37 (*)     GFR  44 (*)     All other components within normal limits   BLOOD GAS, ARTERIAL - Abnormal; Notable for the following components:    pH, Arterial 7.340 (*)     pCO2, Arterial 59.0 (*)     pO2, Arterial 28.0 (*)     HCO3, Arterial 31.8 (*)     Base Excess, Arterial 4.7 (*)     Hemoglobin, Art, Extended 11.2 (*)     O2 Sat, Arterial 56.1 (*)     Carboxyhgb, Arterial 6.2 (*)     All other components within normal limits    Narrative:     CALL  [facility] , GUERLINE Chadwick, 07/15/2020 18:40, by Redd Alexander   CBC WITH AUTO DIFFERENTIAL - Abnormal; Notable for the following components:    RBC 3.63 (*)     Hemoglobin 10.6 (*)     Hematocrit 34.8 (*)     MCHC 30.5 (*)     RDW 15.6 (*)     Neutrophils % 93.8 (*)     Lymphocytes % 4.7 (*)     Lymphocytes Absolute 0.4 (*)     All other components within normal limits    Narrative: Collection has been rescheduled by EUSEBIO at 07/16/2020 04:55 Reason: Pt   has pick line nurse will draw per corazon lin    CULTURE, BLOOD 1   CULTURE, BLOOD 2   TROPONIN   POTASSIUM, WHOLE BLOOD   COVID-19    Narrative:     Alex Herrera tel. ,  Microbiology results called to and read back by Sandhya Atkinson RN in ER,  07/16/2020 00:53, by [address]    Narrative:     Collection has been rescheduled by EUSEBIO at 07/16/2020 04:55 Reason: Pt   has pick line nurse will draw per corazon lin    MAGNESIUM   COMPREHENSIVE METABOLIC PANEL       All other labs were within normal range or not returned as of this dictation. EMERGENCY DEPARTMENT COURSE and DIFFERENTIAL DIAGNOSIS/MDM:   Vitals:    Vitals:    07/16/20 1415 07/16/20 1447 07/16/20 1831 07/16/20 2106   BP: 110/72  (!) 141/85    Pulse: 72  86    Resp: 16 (!) 3 18    Temp: 97.3 °F (36.3 °C)  98.5 °F (36.9 °C)    TempSrc: Temporal  Temporal    SpO2: 93%  (!) 88% 98%   Weight:       Height:               MDM  Number of Diagnoses or Management Options  Diagnosis management comments: Discussed with Dr. Krystal Gil - admit. Sat 93% on 3 liters. CRITICAL CARE TIME   Total Critical Care time was 0 minutes, excluding separately reportable procedures. There was a high probability of clinically significant/lifethreatening deterioration in the patient's condition which required my urgent intervention. CONSULTS:  IP CONSULT TO PALLIATIVE CARE  IP CONSULT TO SOCIAL WORK  PALLIATIVE CARE EVAL    PROCEDURES:  Unless otherwise noted below, none     Procedures    FINAL IMPRESSION      1. Pneumonitis    2. Hypoxia    3.  Renal insufficiency          DISPOSITION/PLAN   DISPOSITION Admitted 07/15/2020 08:42:16 PM      PATIENT REFERRED TO:  Alec Smith, APRN  [address] [facility] [address] [facility] [id number]  [address]  [phone]            DISCHARGE MEDICATIONS:  Current Discharge Medication List (Please note that portions of this note were completed with a voice recognition program.  Efforts were made to edit the dictations but occasionally words are mis-transcribed.)    Juno Shah MD (electronically signed)  Attending Emergency Physician          Juno Shah MD  07/16/20 [id number]

## 2020-07-16 NOTE — H&P
H&P  Chief Complaint:    Chief Complaint   Patient presents with    Altered Mental Status     Pt to ED with c/o change in mental status after fall from bed last night          History Of Present Illness:   77 y.o. female PMH COPD presents with fall out of bed. Denies LOC. In ED no fractures noted on imaging but noted to be hypoxic on initial presentation. PNA seen on CT chest.  Covid negative. Pt is poor historian. In no discomfort during exam.    Review of Systems:   14 ROS completed, all are negative except below:  Fall    Past Medical History:        Diagnosis Date    Abdominal pain     Anxiety     Arthritis     CAD in native artery     Cerebrovascular disease     CHF (congestive heart failure) (HCC)     Constipation     COPD (chronic obstructive pulmonary disease) (HCC)     Depression     Emphysema     GERD (gastroesophageal reflux disease)     Myocardial infarct, old     Palliative care patient 02/20/2020    Pneumonia     Tobacco abuse          Procedure Laterality Date    ABDOMINAL EXPLORATION SURGERY      APPENDECTOMY      CARDIAC CATHETERIZATION  06/25/10    patent  stent noted in LAD,EF is estimated to be 60%    CARDIAC CATHETERIZATION  7/13/12  MDL    EF  60%    CHOLECYSTECTOMY      COLONOSCOPY  10/2014    Dr Millicent Barragan      UPPER GASTROINTESTINAL ENDOSCOPY  10/1/14    Dr Mir Dasilva: food bezoar; esophagitis, gastritis, duodenitis; biopsies neg h-pylori         Home Medications:  Prior to Admission medications    Medication Sig Start Date End Date Taking? Authorizing Provider   clonazePAM (KLONOPIN) 0.5 MG tablet Take 1 tablet by mouth 3 times daily as needed for Anxiety for up to 30 days.  6/16/20 7/16/20 Yes NAVEEN Dior   carvedilol (COREG) 3.125 MG tablet Take 1 tablet by mouth 2 times daily (with meals) 6/5/20  Yes NAVEEN Alamo   denosumab (PROLIA) 60 MG/ML SOSY SC injection INJECT 1ML UNDER THE SKIN EVERY 6 MONTHS 6/2/20  Yes NAVEEN Dior   pantoprazole (PROTONIX) 40 MG tablet Take 1 tablet by mouth every morning (before breakfast) 5/15/20  Yes NAVEEN Dior   traZODone (DESYREL) 150 MG tablet Take 1 tablet by mouth nightly 5/15/20  Yes NAVEEN Dior   Fesoterodine Fumarate ER (TOVIAZ) 8 MG TB24 Take 1 tablet by mouth daily 5/15/20  Yes NAVEEN Dior   donepezil (ARICEPT) 5 MG tablet Take 1 tablet by mouth nightly 4/17/20  Yes NAVEEN Dior   olopatadine (PATADAY) 0.2 % SOLN ophthalmic solution Place 1 drop into both eyes daily 3/18/20  Yes NAVEEN Dior   fluticasone-umeclidin-vilant (TRELEGY ELLIPTA) 100-62.5-25 MCG/INH AEPB Inhale 1 puff into the lungs daily 2/25/20  Yes NAVEEN Dior   DULoxetine (CYMBALTA) 60 MG extended release capsule Take 1 capsule by mouth daily 2/19/20 7/15/20 Yes NAVEEN Dior   gabapentin (NEURONTIN) 300 MG capsule Take 300 mg by mouth 2 times daily.   1/10/20  Yes Historical Provider, MD   albuterol sulfate HFA (VENTOLIN HFA) 108 (90 Base) MCG/ACT inhaler Inhale 2 puffs into the lungs every 6 hours as needed for Wheezing 10/10/19  Yes NAVEEN Cain   isosorbide mononitrate (IMDUR) 30 MG extended release tablet Take 1 tablet by mouth daily 10/4/19  Yes NAVEEN Dior   memantine (NAMENDA) 10 MG tablet Take 1 tablet by mouth 2 times daily 10/4/19  Yes NAVEEN Dior   ondansetron (ZOFRAN) 4 MG tablet Take 1 tablet by mouth every 8 hours as needed for Nausea or Vomiting 6/14/19  Yes NAVEEN Dior   albuterol (PROVENTIL) (5 MG/ML) 0.5% nebulizer solution Take 0.5 mLs by nebulization every 6 hours as needed for Wheezing 5/14/19  Yes NAVEEN Dior   nitroGLYCERIN (NITROQUICK) 0.4 MG SL tablet Place 1 tablet under the tongue every 5 minutes as needed for Chest pain 2/27/19  Yes NAVEEN Dior   furosemide (LASIX) 20 MG tablet Take 1 tablet by mouth daily 12/18/18  Yes NAVEEN Dior   OXYGEN Inhale 2 L into the abs, steroids, nebs  Clinically improving  Hypoxia much improved from initial presentation to ED  Doing well on low dose NC    []Fall  Not complaining of any pain at this time    []Hospital issues:  DVT prop - SCDs  Code - FULL  Disposition - [facility]    Lona Ocampo M.D.,  7/15/2020

## 2020-07-16 NOTE — ACP (ADVANCE CARE PLANNING)
Advance Care Planning     Advance Care Planning Activator (Inpatient)  Conversation Note      Date of ACP Conversation: 07/16/20    [employer] with: Pt with decision making capacity. ACP Activator: Blair Sanchez    \"Who would you like to name as your primary health care decision-maker? \"               Name: Radhika Miranda        Relationship: Son   Phone number: [phone]  Lukas Beavers this person be reached easily? \" Yes    \"Who would you like to name as your back-up decision maker? \"   Name: Valdez Ford          Relationship: Daughter            Phone number: [phone]  Lukas Beavers this person be reached easily? \" yes    Care Preferences    Ventilation: \"If you were in your present state of health and suddenly became very ill and were unable to breathe on your own, what would your preference be about the use of a ventilator (breathing machine) if it were available to you? \"      Would the patient desire the use of ventilator (breathing machine)?: Yes    \"If your health worsens and it becomes clear that your chance of recovery is unlikely, what would your preference be about the use of a ventilator (breathing machine) if it were available to you? \"     Would the patient desire the use of ventilator (breathing machine)?: No      Resuscitation  \"CPR works best to restart the heart when there is a sudden event, like a heart attack, in someone who is otherwise healthy. Unfortunately, CPR does not typically restart the heart for people who have serious health conditions or who are very sick. \"    \"In the event your heart stopped as a result of an underlying serious health condition, would you want attempts to be made to restart your heart (answer \"yes\" for attempt to resuscitate) or would you prefer a natural death (answer \"no\" for do not attempt to resuscitate)? \" No      Conversation Outcomes:  [x] ACP discussion completed  [] Existing advance directive reviewed with patient; no changes to patient's previously recorded wishes  [] New Advance Directive completed  [] Portable Do Not Rescitate prepared for Provider review and signature  [] POLST/POST/MOLST/MOST prepared for Provider review and signature      Follow-up plan:    [x] Schedule follow-up conversation to continue planning  [] Referred individual to Provider for additional questions/concerns   [] Advised patient/agent/surrogate to review completed ACP document and update if needed with changes in condition, patient preferences or care setting    [] This note routed to one or more involved healthcare providers

## 2020-07-16 NOTE — CONSULTS
Palliative Care:    Pt known to palliative care team.  Admitted last evening after a fall from the bed causing AMS. Pt found to have PNA. Congested cough noted by this nurse. Past Medical History:        Past Medical History:   Diagnosis Date    Abdominal pain     Anxiety     Arthritis     CAD in native artery     Cerebrovascular disease     CHF (congestive heart failure) (HCC)     Constipation     COPD (chronic obstructive pulmonary disease) (HCC)     Depression     Emphysema     GERD (gastroesophageal reflux disease)     Myocardial infarct, old     Palliative care patient 02/20/2020    Pneumonia     Tobacco abuse        Advance Directives:  Full code, however, pt states she would be ok with vent if needed, does not want CPR. Pt has POA, son, Orlin Rockwell. Requested paper work for hospital files. Also asked PC  to follow up for further conversation and paperwork for our records. Pain/Other Symptoms:    Pt has left side pain. Pt also tells me she is on Norco 10/325 3 x daily. This is prescribed by pain management for chronic pain. Activity:   As cesia          Psychological/Spiritual:   Pt has family near by as well as a dtr, Aline Gonzalez, who lives with her and a grandchild. No [employer] [facility]/family at present time. Plan:  Fluids, Solu Medrol, Neb txs, monitor closely. Patient/family discussion r/t goals: Pt is awake, alert and oriented. Pt has a hx of COPD, also a smoker. Uses O2 at HS. Lives in the home with her dtr/grand dtr. Pt has difficulty with ADL's and needs assit from family. Pt states her appetite has been good. No weight loss. Pt does not voice any needs at present time. Plan will be for pt to return to her home with family support already in place.     Palliative will follow for support/goals                     Electronically signed by Amanda Bishop RN on 7/16/2020 at 1:18 PM

## 2020-07-16 NOTE — PROGRESS NOTES
Physician Progress Note      Maya Hernandez  [facility] #:                  [id number]  :                       1945  ADMIT DATE:       7/15/2020 6:29 PM  [address] [place] DATE:  RESPONDING  PROVIDER #:        Ayesha Martinez MD          QUERY TEXT:    Pt admitted with pneumonia. Pt noted to have Hypoxia. If possible, please   document in the progress notes and discharge summary if you are evaluating   and/or treating any of the following: The medical record reflects the following:  Risk Factors: AGE< COPD, Pneumonia  Clinical Indicators: ABG: ph: 7.340, PO2: 28.0, PCO2: 59.0, CArboxy: 6.2,   noted AMS  Treatment: ABG, CXR, CT Chest, Consults, SPO2: 81% on arrival in the ED,   Placed to NC in the ED    Thank you in advance,    Jojo Hudson RN-BSN  Clinical Documentation  [address]  [email or web address]  Options provided:  -- Acute respiratory failure with hypoxia  -- Acute respiratory failure with hypercapnia  -- Chronic respiratory failure with hypoxia  -- Chronic respiratory failure with hypercapnia  -- Acute on chronic respiratory failure with hypoxia  -- Acute on chronic respiratory failure with hypercapnia  -- Refer to Clinical Documentation Reviewer    PROVIDER RESPONSE TEXT:    Acute on chronic hypoxic respiratory failure    Query created by: Gerber Fitch on 2020 11:11 AM      Electronically signed by:  Ayesha Martinez MD 2020 3:25 PM

## 2020-07-16 NOTE — PLAN OF CARE
Problem: Falls - Risk of:  Goal: Will remain free from falls  Description: Will remain free from falls  Outcome: Ongoing  Goal: Absence of physical injury  Description: Absence of physical injury  Outcome: Ongoing     Problem: Falls - Risk of:  Goal: Will remain free from falls  Description: Will remain free from falls  Outcome: Ongoing  Goal: Absence of physical injury  Description: Absence of physical injury  Outcome: Ongoing     Problem: Falls - Risk of:  Goal: Will remain free from falls  Description: Will remain free from falls  Outcome: Ongoing  Goal: Absence of physical injury  Description: Absence of physical injury  Outcome: Ongoing     Problem: Falls - Risk of:  Goal: Will remain free from falls  Description: Will remain free from falls  Outcome: Ongoing  Goal: Absence of physical injury  Description: Absence of physical injury  Outcome: Ongoing

## 2020-07-17 PROCEDURE — 2700000000 HC OXYGEN THERAPY PER DAY

## 2020-07-17 PROCEDURE — 6370000000 HC RX 637 (ALT 250 FOR IP): Performed by: HOSPITALIST

## 2020-07-17 PROCEDURE — 94640 AIRWAY INHALATION TREATMENT: CPT

## 2020-07-17 PROCEDURE — 97162 PT EVAL MOD COMPLEX 30 MIN: CPT

## 2020-07-17 PROCEDURE — 6370000000 HC RX 637 (ALT 250 FOR IP): Performed by: INTERNAL MEDICINE

## 2020-07-17 PROCEDURE — 2580000003 HC RX 258: Performed by: HOSPITALIST

## 2020-07-17 PROCEDURE — 6360000002 HC RX W HCPCS: Performed by: INTERNAL MEDICINE

## 2020-07-17 PROCEDURE — 2580000003 HC RX 258: Performed by: INTERNAL MEDICINE

## 2020-07-17 PROCEDURE — 97530 THERAPEUTIC ACTIVITIES: CPT

## 2020-07-17 PROCEDURE — 36591 DRAW BLOOD OFF VENOUS DEVICE: CPT

## 2020-07-17 PROCEDURE — 6360000002 HC RX W HCPCS: Performed by: HOSPITALIST

## 2020-07-17 PROCEDURE — 1210000000 HC MED SURG R&B

## 2020-07-17 RX ORDER — LORAZEPAM 2 MG/ML
0.5 INJECTION INTRAMUSCULAR EVERY 4 HOURS PRN
Status: DISCONTINUED | OUTPATIENT
Start: 2020-07-17 | End: 2020-07-18 | Stop reason: HOSPADM

## 2020-07-17 RX ORDER — ACETAMINOPHEN 325 MG/1
650 TABLET ORAL EVERY 6 HOURS PRN
Status: DISCONTINUED | OUTPATIENT
Start: 2020-07-17 | End: 2020-07-18 | Stop reason: HOSPADM

## 2020-07-17 RX ORDER — PREDNISONE 20 MG/1
40 TABLET ORAL DAILY
Status: DISCONTINUED | OUTPATIENT
Start: 2020-07-17 | End: 2020-07-18 | Stop reason: HOSPADM

## 2020-07-17 RX ADMIN — ACETAMINOPHEN 650 MG: 325 TABLET, FILM COATED ORAL at 02:15

## 2020-07-17 RX ADMIN — IPRATROPIUM BROMIDE AND ALBUTEROL SULFATE 1 AMPULE: .5; 3 SOLUTION RESPIRATORY (INHALATION) at 20:03

## 2020-07-17 RX ADMIN — MEMANTINE HYDROCHLORIDE 10 MG: 5 TABLET ORAL at 08:54

## 2020-07-17 RX ADMIN — GABAPENTIN 300 MG: 300 CAPSULE ORAL at 22:20

## 2020-07-17 RX ADMIN — CARVEDILOL 3.12 MG: 3.12 TABLET, FILM COATED ORAL at 17:22

## 2020-07-17 RX ADMIN — PIPERACILLIN SODIUM AND TAZOBACTAM SODIUM 3.38 G: 3; .375 INJECTION, POWDER, LYOPHILIZED, FOR SOLUTION INTRAVENOUS at 17:22

## 2020-07-17 RX ADMIN — PANTOPRAZOLE SODIUM 40 MG: 40 TABLET, DELAYED RELEASE ORAL at 05:11

## 2020-07-17 RX ADMIN — LORAZEPAM 0.5 MG: 2 INJECTION INTRAMUSCULAR; INTRAVENOUS at 15:50

## 2020-07-17 RX ADMIN — CARVEDILOL 3.12 MG: 3.12 TABLET, FILM COATED ORAL at 08:54

## 2020-07-17 RX ADMIN — GABAPENTIN 300 MG: 300 CAPSULE ORAL at 08:54

## 2020-07-17 RX ADMIN — DULOXETINE 60 MG: 60 CAPSULE, DELAYED RELEASE ORAL at 08:54

## 2020-07-17 RX ADMIN — IPRATROPIUM BROMIDE AND ALBUTEROL SULFATE 1 AMPULE: .5; 3 SOLUTION RESPIRATORY (INHALATION) at 06:41

## 2020-07-17 RX ADMIN — PIPERACILLIN SODIUM AND TAZOBACTAM SODIUM 3.38 G: 3; .375 INJECTION, POWDER, LYOPHILIZED, FOR SOLUTION INTRAVENOUS at 08:54

## 2020-07-17 RX ADMIN — PIPERACILLIN SODIUM AND TAZOBACTAM SODIUM 3.38 G: 3; .375 INJECTION, POWDER, LYOPHILIZED, FOR SOLUTION INTRAVENOUS at 00:17

## 2020-07-17 RX ADMIN — PREDNISONE 40 MG: 20 TABLET ORAL at 08:54

## 2020-07-17 RX ADMIN — DONEPEZIL HYDROCHLORIDE 5 MG: 5 TABLET, FILM COATED ORAL at 22:20

## 2020-07-17 RX ADMIN — SODIUM CHLORIDE, POTASSIUM CHLORIDE, SODIUM LACTATE AND CALCIUM CHLORIDE: 600; 310; 30; 20 INJECTION, SOLUTION INTRAVENOUS at 05:11

## 2020-07-17 RX ADMIN — IPRATROPIUM BROMIDE AND ALBUTEROL SULFATE 1 AMPULE: .5; 3 SOLUTION RESPIRATORY (INHALATION) at 10:48

## 2020-07-17 RX ADMIN — LORAZEPAM 0.5 MG: 2 INJECTION INTRAMUSCULAR; INTRAVENOUS at 22:26

## 2020-07-17 RX ADMIN — IPRATROPIUM BROMIDE AND ALBUTEROL SULFATE 1 AMPULE: .5; 3 SOLUTION RESPIRATORY (INHALATION) at 14:41

## 2020-07-17 ASSESSMENT — PAIN - FUNCTIONAL ASSESSMENT: PAIN_FUNCTIONAL_ASSESSMENT: ACTIVITIES ARE NOT PREVENTED

## 2020-07-17 ASSESSMENT — PAIN DESCRIPTION - FREQUENCY: FREQUENCY: INTERMITTENT

## 2020-07-17 ASSESSMENT — PAIN SCALES - GENERAL
PAINLEVEL_OUTOF10: 0
PAINLEVEL_OUTOF10: 9
PAINLEVEL_OUTOF10: 9

## 2020-07-17 ASSESSMENT — PAIN DESCRIPTION - PROGRESSION
CLINICAL_PROGRESSION: NOT CHANGED

## 2020-07-17 ASSESSMENT — PAIN DESCRIPTION - LOCATION: LOCATION: HEAD

## 2020-07-17 ASSESSMENT — PAIN DESCRIPTION - DESCRIPTORS: DESCRIPTORS: HEADACHE

## 2020-07-17 ASSESSMENT — PAIN DESCRIPTION - ONSET: ONSET: ON-GOING

## 2020-07-17 ASSESSMENT — PAIN DESCRIPTION - ORIENTATION: ORIENTATION: OTHER (COMMENT)

## 2020-07-17 ASSESSMENT — PAIN DESCRIPTION - DIRECTION: RADIATING_TOWARDS: NO

## 2020-07-17 ASSESSMENT — PAIN DESCRIPTION - PAIN TYPE: TYPE: ACUTE PAIN

## 2020-07-17 NOTE — PROGRESS NOTES
Progress Note    Date:7/17/2020       Room:0418/418-02  Patient Name:Jeny Mcguire     YOB: 1945     Age:75 y.o. Subjective   Interval History Status: not changed. 71-year-old lady with a history of coronary artery disease, COPD, depression, arthritis, GERD, tobacco use disorder who presented to the hospital after experiencing a fall at home and noted to have pneumonia. Patient currently be treated for acute hypoxic respiratory failure secondary to pneumonia. CTA noted to be negative for PE. This morning was sleepy but arousable, denies any chest pain, shortness of breath nausea, vomiting or dizziness. Review of Systems   Review of Systems  12 point system reviewed and negative except as stated above. Medications   Scheduled Meds:    predniSONE  40 mg Oral Daily    piperacillin-tazobactam  3.375 g Intravenous Q8H    ipratropium-albuterol  1 ampule Inhalation Q4H WA    carvedilol  3.125 mg Oral BID WC    donepezil  5 mg Oral Nightly    DULoxetine  60 mg Oral Daily    gabapentin  300 mg Oral BID    pantoprazole  40 mg Oral QAM AC    memantine  10 mg Oral Daily     Continuous Infusions:   PRN Meds:     Past History    Past Medical History:   has a past medical history of Abdominal pain, Anxiety, Arthritis, CAD in native artery, Cerebrovascular disease, CHF (congestive heart failure) ([address] Constipation, COPD (chronic obstructive pulmonary disease) ([facility] [address] Depression, Emphysema, GERD (gastroesophageal reflux disease), Myocardial infarct, old, Palliative care patient, Pneumonia, and Tobacco abuse. Social History:   reports that she has been smoking cigarettes. She started smoking about 42 years ago. She has a 36.00 pack-year smoking history. She has never used smokeless tobacco. She reports that she does not drink alcohol or use drugs.      Family History:   Family History   Problem Relation Age of Onset    Stroke Brother     Stroke Sister     Cancer Brother     Cancer Sister     Diabetes Brother     Diabetes Sister     Coronary Art Dis Other     Hypertension Other     Seizures Child     Colon Cancer Neg Hx     Colon Polyps Neg Hx     Esophageal Cancer Neg Hx     Liver Cancer Neg Hx     Liver Disease Neg Hx     Rectal Cancer Neg Hx     Stomach Cancer Neg Hx        Physical Examination      Vitals:  BP (!) 150/81   Pulse 68   Temp 97.8 °F (36.6 °C) (Temporal)   Resp 22   Ht 5' 2\" (1.575 m)   Wt 118 lb 11.2 oz (53.8 kg)   SpO2 91%   BMI 21.71 kg/m²   Temp (24hrs), Av.8 °F (36.6 °C), Min:97.3 °F (36.3 °C), Max:98.5 °F (36.9 °C)      I/O (24Hr): Intake/Output Summary (Last 24 hours) at 2020 1232  Last data filed at 2020 0501  Gross per 24 hour   Intake 1253 ml   Output 400 ml   Net 853 ml       Physical Exam  General: No acute distress lying comfortably in bed. HEENT: Atraumatic normocephalic  Cardiac: Normal S1-S2, + murmur  Respiratory: clear To auscultation bilaterally, + rhonchi with no wheezing. Abdomen: nontender to palpation, no organomegaly noted. Extremities: no tenderness, no edema, moves all extremities  Psych: Affect normal and good eye contact      Labs/Imaging/Diagnostics   Labs:  CBC:  Recent Labs     07/15/20  1849 07/16/20  1020   WBC 12.2* 7.7   RBC 3.72* 3.63*   HGB 10.9* 10.6*   HCT 36.1* 34.8*   MCV 97.0 95.9   RDW 15.8* 15.6*    172     CHEMISTRIES:  Recent Labs     07/15/20  1839 07/15/20  18420  1020   NA  --  132*  --    K 4.2 4.5  --    CL  --  93*  --    CO2  --  27  --    BUN  --  35*  --    CREATININE  --  1.4*  --    GLUCOSE  --  104  --    MG  --   --  1.9     PT/INR:No results for input(s): PROTIME, INR in the last 72 hours. APTT:No results for input(s): APTT in the last 72 hours.   LIVER PROFILE:  Recent Labs     07/15/20  1849   AST 11   ALT 5   BILITOT <0.2   ALKPHOS 68       Imaging Last 24 Hours:  Xr Ribs Left Include Chest (min 3 Views)    Result Date: 7/15/2020  EXAMINATION:  XR RIBS LEFT INCLUDE CHEST (MIN 3 VIEWS)  7/15/2020 7:15 PM HISTORY: Trauma. COMPARISON: 2/19/2020. TECHNIQUE: 2 views and 4 images were obtained of the ribs. A single view chest x-ray was obtained. RIB FINDINGS:  The ribs are intact. There is no rib fracture or rib destruction seen. CHEST X-RAY FINDINGS: The inspiration is not very deep. There is mild infiltrate in both lung bases. Heart size is upper limits of normal. There is a port catheter on the left. 1. No displaced rib fracture is seen. 2. Poor inspiration. Minimal opacity in the lung bases likely due to atelectasis. Signed by Dr Darling Saldivar on 7/15/2020 7:16 PM    Ct Head Wo Contrast    Result Date: 7/15/2020  EXAMINATION:  CT HEAD WO CONTRAST  7/15/2020 8:21 PM HISTORY: Altered mental status. TECHNIQUE: Multiple axial images were obtained through the brain without contrast infusion. Multiplanar images were reconstructed. DLP: 456 mGy-cm. Automated exposure control was utilized. COMPARISON: 2/19/2020. FINDINGS: There are no hemorrhage, edema or mass effect. There is mild atrophy with associated ventricular prominence. There is low-density in the hemispheric white matter. Vascular calcification is noted. The visualized paranasal sinuses and mastoid air cells are clear. There is no calvarial fracture. 1. No hemorrhage, edema or mass effect. 2. Atrophy and chronic small vessel ischemic white matter disease. The full report of this exam was immediately signed and available to the emergency room. The patient is currently in the emergency room. Signed by Dr Darling Saldivar on 7/15/2020 8:22 PM    Cta Pulmonary W Contrast    Result Date: 7/15/2020  EXAMINATION:  CTA PULMONARY W CONTRAST  7/15/2020 8:23 PM HISTORY: Hypoxia. Shortness of air. COMPARISON: No comparison study. DLP: 284 mGy-cm. Automated exposure control was utilized. TECHNIQUE: Spiral CT angiography was performed of the chest with contrast. Sagittal, coronal and 3-D images were reconstructed. MEDIASTINUM/VASCULAR: There is atheromatous disease of the thoracic aorta. There is mild coronary artery calcification. The pulmonary arteries are dilated. No pulmonary embolus is visualized. There are small mediastinal lymph nodes that are not pathologically enlarged. There are small hilar lymph nodes. LUNGS: There is pleural and parenchymal scarring in the lung apices. There are patchy infiltrates in the lung apices. There is additional infiltrate in the lingular segment of the left upper lobe. There are patchy peripheral infiltrates in both lower lobes. There is bronchial wall thickening. There is centrilobular emphysema. BONES/SOFT TISSUES/: There are degenerative changes of the spine. No acute bony abnormality is seen. UPPER ABDOMEN: Prior cholecystectomy. There is a partially imaged 8mm probable cyst in the liver on image 163 of series 5. Wall thickening of the visualized stomach is likely an artifact of under distention. 1. No CT evidence of pulmonary embolus. The pulmonary arteries are dilated. 2. Atheromatous disease of the thoracic aorta. Mild coronary artery calcification. Cardiomegaly. 3. Bilateral infiltrates in the upper lobes, lower lobes and lingula. These are likely infectious/inflammatory. 4. Centrilobular emphysema. 5. Prior cholecystectomy. Partially imaged probable liver cyst on the last axial image. The full report of this exam was immediately signed and available to the emergency room. The patient is currently in the emergency room.  Signed by Dr Mateusz Abbasi on 7/15/2020 8:30 PM        Assessment        Hospital Problems           Last Modified POA    * (Principal) Pneumonia 7/16/2020 Yes    COPD (chronic obstructive pulmonary disease) ([facility] [address] 7/16/2020 Yes    Hypoxia 7/16/2020 Yes          Plan:          Acute on Chronic hypoxic respiratory failure secondary to pneumonia  Mild COPD exacerbation on 2 L of oxygen at baseline  CTA chest negative for PE but noted to have bilateral upper and

## 2020-07-17 NOTE — PROGRESS NOTES
Physical Therapy    Facility/Department: [facility] 4 ONCOLOGY UNIT  Initial Assessment    NAME: Divine Espinosa  : 1945  MRN: [id number]    Date of Service: 2020    Discharge Recommendations:  Continue to assess pending progress, Subacute/Skilled Nursing Facility        Assessment   Body structures, Functions, Activity limitations: Decreased functional mobility ; Decreased endurance;Decreased strength;Decreased posture  Assessment: Pt. will benefit from PT to decrease impairments. Pt. in a deconditioned state and should not attempt mobility on her own at this time, but only with the A of staff and with a gait belt. Pt. most likely will need a RW vs a cane due to weakness. Safest way to transfer pt. is with 2A or ash steady. Anticipate pt. will benefit from additional therapy after d/c from [facility]. Treatment Diagnosis: impaired gait and mobility  Prognosis: Good  Decision Making: Medium Complexity  PT Education: Goals;PT Role;Plan of Care;Gait Training;General Safety;Transfer Training;Functional Mobility Training  Patient Education: use of call light for A with transfers, needs  REQUIRES PT FOLLOW UP: Yes  Activity Tolerance  Activity Tolerance: Patient limited by fatigue;Patient limited by endurance       Patient Diagnosis(es): The primary encounter diagnosis was Pneumonitis. Diagnoses of Hypoxia and Renal insufficiency were also pertinent to this visit. has a past medical history of Abdominal pain, Anxiety, Arthritis, CAD in native artery, Cerebrovascular disease, CHF (congestive heart failure) ([facility] [address] Constipation, COPD (chronic obstructive pulmonary disease) ([facility] [address] Depression, Emphysema, GERD (gastroesophageal reflux disease), Myocardial infarct, old, Palliative care patient, Pneumonia, and Tobacco abuse.   has a past surgical history that includes Hysterectomy; Cholecystectomy; Appendectomy; Abdominal exploration surgery; Cardiac catheterization (06/25/10);  Cardiac catheterization (12  MDL); assistance  Transfers  Sit to Stand: Minimal Assistance  Stand to sit: Minimal Assistance  Bed to Chair: Moderate assistance  Stand Pivot Transfers: Moderate Assistance  Ambulation  Ambulation?: No     Balance  Posture: Fair  Sitting - Static: Good;-  Sitting - Dynamic: Good;-  Standing - Static: Fair;+  Standing - Dynamic: Fair;-  Exercises  Comments: AROM BU/LEs x 10 reps in sitting on EOB     Plan   Plan  Times per week: 3-7  Times per day: Daily  Plan weeks: 2  Current Treatment Recommendations: Strengthening, ROM, Balance Training, Functional Mobility Training, Transfer Training, Endurance Training, Gait Training, Safety Education & Training, Positioning, Equipment Evaluation, Education, & procurement, Patient/Caregiver Education & Training  Plan Comment: cont PT per POC.   Safety Devices  Type of devices: Gait belt, Patient at risk for falls, Nurse notified, Call light within reach, Left in chair, Chair alarm in place    G-Code       OutComes Score                                                  AM-PAC Score             Goals  Short term goals  Time Frame for Short term goals: 2 wks  Short term goal 1: supine to sit indep  Short term goal 2: sit to stand indep  Short term goal 3: amb. 48' with cane or RW CGA  Patient Goals   Patient goals : go home       Therapy Time   Individual Concurrent Group Co-treatment   Time In           Time Out           Minutes                   Prem Christianson PT    Electronically signed by Prem Christianson PT on 7/17/2020 at 1:29 PM

## 2020-07-17 NOTE — PROGRESS NOTES
12 hour chart check review completed. Electronically signed by Esa Singh LPN on 1/57/6352 at 2:30 AM

## 2020-07-17 NOTE — CARE COORDINATION
Met with patient to assess discharge needs. Current Readmission Risk score is 22%. Patient is interested in discharging to a SNF for short-term Rehab. Choice list provided. Patient requested referrals to all [facility] SNFs. Referrals faxed 7/17/2020. Awaiting responses.   Electronically signed by Manuela Castro RN on 7/17/2020 at 3:08 PM

## 2020-07-17 NOTE — PROGRESS NOTES
Visited with pt to provide spiritual care with assistance in completing AD/LW. This  discussed the document with pt. Pt made her wishes known and chose healthcare decision makers. Pt initialed and signed the document and this  notarized the document. Gave original and copies to pt, copy went to unit, and copy goes to MR. Pt expressed gratitude for spiritual care and assistance in completing AD/LW.      Electronically signed by Keila Castle on 7/17/2020 at 2:13 PM

## 2020-07-17 NOTE — PLAN OF CARE
Problem: Falls - Risk of:  Goal: Will remain free from falls  Description: Will remain free from falls  7/17/2020 1210 by Ana Maria High RN  Outcome: Ongoing  7/17/2020 0345 by Marta Dylaning, LPN  Outcome: Ongoing  Goal: Absence of physical injury  Description: Absence of physical injury  7/17/2020 1210 by Ana Maria High RN  Outcome: Ongoing  7/17/2020 0345 by Marta Iveth, LPN  Outcome: Ongoing     Problem: Skin Integrity:  Goal: Will show no infection signs and symptoms  Description: Will show no infection signs and symptoms  7/17/2020 1210 by Ana Maria High RN  Outcome: Ongoing  7/17/2020 0345 by Marta Iveth, LPN  Outcome: Ongoing  Goal: Absence of new skin breakdown  Description: Absence of new skin breakdown  7/17/2020 1210 by Ana Maria High RN  Outcome: Ongoing  7/17/2020 0345 by Martaluis miguel Lazaro, LPN  Outcome: Ongoing     Problem: Discharge Planning:  Goal: Discharged to appropriate level of care  Description: Discharged to appropriate level of care  7/17/2020 1210 by Ana Maria High RN  Outcome: Ongoing  7/17/2020 0345 by Marta Lazaro, LPN  Outcome: Ongoing  Goal: Participates in care planning  Description: Participates in care planning  7/17/2020 1210 by Ana Maria High RN  Outcome: Ongoing  7/17/2020 0345 by Martaluis miguel Lazaro, LPN  Outcome: Ongoing     Problem: Airway Clearance - Ineffective:  Goal: Clear lung sounds  Description: Clear lung sounds  7/17/2020 1210 by Ana Maria High RN  Outcome: Ongoing  7/17/2020 0345 by Marta Lazaro, LPN  Outcome: Ongoing  Goal: Ability to maintain a clear airway will improve  Description: Ability to maintain a clear airway will improve  7/17/2020 1210 by Ana Maria High RN  Outcome: Ongoing  7/17/2020 0345 by Marta Dylaning, LPN  Outcome: Ongoing     Problem: Fluid Volume - Deficit:  Goal: Achieves intake and output within specified parameters  Description: Achieves intake and output within specified parameters  7/17/2020 1210 by Gene Chandler RN  Outcome: Ongoing  7/17/2020 0345 by Juliette Mendoza LPN  Outcome: Ongoing     Problem: Gas Exchange - Impaired:  Goal: Levels of oxygenation will improve  Description: Levels of oxygenation will improve  7/17/2020 1210 by Gene Chandler RN  Outcome: Ongoing  7/17/2020 0345 by Juliette Mendoza LPN  Outcome: Ongoing     Problem: Hyperthermia:  Goal: Ability to maintain a body temperature in the normal range will improve  Description: Ability to maintain a body temperature in the normal range will improve  7/17/2020 1210 by Gene Chandler RN  Outcome: Ongoing  7/17/2020 0345 by Juliette Mendoza LPN  Outcome: Ongoing     Problem: Tobacco Use:  Goal: Will participate in inpatient tobacco-use cessation counseling  Description: Will participate in inpatient tobacco-use cessation counseling  7/17/2020 1210 by Gene Chandler RN  Outcome: Ongoing  7/17/2020 0345 by Juliette Mendoza LPN  Outcome: Ongoing     Problem:  Activity Intolerance:  Goal: Ability to tolerate increased activity will improve  Description: Ability to tolerate increased activity will improve  7/17/2020 1210 by Gene Chandler RN  Outcome: Ongoing  7/17/2020 0345 by Juliette Mendoza LPN  Outcome: Ongoing     Problem: Breathing Pattern - Ineffective:  Goal: Ability to achieve and maintain a regular respiratory rate will improve  Description: Ability to achieve and maintain a regular respiratory rate will improve  7/17/2020 1210 by Gene Chandler RN  Outcome: Ongoing  7/17/2020 0345 by Juliette Mendoza LPN  Outcome: Ongoing     Problem: Pain:  Goal: Pain level will decrease  Description: Pain level will decrease  7/17/2020 1210 by Gene Chandler RN  Outcome: Ongoing  7/17/2020 0345 by Juliette Mendoza LPN  Outcome: Ongoing  Goal: Control of acute pain  Description: Control of acute pain  7/17/2020 1210 by Gene Chandler RN  Outcome: Ongoing  7/17/2020 0345 by Juliette Mendoza LPN  Outcome: Ongoing  Goal: Control of chronic pain  Description: Control of chronic pain  7/17/2020 1210 by Mackenzie Collins RN  Outcome: Ongoing  7/17/2020 0345 by Jayshree Reaves LPN  Outcome: Ongoing

## 2020-07-17 NOTE — PLAN OF CARE
Problem: Falls - Risk of:  Goal: Will remain free from falls  Description: Will remain free from falls  7/17/2020 0345 by Alexandre Sorensen LPN  Outcome: Ongoing  7/16/2020 1723 by Ariel Campos RN  Outcome: Ongoing  Goal: Absence of physical injury  Description: Absence of physical injury  7/17/2020 0345 by Alexandre Sorensen LPN  Outcome: Ongoing  7/16/2020 1723 by Ariel Campos RN  Outcome: Ongoing     Problem: Skin Integrity:  Goal: Will show no infection signs and symptoms  Description: Will show no infection signs and symptoms  7/17/2020 0345 by Alexandre Sorensen LPN  Outcome: Ongoing  7/16/2020 1723 by Ariel Campos RN  Outcome: Ongoing  Goal: Absence of new skin breakdown  Description: Absence of new skin breakdown  7/17/2020 0345 by Alexandre Sorensen LPN  Outcome: Ongoing  7/16/2020 1723 by Ariel Campos RN  Outcome: Ongoing     Problem: Discharge Planning:  Goal: Discharged to appropriate level of care  Description: Discharged to appropriate level of care  Outcome: Ongoing  Goal: Participates in care planning  Description: Participates in care planning  Outcome: Ongoing     Problem: Airway Clearance - Ineffective:  Goal: Clear lung sounds  Description: Clear lung sounds  Outcome: Ongoing  Goal: Ability to maintain a clear airway will improve  Description: Ability to maintain a clear airway will improve  Outcome: Ongoing     Problem: Fluid Volume - Deficit:  Goal: Achieves intake and output within specified parameters  Description: Achieves intake and output within specified parameters  Outcome: Ongoing     Problem: Gas Exchange - Impaired:  Goal: Levels of oxygenation will improve  Description: Levels of oxygenation will improve  Outcome: Ongoing     Problem: Hyperthermia:  Goal: Ability to maintain a body temperature in the normal range will improve  Description: Ability to maintain a body temperature in the normal range will improve  Outcome: Ongoing     Problem: Tobacco Use:  Goal: Will participate in inpatient tobacco-use cessation counseling  Description: Will participate in inpatient tobacco-use cessation counseling  Outcome: Ongoing     Problem:  Activity Intolerance:  Goal: Ability to tolerate increased activity will improve  Description: Ability to tolerate increased activity will improve  Outcome: Ongoing     Problem: Breathing Pattern - Ineffective:  Goal: Ability to achieve and maintain a regular respiratory rate will improve  Description: Ability to achieve and maintain a regular respiratory rate will improve  Outcome: Ongoing     Problem: Pain:  Goal: Pain level will decrease  Description: Pain level will decrease  Outcome: Ongoing  Goal: Control of acute pain  Description: Control of acute pain  Outcome: Ongoing  Goal: Control of chronic pain  Description: Control of chronic pain  Outcome: Ongoing

## 2020-07-18 VITALS
WEIGHT: 118.7 LBS | SYSTOLIC BLOOD PRESSURE: 121 MMHG | HEART RATE: 83 BPM | TEMPERATURE: 97.4 F | OXYGEN SATURATION: 91 % | DIASTOLIC BLOOD PRESSURE: 83 MMHG | BODY MASS INDEX: 21.84 KG/M2 | RESPIRATION RATE: 14 BRPM | HEIGHT: 62 IN

## 2020-07-18 LAB
ALBUMIN SERPL-MCNC: 3.2 G/DL (ref 3.5–5.2)
ALP BLD-CCNC: 61 U/L (ref 35–104)
ALT SERPL-CCNC: 6 U/L (ref 5–33)
ANION GAP SERPL CALCULATED.3IONS-SCNC: 14 MMOL/L (ref 7–19)
AST SERPL-CCNC: 16 U/L (ref 5–32)
BILIRUB SERPL-MCNC: 0.3 MG/DL (ref 0.2–1.2)
BUN BLDV-MCNC: 13 MG/DL (ref 8–23)
CALCIUM SERPL-MCNC: 9.5 MG/DL (ref 8.8–10.2)
CHLORIDE BLD-SCNC: 83 MMOL/L (ref 98–111)
CO2: 27 MMOL/L (ref 22–29)
CREAT SERPL-MCNC: 0.6 MG/DL (ref 0.5–0.9)
GFR AFRICAN AMERICAN: >59
GFR NON-AFRICAN AMERICAN: >60
GLUCOSE BLD-MCNC: 89 MG/DL (ref 74–109)
MAGNESIUM: 1.5 MG/DL (ref 1.6–2.4)
POTASSIUM SERPL-SCNC: 3.8 MMOL/L (ref 3.5–5)
SODIUM BLD-SCNC: 124 MMOL/L (ref 136–145)
TOTAL PROTEIN: 6.6 G/DL (ref 6.6–8.7)

## 2020-07-18 PROCEDURE — 2580000003 HC RX 258: Performed by: INTERNAL MEDICINE

## 2020-07-18 PROCEDURE — 6370000000 HC RX 637 (ALT 250 FOR IP): Performed by: INTERNAL MEDICINE

## 2020-07-18 PROCEDURE — 6360000002 HC RX W HCPCS: Performed by: HOSPITALIST

## 2020-07-18 PROCEDURE — 36415 COLL VENOUS BLD VENIPUNCTURE: CPT

## 2020-07-18 PROCEDURE — 2700000000 HC OXYGEN THERAPY PER DAY

## 2020-07-18 PROCEDURE — 6360000002 HC RX W HCPCS: Performed by: INTERNAL MEDICINE

## 2020-07-18 PROCEDURE — 94640 AIRWAY INHALATION TREATMENT: CPT

## 2020-07-18 PROCEDURE — 6370000000 HC RX 637 (ALT 250 FOR IP): Performed by: HOSPITALIST

## 2020-07-18 PROCEDURE — 83735 ASSAY OF MAGNESIUM: CPT

## 2020-07-18 PROCEDURE — 80053 COMPREHEN METABOLIC PANEL: CPT

## 2020-07-18 RX ORDER — ZOLPIDEM TARTRATE 5 MG/1
5 TABLET ORAL ONCE
Status: COMPLETED | OUTPATIENT
Start: 2020-07-18 | End: 2020-07-18

## 2020-07-18 RX ORDER — PREDNISONE 20 MG/1
40 TABLET ORAL DAILY
Qty: 6 TABLET | Refills: 0 | Status: SHIPPED | OUTPATIENT
Start: 2020-07-19 | End: 2020-07-22

## 2020-07-18 RX ORDER — HEPARIN SODIUM (PORCINE) LOCK FLUSH IV SOLN 100 UNIT/ML 100 UNIT/ML
300 SOLUTION INTRAVENOUS PRN
Status: DISCONTINUED | OUTPATIENT
Start: 2020-07-18 | End: 2020-07-18 | Stop reason: HOSPADM

## 2020-07-18 RX ORDER — CEFDINIR 300 MG/1
300 CAPSULE ORAL 2 TIMES DAILY
Qty: 10 CAPSULE | Refills: 0 | Status: SHIPPED | OUTPATIENT
Start: 2020-07-18 | End: 2020-07-23

## 2020-07-18 RX ORDER — MAGNESIUM SULFATE IN WATER 40 MG/ML
4 INJECTION, SOLUTION INTRAVENOUS ONCE
Status: COMPLETED | OUTPATIENT
Start: 2020-07-18 | End: 2020-07-18

## 2020-07-18 RX ORDER — AZITHROMYCIN 250 MG/1
250 TABLET, FILM COATED ORAL DAILY
Qty: 3 TABLET | Refills: 0 | Status: SHIPPED | OUTPATIENT
Start: 2020-07-18 | End: 2020-07-21

## 2020-07-18 RX ADMIN — MEMANTINE HYDROCHLORIDE 10 MG: 5 TABLET ORAL at 08:42

## 2020-07-18 RX ADMIN — HEPARIN 300 UNITS: 100 SYRINGE at 14:44

## 2020-07-18 RX ADMIN — IPRATROPIUM BROMIDE AND ALBUTEROL SULFATE 1 AMPULE: .5; 3 SOLUTION RESPIRATORY (INHALATION) at 14:21

## 2020-07-18 RX ADMIN — PREDNISONE 40 MG: 20 TABLET ORAL at 08:42

## 2020-07-18 RX ADMIN — PANTOPRAZOLE SODIUM 40 MG: 40 TABLET, DELAYED RELEASE ORAL at 08:42

## 2020-07-18 RX ADMIN — CARVEDILOL 3.12 MG: 3.12 TABLET, FILM COATED ORAL at 08:42

## 2020-07-18 RX ADMIN — PIPERACILLIN SODIUM AND TAZOBACTAM SODIUM 3.38 G: 3; .375 INJECTION, POWDER, LYOPHILIZED, FOR SOLUTION INTRAVENOUS at 01:27

## 2020-07-18 RX ADMIN — IPRATROPIUM BROMIDE AND ALBUTEROL SULFATE 1 AMPULE: .5; 3 SOLUTION RESPIRATORY (INHALATION) at 07:31

## 2020-07-18 RX ADMIN — IPRATROPIUM BROMIDE AND ALBUTEROL SULFATE 1 AMPULE: .5; 3 SOLUTION RESPIRATORY (INHALATION) at 11:02

## 2020-07-18 RX ADMIN — PIPERACILLIN SODIUM AND TAZOBACTAM SODIUM 3.38 G: 3; .375 INJECTION, POWDER, LYOPHILIZED, FOR SOLUTION INTRAVENOUS at 08:42

## 2020-07-18 RX ADMIN — DULOXETINE 60 MG: 60 CAPSULE, DELAYED RELEASE ORAL at 08:42

## 2020-07-18 RX ADMIN — MAGNESIUM SULFATE HEPTAHYDRATE 4 G: 40 INJECTION, SOLUTION INTRAVENOUS at 10:38

## 2020-07-18 RX ADMIN — GABAPENTIN 300 MG: 300 CAPSULE ORAL at 08:42

## 2020-07-18 RX ADMIN — ZOLPIDEM TARTRATE 5 MG: 5 TABLET ORAL at 01:48

## 2020-07-18 NOTE — CARE COORDINATION
Spoke with patient regarding MD orders for [facility] services. Patient agreeable and has chosen [facility]. Referral Faxed. [address] [place] [address] [phone]. [phone]. Please notify [address] [place] [address] when patient discharges and fax DC Summary,  DC med list and any new [facility] orders. The Patient and/or patient representative Felice Clemencia (patient) was provided with a choice of provider and agrees   with the discharge plan. [x] Yes [] No    Freedom of choice list was provided with basic dialogue that supports the patient's individualized plan of care/goals, treatment preferences and shares the quality data associated with the providers.  [x] Yes [] No  Electronically signed by Carmelo Monson on 7/18/2020 at 12:24 PM

## 2020-07-18 NOTE — DISCHARGE SUMMARY
Discharge Summary      Date:7/18/2020        Patient Sherron Campos     YOB: 1945     Age:75 y.o. Admit Date:7/15/2020   Admission Condition:fair   Discharged Condition:stable  Discharge Date: 07/18/20       Discharge Diagnoses   Principal Problem:    Pneumonia  Active Problems:    COPD (chronic obstructive pulmonary disease) ([address]    Hypoxia  Resolved Problems:    * No resolved hospital problems. [facility] Stay   Narrative of Hospital Course:     81-year-old lady with a history of coronary artery disease, COPD, depression, arthritis, GERD, tobacco use disorder who presented to the hospital after experiencing a fall at home and noted to have pneumonia. Patient was treated for acute hypoxic respiratory failure secondary to pneumonia. CTA noted to be negative for PE. Patient was treated in-house with Zosyn and discharged to complete course with Omnicef and azithromycin outpatient. Was also given a 3 days course outpatient of prednisone to finish in 5 days total course. Patient was maintained at her baseline 2 L of oxygen, tobacco cessation was reinforced again this admission. Was evaluated by therapy team recommending SNF however family decided to take patient home with home health. Was encouraged to follow-up outpatient with PCP for continuous management of chronic medical conditions. Physical Exam  General: No acute distress lying comfortably in bed. HEENT: Atraumatic normocephalic  Cardiac: Normal S1-S2, + murmur  Respiratory: clear To auscultation bilaterally, + rhonchi with no wheezing. Abdomen: nontender to palpation, no organomegaly noted.   Extremities: no tenderness, no edema, moves all extremities  Psych: Affect normal and good eye contact      Consultants:   IP CONSULT TO PALLIATIVE CARE  IP CONSULT TO SOCIAL WORK  PALLIATIVE CARE EVAL  IP CONSULT TO HOME CARE NEEDS    Time Spent on Discharge:  >30 minutes were spent in patient examination, evaluation, counseling as well as medication reconciliation, prescriptions for required medications, discharge plan and follow up. Surgeries/Procedures Performed:  NONE      Significant Diagnostic Studies:   Recent Labs:  CBC:   Lab Results   Component Value Date    WBC 7.7 07/16/2020    RBC 3.63 07/16/2020    HGB 10.6 07/16/2020    HCT 34.8 07/16/2020    MCV 95.9 07/16/2020    MCH 29.2 07/16/2020    MCHC 30.5 07/16/2020    RDW 15.6 07/16/2020     07/16/2020     BMP:    Lab Results   Component Value Date    GLUCOSE 89 07/18/2020     07/18/2020    K 3.8 07/18/2020    K 4.5 07/15/2020    CL 83 07/18/2020    CO2 27 07/18/2020    ANIONGAP 14 07/18/2020    BUN 13 07/18/2020    CREATININE 0.6 07/18/2020    CALCIUM 9.5 07/18/2020    LABGLOM >60 07/18/2020    GFRAA >59 07/18/2020       Radiology Last 7 Days:  Xr Ribs Left Include Chest (min 3 Views)    Result Date: 7/15/2020  1. No displaced rib fracture is seen. 2. Poor inspiration. Minimal opacity in the lung bases likely due to atelectasis. Signed by Dr Nj Has on 7/15/2020 7:16 PM    Ct Head Wo Contrast    Result Date: 7/15/2020  1. No hemorrhage, edema or mass effect. 2. Atrophy and chronic small vessel ischemic white matter disease. The full report of this exam was immediately signed and available to the emergency room. The patient is currently in the emergency room. Signed by Dr Nj Has on 7/15/2020 8:22 PM    Cta Pulmonary W Contrast    Result Date: 7/15/2020  1. No CT evidence of pulmonary embolus. The pulmonary arteries are dilated. 2. Atheromatous disease of the thoracic aorta. Mild coronary artery calcification. Cardiomegaly. 3. Bilateral infiltrates in the upper lobes, lower lobes and lingula. These are likely infectious/inflammatory. 4. Centrilobular emphysema. 5. Prior cholecystectomy. Partially imaged probable liver cyst on the last axial image. The full report of this exam was immediately signed and available to the emergency room.  The patient is currently in the emergency room. Signed by Dr Heaven Fuentes on 7/15/2020 8:30 PM      Discharge Plan   Disposition: Home Health  Provider Follow-Up:   Lisa Cunningham, APRN  [address] [facility] [address] [address] [facility] MD Fede Wild[address] [place]  [phone]               Patient Instructions   Diet: cardiac diet    Activity: activity as tolerated      Discharge Medications         Medication List      START taking these medications    azithromycin 250 MG tablet  Commonly known as:  ZITHROMAX  Take 1 tablet by mouth daily for 3 days     cefdinir 300 MG capsule  Commonly known as:  OMNICEF  Take 1 capsule by mouth 2 times daily for 5 days     predniSONE 20 MG tablet  Commonly known as:  DELTASONE  Take 2 tablets by mouth daily for 3 days  Start taking on:  July 19, 2020        CONTINUE taking these medications    * albuterol (5 MG/ML) 0.5% nebulizer solution  Commonly known as:  PROVENTIL  Take 0.5 mLs by nebulization every 6 hours as needed for Wheezing     * albuterol sulfate  (90 Base) MCG/ACT inhaler  Commonly known as:  Ventolin HFA  Inhale 2 puffs into the lungs every 6 hours as needed for Wheezing     carvedilol 3.125 MG tablet  Commonly known as:  COREG  Take 1 tablet by mouth 2 times daily (with meals)     clonazePAM 0.5 MG tablet  Commonly known as:  KlonoPIN  Take 1 tablet by mouth 3 times daily as needed for Anxiety for up to 30 days.      donepezil 5 MG tablet  Commonly known as:  ARICEPT  Take 1 tablet by mouth nightly     DULoxetine 60 MG extended release capsule  Commonly known as:  CYMBALTA  Take 1 capsule by mouth daily     fluticasone-umeclidin-vilant 100-62.5-25 MCG/INH Aepb  Commonly known as:  TRELEGY ELLIPTA  Inhale 1 puff into the lungs daily     gabapentin 300 MG capsule  Commonly known as:  NEURONTIN     HYDROcodone-acetaminophen  MG per tablet  Commonly known as:  NORCO     isosorbide mononitrate 30 MG extended release tablet  Commonly known as:  IMDUR  Take 1 tablet by mouth daily     memantine 10 MG tablet  Commonly known as:  NAMENDA  Take 1 tablet by mouth 2 times daily     nitroGLYCERIN 0.4 MG SL tablet  Commonly known as:  NitroQuick  Place 1 tablet under the tongue every 5 minutes as needed for Chest pain     olopatadine 0.2 % Soln ophthalmic solution  Commonly known as:  PATADAY  Place 1 drop into both eyes daily     ondansetron 4 MG tablet  Commonly known as:  ZOFRAN  Take 1 tablet by mouth every 8 hours as needed for Nausea or Vomiting     OXYGEN  Inhale 2 L into the lungs daily     pantoprazole 40 MG tablet  Commonly known as:  PROTONIX  Take 1 tablet by mouth every morning (before breakfast)     Prolia 60 MG/ML Sosy SC injection  Generic drug:  denosumab  INJECT 1ML UNDER THE SKIN EVERY 6 MONTHS     Toviaz 8 MG Tb24  Generic drug:  Fesoterodine Fumarate ER  Take 1 tablet by mouth daily     traZODone 150 MG tablet  Commonly known as:  DESYREL  Take 1 tablet by mouth nightly         * This list has 2 medication(s) that are the same as other medications prescribed for you. Read the directions carefully, and ask your doctor or other care provider to review them with you.             STOP taking these medications    furosemide 20 MG tablet  Commonly known as:  LASIX           Where to Get Your Medications      These medications were sent to [address], [place] [address], [address]    Phone:  [phone]   · azithromycin 250 MG tablet  · cefdinir 300 MG capsule  · predniSONE 20 MG tablet         Electronically signed by Christina Flores MD on 7/18/20 at 11:38 AM CDT

## 2020-07-20 ENCOUNTER — TELEPHONE (OUTPATIENT)
Dept: PRIMARY CARE CLINIC | Age: 75
End: 2020-07-20

## 2020-07-20 ENCOUNTER — CARE COORDINATION (OUTPATIENT)
Dept: CASE MANAGEMENT | Age: 75
End: 2020-07-20

## 2020-07-20 NOTE — TELEPHONE ENCOUNTER
[facility] Transitions Initial Follow Up Call    Outreach made within 2 business days of discharge: Yes    Patient: Iraida Hugo Patient : 1945   MRN: [id number]  Reason for Admission: pneumonia  Discharge Date: 20       Spoke with: daughter     Discharge department/facility: [address] [place] [address] Interactive Patient Contact:  Was patient able to fill all prescriptions: Yes  Was patient instructed to bring all medications to the follow-up visit: Yes  Is patient taking all medications as directed in the discharge summary?  Yes  Does patient understand their discharge instructions: Yes  Does patient have questions or concerns that need addressed prior to 7-14 day follow up office visit: no    Scheduled appointment with PCP within 7-14 days    Follow Up  Future Appointments   Date Time Provider Abelardo Singleton   2020  1:00 PM Malena Herman, [address] [place] [address]

## 2020-07-20 NOTE — TELEPHONE ENCOUNTER
[facility] [place] called stating pt was dishcarged from [facility] this weekend and [place] was ordered. Wants to know if Verluis Castelante will follow.     Called and informed Yaakov Muller we will follow w pt

## 2020-07-20 NOTE — CARE COORDINATION
[address] [facility] Transitions Initial Follow Up Call    Call within 2 business days of discharge: Yes    Patient: Iraida Hugo Patient : 1945   MRN: [id number]  Reason for Admission:   Discharge Date: 20 RARS: Readmission Risk Score: 21      Last Discharge [facility]       Complaint Diagnosis Description Type Department Provider    7/15/20 Altered Mental Status Pneumonitis . .. ED to Hosp-Admission (Discharged) (ADMITTED) [facility] Jelly Cruz MD; John Bates . .. Spoke with: N/A    Facility: [place] [address]    Non-face-to-face services provided:  Reviewed encounter information for continuity of care prior to follow up phone call - chart notes, consults, progress notes, test results, med list, appointments, AVS, other information. Care Transitions 24 Hour Call    Do you have all of your prescriptions and are they filled?:  Yes  Post Acute Services:  Home Health  Care Transitions Interventions         Follow Up : Attempted to make contact with Jeny for BPCI initial follow up call post discharge from the hospital without success. Unable to leave a message regarding intent of call and call back information. Will try again my next business day.      Future Appointments   Date Time Provider Abelardo Singleton   2020  1:00 PM [address] [address] [facility]       [facility], [facility]

## 2020-07-21 ENCOUNTER — TELEPHONE (OUTPATIENT)
Dept: PRIMARY CARE CLINIC | Age: 75
End: 2020-07-21

## 2020-07-21 ENCOUNTER — CARE COORDINATION (OUTPATIENT)
Dept: CASE MANAGEMENT | Age: 75
End: 2020-07-21

## 2020-07-21 LAB
BLOOD CULTURE, ROUTINE: NORMAL
CULTURE, BLOOD 2: NORMAL

## 2020-07-21 NOTE — CARE COORDINATION
[facility] Transitions Initial Follow Up Call    Call within 2 business days of discharge: Yes    Patient: Sandip Hardy Patient : 1945   MRN: [id number]    Discharge Date: 20 RARS: Readmission Risk Score: 21      Last Discharge [facility]       Complaint Diagnosis Description Type Department Provider    7/15/20 Altered Mental Status Pneumonitis . .. ED to Hosp-Admission (Discharged) (ADMITTED) [facility] Jesse Cordova MD; Rigoberto Jenkins . .. Spoke with: Makenna Ellington, daughter    Facility: [address] [place] [address]     Non-face-to-face services provided:  Reviewed encounter information for continuity of care prior to follow up phone call - chart notes, consults, progress notes, test results, med list, appointments, AVS, other information. Care Transitions 24 Hour Call    Schedule Follow Up Appointment with PCP:  Completed  Do you have a copy of your discharge instructions?:  Yes  Do you have all of your prescriptions and are they filled?:  Yes  Have you been contacted by a [address]?:  No  Have you scheduled your follow up appointment?:  Yes  How are you going to get to your appointment?:  Car - family or friend to transport  [address] you feel like you have everything you need to keep you well at home?:  Yes  Care Transitions Interventions         Follow Up: Placed a call to the home and spoke with Makenna Ellington, patient's daughter. She reported that she is not feeling too well today. She said patient could not give any specifics, just that she is not feeling well. She said she is having some shortness of breath, but pulse ox is running around 91%. She is using her oxygen at all time. She said she is coughing, but can't get anything up. She said she has been trying to get her to drink more water and she will not. She is doing neb treatments. No fever or other symptoms reported. Eating fair, sleeping more than usual, but just \"seems tired, weak. \"  Arouses easily when she needs to be awake. Goes to see PCP this week. Did not want to review meds at this time. Aware of CTN follow up.         Future Appointments   Date Time Provider Abelardo Singleton   7/23/2020  1:00 PM [facility], [address] [place] [address]-[place]       José Miguel Eskridge, [place]

## 2020-07-22 ENCOUNTER — TELEPHONE (OUTPATIENT)
Dept: PRIMARY CARE CLINIC | Age: 75
End: 2020-07-22

## 2020-07-22 ENCOUNTER — CARE COORDINATION (OUTPATIENT)
Dept: CASE MANAGEMENT | Age: 75
End: 2020-07-22

## 2020-07-22 NOTE — CARE COORDINATION
[facility] [address] Transitions Follow Up Call    2020    Patient: Elisabeth Martinez  Patient : 1945   MRN: [id number]  Reason for Admission:   Discharge Date: 20 RARS: Readmission Risk Score: 21         Spoke with: Clifton Del Rio, patient's kaiaugher    Care Transitions Subsequent and Final Call    Subsequent and Final Calls  Do you have any ongoing symptoms?:  No  Have your medications changed?:  No  Do you have any questions related to your medications?:  No  Do you currently have any active services?:  Yes  Are you currently active with any services?:  Home Health  Do you have any needs or concerns that I can assist you with?:  No  Identified Barriers:  None  Care Transitions Interventions  Other Interventions: Follow Up : Spoke with Clifton Del Rio, patient's daughter today for follow up BPCI-A call. She says patient is having a better day, just weak. She says she ate some meatloaf last night for supper. Her mouth is sore so it is hard for her right now to eat anything substantial. She says she does not like Ensure, so advised some [employer] Instant Breakfast to mix with her own milk, as it might be more palatable for her. She says she will try it. She says PT is coming today and Aquilino Saravia is coming on Friday. She says she feels like she is on the mend, up sitting beside her during the call. No problems or issues at this time. Will follow up at a later time.    Future Appointments   Date Time Provider Abelardo Singleton   2020  1:00 PM Bereket Pineda, [address] [facility]       Marcia Mcardle, [facility]

## 2020-07-23 ENCOUNTER — VIRTUAL VISIT (OUTPATIENT)
Dept: PRIMARY CARE CLINIC | Age: 75
End: 2020-07-23
Payer: MEDICARE

## 2020-07-23 PROCEDURE — 3017F COLORECTAL CA SCREEN DOC REV: CPT | Performed by: NURSE PRACTITIONER

## 2020-07-23 PROCEDURE — 4040F PNEUMOC VAC/ADMIN/RCVD: CPT | Performed by: NURSE PRACTITIONER

## 2020-07-23 PROCEDURE — G8399 PT W/DXA RESULTS DOCUMENT: HCPCS | Performed by: NURSE PRACTITIONER

## 2020-07-23 PROCEDURE — 1123F ACP DISCUSS/DSCN MKR DOCD: CPT | Performed by: NURSE PRACTITIONER

## 2020-07-23 PROCEDURE — 99214 OFFICE O/P EST MOD 30 MIN: CPT | Performed by: NURSE PRACTITIONER

## 2020-07-23 PROCEDURE — 1111F DSCHRG MED/CURRENT MED MERGE: CPT | Performed by: NURSE PRACTITIONER

## 2020-07-23 PROCEDURE — G8427 DOCREV CUR MEDS BY ELIG CLIN: HCPCS | Performed by: NURSE PRACTITIONER

## 2020-07-23 PROCEDURE — 1090F PRES/ABSN URINE INCON ASSESS: CPT | Performed by: NURSE PRACTITIONER

## 2020-07-23 ASSESSMENT — ENCOUNTER SYMPTOMS
SORE THROAT: 0
SHORTNESS OF BREATH: 1
COUGH: 1
RHINORRHEA: 0
NAUSEA: 0
WHEEZING: 0
SINUS PRESSURE: 0
VOMITING: 0
ABDOMINAL PAIN: 0
DIARRHEA: 0

## 2020-07-23 NOTE — PROGRESS NOTES
[address]  Phone [phone]   Fax [phone] VISIT: 2020    Dre Herman- : 1945    Chief Complaint:Jeny is a 77 y.o. female who is here for COPD     HPI  The patient is called for a video conference via [employer]. me. S/P HOSPITALIZATION:  She was admitted on 7-  She was discharged on 2020  She was dx with pneumonia. She was sent home on Omnicef & Zithromax. Denies SOA. She is breathing much better. Cough is improved. She continues on oxygen 2L  Denies any confusion  She is taking Trelegy daily and albuterol prn  She has PT coming to the home. She has a home health nurse coming out twice a week too. She continues to smoke 1ppd. She got sick \"overnight\" per patient's daughter    2020  Na+ 124  Chloride 83  K+ 3.8    COVID: [facility]    Hospital weighed her at 118 pounds    Her daughter reports that she is slowly getting back to herself. MOODS:  Stable  She continues on Cymbalta 60 mg and Klonopin 0.5 mg prn. She does not need a refill on Klonopin today. She is sleeping well. CP: Denies CP  She has not needed any Nitroglycerin recently. CHRONIC PAIN:  She had a video chat with pain mgt on Monday. She continues on Neurontin & [place] prn for back pain. This is keeping her pain tolerable     vitals were not taken for this visit. There is no height or weight on file to calculate BMI. Results for orders placed or performed during the hospital encounter of 07/15/20   Culture, Blood 1    Specimen: Blood   Result Value Ref Range    Blood Culture, Routine No growth after 5 days of incubation. Culture, Blood 2    Specimen: Blood   Result Value Ref Range    Culture, Blood 2 No growth after 5 days of incubation.     CBC Auto Differential   Result Value Ref Range    WBC 12.2 (H) 4.8 - 10.8 K/uL    RBC 3.72 (L) 4.20 - 5.40 M/uL    Hemoglobin 10.9 (L) 12.0 - 16.0 g/dL    Hematocrit 36.1 (L) 37.0 - 47.0 %    MCV 97.0 Hemoglobin 07/15/2020 10.9*    Hematocrit 07/15/2020 36.1*    MCV 07/15/2020 97.0     MCH 07/15/2020 29.3     MCHC 07/15/2020 30.2*    RDW 07/15/2020 15.8*    Platelets 44/24/4970 175     MPV 07/15/2020 10.3     Neutrophils % 07/15/2020 82.8*    Lymphocytes % 07/15/2020 9.7*    Monocytes % 07/15/2020 6.7     Eosinophils % 07/15/2020 0.2     Basophils % 07/15/2020 0.3     Neutrophils Absolute 07/15/2020 10.1*    Immature Granulocytes # 07/15/2020 0.0     Lymphocytes Absolute 07/15/2020 1.2     Monocytes Absolute 07/15/2020 0.80     Eosinophils Absolute 07/15/2020 0.00     Basophils Absolute 07/15/2020 0.00     Sodium 07/15/2020 132*    Potassium reflex Magnesi* 07/15/2020 4.5     Chloride 07/15/2020 93*    CO2 07/15/2020 27     Anion Gap 07/15/2020 12     Glucose 07/15/2020 104     BUN 07/15/2020 35*    CREATININE 07/15/2020 1.4*    GFR Non- 07/15/2020 37*    GFR  07/15/2020 44*    Calcium 07/15/2020 9.2     Total Protein 07/15/2020 6.9     Alb 07/15/2020 3.7     Total Bilirubin 07/15/2020 <0.2     Alkaline Phosphatase 07/15/2020 68     ALT 07/15/2020 5     AST 07/15/2020 11     pH, Arterial 07/15/2020 7.340*    pCO2, Arterial 07/15/2020 59.0*    pO2, Arterial 07/15/2020 28.0*    HCO3, Arterial 07/15/2020 31.8*    Base Excess, Arterial 07/15/2020 4.7*    Hemoglobin, Art, Extended 07/15/2020 11.2*    O2 Sat, Arterial 07/15/2020 56.1*    Carboxyhgb, Arterial 07/15/2020 6.2*    Methemoglobin, Arterial 07/15/2020 2.5     O2 Content, Arterial 07/15/2020 8.8     O2 Therapy 07/15/2020 Unknown     P-R Interval 07/15/2020 146     QRS Duration 07/15/2020 88     Q-T Interval 07/15/2020 362     QTc Calculation (Bazett) 07/15/2020 395     P Axis 07/15/2020 71     T Axis 07/15/2020 52     Troponin 07/15/2020 <0.01     Potassium, Whole Blood 07/15/2020 4.2     Blood Culture, Routine 07/15/2020 No growth after 5 days of incubation.      Culture, Blood 2 07/15/2020 No growth after 5 days of incubation.      SARS-CoV-2, PCR 07/15/2020 Not Detected     WBC 07/16/2020 7.7     RBC 07/16/2020 3.63*    Hemoglobin 07/16/2020 10.6*    Hematocrit 07/16/2020 34.8*    MCV 07/16/2020 95.9     MCH 07/16/2020 29.2     MCHC 07/16/2020 30.5*    RDW 07/16/2020 15.6*    Platelets 62/68/5365 172     MPV 07/16/2020 10.1     Neutrophils % 07/16/2020 93.8*    Lymphocytes % 07/16/2020 4.7*    Monocytes % 07/16/2020 1.0     Eosinophils % 07/16/2020 0.0     Basophils % 07/16/2020 0.0     Neutrophils Absolute 07/16/2020 7.2     Immature Granulocytes # 07/16/2020 0.0     Lymphocytes Absolute 07/16/2020 0.4*    Monocytes Absolute 07/16/2020 0.10     Eosinophils Absolute 07/16/2020 0.00     Basophils Absolute 07/16/2020 0.00     Magnesium 07/16/2020 1.9     Sodium 07/18/2020 124*    Potassium 07/18/2020 3.8     Chloride 07/18/2020 83*    CO2 07/18/2020 27     Anion Gap 07/18/2020 14     Glucose 07/18/2020 89     BUN 07/18/2020 13     CREATININE 07/18/2020 0.6     GFR Non- 07/18/2020 >60     GFR  07/18/2020 >59     Calcium 07/18/2020 9.5     Total Protein 07/18/2020 6.6     Alb 07/18/2020 3.2*    Total Bilirubin 07/18/2020 0.3     Alkaline Phosphatase 07/18/2020 61     ALT 07/18/2020 6     AST 07/18/2020 16     Magnesium 07/18/2020 1.5*   Orders Only on 02/25/2020   Component Date Value    Sodium 02/25/2020 133*    Potassium 02/25/2020 3.9     Chloride 02/25/2020 95*    CO2 02/25/2020 27     Anion Gap 02/25/2020 11     Glucose 02/25/2020 91     BUN 02/25/2020 17     CREATININE 02/25/2020 0.8     GFR Non- 02/25/2020 >60     Calcium 02/25/2020 8.3*    Total Protein 02/25/2020 6.6     Alb 02/25/2020 3.5     Total Bilirubin 02/25/2020 <0.2     Alkaline Phosphatase 02/25/2020 64     ALT 02/25/2020 7     AST 02/25/2020 10     WBC 02/25/2020 7.2     RBC 02/25/2020 3.32*    Hemoglobin 02/25/2020 9.6*    Hematocrit 02/25/2020 32.1*    MCV 02/25/2020 96.7     MCH 02/25/2020 28.9     MCHC 02/25/2020 29.9*    RDW 02/25/2020 16.2*    Platelets 84/20/2228 393     MPV 02/25/2020 9.8     Neutrophils % 02/25/2020 59.4     Lymphocytes % 02/25/2020 24.8     Monocytes % 02/25/2020 11.9*    Eosinophils % 02/25/2020 2.1     Basophils % 02/25/2020 0.3     Neutrophils Absolute 02/25/2020 4.3     Immature Granulocytes # 02/25/2020 0.1     Lymphocytes Absolute 02/25/2020 1.8     Monocytes Absolute 02/25/2020 0.90     Eosinophils Absolute 02/25/2020 0.20     Basophils Absolute 02/25/2020 0.00     Ferritin 02/25/2020 69.0     Iron 02/25/2020 43    Admission on 02/19/2020, Discharged on 02/21/2020   Component Date Value    WBC 02/19/2020 11.5*    RBC 02/19/2020 2.94*    Hemoglobin 02/19/2020 8.6*    Hematocrit 02/19/2020 28.6*    MCV 02/19/2020 97.3     MCH 02/19/2020 29.3     MCHC 02/19/2020 30.1*    RDW 02/19/2020 15.7*    Platelets 57/39/3448 207     MPV 02/19/2020 10.1     Neutrophils % 02/19/2020 81.7*    Lymphocytes % 02/19/2020 10.3*    Monocytes % 02/19/2020 6.3     Eosinophils % 02/19/2020 1.0     Basophils % 02/19/2020 0.2     Neutrophils Absolute 02/19/2020 9.4*    Immature Granulocytes # 02/19/2020 0.1     Lymphocytes Absolute 02/19/2020 1.2     Monocytes Absolute 02/19/2020 0.70     Eosinophils Absolute 02/19/2020 0.10     Basophils Absolute 02/19/2020 0.00     Sodium 02/19/2020 127*    Potassium reflex Magnesi* 02/19/2020 3.8     Chloride 02/19/2020 92*    CO2 02/19/2020 24     Anion Gap 02/19/2020 11     Glucose 02/19/2020 96     BUN 02/19/2020 33*    CREATININE 02/19/2020 1.0*    GFR Non- 02/19/2020 54*    Calcium 02/19/2020 7.4*    Total Protein 02/19/2020 6.3*    Alb 02/19/2020 3.0*    Total Bilirubin 02/19/2020 <0.2     Alkaline Phosphatase 02/19/2020 80     ALT 02/19/2020 6     AST 02/19/2020 11     P-R Interval 02/19/2020 02/20/2020 29.3     MCHC 02/20/2020 30.1*    RDW 02/20/2020 15.5*    Platelets 22/08/6212 208     MPV 02/20/2020 9.8     TSH 02/20/2020 2.330     Vitamin B-12 02/20/2020 484     Ammonia 02/20/2020 30     Sodium 02/20/2020 135*    Potassium reflex Magnesi* 02/20/2020 4.0     Chloride 02/20/2020 99     CO2 02/20/2020 25     Anion Gap 02/20/2020 11     Glucose 02/20/2020 209*    BUN 02/20/2020 22     CREATININE 02/20/2020 0.8     GFR Non- 02/20/2020 >60     Calcium 02/20/2020 7.8*    Total Protein 02/20/2020 6.5*    Alb 02/20/2020 3.0*    Total Bilirubin 02/20/2020 <0.2     Alkaline Phosphatase 02/20/2020 77     ALT 02/20/2020 6     AST 02/20/2020 10     Sodium 02/20/2020 136     Potassium reflex Magnesi* 02/20/2020 4.4     Chloride 02/20/2020 102     CO2 02/20/2020 26     Anion Gap 02/20/2020 8     Glucose 02/20/2020 188*    BUN 02/20/2020 23     CREATININE 02/20/2020 0.8     GFR Non- 02/20/2020 >60     Calcium 02/20/2020 7.7*    Total Protein 02/20/2020 6.1*    Alb 02/20/2020 3.0*    Total Bilirubin 02/20/2020 <0.2     Alkaline Phosphatase 02/20/2020 67     ALT 02/20/2020 5     AST 02/20/2020 8     Sodium 02/21/2020 135*    Potassium reflex Magnesi* 02/21/2020 4.0     Chloride 02/21/2020 98     CO2 02/21/2020 24     Anion Gap 02/21/2020 13     Glucose 02/21/2020 161*    BUN 02/21/2020 22     CREATININE 02/21/2020 0.8     GFR Non- 02/21/2020 >60     Calcium 02/21/2020 8.0*    Total Protein 02/21/2020 6.5*    Alb 02/21/2020 2.9*    Total Bilirubin 02/21/2020 <0.2     Alkaline Phosphatase 02/21/2020 74     ALT 02/21/2020 6     AST 02/21/2020 11     WBC 02/21/2020 7.9     RBC 02/21/2020 2.80*    Hemoglobin 02/21/2020 8.1*    Hematocrit 02/21/2020 26.5*    MCV 02/21/2020 94.6     MCH 02/21/2020 28.9     MCHC 02/21/2020 30.6*    RDW 02/21/2020 15.9*    Platelets 67/67/1862 259     MPV 02/21/2020 10.4     Procedure Laterality Date    ABDOMINAL EXPLORATION SURGERY      APPENDECTOMY      CARDIAC CATHETERIZATION  06/25/10    patent  stent noted in LAD,EF is estimated to be 60%    CARDIAC CATHETERIZATION  7/13/12  MDL    EF  60%    CHOLECYSTECTOMY      COLONOSCOPY  10/2014    Dr Alan Cunningham GASTROINTESTINAL ENDOSCOPY  10/1/14    Dr Funk Bracket: food bezoar; esophagitis, gastritis, duodenitis; biopsies neg h-pylori       Social History     Tobacco Use    Smoking status: Current Every Day Smoker     Packs/day: 1.00     Years: 36.00     Pack years: 36.00     Types: Cigarettes     Start date: 1978    Smokeless tobacco: Never Used   Substance Use Topics    Alcohol use: No     Alcohol/week: 0.0 standard drinks       Family History   Problem Relation Age of Onset    Stroke Brother     Stroke Sister     Cancer Brother     Cancer Sister     Diabetes Brother     Diabetes Sister     Coronary Art Dis Other     Hypertension Other     Seizures Child     Colon Cancer Neg Hx     Colon Polyps Neg Hx     Esophageal Cancer Neg Hx     Liver Cancer Neg Hx     Liver Disease Neg Hx     Rectal Cancer Neg Hx     Stomach Cancer Neg Hx        Review of Systems   Constitutional: Negative for chills and fever. HENT: Negative for congestion, ear pain, rhinorrhea, sinus pressure and sore throat. Respiratory: Positive for cough (stable, chronic) and shortness of breath (chronic, stable). Negative for wheezing. Cardiovascular: Negative for chest pain and leg swelling. Gastrointestinal: Negative for abdominal pain, diarrhea, nausea and vomiting. GERD improved on Protonix 40 mg   Neurological:        Memory loss: stable   Psychiatric/Behavioral: Positive for sleep disturbance (stable on current regimen, Trazodone). The patient is nervous/anxious (stable on current regimen, Cymbalta & Klonopin).         Physical Exam  Constitutional:       Appearance: Normal appearance. She is normal weight. HENT:      Head: Normocephalic. Right Ear: External ear normal.      Left Ear: External ear normal.      Nose: Nose normal.   Eyes:      General:         Right eye: No discharge. Left eye: No discharge. Neck:      Musculoskeletal: Normal range of motion. Pulmonary:      Effort: Pulmonary effort is normal. No respiratory distress. Skin:     General: Skin is dry. Neurological:      General: No focal deficit present. Mental Status: She is alert and oriented to person, place, and time. Mental status is at baseline. Psychiatric:         Mood and Affect: Mood normal.         Behavior: Behavior normal.         Thought Content: Thought content normal.         Judgment: Judgment normal.       ASSESSMENT      ICD-10-CM    1. Chronic obstructive pulmonary disease, unspecified COPD type ([facility] [address]  J44.9  Continue Trelegy daily  Continue albuterol as needed  Continue 2 L of oxygen   2. HAILEY (generalized anxiety disorder)  F41.1  Continue Cymbalta  Continue Klonopin as needed (patient reports she does not need refill today)   3. Hyponatremia  G28.4 Basic Metabolic Panel  Limit free water  Patient and caregiver report patient has not been confused  Patient to come in for repeat BMP   4. Chronic narcotic dependence (HCC)  F11.20  Smoking cessation recommended   5. Lumbar back pain  M54.5  Keep follow-up with pain management   6. Pneumonia of both lungs due to infectious organism, unspecified part of lung  J18.9  Much improved  Patient has completed Omnicef and Zithromax. Patient to continue Trelegy daily and albuterol as needed  Continue oxygen 2 L         PLAN    Orders Placed This Encounter   Procedures    Basic Metabolic Panel        Return in about 1 month (around 8/23/2020), or if symptoms worsen or fail to improve. There are no Patient Instructions on file for this visit.               Controlled Substances Monitoring:  n/a            Divine Espinosa is a 77 y.o. female being evaluated by a Virtual Visit (video visit) encounter to address concerns as mentioned above. A caregiver was present when appropriate. Due to this being a TeleHealth encounter (During BXU-29 public health emergency), evaluation of the following organ systems was limited: Vitals/Constitutional/EENT/Resp/CV/GI//MS/Neuro/Skin/Heme-Lymph-Imm. Pursuant to the emergency declaration under the [address] [place] [address], [address] [place] [address] authority and the [employer] Act, this Virtual Visit was conducted with patient's (and/or legal guardian's) consent, to reduce the patient's risk of exposure to COVID-19 and provide necessary medical care. The patient (and/or legal guardian) has also been advised to contact this office for worsening conditions or problems, and seek emergency medical treatment and/or call 911 if deemed necessary. Patient identification was verified at the start of the visit: Yes    Total time spent for this encounter: Not billed by time    Services were provided through a video synchronous discussion virtually to substitute for in-person clinic visit. Patient and provider were located at their individual homes. --NAVEEN Reynoso on 7/23/2020 at 1:18 PM    An electronic signature was used to authenticate this note.

## 2020-07-24 ENCOUNTER — TELEPHONE (OUTPATIENT)
Dept: PRIMARY CARE CLINIC | Age: 75
End: 2020-07-24

## 2020-07-24 RX ORDER — DOXYCYCLINE HYCLATE 100 MG
100 TABLET ORAL 2 TIMES DAILY
Qty: 20 TABLET | Refills: 0 | Status: SHIPPED | OUTPATIENT
Start: 2020-07-24 | End: 2020-08-03

## 2020-07-24 NOTE — TELEPHONE ENCOUNTER
Called and spoke with daughter letting her know that her prolia was here. She thinks patient has pneumonia and waiting on a return call. Message was sent to MM and waiting on response.  Told daughter to call and let us know when patient was feeling better and we will proceed with injection

## 2020-07-24 NOTE — TELEPHONE ENCOUNTER
Praveen Salazar called with [facility], she was there with pt and she is having a lot of crackles on right side of chest, some on the left. O2 is 91%. Has a little back pain. No difficultly breathing.

## 2020-07-24 NOTE — TELEPHONE ENCOUNTER
Patient was recently d/c from [facility] for pneumonia. Her baseline O2 is in the low 90's due to her COPD  However, I want to get her back on antibiotics due to the crackles.     Recommend doxcycline 100 mg BID x10 days

## 2020-07-28 ENCOUNTER — CARE COORDINATION (OUTPATIENT)
Dept: CASE MANAGEMENT | Age: 75
End: 2020-07-28

## 2020-07-28 NOTE — CARE COORDINATION
[facility] Transitions Follow Up Call    2020    Patient: Sandip Hardy  Patient : 1945   MRN: [id number]  Reason for Admission:   Discharge Date: 20 RARS: Readmission Risk Score: 21      Follow Up: Attempted to contact patient for BPCI-A follow up. Unable to reach patient. Left message with contact information and request for call back.      Future Appointments   Date Time Provider Abelardo Singleton   2020  1:15 PM [facility]

## 2020-07-29 ENCOUNTER — TELEPHONE (OUTPATIENT)
Dept: PRIMARY CARE CLINIC | Age: 75
End: 2020-07-29

## 2020-07-29 NOTE — TELEPHONE ENCOUNTER
ELICIA Timmons  called. She went to see pt today and on room air, her O2 sat was 76%, she had her move rooms to where her Oxygen tank was and she dropped to 69%. after 2L of O2 after 10+ minutes and a breathing tx, her O2 level was 90%.

## 2020-07-29 NOTE — TELEPHONE ENCOUNTER
If oxygen level remains low or if she is SOA, wheezing, coughing or running fever then she needs to be evaluated in the ED or here

## 2020-07-30 ENCOUNTER — TELEPHONE (OUTPATIENT)
Dept: PRIMARY CARE CLINIC | Age: 75
End: 2020-07-30

## 2020-07-30 NOTE — TELEPHONE ENCOUNTER
[facility] called pt still has O2 stats in the 70s when she up walking without Oxygen on. She likes NOT to wear it due to smoking frquently and her daughter smoking in there. Has decreased lung sounds, not coughing up anything. Patricia Daigle discussed them smoking and need to stop. Also that if they didn't, they would discharge them bc high risk. When she put her Oxygen on went from 86%-96%. She said that she doesn't think pt understood the seriousness of this.

## 2020-07-30 NOTE — TELEPHONE ENCOUNTER
I too have talked to the patient about her smoking and the significance of her lung disease in the past.  Unfortunately she has continued to smoke despite recommendations to stop. Thank you for the update.

## 2020-08-05 NOTE — PROGRESS NOTES
Physician Progress Note      PATIENT:               Miac Browne  [facility] #:                  [id number]  :                       1945  ADMIT DATE:       7/15/2020 6:29 PM  [address] [place] DATE:        2020 3:07 PM  RESPONDING  PROVIDER #:        Jag Bobo MD          QUERY TEXT:    Pt admitted with Pneumonia. If possible, please document in the progress   notes and discharge summary if you are evaluating and /or treating any of the   following: The medical record reflects the following:  Risk Factors: Age, Pneumonia,  Clinical Indicators: WBC: 12.2, AMS and YFN (CR1.4 down to 0.6 with IV fluids)  Treatment: Emile Jameson, Zithromax    Thank you in advance,    Shirin Moore RN-BSN  Clinical Documentation  [address]  [email or web address]  Options provided:  -- *** (organism, if known) Sepsis, present on admission  -- *** (organism if known) Sepsis, now resolved, ***  -- *** (organism if known) Sepsis, not present on admission,  -- No Sepsis, localized infection only *** (if known)  -- Sepsis was ruled out  -- Other - I will add my own diagnosis  -- Disagree - Not applicable / Not valid  -- Disagree - Clinically unable to determine / Unknown  -- Refer to Clinical Documentation Reviewer    PROVIDER RESPONSE TEXT:    This patient has *** localized infection only, patient is not septic.     Query created by: Luna Austin on 2020 8:07 AM      Electronically signed by:  Jag Bobo MD 2020 7:29 PM

## 2020-08-06 ENCOUNTER — CARE COORDINATION (OUTPATIENT)
Dept: CASE MANAGEMENT | Age: 75
End: 2020-08-06

## 2020-08-07 ENCOUNTER — TELEPHONE (OUTPATIENT)
Dept: PRIMARY CARE CLINIC | Age: 75
End: 2020-08-07

## 2020-08-07 NOTE — TELEPHONE ENCOUNTER
Georgi Orosco RN with [facility] called, pt got mad at [facility] for saying something about smoking, so she discharged [facility] services effective today.

## 2020-08-18 ENCOUNTER — CARE COORDINATION (OUTPATIENT)
Dept: CASE MANAGEMENT | Age: 75
End: 2020-08-18

## 2020-08-18 NOTE — CARE COORDINATION
[facility] Transitions Follow Up Call    2020    Patient: Sandip Hardy  Patient : 1945   MRN: [id number]  Reason for Admission:   Discharge Date: 20 RARS: Readmission Risk Score: 21    Follow Up: Attempted to contact patient for BPCI-A follow up. Unable to reach patient. Left message with contact information and request for call back.       Future Appointments   Date Time Provider Abelardo Singleton   2020 11:45 AM [facility]

## 2020-08-19 NOTE — CARE COORDINATION
Received return phone call from Ioana Varela, patient's daughter. Left message stating she was returning phone call. She can be reached at [phone]. Contacted patient's daughter Ioana Varela. Ioana Varela stated Kasia Fields has good days and bad days. She said today is a good day. Breathing is at baseline. Kasia Fields continues to use 2 L of continuous oxygen. Patient has occasional cough d/t smoking per Ioana Varela. Discussed safety issues and concerns with smoking and oxygen. Patient does not smoke while using oxygen. Ioana Varela denies Kasia Fields having any c/o chest pain/discomfort, pressure, tightness, fever, chills. She stated Kasia Fields has a virtual visit on Friday, 8/21/20 with PCP. No needs or concerns at this time.       Rex Deng, GUERLINE  Care Transition Nurse

## 2020-08-26 ENCOUNTER — VIRTUAL VISIT (OUTPATIENT)
Dept: PRIMARY CARE CLINIC | Age: 75
End: 2020-08-26
Payer: MEDICARE

## 2020-08-26 PROCEDURE — 99406 BEHAV CHNG SMOKING 3-10 MIN: CPT | Performed by: NURSE PRACTITIONER

## 2020-08-26 PROCEDURE — 1123F ACP DISCUSS/DSCN MKR DOCD: CPT | Performed by: NURSE PRACTITIONER

## 2020-08-26 PROCEDURE — G8399 PT W/DXA RESULTS DOCUMENT: HCPCS | Performed by: NURSE PRACTITIONER

## 2020-08-26 PROCEDURE — 99214 OFFICE O/P EST MOD 30 MIN: CPT | Performed by: NURSE PRACTITIONER

## 2020-08-26 PROCEDURE — 1090F PRES/ABSN URINE INCON ASSESS: CPT | Performed by: NURSE PRACTITIONER

## 2020-08-26 PROCEDURE — 3017F COLORECTAL CA SCREEN DOC REV: CPT | Performed by: NURSE PRACTITIONER

## 2020-08-26 PROCEDURE — G8428 CUR MEDS NOT DOCUMENT: HCPCS | Performed by: NURSE PRACTITIONER

## 2020-08-26 PROCEDURE — 4040F PNEUMOC VAC/ADMIN/RCVD: CPT | Performed by: NURSE PRACTITIONER

## 2020-08-26 RX ORDER — GUAIFENESIN 600 MG/1
1200 TABLET, EXTENDED RELEASE ORAL 2 TIMES DAILY
Qty: 120 TABLET | Refills: 5 | Status: SHIPPED | OUTPATIENT
Start: 2020-08-26 | End: 2020-09-25

## 2020-08-26 RX ORDER — CLONAZEPAM 0.5 MG/1
0.5 TABLET ORAL 3 TIMES DAILY PRN
Qty: 75 TABLET | Refills: 0 | Status: SHIPPED | OUTPATIENT
Start: 2020-08-26 | End: 2020-10-28 | Stop reason: ALTCHOICE

## 2020-08-26 ASSESSMENT — ENCOUNTER SYMPTOMS
ABDOMINAL PAIN: 0
SORE THROAT: 0
SHORTNESS OF BREATH: 1
VOMITING: 0
COUGH: 1
NAUSEA: 0
WHEEZING: 0
SINUS PRESSURE: 0
RHINORRHEA: 0
DIARRHEA: 0

## 2020-08-26 NOTE — PROGRESS NOTES
[address]  [address]  Phone [phone]   Fax [phone] VISIT: 2020    Torres Lunsford- : 1945    Chief Complaint:Jeny is a 77 y.o. female who is here for Anxiety     HPI  The patient is called for a video conference via [facility]. me. COPD:  She was sick last week. She cancelled her appointment here. She did not want to come in. \"I don't feel bad now. \"  \"I am breathing pretty good. \"  She is not using the Trelegy. \"The last time I used it my mouth got real sore. \"  Reports intermittent SOA. She is getting up and walking   SOA is worse with activity. She is taking albuterol 3x/day. This does help open her lungs up. She continues to smoke 1/2-1 ppd. She is not interested in smoking cessation at this time  Oxygen has been running 88-90%  She is only wearing her oxygen if she is napping or sleeping. MOODS:  She is taking Klonopin prn & Cymbalta 60 mg daily. Moods have been stable. She has 22 tablets left of Klonopin. She always takes one at night. She will need a refill before next month. Last fill of Klonopin according to Bruce Luna was 2020. INSOMNIA:  She is sleeping good. She continues on Trazodone 150 mg nightly & Klonopin nightly/prn. She is requesting Klonopin refill today. They do not have a way to check her BP     vitals were not taken for this visit. There is no height or weight on file to calculate BMI. Results for orders placed or performed during the hospital encounter of 07/15/20   Culture, Blood 1    Specimen: Blood   Result Value Ref Range    Blood Culture, Routine No growth after 5 days of incubation. Culture, Blood 2    Specimen: Blood   Result Value Ref Range    Culture, Blood 2 No growth after 5 days of incubation.     CBC Auto Differential   Result Value Ref Range    WBC 12.2 (H) 4.8 - 10.8 K/uL    RBC 3.72 (L) 4.20 - 5.40 M/uL    Hemoglobin 10.9 (L) 12.0 - 16.0 g/dL    Hematocrit 36.1 (L) 37.0 - 47.0 %    MCV 97.0 81.0 - 99.0 fL    MCH 29.3 27.0 - 31.0 pg    MCHC 30.2 (L) 33.0 - 37.0 g/dL    RDW 15.8 (H) 11.5 - 14.5 %    Platelets 199 491 - 734 K/uL    MPV 10.3 9.4 - 12.3 fL    Neutrophils % 82.8 (H) 50.0 - 65.0 %    Lymphocytes % 9.7 (L) 20.0 - 40.0 %    Monocytes % 6.7 0.0 - 10.0 %    Eosinophils % 0.2 0.0 - 5.0 %    Basophils % 0.3 0.0 - 1.0 %    Neutrophils Absolute 10.1 (H) 1.5 - 7.5 K/uL    Immature Granulocytes # 0.0 K/uL    Lymphocytes Absolute 1.2 1.1 - 4.5 K/uL    Monocytes Absolute 0.80 0.00 - 0.90 K/uL    Eosinophils Absolute 0.00 0.00 - 0.60 K/uL    Basophils Absolute 0.00 0.00 - 0.20 K/uL   Comprehensive Metabolic Panel w/ Reflex to MG   Result Value Ref Range    Sodium 132 (L) 136 - 145 mmol/L    Potassium reflex Magnesium 4.5 3.5 - 5.0 mmol/L    Chloride 93 (L) 98 - 111 mmol/L    CO2 27 22 - 29 mmol/L    Anion Gap 12 7 - 19 mmol/L    Glucose 104 74 - 109 mg/dL    BUN 35 (H) 8 - 23 mg/dL    CREATININE 1.4 (H) 0.5 - 0.9 mg/dL    GFR Non- 37 (A) >60    GFR  44 (L) >59    Calcium 9.2 8.8 - 10.2 mg/dL    Total Protein 6.9 6.6 - 8.7 g/dL    Alb 3.7 3.5 - 5.2 g/dL    Total Bilirubin <0.2 0.2 - 1.2 mg/dL    Alkaline Phosphatase 68 35 - 104 U/L    ALT 5 5 - 33 U/L    AST 11 5 - 32 U/L   Blood Gas, Arterial   Result Value Ref Range    pH, Arterial 7.340 (L) 7.350 - 7.450    pCO2, Arterial 59.0 (H) 35.0 - 45.0 mmHg    pO2, Arterial 28.0 (LL) 80.0 - 100.0 mmHg    HCO3, Arterial 31.8 (H) 22.0 - 26.0 mmol/L    Base Excess, Arterial 4.7 (H) -2.0 - 2.0 mmol/L    Hemoglobin, Art, Extended 11.2 (L) 12.0 - 16.0 g/dL    O2 Sat, Arterial 56.1 (LL) >92 %    Carboxyhgb, Arterial 6.2 (H) 0.0 - 5.0 %    Methemoglobin, Arterial 2.5 <1.5 %    O2 Content, Arterial 8.8 Not Established mL/dL    O2 Therapy Unknown    Troponin   Result Value Ref Range    Troponin <0.01 0.00 - 0.03 ng/mL   Potassium, Whole Blood   Result Value Ref Range    Potassium, Whole Blood 4.2    COVID-19   Result Value Ref Range SARS-CoV-2, PCR Not Detected Not Detected   CBC Auto Differential   Result Value Ref Range    WBC 7.7 4.8 - 10.8 K/uL    RBC 3.63 (L) 4.20 - 5.40 M/uL    Hemoglobin 10.6 (L) 12.0 - 16.0 g/dL    Hematocrit 34.8 (L) 37.0 - 47.0 %    MCV 95.9 81.0 - 99.0 fL    MCH 29.2 27.0 - 31.0 pg    MCHC 30.5 (L) 33.0 - 37.0 g/dL    RDW 15.6 (H) 11.5 - 14.5 %    Platelets 464 741 - 890 K/uL    MPV 10.1 9.4 - 12.3 fL    Neutrophils % 93.8 (H) 50.0 - 65.0 %    Lymphocytes % 4.7 (L) 20.0 - 40.0 %    Monocytes % 1.0 0.0 - 10.0 %    Eosinophils % 0.0 0.0 - 5.0 %    Basophils % 0.0 0.0 - 1.0 %    Neutrophils Absolute 7.2 1.5 - 7.5 K/uL    Immature Granulocytes # 0.0 K/uL    Lymphocytes Absolute 0.4 (L) 1.1 - 4.5 K/uL    Monocytes Absolute 0.10 0.00 - 0.90 K/uL    Eosinophils Absolute 0.00 0.00 - 0.60 K/uL    Basophils Absolute 0.00 0.00 - 0.20 K/uL   Magnesium   Result Value Ref Range    Magnesium 1.9 1.6 - 2.4 mg/dL   Comprehensive Metabolic Panel   Result Value Ref Range    Sodium 124 (L) 136 - 145 mmol/L    Potassium 3.8 3.5 - 5.0 mmol/L    Chloride 83 (L) 98 - 111 mmol/L    CO2 27 22 - 29 mmol/L    Anion Gap 14 7 - 19 mmol/L    Glucose 89 74 - 109 mg/dL    BUN 13 8 - 23 mg/dL    CREATININE 0.6 0.5 - 0.9 mg/dL    GFR Non-African American >60 >60    GFR African American >59 >59    Calcium 9.5 8.8 - 10.2 mg/dL    Total Protein 6.6 6.6 - 8.7 g/dL    Alb 3.2 (L) 3.5 - 5.2 g/dL    Total Bilirubin 0.3 0.2 - 1.2 mg/dL    Alkaline Phosphatase 61 35 - 104 U/L    ALT 6 5 - 33 U/L    AST 16 5 - 32 U/L   Magnesium   Result Value Ref Range    Magnesium 1.5 (L) 1.6 - 2.4 mg/dL   EKG 12 Lead   Result Value Ref Range    P-R Interval 146 ms    QRS Duration 88 ms    Q-T Interval 362 ms    QTc Calculation (Bazett) 395 ms    P Axis 71 degrees    T Axis 52 degrees     have reviewed the following with the Ms.  [facility]   Lab Review   Admission on 07/15/2020, Discharged on 07/18/2020   Component Date Value    WBC 07/15/2020 12.2*    RBC 07/15/2020 3.72*    Hemoglobin 07/15/2020 10.9*    Hematocrit 07/15/2020 36.1*    MCV 07/15/2020 97.0     MCH 07/15/2020 29.3     MCHC 07/15/2020 30.2*    RDW 07/15/2020 15.8*    Platelets 92/42/2156 175     MPV 07/15/2020 10.3     Neutrophils % 07/15/2020 82.8*    Lymphocytes % 07/15/2020 9.7*    Monocytes % 07/15/2020 6.7     Eosinophils % 07/15/2020 0.2     Basophils % 07/15/2020 0.3     Neutrophils Absolute 07/15/2020 10.1*    Immature Granulocytes # 07/15/2020 0.0     Lymphocytes Absolute 07/15/2020 1.2     Monocytes Absolute 07/15/2020 0.80     Eosinophils Absolute 07/15/2020 0.00     Basophils Absolute 07/15/2020 0.00     Sodium 07/15/2020 132*    Potassium reflex Magnesi* 07/15/2020 4.5     Chloride 07/15/2020 93*    CO2 07/15/2020 27     Anion Gap 07/15/2020 12     Glucose 07/15/2020 104     BUN 07/15/2020 35*    CREATININE 07/15/2020 1.4*    GFR Non- 07/15/2020 37*    GFR  07/15/2020 44*    Calcium 07/15/2020 9.2     Total Protein 07/15/2020 6.9     Alb 07/15/2020 3.7     Total Bilirubin 07/15/2020 <0.2     Alkaline Phosphatase 07/15/2020 68     ALT 07/15/2020 5     AST 07/15/2020 11     pH, Arterial 07/15/2020 7.340*    pCO2, Arterial 07/15/2020 59.0*    pO2, Arterial 07/15/2020 28.0*    HCO3, Arterial 07/15/2020 31.8*    Base Excess, Arterial 07/15/2020 4.7*    Hemoglobin, Art, Extended 07/15/2020 11.2*    O2 Sat, Arterial 07/15/2020 56.1*    Carboxyhgb, Arterial 07/15/2020 6.2*    Methemoglobin, Arterial 07/15/2020 2.5     O2 Content, Arterial 07/15/2020 8.8     O2 Therapy 07/15/2020 Unknown     P-R Interval 07/15/2020 146     QRS Duration 07/15/2020 88     Q-T Interval 07/15/2020 362     QTc Calculation (Bazett) 07/15/2020 395     P Axis 07/15/2020 71     T Axis 07/15/2020 52     Troponin 07/15/2020 <0.01     Potassium, Whole Blood 07/15/2020 4.2     Blood Culture, Routine 07/15/2020 No growth after 5 days of incubation.      Culture, Blood 2 07/15/2020 No growth after 5 days of incubation.  SARS-CoV-2, PCR 07/15/2020 Not Detected     WBC 07/16/2020 7.7     RBC 07/16/2020 3.63*    Hemoglobin 07/16/2020 10.6*    Hematocrit 07/16/2020 34.8*    MCV 07/16/2020 95.9     MCH 07/16/2020 29.2     MCHC 07/16/2020 30.5*    RDW 07/16/2020 15.6*    Platelets 49/52/4424 172     MPV 07/16/2020 10.1     Neutrophils % 07/16/2020 93.8*    Lymphocytes % 07/16/2020 4.7*    Monocytes % 07/16/2020 1.0     Eosinophils % 07/16/2020 0.0     Basophils % 07/16/2020 0.0     Neutrophils Absolute 07/16/2020 7.2     Immature Granulocytes # 07/16/2020 0.0     Lymphocytes Absolute 07/16/2020 0.4*    Monocytes Absolute 07/16/2020 0.10     Eosinophils Absolute 07/16/2020 0.00     Basophils Absolute 07/16/2020 0.00     Magnesium 07/16/2020 1.9     Sodium 07/18/2020 124*    Potassium 07/18/2020 3.8     Chloride 07/18/2020 83*    CO2 07/18/2020 27     Anion Gap 07/18/2020 14     Glucose 07/18/2020 89     BUN 07/18/2020 13     CREATININE 07/18/2020 0.6     GFR Non- 07/18/2020 >60     GFR  07/18/2020 >59     Calcium 07/18/2020 9.5     Total Protein 07/18/2020 6.6     Alb 07/18/2020 3.2*    Total Bilirubin 07/18/2020 0.3     Alkaline Phosphatase 07/18/2020 61     ALT 07/18/2020 6     AST 07/18/2020 16     Magnesium 07/18/2020 1.5*     Copies of these are in the chart. Prior to Visit Medications    Medication Sig Taking? Authorizing Provider   umeclidinium-vilanterol (ANORO ELLIPTA) 62.5-25 MCG/INH AEPB inhaler Inhale 1 puff into the lungs daily IN place of Trelegy Yes NAVEEN Dior   clonazePAM (KLONOPIN) 0.5 MG tablet Take 1 tablet by mouth 3 times daily as needed for Anxiety for up to 30 days.  Yes NAVEEN Dior   guaiFENesin (MUCINEX) 600 MG extended release tablet Take 2 tablets by mouth 2 times daily Yes NAVEEN Dior   clonazePAM (KLONOPIN) 0.5 MG tablet Take 1 tablet by mouth 3 times daily as needed for Anxiety for up to 30 days. NAVEEN Dior   carvedilol (COREG) 3.125 MG tablet Take 1 tablet by mouth 2 times daily (with meals)  NAVEEN Ojeda   denosumab (PROLIA) 60 MG/ML SOSY SC injection INJECT 1ML UNDER THE SKIN EVERY 6 MONTHS  NAVEEN Dior   pantoprazole (PROTONIX) 40 MG tablet Take 1 tablet by mouth every morning (before breakfast)  NAVEEN Dior   traZODone (DESYREL) 150 MG tablet Take 1 tablet by mouth nightly  NAVEEN Dior   Fesoterodine Fumarate ER (TOVIAZ) 8 MG TB24 Take 1 tablet by mouth daily  NAVEEN Dior   donepezil (ARICEPT) 5 MG tablet Take 1 tablet by mouth nightly  NAVEEN Dior   olopatadine (PATADAY) 0.2 % SOLN ophthalmic solution Place 1 drop into both eyes daily  NAVEEN Dior   DULoxetine (CYMBALTA) 60 MG extended release capsule Take 1 capsule by mouth daily  NAVEEN Dior   gabapentin (NEURONTIN) 300 MG capsule Take 300 mg by mouth 2 times daily.    Historical Provider, MD   albuterol sulfate HFA (VENTOLIN HFA) 108 (90 Base) MCG/ACT inhaler Inhale 2 puffs into the lungs every 6 hours as needed for Wheezing  NAVEEN Ojeda   isosorbide mononitrate (IMDUR) 30 MG extended release tablet Take 1 tablet by mouth daily  NAVEEN Dior   memantine (NAMENDA) 10 MG tablet Take 1 tablet by mouth 2 times daily  NAVEEN Dior   ondansetron (ZOFRAN) 4 MG tablet Take 1 tablet by mouth every 8 hours as needed for Nausea or Vomiting  NAVEEN Dior   albuterol (PROVENTIL) (5 MG/ML) 0.5% nebulizer solution Take 0.5 mLs by nebulization every 6 hours as needed for Wheezing  NAVEEN Dior   nitroGLYCERIN (NITROQUICK) 0.4 MG SL tablet Place 1 tablet under the tongue every 5 minutes as needed for Chest pain  NAVEEN Dior   OXYGEN Inhale 2 L into the lungs daily  Malena Moryl, APRN   HYDROcodone-acetaminophen (NORCO)  MG per tablet TAKE ONE TABLET BY MOUTH EVERY 8 HOURS AS NEEDED- dr Claudette Shah Historical Provider, MD       Allergies: Codeine and Sulfa antibiotics    Past Medical History:   Diagnosis Date    Abdominal pain     Anxiety     Arthritis     CAD in native artery     Cerebrovascular disease     CHF (congestive heart failure) (HCC)     Constipation     COPD (chronic obstructive pulmonary disease) (HCC)     Depression     Emphysema     GERD (gastroesophageal reflux disease)     Myocardial infarct, old     Palliative care patient 02/20/2020    Pneumonia     Tobacco abuse        Past Surgical History:   Procedure Laterality Date    ABDOMINAL EXPLORATION SURGERY      APPENDECTOMY      CARDIAC CATHETERIZATION  06/25/10    patent  stent noted in LAD,EF is estimated to be 60%    CARDIAC CATHETERIZATION  7/13/12  MDL    EF  60%    CHOLECYSTECTOMY      COLONOSCOPY  10/2014    Dr Blanchard Fill ENDOSCOPY  10/1/14    Dr Brad Simpson: food bezoar; esophagitis, gastritis, duodenitis; biopsies neg h-pylori       Social History     Tobacco Use    Smoking status: Current Every Day Smoker     Packs/day: 1.00     Years: 36.00     Pack years: 36.00     Types: Cigarettes     Start date: 1978    Smokeless tobacco: Never Used   Substance Use Topics    Alcohol use: No     Alcohol/week: 0.0 standard drinks       Family History   Problem Relation Age of Onset    Stroke Brother     Stroke Sister     Cancer Brother     Cancer Sister     Diabetes Brother     Diabetes Sister     Coronary Art Dis Other     Hypertension Other     Seizures Child     Colon Cancer Neg Hx     Colon Polyps Neg Hx     Esophageal Cancer Neg Hx     Liver Cancer Neg Hx     Liver Disease Neg Hx     Rectal Cancer Neg Hx     Stomach Cancer Neg Hx        Review of Systems   Constitutional: Negative for chills and fever. HENT: Negative for congestion, ear pain, rhinorrhea, sinus pressure and sore throat.     Respiratory: Positive for cough (stable, chronic) and shortness of breath (chronic, stable). Negative for wheezing. Cardiovascular: Positive for chest pain (once or twice, she did not take nitroglycerin). Negative for leg swelling. Gastrointestinal: Negative for abdominal pain, diarrhea, nausea and vomiting. Neurological:        Memory loss: stable   Psychiatric/Behavioral: Positive for sleep disturbance (stable on current regimen, Trazodone). The patient is nervous/anxious (stable on current regimen, Cymbalta & Klonopin). Physical Exam  Constitutional:       Appearance: She is ill-appearing. HENT:      Head: Normocephalic. Right Ear: External ear normal.      Left Ear: External ear normal.      Nose: Nose normal.   Eyes:      General:         Right eye: No discharge. Left eye: No discharge. Neck:      Musculoskeletal: Normal range of motion. Pulmonary:      Effort: Pulmonary effort is normal.   Neurological:      General: No focal deficit present. Mental Status: She is alert and oriented to person, place, and time. Mental status is at baseline. Psychiatric:         Mood and Affect: Mood normal.         Behavior: Behavior normal.         Thought Content: Thought content normal.         Judgment: Judgment normal.       ASSESSMENT      ICD-10-CM    1. Chronic obstructive pulmonary disease, unspecified COPD type ([facility])  J44.9 umeclidinium-vilanterol (ANORO ELLIPTA) 62.5-25 MCG/INH AEPB inhaler  (In place of Trelegy. Patient stopped Trelegy due to oral irritation)  Continue albuterol as needed  Recommend oxygen 24/7 due to low oxygen saturations. Recommend smoking cessation     guaiFENesin (MUCINEX) 600 MG extended release tablet   2. HAILEY (generalized anxiety disorder)  F41.1 clonazePAM (KLONOPIN) 0.5 MG tablet  Continue Cymbalta 60 mg daily   3. Insomnia due to medical condition  G47.01 clonazePAM (KLONOPIN) 0.5 MG tablet  Continue trazodone nightly   4.  Tobacco abuse disorder  Z72.0  Smoking cessation recommended PLAN    Orders Placed This Encounter   Procedures    RI TOBACCO USE CESSATION INTERMEDIATE 3-10 MINUTES      Her Prolia injection is here in the office. Her daughter reports that she is refusing to come up here and get it. Approximately 5 minutes of education was provided about quit smoking and the harms of tobacco.  Patient does show understanding. Patient does not have  the desire to quit smoking in the near future. Return in about 1 month (around 9/26/2020), or if symptoms worsen or fail to improve. There are no Patient Instructions on file for this visit. Controlled Substances Monitoring: Attestation: The Prescription Monitoring Report for this patient was reviewed today. ([id number]) Rico Quispe, NAVEEN)            Grace Aldridge is a 77 y.o. female being evaluated by a Virtual Visit (video visit) encounter to address concerns as mentioned above. A caregiver was present when appropriate. Due to this being a TeleHealth encounter (During PVTPX-09 public health emergency), evaluation of the following organ systems was limited: Vitals/Constitutional/EENT/Resp/CV/GI//MS/Neuro/Skin/Heme-Lymph-Imm. Pursuant to the emergency declaration under the [address] [place] [address], [address] [place] [address] authority and the [employer] and [employer] Act, this Virtual Visit was conducted with patient's (and/or legal guardian's) consent, to reduce the patient's risk of exposure to COVID-19 and provide necessary medical care. The patient (and/or legal guardian) has also been advised to contact this office for worsening conditions or problems, and seek emergency medical treatment and/or call 911 if deemed necessary.      Patient identification was verified at the start of the visit: Yes    Total time spent for this encounter: Not billed by time    Services were provided through a video synchronous discussion virtually to substitute for in-person clinic visit. Patient and provider were located at their individual homes. --NAVEEN Conley on 8/26/2020 at 12:06 PM    An electronic signature was used to authenticate this note.

## 2020-09-01 ENCOUNTER — CARE COORDINATION (OUTPATIENT)
Dept: CASE MANAGEMENT | Age: 75
End: 2020-09-01

## 2020-09-01 NOTE — CARE COORDINATION
[facility] Transitions Follow Up Call    2020    Patient: Carlos Chicas  Patient : 1945   MRN: [id number]  Reason for Admission:   Discharge Date: 20 RARS: Readmission Risk Score: 21    Follow Up: Attempted to contact patient for BPCI-A follow up. Unable to reach patient. Left message with contact information and request for call back.       Future Appointments   Date Time Provider Abelardo Singleton   2020  1:00 PM [address]

## 2020-09-03 ENCOUNTER — TELEPHONE (OUTPATIENT)
Dept: PRIMARY CARE CLINIC | Age: 75
End: 2020-09-03

## 2020-09-08 ENCOUNTER — VIRTUAL VISIT (OUTPATIENT)
Dept: PRIMARY CARE CLINIC | Age: 75
End: 2020-09-08
Payer: MEDICARE

## 2020-09-08 PROCEDURE — 99442 PR PHYS/QHP TELEPHONE EVALUATION 11-20 MIN: CPT | Performed by: NURSE PRACTITIONER

## 2020-09-08 RX ORDER — NYSTATIN 100000 [USP'U]/G
POWDER TOPICAL
Qty: 1 BOTTLE | Refills: 1 | Status: SHIPPED | OUTPATIENT
Start: 2020-09-08 | End: 2020-10-28 | Stop reason: ALTCHOICE

## 2020-09-08 NOTE — PROGRESS NOTES
[facility]hencar[facility]egerman[facility] Ro Imus  Phone [phone]   Fax [phone] PHONE CALL VISIT: 2020    Carlos Chicas- : 1945    Chief Complaint:Jeny is a 77 y.o. female who is here for Rash     HPI  The patient is called for a telehealth phone call visit. RASH:  She has a red rash between her legs bilaterally. The rash has little red raised areas. She reports that she came home from the hospital with this several weeks ago. Patient does wear depends at home. Her daughter reports she does not change these as often as she should. They also report they have tried several over-the-counter ointments without improvement in rash     vitals were not taken for this visit. There is no height or weight on file to calculate BMI. Results for orders placed or performed during the hospital encounter of 07/15/20   Culture, Blood 1    Specimen: Blood   Result Value Ref Range    Blood Culture, Routine No growth after 5 days of incubation. Culture, Blood 2    Specimen: Blood   Result Value Ref Range    Culture, Blood 2 No growth after 5 days of incubation.     CBC Auto Differential   Result Value Ref Range    WBC 12.2 (H) 4.8 - 10.8 K/uL    RBC 3.72 (L) 4.20 - 5.40 M/uL    Hemoglobin 10.9 (L) 12.0 - 16.0 g/dL    Hematocrit 36.1 (L) 37.0 - 47.0 %    MCV 97.0 81.0 - 99.0 fL    MCH 29.3 27.0 - 31.0 pg    MCHC 30.2 (L) 33.0 - 37.0 g/dL    RDW 15.8 (H) 11.5 - 14.5 %    Platelets 026 935 - 291 K/uL    MPV 10.3 9.4 - 12.3 fL    Neutrophils % 82.8 (H) 50.0 - 65.0 %    Lymphocytes % 9.7 (L) 20.0 - 40.0 %    Monocytes % 6.7 0.0 - 10.0 %    Eosinophils % 0.2 0.0 - 5.0 %    Basophils % 0.3 0.0 - 1.0 %    Neutrophils Absolute 10.1 (H) 1.5 - 7.5 K/uL    Immature Granulocytes # 0.0 K/uL    Lymphocytes Absolute 1.2 1.1 - 4.5 K/uL    Monocytes Absolute 0.80 0.00 - 0.90 K/uL    Eosinophils Absolute 0.00 0.00 - 0.60 K/uL    Basophils Absolute 0.00 0.00 - 0.20 K/uL   Comprehensive Metabolic Panel w/ Granulocytes # 0.0 K/uL    Lymphocytes Absolute 0.4 (L) 1.1 - 4.5 K/uL    Monocytes Absolute 0.10 0.00 - 0.90 K/uL    Eosinophils Absolute 0.00 0.00 - 0.60 K/uL    Basophils Absolute 0.00 0.00 - 0.20 K/uL   Magnesium   Result Value Ref Range    Magnesium 1.9 1.6 - 2.4 mg/dL   Comprehensive Metabolic Panel   Result Value Ref Range    Sodium 124 (L) 136 - 145 mmol/L    Potassium 3.8 3.5 - 5.0 mmol/L    Chloride 83 (L) 98 - 111 mmol/L    CO2 27 22 - 29 mmol/L    Anion Gap 14 7 - 19 mmol/L    Glucose 89 74 - 109 mg/dL    BUN 13 8 - 23 mg/dL    CREATININE 0.6 0.5 - 0.9 mg/dL    GFR Non-African American >60 >60    GFR African American >59 >59    Calcium 9.5 8.8 - 10.2 mg/dL    Total Protein 6.6 6.6 - 8.7 g/dL    Alb 3.2 (L) 3.5 - 5.2 g/dL    Total Bilirubin 0.3 0.2 - 1.2 mg/dL    Alkaline Phosphatase 61 35 - 104 U/L    ALT 6 5 - 33 U/L    AST 16 5 - 32 U/L   Magnesium   Result Value Ref Range    Magnesium 1.5 (L) 1.6 - 2.4 mg/dL   EKG 12 Lead   Result Value Ref Range    P-R Interval 146 ms    QRS Duration 88 ms    Q-T Interval 362 ms    QTc Calculation (Bazett) 395 ms    P Axis 71 degrees    T Axis 52 degrees     have reviewed the following with the Ms. Mcguire   Lab Review   Admission on 07/15/2020, Discharged on 07/18/2020   Component Date Value    WBC 07/15/2020 12.2*    RBC 07/15/2020 3.72*    Hemoglobin 07/15/2020 10.9*    Hematocrit 07/15/2020 36.1*    MCV 07/15/2020 97.0     MCH 07/15/2020 29.3     MCHC 07/15/2020 30.2*    RDW 07/15/2020 15.8*    Platelets 56/12/7343 175     MPV 07/15/2020 10.3     Neutrophils % 07/15/2020 82.8*    Lymphocytes % 07/15/2020 9.7*    Monocytes % 07/15/2020 6.7     Eosinophils % 07/15/2020 0.2     Basophils % 07/15/2020 0.3     Neutrophils Absolute 07/15/2020 10.1*    Immature Granulocytes # 07/15/2020 0.0     Lymphocytes Absolute 07/15/2020 1.2     Monocytes Absolute 07/15/2020 0.80     Eosinophils Absolute 07/15/2020 0.00     Basophils Absolute 07/15/2020 0.00  Sodium 07/15/2020 132*    Potassium reflex Magnesi* 07/15/2020 4.5     Chloride 07/15/2020 93*    CO2 07/15/2020 27     Anion Gap 07/15/2020 12     Glucose 07/15/2020 104     BUN 07/15/2020 35*    CREATININE 07/15/2020 1.4*    GFR Non- 07/15/2020 37*    GFR  07/15/2020 44*    Calcium 07/15/2020 9.2     Total Protein 07/15/2020 6.9     Alb 07/15/2020 3.7     Total Bilirubin 07/15/2020 <0.2     Alkaline Phosphatase 07/15/2020 68     ALT 07/15/2020 5     AST 07/15/2020 11     pH, Arterial 07/15/2020 7.340*    pCO2, Arterial 07/15/2020 59.0*    pO2, Arterial 07/15/2020 28.0*    HCO3, Arterial 07/15/2020 31.8*    Base Excess, Arterial 07/15/2020 4.7*    Hemoglobin, Art, Extended 07/15/2020 11.2*    O2 Sat, Arterial 07/15/2020 56.1*    Carboxyhgb, Arterial 07/15/2020 6.2*    Methemoglobin, Arterial 07/15/2020 2.5     O2 Content, Arterial 07/15/2020 8.8     O2 Therapy 07/15/2020 Unknown     P-R Interval 07/15/2020 146     QRS Duration 07/15/2020 88     Q-T Interval 07/15/2020 362     QTc Calculation (Bazett) 07/15/2020 395     P Axis 07/15/2020 71     T Axis 07/15/2020 52     Troponin 07/15/2020 <0.01     Potassium, Whole Blood 07/15/2020 4.2     Blood Culture, Routine 07/15/2020 No growth after 5 days of incubation.  Culture, Blood 2 07/15/2020 No growth after 5 days of incubation.      SARS-CoV-2, PCR 07/15/2020 Not Detected     WBC 07/16/2020 7.7     RBC 07/16/2020 3.63*    Hemoglobin 07/16/2020 10.6*    Hematocrit 07/16/2020 34.8*    MCV 07/16/2020 95.9     MCH 07/16/2020 29.2     MCHC 07/16/2020 30.5*    RDW 07/16/2020 15.6*    Platelets 09/58/6705 172     MPV 07/16/2020 10.1     Neutrophils % 07/16/2020 93.8*    Lymphocytes % 07/16/2020 4.7*    Monocytes % 07/16/2020 1.0     Eosinophils % 07/16/2020 0.0     Basophils % 07/16/2020 0.0     Neutrophils Absolute 07/16/2020 7.2     Immature Granulocytes # 07/16/2020 0.0     Lymphocytes Absolute 07/16/2020 0.4*    Monocytes Absolute 07/16/2020 0.10     Eosinophils Absolute 07/16/2020 0.00     Basophils Absolute 07/16/2020 0.00     Magnesium 07/16/2020 1.9     Sodium 07/18/2020 124*    Potassium 07/18/2020 3.8     Chloride 07/18/2020 83*    CO2 07/18/2020 27     Anion Gap 07/18/2020 14     Glucose 07/18/2020 89     BUN 07/18/2020 13     CREATININE 07/18/2020 0.6     GFR Non- 07/18/2020 >60     GFR  07/18/2020 >59     Calcium 07/18/2020 9.5     Total Protein 07/18/2020 6.6     Alb 07/18/2020 3.2*    Total Bilirubin 07/18/2020 0.3     Alkaline Phosphatase 07/18/2020 61     ALT 07/18/2020 6     AST 07/18/2020 16     Magnesium 07/18/2020 1.5*     Copies of these are in the chart. Prior to Visit Medications    Medication Sig Taking? Authorizing Provider   nystatin (MYCOSTATIN) 879953 UNIT/GM powder Apply 3 times daily. Yes NAVEEN Dior   umeclidinium-vilanterol (ANORO ELLIPTA) 62.5-25 MCG/INH AEPB inhaler Inhale 1 puff into the lungs daily IN place of Trelegy  NAVEEN Dior   clonazePAM (KLONOPIN) 0.5 MG tablet Take 1 tablet by mouth 3 times daily as needed for Anxiety for up to 30 days. NAVEEN Dior   guaiFENesin (MUCINEX) 600 MG extended release tablet Take 2 tablets by mouth 2 times daily  NAVEEN Dior   clonazePAM (KLONOPIN) 0.5 MG tablet Take 1 tablet by mouth 3 times daily as needed for Anxiety for up to 30 days.   NAVEEN Dior   carvedilol (COREG) 3.125 MG tablet Take 1 tablet by mouth 2 times daily (with meals)  NAVEEN Cornejo   denosumab (PROLIA) 60 MG/ML SOSY SC injection INJECT 1ML UNDER THE SKIN EVERY 6 MONTHS  NAVEEN Dior   pantoprazole (PROTONIX) 40 MG tablet Take 1 tablet by mouth every morning (before breakfast)  NAVEEN Dior   traZODone (DESYREL) 150 MG tablet Take 1 tablet by mouth nightly  NAVEEN Dior   Fesoterodine Fumarate ER (TOVIAZ) 8 MG TB24 Take 1 tablet by mouth daily  NAVEEN Dior   donepezil (ARICEPT) 5 MG tablet Take 1 tablet by mouth nightly  NAVEEN Dior   olopatadine (PATADAY) 0.2 % SOLN ophthalmic solution Place 1 drop into both eyes daily  NAVEEN Dior   DULoxetine (CYMBALTA) 60 MG extended release capsule Take 1 capsule by mouth daily  NAVEEN Dior   gabapentin (NEURONTIN) 300 MG capsule Take 300 mg by mouth 2 times daily.    Historical Provider, MD   albuterol sulfate HFA (VENTOLIN HFA) 108 (90 Base) MCG/ACT inhaler Inhale 2 puffs into the lungs every 6 hours as needed for Wheezing  NAVEEN Ayala   isosorbide mononitrate (IMDUR) 30 MG extended release tablet Take 1 tablet by mouth daily  NAVEEN Dior   memantine (NAMENDA) 10 MG tablet Take 1 tablet by mouth 2 times daily  NAVEEN Dior   ondansetron (ZOFRAN) 4 MG tablet Take 1 tablet by mouth every 8 hours as needed for Nausea or Vomiting  NAVEEN Dior   albuterol (PROVENTIL) (5 MG/ML) 0.5% nebulizer solution Take 0.5 mLs by nebulization every 6 hours as needed for Wheezing  NAVEEN Dior   nitroGLYCERIN (NITROQUICK) 0.4 MG SL tablet Place 1 tablet under the tongue every 5 minutes as needed for Chest pain  NAVEEN Dior   OXYGEN Inhale 2 L into the lungs daily  NAVEEN Dior   HYDROcodone-acetaminophen (NORCO)  MG per tablet TAKE ONE TABLET BY MOUTH EVERY 8 HOURS AS NEEDED- dr galarza  Historical Provider, MD       Allergies: Codeine and Sulfa antibiotics    Past Medical History:   Diagnosis Date    Abdominal pain     Anxiety     Arthritis     CAD in native artery     Cerebrovascular disease     [facility]     Constipation     COPD [facility]ronic obstructive [facility] disease[facility]     Depression     Emphysema     GERD (gastroesophageal reflux disease)     Myocardial infarct, old     Palliative care patient 02/20/2020    Pneumonia     Tobacco abuse        Past Surgical History: Procedure Laterality Date    ABDOMINAL EXPLORATION SURGERY      APPENDECTOMY      CARDIAC CATHETERIZATION  06/25/10    patent  stent noted in LAD,EF is estimated to be 60%    CARDIAC CATHETERIZATION  7/13/12  MDL    EF  60%    CHOLECYSTECTOMY      COLONOSCOPY  10/2014    Dr Princess Tariq GASTROINTESTINAL ENDOSCOPY  10/1/14    Dr Kiera Cuello: food bezoar; esophagitis, gastritis, duodenitis; biopsies neg h-pylori       Social History     Tobacco Use    Smoking status: Current Every Day Smoker     Packs/day: 1.00     Years: 36.00     Pack years: 36.00     Types: Cigarettes     Start date: 1978    Smokeless tobacco: Never Used   Substance Use Topics    Alcohol use: No     Alcohol/week: 0.0 standard drinks       Family History   Problem Relation Age of Onset    Stroke Brother     Stroke Sister     Cancer Brother     Cancer Sister     Diabetes Brother     Diabetes Sister     Coronary Art Dis Other     Hypertension Other     Seizures Child     Colon Cancer Neg Hx     Colon Polyps Neg Hx     Esophageal Cancer Neg Hx     Liver Cancer Neg Hx     Liver Disease Neg Hx     Rectal Cancer Neg Hx     Stomach Cancer Neg Hx        Review of Systems   Skin: Positive for rash. Physical Exam  N/A DUE TO TELE-HEALTH PHONE CALL    ASSESSMENT      ICD-10-CM    1. Yeast dermatitis  B37.2 nystatin (MYCOSTATIN) [id number] UNIT/GM powder    Instructed patient to change depends as soon as it becomes wet. PLAN    No orders of the defined types were placed in this encounter. Return if symptoms worsen or fail to improve. There are no Patient Instructions on file for this visit. Controlled Substances Monitoring:  n/a            Divine Espinosa is a 77 y.o. female being evaluated by a Virtual Visit (video visit) encounter to address concerns as mentioned above. A caregiver was present when appropriate.  Due to this being a TeleHealth encounter (During MMYSA-04 public health emergency), evaluation of the following organ systems was limited: Vitals/Constitutional/EENT/Resp/CV/GI//MS/Neuro/Skin/Heme-Lymph-Imm. Pursuant to the emergency declaration under the [address] [place] [address], [address] [place] [address] and the [employer] and [employer] Act, this Virtual Visit was conducted with patient's (and/or legal guardian's) consent, to reduce the patient's risk of exposure to COVID-19 and provide necessary medical care. The patient (and/or legal guardian) has also been advised to contact this office for worsening conditions or problems, and seek emergency medical treatment and/or call 911 if deemed necessary. Patient identification was verified at the start of the visit: Yes    Total time spent for this encounter: 11 minutes    Services were provided through a video synchronous discussion virtually to substitute for in-person clinic visit. Patient and provider were located at their individual homes. --NAVEEN Tan on 9/8/2020 at 11:23 AM    An electronic signature was used to authenticate this note.

## 2020-09-18 ENCOUNTER — TELEPHONE (OUTPATIENT)
Dept: PRIMARY CARE CLINIC | Age: 75
End: 2020-09-18

## 2020-09-18 ENCOUNTER — CARE COORDINATION (OUTPATIENT)
Dept: CASE MANAGEMENT | Age: 75
End: 2020-09-18

## 2020-09-18 RX ORDER — CLOTRIMAZOLE AND BETAMETHASONE DIPROPIONATE 10; .64 MG/G; MG/G
CREAM TOPICAL
Qty: 45 G | Refills: 0 | Status: SHIPPED | OUTPATIENT
Start: 2020-09-18 | End: 2020-10-28 | Stop reason: ALTCHOICE

## 2020-09-18 NOTE — TELEPHONE ENCOUNTER
Stop powder    Try Lotrisone cream apply topically twice daily dispense 1 tube    If symptoms are not improving recommend patient schedule an in office appointment for further evaluation

## 2020-09-18 NOTE — CARE COORDINATION
[facility] Care Improvement ([facility]) Follow Up Call  Qualifying Diagnosis of Pneumonia. 9/18/2020  Patient Name:  Memo Uribe   YOB: 1945  Discharge Date:  7/18/20  RARS:  Readmission Risk Score: 21    PCP:  NAVEEN Collins Cera  Message left in compliance with HIPPA. Stated who I was, representing the [facility], Care Transitions Team. Requested to place a call to their Physician with any immediate health needs/questions/concerns.    Care Transitions will continue to follow per BPCI Program.  Yaniv Patel, RN, CTN      Future Appointments   Date Time Provider Abelardo Singleton   9/25/2020  1:00 PM Malena Herman, [address] [facility] [address] [facility]

## 2020-09-24 ENCOUNTER — CARE COORDINATION (OUTPATIENT)
Dept: CASE MANAGEMENT | Age: 75
End: 2020-09-24

## 2020-09-24 NOTE — CARE COORDINATION
[facility] Trans[facility] Follow Up Call    2020    Patient: Olayinka Thompson  Patient : 1945   MRN: [id number]  Reason for Admission:   Discharge Date: 20 RARS: Readmission Risk Score: 21      Care Transition Nurse (CTN) contacted the patient's daughter by telephone to follow up after admission on 20-20. Verified name and  with patient's daughter as identifiers. Addressed changes since last contact: symptom management-No worsening symptoms. Has good days and bad days. Discharged needs reviewed: none  Follow up appointment completed? Yes    CTN reviewed discharge instructions, medical action plan and red flags with patient's daughter and discussed any barriers to care and/or understanding of plan of care after discharge. Discussed appropriate site of care based on symptoms and resources available to patient including: PCP. The patient's daughter agrees to contact the PCP office for questions related to their healthcare. Patients top risk factors for readmission: medical condition and polypharmacy  Interventions to address risk factors: Scheduled appointment with PCP-20    Discussed follow-up appointments. If no appointment was previously scheduled, appointment scheduling offered: Yes     Plan for follow-up call in 7-10 days based on severity of symptoms and risk factors. Plan for next call: symptom management-Follow up on any new or worsening symptoms  CTN provided contact information for future needs. Care Transitions Subsequent and Final Call    Subsequent and Final Calls  Have your medications changed?:  No  Do you have any questions related to your medications?:  No  Do you currently have any active services?:  No  Are you currently active with any services?:  Home Health  Do you have any needs or concerns that I can assist you with?:  No  Identified Barriers:  None  Care Transitions Interventions  Other Interventions:             Follow Up:  Contacted patient

## 2020-09-25 ENCOUNTER — VIRTUAL VISIT (OUTPATIENT)
Dept: PRIMARY CARE CLINIC | Age: 75
End: 2020-09-25
Payer: MEDICARE

## 2020-09-25 PROCEDURE — 3017F COLORECTAL CA SCREEN DOC REV: CPT | Performed by: NURSE PRACTITIONER

## 2020-09-25 PROCEDURE — G8399 PT W/DXA RESULTS DOCUMENT: HCPCS | Performed by: NURSE PRACTITIONER

## 2020-09-25 PROCEDURE — 1090F PRES/ABSN URINE INCON ASSESS: CPT | Performed by: NURSE PRACTITIONER

## 2020-09-25 PROCEDURE — G8428 CUR MEDS NOT DOCUMENT: HCPCS | Performed by: NURSE PRACTITIONER

## 2020-09-25 PROCEDURE — 99214 OFFICE O/P EST MOD 30 MIN: CPT | Performed by: NURSE PRACTITIONER

## 2020-09-25 PROCEDURE — 4040F PNEUMOC VAC/ADMIN/RCVD: CPT | Performed by: NURSE PRACTITIONER

## 2020-09-25 PROCEDURE — 1123F ACP DISCUSS/DSCN MKR DOCD: CPT | Performed by: NURSE PRACTITIONER

## 2020-09-25 RX ORDER — NALOXONE HYDROCHLORIDE 4 MG/.1ML
1 SPRAY NASAL PRN
Qty: 1 EACH | Refills: 1 | Status: SHIPPED | OUTPATIENT
Start: 2020-09-25 | End: 2020-10-28 | Stop reason: ALTCHOICE

## 2020-09-25 ASSESSMENT — ENCOUNTER SYMPTOMS
DIARRHEA: 0
WHEEZING: 0
SHORTNESS OF BREATH: 1
SINUS PRESSURE: 0
ABDOMINAL PAIN: 0
VOMITING: 0
RHINORRHEA: 0
COUGH: 1
NAUSEA: 0
SORE THROAT: 0

## 2020-09-25 NOTE — PROGRESS NOTES
(H) 11.5 - 14.5 %    Platelets 584 876 - 646 K/uL    MPV 10.1 9.4 - 12.3 fL    Neutrophils % 93.8 (H) 50.0 - 65.0 %    Lymphocytes % 4.7 (L) 20.0 - 40.0 %    Monocytes % 1.0 0.0 - 10.0 %    Eosinophils % 0.0 0.0 - 5.0 %    Basophils % 0.0 0.0 - 1.0 %    Neutrophils Absolute 7.2 1.5 - 7.5 K/uL    Immature Granulocytes # 0.0 K/uL    Lymphocytes Absolute 0.4 (L) 1.1 - 4.5 K/uL    Monocytes Absolute 0.10 0.00 - 0.90 K/uL    Eosinophils Absolute 0.00 0.00 - 0.60 K/uL    Basophils Absolute 0.00 0.00 - 0.20 K/uL   Magnesium   Result Value Ref Range    Magnesium 1.9 1.6 - 2.4 mg/dL   Comprehensive Metabolic Panel   Result Value Ref Range    Sodium 124 (L) 136 - 145 mmol/L    Potassium 3.8 3.5 - 5.0 mmol/L    Chloride 83 (L) 98 - 111 mmol/L    CO2 27 22 - 29 mmol/L    Anion Gap 14 7 - 19 mmol/L    Glucose 89 74 - 109 mg/dL    BUN 13 8 - 23 mg/dL    CREATININE 0.6 0.5 - 0.9 mg/dL    GFR Non-African American >60 >60    GFR African American >59 >59    Calcium 9.5 8.8 - 10.2 mg/dL    Total Protein 6.6 6.6 - 8.7 g/dL    Alb 3.2 (L) 3.5 - 5.2 g/dL    Total Bilirubin 0.3 0.2 - 1.2 mg/dL    Alkaline Phosphatase 61 35 - 104 U/L    ALT 6 5 - 33 U/L    AST 16 5 - 32 U/L   Magnesium   Result Value Ref Range    Magnesium 1.5 (L) 1.6 - 2.4 mg/dL   EKG 12 Lead   Result Value Ref Range    P-R Interval 146 ms    QRS Duration 88 ms    Q-T Interval 362 ms    QTc Calculation (Bazett) 395 ms    P Axis 71 degrees    T Axis 52 degrees     have reviewed the following with the Ms.  Guy [facility]   Lab Review   Admission on 07/15/2020, Discharged on 07/18/2020   Component Date Value    WBC 07/15/2020 12.2*    RBC 07/15/2020 3.72*    Hemoglobin 07/15/2020 10.9*    Hematocrit 07/15/2020 36.1*    MCV 07/15/2020 97.0     MCH 07/15/2020 29.3     MCHC 07/15/2020 30.2*    RDW 07/15/2020 15.8*    Platelets 85/65/2759 175     MPV 07/15/2020 10.3     Neutrophils % 07/15/2020 82.8*    Lymphocytes % 07/15/2020 9.7*    Monocytes % 07/15/2020 6.7     Eosinophils % 07/15/2020 0.2     Basophils % 07/15/2020 0.3     Neutrophils Absolute 07/15/2020 10.1*    Immature Granulocytes # 07/15/2020 0.0     Lymphocytes Absolute 07/15/2020 1.2     Monocytes Absolute 07/15/2020 0.80     Eosinophils Absolute 07/15/2020 0.00     Basophils Absolute 07/15/2020 0.00     Sodium 07/15/2020 132*    Potassium reflex Magnesi* 07/15/2020 4.5     Chloride 07/15/2020 93*    CO2 07/15/2020 27     Anion Gap 07/15/2020 12     Glucose 07/15/2020 104     BUN 07/15/2020 35*    CREATININE 07/15/2020 1.4*    GFR Non- 07/15/2020 37*    GFR  07/15/2020 44*    Calcium 07/15/2020 9.2     Total Protein 07/15/2020 6.9     Alb 07/15/2020 3.7     Total Bilirubin 07/15/2020 <0.2     Alkaline Phosphatase 07/15/2020 68     ALT 07/15/2020 5     AST 07/15/2020 11     pH, Arterial 07/15/2020 7.340*    pCO2, Arterial 07/15/2020 59.0*    pO2, Arterial 07/15/2020 28.0*    HCO3, Arterial 07/15/2020 31.8*    Base Excess, Arterial 07/15/2020 4.7*    Hemoglobin, Art, Extended 07/15/2020 11.2*    O2 Sat, Arterial 07/15/2020 56.1*    Carboxyhgb, Arterial 07/15/2020 6.2*    Methemoglobin, Arterial 07/15/2020 2.5     O2 Content, Arterial 07/15/2020 8.8     O2 Therapy 07/15/2020 Unknown     P-R Interval 07/15/2020 146     QRS Duration 07/15/2020 88     Q-T Interval 07/15/2020 362     QTc Calculation (Bazett) 07/15/2020 395     P Axis 07/15/2020 71     T Axis 07/15/2020 52     Troponin 07/15/2020 <0.01     Potassium, Whole Blood 07/15/2020 4.2     Blood Culture, Routine 07/15/2020 No growth after 5 days of incubation.  Culture, Blood 2 07/15/2020 No growth after 5 days of incubation.      SARS-CoV-2, PCR 07/15/2020 Not Detected     WBC 07/16/2020 7.7     RBC 07/16/2020 3.63*    Hemoglobin 07/16/2020 10.6*    Hematocrit 07/16/2020 34.8*    MCV 07/16/2020 95.9     MCH 07/16/2020 29.2     MCHC 07/16/2020 30.5*    RDW 07/16/2020 15.6*    Platelets 26/72/9969 172     MPV 07/16/2020 10.1     Neutrophils % 07/16/2020 93.8*    Lymphocytes % 07/16/2020 4.7*    Monocytes % 07/16/2020 1.0     Eosinophils % 07/16/2020 0.0     Basophils % 07/16/2020 0.0     Neutrophils Absolute 07/16/2020 7.2     Immature Granulocytes # 07/16/2020 0.0     Lymphocytes Absolute 07/16/2020 0.4*    Monocytes Absolute 07/16/2020 0.10     Eosinophils Absolute 07/16/2020 0.00     Basophils Absolute 07/16/2020 0.00     Magnesium 07/16/2020 1.9     Sodium 07/18/2020 124*    Potassium 07/18/2020 3.8     Chloride 07/18/2020 83*    CO2 07/18/2020 27     Anion Gap 07/18/2020 14     Glucose 07/18/2020 89     BUN 07/18/2020 13     CREATININE 07/18/2020 0.6     GFR Non- 07/18/2020 >60     GFR  07/18/2020 >59     Calcium 07/18/2020 9.5     Total Protein 07/18/2020 6.6     Alb 07/18/2020 3.2*    Total Bilirubin 07/18/2020 0.3     Alkaline Phosphatase 07/18/2020 61     ALT 07/18/2020 6     AST 07/18/2020 16     Magnesium 07/18/2020 1.5*     Copies of these are in the chart. Prior to Visit Medications    Medication Sig Taking? Authorizing Provider   naloxone (NARCAN) 4 MG/0.1ML LIQD nasal spray 1 spray by Nasal route as needed for Opioid Reversal Yes NAVEEN Dior   clotrimazole-betamethasone (LOTRISONE) 1-0.05 % cream Apply topically 2 times daily. NAVEEN Dior   nystatin (MYCOSTATIN) 081147 UNIT/GM powder Apply 3 times daily. NAVEEN Dior   umeclidinium-vilanterol (ANORO ELLIPTA) 62.5-25 MCG/INH AEPB inhaler Inhale 1 puff into the lungs daily IN place of Trelegy  NAVEEN Dior   clonazePAM (KLONOPIN) 0.5 MG tablet Take 1 tablet by mouth 3 times daily as needed for Anxiety for up to 30 days.   NAVEEN Dior   guaiFENesin (MUCINEX) 600 MG extended release tablet Take 2 tablets by mouth 2 times daily  NAVEEN Dior   clonazePAM (KLONOPIN) 0.5 MG tablet Take 1 tablet by mouth 3 times daily as needed for Anxiety for up to 30 days. NAVEEN Dior   carvedilol (COREG) 3.125 MG tablet Take 1 tablet by mouth 2 times daily (with meals)  NAVEEN Onofre   denosumab (PROLIA) 60 MG/ML SOSY SC injection INJECT 1ML UNDER THE SKIN EVERY 6 MONTHS  NAVEEN Dior   pantoprazole (PROTONIX) 40 MG tablet Take 1 tablet by mouth every morning (before breakfast)  NAVEEN Dior   traZODone (DESYREL) 150 MG tablet Take 1 tablet by mouth nightly  NAVEEN Dior   Fesoterodine Fumarate ER (TOVIAZ) 8 MG TB24 Take 1 tablet by mouth daily  NAVEEN Dior   donepezil (ARICEPT) 5 MG tablet Take 1 tablet by mouth nightly  NAVEEN Dior   olopatadine (PATADAY) 0.2 % SOLN ophthalmic solution Place 1 drop into both eyes daily  NAVEEN Dior   DULoxetine (CYMBALTA) 60 MG extended release capsule Take 1 capsule by mouth daily  NAVEEN Dior   gabapentin (NEURONTIN) 300 MG capsule Take 300 mg by mouth 2 times daily.    Historical Provider, MD   albuterol sulfate HFA (VENTOLIN HFA) 108 (90 Base) MCG/ACT inhaler Inhale 2 puffs into the lungs every 6 hours as needed for Wheezing  NAVEEN Onofre   isosorbide mononitrate (IMDUR) 30 MG extended release tablet Take 1 tablet by mouth daily  NAVEEN Dior   memantine (NAMENDA) 10 MG tablet Take 1 tablet by mouth 2 times daily  NAVEEN Dior   ondansetron (ZOFRAN) 4 MG tablet Take 1 tablet by mouth every 8 hours as needed for Nausea or Vomiting  NAVEEN Dior   albuterol (PROVENTIL) (5 MG/ML) 0.5% nebulizer solution Take 0.5 mLs by nebulization every 6 hours as needed for Wheezing  NAVEEN Dior   nitroGLYCERIN (NITROQUICK) 0.4 MG SL tablet Place 1 tablet under the tongue every 5 minutes as needed for Chest pain  NAVEEN Dior   OXYGEN Inhale 2 L into the lungs daily  Malena Moryl, APRN   HYDROcodone-acetaminophen (NORCO)  MG per tablet TAKE ONE TABLET BY MOUTH EVERY 8 HOURS AS NEEDED- dr Ethel Blancas Provider, MD       Allergies: Codeine and Sulfa antibiotics    Past Medical History:   Diagnosis Date    Abdominal pain     Anxiety     Arthritis     CAD in native artery     Cerebrovascular disease     CHF (congestive heart failure) (HCC)     Constipation     COPD (chronic obstructive pulmonary disease) (HCC)     Depression     Emphysema     GERD (gastroesophageal reflux disease)     Myocardial infarct, old     Palliative care patient 02/20/2020    Pneumonia     Tobacco abuse        Past Surgical History:   Procedure Laterality Date    ABDOMINAL EXPLORATION SURGERY      APPENDECTOMY      CARDIAC CATHETERIZATION  06/25/10    patent  stent noted in LAD,EF is estimated to be 60%    CARDIAC CATHETERIZATION  7/13/12  MDL    EF  60%    CHOLECYSTECTOMY      COLONOSCOPY  10/2014    Dr Nataliya Nielson ENDOSCOPY  10/1/14    Dr Lay Boehringer: food bezoar; esophagitis, gastritis, duodenitis; biopsies neg h-pylori       Social History     Tobacco Use    Smoking status: Current Every Day Smoker     Packs/day: 1.00     Years: 36.00     Pack years: 36.00     Types: Cigarettes     Start date: 1978    Smokeless tobacco: Never Used   Substance Use Topics    Alcohol use: No     Alcohol/week: 0.0 standard drinks       Family History   Problem Relation Age of Onset    Stroke Brother     Stroke Sister     Cancer Brother     Cancer Sister     Diabetes Brother     Diabetes Sister     Coronary Art Dis Other     Hypertension Other     Seizures Child     Colon Cancer Neg Hx     Colon Polyps Neg Hx     Esophageal Cancer Neg Hx     Liver Cancer Neg Hx     Liver Disease Neg Hx     Rectal Cancer Neg Hx     Stomach Cancer Neg Hx        Review of Systems   Constitutional: Positive for fatigue. Negative for chills and fever. HENT: Negative for congestion, ear pain, rhinorrhea, sinus pressure and sore throat.     Respiratory: Positive for cough (stable, chronic) and shortness of breath (chronic, stable). Negative for wheezing. Cardiovascular: Negative for chest pain and leg swelling. Gastrointestinal: Negative for abdominal pain, diarrhea, nausea and vomiting. Neurological:        Memory loss: stable   Psychiatric/Behavioral: Positive for sleep disturbance (stable on current regimen, Trazodone). The patient is nervous/anxious (stable on current regimen, Cymbalta & Klonopin). Physical Exam  Constitutional:       Appearance: Normal appearance. She is normal weight. HENT:      Head: Normocephalic. Right Ear: External ear normal.      Left Ear: External ear normal.      Nose: Nose normal.   Eyes:      General:         Right eye: No discharge. Left eye: No discharge. Neck:      Musculoskeletal: Normal range of motion. Pulmonary:      Effort: Pulmonary effort is normal.   Neurological:      General: No focal deficit present. Mental Status: She is alert and oriented to person, place, and time. Mental status is at baseline. Psychiatric:         Mood and Affect: Mood normal.         Behavior: Behavior normal.         Thought Content: Thought content normal.         Judgment: Judgment normal.       ASSESSMENT      ICD-10-CM    1. Chronic obstructive pulmonary disease, unspecified COPD type (HCC)  J44.9 Continue Anoro daily  Continue albuterol as needed  Recommend oxygen at all times   2. Chronic, continuous use of opioids  F11.90 naloxone (NARCAN) 4 MG/0.1ML LIQD nasal spray   3. HAILEY (generalized anxiety disorder)  F41.1  Continue Cymbalta  Continue Klonopin as needed   4. Insomnia due to medical condition  G47.01  Continue trazodone nightly  Continue Klonopin as needed   5. Tobacco abuse disorder  Z72.0  Smoking cessation recommended   6. Lumbar back pain  M54.5  Keep follow-up with pain management         PLAN    No orders of the defined types were placed in this encounter.        Return in about 1 month (around 10/25/2020), or if symptoms worsen or fail to improve, for Annual Wellness Exam, 30 minute appointment. There are no Patient Instructions on file for this visit. Controlled Substances Monitoring: Attestation: The Prescription Monitoring Report for this patient was reviewed today. ([id number]) [facility], APRN)            Adriel Silver is a 77 y.o. female being evaluated by a Virtual Visit (video visit) encounter to address concerns as mentioned above. A caregiver was present when appropriate. Due to this being a TeleHealth encounter (During YSWBD-89 public health emergency), evaluation of the following organ systems was limited: Vitals/Constitutional/EENT/Resp/CV/GI//MS/Neuro/Skin/Heme-Lymph-Imm. Pursuant to the emergency declaration under the [address] [place] [address], [address] [place] [address] authority and the [employer] and [employer] Act, this Virtual Visit was conducted with patient's (and/or legal guardian's) consent, to reduce the patient's risk of exposure to COVID-19 and provide necessary medical care. The patient (and/or legal guardian) has also been advised to contact this office for worsening conditions or problems, and seek emergency medical treatment and/or call 911 if deemed necessary. Patient identification was verified at the start of the visit: Yes    Total time spent for this encounter: Not billed by time    Services were provided through a video synchronous discussion virtually to substitute for in-person clinic visit. Patient and provider were located at their individual homes. --[facility], APRN on 9/25/2020 at 1:23 PM    An electronic signature was used to authenticate this note.

## 2020-10-02 ENCOUNTER — CARE COORDINATION (OUTPATIENT)
Dept: CASE MANAGEMENT | Age: 75
End: 2020-10-02

## 2020-10-02 NOTE — CARE COORDINATION
Ty 45 Transitions Follow Up Call    10/2/2020    Patient: Ana Gates  Patient : 1945   MRN: <[id number]>  Reason for Admission: COPD  Discharge Date: 20 RARS: Readmission Risk Score: 21         Spoke with: pt's dtr    Care Transitions Subsequent and Final Call    Subsequent and Final Calls  Do you have any ongoing symptoms?:  No  Have your medications changed?:  No  Do you have any questions related to your medications?:  No  Do you currently have any active services?:  Yes  Are you currently active with any services?:  Home Health  Do you have any needs or concerns that I can assist you with?:  No  Care Transitions Interventions  Other Interventions:        Denies SOB, CP, cough, congestion, fever, flu-like symptoms or lack of appetite. States she's doing well and taking medications as prescribed. Denies questions or concerns at this time.      Follow Up  Future Appointments   Date Time Provider Abelardo Singleton   10/27/2020  2:00 PM NAVEEN Warren P-[place]       Cholo De Luna

## 2020-10-07 ENCOUNTER — TELEPHONE (OUTPATIENT)
Dept: PRIMARY CARE CLINIC | Age: 75
End: 2020-10-07

## 2020-10-07 RX ORDER — PREDNISONE 20 MG/1
20 TABLET ORAL 2 TIMES DAILY
Qty: 10 TABLET | Refills: 0 | Status: SHIPPED | OUTPATIENT
Start: 2020-10-07 | End: 2020-10-12

## 2020-10-07 RX ORDER — CEFDINIR 300 MG/1
300 CAPSULE ORAL 2 TIMES DAILY
Qty: 20 CAPSULE | Refills: 0 | Status: SHIPPED | OUTPATIENT
Start: 2020-10-07 | End: 2020-10-17

## 2020-10-07 RX ORDER — ISOSORBIDE MONONITRATE 30 MG/1
30 TABLET, EXTENDED RELEASE ORAL DAILY
Qty: 30 TABLET | Refills: 11 | Status: SHIPPED | OUTPATIENT
Start: 2020-10-07 | End: 2021-09-03 | Stop reason: SDUPTHER

## 2020-10-07 RX ORDER — MEMANTINE HYDROCHLORIDE 10 MG/1
10 TABLET ORAL 2 TIMES DAILY
Qty: 60 TABLET | Refills: 11 | Status: SHIPPED | OUTPATIENT
Start: 2020-10-07 | End: 2021-09-03 | Stop reason: SDUPTHER

## 2020-10-07 NOTE — TELEPHONE ENCOUNTER
Pt wore Oxygen all day yesterday and last night and this am her O2 was 78%. Daughter turned it up to 2.5L and it went up to 91%. No fever or congestion. Cough with yellow sputum, but this is not abnormal for pt.

## 2020-10-07 NOTE — TELEPHONE ENCOUNTER
omnicef 300mg bid for 10 days  Prednisone 20mg bid for 5 days. Continue to wear oxygen  Follow up with Cira Crespo next week.    ER if worsens

## 2020-10-07 NOTE — TELEPHONE ENCOUNTER
Received fax from pharmacy requesting refill on pts medication(s). Pt was last seen in office on 9/25/2020  and has a follow up scheduled for 10/27/2020. Will send request to  [facility]  for patient.      Requested Prescriptions     Pending Prescriptions Disp Refills    memantine (NAMENDA) 10 MG tablet [Pharmacy Med Name: MEMANTINE HCL 10 MG TABLET] 60 tablet 0     Sig: TAKE 1 TABLET TWICE DAILY    isosorbide mononitrate (IMDUR) 30 MG extended release tablet [Pharmacy Med Name: ISOSORBIDE ER 30 MG TABLET] 30 tablet 0     Sig: Take 1 tablet by mouth daily

## 2020-10-07 NOTE — TELEPHONE ENCOUNTER
Pt daughter aware that we are sending in meds. She will call back to schedule a follow up with Halle Sweeney.

## 2020-10-09 ENCOUNTER — CARE COORDINATION (OUTPATIENT)
Dept: CASE MANAGEMENT | Age: 75
End: 2020-10-09

## 2020-10-12 RX ORDER — DULOXETIN HYDROCHLORIDE 60 MG/1
60 CAPSULE, DELAYED RELEASE ORAL 2 TIMES DAILY
Qty: 180 CAPSULE | Refills: 3 | Status: SHIPPED | OUTPATIENT
Start: 2020-10-12 | End: 2020-11-04 | Stop reason: SDUPTHER

## 2020-10-12 NOTE — TELEPHONE ENCOUNTER
Received fax from pharmacy requesting refill on pts medication(s). Pt was last seen in office on 9/25/2020  and has a follow up scheduled for 10/27/2020. Will send request to  [facility]  for patient.      Requested Prescriptions     Pending Prescriptions Disp Refills    DULoxetine (CYMBALTA) 60 MG extended release capsule [Pharmacy Med Name: DULOXETINE 60MG **DD**] 60 capsule 0     Sig: Take 1 capsule by mouth 2 times daily

## 2020-10-15 ENCOUNTER — CARE COORDINATION (OUTPATIENT)
Dept: CASE MANAGEMENT | Age: 75
End: 2020-10-15

## 2020-10-15 ENCOUNTER — TELEPHONE (OUTPATIENT)
Dept: PRIMARY CARE CLINIC | Age: 75
End: 2020-10-15

## 2020-10-15 NOTE — TELEPHONE ENCOUNTER
left sided facial pressure, that is painful. She has been on the omnicef since 10/7/20-has 2 days left.  She said that the pain started Tuesday night

## 2020-10-15 NOTE — CARE COORDINATION
Ty 45 Transitions Follow Up Call    10/15/2020    Patient: Lisa Moreira  Patient : 1945   MRN: [id number]  Reason for Admission:   Discharge Date: 20 RARS: Readmission Risk Score: 21    Follow Up: Attempted to contact patient for BPCI-A follow up. Unable to reach patient. Left message with contact information and request for call back. CTN extended BPCI-A bundle to 10/22/20. Patient having facial pressure according to telephone encounter 10/15/20 to PCP. She was also having a productive cough with yellow sputum with O2 sats dropping per telephone encounter 10/7/20.       Future Appointments   Date Time Provider Abelardo Singleton   10/27/2020  2:00 PM Ricardo Valenzuela, [facility]

## 2020-10-28 ENCOUNTER — HOSPITAL ENCOUNTER (INPATIENT)
Age: 75
LOS: 2 days | Discharge: HOME | DRG: 189 | End: 2020-10-30
Attending: EMERGENCY MEDICINE | Admitting: HOSPITALIST
Payer: MEDICARE

## 2020-10-28 ENCOUNTER — APPOINTMENT (OUTPATIENT)
Dept: GENERAL RADIOLOGY | Age: 75
DRG: 189 | End: 2020-10-28
Payer: MEDICARE

## 2020-10-28 PROBLEM — J96.90 RESPIRATORY FAILURE (HCC): Status: ACTIVE | Noted: 2020-10-28

## 2020-10-28 LAB
ALBUMIN SERPL-MCNC: 3.3 G/DL (ref 3.5–5.2)
ALP BLD-CCNC: 111 U/L (ref 35–104)
ALT SERPL-CCNC: 7 U/L (ref 5–33)
ANION GAP SERPL CALCULATED.3IONS-SCNC: 6 MMOL/L (ref 7–19)
APTT: 32.2 SEC (ref 26–36.2)
AST SERPL-CCNC: 11 U/L (ref 5–32)
BACTERIA: NEGATIVE /HPF
BASE EXCESS ARTERIAL: 12.5 MMOL/L (ref -2–2)
BASOPHILS ABSOLUTE: 0 K/UL (ref 0–0.2)
BASOPHILS RELATIVE PERCENT: 0.1 % (ref 0–1)
BILIRUB SERPL-MCNC: <0.2 MG/DL (ref 0.2–1.2)
BILIRUBIN URINE: NEGATIVE
BLOOD, URINE: NEGATIVE
BUN BLDV-MCNC: 29 MG/DL (ref 8–23)
CALCIUM SERPL-MCNC: 9.5 MG/DL (ref 8.8–10.2)
CARBOXYHEMOGLOBIN ARTERIAL: 2.6 % (ref 0–5)
CHLORIDE BLD-SCNC: 96 MMOL/L (ref 98–111)
CLARITY: CLEAR
CO2: 35 MMOL/L (ref 22–29)
COLOR: YELLOW
CREAT SERPL-MCNC: 0.9 MG/DL (ref 0.5–0.9)
CRYSTALS, UA: ABNORMAL /HPF
EOSINOPHILS ABSOLUTE: 0.1 K/UL (ref 0–0.6)
EOSINOPHILS RELATIVE PERCENT: 0.6 % (ref 0–5)
EPITHELIAL CELLS, UA: 0 /HPF (ref 0–5)
GFR AFRICAN AMERICAN: >59
GFR NON-AFRICAN AMERICAN: >60
GLUCOSE BLD-MCNC: 121 MG/DL (ref 74–109)
GLUCOSE URINE: NEGATIVE MG/DL
HCO3 ARTERIAL: 39.7 MMOL/L (ref 22–26)
HCT VFR BLD CALC: 31.5 % (ref 37–47)
HEMOGLOBIN, ART, EXTENDED: 7.6 G/DL (ref 12–16)
HEMOGLOBIN: 9.3 G/DL (ref 12–16)
HYALINE CASTS: 1 /HPF (ref 0–8)
IMMATURE GRANULOCYTES #: 0.1 K/UL
INR BLD: 1.04 (ref 0.88–1.18)
KETONES, URINE: NEGATIVE MG/DL
LACTIC ACID: 0.4 MMOL/L (ref 0.5–1.9)
LEUKOCYTE ESTERASE, URINE: NEGATIVE
LIPASE: 9 U/L (ref 13–60)
LYMPHOCYTES ABSOLUTE: 0.8 K/UL (ref 1.1–4.5)
LYMPHOCYTES RELATIVE PERCENT: 8.2 % (ref 20–40)
MCH RBC QN AUTO: 29.8 PG (ref 27–31)
MCHC RBC AUTO-ENTMCNC: 29.5 G/DL (ref 33–37)
MCV RBC AUTO: 101 FL (ref 81–99)
METHEMOGLOBIN ARTERIAL: 1.6 %
MONOCYTES ABSOLUTE: 0.9 K/UL (ref 0–0.9)
MONOCYTES RELATIVE PERCENT: 9.2 % (ref 0–10)
NEUTROPHILS ABSOLUTE: 7.6 K/UL (ref 1.5–7.5)
NEUTROPHILS RELATIVE PERCENT: 81.4 % (ref 50–65)
NITRITE, URINE: NEGATIVE
O2 CONTENT ARTERIAL: 9.7 ML/DL
O2 SAT, ARTERIAL: 89.8 %
O2 THERAPY: ABNORMAL
PCO2 ARTERIAL: 72 MMHG (ref 35–45)
PDW BLD-RTO: 14.6 % (ref 11.5–14.5)
PH ARTERIAL: 7.35 (ref 7.35–7.45)
PH UA: 5 (ref 5–8)
PLATELET # BLD: 284 K/UL (ref 130–400)
PMV BLD AUTO: 10.1 FL (ref 9.4–12.3)
PO2 ARTERIAL: 60 MMHG (ref 80–100)
POTASSIUM REFLEX MAGNESIUM: 4.1 MMOL/L (ref 3.5–5)
POTASSIUM, WHOLE BLOOD: 4
PROTEIN UA: 30 MG/DL
PROTHROMBIN TIME: 13.6 SEC (ref 12–14.6)
RBC # BLD: 3.12 M/UL (ref 4.2–5.4)
RBC UA: 2 /HPF (ref 0–4)
SODIUM BLD-SCNC: 137 MMOL/L (ref 136–145)
SPECIFIC GRAVITY UA: 1.02 (ref 1–1.03)
TOTAL PROTEIN: 6.7 G/DL (ref 6.6–8.7)
TROPONIN: <0.01 NG/ML (ref 0–0.03)
UROBILINOGEN, URINE: 1 E.U./DL
WBC # BLD: 9.4 K/UL (ref 4.8–10.8)
WBC UA: 0 /HPF (ref 0–5)

## 2020-10-28 PROCEDURE — 87040 BLOOD CULTURE FOR BACTERIA: CPT

## 2020-10-28 PROCEDURE — 83605 ASSAY OF LACTIC ACID: CPT

## 2020-10-28 PROCEDURE — 93005 ELECTROCARDIOGRAM TRACING: CPT | Performed by: EMERGENCY MEDICINE

## 2020-10-28 PROCEDURE — 99999 PR OFFICE/OUTPT VISIT,PROCEDURE ONLY: CPT | Performed by: EMERGENCY MEDICINE

## 2020-10-28 PROCEDURE — 84132 ASSAY OF SERUM POTASSIUM: CPT

## 2020-10-28 PROCEDURE — 84484 ASSAY OF TROPONIN QUANT: CPT

## 2020-10-28 PROCEDURE — 83880 ASSAY OF NATRIURETIC PEPTIDE: CPT

## 2020-10-28 PROCEDURE — 36415 COLL VENOUS BLD VENIPUNCTURE: CPT

## 2020-10-28 PROCEDURE — 85025 COMPLETE CBC W/AUTO DIFF WBC: CPT

## 2020-10-28 PROCEDURE — 85379 FIBRIN DEGRADATION QUANT: CPT

## 2020-10-28 PROCEDURE — 85730 THROMBOPLASTIN TIME PARTIAL: CPT

## 2020-10-28 PROCEDURE — 81001 URINALYSIS AUTO W/SCOPE: CPT

## 2020-10-28 PROCEDURE — 86738 MYCOPLASMA ANTIBODY: CPT

## 2020-10-28 PROCEDURE — 96365 THER/PROPH/DIAG IV INF INIT: CPT

## 2020-10-28 PROCEDURE — 96366 THER/PROPH/DIAG IV INF ADDON: CPT

## 2020-10-28 PROCEDURE — 99285 EMERGENCY DEPT VISIT HI MDM: CPT

## 2020-10-28 PROCEDURE — 71045 X-RAY EXAM CHEST 1 VIEW: CPT

## 2020-10-28 PROCEDURE — 36600 WITHDRAWAL OF ARTERIAL BLOOD: CPT

## 2020-10-28 PROCEDURE — 85610 PROTHROMBIN TIME: CPT

## 2020-10-28 PROCEDURE — 83690 ASSAY OF LIPASE: CPT

## 2020-10-28 PROCEDURE — 82803 BLOOD GASES ANY COMBINATION: CPT

## 2020-10-28 PROCEDURE — 2000000000 HC ICU R&B

## 2020-10-28 PROCEDURE — 83735 ASSAY OF MAGNESIUM: CPT

## 2020-10-28 PROCEDURE — 80053 COMPREHEN METABOLIC PANEL: CPT

## 2020-10-28 PROCEDURE — 0202U NFCT DS 22 TRGT SARS-COV-2: CPT

## 2020-10-28 PROCEDURE — 6360000002 HC RX W HCPCS: Performed by: EMERGENCY MEDICINE

## 2020-10-28 RX ORDER — FLUTICASONE FUROATE, UMECLIDINIUM BROMIDE AND VILANTEROL TRIFENATATE 100; 62.5; 25 UG/1; UG/1; UG/1
1 POWDER RESPIRATORY (INHALATION) DAILY
Status: ON HOLD | COMMUNITY
End: 2020-10-30 | Stop reason: HOSPADM

## 2020-10-28 RX ORDER — LEVOFLOXACIN 5 MG/ML
750 INJECTION, SOLUTION INTRAVENOUS ONCE
Status: COMPLETED | OUTPATIENT
Start: 2020-10-28 | End: 2020-10-29

## 2020-10-28 RX ORDER — FUROSEMIDE 20 MG/1
20 TABLET ORAL DAILY
COMMUNITY
End: 2021-01-19 | Stop reason: SDUPTHER

## 2020-10-28 RX ORDER — ACETAMINOPHEN 500 MG
1000 TABLET ORAL EVERY 6 HOURS PRN
COMMUNITY
End: 2020-11-04

## 2020-10-28 RX ORDER — DEXAMETHASONE SODIUM PHOSPHATE 10 MG/ML
10 INJECTION, SOLUTION INTRAMUSCULAR; INTRAVENOUS ONCE
Status: COMPLETED | OUTPATIENT
Start: 2020-10-28 | End: 2020-10-29

## 2020-10-28 RX ADMIN — LEVOFLOXACIN 750 MG: 5 INJECTION, SOLUTION INTRAVENOUS at 22:11

## 2020-10-28 ASSESSMENT — ENCOUNTER SYMPTOMS
GASTROINTESTINAL NEGATIVE: 1
SHORTNESS OF BREATH: 1
VOMITING: 0
WHEEZING: 0
COUGH: 1

## 2020-10-29 ENCOUNTER — APPOINTMENT (OUTPATIENT)
Dept: CT IMAGING | Age: 75
DRG: 189 | End: 2020-10-29
Payer: MEDICARE

## 2020-10-29 LAB
ADENOVIRUS BY PCR: NOT DETECTED
BORDETELLA PARAPERTUSSIS BY PCR: NOT DETECTED
BORDETELLA PERTUSSIS BY PCR: NOT DETECTED
CHLAMYDOPHILIA PNEUMONIAE BY PCR: NOT DETECTED
CORONAVIRUS 229E BY PCR: NOT DETECTED
CORONAVIRUS HKU1 BY PCR: NOT DETECTED
CORONAVIRUS NL63 BY PCR: NOT DETECTED
CORONAVIRUS OC43 BY PCR: NOT DETECTED
D DIMER: 1.14 UG/ML FEU (ref 0–0.48)
EKG P AXIS: 78 DEGREES
EKG P-R INTERVAL: 138 MS
EKG Q-T INTERVAL: 334 MS
EKG QRS DURATION: 86 MS
EKG QTC CALCULATION (BAZETT): 384 MS
EKG T AXIS: 72 DEGREES
FERRITIN: 126.6 NG/ML (ref 13–150)
FOLATE: 7.8 NG/ML (ref 4.8–37.3)
HUMAN METAPNEUMOVIRUS BY PCR: NOT DETECTED
HUMAN RHINOVIRUS/ENTEROVIRUS BY PCR: NOT DETECTED
INFLUENZA A BY PCR: NOT DETECTED
INFLUENZA B BY PCR: NOT DETECTED
IRON SATURATION: 6 % (ref 14–50)
IRON: 14 UG/DL (ref 37–145)
LV EF: 68 %
LVEF MODALITY: NORMAL
MAGNESIUM: 2.1 MG/DL (ref 1.6–2.4)
MYCOPLASMA PNEUMONIAE BY PCR: NOT DETECTED
PARAINFLUENZA VIRUS 1 BY PCR: NOT DETECTED
PARAINFLUENZA VIRUS 2 BY PCR: NOT DETECTED
PARAINFLUENZA VIRUS 3 BY PCR: NOT DETECTED
PARAINFLUENZA VIRUS 4 BY PCR: NOT DETECTED
PRO-BNP: 358 PG/ML (ref 0–900)
RESPIRATORY SYNCYTIAL VIRUS BY PCR: NOT DETECTED
SARS-COV-2, PCR: NOT DETECTED
TOTAL IRON BINDING CAPACITY: 227 UG/DL (ref 250–400)
TSH SERPL DL<=0.05 MIU/L-ACNC: 0.78 UIU/ML (ref 0.27–4.2)
VITAMIN B-12: 410 PG/ML (ref 211–946)

## 2020-10-29 PROCEDURE — 2580000003 HC RX 258: Performed by: HOSPITALIST

## 2020-10-29 PROCEDURE — 84443 ASSAY THYROID STIM HORMONE: CPT

## 2020-10-29 PROCEDURE — 6370000000 HC RX 637 (ALT 250 FOR IP): Performed by: INTERNAL MEDICINE

## 2020-10-29 PROCEDURE — 92610 EVALUATE SWALLOWING FUNCTION: CPT

## 2020-10-29 PROCEDURE — 1210000000 HC MED SURG R&B

## 2020-10-29 PROCEDURE — 92522 EVALUATE SPEECH PRODUCTION: CPT

## 2020-10-29 PROCEDURE — 6370000000 HC RX 637 (ALT 250 FOR IP): Performed by: HOSPITALIST

## 2020-10-29 PROCEDURE — 2580000003 HC RX 258: Performed by: INTERNAL MEDICINE

## 2020-10-29 PROCEDURE — 2500000003 HC RX 250 WO HCPCS: Performed by: HOSPITALIST

## 2020-10-29 PROCEDURE — 93880 EXTRACRANIAL BILAT STUDY: CPT

## 2020-10-29 PROCEDURE — 82728 ASSAY OF FERRITIN: CPT

## 2020-10-29 PROCEDURE — 82746 ASSAY OF FOLIC ACID SERUM: CPT

## 2020-10-29 PROCEDURE — 83540 ASSAY OF IRON: CPT

## 2020-10-29 PROCEDURE — 71275 CT ANGIOGRAPHY CHEST: CPT

## 2020-10-29 PROCEDURE — 6360000002 HC RX W HCPCS: Performed by: INTERNAL MEDICINE

## 2020-10-29 PROCEDURE — 93306 TTE W/DOPPLER COMPLETE: CPT

## 2020-10-29 PROCEDURE — 6360000004 HC RX CONTRAST MEDICATION: Performed by: HOSPITALIST

## 2020-10-29 PROCEDURE — 6360000002 HC RX W HCPCS: Performed by: EMERGENCY MEDICINE

## 2020-10-29 PROCEDURE — 2700000000 HC OXYGEN THERAPY PER DAY

## 2020-10-29 PROCEDURE — 94640 AIRWAY INHALATION TREATMENT: CPT

## 2020-10-29 PROCEDURE — 6360000002 HC RX W HCPCS: Performed by: HOSPITALIST

## 2020-10-29 PROCEDURE — 82607 VITAMIN B-12: CPT

## 2020-10-29 PROCEDURE — 99222 1ST HOSP IP/OBS MODERATE 55: CPT | Performed by: NURSE PRACTITIONER

## 2020-10-29 PROCEDURE — 36591 DRAW BLOOD OFF VENOUS DEVICE: CPT

## 2020-10-29 PROCEDURE — 83550 IRON BINDING TEST: CPT

## 2020-10-29 RX ORDER — IPRATROPIUM BROMIDE AND ALBUTEROL SULFATE 2.5; .5 MG/3ML; MG/3ML
1 SOLUTION RESPIRATORY (INHALATION)
Status: DISCONTINUED | OUTPATIENT
Start: 2020-10-29 | End: 2020-10-29

## 2020-10-29 RX ORDER — SODIUM CHLORIDE 9 MG/ML
INJECTION, SOLUTION INTRAVENOUS CONTINUOUS
Status: DISCONTINUED | OUTPATIENT
Start: 2020-10-29 | End: 2020-10-29

## 2020-10-29 RX ORDER — PROMETHAZINE HYDROCHLORIDE 12.5 MG/1
12.5 TABLET ORAL EVERY 6 HOURS PRN
Status: DISCONTINUED | OUTPATIENT
Start: 2020-10-29 | End: 2020-10-30 | Stop reason: HOSPADM

## 2020-10-29 RX ORDER — SODIUM CHLORIDE 0.9 % (FLUSH) 0.9 %
10 SYRINGE (ML) INJECTION EVERY 12 HOURS SCHEDULED
Status: DISCONTINUED | OUTPATIENT
Start: 2020-10-29 | End: 2020-10-30 | Stop reason: HOSPADM

## 2020-10-29 RX ORDER — ONDANSETRON 2 MG/ML
4 INJECTION INTRAMUSCULAR; INTRAVENOUS EVERY 6 HOURS PRN
Status: DISCONTINUED | OUTPATIENT
Start: 2020-10-29 | End: 2020-10-30 | Stop reason: HOSPADM

## 2020-10-29 RX ORDER — CARVEDILOL 3.12 MG/1
3.12 TABLET ORAL 2 TIMES DAILY WITH MEALS
Status: DISCONTINUED | OUTPATIENT
Start: 2020-10-29 | End: 2020-10-30 | Stop reason: HOSPADM

## 2020-10-29 RX ORDER — MEMANTINE HYDROCHLORIDE 5 MG/1
10 TABLET ORAL 2 TIMES DAILY
Status: DISCONTINUED | OUTPATIENT
Start: 2020-10-29 | End: 2020-10-30 | Stop reason: HOSPADM

## 2020-10-29 RX ORDER — POLYETHYLENE GLYCOL 3350 17 G/17G
17 POWDER, FOR SOLUTION ORAL DAILY PRN
Status: DISCONTINUED | OUTPATIENT
Start: 2020-10-29 | End: 2020-10-30 | Stop reason: HOSPADM

## 2020-10-29 RX ORDER — PANTOPRAZOLE SODIUM 40 MG/1
40 TABLET, DELAYED RELEASE ORAL
Status: DISCONTINUED | OUTPATIENT
Start: 2020-10-29 | End: 2020-10-30 | Stop reason: HOSPADM

## 2020-10-29 RX ORDER — ACETAMINOPHEN 325 MG/1
650 TABLET ORAL EVERY 6 HOURS PRN
Status: DISCONTINUED | OUTPATIENT
Start: 2020-10-29 | End: 2020-10-30 | Stop reason: HOSPADM

## 2020-10-29 RX ORDER — FUROSEMIDE 20 MG/1
20 TABLET ORAL DAILY
Status: DISCONTINUED | OUTPATIENT
Start: 2020-10-29 | End: 2020-10-30 | Stop reason: HOSPADM

## 2020-10-29 RX ORDER — SODIUM CHLORIDE 0.9 % (FLUSH) 0.9 %
10 SYRINGE (ML) INJECTION PRN
Status: DISCONTINUED | OUTPATIENT
Start: 2020-10-29 | End: 2020-10-30 | Stop reason: HOSPADM

## 2020-10-29 RX ORDER — ISOSORBIDE MONONITRATE 30 MG/1
30 TABLET, EXTENDED RELEASE ORAL DAILY
Status: DISCONTINUED | OUTPATIENT
Start: 2020-10-29 | End: 2020-10-30 | Stop reason: HOSPADM

## 2020-10-29 RX ORDER — ACETAMINOPHEN 650 MG/1
650 SUPPOSITORY RECTAL EVERY 6 HOURS PRN
Status: DISCONTINUED | OUTPATIENT
Start: 2020-10-29 | End: 2020-10-30 | Stop reason: HOSPADM

## 2020-10-29 RX ORDER — FERROUS SULFATE 325(65) MG
325 TABLET ORAL 2 TIMES DAILY WITH MEALS
Status: DISCONTINUED | OUTPATIENT
Start: 2020-10-29 | End: 2020-10-30 | Stop reason: HOSPADM

## 2020-10-29 RX ORDER — DONEPEZIL HYDROCHLORIDE 5 MG/1
5 TABLET, FILM COATED ORAL NIGHTLY
Status: DISCONTINUED | OUTPATIENT
Start: 2020-10-29 | End: 2020-10-30 | Stop reason: HOSPADM

## 2020-10-29 RX ORDER — ARFORMOTEROL TARTRATE 15 UG/2ML
15 SOLUTION RESPIRATORY (INHALATION) 2 TIMES DAILY
Status: DISCONTINUED | OUTPATIENT
Start: 2020-10-29 | End: 2020-10-30 | Stop reason: HOSPADM

## 2020-10-29 RX ORDER — IPRATROPIUM BROMIDE AND ALBUTEROL SULFATE 2.5; .5 MG/3ML; MG/3ML
1 SOLUTION RESPIRATORY (INHALATION) EVERY 4 HOURS
Status: DISCONTINUED | OUTPATIENT
Start: 2020-10-29 | End: 2020-10-30 | Stop reason: HOSPADM

## 2020-10-29 RX ORDER — METHYLPREDNISOLONE SODIUM SUCCINATE 40 MG/ML
40 INJECTION, POWDER, LYOPHILIZED, FOR SOLUTION INTRAMUSCULAR; INTRAVENOUS EVERY 12 HOURS
Status: DISCONTINUED | OUTPATIENT
Start: 2020-10-29 | End: 2020-10-30 | Stop reason: HOSPADM

## 2020-10-29 RX ORDER — CLONAZEPAM 1 MG/1
0.5 TABLET ORAL 3 TIMES DAILY PRN
Status: DISCONTINUED | OUTPATIENT
Start: 2020-10-29 | End: 2020-10-30 | Stop reason: HOSPADM

## 2020-10-29 RX ORDER — TRAZODONE HYDROCHLORIDE 150 MG/1
150 TABLET ORAL NIGHTLY
Status: DISCONTINUED | OUTPATIENT
Start: 2020-10-29 | End: 2020-10-30 | Stop reason: HOSPADM

## 2020-10-29 RX ORDER — SODIUM CHLORIDE, SODIUM LACTATE, POTASSIUM CHLORIDE, CALCIUM CHLORIDE 600; 310; 30; 20 MG/100ML; MG/100ML; MG/100ML; MG/100ML
INJECTION, SOLUTION INTRAVENOUS CONTINUOUS
Status: DISCONTINUED | OUTPATIENT
Start: 2020-10-29 | End: 2020-10-30 | Stop reason: HOSPADM

## 2020-10-29 RX ORDER — BUDESONIDE 0.25 MG/2ML
250 INHALANT ORAL 2 TIMES DAILY
Status: DISCONTINUED | OUTPATIENT
Start: 2020-10-29 | End: 2020-10-30 | Stop reason: HOSPADM

## 2020-10-29 RX ORDER — GUAIFENESIN 600 MG/1
600 TABLET, EXTENDED RELEASE ORAL 2 TIMES DAILY
Status: DISCONTINUED | OUTPATIENT
Start: 2020-10-29 | End: 2020-10-30 | Stop reason: HOSPADM

## 2020-10-29 RX ADMIN — BUDESONIDE 250 MCG: 0.25 SUSPENSION RESPIRATORY (INHALATION) at 17:54

## 2020-10-29 RX ADMIN — CEFTRIAXONE 1 G: 1 INJECTION, POWDER, FOR SOLUTION INTRAMUSCULAR; INTRAVENOUS at 04:37

## 2020-10-29 RX ADMIN — SODIUM CHLORIDE, SODIUM LACTATE, POTASSIUM CHLORIDE, AND CALCIUM CHLORIDE: 600; 310; 30; 20 INJECTION, SOLUTION INTRAVENOUS at 09:56

## 2020-10-29 RX ADMIN — ISOSORBIDE MONONITRATE 30 MG: 30 TABLET, EXTENDED RELEASE ORAL at 09:53

## 2020-10-29 RX ADMIN — METHYLPREDNISOLONE SODIUM SUCCINATE 40 MG: 40 INJECTION, POWDER, FOR SOLUTION INTRAMUSCULAR; INTRAVENOUS at 11:00

## 2020-10-29 RX ADMIN — SODIUM CHLORIDE: 9 INJECTION, SOLUTION INTRAVENOUS at 01:43

## 2020-10-29 RX ADMIN — PANTOPRAZOLE SODIUM 40 MG: 40 TABLET, DELAYED RELEASE ORAL at 09:53

## 2020-10-29 RX ADMIN — TRAZODONE HYDROCHLORIDE 150 MG: 150 TABLET ORAL at 20:53

## 2020-10-29 RX ADMIN — CARVEDILOL 3.12 MG: 3.12 TABLET, FILM COATED ORAL at 09:53

## 2020-10-29 RX ADMIN — IPRATROPIUM BROMIDE AND ALBUTEROL SULFATE 1 AMPULE: .5; 3 SOLUTION RESPIRATORY (INHALATION) at 21:52

## 2020-10-29 RX ADMIN — ENOXAPARIN SODIUM 40 MG: 40 INJECTION SUBCUTANEOUS at 16:56

## 2020-10-29 RX ADMIN — IPRATROPIUM BROMIDE 0.5 MG: 0.5 SOLUTION RESPIRATORY (INHALATION) at 07:46

## 2020-10-29 RX ADMIN — ARFORMOTEROL TARTRATE 15 MCG: 15 SOLUTION RESPIRATORY (INHALATION) at 17:53

## 2020-10-29 RX ADMIN — DOXYCYCLINE 100 MG: 100 INJECTION, POWDER, LYOPHILIZED, FOR SOLUTION INTRAVENOUS at 16:55

## 2020-10-29 RX ADMIN — DONEPEZIL HYDROCHLORIDE 5 MG: 5 TABLET, FILM COATED ORAL at 20:53

## 2020-10-29 RX ADMIN — IPRATROPIUM BROMIDE AND ALBUTEROL SULFATE 1 AMPULE: .5; 3 SOLUTION RESPIRATORY (INHALATION) at 10:17

## 2020-10-29 RX ADMIN — MEMANTINE HYDROCHLORIDE 10 MG: 5 TABLET ORAL at 20:54

## 2020-10-29 RX ADMIN — IPRATROPIUM BROMIDE AND ALBUTEROL SULFATE 1 AMPULE: .5; 3 SOLUTION RESPIRATORY (INHALATION) at 17:39

## 2020-10-29 RX ADMIN — MEMANTINE HYDROCHLORIDE 10 MG: 5 TABLET ORAL at 01:42

## 2020-10-29 RX ADMIN — IOPAMIDOL 90 ML: 755 INJECTION, SOLUTION INTRAVENOUS at 03:59

## 2020-10-29 RX ADMIN — DONEPEZIL HYDROCHLORIDE 5 MG: 5 TABLET, FILM COATED ORAL at 01:43

## 2020-10-29 RX ADMIN — METHYLPREDNISOLONE SODIUM SUCCINATE 40 MG: 40 INJECTION, POWDER, FOR SOLUTION INTRAMUSCULAR; INTRAVENOUS at 22:43

## 2020-10-29 RX ADMIN — FUROSEMIDE 20 MG: 20 TABLET ORAL at 09:53

## 2020-10-29 RX ADMIN — SODIUM CHLORIDE, PRESERVATIVE FREE 10 ML: 5 INJECTION INTRAVENOUS at 11:00

## 2020-10-29 RX ADMIN — ENOXAPARIN SODIUM 50 MG: 60 INJECTION SUBCUTANEOUS at 01:42

## 2020-10-29 RX ADMIN — DOXYCYCLINE 100 MG: 100 INJECTION, POWDER, LYOPHILIZED, FOR SOLUTION INTRAVENOUS at 04:38

## 2020-10-29 RX ADMIN — GUAIFENESIN 600 MG: 600 TABLET, EXTENDED RELEASE ORAL at 20:53

## 2020-10-29 RX ADMIN — FERROUS SULFATE TAB 325 MG (65 MG ELEMENTAL FE) 325 MG: 325 (65 FE) TAB at 16:55

## 2020-10-29 RX ADMIN — MEMANTINE HYDROCHLORIDE 10 MG: 5 TABLET ORAL at 09:53

## 2020-10-29 RX ADMIN — ACETAMINOPHEN 650 MG: 325 TABLET ORAL at 09:52

## 2020-10-29 RX ADMIN — BUDESONIDE 250 MCG: 0.25 SUSPENSION RESPIRATORY (INHALATION) at 07:47

## 2020-10-29 RX ADMIN — GUAIFENESIN 600 MG: 600 TABLET, EXTENDED RELEASE ORAL at 09:53

## 2020-10-29 RX ADMIN — FERROUS SULFATE TAB 325 MG (65 MG ELEMENTAL FE) 325 MG: 325 (65 FE) TAB at 11:00

## 2020-10-29 RX ADMIN — ARFORMOTEROL TARTRATE 15 MCG: 15 SOLUTION RESPIRATORY (INHALATION) at 07:47

## 2020-10-29 RX ADMIN — DEXAMETHASONE SODIUM PHOSPHATE 10 MG: 10 INJECTION, SOLUTION INTRAMUSCULAR; INTRAVENOUS at 00:32

## 2020-10-29 RX ADMIN — CARVEDILOL 3.12 MG: 3.12 TABLET, FILM COATED ORAL at 16:55

## 2020-10-29 ASSESSMENT — PAIN SCALES - GENERAL
PAINLEVEL_OUTOF10: 0
PAINLEVEL_OUTOF10: 10

## 2020-10-29 NOTE — PROGRESS NOTES
Hospitalist Progress Note  [facility]     Patient: Yuriy Lester  : 1945  MRN: [id number]  Code Status: [facility]    Hospital Day: 1   Date of Service: 10/29/2020    Subjective:   Pt seen and examined. Resting comfortably. No current complaints.     Past Medical History:   Diagnosis Date    Abdominal pain     Anxiety     Arthritis     CAD in native artery     Cerebrovascular disease     CHF (congestive heart failure) (HCC)     Constipation     COPD (chronic obstructive pulmonary disease) (HCC)     Depression     Emphysema     GERD (gastroesophageal reflux disease)     Myocardial infarct, old     Palliative care patient 2020    Pneumonia     Tobacco abuse        Past Surgical History:   Procedure Laterality Date    ABDOMINAL EXPLORATION SURGERY      APPENDECTOMY      CARDIAC CATHETERIZATION  06/25/10    patent  stent noted in LAD,EF is estimated to be 60%    CARDIAC CATHETERIZATION  12  MDL    EF  60%    CHOLECYSTECTOMY      COLONOSCOPY  10/2014    Dr Krish Cuadra ENDOSCOPY  10/1/14    Dr Rai Class: food bezoar; esophagitis, gastritis, duodenitis; biopsies neg h-pylori       Family History   Problem Relation Age of Onset    Stroke Brother     Stroke Sister     Cancer Brother     Cancer Sister     Diabetes Brother     Diabetes Sister     Coronary Art Dis Other     Hypertension Other     Seizures Child     Colon Cancer Neg Hx     Colon Polyps Neg Hx     Esophageal Cancer Neg Hx     Liver Cancer Neg Hx     Liver Disease Neg Hx     Rectal Cancer Neg Hx     Stomach Cancer Neg Hx        Social History     Socioeconomic History    Marital status:      Spouse name: Not on file    Number of children: Not on file    Years of education: Not on file    Highest education level: Not on file   Occupational History    Not on file   Social Needs    Financial resource strain: Not on file    Food insecurity     Worry: Not on file     Inability: Not on file    Transportation needs     Medical: Not on file     Non-medical: Not on file   Tobacco Use    Smoking status: Current Every Day Smoker     Packs/day: 0.25     Years: 36.00     Pack years: 9.00     Types: Cigarettes     Start date: 1    Smokeless tobacco: Never Used   Substance and Sexual Activity    Alcohol use: No     Alcohol/week: 0.0 standard drinks    Drug use: No    Sexual activity: Not on file   Lifestyle    Physical activity     Days per week: Not on file     Minutes per session: Not on file    Stress: Not on file   Relationships    Social connections     Talks on phone: Not on file     Gets together: Not on file     Attends Yazdanism service: Not on file     Active member of club or organization: Not on file     Attends meetings of clubs or organizations: Not on file     Relationship status: Not on file    Intimate partner violence     Fear of current or ex partner: Not on file     Emotionally abused: Not on file     Physically abused: Not on file     Forced sexual activity: Not on file   Other Topics Concern    Not on file   Social History Narrative    Not on file       Current Facility-Administered Medications   Medication Dose Route Frequency Provider Last Rate Last Dose    0.9 % sodium chloride infusion   Intravenous Continuous Zheng Blake MD 75 mL/hr at 10/29/20 0538      sodium chloride flush 0.9 % injection 10 mL  10 mL Intravenous 2 times per day Zheng Blake MD        sodium chloride flush 0.9 % injection 10 mL  10 mL Intravenous PRN Zheng Blake MD        acetaminophen (TYLENOL) tablet 650 mg  650 mg Oral Q6H PRN Zheng Blake MD        Or    acetaminophen (TYLENOL) suppository 650 mg  650 mg Rectal Q6H PRN Zheng Blake MD        polyethylene glycol (GLYCOLAX) packet 17 g  17 g Oral Daily PRN Zheng Blake MD        promethazine (PHENERGAN) tablet 12.5 mg  12.5 mg Oral Q6H PRN Juanna Saint, MD        Or    ondansetron [facility]) injection 4 mg  4 mg Intravenous Q6H PRN Juanna Saint, MD        enoxaparin (LOVENOX) injection 50 mg  1 mg/kg Subcutaneous Q12H Juanna Saint, MD   50 mg at 10/29/20 0142    carvedilol (COREG) tablet 3.125 mg  3.125 mg Oral BID WC Juanna Saint, MD        clonazePAM [facility]) tablet 0.5 mg  0.5 mg Oral TID PRN Juanna Saint, MD        donepezil (ARICEPT) tablet 5 mg  5 mg Oral Nightly Juanna Saint, MD   5 mg at 10/29/20 0143    furosemide (LASIX) tablet 20 mg  20 mg Oral Daily Juanna Saint, MD        isosorbide mononitrate (IMDUR) extended release tablet 30 mg  30 mg Oral Daily Juanna Saint, MD        [facility]) tablet 10 mg  10 mg Oral BID Juanna Saint, MD   10 mg at 10/29/20 0142    pantoprazole (PROTONIX) tablet 40 mg  40 mg Oral QAM AC Juanna Saint, MD        traZODone (DESYREL) tablet 150 mg  150 mg Oral Nightly Juanna Saint, MD        budesonide (PULMICORT) nebulizer suspension 250 mcg  250 mcg Nebulization BID Juanna Saint, MD   250 mcg at 10/29/20 0747    Arformoterol Tartrate (BROVANA) nebulizer solution 15 mcg  15 mcg Nebulization BID Juanna Saint, MD   15 mcg at 10/29/20 0747    ipratropium (ATROVENT) 0.02 % nebulizer solution 0.5 mg  0.5 mg Nebulization TID Juanna Saint, MD   0.5 mg at 10/29/20 0746    cefTRIAXone (ROCEPHIN) 1 g in sterile water 10 mL IV syringe  1 g Intravenous Q24H Juanna Saint, MD   1 g at 10/29/20 0437    guaiFENesin (MUCINEX) extended release tablet 600 mg  600 mg Oral BID Juanna Saint, MD        doxycycline (VIBRAMYCIN) 100 mg in dextrose 5 % 100 mL IVPB  100 mg Intravenous Q12H Juanna Saint, MD   Stopped at 10/29/20 0538    methylPREDNISolone sodium (SOLU-MEDROL) injection 40 mg  40 mg Intravenous Q12H Diego Moser MD             sodium chloride 75 mL/hr at 10/29/20 0538        Objective:   BP (!) 105/56   Pulse 73   Temp 98 °F (36.7 °C) (Temporal)   Resp 15   Ht 5' (1.524 m)   Wt 118 lb 1.6 oz (53.6 kg)   SpO2 98%   BMI 23.06 kg/m²     General: no acute distress  HEENT: normocephalic, atraumatic  Neck: supple, symmetrical, trachea midline   Lungs: bilateral expiratory wheezing  Cardiovascular: s1 and s2 normal  Abdomen: soft, positive bowel sounds, nondistended  Extremities: no edema or cyanosis   Neuro: aaox3, no focal deficits   Skin: normal color and texture     Recent Labs     10/28/20  1835   WBC 9.4   RBC 3.12*   HGB 9.3*   HCT 31.5*   .0*   MCH 29.8   MCHC 29.5*        Recent Labs     10/28/20  1835 10/28/20  1952     --    K 4.1 4.0   ANIONGAP 6*  --    CL 96*  --    CO2 35*  --    BUN 29*  --    CREATININE 0.9  --    GLUCOSE 121*  --    CALCIUM 9.5  --      Recent Labs     10/28/20  1835   MG 2.1     Recent Labs     10/28/20  1835   AST 11   ALT 7   BILITOT <0.2   ALKPHOS 111*     No results for input(s): PH, PO2, PCO2, HCO3, BE, O2SAT in the last 72 hours. Recent Labs     10/28/20  1835   TROPONINI <0.01     Recent Labs     10/28/20  1835   INR 1.04     Recent Labs     10/28/20  2122   LACTA 0.4*         Intake/Output Summary (Last 24 hours) at 10/29/2020 0931  Last data filed at 10/29/2020 0600  Gross per 24 hour   Intake 346.25 ml   Output 50 ml   Net 296.25 ml       Xr Chest Portable    Result Date: 10/28/2020  XR CHEST PORTABLE 10/28/2020 6:14 PM HISTORY: Chest pain COMPARISON: Chest exam dated 7/15/2020. FINDINGS: There is a new patchy infiltrate in the left costophrenic angle. Mild fibrotic change in both lung apices. No pleural effusion or pneumothorax. Heart size is normal. The pulmonary vasculature are nondilated. There is a left chest wall [id number] identified. No acute bony abnormality.     1. New patchy infiltrate in the left costophrenic angle, perhaps developing pneumonia in the lingula. 2. Underlying lung emphysema with bilateral apical fibrotic changes. Signed by Dr Cari Griffin on 10/28/2020 7:46 PM    Cta Pulmonary W Contrast    Result Date: 10/29/2020  CTA PULMONARY W CONTRAST 10/29/2020 2:38 AM HISTORY: Dyspnea. Elevated d-dimer. COMPARISON: 7/15/2020. DLP: 328 mGy cm. Automated exposure control was utilized to diminish patient radiation dose. TECHNIQUE: Helical tomographic images of the chest were obtained after the administration of intravenous contrast following angiogram protocol. Additionally, 3D MIP reconstructions in the coronal and sagittal planes were provided. FINDINGS:  Pulmonary arteries: There is adequate enhancement of the pulmonary arteries to evaluate for central and segmental pulmonary emboli. There are no filling defects within the main, lobar, segmental or visualized subsegmental pulmonary arteries. The pulmonary vessels are within normal limits. Aorta and great vessels: The aorta is well opacified and demonstrates no aneurysm, stenosis or dissection. There is atheromatous calcification and plaquing at the origin of the left common carotid artery with moderate stenosis. There is also irregular plaquing and mild stenosis involving the proximal segment of the left subclavian artery. . Coronary calcifications are noted in the LAD distribution. . Neck base: The imaged portion of the base of the neck appears unremarkable. Lungs: There is nodular scarring within both apices. There are moderate changes of centrilobular emphysema. There is diffuse bronchial wall thickening. There is mild cylindrical bronchiectasis predominantly within the lower lobes with some mild mucoid impaction within the more distal subsegmental bronchi. There is improved aeration of the lungs appear to the previous study. Previously noted rather extensive left lingular pneumonia has resolved. . The trachea and bronchial tree are patent.  Heart: The heart is normal in size. A small pericardial effusion is present. . Lymph nodes: Borderline enlarged AP window, pretracheal and right paratracheal nodes are again demonstrated. These are likely reactive in nature and are stable from the previous study. . Bones and soft tissues: The osseous structures of the thorax and surrounding soft tissues demonstrate no acute process. Upper abdomen: The patient has undergone prior cholecystectomy. The adrenals are unremarkable. .     1. No evidence of pulmonary embolus. 2. There is atheromatous calcification of the aortic arch and proximal great vessels. There is associated stenosis of the proximal left common carotid and left subclavian artery. Mild coronary calcifications are noted in the LAD distribution. 3. Small pericardial effusion. 4. Diffuse bronchial wall thickening with some mild cylindrical bronchiectasis predominantly within the lower lobes. Previously noted extensive left lingular pneumonia has resolved. There is nodular scarring within both apices. . Signed by Dr Suha Benson on 10/29/2020 7:29 AM       Assessment and Plan:   COPD exacerbation  Denies fever or chills  Lactate 0.4  Steroids  Nebs  Antibiotics  Goal sats 88 to 92%  Home requirement 3 L supplemental O2 which patient is on currently    Tobacco dependence  Counseled    Left common carotid/left subclavian artery stenosis  Per CT 10/29/2020  Vascular surgery consulted to eval    DVT prophylaxis  Eliezer Guerrero MD   10/29/2020 9:31 AM

## 2020-10-29 NOTE — PROGRESS NOTES
Speech Language Pathology  Facility/Department: [facility] ICU   CLINICAL BEDSIDE SWALLOW EVALUATION  SPEECH PRODUCTION EVALUATION     NAME: Deshawn Hernandez  : 1945  MRN: [id number]    ADMISSION DATE: 10/28/2020  ADMITTING DIAGNOSIS: has CHF (congestive heart failure) ([address] CAD in native artery; Chest pain; Chronic constipation; Nondiabetic gastroparesis; Generalized abdominal pain; Nausea & vomiting; Chronic narcotic dependence ([address] Polypharmacy; Frequency of urination; Gross hematuria; Urge incontinence; Palliative care patient; Pneumonia due to infectious organism; COPD (chronic obstructive pulmonary disease) ([address] Moderate episode of recurrent major depressive disorder ([facility] Pneumonia; Hypoxia; Respiratory failure ([facility] [address] [facility] and Carotid stenosis, asymptomatic, left on their problem list.    Date of Eval: 10/29/2020  Evaluating Therapist: Kevin Luo    Current Diet level:  Regular solid consistency with thin liquids    Reason for Referral  Deshawn Hernandez was referred for a bedside swallow evaluation to assess the efficiency of her swallow function, identify signs and symptoms of aspiration and make recommendations regarding safe dietary consistencies, effective compensatory strategies, and safe eating environment. Impression  Assessed patient's swallowing function. Patient exhibits slow, decreased oral prep of more solid consistencies as well as sluggish, mild-moderately decreased laryngeal elevation for swallow airway protection. Even so, no outward S/S penetration/aspiration was observed with any ice chip trial, puree consistency trial, regular solid consistency trial, or thin H2O trial administered during evaluation this date. It is noted that during assessment, patient verbalized preference of softer foods secondary to lack of dentition. At this time, would trial bite sized consistency. Continue thin liquids. Assist in meal set-up. Recommend meds whole in pudding/applesauce.  Will continue to follow. Thank you for this consult. Treatment Plan  Requires SLP Intervention: Yes     Recommended Diet and Intervention  Diet Solids Recommendation: Bite sized  Liquid Consistency Recommendation: Thin    Recommended Form of Meds: Meds whole in puree as able  Therapeutic Interventions: Patient/Family education;Diet tolerance monitoring; Therapeutic PO trials with SLP     Compensatory Swallowing Strategies  Compensatory Swallowing Strategies: Upright as possible for all oral intake;Small bites/sips;Eat/Feed slowly; Alternate solids and liquids; Remain upright for 30-45 minutes after meals     Treatment/Goals  Timeframe for Short-term Goals: 1x/day for 3 days   Goal 1: Patient will tolerate bite sized consistency and thin liquids with min S/S penetration/aspiration during PO intake. Goal 2: Patient staff will follow swallow safety recommendations to decrease risk of penetration/aspiration during PO intake. Goal 3: Re-assessment of swallow function for potential diet upgrade. Goal 4: Monitor speech production. General  Chart Reviewed: Yes  Behavior/Cognition: Alert; Cooperative  O2 Device: Nasal Cannula   Communication Observation: (Assessed patient's speech production. Patient exhibits decreased volume of speech and slow, decreased lingual movements during verbalizations. SLP ranked functional intelligibility of speech for unfamiliar listeners at % in utterances without background noise present.)  Follows Directions: Simple   Dentition: No dentures or partials present  Patient Positioning: Upright in bed  Consistencies Administered: Ice chips;Dysphagia Pureed (Dysphagia I); Regular solid; Thin - straw     Oral Motor Examination   Labial ROM: (Adequate during labial retraction trials and labial protrusion trials.)  Labial Strength: (Adequate during labial compression trials.)  Labial Coordination: (Adequate movements were noted.)  Lingual ROM: (Adequate during lingual extension trials with full point achieved; decreased during lingual elevation trials without use of accessory jaw movement; adequate movements noted bilaterally.)  Lingual Symmetry: (Deviation was noted, to the left, during lingual extension trials.)  Lingual Strength: (Decreased.)  Lingual Coordination: (Slowed movements were noted.)     Assessed patient's swallowing function with the following observations noted:     Oral Phase  Mastication: Ice chips;Regular solid (Patient exhibited slow vertical jaw movement at the front of the mouth during oral prep of ice chip trials and regular solid consistency trials presented by SLP.)  Oral Phase - Comment: Oral transit of ice chip trials primarily measured 1-2 seconds in length. Oral transit of puree consistency trials and regular solid consistency trials, all presented by SLP, primarily measured 1-2 seconds in length and min oral cavity residue (with both consistencies) was noted post swallows; all oral cavity residue cleared from the mouth with additional dry swallows. Oral transit of thin H2O trials, presented via straw by SLP, primarily measured 1-2 seconds in length. Pharyngeal Phase  Decreased Laryngeal Elevation: (Patient exhibited sluggish, mild-moderately decreased laryngeal elevation for swallow airway protection.)  Pharyngeal Phase - Comment: No outward S/S penetration/aspiration was observed with any ice chip trial, puree consistency trial, regular solid consistency trial, or thin H2O trial administered during assessment this date. It is noted that during evaluation, patient verbalized preference of softer foods secondary to lack of dentition. At this time, would trial bite sized consistency. Continue thin liquids. Assist in meal set-up. Recommend meds whole in pudding/applesauce. Will continue to follow.      Electronically signed by GIOVANNI Murray on 10/29/2020 at 12:31 PM

## 2020-10-29 NOTE — PLAN OF CARE
Problem: Falls - Risk of:  Goal: Will remain free from falls  Description: Will remain free from falls  Outcome: Ongoing  Goal: Absence of physical injury  Description: Absence of physical injury  Outcome: Ongoing     Problem: Skin Integrity:  Goal: Will show no infection signs and symptoms  Description: Will show no infection signs and symptoms  Outcome: Ongoing  Goal: Absence of new skin breakdown  Description: Absence of new skin breakdown  Outcome: Ongoing  Goal: Risk for impaired skin integrity will decrease  Description: Risk for impaired skin integrity will decrease  Outcome: Ongoing     Problem:  Activity:  Goal: Fatigue will decrease  Description: Fatigue will decrease  Outcome: Ongoing     Problem: Cardiac:  Goal: Hemodynamic stability will improve  Description: Hemodynamic stability will improve  Outcome: Ongoing     Problem: Coping:  Goal: Level of anxiety will decrease  Description: Level of anxiety will decrease  Outcome: Ongoing  Goal: Ability to cope will improve  Description: Ability to cope will improve  Outcome: Ongoing  Goal: Ability to establish a method of communication will improve  Description: Ability to establish a method of communication will improve  Outcome: Ongoing     Problem: Nutritional:  Goal: Consumption of the prescribed amount of daily calories will improve  Description: Consumption of the prescribed amount of daily calories will improve  Outcome: Ongoing     Problem: Respiratory:  Goal: Ability to maintain a clear airway will improve  Description: Ability to maintain a clear airway will improve  Outcome: Ongoing  Goal: Ability to maintain adequate ventilation will improve  Description: Ability to maintain adequate ventilation will improve  Outcome: Ongoing  Goal: Complications related to the disease process, condition or treatment will be avoided or minimized  Description: Complications related to the disease process, condition or treatment will be avoided or minimized  Outcome: Ongoing

## 2020-10-29 NOTE — PROGRESS NOTES
Short transfer note:    Patient admitted with COPD exacerbation and is clinically improving with steroids, nebs, and abx. Patient requires 3 L supplemental O2 at home which she is on currently.     Juana Doll MD  10/29/2020

## 2020-10-29 NOTE — PROGRESS NOTES
10/28/2020  7:54 PM - Lorraine Silva Incoming Lab Results From [facility]  Value  Ref Range & Units  Status  Collected  Lab    pH, Arterial  7.350  7.350 - 7.450  Final  10/28/2020  7:52 PM  [facility]    pCO2, Arterial  72. 0High Panic    35.0 - 45.0 mmHg  Final  10/28/2020  7:52 PM  [facility]    pO2, Arterial  60. 0Low    80.0 - 100.0 mmHg  Final  10/28/2020  7:52 PM  [facility]    HCO3, Arterial  39.7High    22.0 - 26.0 mmol/L  Final  10/28/2020  7:52 PM  [facility] Excess, Arterial  12. 5High    -2.0 - 2.0 mmol/L  Final  10/28/2020  7:52 PM  [address] [place] [address] Lab    Hemoglobin, Art, Extended  7.6Low    12.0 - 16.0 g/dL  Final  10/28/2020  7:52 PM  [address] [place] [address] Lab    O2 Sat, Arterial  89.8Low    >92 %  Final  10/28/2020  7:52 PM  [facility]    Carboxyhgb, Arterial  2.6  0.0 - 5.0 %  Final  10/28/2020  7:52 PM  [facility]         0.0-1.5   (Smokers 1.5-5.0)    Methemoglobin, Arterial  1.6  <1.5 %  Final  10/28/2020  7:52 PM  [facility] Lab    O2 Content, Arterial  9.7  Not Established mL/dL  Final  10/28/2020  7:52 PM  [address] [place] [address] Lab      Pt on 3 lpm nasal cannula  resp rate 24   right radial puncture AT+

## 2020-10-29 NOTE — CONSULTS
GASTROINTESTINAL ENDOSCOPY  10/1/14    Dr Raymond Bowels: food bezoar; esophagitis, gastritis, duodenitis; biopsies neg h-pylori       Allergies:  Codeine and Sulfa antibiotics    Medications Current:   Current Facility-Administered Medications   Medication Dose Route Frequency Provider Last Rate Last Dose    sodium chloride flush 0.9 % injection 10 mL  10 mL Intravenous 2 times per day Burgess Ethan MD   10 mL at 10/29/20 1100    sodium chloride flush 0.9 % injection 10 mL  10 mL Intravenous PRN Burgess Ethan MD        acetaminophen (TYLENOL) tablet 650 mg  650 mg Oral Q6H PRN Burgess Ethan MD   650 mg at 10/29/20 9946    Or    acetaminophen (TYLENOL) suppository 650 mg  650 mg Rectal Q6H PRN Burgess Ethan MD        polyethylene glycol (GLYCOLAX) packet 17 g  17 g Oral Daily PRN Burgess Ethan MD        promethazine (PHENERGAN) tablet 12.5 mg  12.5 mg Oral Q6H PRN Burgess Ethan MD        Or    ondansetron [facility]) injection 4 mg  4 mg Intravenous Q6H PRN Burgess Ethan MD        carvedilol (COREG) tablet 3.125 mg  3.125 mg Oral BID WC Burgess Ethan MD   3.125 mg at 10/29/20 9063    clonazePAM (KLONOPIN) tablet 0.5 mg  0.5 mg Oral TID PRN Burgess Ethan MD        donepezil (ARICEPT) tablet 5 mg  5 mg Oral Nightly Burgess Ethan MD   5 mg at 10/29/20 0143    furosemide (LASIX) tablet 20 mg  20 mg Oral Daily Burgess Ethan MD   20 mg at 10/29/20 3865    isosorbide mononitrate (IMDUR) extended release tablet 30 mg  30 mg Oral Daily Burgess Ethan MD   30 mg at 10/29/20 0953    memantine (NAMENDA) tablet 10 mg  10 mg Oral BID Burgess Ethan MD   10 mg at 10/29/20 0953    pantoprazole (PROTONIX) tablet 40 mg  40 mg Oral QAM AC Burgess Ethan MD   40 mg at 10/29/20 0953    traZODone (DESYREL) tablet 150 mg  150 mg Oral Nightly Burgess Ethan MD        budesonide (PULMICORT) nebulizer suspension 250 mcg  250 mcg Nebulization BID Tonny Vaughan MD   250 mcg at 10/29/20 0747    Arformoterol Tartrate (BROVANA) nebulizer solution 15 mcg  15 mcg Nebulization BID Tonny Vaughan MD   15 mcg at 10/29/20 0747    guaiFENesin [facility]) extended release tablet 600 mg  600 mg Oral BID Tonny Vaughan MD   600 mg at 10/29/20 0953    doxycycline (VIBRAMYCIN) 100 mg in dextrose 5 % 100 mL IVPB  100 mg Intravenous Q12H Tonny Vaughan MD   Stopped at 10/29/20 0538    methylPREDNISolone sodium (SOLU-MEDROL) injection 40 mg  40 mg Intravenous Q12H Tonny Vaughan MD   40 mg at 10/29/20 1100    enoxaparin (LOVENOX) injection 40 mg  40 mg Subcutaneous Daily Julianna Dhillon MD        ipratropium-albuterol (DUONEB) nebulizer solution 1 ampule  1 ampule Inhalation Q4H Julianna Dhillon MD   1 ampule at 10/29/20 1017    ferrous sulfate (IRON 325) tablet 325 mg  325 mg Oral BID WC Julianna Dhillon MD   325 mg at 10/29/20 1100    lactated ringers infusion   Intravenous Continuous Julianna Dhillon MD 50 mL/hr at 10/29/20 6132         Social History:  Reports that she has been smoking cigarettes. She started smoking about 42 years ago. She has a 9.00 pack-year smoking history. She has never used smokeless tobacco. She reports that she does not drink alcohol or use drugs. Family History:      Problem Relation Age of Onset    Stroke Brother     Stroke Sister     Cancer Brother     Cancer Sister     Diabetes Brother     Diabetes Sister     Coronary Art Dis Other     Hypertension Other     Seizures Child     Colon Cancer Neg Hx     Colon Polyps Neg Hx     Esophageal Cancer Neg Hx     Liver Cancer Neg Hx     Liver Disease Neg Hx     Rectal Cancer Neg Hx     Stomach Cancer Neg Hx        REVIEW OF SYSTEMS:  General: Denies any fever or chills. Denies any unexplained weight loss or gain. Denies any change in activity or endurance.   HEENT: Denies any headaches or distribution. 3. Small pericardial effusion. 4. Diffuse bronchial wall thickening with some mild cylindrical bronchiectasis predominantly within the lower lobes. Previously noted extensive left lingular pneumonia has resolved. There is nodular scarring within both apices. CBC with Differential:   Lab Results   Component Value Date    WBC 9.4 10/28/2020    RBC 3.12 10/28/2020    HGB 9.3 10/28/2020    HCT 31.5 10/28/2020     10/28/2020    .0 10/28/2020    LYMPHOPCT 8.2 10/28/2020     BMP:   Lab Results   Component Value Date     10/28/2020    K 4.0 10/28/2020    K 4.1 10/28/2020    CL 96 10/28/2020    CO2 35 10/28/2020    BUN 29 10/28/2020    CREATININE 0.9 10/28/2020    CALCIUM 9.5 10/28/2020    GFRAA >59 10/28/2020    LABGLOM >60 10/28/2020    GLUCOSE 121 10/28/2020         ASSESSMENT:    1. Acute on Chronic Hypoxic Hypercapnic Respiratory Failure with Underlying Emphysema and Fibrotic Changes  2. Left Subclavian and Carotid Stenosis - Noted on CTA Pulmonary  3. CAD - PMHx LAD Stent  4. Iron Deficiency Anemia  5. GERD  6. Chronic Cigarette Smoker      PLAN:    1. Check Carotid Ultrasound - unless significant stenosis, recommend medical management with with ASA and Statin.        Xuan Ortiz, APRN

## 2020-10-29 NOTE — ACP (ADVANCE CARE PLANNING)
Advance Care Planning     Advance Care Planning Activator (Inpatient)  Conversation Note      Date of ACP Conversation: 10/29/2020    Conversation Conducted with: Pt: Cristian Boody, alert and oriented. Also confirmation from her daughter, Zelda Kendall Activator: [address]:     Current Designated Health Care Decision Maker:   Primary Decision Maker: Rosa Villa - Child - [phone]    Secondary Decision Maker: Heathervincentzoilatalia [facility] Child - [phone]          Care Preferences    Ventilation: \"If you were in your present state of health and suddenly became very ill and were unable to breathe on your own, what would your preference be about the use of a ventilator (breathing machine) if it were available to you? \"      Would the patient desire the use of ventilator (breathing machine)?:  NO    \"If your health worsens and it becomes clear that your chance of recovery is unlikely, what would your preference be about the use of a ventilator (breathing machine) if it were available to you? \"     Would the patient desire the use of ventilator (breathing machine)?:  NO      Resuscitation  \"CPR works best to restart the heart when there is a sudden event, like a heart attack, in someone who is otherwise healthy. Unfortunately, CPR does not typically restart the heart for people who have serious health conditions or who are very sick. \"    \"In the event your heart stopped as a result of an underlying serious health condition, would you want attempts to be made to restart your heart (answer \"yes\" for attempt to resuscitate) or would you prefer a natural death (answer \"no\" for do not attempt to resuscitate)? \"  NO        Conversation Outcomes:  [x] ACP discussion completed  [x] Existing advance directive reviewed with patient; no changes to patient's previously recorded wishes  [] New Advance Directive completed  [] Portable Do Not Rescitate prepared for Provider review and signature  [] POLST/POST/MOLST/MOST prepared for Provider review and signature      Electronically signed by Hany Kenny RN on 10/29/2020 at 10:07 AM

## 2020-10-29 NOTE — PROGRESS NOTES
Vascular Lab preliminary report  Carotid duplex exam shows < 50% stenosis B/L ICA. Vertebral arteries antegrade. Final report pending.

## 2020-10-29 NOTE — PROGRESS NOTES
Pt wishes to be a DNR. Also confirmed with pt's daughter and she states that is her wishes. Spoke with Dr. Meghann Borges about wishes.

## 2020-10-29 NOTE — ED PROVIDER NOTES
[facility] EMERGENCY DEPT  EMERGENCY DEPARTMENT ENCOUNTER      Pt Name: Sandy Bence  MRN: [id number]  [place] 1945  Date of evaluation: 10/28/2020  Provider: Ariel Toth MD    [address] [place] [address]       Chief Complaint   Patient presents with    Shortness of Breath         HISTORY OF PRESENT ILLNESS   (Location/Symptom, Timing/Onset, Context/Setting, Quality, Duration, Modifying Factors, Severity)  Note limiting factors. Sandy Bence is a 77 y.o. female who presents to the emergency department       Shortness of Breath   Severity:  Moderate  Onset quality:  Gradual  Timing:  Intermittent  Progression:  Worsening  Chronicity:  Recurrent  Context comment:  H/o COPD  Relieved by:  Nothing  Worsened by:  Exertion  Ineffective treatments:  None tried  Associated symptoms: chest pain and cough    Associated symptoms: no fever, no neck pain, no syncope, no vomiting and no wheezing        Nursing Notes were reviewed. REVIEW OF SYSTEMS    (2-9 systems for level 4, 10 or more for level 5)     Review of Systems   Constitutional: Negative for chills and fever. HENT: Negative. Respiratory: Positive for cough and shortness of breath. Negative for wheezing. Cardiovascular: Positive for chest pain. Negative for syncope. Gastrointestinal: Negative. Negative for vomiting. Musculoskeletal: Negative for neck pain. Neurological: Negative. All other systems reviewed and are negative. Except as noted above the remainder of the review of systems was reviewed and negative.        PAST MEDICAL HISTORY     Past Medical History:   Diagnosis Date    Abdominal pain     Anxiety     Arthritis     CAD in native artery     Cerebrovascular disease     CHF (congestive heart failure) (HCC)     Constipation     COPD (chronic obstructive pulmonary disease) (HCC)     Depression     Emphysema     GERD (gastroesophageal reflux disease)     Myocardial infarct, old     Palliative care patient 02/20/2020   [facility] Pneumonia     Tobacco abuse          SURGICAL HISTORY       Past Surgical History:   Procedure Laterality Date    ABDOMINAL EXPLORATION SURGERY      APPENDECTOMY      CARDIAC CATHETERIZATION  06/25/10    patent  stent noted in LAD,EF is estimated to be 60%    CARDIAC CATHETERIZATION  7/13/12  MDL    EF  60%    CHOLECYSTECTOMY      COLONOSCOPY  10/2014    Dr Anthony Gr UPPER GASTROINTESTINAL ENDOSCOPY  10/1/14    Dr Iesha Silva: food bezoar; esophagitis, gastritis, duodenitis; biopsies neg h-pylori         CURRENT MEDICATIONS       Previous Medications    ACETAMINOPHEN (TYLENOL) 500 MG TABLET    Take 1,000 mg by mouth every 6 hours as needed for Pain    ALBUTEROL (PROVENTIL) (5 MG/ML) 0.5% NEBULIZER SOLUTION    Take 0.5 mLs by nebulization every 6 hours as needed for Wheezing    ALBUTEROL SULFATE HFA (VENTOLIN HFA) 108 (90 BASE) MCG/ACT INHALER    Inhale 2 puffs into the lungs every 6 hours as needed for Wheezing    CARVEDILOL (COREG) 3.125 MG TABLET    Take 1 tablet by mouth 2 times daily (with meals)    CLONAZEPAM (KLONOPIN) 0.5 MG TABLET    Take 1 tablet by mouth 3 times daily as needed for Anxiety for up to 30 days. DONEPEZIL (ARICEPT) 5 MG TABLET    Take 1 tablet by mouth nightly    DULOXETINE (CYMBALTA) 60 MG EXTENDED RELEASE CAPSULE    Take 1 capsule by mouth 2 times daily    FESOTERODINE FUMARATE ER (TOVIAZ) 8 MG TB24    Take 1 tablet by mouth daily    FLUTICASONE-UMECLIDIN-VILANT (TRELEGY ELLIPTA) 100-62.5-25 MCG/INH AEPB    Inhale 1 puff into the lungs daily    FUROSEMIDE (LASIX) 20 MG TABLET    Take 20 mg by mouth daily    GABAPENTIN (NEURONTIN) 300 MG CAPSULE    Take 300 mg by mouth 3 times daily.      HYDROCODONE-ACETAMINOPHEN (NORCO)  MG PER TABLET    TAKE ONE TABLET BY MOUTH EVERY 8 HOURS AS NEEDED- dr michell Chung [place] MONONITRATE (IMDUR) 30 MG EXTENDED RELEASE TABLET    Take 1 tablet by mouth daily    MEMANTINE (NAMENDA) 10 MG TABLET Take 1 tablet by mouth 2 times daily    NITROGLYCERIN (NITROQUICK) 0.4 MG SL TABLET    Place 1 tablet under the tongue every 5 minutes as needed for Chest pain    OLOPATADINE (PATADAY) 0.2 % SOLN OPHTHALMIC SOLUTION    Place 1 drop into both eyes daily    ONDANSETRON (ZOFRAN) 4 MG TABLET    Take 1 tablet by mouth every 8 hours as needed for Nausea or Vomiting    OXYGEN    Inhale 2 L into the lungs daily    PANTOPRAZOLE (PROTONIX) 40 MG TABLET    Take 1 tablet by mouth every morning (before breakfast)    TRAZODONE (DESYREL) 150 MG TABLET    Take 1 tablet by mouth nightly    UMECLIDINIUM-VILANTEROL (ANORO ELLIPTA) 62.5-25 MCG/INH AEPB INHALER    Inhale 1 puff into the lungs daily IN place of Trelegy       ALLERGIES     Codeine and Sulfa antibiotics    FAMILY HISTORY       Family History   Problem Relation Age of Onset    Stroke Brother     Stroke Sister     Cancer Brother     Cancer Sister     Diabetes Brother     Diabetes Sister     Coronary Art Dis Other     Hypertension Other     Seizures Child     Colon Cancer Neg Hx     Colon Polyps Neg Hx     Esophageal Cancer Neg Hx     Liver Cancer Neg Hx     Liver Disease Neg Hx     Rectal Cancer Neg Hx     Stomach Cancer Neg Hx           SOCIAL HISTORY       Social History     Socioeconomic History    Marital status:      Spouse name: None    Number of children: None    Years of education: None    Highest education level: None   Occupational History    None   Social Needs    Financial resource strain: None    Food insecurity     Worry: None     Inability: None    Transportation needs     Medical: None     Non-medical: None   Tobacco Use    Smoking status: Current Every Day Smoker     Packs/day: 1.00     Years: 36.00     Pack years: 36.00     Types: Cigarettes     Start date: 1978    Smokeless tobacco: Never Used   Substance and Sexual Activity    Alcohol use: No     Alcohol/week: 0.0 standard drinks    Drug use: No    Sexual activity: Affect: Mood normal.         Behavior: Behavior normal.         DIAGNOSTIC RESULTS     EKG: All EKG's are interpreted by the Emergency Department Physician who either signs or Co-signs this chart in the absence of a cardiologist.    Sinus rhythm, rate 90, normal axis, normal intervals, no acute ischemic changes. RADIOLOGY:   Non-plain film images such as CT, Ultrasound and MRI are read by the radiologist. Plain radiographic images are visualized and preliminarily interpreted by the emergency physician with the below findings:        Interpretation per the Radiologist below, if available at the time of this note:    XR CHEST PORTABLE   Final Result   1. New patchy infiltrate in the left costophrenic angle, perhaps   developing pneumonia in the lingula. 2. Underlying lung emphysema with bilateral apical fibrotic changes.    Signed by Dr Janiya Holguin on 10/28/2020 7:46 PM            ED BEDSIDE ULTRASOUND:   Performed by ED Physician - none    LABS:  Labs Reviewed   CBC WITH AUTO DIFFERENTIAL - Abnormal; Notable for the following components:       Result Value    RBC 3.12 (*)     Hemoglobin 9.3 (*)     Hematocrit 31.5 (*)     .0 (*)     MCHC 29.5 (*)     RDW 14.6 (*)     Neutrophils % 81.4 (*)     Lymphocytes % 8.2 (*)     Neutrophils Absolute 7.6 (*)     Lymphocytes Absolute 0.8 (*)     All other components within normal limits   COMPREHENSIVE METABOLIC PANEL W/ REFLEX TO MG FOR LOW K - Abnormal; Notable for the following components:    Chloride 96 (*)     CO2 35 (*)     Anion Gap 6 (*)     Glucose 121 (*)     BUN 29 (*)     Alb 3.3 (*)     Alkaline Phosphatase 111 (*)     All other components within normal limits   LIPASE - Abnormal; Notable for the following components:    Lipase 9 (*)     All other components within normal limits   BLOOD GAS, ARTERIAL - Abnormal; Notable for the following components:    pCO2, Arterial 72.0 (*)     pO2, Arterial 60.0 (*)     HCO3, Arterial 39.7 (*)     Base Excess, 4 L nasal cannula. Given her multifactorial dyspnea and an increased oxygen requirement, we will admit for further cares. We have initiated Levaquin for antibiotic therapy. Amount and/or Complexity of Data Reviewed  Clinical lab tests: ordered and reviewed  Tests in the radiology section of CPT®: ordered and reviewed    Patient Progress  Patient progress: stable        REASSESSMENT          CRITICAL CARE TIME   Total Critical Care time was 0 minutes, excluding separately reportable procedures. There was a high probability of clinically significant/life threatening deterioration in the patient's condition which required my urgent intervention. CONSULTS:  None    PROCEDURES:  Unless otherwise noted below, none     Procedures        FINAL IMPRESSION      1. COPD exacerbation ([facility] [address]    2. Pneumonia due to organism          DISPOSITION/PLAN   DISPOSITION Admitted 10/28/2020 11:47:03 PM      PATIENT REFERRED TO:  No follow-up provider specified. DISCHARGE MEDICATIONS:  New Prescriptions    No medications on file     Controlled Substances Monitoring:     RX Monitoring 9/25/2020   Attestation The Prescription Monitoring Report for this patient was reviewed today.    Periodic Controlled Substance Monitoring -       (Please note that portions of this note were completed with a voice recognition program.  Efforts were made to edit the dictations but occasionally words are mis-transcribed.)    Jesus Thao MD (electronically signed)  Attending Emergency Physician           Jesus Thao MD  10/28/20 1029

## 2020-10-29 NOTE — PROGRESS NOTES
4 Eyes Skin Assessment    Carlos Stein is being assessed upon: Transfer to New Unit    I agree that Viktoria Schmitt, along with Aristeo Maldonado RN (either 2 RN's or 1 LPN and 1 RN) have performed a thorough Head to Toe Skin Assessment on the patient. ALL assessment sites listed below have been assessed. Areas assessed by both nurses:     [x]   Head, Face, and Ears   [x]   Shoulders, Back, and Chest  [x]   Arms, Elbows, and Hands   [x]   Coccyx, Sacrum, and Ischium  [x]   Legs, Feet, and Heels    Does the Patient have Skin Breakdown?  No    [place] Prevention initiated: Yes  Wound Care Orders initiated: No    WOC nurse consulted for Pressure Injury (Stage 3,4, Unstageable, DTI, NWPT, and Complex wounds) and New or Established Ostomies: No        Primary Nurse eSignature: Pratik Forte RN on 10/29/2020 at 6:25 PM      Co-Signer eSignature: Electronically signed by Zachariah Mccullough LPN on 56/86/78 at 2:91 PM CDT

## 2020-10-29 NOTE — CONSULTS
Palliative Care:     Pt known to Palliative Care. She presented to ED with worsening dyspnea. COPD exacerbation. Reviewed care with her nurse, Pt continues to get antibiotics, steroids and neb treatments. Possible move to the floor today. Reviewed life at home with pt and she confirms that she lives with her daughter. At home she has oxygen, BSC, and a walker. Her daughter helps her with bathing. Pt's tray present and she ate little. Encouraged her and offered assistance, but she declined. Past Medical History:        Past Medical History:   Diagnosis Date    Abdominal pain     Anxiety     Arthritis     CAD in native artery     Cerebrovascular disease     CHF (congestive heart failure) (HCC)     Constipation     COPD (chronic obstructive pulmonary disease) (HCC)     Depression     Emphysema     GERD (gastroesophageal reflux disease)     Myocardial infarct, old     Palliative care patient 02/20/2020    Pneumonia     Tobacco abuse        Advance Directives:    Pt wishes to be DNR       Living will on file   Will review and update ACP     Pain/Other Symptoms:    Pt reports back pain and asks for a pain pill. Her nurse is aware and will follow up. Activity:     Pt says she is up with the walker at home. Needs help with some ADLs        Psychological/Spiritual:       Restoration maryanne by history, no current Buddhism home. Plan:    Continue medical management and monitoring. Patient/family discussion r/t goals:  Pt states, she will return home with her daughter. Discussed home health and she refused. \"I don't need that. \"    Palliative Care will follow POC and support            Electronically signed by Deepak Hardy RN on 10/29/2020 at 9:56 AM

## 2020-10-30 VITALS
HEART RATE: 73 BPM | BODY MASS INDEX: 23.19 KG/M2 | SYSTOLIC BLOOD PRESSURE: 149 MMHG | HEIGHT: 60 IN | RESPIRATION RATE: 16 BRPM | OXYGEN SATURATION: 97 % | DIASTOLIC BLOOD PRESSURE: 82 MMHG | TEMPERATURE: 98 F | WEIGHT: 118.1 LBS

## 2020-10-30 LAB
ANION GAP SERPL CALCULATED.3IONS-SCNC: 8 MMOL/L (ref 7–19)
BASOPHILS ABSOLUTE: 0 K/UL (ref 0–0.2)
BASOPHILS RELATIVE PERCENT: 0 % (ref 0–1)
BUN BLDV-MCNC: 21 MG/DL (ref 8–23)
CALCIUM SERPL-MCNC: 9.2 MG/DL (ref 8.8–10.2)
CHLORIDE BLD-SCNC: 97 MMOL/L (ref 98–111)
CO2: 31 MMOL/L (ref 22–29)
CREAT SERPL-MCNC: 0.8 MG/DL (ref 0.5–0.9)
EOSINOPHILS ABSOLUTE: 0 K/UL (ref 0–0.6)
EOSINOPHILS RELATIVE PERCENT: 0 % (ref 0–5)
GFR AFRICAN AMERICAN: >59
GFR NON-AFRICAN AMERICAN: >60
GLUCOSE BLD-MCNC: 122 MG/DL (ref 74–109)
HCT VFR BLD CALC: 26.5 % (ref 37–47)
HEMOGLOBIN: 8 G/DL (ref 12–16)
IMMATURE GRANULOCYTES #: 0 K/UL
LYMPHOCYTES ABSOLUTE: 0.6 K/UL (ref 1.1–4.5)
LYMPHOCYTES RELATIVE PERCENT: 10.7 % (ref 20–40)
MAGNESIUM: 1.7 MG/DL (ref 1.6–2.4)
MCH RBC QN AUTO: 29.3 PG (ref 27–31)
MCHC RBC AUTO-ENTMCNC: 30.2 G/DL (ref 33–37)
MCV RBC AUTO: 97.1 FL (ref 81–99)
MONOCYTES ABSOLUTE: 0.2 K/UL (ref 0–0.9)
MONOCYTES RELATIVE PERCENT: 2.9 % (ref 0–10)
NEUTROPHILS ABSOLUTE: 4.5 K/UL (ref 1.5–7.5)
NEUTROPHILS RELATIVE PERCENT: 86 % (ref 50–65)
PDW BLD-RTO: 14.4 % (ref 11.5–14.5)
PLATELET # BLD: 285 K/UL (ref 130–400)
PMV BLD AUTO: 10.3 FL (ref 9.4–12.3)
POTASSIUM SERPL-SCNC: 3.9 MMOL/L (ref 3.5–5)
RBC # BLD: 2.73 M/UL (ref 4.2–5.4)
SODIUM BLD-SCNC: 136 MMOL/L (ref 136–145)
WBC # BLD: 5.3 K/UL (ref 4.8–10.8)

## 2020-10-30 PROCEDURE — 6370000000 HC RX 637 (ALT 250 FOR IP): Performed by: INTERNAL MEDICINE

## 2020-10-30 PROCEDURE — 80048 BASIC METABOLIC PNL TOTAL CA: CPT

## 2020-10-30 PROCEDURE — 2500000003 HC RX 250 WO HCPCS: Performed by: HOSPITALIST

## 2020-10-30 PROCEDURE — 2580000003 HC RX 258: Performed by: HOSPITALIST

## 2020-10-30 PROCEDURE — 85025 COMPLETE CBC W/AUTO DIFF WBC: CPT

## 2020-10-30 PROCEDURE — 2700000000 HC OXYGEN THERAPY PER DAY

## 2020-10-30 PROCEDURE — 6360000002 HC RX W HCPCS: Performed by: HOSPITALIST

## 2020-10-30 PROCEDURE — 83735 ASSAY OF MAGNESIUM: CPT

## 2020-10-30 PROCEDURE — 94640 AIRWAY INHALATION TREATMENT: CPT

## 2020-10-30 PROCEDURE — 92526 ORAL FUNCTION THERAPY: CPT

## 2020-10-30 PROCEDURE — 6370000000 HC RX 637 (ALT 250 FOR IP): Performed by: HOSPITALIST

## 2020-10-30 PROCEDURE — 2580000003 HC RX 258: Performed by: INTERNAL MEDICINE

## 2020-10-30 PROCEDURE — 6360000002 HC RX W HCPCS: Performed by: INTERNAL MEDICINE

## 2020-10-30 PROCEDURE — 6360000002 HC RX W HCPCS: Performed by: STUDENT IN AN ORGANIZED HEALTH CARE EDUCATION/TRAINING PROGRAM

## 2020-10-30 RX ORDER — PREDNISONE 20 MG/1
40 TABLET ORAL DAILY
Qty: 6 TABLET | Refills: 0 | Status: SHIPPED | OUTPATIENT
Start: 2020-10-30 | End: 2020-11-02

## 2020-10-30 RX ORDER — DOXYCYCLINE HYCLATE 100 MG
100 TABLET ORAL 2 TIMES DAILY
Qty: 6 TABLET | Refills: 0 | Status: SHIPPED | OUTPATIENT
Start: 2020-10-30 | End: 2020-11-02

## 2020-10-30 RX ORDER — HEPARIN SODIUM (PORCINE) LOCK FLUSH IV SOLN 100 UNIT/ML 100 UNIT/ML
300 SOLUTION INTRAVENOUS ONCE
Status: COMPLETED | OUTPATIENT
Start: 2020-10-30 | End: 2020-10-30

## 2020-10-30 RX ADMIN — ENOXAPARIN SODIUM 40 MG: 40 INJECTION SUBCUTANEOUS at 16:55

## 2020-10-30 RX ADMIN — SODIUM CHLORIDE, SODIUM LACTATE, POTASSIUM CHLORIDE, AND CALCIUM CHLORIDE: 600; 310; 30; 20 INJECTION, SOLUTION INTRAVENOUS at 04:54

## 2020-10-30 RX ADMIN — BUDESONIDE 250 MCG: 0.25 SUSPENSION RESPIRATORY (INHALATION) at 07:40

## 2020-10-30 RX ADMIN — BUDESONIDE 250 MCG: 0.25 SUSPENSION RESPIRATORY (INHALATION) at 18:28

## 2020-10-30 RX ADMIN — MEMANTINE HYDROCHLORIDE 10 MG: 5 TABLET ORAL at 09:35

## 2020-10-30 RX ADMIN — FERROUS SULFATE TAB 325 MG (65 MG ELEMENTAL FE) 325 MG: 325 (65 FE) TAB at 09:35

## 2020-10-30 RX ADMIN — DOXYCYCLINE 100 MG: 100 INJECTION, POWDER, LYOPHILIZED, FOR SOLUTION INTRAVENOUS at 16:55

## 2020-10-30 RX ADMIN — ARFORMOTEROL TARTRATE 15 MCG: 15 SOLUTION RESPIRATORY (INHALATION) at 18:27

## 2020-10-30 RX ADMIN — FUROSEMIDE 20 MG: 20 TABLET ORAL at 09:36

## 2020-10-30 RX ADMIN — FERROUS SULFATE TAB 325 MG (65 MG ELEMENTAL FE) 325 MG: 325 (65 FE) TAB at 16:55

## 2020-10-30 RX ADMIN — METHYLPREDNISOLONE SODIUM SUCCINATE 40 MG: 40 INJECTION, POWDER, FOR SOLUTION INTRAMUSCULAR; INTRAVENOUS at 11:09

## 2020-10-30 RX ADMIN — PANTOPRAZOLE SODIUM 40 MG: 40 TABLET, DELAYED RELEASE ORAL at 05:02

## 2020-10-30 RX ADMIN — ARFORMOTEROL TARTRATE 15 MCG: 15 SOLUTION RESPIRATORY (INHALATION) at 07:40

## 2020-10-30 RX ADMIN — HEPARIN 300 UNITS: 100 SYRINGE at 18:19

## 2020-10-30 RX ADMIN — IPRATROPIUM BROMIDE AND ALBUTEROL SULFATE 1 AMPULE: .5; 3 SOLUTION RESPIRATORY (INHALATION) at 07:27

## 2020-10-30 RX ADMIN — IPRATROPIUM BROMIDE AND ALBUTEROL SULFATE 1 AMPULE: .5; 3 SOLUTION RESPIRATORY (INHALATION) at 18:13

## 2020-10-30 RX ADMIN — ISOSORBIDE MONONITRATE 30 MG: 30 TABLET, EXTENDED RELEASE ORAL at 09:35

## 2020-10-30 RX ADMIN — IPRATROPIUM BROMIDE AND ALBUTEROL SULFATE 1 AMPULE: .5; 3 SOLUTION RESPIRATORY (INHALATION) at 11:11

## 2020-10-30 RX ADMIN — CARVEDILOL 3.12 MG: 3.12 TABLET, FILM COATED ORAL at 16:55

## 2020-10-30 RX ADMIN — GUAIFENESIN 600 MG: 600 TABLET, EXTENDED RELEASE ORAL at 09:35

## 2020-10-30 RX ADMIN — CARVEDILOL 3.12 MG: 3.12 TABLET, FILM COATED ORAL at 09:35

## 2020-10-30 RX ADMIN — DOXYCYCLINE 100 MG: 100 INJECTION, POWDER, LYOPHILIZED, FOR SOLUTION INTRAVENOUS at 04:53

## 2020-10-30 NOTE — DISCHARGE SUMMARY
Discharge Summary    NAME: Cristiana Lester  :  1945  MRN:  [id number]    Admit date:  10/28/2020  Discharge date:  10/30/2020    Admitting Physician:  Maddie Hi MD    Advance Directive: [facility]    Consults:  Vascular Surgery    Primary Care Physician:  NAVEEN Dale    Discharge Diagnoses:      COPD exacerbation    Carotid stenosis, asymptomatic, left      Significant Diagnostic Studies:   Xr Chest Portable    Result Date: 10/28/2020  XR CHEST PORTABLE 10/28/2020 6:14 PM HISTORY: Chest pain COMPARISON: Chest exam dated 7/15/2020. FINDINGS: There is a new patchy infiltrate in the left costophrenic angle. Mild fibrotic change in both lung apices. No pleural effusion or pneumothorax. Heart size is normal. The pulmonary vasculature are nondilated. There is a left chest wall Qibbzm-v-Dxvi identified. No acute bony abnormality. 1. New patchy infiltrate in the left costophrenic angle, perhaps developing pneumonia in the lingula. 2. Underlying lung emphysema with bilateral apical fibrotic changes. Signed by Dr Tom Lezama on 10/28/2020 7:46 PM    Vl Dup Carotid Bilateral    Result Date: 10/29/2020  Vascular Carotid Procedure  Demographics   Patient Name    Horacio Mckeon Age                   77   Patient Number  [id number]           Gender                Female   Visit Number    [id number]        Interpreting          Tiburcio Burton MD                                   Physician   Date of Birth   1945       Referring Physician   Yuly Parker   Accession       [id number]       [address] [place] [address]  Number  Procedure Type of Study:   Cerebral:Carotid, VL CAROTID BILATERAL. Indications for Study:Abnormal Cat Scan. Risk Factors History of Disease +-------------------------------+----+-------------------------------------+ ! Diagnosis                      ! Date! Comments                             !  +-------------------------------+----+-------------------------------------+ !Circulatory->CAD               ! !                                     ! +-------------------------------+----+-------------------------------------+ ! Circulatory->CHF               ! !                                     ! +-------------------------------+----+-------------------------------------+ ! Circulatory->Previous MI       !    !                                     ! +-------------------------------+----+-------------------------------------+ ! Chronic lung disease->COPD     !    !                                     ! +-------------------------------+----+-------------------------------------+ ! Musculoskeletal->Arthritis     !    !                                     ! +-------------------------------+----+-------------------------------------+ ! Neuro->Stroke                  !    !                                     ! +-------------------------------+----+-------------------------------------+ ! Neuro->Cataracts               ! !Cataract removal bilaterally         ! +-------------------------------+----+-------------------------------------+ ! Chronic lung disease->Home O2  !    !2l/NC, NEBULIZER                     ! +-------------------------------+----+-------------------------------------+ ! Gastrointestinal->Ulcers       !    !                                     ! +-------------------------------+----+-------------------------------------+ ! Gastrointestinal->GERD         !    !                                     ! +-------------------------------+----+-------------------------------------+ ! Endocrine->Thyroid             !    !                                     ! +-------------------------------+----+-------------------------------------+ ! Pain Management                !    ! DR. Noé Patton                             ! +-------------------------------+----+-------------------------------------+ ! Psychiatric History            ! !Depression/Anxiety                   ! +-------------------------------+----+-------------------------------------+   - Current - Every day. Allergies   - Codeine:Reaction - Skin ->rash;Severity - Mild;Sensitivity - Adverse     Reaction.   - Sulfa drugs:Reaction - Stomach ->vomiting;Severity -     Moderate;Sensitivity - Intolerance. Impression   There is mixed plaque visualized in the bilateral internal carotid  arteries. There is less than 50% stenosis in the right internal carotid artery. There is less than 50% stenosis of the left internal carotid artery. There is normal antegrade flow in the bilateral vertebral arteries. Signature   ----------------------------------------------------------------  Electronically signed by Saman Overton MD(Interpreting  physician) on 10/29/2020 04:21 PM  ----------------------------------------------------------------  Blood Pressure:Right arm 107/68 mmHg. Left arm 95/69 mmHg. Velocities are measured in cm/s ; Diameters are measured in mm Carotid Right Measurements +------------+-------+-------+--------+-------+------------+---------------+ ! Location    ! PSV    ! EDV    ! Angle   ! RI     !%Stenosis   ! Tortuosity     ! +------------+-------+-------+--------+-------+------------+---------------+ ! Prox CCA    !78.6   !15.2   ! 60      !0.81   !            !               ! +------------+-------+-------+--------+-------+------------+---------------+ ! Mid CCA     !60.4   !22.3   !60      !0.63   !            !               ! +------------+-------+-------+--------+-------+------------+---------------+ ! Prox ICA    !45.2   !12.6   !60      !0.72   !            !               ! +------------+-------+-------+--------+-------+------------+---------------+ ! Mid ICA     !52.8   !18.8   !60      !0.64   !            !               ! +------------+-------+-------+--------+-------+------------+---------------+ ! Dist ICA    !92     !28.7   ! 60      !0.69   !            !               ! Measurements:ICAPSV/CCAPSV 2. 3. ICAEDV/CCAEDV 2.12.    Cta Pulmonary W Contrast    Result Date: 10/29/2020  CTA PULMONARY W CONTRAST 10/29/2020 2:38 AM HISTORY: Dyspnea. Elevated d-dimer. COMPARISON: 7/15/2020. DLP: 328 mGy cm. Automated exposure control was utilized to diminish patient radiation dose. TECHNIQUE: Helical tomographic images of the chest were obtained after the administration of intravenous contrast following angiogram protocol. Additionally, 3D MIP reconstructions in the coronal and sagittal planes were provided. FINDINGS:  Pulmonary arteries: There is adequate enhancement of the pulmonary arteries to evaluate for central and segmental pulmonary emboli. There are no filling defects within the main, lobar, segmental or visualized subsegmental pulmonary arteries. The pulmonary vessels are within normal limits. Aorta and great vessels: The aorta is well opacified and demonstrates no aneurysm, stenosis or dissection. There is atheromatous calcification and plaquing at the origin of the left common carotid artery with moderate stenosis. There is also irregular plaquing and mild stenosis involving the proximal segment of the left subclavian artery. . Coronary calcifications are noted in the LAD distribution. . Neck base: The imaged portion of the base of the neck appears unremarkable. Lungs: There is nodular scarring within both apices. There are moderate changes of centrilobular emphysema. There is diffuse bronchial wall thickening. There is mild cylindrical bronchiectasis predominantly within the lower lobes with some mild mucoid impaction within the more distal subsegmental bronchi. There is improved aeration of the lungs appear to the previous study. Previously noted rather extensive left lingular pneumonia has resolved. . The trachea and bronchial tree are patent. Heart: The heart is normal in size. A small pericardial effusion is present. . Lymph nodes: Borderline enlarged AP window, pretracheal and the floor on 10/29. CTA chest showed no evidence of PE and previous left lingular pneumonia resolved, although did show some mild bronchiectasis. Imaging also revealed moderate stenosis of left common carotid artery, vascular consulted and carotid Dopplers obtained which did not show any significant stenosis that would require intervention. Supplemental O2 weaned to her baseline with improvement in dyspnea and cough over hospital course. Respiratory status back to her baseline on 10/30 and patient otherwise stable for discharge. Discharged with prescription for a few additional days of doxycycline and prednisone to finish 5-day course, and resume home COPD regimen. Follow-up with PCP within a week. Physical Exam:  Vital Signs: /80   Pulse 81   Temp 98 °F (36.7 °C) (Temporal)   Resp 16   Ht 5' (1.524 m)   Wt 118 lb 1.6 oz (53.6 kg)   SpO2 97%   BMI 23.06 kg/m²   General appearance:. Alert and Cooperative   HEENT: Normocephalic. Chest: diminished breath sounds but no wheezes  Cardiac: Normal heart tones with regular rate and rhythm, S1, S2 normal.  Abdomen:soft, non-tender; normal bowel sounds  Extremities: No clubbing or cyanosis. No peripheral edema. Peripheral pulses palpable.   Neurologic: alert and full strength in extremities    Discharge Medications:       Amye Salines   Home Medication Instructions ESTEFANIA:[id number]    Printed on:10/30/20 8733   Medication Information                      acetaminophen (TYLENOL) 500 MG tablet  Take 1,000 mg by mouth every 6 hours as needed for Pain             albuterol (PROVENTIL) (5 MG/ML) 0.5% nebulizer solution  Take 0.5 mLs by nebulization every 6 hours as needed for Wheezing             albuterol sulfate HFA (VENTOLIN HFA) 108 (90 Base) MCG/ACT inhaler  Inhale 2 puffs into the lungs every 6 hours as needed for Wheezing             carvedilol (COREG) 3.125 MG tablet  Take 1 tablet by mouth 2 times daily (with meals)             clonazePAM (KLONOPIN) 0.5 MG tablet  Take 1 tablet by mouth 3 times daily as needed for Anxiety for up to 30 days. donepezil (ARICEPT) 5 MG tablet  Take 1 tablet by mouth nightly             doxycycline hyclate (VIBRA-TABS) 100 MG tablet  Take 1 tablet by mouth 2 times daily for 3 days             DULoxetine (CYMBALTA) 60 MG extended release capsule  Take 1 capsule by mouth 2 times daily             Fesoterodine Fumarate ER (TOVIAZ) 8 MG TB24  Take 1 tablet by mouth daily             furosemide (LASIX) 20 MG tablet  Take 20 mg by mouth daily             gabapentin (NEURONTIN) 300 MG capsule  Take 300 mg by mouth 3 times daily. isosorbide mononitrate (IMDUR) 30 MG extended release tablet  Take 1 tablet by mouth daily             memantine (NAMENDA) 10 MG tablet  Take 1 tablet by mouth 2 times daily             nitroGLYCERIN (NITROQUICK) 0.4 MG SL tablet  Place 1 tablet under the tongue every 5 minutes as needed for Chest pain             olopatadine (PATADAY) 0.2 % SOLN ophthalmic solution  Place 1 drop into both eyes daily             ondansetron (ZOFRAN) 4 MG tablet  Take 1 tablet by mouth every 8 hours as needed for Nausea or Vomiting             OXYGEN  Inhale 2 L into the lungs daily             pantoprazole (PROTONIX) 40 MG tablet  Take 1 tablet by mouth every morning (before breakfast)             predniSONE (DELTASONE) 20 MG tablet  Take 2 tablets by mouth daily for 3 days             traZODone (DESYREL) 150 MG tablet  Take 1 tablet by mouth nightly             umeclidinium-vilanterol (ANORO ELLIPTA) 62.5-25 MCG/INH AEPB inhaler  Inhale 1 puff into the lungs daily IN place of Trelegy                 Discharge Instructions: Follow up with NAVEEN Nieves. Call for appointment in 3 days. Take medications as directed. Resume activity as tolerated. Disposition: Patient is medically stable and will be discharged home. Time spent on discharge over 30 minutes.       Signed:  Mak Frank Karla Quiroz MD  10/30/2020 5:11 PM

## 2020-10-30 NOTE — PLAN OF CARE
method of communication will improve  Description: Ability to establish a method of communication will improve  10/29/2020 2133 by Elle Devine RN  Outcome: Ongoing  10/29/2020 1826 by Paz Haney RN  Outcome: Ongoing     Problem: Nutritional:  Goal: Consumption of the prescribed amount of daily calories will improve  Description: Consumption of the prescribed amount of daily calories will improve  10/29/2020 2133 by Elle Devine RN  Outcome: Ongoing  10/29/2020 1826 by Paz Haney RN  Outcome: Ongoing     Problem: Respiratory:  Goal: Ability to maintain a clear airway will improve  Description: Ability to maintain a clear airway will improve  10/29/2020 2133 by Elle Devine RN  Outcome: Ongoing  10/29/2020 1826 by Paz Haney RN  Outcome: Ongoing  Goal: Ability to maintain adequate ventilation will improve  Description: Ability to maintain adequate ventilation will improve  10/29/2020 2133 by Elle Devine RN  Outcome: Ongoing  10/29/2020 1826 by Paz Haney RN  Outcome: Ongoing  Goal: Complications related to the disease process, condition or treatment will be avoided or minimized  Description: Complications related to the disease process, condition or treatment will be avoided or minimized  10/29/2020 2133 by Elle Devine RN  Outcome: Ongoing  10/29/2020 1826 by Paz Haney RN  Outcome: Ongoing

## 2020-10-30 NOTE — PROGRESS NOTES
Speech Language Pathology  Facility/Department: [facility] 4 ONCOLOGY UNIT  SWALLOW THERAPY     NAME: Lisa Moreira  : 1945  MRN: [id number]    ADMISSION DATE: 10/28/2020  ADMITTING DIAGNOSIS: has CHF (congestive heart failure) ([facility] [address] CAD in native artery; Chest pain; Chronic constipation; Nondiabetic gastroparesis; Generalized abdominal pain; Nausea & vomiting; Chronic narcotic dependence ([facility] [address] Polypharmacy; Frequency of urination; Gross hematuria; Urge incontinence; Palliative care patient; Pneumonia due to infectious organism; COPD (chronic obstructive pulmonary disease) ([facility] [address] Moderate episode of recurrent major depressive disorder ([facility] [address] Pneumonia; Hypoxia; Respiratory failure ([facility] [address] and Carotid stenosis, asymptomatic, left on their problem list.    Date of Treat: 10/30/2020  Evaluating Therapist: Ivan Jones    Current Diet level:  Bite sized consistency with thin liquids    Reason for Referral  Lisa Moreira was referred for a bedside swallow evaluation to assess the efficiency of her swallow function, identify signs and symptoms of aspiration and make recommendations regarding safe dietary consistencies, effective compensatory strategies, and safe eating environment. Impression  Monitored patient's swallowing function. Patient exhibits slow, decreased oral prep of more solid consistencies as well as sluggish, mild-moderately decreased laryngeal elevation for swallow airway protection. Even so, no outward S/S penetration/aspiration was noted with any bite sized consistency presentation or thin liquid presentation administered during treatment session this date     At this time, would recommend continuation bite sized consistency and thin liquids. Assist in meal set-up. Recommend meds whole in pudding/applesauce. Will continue to follow.     Treatment Plan  Requires SLP Intervention:  Yes     Recommended Diet and Intervention  Diet Solids Recommendation: Bite sized  Liquid Consistency Recommendation: Thin    Recommended Form of Meds: Meds whole in puree as able  Therapeutic Interventions: Patient/Family education;Diet tolerance monitoring; Therapeutic PO trials with SLP     Compensatory Swallowing Strategies  Compensatory Swallowing Strategies: Upright as possible for all oral intake;Small bites/sips;Eat/Feed slowly; Alternate solids and liquids; Remain upright for 30-45 minutes after meals     Treatment/Goals  Timeframe for Short-term Goals: 1x/day for 3 days   Goal 1: Patient will tolerate bite sized consistency and thin liquids with min S/S penetration/aspiration during PO intake. Goal 2: Patient staff will follow swallow safety recommendations to decrease risk of penetration/aspiration during PO intake. Goal 3: Re-assessment of swallow function for potential diet upgrade. Goal 4: Monitor speech production.      General  Chart Reviewed: Yes  Behavior/Cognition: Alert; Cooperative  O2 Device: Nasal Cannula   Communication Observation: (SLP ranked functional intelligibility of speech for unfamiliar listeners at 100% in utterances without background noise present.)  Follows Directions: Simple   Dentition: No dentures or partials present  Patient Positioning: Upright in bed  Consistencies Administered: Bite sized; Thin - cup     Monitored patient's swallowing function with the following observations noted:     Oral Phase  Mastication: Bite sized (Patient exhibited slow vertical jaw movement at the front of the mouth during oral prep of bite sized consistency presentations administered independently.)  Oral Transit: Bite sized (Min-moderate oral cavity residue was noted post swallows; residue cleared from the mouth with additional dry swallows.)  Oral Phase - Comment: Oral transit of bite sized consistency primarily measured 1-2 seconds in length. Oral transit of thin liquid presentations, administered independently via cup, primarily measured 1-2 seconds in length.     Pharyngeal Phase  Laryngeal Elevation: (Patient exhibited sluggish, mild-moderately decreased laryngeal elevation for swallow airway protection.)  Pharyngeal Phase - Comment: No outward S/S penetration/aspiration was noted with any bite sized consistency presentation or thin liquid presentation administered during treatment session this date     At this time, would recommend continuation bite sized consistency and thin liquids. Assist in meal set-up. Recommend meds whole in pudding/applesauce. Will continue to follow.     Electronically signed by GIOVANNI Dennison on 10/30/2020 at 10:37 AM

## 2020-10-30 NOTE — PROGRESS NOTES
Pt's daughter Inga Coffman called to inform of dc home. Instructed to come to ER entrance and call the floor when she arrives so we can bring pt down.

## 2020-10-31 LAB
MYCOPLASMA PNEUMONIAE IGG: 0.38 U/L
MYCOPLASMA PNEUMONIAE IGM: 0.21 U/L

## 2020-11-02 ENCOUNTER — TELEPHONE (OUTPATIENT)
Dept: PRIMARY CARE CLINIC | Age: 75
End: 2020-11-02

## 2020-11-02 NOTE — TELEPHONE ENCOUNTER
[facility] Initial Follow Up Call    Outreach made within 2 business days of discharge: Yes    Patient: Andi Gilliam Patient : 1945   MRN: [id number]  Reason for Admission: There are no discharge diagnoses documented for the most recent discharge. Discharge Date: 10/30/20       Spoke with: patients daughter    Discharge department/facility:  to [facility]    TCM Interactive Patient Contact:  Was patient able to fill all prescriptions: Yes  Was patient instructed to bring all medications to the follow-up visit: Yes  Is patient taking all medications as directed in the discharge summary? Yes  Does patient understand their discharge instructions: Yes  Does patient have questions or concerns that need addressed prior to 7-14 day follow up office visit: yes - pt is aware of apt with mm.      Scheduled appointment with PCP within 7-14 days    Follow Up  Future Appointments   Date Time Provider Abelardo Singleton   2020  1:00 PM Malena Herman, [address]

## 2020-11-03 ENCOUNTER — TELEPHONE (OUTPATIENT)
Dept: PRIMARY CARE CLINIC | Age: 75
End: 2020-11-03

## 2020-11-03 PROBLEM — J18.9 PNEUMONIA DUE TO INFECTIOUS ORGANISM: Status: RESOLVED | Noted: 2020-02-20 | Resolved: 2020-11-03

## 2020-11-03 LAB
BLOOD CULTURE, ROUTINE: NORMAL
CULTURE, BLOOD 2: NORMAL

## 2020-11-03 NOTE — TELEPHONE ENCOUNTER
I read the discharge summary. They recommended Tylenol at d/c for pain  Opioids can cause respiratory suppression so I am assuming this is reason this was stopped. If she needs Norco, then I would recommend using this very sparingly.

## 2020-11-03 NOTE — TELEPHONE ENCOUNTER
pts daughter called. She was discharged from [facility] on 10/30/20. She was told to stop her norco, but wasn't told why.  Daughter wants to know if pt can take one because she is hurting really bad

## 2020-11-04 ENCOUNTER — OFFICE VISIT (OUTPATIENT)
Dept: PRIMARY CARE CLINIC | Age: 75
End: 2020-11-04
Payer: MEDICARE

## 2020-11-04 VITALS
HEIGHT: 61 IN | HEART RATE: 73 BPM | DIASTOLIC BLOOD PRESSURE: 68 MMHG | WEIGHT: 117.25 LBS | OXYGEN SATURATION: 90 % | TEMPERATURE: 96.7 F | BODY MASS INDEX: 22.14 KG/M2 | SYSTOLIC BLOOD PRESSURE: 100 MMHG

## 2020-11-04 DIAGNOSIS — D50.9 IRON DEFICIENCY ANEMIA, UNSPECIFIED IRON DEFICIENCY ANEMIA TYPE: ICD-10-CM

## 2020-11-04 LAB
BASOPHILS ABSOLUTE: 0 K/UL (ref 0–0.2)
BASOPHILS RELATIVE PERCENT: 0.3 % (ref 0–1)
EOSINOPHILS ABSOLUTE: 0.2 K/UL (ref 0–0.6)
EOSINOPHILS RELATIVE PERCENT: 1.5 % (ref 0–5)
HCT VFR BLD CALC: 35.3 % (ref 37–47)
HEMOGLOBIN: 10.3 G/DL (ref 12–16)
IMMATURE GRANULOCYTES #: 0.3 K/UL
LYMPHOCYTES ABSOLUTE: 2.5 K/UL (ref 1.1–4.5)
LYMPHOCYTES RELATIVE PERCENT: 22.5 % (ref 20–40)
MCH RBC QN AUTO: 28.9 PG (ref 27–31)
MCHC RBC AUTO-ENTMCNC: 29.2 G/DL (ref 33–37)
MCV RBC AUTO: 99.2 FL (ref 81–99)
MONOCYTES ABSOLUTE: 1.1 K/UL (ref 0–0.9)
MONOCYTES RELATIVE PERCENT: 10 % (ref 0–10)
NEUTROPHILS ABSOLUTE: 7.1 K/UL (ref 1.5–7.5)
NEUTROPHILS RELATIVE PERCENT: 63 % (ref 50–65)
PDW BLD-RTO: 15 % (ref 11.5–14.5)
PLATELET # BLD: 325 K/UL (ref 130–400)
PMV BLD AUTO: 10.5 FL (ref 9.4–12.3)
RBC # BLD: 3.56 M/UL (ref 4.2–5.4)
WBC # BLD: 11.2 K/UL (ref 4.8–10.8)

## 2020-11-04 PROCEDURE — 96372 THER/PROPH/DIAG INJ SC/IM: CPT | Performed by: NURSE PRACTITIONER

## 2020-11-04 PROCEDURE — 99495 TRANSJ CARE MGMT MOD F2F 14D: CPT | Performed by: NURSE PRACTITIONER

## 2020-11-04 PROCEDURE — 1111F DSCHRG MED/CURRENT MED MERGE: CPT | Performed by: NURSE PRACTITIONER

## 2020-11-04 PROCEDURE — G0008 ADMIN INFLUENZA VIRUS VAC: HCPCS | Performed by: NURSE PRACTITIONER

## 2020-11-04 PROCEDURE — 96523 IRRIG DRUG DELIVERY DEVICE: CPT | Performed by: NURSE PRACTITIONER

## 2020-11-04 PROCEDURE — 90694 VACC AIIV4 NO PRSRV 0.5ML IM: CPT | Performed by: NURSE PRACTITIONER

## 2020-11-04 RX ORDER — CLONAZEPAM 0.5 MG/1
0.5 TABLET ORAL 3 TIMES DAILY PRN
Qty: 75 TABLET | Refills: 0 | Status: SHIPPED | OUTPATIENT
Start: 2020-11-04 | End: 2021-01-06 | Stop reason: SDUPTHER

## 2020-11-04 RX ORDER — ASCORBIC ACID 250 MG
250 TABLET ORAL DAILY
Qty: 30 TABLET | Refills: 3 | Status: SHIPPED
Start: 2020-11-04 | End: 2021-01-06

## 2020-11-04 RX ORDER — LANOLIN ALCOHOL/MO/W.PET/CERES
325 CREAM (GRAM) TOPICAL
Qty: 30 TABLET | Refills: 3 | Status: SHIPPED
Start: 2020-11-04 | End: 2021-01-06

## 2020-11-04 RX ORDER — DULOXETIN HYDROCHLORIDE 60 MG/1
60 CAPSULE, DELAYED RELEASE ORAL DAILY
Qty: 30 CAPSULE | Refills: 11 | Status: SHIPPED | OUTPATIENT
Start: 2020-11-04 | End: 2021-09-03 | Stop reason: SDUPTHER

## 2020-11-04 ASSESSMENT — ENCOUNTER SYMPTOMS
ABDOMINAL PAIN: 0
VOMITING: 0
BACK PAIN: 1
RHINORRHEA: 0
SORE THROAT: 0
NAUSEA: 0
DIARRHEA: 0
COUGH: 1
SHORTNESS OF BREATH: 1

## 2020-11-04 NOTE — PROGRESS NOTES
Post-Discharge Transitional Care Management Services or Hospital Follow Up      Angel Li   YOB: 1945    Date of Office Visit:  11/4/2020  Date of Hospital Admission: 10-  Date of Hospital Discharge: 10-    Care management risk score Rising risk (score 2-5) and Complex Care (Scores >=6): 3     Non face to face  following discharge, date last encounter closed (first attempt may have been earlier): 11/2/2020  3:16 PM     Call initiated 2 business days of discharge: Yes    Patient Active Problem List   Diagnosis    CHF (congestive heart failure) ([address]    CAD in native artery    Chest pain    Chronic constipation    Nondiabetic gastroparesis    Generalized abdominal pain    Nausea & vomiting    Chronic narcotic dependence ([address]    Polypharmacy    Frequency of urination    Gross hematuria    Urge incontinence    Palliative care patient    COPD (chronic obstructive pulmonary disease) ([address]    Moderate episode of recurrent major depressive disorder ([address]    Pneumonia    Hypoxia    Respiratory failure ([address]    Carotid stenosis, asymptomatic, left       Allergies   Allergen Reactions    Codeine Nausea Only    Sulfa Antibiotics Other (See Comments)     \"makes me tremble'       Medications listed as ordered at the time of discharge from hospital  reviewed     Medications marked \"taking\" at this time  Outpatient Medications Marked as Taking for the 11/4/20 encounter (Office Visit) with NAVEEN Olivo   Medication Sig Dispense Refill    DULoxetine (CYMBALTA) 60 MG extended release capsule Take 1 capsule by mouth daily 30 capsule 11    ferrous sulfate (FE TABS) 325 (65 Fe) MG EC tablet Take 1 tablet by mouth daily (with breakfast) 30 tablet 3    ascorbic acid (V-R VITAMIN C) 250 MG tablet Take 1 tablet by mouth daily 30 tablet 3    clonazePAM (KLONOPIN) 0.5 MG tablet Take 1 tablet by mouth 3 times daily as needed for Anxiety for up to 30 days.  75 tablet 0    furosemide (LASIX) 20 MG tablet Take 20 mg by mouth daily      memantine (NAMENDA) 10 MG tablet Take 1 tablet by mouth 2 times daily 60 tablet 11    isosorbide mononitrate (IMDUR) 30 MG extended release tablet Take 1 tablet by mouth daily 30 tablet 11    umeclidinium-vilanterol (ANORO ELLIPTA) 62.5-25 MCG/INH AEPB inhaler Inhale 1 puff into the lungs daily IN place of Trelegy 1 each 5    carvedilol (COREG) 3.125 MG tablet Take 1 tablet by mouth 2 times daily (with meals) 60 tablet 11    pantoprazole (PROTONIX) 40 MG tablet Take 1 tablet by mouth every morning (before breakfast) (Patient taking differently: Take 20 mg by mouth daily ) 30 tablet 5    traZODone (DESYREL) 150 MG tablet Take 1 tablet by mouth nightly 90 tablet 3    Fesoterodine Fumarate ER (TOVIAZ) 8 MG TB24 Take 1 tablet by mouth daily 90 tablet 1    donepezil (ARICEPT) 5 MG tablet Take 1 tablet by mouth nightly 90 tablet 3    olopatadine (PATADAY) 0.2 % SOLN ophthalmic solution Place 1 drop into both eyes daily 2.5 mL 5    gabapentin (NEURONTIN) 300 MG capsule Take 300 mg by mouth 3 times daily.        albuterol sulfate HFA (VENTOLIN HFA) 108 (90 Base) MCG/ACT inhaler Inhale 2 puffs into the lungs every 6 hours as needed for Wheezing 3 Inhaler 3    ondansetron (ZOFRAN) 4 MG tablet Take 1 tablet by mouth every 8 hours as needed for Nausea or Vomiting 40 tablet 1    albuterol (PROVENTIL) (5 MG/ML) 0.5% nebulizer solution Take 0.5 mLs by nebulization every 6 hours as needed for Wheezing 120 each 3    nitroGLYCERIN (NITROQUICK) 0.4 MG SL tablet Place 1 tablet under the tongue every 5 minutes as needed for Chest pain 25 tablet 3    OXYGEN Inhale 2 L into the lungs daily 1 each 11        Medications patient taking as of now reconciled against medications ordered at time of hospital discharge: Yes    Chief Complaint   Patient presents with    Follow-Up from Hospital     for pneumonia and COPD     HPI    Inpatient course: Discharge summary reviewed- see chart. CTA LUNGS, 10-:  1. No evidence of pulmonary embolus. 2. There is atheromatous calcification of the aortic arch and proximal    great vessels. There is associated stenosis of the proximal left    common carotid and left subclavian artery. Mild coronary    calcifications are noted in the LAD distribution. 3. Small pericardial effusion. 4. Diffuse bronchial wall thickening with some mild cylindrical    bronchiectasis predominantly within the lower lobes. Previously noted    extensive left lingular pneumonia has resolved. There is nodular    scarring within both apices. .      CAROTID US, 10-: There is mixed plaque visualized in the bilateral internal carotid     arteries.    Lucetta Record is less than 50% stenosis in the right internal carotid artery.    Lucetta Record is less than 50% stenosis of the left internal carotid artery.     There is normal antegrade flow in the bilateral vertebral arteries. Interval history/Current status: Admitted with LLL pneumonia, COPD exacerbation, acute on chronic  hypoxic resp failure    She has completed steroids and doxycycline since discharge from the hospital.    She is currently on 2L/NC    She has not been doing any breathing treatments since discharge   (These were ordered prn)   She has been using her ventolin BID    She is breathing much better. \"I feel a lot better. \"    She continues to be weak. Denies CP  Reports chronic SOA    10-  Hgb: 8  Hct: 26.5  BMP: WNL  Vitamin B12: 410  Iron: 14 (she is not currently taking iron)  Ferritin: 126.6  TSH: WNL      10-:   Pro-BNP: 358    She is taking Klonopin 0.5 mg nightly/prn.     She continues to smoke 1/2 ppd    Vitals:    11/04/20 1313   BP: 100/68   Site: Right Upper Arm   Position: Sitting   Cuff Size: Large Adult   Pulse: 73   Temp: 96.7 °F (35.9 °C)   TempSrc: Temporal   SpO2: 90%   Weight: 117 lb 4 oz (53.2 kg)   Height: 5' 1\" (1.549 m)     Body mass index is 22.15 kg/m². Wt Readings from Last 3 Encounters:   11/04/20 117 lb 4 oz (53.2 kg)   10/29/20 118 lb 1.6 oz (53.6 kg)   07/16/20 118 lb 11.2 oz (53.8 kg)     BP Readings from Last 3 Encounters:   11/04/20 100/68   10/30/20 (!) 149/82   07/18/20 121/83       Review of Systems   Constitutional: Negative for activity change, appetite change and fever. HENT: Negative for congestion, ear pain, rhinorrhea and sore throat. Respiratory: Positive for cough (chronic, dry) and shortness of breath (chronic, but improved since discharge). Cardiovascular: Negative for chest pain and leg swelling. Gastrointestinal: Negative for abdominal pain, diarrhea, nausea and vomiting. Genitourinary: Negative for dysuria. Musculoskeletal: Positive for arthralgias and back pain. Neurological: Positive for weakness. Negative for headaches. Psychiatric/Behavioral: Positive for sleep disturbance (stable on current regimen). The patient is nervous/anxious (stable on current regimen). Physical Exam  Vitals signs reviewed. Constitutional:       Appearance: She is well-developed. She is ill-appearing. Comments: In a wheelchair   HENT:      Head: Normocephalic. Right Ear: Tympanic membrane and external ear normal.      Left Ear: Tympanic membrane and external ear normal.      Nose: Nose normal.   Eyes:      General:         Right eye: No discharge. Left eye: No discharge. Neck:      Musculoskeletal: Normal range of motion. Vascular: No carotid bruit. Cardiovascular:      Rate and Rhythm: Normal rate and regular rhythm. Pulmonary:      Effort: Pulmonary effort is normal.      Breath sounds: Decreased breath sounds (throughout) and rhonchi (scattered) present. Chest:       Abdominal:      General: Bowel sounds are normal.      Palpations: Abdomen is soft. Skin:     General: Skin is dry. Neurological:      General: No focal deficit present.       Mental Status: She is alert and oriented to person, place, and time. Mental status is at baseline. Psychiatric:         Mood and Affect: Mood normal.         Behavior: Behavior normal.         Thought Content: Thought content normal.         Judgment: Judgment normal.       ASSESSMENT      ICD-10-CM    1. Pneumonia of left lower lobe due to infectious organism  J18.9 FL DISCHARGE MEDS RECONCILED W/ CURRENT OUTPATIENT MED LIST   2. Chronic obstructive pulmonary disease, unspecified COPD type (HCC)  J44.9 FL DISCHARGE MEDS RECONCILED W/ CURRENT OUTPATIENT MED LIST   3. HAILEY (generalized anxiety disorder)  F41.1 DULoxetine (CYMBALTA) 60 MG extended release capsule     clonazePAM (KLONOPIN) 0.5 MG tablet   4. Reactive depression  F32.9 DULoxetine (CYMBALTA) 60 MG extended release capsule   5. Iron deficiency anemia, unspecified iron deficiency anemia type  D50.9 ferrous sulfate (FE TABS) 325 (65 Fe) MG EC tablet     ascorbic acid (V-R VITAMIN C) 250 MG tablet     CBC Auto Differential   6. Insomnia due to medical condition  G47.01 clonazePAM (KLONOPIN) 0.5 MG tablet   7. Need for immunization against influenza  Z23 INFLUENZA, QUADV, ADJUVANTED, 65 YRS =, IM, PF, PREFILL SYR, 0.5ML (FLUAD)   8. Age-related osteoporosis without current pathological fracture  M81.0 denosumab (PROLIA) SC injection 60 mg   9. Port-A-Cath in place  Z95.828 FL IRRIG IMPLANTED DRUG DELIVERY DEVICE         PLAN    Orders Placed This Encounter   Procedures    INFLUENZA, QUADV, ADJUVANTED, 72 YRS =, IM, PF, PREFILL SYR, 0.5ML (FLUAD)    CBC Auto Differential    FL DISCHARGE MEDS RECONCILED W/ CURRENT OUTPATIENT MED LIST    FL IRRIG IMPLANTED DRUG DELIVERY DEVICE        Return in about 1 month (around 12/4/2020), or if symptoms worsen or fail to improve, for Annual Wellness Exam, Physical.     There are no Patient Instructions on file for this visit. Procedure note:    Romy Solorzano in sterile fashion with gripper needle without anesthesia.   CBC was drawn, then the port was WNL  - denosumab (PROLIA) SC injection 60 mg    9.  Port-A-Cath in place  - [place] IRRIG IMPLANTED DRUG DELIVERY DEVICE        Medical Decision Making: moderate complexity

## 2020-11-04 NOTE — PROGRESS NOTES
Vaccine Information Sheet, \"Influenza - Inactivated\"  given to Trang Black, or parent/legal guardian of  Trang Black and verbalized understanding. Patient responses:    Have you ever had a reaction to a flu vaccine? No  Are you able to eat eggs without adverse effects? Yes  Do you have any current illness? No  Have you ever had Guillian [place] Syndrome? No    Flu vaccine given per order. Please see immunization tab. After obtaining consent, and per orders of FAITH HODGE, injection of prolia given in Right arm  by Marcos Saba. Patient tolerated well. Medication was supplied by patient.

## 2020-11-05 ENCOUNTER — TELEPHONE (OUTPATIENT)
Dept: PRIMARY CARE CLINIC | Age: 75
End: 2020-11-05

## 2020-11-05 NOTE — TELEPHONE ENCOUNTER
Called patient, spoke with: Child/Children regarding the results of the patients most recent labs. I advised Child/Children of Hillary Peña recommendations.    Child/Children did voice understanding

## 2020-11-09 ENCOUNTER — TELEPHONE (OUTPATIENT)
Dept: PRIMARY CARE CLINIC | Age: 75
End: 2020-11-09

## 2020-11-09 RX ORDER — OLOPATADINE HYDROCHLORIDE 2 MG/ML
1 SOLUTION/ DROPS OPHTHALMIC DAILY
Qty: 3 BOTTLE | Refills: 3 | Status: SHIPPED | OUTPATIENT
Start: 2020-11-09 | End: 2021-02-12

## 2020-11-09 RX ORDER — ALBUTEROL SULFATE 90 UG/1
2 AEROSOL, METERED RESPIRATORY (INHALATION) EVERY 6 HOURS PRN
Qty: 3 INHALER | Refills: 3 | Status: SHIPPED | OUTPATIENT
Start: 2020-11-09 | End: 2021-09-03 | Stop reason: SDUPTHER

## 2020-11-09 NOTE — TELEPHONE ENCOUNTER
Received fax from pharmacy requesting refill on pts medication(s). Pt was last seen in office on 11/4/2020  and has a follow up scheduled for 11/9/2020. Will send request to  [facility]  for patient.      Requested Prescriptions     Pending Prescriptions Disp Refills    VENTOLIN  (90 Base) MCG/ACT inhaler [Pharmacy Med Name: VENTOLIN HFA  INHALER] 18 g 0     Sig: Inhale 2 puffs into the lungs every 6 hours as needed for Wheezing

## 2020-11-09 NOTE — TELEPHONE ENCOUNTER
Received fax from pharmacy requesting refill on pts medication(s). Pt was last seen in office on 11/4/2020  and has a follow up scheduled for 11/7/2020. Will send request to  [facility]  for patient.      Requested Prescriptions     Pending Prescriptions Disp Refills    olopatadine (PATADAY) 0.2 % SOLN ophthalmic solution [Pharmacy Med Name: OLOPATADINE 0.2%  2.5ML] 2.5 mL 0     Sig: PLACE 1 DROP INTO EACH EYE DIALY

## 2020-11-09 NOTE — TELEPHONE ENCOUNTER
Daughter called about low O2    Pt is not feeling well. They cut back on her pain meds too. On 2L of oxygen and her O2 Level is staying around 84-87.

## 2020-11-25 RX ORDER — PANTOPRAZOLE SODIUM 40 MG/1
40 TABLET, DELAYED RELEASE ORAL
Qty: 30 TABLET | Refills: 0 | Status: SHIPPED | OUTPATIENT
Start: 2020-11-25 | End: 2020-12-28

## 2020-12-04 ENCOUNTER — VIRTUAL VISIT (OUTPATIENT)
Dept: PRIMARY CARE CLINIC | Age: 75
End: 2020-12-04
Payer: MEDICARE

## 2020-12-04 PROCEDURE — 99443 PR PHYS/QHP TELEPHONE EVALUATION 21-30 MIN: CPT | Performed by: NURSE PRACTITIONER

## 2020-12-04 ASSESSMENT — ENCOUNTER SYMPTOMS
SHORTNESS OF BREATH: 1
SORE THROAT: 0
BACK PAIN: 1
VOMITING: 0
COUGH: 1
RHINORRHEA: 0
NAUSEA: 0
DIARRHEA: 0
ABDOMINAL PAIN: 0

## 2020-12-04 NOTE — PROGRESS NOTES
6*    Glucose 10/28/2020 121*    BUN 10/28/2020 29*    CREATININE 10/28/2020 0.9     GFR Non- 10/28/2020 >60     GFR  10/28/2020 >59     Calcium 10/28/2020 9.5     Total Protein 10/28/2020 6.7     Alb 10/28/2020 3.3*    Total Bilirubin 10/28/2020 <0.2     Alkaline Phosphatase 10/28/2020 111*    ALT 10/28/2020 7     AST 10/28/2020 11     Lipase 10/28/2020 9*    Troponin 10/28/2020 <0.01     Protime 10/28/2020 13.6     INR 10/28/2020 1.04     aPTT 10/28/2020 32.2     pH, Arterial 10/28/2020 7.350     pCO2, Arterial 10/28/2020 72.0*    pO2, Arterial 10/28/2020 60.0*    HCO3, Arterial 10/28/2020 39.7*    Base Excess, Arterial 10/28/2020 12.5*    Hemoglobin, Art, Extended 10/28/2020 7.6*    O2 Sat, Arterial 10/28/2020 89.8*    Carboxyhgb, Arterial 10/28/2020 2.6     Methemoglobin, Arterial 10/28/2020 1.6     O2 Content, Arterial 10/28/2020 9.7     O2 Therapy 10/28/2020 Unknown     Color, UA 10/28/2020 YELLOW     Clarity, UA 10/28/2020 Clear     Glucose, Ur 10/28/2020 Negative     Bilirubin Urine 10/28/2020 Negative     Ketones, Urine 10/28/2020 Negative     Specific Gravity, UA 10/28/2020 1.022     Blood, Urine 10/28/2020 Negative     pH, UA 10/28/2020 5.0     Protein, UA 10/28/2020 30*    Urobilinogen, Urine 10/28/2020 1.0     Nitrite, Urine 10/28/2020 Negative     Leukocyte Esterase, Urine 10/28/2020 Negative     Lactic Acid 10/28/2020 0.4*    Blood Culture, Routine 10/28/2020 No growth after 5 days of incubation.  Culture, Blood 2 10/28/2020 No growth after 5 days of incubation.      Potassium, Whole Blood 10/28/2020 4.0     Adenovirus by PCR 10/28/2020 Not Detected     Bordetella parapertussis* 10/28/2020 Not Detected     Bordetella pertussis by * 10/28/2020 Not Detected     Chlamydophilia pneumonia* 10/28/2020 Not Detected     Coronavirus 229E by PCR 10/28/2020 Not Detected     Coronavirus HKU1 by PCR 10/28/2020 Not Detected     Coronavirus NL63 by PCR 10/28/2020 Not Detected     Coronavirus OC43 by PCR 10/28/2020 Not Detected     SARS-CoV-2, PCR 10/28/2020 Not Detected     Human Metapneumovirus by* 10/28/2020 Not Detected     Human Rhinovirus/Enterov* 10/28/2020 Not Detected     Influenza A by PCR 10/28/2020 Not Detected     Influenza B by PCR 10/28/2020 Not Detected     Mycoplasma pneumoniae by* 10/28/2020 Not Detected     Parainfluenza Virus 1 by* 10/28/2020 Not Detected     Parainfluenza Virus 2 by* 10/28/2020 Not Detected     Parainfluenza Virus 3 by* 10/28/2020 Not Detected     Parainfluenza Virus 4 by* 10/28/2020 Not Detected     Respiratory Syncytial Vi* 10/28/2020 Not Detected     Bacteria, UA 10/28/2020 NEGATIVE*    Crystals, UA 10/28/2020 NEG*    Hyaline Casts, UA 10/28/2020 1     WBC, UA 10/28/2020 0     RBC, UA 10/28/2020 2     Epithelial Cells, UA 10/28/2020 0     Magnesium 10/28/2020 2.1     Pro-BNP 10/28/2020 358     Mycoplasma pneumo IgG 10/28/2020 0.38*    Mycoplasma pneumo IgM 10/28/2020 0.21     D-Dimer, Quant 10/28/2020 1.14*    TSH 10/29/2020 0.784     Iron 10/29/2020 14*    TIBC 10/29/2020 227*    Iron Saturation 10/29/2020 6*    Ferritin 10/29/2020 126.6     Vitamin B-12 10/29/2020 410     Folate 10/29/2020 7.8     Left Ventricular Ejectio* 10/29/2020 68     LVEF MODALITY 10/29/2020 ECHO     Sodium 10/30/2020 136     Potassium 10/30/2020 3.9     Chloride 10/30/2020 97*    CO2 10/30/2020 31*    Anion Gap 10/30/2020 8     Glucose 10/30/2020 122*    BUN 10/30/2020 21     CREATININE 10/30/2020 0.8     GFR Non- 10/30/2020 >60     GFR  10/30/2020 >59     Calcium 10/30/2020 9.2     WBC 10/30/2020 5.3     RBC 10/30/2020 2.73*    Hemoglobin 10/30/2020 8.0*    Hematocrit 10/30/2020 26.5*    MCV 10/30/2020 97.1     MCH 10/30/2020 29.3     MCHC 10/30/2020 30.2*    RDW 10/30/2020 14.4     Platelets 55/71/5951 285     MPV 10/30/2020 10.3     Neutrophils % 10/30/2020 86.0*    Lymphocytes % 10/30/2020 10.7*    Monocytes % 10/30/2020 2.9     Eosinophils % 10/30/2020 0.0     Basophils % 10/30/2020 0.0     Neutrophils Absolute 10/30/2020 4.5     Immature Granulocytes # 10/30/2020 0.0     Lymphocytes Absolute 10/30/2020 0.6*    Monocytes Absolute 10/30/2020 0.20     Eosinophils Absolute 10/30/2020 0.00     Basophils Absolute 10/30/2020 0.00     Magnesium 10/30/2020 1.7    Admission on 07/15/2020, Discharged on 07/18/2020   Component Date Value    WBC 07/15/2020 12.2*    RBC 07/15/2020 3.72*    Hemoglobin 07/15/2020 10.9*    Hematocrit 07/15/2020 36.1*    MCV 07/15/2020 97.0     MCH 07/15/2020 29.3     MCHC 07/15/2020 30.2*    RDW 07/15/2020 15.8*    Platelets 33/08/4001 175     MPV 07/15/2020 10.3     Neutrophils % 07/15/2020 82.8*    Lymphocytes % 07/15/2020 9.7*    Monocytes % 07/15/2020 6.7     Eosinophils % 07/15/2020 0.2     Basophils % 07/15/2020 0.3     Neutrophils Absolute 07/15/2020 10.1*    Immature Granulocytes # 07/15/2020 0.0     Lymphocytes Absolute 07/15/2020 1.2     Monocytes Absolute 07/15/2020 0.80     Eosinophils Absolute 07/15/2020 0.00     Basophils Absolute 07/15/2020 0.00     Sodium 07/15/2020 132*    Potassium reflex Magnesi* 07/15/2020 4.5     Chloride 07/15/2020 93*    CO2 07/15/2020 27     Anion Gap 07/15/2020 12     Glucose 07/15/2020 104     BUN 07/15/2020 35*    CREATININE 07/15/2020 1.4*    GFR Non- 07/15/2020 37*    GFR  07/15/2020 44*    Calcium 07/15/2020 9.2     Total Protein 07/15/2020 6.9     Alb 07/15/2020 3.7     Total Bilirubin 07/15/2020 <0.2     Alkaline Phosphatase 07/15/2020 68     ALT 07/15/2020 5     AST 07/15/2020 11     pH, Arterial 07/15/2020 7.340*    pCO2, Arterial 07/15/2020 59.0*    pO2, Arterial 07/15/2020 28.0*    HCO3, Arterial 07/15/2020 31.8*    Base Excess, Arterial 07/15/2020 4.7*    Hemoglobin, Art, Extended 07/15/2020 11.2*    O2 Sat, Arterial 07/15/2020 56.1*    Carboxyhgb, Arterial 07/15/2020 6.2*    Methemoglobin, Arterial 07/15/2020 2.5     O2 Content, Arterial 07/15/2020 8.8     O2 Therapy 07/15/2020 Unknown     P-R Interval 07/15/2020 146     QRS Duration 07/15/2020 88     Q-T Interval 07/15/2020 362     QTc Calculation (Bazett) 07/15/2020 395     P Axis 07/15/2020 71     T Axis 07/15/2020 52     Troponin 07/15/2020 <0.01     Potassium, Whole Blood 07/15/2020 4.2     Blood Culture, Routine 07/15/2020 No growth after 5 days of incubation.  Culture, Blood 2 07/15/2020 No growth after 5 days of incubation.      SARS-CoV-2, PCR 07/15/2020 Not Detected     WBC 07/16/2020 7.7     RBC 07/16/2020 3.63*    Hemoglobin 07/16/2020 10.6*    Hematocrit 07/16/2020 34.8*    MCV 07/16/2020 95.9     MCH 07/16/2020 29.2     MCHC 07/16/2020 30.5*    RDW 07/16/2020 15.6*    Platelets 92/53/0891 172     MPV 07/16/2020 10.1     Neutrophils % 07/16/2020 93.8*    Lymphocytes % 07/16/2020 4.7*    Monocytes % 07/16/2020 1.0     Eosinophils % 07/16/2020 0.0     Basophils % 07/16/2020 0.0     Neutrophils Absolute 07/16/2020 7.2     Immature Granulocytes # 07/16/2020 0.0     Lymphocytes Absolute 07/16/2020 0.4*    Monocytes Absolute 07/16/2020 0.10     Eosinophils Absolute 07/16/2020 0.00     Basophils Absolute 07/16/2020 0.00     Magnesium 07/16/2020 1.9     Sodium 07/18/2020 124*    Potassium 07/18/2020 3.8     Chloride 07/18/2020 83*    CO2 07/18/2020 27     Anion Gap 07/18/2020 14     Glucose 07/18/2020 89     BUN 07/18/2020 13     CREATININE 07/18/2020 0.6     GFR Non- 07/18/2020 >60     GFR  07/18/2020 >59     Calcium 07/18/2020 9.5     Total Protein 07/18/2020 6.6     Alb 07/18/2020 3.2*    Total Bilirubin 07/18/2020 0.3     Alkaline Phosphatase 07/18/2020 61     ALT 07/18/2020 6     AST 07/18/2020 16     Magnesium 07/18/2020 1.5*     Copies of these are in the chart. Prior to Visit Medications    Medication Sig Taking? Authorizing Provider   pantoprazole (PROTONIX) 40 MG tablet Take 1 tablet by mouth every morning (before breakfast)  NAVEEN Dior   olopatadine (PATADAY) 0.2 % SOLN ophthalmic solution Apply 1 drop to eye daily  NAVEEN Small   albuterol sulfate HFA (VENTOLIN HFA) 108 (90 Base) MCG/ACT inhaler Inhale 2 puffs into the lungs every 6 hours as needed for Wheezing  NAVEEN Small   DULoxetine (CYMBALTA) 60 MG extended release capsule Take 1 capsule by mouth daily  NAVEEN Dior   ferrous sulfate (FE TABS) 325 (65 Fe) MG EC tablet Take 1 tablet by mouth daily (with breakfast)  NAVEEN Dior   ascorbic acid (V-R VITAMIN C) 250 MG tablet Take 1 tablet by mouth daily  NAVEEN Dior   clonazePAM (KLONOPIN) 0.5 MG tablet Take 1 tablet by mouth 3 times daily as needed for Anxiety for up to 30 days. NAVEEN Dior   furosemide (LASIX) 20 MG tablet Take 20 mg by mouth daily  Historical Provider, MD   memantine (NAMENDA) 10 MG tablet Take 1 tablet by mouth 2 times daily  NAVEEN Small   isosorbide mononitrate (IMDUR) 30 MG extended release tablet Take 1 tablet by mouth daily  NAVEEN Small   umeclidinium-vilanterol (ANORO ELLIPTA) 62.5-25 MCG/INH AEPB inhaler Inhale 1 puff into the lungs daily IN place of Trelegy  NAVEEN Dior   clonazePAM (KLONOPIN) 0.5 MG tablet Take 1 tablet by mouth 3 times daily as needed for Anxiety for up to 30 days.   NAVEEN Dior   carvedilol (COREG) 3.125 MG tablet Take 1 tablet by mouth 2 times daily (with meals)  NAVEEN Small   traZODone (DESYREL) 150 MG tablet Take 1 tablet by mouth nightly  NAVEEN Dior   Fesoterodine Fumarate ER (TOVIAZ) 8 MG TB24 Take 1 tablet by mouth daily  NAVEEN Dior   donepezil (ARICEPT) 5 MG tablet Take 1 tablet by mouth nightly  NAVEEN Dior   gabapentin Brother    Sara Pollard Sister     Diabetes Brother     Diabetes Sister     Coronary Art Dis Other     Hypertension Other     Seizures Child     Colon Cancer Neg Hx     Colon Polyps Neg Hx     Esophageal Cancer Neg Hx     Liver Cancer Neg Hx     Liver Disease Neg Hx     Rectal Cancer Neg Hx     Stomach Cancer Neg Hx        Review of Systems   Constitutional: Negative for activity change, appetite change and fever. HENT: Negative for congestion, ear pain, rhinorrhea and sore throat. Respiratory: Positive for cough (chronic, dry) and shortness of breath (chronic). Cardiovascular: Negative for chest pain and leg swelling. Gastrointestinal: Negative for abdominal pain, diarrhea, nausea and vomiting. Genitourinary: Negative for dysuria. Musculoskeletal: Positive for arthralgias (follows with pain mgt) and back pain (follows with pain mgt). Neurological: Positive for weakness (\"I am still pretty weak. \" (since discharge from the hospital)). Negative for headaches. Psychiatric/Behavioral: Positive for sleep disturbance (stable on current regimen). The patient is nervous/anxious (stable on current regimen). Physical Exam  N/A DUE TO TELE-HEALTH PHONE CALL    ASSESSMENT      ICD-10-CM    1. Chronic obstructive pulmonary disease, unspecified COPD type ([facility]  J44.9 Continue Anoro daily  Continue oxygen  Continue albuterol prn  Smoking cessation recommended   2. Insomnia due to medical condition  G47.01 Continue Trazodone  Continue Klonopin prn   3. Tobacco abuse disorder  Z72.0 Smoking cessation recommended   4. Moderate episode of recurrent major depressive disorder (HCC)  F33.1 Continue Cymbalta 60 mg  Patient deferred counseling   5. HAILEY (generalized anxiety disorder)  F41.1 Continue Cymbalta 60 mg         PLAN    No orders of the defined types were placed in this encounter. Return in about 1 month (around 1/4/2021), or if symptoms worsen or fail to improve.      There are no Patient

## 2020-12-10 ENCOUNTER — VIRTUAL VISIT (OUTPATIENT)
Dept: PRIMARY CARE CLINIC | Age: 75
End: 2020-12-10
Payer: MEDICARE

## 2020-12-10 ENCOUNTER — TELEPHONE (OUTPATIENT)
Dept: PRIMARY CARE CLINIC | Age: 75
End: 2020-12-10

## 2020-12-10 PROCEDURE — 3017F COLORECTAL CA SCREEN DOC REV: CPT | Performed by: NURSE PRACTITIONER

## 2020-12-10 PROCEDURE — 99213 OFFICE O/P EST LOW 20 MIN: CPT | Performed by: NURSE PRACTITIONER

## 2020-12-10 PROCEDURE — G8399 PT W/DXA RESULTS DOCUMENT: HCPCS | Performed by: NURSE PRACTITIONER

## 2020-12-10 PROCEDURE — G8427 DOCREV CUR MEDS BY ELIG CLIN: HCPCS | Performed by: NURSE PRACTITIONER

## 2020-12-10 PROCEDURE — 1123F ACP DISCUSS/DSCN MKR DOCD: CPT | Performed by: NURSE PRACTITIONER

## 2020-12-10 PROCEDURE — 1090F PRES/ABSN URINE INCON ASSESS: CPT | Performed by: NURSE PRACTITIONER

## 2020-12-10 PROCEDURE — 4040F PNEUMOC VAC/ADMIN/RCVD: CPT | Performed by: NURSE PRACTITIONER

## 2020-12-10 ASSESSMENT — ENCOUNTER SYMPTOMS
DIARRHEA: 0
RHINORRHEA: 0
EYE DISCHARGE: 0
WHEEZING: 0
BLOOD IN STOOL: 0
TROUBLE SWALLOWING: 0
COUGH: 0
SORE THROAT: 0
EYE REDNESS: 0
CONSTIPATION: 0
ABDOMINAL PAIN: 0

## 2020-12-10 NOTE — PROGRESS NOTES
12/10/2020    TELEHEALTH EVALUATION -- Audio/Visual (During CMPSV-68 public health emergency)    Kamla Moore is a 77 y.o. female who c/o of Breast Pain   medical conditions/complaints as noted below. HPI:    Kamla Moore (:  1945) has requested an audio/video evaluation for the following concern(s):    Breast pain  This started yesterday. Pain in right breast. Feels better when she applies pressure to it. No redness, or warmth. Doesn't feel a mass. Last mammogram was in 2017    Review of Systems   Constitutional: Negative for appetite change and unexpected weight change. HENT: Negative for congestion, rhinorrhea, sore throat and trouble swallowing. Eyes: Negative for discharge and redness. Respiratory: Negative for cough and wheezing. Cardiovascular: Negative for chest pain. Gastrointestinal: Negative for abdominal pain, blood in stool, constipation and diarrhea. Genitourinary: Negative for dysuria. Breast pain   Musculoskeletal: Positive for arthralgias. Skin: Negative for rash. Neurological: Negative for weakness. Hematological: Negative for adenopathy. Prior to Visit Medications    Medication Sig Taking?  Authorizing Provider   pantoprazole (PROTONIX) 40 MG tablet Take 1 tablet by mouth every morning (before breakfast)  NAVEEN Dior   olopatadine (PATADAY) 0.2 % SOLN ophthalmic solution Apply 1 drop to eye daily  NAVEEN Delgado   albuterol sulfate HFA (VENTOLIN HFA) 108 (90 Base) MCG/ACT inhaler Inhale 2 puffs into the lungs every 6 hours as needed for Wheezing  NAVEEN Delgado   DULoxetine (CYMBALTA) 60 MG extended release capsule Take 1 capsule by mouth daily  NAVEEN Dior   ferrous sulfate (FE TABS) 325 (65 Fe) MG EC tablet Take 1 tablet by mouth daily (with breakfast)  NAVEEN Dior   ascorbic acid (V-R VITAMIN C) 250 MG tablet Take 1 tablet by mouth daily  NAVEEN Dior   clonazePAM (KLONOPIN) 0.5 MG drinks    Drug use: No        Allergies   Allergen Reactions    Codeine Nausea Only    Sulfa Antibiotics Other (See Comments)     \"makes me tremble'   ,   Past Medical History:   Diagnosis Date    Abdominal pain     Anxiety     Arthritis     CAD in native artery     Cerebrovascular disease     CHF (congestive heart failure) (HCC)     Constipation     COPD (chronic obstructive pulmonary disease) (HCC)     Depression     Emphysema     GERD (gastroesophageal reflux disease)     Myocardial infarct, old     Palliative care patient 02/20/2020    Pneumonia     Tobacco abuse    ,   Past Surgical History:   Procedure Laterality Date    ABDOMINAL EXPLORATION SURGERY      APPENDECTOMY      CARDIAC CATHETERIZATION  06/25/10    patent  stent noted in LAD,EF is estimated to be 60%    CARDIAC CATHETERIZATION  7/13/12  [facility]    EF  60%    CHOLECYSTECTOMY      COLONOSCOPY  10/2014    Dr Marvin Vogel ENDOSCOPY  10/1/14    Dr Quinn Orlando: food bezoar; esophagitis, gastritis, duodenitis; biopsies neg h-pylori   ,   Social History     Tobacco Use    Smoking status: Current Every Day Smoker     Packs/day: 0.25     Years: 36.00     Pack years: 9.00     Types: Cigarettes     Start date: 1978    Smokeless tobacco: Never Used   Substance Use Topics    Alcohol use: No     Alcohol/week: 0.0 standard drinks    Drug use: No   ,   Family History   Problem Relation Age of Onset    Stroke Brother     Stroke Sister     Cancer Brother     Cancer Sister     Diabetes Brother     Diabetes Sister     Coronary Art Dis Other     Hypertension Other     Seizures Child     Colon Cancer Neg Hx     Colon Polyps Neg Hx     Esophageal Cancer Neg Hx     Liver Cancer Neg Hx     Liver Disease Neg Hx     Rectal Cancer Neg Hx     Stomach Cancer Neg Hx    ,   Immunization History   Administered Date(s) Administered    Influenza Virus Vaccine 10/06/2014    Influenza, High Dose (Fluzone 65 yrs and older) 10/25/2017, 10/18/2018    Influenza, Quadv, adjuvanted, 65 yrs +, IM, PF (Fluad) 11/04/2020    Influenza, Triv, inactivated, subunit, adjuvanted, IM (Fluad 65 yrs and older) 11/20/2019    Pneumococcal Conjugate 13-valent ([id number]) 09/14/2016    Pneumococcal Conjugate Vaccine 10/06/2014    Pneumococcal Polysaccharide ([id number]) 10/25/2017   ,   Health Maintenance   Topic Date Due    DTaP/Tdap/Td vaccine (1 - Tdap) 04/06/1965    Shingles Vaccine (1 of 2) 04/06/1996    Breast cancer screen  01/10/2018    Annual Wellness Visit (AWV)  05/29/2019    Colon cancer screen colonoscopy  07/05/2021    Potassium monitoring  10/30/2021    Creatinine monitoring  10/30/2021    Flu vaccine  Completed    Pneumococcal 65+ years Vaccine  Completed    Hepatitis A vaccine  Aged Out    Hepatitis B vaccine  Aged Out    Hib vaccine  Aged Out    Meningococcal (ACWY) vaccine  Aged Out       PHYSICAL EXAMINATION:  [ INSTRUCTIONS:  \"[x]\" Indicates a positive item  \"[]\" Indicates a negative item  -- DELETE ALL ITEMS NOT EXAMINED]  Vital Signs: (As obtained by patient/caregiver or practitioner observation)    Blood pressure-  Heart rate-    Respiratory rate-    Temperature-  Pulse oximetry-     Constitutional: [x] Appears well-developed and well-nourished [x] No apparent distress      [] Abnormal-   Mental status  [x] Alert and awake  [x] Oriented to person/place/time [x]Able to follow commands      Eyes:  EOM    []  Normal  [] Abnormal-  Sclera  []  Normal  [] Abnormal -         Discharge [x]  None visible  [] Abnormal -    HENT:   [] Normocephalic, atraumatic.   [] Abnormal   [x] Mouth/Throat: Mucous membranes are moist.     External Ears [x] Normal  [] Abnormal-     Neck: [x] No visualized mass     Pulmonary/Chest: [x] Respiratory effort normal.  [] No visualized signs of difficulty breathing or respiratory distress        [] Abnormal-      Musculoskeletal:   [] Normal gait identification was verified at the start of the visit: Yes    Total time spent on this encounter: Not billed by time    Due to patients risk for potential exposure of COVID 19 pandemic, a virtual visit was initiated. Provider performed history of present illness and review of systems. Diagnosis and treatment plan was discussed with patient. Pharmacy of choice was reviewed along with past medical history, medication allergies, and current medications. Education provided to patient or patient parents/guardian with current illness diagnosis as well as when to seek additional healthcare due to changing or for worsening symptoms. Patient voiced understanding. --NAVEEN Fischer on 12/10/2020 at 4:22 PM    An electronic signature was used to authenticate this note.

## 2020-12-10 NOTE — TELEPHONE ENCOUNTER
Jeny's daughter left a v/m after office hours yesterday evening requesting a call back from [facility] nurse please. Thank you.

## 2020-12-28 RX ORDER — PANTOPRAZOLE SODIUM 40 MG/1
TABLET, DELAYED RELEASE ORAL
Qty: 30 TABLET | Refills: 0 | Status: SHIPPED | OUTPATIENT
Start: 2020-12-28 | End: 2021-01-27

## 2020-12-28 NOTE — TELEPHONE ENCOUNTER
Requested Prescriptions     Pending Prescriptions Disp Refills    pantoprazole (PROTONIX) 40 MG tablet [Pharmacy Med Name: PANTOPRAZOLE 40MG TABLET *DD] 30 tablet 0     Sig: TAKE 1 TABLET BY MOUTH EVERY MORNING BEFORE BREAKFAST

## 2021-01-05 ENCOUNTER — TELEPHONE (OUTPATIENT)
Dept: PRIMARY CARE CLINIC | Age: 76
End: 2021-01-05

## 2021-01-06 ENCOUNTER — VIRTUAL VISIT (OUTPATIENT)
Dept: PRIMARY CARE CLINIC | Age: 76
End: 2021-01-06
Payer: MEDICARE

## 2021-01-06 DIAGNOSIS — Z87.891 PERSONAL HISTORY OF NICOTINE DEPENDENCE: ICD-10-CM

## 2021-01-06 DIAGNOSIS — F11.20 CHRONIC NARCOTIC DEPENDENCE (HCC): ICD-10-CM

## 2021-01-06 DIAGNOSIS — Z00.00 ROUTINE GENERAL MEDICAL EXAMINATION AT A HEALTH CARE FACILITY: Primary | ICD-10-CM

## 2021-01-06 DIAGNOSIS — F51.01 PRIMARY INSOMNIA: ICD-10-CM

## 2021-01-06 DIAGNOSIS — J44.9 CHRONIC OBSTRUCTIVE PULMONARY DISEASE, UNSPECIFIED COPD TYPE (HCC): ICD-10-CM

## 2021-01-06 DIAGNOSIS — I50.9 CONGESTIVE HEART FAILURE, UNSPECIFIED HF CHRONICITY, UNSPECIFIED HEART FAILURE TYPE (HCC): ICD-10-CM

## 2021-01-06 PROCEDURE — 1123F ACP DISCUSS/DSCN MKR DOCD: CPT | Performed by: NURSE PRACTITIONER

## 2021-01-06 PROCEDURE — 3017F COLORECTAL CA SCREEN DOC REV: CPT | Performed by: NURSE PRACTITIONER

## 2021-01-06 PROCEDURE — G0439 PPPS, SUBSEQ VISIT: HCPCS | Performed by: NURSE PRACTITIONER

## 2021-01-06 PROCEDURE — 4040F PNEUMOC VAC/ADMIN/RCVD: CPT | Performed by: NURSE PRACTITIONER

## 2021-01-06 PROCEDURE — 99406 BEHAV CHNG SMOKING 3-10 MIN: CPT | Performed by: NURSE PRACTITIONER

## 2021-01-06 RX ORDER — HYDROCODONE BITARTRATE AND ACETAMINOPHEN 10; 325 MG/1; MG/1
1 TABLET ORAL EVERY 6 HOURS PRN
COMMUNITY
Start: 2020-12-04 | End: 2022-07-26

## 2021-01-06 ASSESSMENT — PATIENT HEALTH QUESTIONNAIRE - PHQ9
SUM OF ALL RESPONSES TO PHQ QUESTIONS 1-9: 2
SUM OF ALL RESPONSES TO PHQ QUESTIONS 1-9: 2
SUM OF ALL RESPONSES TO PHQ9 QUESTIONS 1 & 2: 2
2. FEELING DOWN, DEPRESSED OR HOPELESS: 1
1. LITTLE INTEREST OR PLEASURE IN DOING THINGS: 1

## 2021-01-06 NOTE — PATIENT INSTRUCTIONS
Personalized Preventive Plan for Rodolfo Ibrahim - 1/6/2021  Medicare offers a range of preventive health benefits. Some of the tests and screenings are paid in full while other may be subject to a deductible, co-insurance, and/or copay. Some of these benefits include a comprehensive review of your medical history including lifestyle, illnesses that may run in your family, and various assessments and screenings as appropriate. After reviewing your medical record and screening and assessments performed today your provider may have ordered immunizations, labs, imaging, and/or referrals for you. A list of these orders (if applicable) as well as your Preventive Care list are included within your After Visit Summary for your review. Other Preventive Recommendations:    · A preventive eye exam performed by an eye specialist is recommended every 1-2 years to screen for glaucoma; cataracts, macular degeneration, and other eye disorders. · A preventive dental visit is recommended every 6 months. · Try to get at least 150 minutes of exercise per week or 10,000 steps per day on a pedometer . · Order or download the FREE \"Exercise & Physical Activity: Your Everyday Guide\" from [employer]. Call 2-904.135.6694 or search [employer] online. · You need 4836-1609 mg of calcium and 6245-5653 IU of vitamin D per day. It is possible to meet your calcium requirement with diet alone, but a vitamin D supplement is usually necessary to meet this goal.  · When exposed to the sun, use a sunscreen that protects against both UVA and UVB radiation with an SPF of 30 or greater. Reapply every 2 to 3 hours or after sweating, drying off with a towel, or swimming. · Always wear a seat belt when traveling in a car. Always wear a helmet when riding a bicycle or motorcycle.

## 2021-01-06 NOTE — PROGRESS NOTES
Medicare Annual Wellness Visit  Are Name: Robin Camilo Date: 2021   MRN: [id number] Sex: Female   Age: 77 y.o. Ethnicity: Non-/Non    : 1945 Race: Julio Matias is here for Medicare AWV    Screenings for behavioral, psychosocial and functional/safety risks, and cognitive dysfunction are all negative except as indicated below. These results, as well as other patient data from the [address] form, are documented in Flowsheets linked to this Encounter. \"I have been wearing my oxygen at night and when I lie down. \"  \"Reports a little shortness of breath. \"  She continues on Anoro daily. Continues to smoke about a pack per day. She is not interested in smoking cessation. She follows with pain mgt. She has chronic back pain. They have increased her hydrocodone to every 6 hours, #120. She has back injections on 2021. She also takes Neurontin 300 mg BID. Pain is better controlled. She continues to take Trazodone 150 mg & Klonopin 0.5 mg nightly/prn. She rarely needs the Klonopin during the day. She is sleeping good \"sometimes\"  \"Right now she is okay on the Klonopin. I will call when I run out. \"  Last fill of Klonopin was 2020, #75    Denies any chest pain. Denies any swelling. She continues on Lasix 20 mg prn. She continues on Coreg 3.125 mg BID    Allergies   Allergen Reactions    Codeine Nausea Only    Sulfa Antibiotics Other (See Comments)     \"makes me tremble'         Prior to Visit Medications    Medication Sig Taking? Authorizing Provider   HYDROcodone-acetaminophen (NORCO)  MG per tablet Take 1 tablet by mouth every 6 hours as needed for Pain.   Yes Historical Provider, MD   pantoprazole (PROTONIX) 40 MG tablet TAKE 1 TABLET BY MOUTH EVERY MORNING BEFORE BREAKFAST Yes NAVEEN Dior   olopatadine (PATADAY) 0.2 % SOLN ophthalmic solution Apply 1 drop to eye daily Yes NAVEEN Ybarar albuterol sulfate HFA (VENTOLIN HFA) 108 (90 Base) MCG/ACT inhaler Inhale 2 puffs into the lungs every 6 hours as needed for Wheezing Yes NAVEEN Hernandez   DULoxetine (CYMBALTA) 60 MG extended release capsule Take 1 capsule by mouth daily Yes NAVEEN Dior   furosemide (LASIX) 20 MG tablet Take 20 mg by mouth daily Yes Historical Provider, MD   memantine (NAMENDA) 10 MG tablet Take 1 tablet by mouth 2 times daily Yes NAVEEN Hernandez   isosorbide mononitrate (IMDUR) 30 MG extended release tablet Take 1 tablet by mouth daily Yes NAVEEN Hernandez   umeclidinium-vilanterol (ANORO ELLIPTA) 62.5-25 MCG/INH AEPB inhaler Inhale 1 puff into the lungs daily IN place of Trelegy Yes NAVEEN Dior   clonazePAM (KLONOPIN) 0.5 MG tablet Take 1 tablet by mouth 3 times daily as needed for Anxiety for up to 30 days. Yes NAVEEN Dior   carvedilol (COREG) 3.125 MG tablet Take 1 tablet by mouth 2 times daily (with meals) Yes NAVEEN Hernandez   traZODone (DESYREL) 150 MG tablet Take 1 tablet by mouth nightly Yes NAVEEN Dior   Fesoterodine Fumarate ER (TOVIAZ) 8 MG TB24 Take 1 tablet by mouth daily Yes NAVEEN Dior   donepezil (ARICEPT) 5 MG tablet Take 1 tablet by mouth nightly Yes NAVEEN Dior   gabapentin (NEURONTIN) 300 MG capsule Take 300 mg by mouth 3 times daily.   Yes Historical Provider, MD   ondansetron (ZOFRAN) 4 MG tablet Take 1 tablet by mouth every 8 hours as needed for Nausea or Vomiting Yes NAVEEN Dior   albuterol (PROVENTIL) (5 MG/ML) 0.5% nebulizer solution Take 0.5 mLs by nebulization every 6 hours as needed for Wheezing Yes NAVEEN Dior   nitroGLYCERIN (NITROQUICK) 0.4 MG SL tablet Place 1 tablet under the tongue every 5 minutes as needed for Chest pain Yes NAVEEN Dior   OXYGEN Inhale 2 L into the lungs daily Yes Maury Sheen, APRN         Past Medical History:   Diagnosis Date    Abdominal pain  Anxiety     Arthritis     CAD in native artery     Cerebrovascular disease     CHF (congestive heart failure) (HCC)     Constipation     COPD (chronic obstructive pulmonary disease) (HCC)     Depression     Emphysema     GERD (gastroesophageal reflux disease)     Myocardial infarct, old     Palliative care patient 02/20/2020    Pneumonia     Tobacco abuse        Past Surgical History:   Procedure Laterality Date    ABDOMINAL EXPLORATION SURGERY      APPENDECTOMY      CARDIAC CATHETERIZATION  06/25/10    patent  stent noted in LAD,EF is estimated to be 60%    CARDIAC CATHETERIZATION  7/13/12  [facility]DL    EF  60%    CHOLECYSTECTOMY      COLONOSCOPY  10/2014    Dr Jasmina Jason UPPER GASTROINTESTINAL ENDOSCOPY  10/1/14    Dr Aye Baker: food bezoar; esophagitis, gastritis, duodenitis; biopsies neg h-pylori         Family History   Problem Relation Age of Onset    Stroke Brother     Stroke Sister     Cancer Brother     Cancer Sister     Diabetes Brother     Diabetes Sister     Coronary Art Dis Other     Hypertension Other     Seizures Child     Colon Cancer Neg Hx     Colon Polyps Neg Hx     Esophageal Cancer Neg Hx     Liver Cancer Neg Hx     Liver Disease Neg Hx     Rectal Cancer Neg Hx     Stomach Cancer Neg Hx        CareTeam (Including outside providers/suppliers regularly involved in providing care):   Patient Care Team:  NAVEEN Roe as PCP - General (Family Nurse Practitioner)  Cecilia Logan MD as PCP - Cardiology (Cardiology)  NAVEEN Roe as PCP - [facility] Empaneled Provider    Wt Readings from Last 3 Encounters:   11/04/20 117 lb 4 oz (53.2 kg)   10/29/20 118 lb 1.6 oz (53.6 kg)   07/16/20 118 lb 11.2 oz (53.8 kg)      No flowsheet data found. There is no height or weight on file to calculate BMI. Based upon direct observation of the patient, evaluation of cognition reveals recent and remote memory intact. \"My memory is doing pretty good. \"    Patient's complete Health Risk Assessment and screening values have been reviewed and are found in Flowsheets. The following problems were reviewed today and where indicated follow up appointments were made and/or referrals ordered. Positive Risk Factor Screenings with Interventions:         Substance History:  Social History     Tobacco History     Smoking Status  Current Every Day Smoker Smoking Start Date  1/1/1978 Smoking Frequency  0.25 packs/day for 36 years (9 pk yrs) Smoking Tobacco Type  Cigarettes    Smokeless Tobacco Use  Never Used          Alcohol History     Alcohol Use Status  No          Drug Use     Drug Use Status  No          Sexual Activity     Sexually Active  Not Asked               Alcohol Screening:       A score of 8 or more is associated with harmful or hazardous drinking. A score of 13 or more in women, and 15 or more in men, is likely to indicate alcohol dependence. Substance Abuse Interventions:  · Tobacco abuse:  patient is not ready to work toward tobacco cessation at this time    General Health and ACP:  General  In general, how would you say your health is?: (!) Poor  In the past 7 days, have you experienced any of the following?  New or Increased Pain, New or Increased Fatigue, Loneliness, Social Isolation, Stress or Anger?: None of These  Do you get the social and emotional support that you need?: Yes  Do you have a Living Will?: Yes  Advance Directives     Power of  Living Will ACP-Advance Directive ACP-Power of     Not on File Filed on 07/20/20 Filed Not on File      General Health Risk Interventions:  · Poor self-assessment of health status: patient declines any further evaluation/treatment for this issue   · She lives with her daughter    Health Habits/Nutrition:  Health Habits/Nutrition  Do you exercise for at least 20 minutes 2-3 times per week?: Yes  Have you lost any weight without trying in the past 3 months?: No Do you eat fewer than 2 meals per day?: No  Have you seen a dentist within the past year?: (!) No     Health Habits/Nutrition Interventions:  · Dental exam overdue:  recommend dental evaluation    Hearing/Vision:  No exam data present  Hearing/Vision  Do you or your family notice any trouble with your hearing?: No  Do you have difficulty driving, watching TV, or doing any of your daily activities because of your eyesight?: No  Have you had an eye exam within the past year?: (!) No  Hearing/Vision Interventions:  · Vision concerns:  patient encouraged to make appointment with his/her eye specialist    Safety:  Safety  Do you have working smoke detectors?: Yes  Have all throw rugs been removed or fastened?: Yes  Do you have non-slip mats or surfaces in all bathtubs/showers?: Yes  Do all of your stairways have a railing or banister?: (no stairs)  Are your doorways, halls and stairs free of clutter?: Yes  Do you always fasten your seatbelt when you are in a car?: Yes  Safety Interventions:  · Home safety tips provided    ADL:  ADLs  In the past 7 days, did you need help from others to perform any of the following everyday activities? Eating, dressing, grooming, bathing, toileting, or walking/balance?: (!) Bathing, Walking/Balance  In the past 7 days, did you need help from others to take care of any of the following?  Laundry, housekeeping, banking/finances, shopping, telephone use, food preparation, transportation, or taking medications?: [employer], Housekeeping, Banking/Finances, Shopping, Telephone Use, Food Preparation, Transportation, Taking Medications  ADL Interventions:  · Patient declines any further evaluation/treatment for this issue    Personalized Preventive Plan   Current Health Maintenance Status  Immunization History   Administered Date(s) Administered    Influenza Virus Vaccine 10/06/2014    Influenza, High Dose (Fluzone 65 yrs and older) 10/25/2017, 10/18/2018  Influenza, Quadv, adjuvanted, 65 yrs +, IM, PF (Fluad) 11/04/2020    Influenza, Triv, inactivated, subunit, adjuvanted, IM (Fluad 65 yrs and older) 11/20/2019    Pneumococcal Conjugate 13-valent ([id number]) 09/14/2016    Pneumococcal Conjugate Vaccine 10/06/2014    Pneumococcal Polysaccharide ([id number]) 10/25/2017        Health Maintenance   Topic Date Due    DTaP/Tdap/Td vaccine (1 - Tdap) 04/06/1965    Shingles Vaccine (1 of 2) 04/06/1996    Breast cancer screen  01/10/2018    Annual Wellness Visit (AWV)  05/29/2019    Colon cancer screen colonoscopy  07/05/2021    Potassium monitoring  10/30/2021    Creatinine monitoring  10/30/2021    Flu vaccine  Completed    Pneumococcal 65+ years Vaccine  Completed    Hepatitis A vaccine  Aged Out    Hepatitis B vaccine  Aged Out    Hib vaccine  Aged Out    Meningococcal (ACWY) vaccine  Aged Out     Recommendations for [employer] Due: see orders and patient instructions/AVS.  . Recommended screening schedule for the next 5-10 years is provided to the patient in written form: see Patient Instructions/AVS.    Dick Smoker was seen today for medicare awv. Diagnoses and all orders for this visit:    Routine general medical examination at a health care facility  -Patient to return for routine fasting labs. -Discussed mammogram but patient does not want to proceed while Covid cases are so high    Personal history of nicotine dependence   -Tobacco cessation recommended  -Approximately 5 minutes of education was provided about quit smoking and the harms of tobacco.  Patient does show understanding. Patient does not have  the desire to quit smoking in the near future.      Congestive heart failure, unspecified HF chronicity, unspecified heart failure type ([address]  -Continue Lasix as needed  -Continue carvedilol 3.125 mg twice daily  -Monitor weights    Chronic narcotic dependence ([address]  -Patient follows with pain management  -Patient has Narcan if needed Chronic obstructive pulmonary disease, unspecified COPD type (HCC)  -Continue oxygen  -Continue albuterol as needed  -Continue Anoro daily    Primary insomnia  -Continue trazodone nightly  -Continue Klonopin nightly. Joe Simpson is a 77 y.o. female being evaluated by a Virtual Visit (phone) encounter to address concerns as mentioned above. A caregiver was present when appropriate. Due to this being a TeleHealth encounter (During RKWWY-91 public health emergency), evaluation of the following organ systems was limited: Vitals/Constitutional/EENT/Resp/CV/GI//MS/Neuro/Skin/Heme-Lymph-Imm. Pursuant to the emergency declaration under the [address] [place] [address], [address] [place] [address] authority and the [employer] Act, this Virtual Visit was conducted with patient's (and/or legal guardian's) consent, to reduce the patient's risk of exposure to COVID-19 and provide necessary medical care. The patient (and/or legal guardian) has also been advised to contact this office for worsening conditions or problems, and seek emergency medical treatment and/or call 911 if deemed necessary. Patient identification was verified at the start of the visit: Yes    Services were provided through phone to substitute for in-person clinic visit. Patient and provider were located at their individual homes. --NAVEEN Moffett on 1/6/2021 at 4:31 PM    An electronic signature was used to authenticate this note.

## 2021-01-07 DIAGNOSIS — K31.84 NONDIABETIC GASTROPARESIS: ICD-10-CM

## 2021-01-07 DIAGNOSIS — R11.0 NAUSEA: ICD-10-CM

## 2021-01-07 RX ORDER — ONDANSETRON 4 MG/1
4 TABLET, FILM COATED ORAL EVERY 8 HOURS PRN
Qty: 40 TABLET | Refills: 0 | Status: SHIPPED | OUTPATIENT
Start: 2021-01-07 | End: 2021-08-05

## 2021-01-07 NOTE — TELEPHONE ENCOUNTER
Received fax from pharmacy requesting refill on pts medication(s). Pt was last seen in office on 1/6/2021  and has a follow up scheduled for 2/5/2021. Will send request to  [facility]  for patient.      Requested Prescriptions     Pending Prescriptions Disp Refills    ondansetron (ZOFRAN) 4 MG tablet [Pharmacy Med Name: ONDANSETRON HCL 4 MG TAB    SD] 40 tablet 0     Sig: Take 1 tablet by mouth every 8 hours as needed for Nausea or Vomiting

## 2021-01-12 ENCOUNTER — TELEPHONE (OUTPATIENT)
Dept: PRIMARY CARE CLINIC | Age: 76
End: 2021-01-12

## 2021-01-19 RX ORDER — FUROSEMIDE 20 MG/1
20 TABLET ORAL DAILY
Qty: 30 TABLET | Refills: 11 | Status: SHIPPED | OUTPATIENT
Start: 2021-01-19 | End: 2021-09-03 | Stop reason: SDUPTHER

## 2021-01-22 DIAGNOSIS — F03.90 DEMENTIA WITHOUT BEHAVIORAL DISTURBANCE, UNSPECIFIED DEMENTIA TYPE: ICD-10-CM

## 2021-01-22 NOTE — TELEPHONE ENCOUNTER
Received fax from pharmacy requesting refill on pts medication(s). Pt was last seen in office on 1/6/2021  and has a follow up scheduled for 2/5/2021. Will send [facility] to  [facility]  for patient.      Requested Prescriptions     Pending Prescriptions Disp Refills    donepezil (ARICEPT) 5 MG tablet [Pharmacy Med Name: DONEPEZIL HCL 5 MG TABLET] 90 tablet 0     Sig: TAKE 1 TABLET BY MOUTH AT BEDTIME

## 2021-01-25 RX ORDER — DONEPEZIL HYDROCHLORIDE 5 MG/1
5 TABLET, FILM COATED ORAL NIGHTLY
Qty: 90 TABLET | Refills: 3 | Status: SHIPPED | OUTPATIENT
Start: 2021-01-25 | End: 2021-03-04 | Stop reason: SDUPTHER

## 2021-01-26 ENCOUNTER — VIRTUAL VISIT (OUTPATIENT)
Dept: PRIMARY CARE CLINIC | Age: 76
End: 2021-01-26
Payer: MEDICARE

## 2021-01-26 ENCOUNTER — TELEPHONE (OUTPATIENT)
Dept: PRIMARY CARE CLINIC | Age: 76
End: 2021-01-26

## 2021-01-26 DIAGNOSIS — R53.82 CHRONIC FATIGUE: Primary | ICD-10-CM

## 2021-01-26 DIAGNOSIS — E55.9 VITAMIN D DEFICIENCY: ICD-10-CM

## 2021-01-26 DIAGNOSIS — R53.1 WEAKNESS: ICD-10-CM

## 2021-01-26 PROCEDURE — 99442 PR PHYS/QHP TELEPHONE EVALUATION 11-20 MIN: CPT | Performed by: NURSE PRACTITIONER

## 2021-01-26 ASSESSMENT — ENCOUNTER SYMPTOMS
WHEEZING: 0
DIARRHEA: 0
ABDOMINAL PAIN: 0
RHINORRHEA: 0
SHORTNESS OF BREATH: 1
BACK PAIN: 1
VOMITING: 0
NAUSEA: 0
COUGH: 1

## 2021-01-26 NOTE — PROGRESS NOTES
Carlitos Marcelino (:  1945) is a 77 y.o. female,Established patient, here for evaluation of the following chief complaint(s): Fatigue      ASSESSMENT/PLAN:  1. Chronic fatigue  -     Comprehensive Metabolic Panel; Future  -     Vitamin D 25 Hydroxy; Future  -     Vitamin B12; Future  -     CBC Auto Differential; Future  -     TSH without Reflex; Future  -     Urinalysis; Future  2. Vitamin D deficiency  -     Vitamin D 25 Hydroxy; Future  3. Weakness  -     Comprehensive Metabolic Panel; Future  -     Vitamin D 25 Hydroxy; Future  -     Vitamin B12; Future  -     CBC Auto Differential; Future  -     TSH without Reflex; Future  -     Urinalysis; Future    COVID vaccine: she has received her 1st dose (2021), second dose will be 2021    Return if symptoms worsen or fail to improve. SUBJECTIVE/OBJECTIVE:  HPI    FATIGUE:  \"I stay tired all the time. \"  \"I am weak. \"  Reports a chronic cough and chronic SOA   Her oxygen saturation was 90% this morning. She is wearing her oxygen at nap and bedtime. She continues to smoke  Denies N, V or D. Denies syncope  Denies dysuria, frequency or urgency  Denies HA or dizziness    Review of Systems   Constitutional: Positive for fatigue. Negative for fever. HENT: Negative for congestion and rhinorrhea. Respiratory: Positive for cough (chronic) and shortness of breath (chronic). Negative for wheezing. Cardiovascular: Negative for chest pain, palpitations and leg swelling. Gastrointestinal: Negative for abdominal pain, diarrhea, nausea and vomiting. Genitourinary: Negative for dysuria, frequency and urgency. Musculoskeletal: Positive for arthralgias (chronic joint pain, increased shoulder pain) and back pain (chronic). Neurological: Positive for weakness. Negative for dizziness, syncope and headaches. Psychiatric/Behavioral: Negative for sleep disturbance. No flowsheet data found.      Physical Exam   N/A DUE TO TELE-HEALTH PHONE CALL On this date 01/26/21 I have spent 15 minutes reviewing previous notes, test results and on the tele-phone (virtual) with the patient discussing the diagnosis and importance of compliance with the treatment plan as well as documenting on the day of the visit. Gabi Ramos is a 77 y.o. female being evaluated by a Virtual Visit (video visit) encounter to address concerns as mentioned above. A caregiver was present when appropriate. Due to this being a TeleHealth encounter (During [facility] public health emergency), evaluation of the following organ systems was limited: Vitals/Constitutional/EENT/Resp/CV/GI//MS/Neuro/Skin/Heme-Lymph-Imm. Pursuant to the emergency declaration under the [address] [place] [address], [address] [place] [address] authority and the [employer] Act, this Virtual Visit was conducted with patient's (and/or legal guardian's) consent, to reduce the patient's risk of exposure to COVID-19 and provide necessary medical care. The patient (and/or legal guardian) has also been advised to contact this office for worsening conditions or problems, and seek emergency medical treatment and/or call 911 if deemed necessary. Patient identification was verified at the start of the visit: Yes    Services were provided through a video synchronous discussion virtually to substitute for in-person clinic visit. Patient was located at home and provider was located in office or at home. An electronic signature was used to authenticate this note.     --NAVEEN Moss

## 2021-01-28 ENCOUNTER — OFFICE VISIT (OUTPATIENT)
Dept: PRIMARY CARE CLINIC | Age: 76
End: 2021-01-28
Payer: MEDICARE

## 2021-01-28 VITALS
HEART RATE: 80 BPM | SYSTOLIC BLOOD PRESSURE: 132 MMHG | DIASTOLIC BLOOD PRESSURE: 72 MMHG | RESPIRATION RATE: 20 BRPM | BODY MASS INDEX: 24.55 KG/M2 | HEIGHT: 61 IN | WEIGHT: 130 LBS | TEMPERATURE: 97.5 F | OXYGEN SATURATION: 92 %

## 2021-01-28 DIAGNOSIS — Z00.00 ROUTINE GENERAL MEDICAL EXAMINATION AT A HEALTH CARE FACILITY: ICD-10-CM

## 2021-01-28 DIAGNOSIS — R53.1 WEAKNESS: ICD-10-CM

## 2021-01-28 DIAGNOSIS — E55.9 VITAMIN D DEFICIENCY: ICD-10-CM

## 2021-01-28 DIAGNOSIS — E87.1 HYPONATREMIA: ICD-10-CM

## 2021-01-28 DIAGNOSIS — R53.82 CHRONIC FATIGUE: Primary | ICD-10-CM

## 2021-01-28 DIAGNOSIS — G89.29 CHRONIC RIGHT SHOULDER PAIN: ICD-10-CM

## 2021-01-28 DIAGNOSIS — M15.9 PRIMARY OSTEOARTHRITIS INVOLVING MULTIPLE JOINTS: ICD-10-CM

## 2021-01-28 DIAGNOSIS — R53.82 CHRONIC FATIGUE: ICD-10-CM

## 2021-01-28 DIAGNOSIS — Z95.828 PORT-A-CATH IN PLACE: ICD-10-CM

## 2021-01-28 DIAGNOSIS — M25.511 CHRONIC RIGHT SHOULDER PAIN: ICD-10-CM

## 2021-01-28 PROCEDURE — G8427 DOCREV CUR MEDS BY ELIG CLIN: HCPCS | Performed by: NURSE PRACTITIONER

## 2021-01-28 PROCEDURE — G8399 PT W/DXA RESULTS DOCUMENT: HCPCS | Performed by: NURSE PRACTITIONER

## 2021-01-28 PROCEDURE — 93000 ELECTROCARDIOGRAM COMPLETE: CPT | Performed by: NURSE PRACTITIONER

## 2021-01-28 PROCEDURE — G8484 FLU IMMUNIZE NO ADMIN: HCPCS | Performed by: NURSE PRACTITIONER

## 2021-01-28 PROCEDURE — G8420 CALC BMI NORM PARAMETERS: HCPCS | Performed by: NURSE PRACTITIONER

## 2021-01-28 PROCEDURE — 1123F ACP DISCUSS/DSCN MKR DOCD: CPT | Performed by: NURSE PRACTITIONER

## 2021-01-28 PROCEDURE — 96372 THER/PROPH/DIAG INJ SC/IM: CPT | Performed by: NURSE PRACTITIONER

## 2021-01-28 PROCEDURE — 3017F COLORECTAL CA SCREEN DOC REV: CPT | Performed by: NURSE PRACTITIONER

## 2021-01-28 PROCEDURE — 1090F PRES/ABSN URINE INCON ASSESS: CPT | Performed by: NURSE PRACTITIONER

## 2021-01-28 PROCEDURE — 96523 IRRIG DRUG DELIVERY DEVICE: CPT | Performed by: NURSE PRACTITIONER

## 2021-01-28 PROCEDURE — 99213 OFFICE O/P EST LOW 20 MIN: CPT | Performed by: NURSE PRACTITIONER

## 2021-01-28 PROCEDURE — 4040F PNEUMOC VAC/ADMIN/RCVD: CPT | Performed by: NURSE PRACTITIONER

## 2021-01-28 PROCEDURE — 4004F PT TOBACCO SCREEN RCVD TLK: CPT | Performed by: NURSE PRACTITIONER

## 2021-01-28 RX ORDER — DEXAMETHASONE SODIUM PHOSPHATE 4 MG/ML
6 INJECTION, SOLUTION INTRA-ARTICULAR; INTRALESIONAL; INTRAMUSCULAR; INTRAVENOUS; SOFT TISSUE ONCE
Status: COMPLETED | OUTPATIENT
Start: 2021-01-28 | End: 2021-01-28

## 2021-01-28 RX ADMIN — DEXAMETHASONE SODIUM PHOSPHATE 6 MG: 4 INJECTION, SOLUTION INTRA-ARTICULAR; INTRALESIONAL; INTRAMUSCULAR; INTRAVENOUS; SOFT TISSUE at 16:45

## 2021-01-28 ASSESSMENT — ENCOUNTER SYMPTOMS
VOMITING: 0
ABDOMINAL PAIN: 0
SHORTNESS OF BREATH: 1
CONSTIPATION: 0
COUGH: 1
NAUSEA: 0
DIARRHEA: 0

## 2021-01-28 NOTE — PROGRESS NOTES
Edward Fountain (:  1945) is a 77 y.o. female,Established patient, here for evaluation of the following chief complaint(s):  Procedure (Patient presents for port flush) and Fatigue      ASSESSMENT/PLAN:  1. Chronic fatigue  -     EKG 12 Lead: SR  @74  -     AR ELECTROCARDIOGRAM, COMPLETE  - TSH, CBC, vitamin B12, vitamin D, CMP, and free T4  2. Chronic right shoulder pain  -     dexamethasone (DECADRON) injection 6 mg; 6 mg, Intramuscular, ONCE, Thu 21 at 1630, For 1 dose  3. Primary osteoarthritis involving multiple joints  -     dexamethasone (DECADRON) injection 6 mg; 6 mg, Intramuscular, ONCE, Thu 21 at 1630, For 1 dose  4. Port-A-Cath in place  -     AR IRRIG IMPLANTED DRUG DELIVERY DEVICE        Procedure note:    Accessed mediport in sterile fashion with gripper needle without anesthesia. The port was flushed with normal saline and flushed easily. Blood was drawn. The port was then flushed with heparin and the port was de-accessed. No blood loss  No complication  No specimen  He tolerated the procedure well without incident. No follow-ups on file. SUBJECTIVE/OBJECTIVE:  HPI     FATIGUE:  She is sleeping well. She feels rested \"sometimes\" when she wakes up. Denies any new medications. \"Cassie says I snore. \"  She denies gasping. RIGHT SHOULDER PAIN:  Reports increased pain. She has not fallen. \"I can move it but it hurts. \"  \"I can't lie on it at night. \"  2018:  Right shoulder x-ray,   1. Unremarkable radiographs of the right shoulder. 2. Mildly limited study due to motion artifact. Review of Systems   Constitutional: Positive for fatigue. Negative for fever. Respiratory: Positive for cough (\"some\") and shortness of breath (chronic). Cardiovascular: Negative for chest pain. Gastrointestinal: Negative for abdominal pain, constipation, diarrhea, nausea and vomiting. Genitourinary: Negative for dysuria, frequency and urgency.    Musculoskeletal: Positive for arthralgias (right shoulder pain). Physical Exam  Vitals signs reviewed. Constitutional:       Appearance: She is well-developed. She is ill-appearing. Comments: In a wheelchair   HENT:      Head: Normocephalic. Right Ear: Tympanic membrane and external ear normal.      Left Ear: Tympanic membrane and external ear normal.      Nose: Nose normal.   Eyes:      General:         Right eye: No discharge. Left eye: No discharge. Neck:      Musculoskeletal: Normal range of motion. Vascular: No carotid bruit. Cardiovascular:      Rate and Rhythm: Normal rate and regular rhythm. Pulmonary:      Effort: Pulmonary effort is normal.      Breath sounds: Decreased breath sounds (throughout) and rhonchi (scattered) present. Chest:       Abdominal:      General: Bowel sounds are normal.      Palpations: Abdomen is soft. Skin:     General: Skin is dry. Neurological:      General: No focal deficit present. Mental Status: She is alert and oriented to person, place, and time. Mental status is at baseline. Psychiatric:         Mood and Affect: Mood normal.         Behavior: Behavior normal.         Thought Content: Thought content normal.         Judgment: Judgment normal.           An electronic signature was used to authenticate this note.     --NAVEEN Redd

## 2021-01-29 ENCOUNTER — TELEPHONE (OUTPATIENT)
Dept: PRIMARY CARE CLINIC | Age: 76
End: 2021-01-29

## 2021-01-29 DIAGNOSIS — D64.9 LOW HEMOGLOBIN AND LOW HEMATOCRIT: ICD-10-CM

## 2021-01-29 DIAGNOSIS — E55.9 VITAMIN D DEFICIENCY: Primary | ICD-10-CM

## 2021-01-29 DIAGNOSIS — E87.5 SERUM POTASSIUM ELEVATED: ICD-10-CM

## 2021-01-29 LAB
ALBUMIN SERPL-MCNC: 3.3 G/DL (ref 3.5–5.2)
ALP BLD-CCNC: 60 U/L (ref 35–104)
ALT SERPL-CCNC: 6 U/L (ref 5–33)
ANION GAP SERPL CALCULATED.3IONS-SCNC: 6 MMOL/L (ref 7–19)
AST SERPL-CCNC: 14 U/L (ref 5–32)
BASOPHILS ABSOLUTE: 0 K/UL (ref 0–0.2)
BASOPHILS RELATIVE PERCENT: 0.8 % (ref 0–1)
BILIRUB SERPL-MCNC: <0.2 MG/DL (ref 0.2–1.2)
BUN BLDV-MCNC: 18 MG/DL (ref 8–23)
CALCIUM SERPL-MCNC: 8.2 MG/DL (ref 8.8–10.2)
CHLORIDE BLD-SCNC: 98 MMOL/L (ref 98–111)
CO2: 26 MMOL/L (ref 22–29)
CREAT SERPL-MCNC: 0.7 MG/DL (ref 0.5–0.9)
EOSINOPHILS ABSOLUTE: 0.2 K/UL (ref 0–0.6)
EOSINOPHILS RELATIVE PERCENT: 3.3 % (ref 0–5)
GFR AFRICAN AMERICAN: >59
GFR NON-AFRICAN AMERICAN: >60
GLUCOSE BLD-MCNC: 80 MG/DL (ref 74–109)
HCT VFR BLD CALC: 30.2 % (ref 37–47)
HEMOGLOBIN: 8.8 G/DL (ref 12–16)
IMMATURE GRANULOCYTES #: 0 K/UL
LYMPHOCYTES ABSOLUTE: 1.5 K/UL (ref 1.1–4.5)
LYMPHOCYTES RELATIVE PERCENT: 28.8 % (ref 20–40)
MCH RBC QN AUTO: 29.6 PG (ref 27–31)
MCHC RBC AUTO-ENTMCNC: 29.1 G/DL (ref 33–37)
MCV RBC AUTO: 101.7 FL (ref 81–99)
MONOCYTES ABSOLUTE: 0.5 K/UL (ref 0–0.9)
MONOCYTES RELATIVE PERCENT: 10.1 % (ref 0–10)
NEUTROPHILS ABSOLUTE: 2.9 K/UL (ref 1.5–7.5)
NEUTROPHILS RELATIVE PERCENT: 56.6 % (ref 50–65)
PDW BLD-RTO: 15.2 % (ref 11.5–14.5)
PLATELET # BLD: 245 K/UL (ref 130–400)
PMV BLD AUTO: 9.7 FL (ref 9.4–12.3)
POTASSIUM SERPL-SCNC: 5.3 MMOL/L (ref 3.5–5)
RBC # BLD: 2.97 M/UL (ref 4.2–5.4)
SODIUM BLD-SCNC: 130 MMOL/L (ref 136–145)
T4 FREE: 0.97 NG/DL (ref 0.93–1.7)
TOTAL PROTEIN: 5.8 G/DL (ref 6.6–8.7)
TSH SERPL DL<=0.05 MIU/L-ACNC: 2.07 UIU/ML (ref 0.27–4.2)
VITAMIN B-12: 437 PG/ML (ref 211–946)
VITAMIN D 25-HYDROXY: 9.5 NG/ML
WBC # BLD: 5.2 K/UL (ref 4.8–10.8)

## 2021-01-29 RX ORDER — ERGOCALCIFEROL 1.25 MG/1
50000 CAPSULE ORAL WEEKLY
Qty: 4 CAPSULE | Refills: 5 | Status: SHIPPED | OUTPATIENT
Start: 2021-01-29 | End: 2021-07-01 | Stop reason: SDUPTHER

## 2021-01-29 NOTE — TELEPHONE ENCOUNTER
Called daughter with results. She has not been taking a potassium supplement, but she has been eating 2 bananas everyday for awhile now.      She will come and recheck labs next week and  a fit test.

## 2021-01-29 NOTE — TELEPHONE ENCOUNTER
----- Message from NAVEEN Matthews sent at 1/29/2021 12:04 PM CST -----  Thyroid is normal  CMP: Sodium is a little low. However potassium is elevated. It is 5.3. Has patient been taking any oral potassium supplement or over-the-counter supplement. If so recommend stopping any potassium supplement. Kidney function is normal.  Calcium is a little low at 8.2. Recommend repeat BMP in 1 week  CBC: White blood cell count, infection fighting cells, is within normal limits. Hemoglobin and hematocrit, oxygen-carrying cells, are lower than 2 months ago. I am concerned for blood loss. Recommend a fit test to ensure no GI source of blood loss. Awaiting B12 results. As the size of your red blood cells are elevated.   Recommend repeat CBC in 1 week

## 2021-01-29 NOTE — TELEPHONE ENCOUNTER
LM for daughter to [facility] and discuss labs

## 2021-01-29 NOTE — TELEPHONE ENCOUNTER
Brandi Angi, APRN   1/29/2021 12:04 PM CST      Thyroid is normal  CMP: Sodium is a little low.  However potassium is elevated.  It is 5.3.  Has patient been taking any oral potassium supplement or over-the-counter supplement.  If so recommend stopping any potassium supplement. Rosales Ally function is normal.  Calcium is a little low at 8.2.  Recommend repeat BMP in 1 week  CBC: White blood cell count, infection fighting cells, is within normal limits.  Hemoglobin and hematocrit, oxygen-carrying cells, are lower than 2 months ago.  I am concerned for blood loss.  Recommend a fit test to ensure no GI source of blood loss.  Awaiting B12 results.  As the size of your red blood cells are elevated.  Recommend repeat CBC in 1 week

## 2021-02-04 ENCOUNTER — OFFICE VISIT (OUTPATIENT)
Dept: PRIMARY CARE CLINIC | Age: 76
End: 2021-02-04
Payer: MEDICARE

## 2021-02-04 VITALS
WEIGHT: 133 LBS | BODY MASS INDEX: 25.11 KG/M2 | TEMPERATURE: 97.1 F | DIASTOLIC BLOOD PRESSURE: 80 MMHG | SYSTOLIC BLOOD PRESSURE: 110 MMHG | OXYGEN SATURATION: 89 % | HEIGHT: 61 IN | HEART RATE: 77 BPM

## 2021-02-04 DIAGNOSIS — Z95.828 PORT-A-CATH IN PLACE: ICD-10-CM

## 2021-02-04 DIAGNOSIS — F41.1 GAD (GENERALIZED ANXIETY DISORDER): ICD-10-CM

## 2021-02-04 DIAGNOSIS — R53.82 CHRONIC FATIGUE: Primary | ICD-10-CM

## 2021-02-04 DIAGNOSIS — J44.9 CHRONIC OBSTRUCTIVE PULMONARY DISEASE, UNSPECIFIED COPD TYPE (HCC): ICD-10-CM

## 2021-02-04 DIAGNOSIS — D64.9 LOW HEMOGLOBIN AND LOW HEMATOCRIT: ICD-10-CM

## 2021-02-04 DIAGNOSIS — R53.82 CHRONIC FATIGUE: ICD-10-CM

## 2021-02-04 DIAGNOSIS — E55.9 VITAMIN D DEFICIENCY: ICD-10-CM

## 2021-02-04 DIAGNOSIS — E87.5 SERUM POTASSIUM ELEVATED: ICD-10-CM

## 2021-02-04 DIAGNOSIS — D64.9 ANEMIA, UNSPECIFIED TYPE: ICD-10-CM

## 2021-02-04 DIAGNOSIS — G47.01 INSOMNIA DUE TO MEDICAL CONDITION: ICD-10-CM

## 2021-02-04 DIAGNOSIS — Z00.00 ROUTINE GENERAL MEDICAL EXAMINATION AT A HEALTH CARE FACILITY: ICD-10-CM

## 2021-02-04 LAB
ANION GAP SERPL CALCULATED.3IONS-SCNC: 11 MMOL/L (ref 7–19)
BASOPHILS ABSOLUTE: 0 K/UL (ref 0–0.2)
BASOPHILS RELATIVE PERCENT: 0.5 % (ref 0–1)
BUN BLDV-MCNC: 20 MG/DL (ref 8–23)
CALCIUM SERPL-MCNC: 8 MG/DL (ref 8.8–10.2)
CHLORIDE BLD-SCNC: 98 MMOL/L (ref 98–111)
CHOLESTEROL, TOTAL: 192 MG/DL (ref 160–199)
CO2: 27 MMOL/L (ref 22–29)
CREAT SERPL-MCNC: 0.8 MG/DL (ref 0.5–0.9)
EOSINOPHILS ABSOLUTE: 0.2 K/UL (ref 0–0.6)
EOSINOPHILS RELATIVE PERCENT: 2.6 % (ref 0–5)
FERRITIN: 38.5 NG/ML (ref 13–150)
GFR AFRICAN AMERICAN: >59
GFR NON-AFRICAN AMERICAN: >60
GLUCOSE BLD-MCNC: 87 MG/DL (ref 74–109)
HCT VFR BLD CALC: 33.6 % (ref 37–47)
HDLC SERPL-MCNC: 43 MG/DL (ref 65–121)
HEMOGLOBIN: 9.8 G/DL (ref 12–16)
IMMATURE GRANULOCYTES #: 0 K/UL
IRON: 66 UG/DL (ref 37–145)
LDL CHOLESTEROL CALCULATED: 121 MG/DL
LYMPHOCYTES ABSOLUTE: 1.5 K/UL (ref 1.1–4.5)
LYMPHOCYTES RELATIVE PERCENT: 22.5 % (ref 20–40)
MCH RBC QN AUTO: 29.5 PG (ref 27–31)
MCHC RBC AUTO-ENTMCNC: 29.2 G/DL (ref 33–37)
MCV RBC AUTO: 101.2 FL (ref 81–99)
MONOCYTES ABSOLUTE: 0.6 K/UL (ref 0–0.9)
MONOCYTES RELATIVE PERCENT: 9.2 % (ref 0–10)
NEUTROPHILS ABSOLUTE: 4.2 K/UL (ref 1.5–7.5)
NEUTROPHILS RELATIVE PERCENT: 64.9 % (ref 50–65)
PDW BLD-RTO: 15.5 % (ref 11.5–14.5)
PLATELET # BLD: 233 K/UL (ref 130–400)
PMV BLD AUTO: 10.1 FL (ref 9.4–12.3)
POTASSIUM SERPL-SCNC: 5 MMOL/L (ref 3.5–5)
RBC # BLD: 3.32 M/UL (ref 4.2–5.4)
SODIUM BLD-SCNC: 136 MMOL/L (ref 136–145)
TRIGL SERPL-MCNC: 142 MG/DL (ref 0–149)
VITAMIN D 25-HYDROXY: 21.4 NG/ML
WBC # BLD: 6.5 K/UL (ref 4.8–10.8)

## 2021-02-04 PROCEDURE — 4040F PNEUMOC VAC/ADMIN/RCVD: CPT | Performed by: NURSE PRACTITIONER

## 2021-02-04 PROCEDURE — 1123F ACP DISCUSS/DSCN MKR DOCD: CPT | Performed by: NURSE PRACTITIONER

## 2021-02-04 PROCEDURE — 3023F SPIROM DOC REV: CPT | Performed by: NURSE PRACTITIONER

## 2021-02-04 PROCEDURE — G8926 SPIRO NO PERF OR DOC: HCPCS | Performed by: NURSE PRACTITIONER

## 2021-02-04 PROCEDURE — G8484 FLU IMMUNIZE NO ADMIN: HCPCS | Performed by: NURSE PRACTITIONER

## 2021-02-04 PROCEDURE — 4004F PT TOBACCO SCREEN RCVD TLK: CPT | Performed by: NURSE PRACTITIONER

## 2021-02-04 PROCEDURE — 96523 IRRIG DRUG DELIVERY DEVICE: CPT | Performed by: NURSE PRACTITIONER

## 2021-02-04 PROCEDURE — G8427 DOCREV CUR MEDS BY ELIG CLIN: HCPCS | Performed by: NURSE PRACTITIONER

## 2021-02-04 PROCEDURE — 3017F COLORECTAL CA SCREEN DOC REV: CPT | Performed by: NURSE PRACTITIONER

## 2021-02-04 PROCEDURE — G8399 PT W/DXA RESULTS DOCUMENT: HCPCS | Performed by: NURSE PRACTITIONER

## 2021-02-04 PROCEDURE — 99214 OFFICE O/P EST MOD 30 MIN: CPT | Performed by: NURSE PRACTITIONER

## 2021-02-04 PROCEDURE — G8417 CALC BMI ABV UP PARAM F/U: HCPCS | Performed by: NURSE PRACTITIONER

## 2021-02-04 PROCEDURE — 1090F PRES/ABSN URINE INCON ASSESS: CPT | Performed by: NURSE PRACTITIONER

## 2021-02-04 RX ORDER — CLONAZEPAM 0.5 MG/1
0.5 TABLET ORAL 3 TIMES DAILY PRN
Qty: 75 TABLET | Refills: 0 | Status: SHIPPED | OUTPATIENT
Start: 2021-02-04 | End: 2021-04-20 | Stop reason: SDUPTHER

## 2021-02-04 ASSESSMENT — ENCOUNTER SYMPTOMS
NAUSEA: 0
COUGH: 1
DIARRHEA: 0
VOMITING: 0
CONSTIPATION: 0
ABDOMINAL PAIN: 0
RHINORRHEA: 0
SHORTNESS OF BREATH: 1
SORE THROAT: 0

## 2021-02-04 NOTE — PROGRESS NOTES
Ekaterina Pacheco (:  1945) is a 77 y.o. female,Established patient, here for evaluation of the following chief complaint(s):  Follow-up (labs)      ASSESSMENT/PLAN:  1. Chronic fatigue  -     POCT Fecal Immunochemical Test (FIT); Future  -     Iron; Future  -     Ferritin; Future  - CBC  - Long discussion with patient that fatigue can be caused from multiple etiologies. She is anemic as well as vitamin D deficient. Also several of her medications are sedating. 2. Vitamin D deficiency-vitamin D 50,000 units weekly  3. Anemia, unspecified type  -     POCT Fecal Immunochemical Test (FIT); Future  -     Iron; Future  -     Ferritin; Future  - CBC  4. HAILEY (generalized anxiety disorder)  -     clonazePAM (KLONOPIN) 0.5 MG tablet; Take 1 tablet by mouth 3 times daily as needed for Anxiety for up to 30 days. , Disp-75 tablet, R-0Normal  - Continue Cymbalta  5. Insomnia due to medical condition  -     clonazePAM (KLONOPIN) 0.5 MG tablet; Take 1 tablet by mouth 3 times daily as needed for Anxiety for up to 30 days. , Disp-75 tablet, R-0Normal  6. Serum potassium elevated-BMP  7. Chronic obstructive pulmonary disease, unspecified COPD type (HCC)--continue Anoro and albuterol as needed. Strongly recommended patient stop smoking  8. Port-A-Cath in place  -     DC IRRIG IMPLANTED DRUG DELIVERY DEVICE        procedure note:    Accessed mediport in sterile fashion with gripper needle without anesthesia. The port was flushed with normal saline and flushed easily. Blood/labs were drawn from the port. The port was then flushed with heparin and the port was de-accessed. No blood loss  No complication  No specimen  She tolerated the procedure well without incident. Return in about 1 month (around 3/4/2021), or if symptoms worsen or fail to improve. SUBJECTIVE/OBJECTIVE:  HPI     FATIGUE:  \"All I want to do is sleep. \"  She is sleeping too much. \"They increased her pain medication to 4x/day. \"   She is taking it 4x every day. She is sleeping so much during the day that she can't sleep at night. Last fill of Klonopin was 1100 4/2020, #75    ANXIETY:  She is taking Cymbalta 60 mg daily  She takes Klonopin prn. Last fill was 11/4/2020, #75  She is requesting refill today. Moods are well controlled. VITAMIN D DEF:  She started Vitamin D 50,000 units weekly last week. COPD:  She is breathing \"pretty good. \"  She continues to smoke 1-1.5 ppd. She is not interested in smoking cessation. She continues on Anoro daily. She has cut down on how often she uses her albuterol. \"I need it maybe twice a day. \"    LABS:  Vitamin B12 is within normal limits. Vitamin D is very low.  Recommend vitamin D 50,000 units, 1 tablet weekly.  Dispense #4.  Refills #5.  Recommend rechecking vitamin D level in 3 months  Thyroid is normal  CMP: Sodium is a little low.  However potassium is elevated.  It is 5.3.  Has patient been taking any oral potassium supplement or over-the-counter supplement.  If so recommend stopping any potassium supplement. Jessica Dates function is normal.  Calcium is a little low at 8.2.  Recommend repeat BMP in 1 week  CBC: White blood cell count, infection fighting cells, is within normal limits.  Hemoglobin and hematocrit, oxygen-carrying cells, are lower than 2 months ago.  I am concerned for blood loss.  Recommend a fit test to ensure no GI source of blood loss.  Awaiting B12 results.  As the size of your red blood cells are elevated.  Recommend repeat CBC in 1 week    Review of Systems   Constitutional: Positive for fatigue. Negative for fever. HENT: Negative for congestion, ear pain, rhinorrhea and sore throat. Respiratory: Positive for cough (\"some\") and shortness of breath (chronic). Cardiovascular: Negative for chest pain. Gastrointestinal: Negative for abdominal pain, constipation, diarrhea, nausea and vomiting. Genitourinary: Negative for dysuria, frequency and urgency.    Musculoskeletal: Positive for arthralgias. Psychiatric/Behavioral: Positive for sleep disturbance. The patient is nervous/anxious (controlled on current medication regimen). Physical Exam  Vitals signs reviewed. Constitutional:       Appearance: She is well-developed. She is ill-appearing. Comments: In a wheelchair   HENT:      Head: Normocephalic. Right Ear: Tympanic membrane and external ear normal.      Left Ear: Tympanic membrane and external ear normal.      Nose: Nose normal.   Eyes:      General:         Right eye: No discharge. Left eye: No discharge. Neck:      Musculoskeletal: Normal range of motion. Vascular: No carotid bruit. Cardiovascular:      Rate and Rhythm: Normal rate and regular rhythm. Pulmonary:      Effort: Pulmonary effort is normal.      Breath sounds: Decreased breath sounds (throughout) and rhonchi (scattered) present. Chest:       Abdominal:      General: Bowel sounds are normal.      Palpations: Abdomen is soft. Skin:     General: Skin is dry. Neurological:      General: No focal deficit present. Mental Status: She is alert and oriented to person, place, and time. Mental status is at baseline. Psychiatric:         Mood and Affect: Mood normal.         Behavior: Behavior normal.         Thought Content: Thought content normal.         Judgment: Judgment normal.           An electronic signature was used to authenticate this note.     --NAVEEN Aguilar

## 2021-02-05 ENCOUNTER — TELEPHONE (OUTPATIENT)
Dept: PRIMARY CARE CLINIC | Age: 76
End: 2021-02-05

## 2021-02-05 RX ORDER — ATORVASTATIN CALCIUM 20 MG/1
20 TABLET, FILM COATED ORAL DAILY
Qty: 30 TABLET | Refills: 11 | Status: SHIPPED | OUTPATIENT
Start: 2021-02-05 | End: 2021-09-03 | Stop reason: SDUPTHER

## 2021-02-05 NOTE — TELEPHONE ENCOUNTER
----- Message from NAVEEN Way sent at 2/5/2021 11:28 AM CST -----  Ferritin, iron stores is within normal limits  Vitamin D is low but improved from 8 days ago. Recommend continuing vitamin D 50,000 units weekly  Iron is normal  Cholesterol is slightly higher than 3 years ago. However, this was not a fasting cholesterol drawl. Total cholesterol is 192. LDL is 121. Would like to see this lower than 100 and even closer to 70. Recommend adding atorvastatin 20 mg, 1 tablet nightly. Dispense #30. Refills #5. Recommend rechecking lipid profile and LFTs in 6 to 8 weeks  BMP: Normal sodium, potassium, blood sugar. Normal kidney function. Calcium remains low at 8. Recommend calcium supplement over-the-counter twice daily. CBC: White blood cell count, infection fighting cells, is within normal limits. Hemoglobin hematocrit, oxygen-carrying cells, are low but improved from 8 days ago. This is great news. Still recommend fit test.  Sent home with patient yesterday.

## 2021-02-11 DIAGNOSIS — H10.13 ALLERGIC CONJUNCTIVITIS OF BOTH EYES: ICD-10-CM

## 2021-02-11 NOTE — TELEPHONE ENCOUNTER
Received fax from pharmacy requesting refill on pts medication(s). Pt was last seen in office on 2/4/2021  and has a follow up scheduled for 3/4/2021. Will send request to  [facility]  for authorization.      Requested Prescriptions     Pending Prescriptions Disp Refills    olopatadine (PATADAY) 0.2 % SOLN ophthalmic solution [Pharmacy Med Name: OLOPATADINE 0.2%  2.5ML] 2.5 mL 0     Sig: INSTILL 1 DROP INTO EACH EYE DAILY

## 2021-02-12 RX ORDER — OLOPATADINE HYDROCHLORIDE 2 MG/ML
SOLUTION/ DROPS OPHTHALMIC
Qty: 2.5 ML | Refills: 0 | Status: SHIPPED | OUTPATIENT
Start: 2021-02-12 | End: 2021-03-12

## 2021-03-04 ENCOUNTER — VIRTUAL VISIT (OUTPATIENT)
Dept: PRIMARY CARE CLINIC | Age: 76
End: 2021-03-04
Payer: MEDICARE

## 2021-03-04 DIAGNOSIS — D64.9 ANEMIA, UNSPECIFIED TYPE: ICD-10-CM

## 2021-03-04 DIAGNOSIS — F03.90 DEMENTIA WITHOUT BEHAVIORAL DISTURBANCE, UNSPECIFIED DEMENTIA TYPE: ICD-10-CM

## 2021-03-04 DIAGNOSIS — F41.1 GAD (GENERALIZED ANXIETY DISORDER): Primary | ICD-10-CM

## 2021-03-04 DIAGNOSIS — J40 BRONCHITIS: ICD-10-CM

## 2021-03-04 DIAGNOSIS — Z72.0 TOBACCO ABUSE DISORDER: ICD-10-CM

## 2021-03-04 PROCEDURE — 99443 PR PHYS/QHP TELEPHONE EVALUATION 21-30 MIN: CPT | Performed by: NURSE PRACTITIONER

## 2021-03-04 RX ORDER — DONEPEZIL HYDROCHLORIDE 5 MG/1
5 TABLET, FILM COATED ORAL NIGHTLY
Qty: 90 TABLET | Refills: 3 | Status: SHIPPED | OUTPATIENT
Start: 2021-03-04 | End: 2021-09-03 | Stop reason: SDUPTHER

## 2021-03-04 RX ORDER — AZITHROMYCIN 250 MG/1
250 TABLET, FILM COATED ORAL SEE ADMIN INSTRUCTIONS
Qty: 6 TABLET | Refills: 0 | Status: SHIPPED | OUTPATIENT
Start: 2021-03-04 | End: 2021-03-09

## 2021-03-04 ASSESSMENT — ENCOUNTER SYMPTOMS
COUGH: 1
SORE THROAT: 1
NAUSEA: 1
RHINORRHEA: 1
ABDOMINAL PAIN: 0
SHORTNESS OF BREATH: 1
VOMITING: 0
DIARRHEA: 0

## 2021-03-04 NOTE — PROGRESS NOTES
Agustina Ghosh (:  1945) is a 77 y.o. female,Established patient, here for evaluation of the following chief complaint(s): Anxiety      ASSESSMENT/PLAN:  1. HAILEY (generalized anxiety disorder)--STABLE. Continue Cymbalta. Continue Klonopin as needed. Patient does not need refill today. 2. Dementia without behavioral disturbance, unspecified dementia type ([facility]  -     donepezil (ARICEPT) 5 MG tablet; Take 1 tablet by mouth nightly, Disp-90 tablet, R-3Normal  3. Tobacco abuse disorder--highly recommend smoking cessation  4. Bronchitis/COPD   -     azithromycin (ZITHROMAX) 250 MG tablet; Take 1 tablet by mouth See Admin Instructions for 5 days 500mg on day 1 followed by 250mg on days 2 - 5, Disp-6 tablet, R-0Normal  - Recommend smoking cessation  - Recommend patient wear oxygen as prescribed  - Continue Anoro daily  - Continue albuterol as needed  5. Anemia, unspecified type--recommend fit test.  Recommend repeat CBC on follow-up. This was a virtual visit today. Return in about 1 month (around 2021), or if symptoms worsen or fail to improve. SUBJECTIVE/OBJECTIVE:  HPI     She had her 2nd COVID shot. She was sick after her shot. ANXIETY:  She continues on Cymbalta 60 mg daily  She takes Klonopin prn  Moods are stable  She does need a refill on her Klonopin today    URI:  Reports congestion. Denies a fever  Reports PND  Reports a little sore throat  Reports cough  Reports SOA. This is chronic but acutely worse  She continues to smoke 1 ppd  She wears her oxygen at night and sometimes during the day. She continues to need her oxygen. ANEMIA:  She is needing a stool sample. She was anemic on her last blood work. Hgb: 9.8  Hct: 33.6  They have not been able to collect this thus far. Review of Systems   Constitutional: Negative for fever. HENT: Positive for congestion, postnasal drip, rhinorrhea and sore throat (\"a little bit\").     Respiratory: Positive for cough (chronic) and shortness of breath (chronic). Gastrointestinal: Positive for nausea (\"a little\"). Negative for abdominal pain, diarrhea and vomiting. Genitourinary: Negative for dysuria, frequency and urgency. Psychiatric/Behavioral: The patient is nervous/anxious (controlled on current regimen). Physical Exam  N/A DUE TO TELE-HEALTH PHONE CALL    On this date 3/4/2021 I have spent 21 minutes reviewing previous notes, test results and face to face with the patient discussing the diagnosis and importance of compliance with the treatment plan as well as documenting on the day of the visit. An electronic signature was used to authenticate this note.     --Lai Marcelino, APRN

## 2021-03-09 RX ORDER — FESOTERODINE FUMARATE 8 MG/1
1 TABLET, FILM COATED, EXTENDED RELEASE ORAL DAILY
Qty: 90 TABLET | Refills: 3 | Status: SHIPPED | OUTPATIENT
Start: 2021-03-09 | End: 2021-06-01 | Stop reason: SDUPTHER

## 2021-03-09 NOTE — TELEPHONE ENCOUNTER
Received fax from pharmacy requesting refill on pts medication(s). Pt was last seen in office on 3/4/2021  and has a follow up scheduled for 4/1/2021. Will send request to  [facility]  for patient.      Requested Prescriptions     Pending Prescriptions Disp Refills    8009 Backchat 8 MG TB24 [Pharmacy Med Name: 0026 Backchat 8 MG ER TABLET] 90 tablet 0     Sig: TAKE 1 TABLET BY MOUTH DAILY

## 2021-03-11 ENCOUNTER — TELEPHONE (OUTPATIENT)
Dept: PRIMARY CARE CLINIC | Age: 76
End: 2021-03-11

## 2021-03-11 NOTE — TELEPHONE ENCOUNTER
She needs to be wearing her oxygen at all times. She needs to quit smoking. Is patient having increased shortness of breath, cough, wheezing, fever etc. if so patient would need to go in for evaluation.

## 2021-03-12 DIAGNOSIS — H10.13 ALLERGIC CONJUNCTIVITIS OF BOTH EYES: ICD-10-CM

## 2021-03-12 RX ORDER — OLOPATADINE HYDROCHLORIDE 2 MG/ML
1 SOLUTION/ DROPS OPHTHALMIC DAILY
Qty: 2.5 ML | Refills: 3 | Status: SHIPPED | OUTPATIENT
Start: 2021-03-12 | End: 2021-06-01 | Stop reason: SDUPTHER

## 2021-03-12 NOTE — TELEPHONE ENCOUNTER
Called and left a detailed message for pts daughter with the recommendations from Abelardo Bettencourt. Asked them to call with any questions.

## 2021-03-12 NOTE — TELEPHONE ENCOUNTER
Received fax from pharmacy requesting refill on pts medication(s). Pt was last seen in office on 3/4/2021  and has a follow up scheduled for 4/1/2021. Will send request to  [facility]  for patient.      Requested Prescriptions     Pending Prescriptions Disp Refills    olopatadine (PATADAY) 0.2 % SOLN ophthalmic solution [Pharmacy Med Name: OLOPATADINE 0.2%  2.5ML] 2.5 mL 0     Sig: INSTILL 1 DROP INTO EACH EYE DAILY

## 2021-04-01 ENCOUNTER — VIRTUAL VISIT (OUTPATIENT)
Dept: PRIMARY CARE CLINIC | Age: 76
End: 2021-04-01
Payer: MEDICARE

## 2021-04-01 DIAGNOSIS — G47.01 INSOMNIA DUE TO MEDICAL CONDITION: ICD-10-CM

## 2021-04-01 DIAGNOSIS — F33.1 MODERATE EPISODE OF RECURRENT MAJOR DEPRESSIVE DISORDER (HCC): ICD-10-CM

## 2021-04-01 DIAGNOSIS — F41.1 GAD (GENERALIZED ANXIETY DISORDER): Primary | ICD-10-CM

## 2021-04-01 DIAGNOSIS — Z72.0 TOBACCO ABUSE DISORDER: ICD-10-CM

## 2021-04-01 DIAGNOSIS — R07.9 CHEST PAIN, UNSPECIFIED TYPE: ICD-10-CM

## 2021-04-01 PROBLEM — J18.9 PNEUMONIA: Status: RESOLVED | Noted: 2020-07-15 | Resolved: 2021-04-01

## 2021-04-01 PROCEDURE — 99442 PR PHYS/QHP TELEPHONE EVALUATION 11-20 MIN: CPT | Performed by: NURSE PRACTITIONER

## 2021-04-01 RX ORDER — NITROGLYCERIN 0.4 MG/1
0.4 TABLET SUBLINGUAL EVERY 5 MIN PRN
Qty: 25 TABLET | Refills: 3 | Status: SHIPPED | OUTPATIENT
Start: 2021-04-01

## 2021-04-01 RX ORDER — CLONAZEPAM 0.5 MG/1
0.5 TABLET ORAL 3 TIMES DAILY PRN
Qty: 75 TABLET | Refills: 0 | Status: CANCELLED | OUTPATIENT
Start: 2021-04-01 | End: 2021-05-01

## 2021-04-01 ASSESSMENT — ENCOUNTER SYMPTOMS
NAUSEA: 0
ABDOMINAL PAIN: 0
RHINORRHEA: 0
COUGH: 1
VOMITING: 0
SHORTNESS OF BREATH: 1
DIARRHEA: 0
SORE THROAT: 0

## 2021-04-01 NOTE — PROGRESS NOTES
Camila Lockhart (:  1945) is a 77 y.o. female,Established patient, here for evaluation of the following chief complaint(s): Anxiety      ASSESSMENT/PLAN:  1. HAILEY (generalized anxiety disorder)  -     clonazePAM (KLONOPIN) 0.5 MG tablet; Take 1 tablet by mouth 3 times daily as needed for Anxiety for up to 30 days. , Disp-75 tablet, R-0 Normal  - Continue Cymbalta 60 mg  2. Insomnia due to medical condition  -     clonazePAM (KLONOPIN) 0.5 MG tablet; Take 1 tablet by mouth 3 times daily as needed for Anxiety for up to 30 days. , Disp-75 tablet, R-0 Normal  - Continue Trazodone 150 mg  3. Chest pain, unspecified type  -     nitroGLYCERIN (NITROSTAT) 0.4 MG SL tablet; Place 1 tablet under the tongue every 5 minutes as needed for Chest pain, Disp-25 tablet, R-3Normal  4. Moderate episode of recurrent major depressive disorder (HCC)--continue Cymbalta 60 mg  5. Tobacco abuse disorder--smoking cessation  6. COPD: Continue Anoro daily and albuterol as needed. Return in about 1 month (around 2021), or if symptoms worsen or fail to improve. SUBJECTIVE/OBJECTIVE:  HPI     COPD:  [facility]   Symptoms improved  She is using Anoro daily and Albuterol prn. She taking Albuterol TID. She is wearing her oxygen. She continues to smoke about a 1ppd. ANXIETY/MDD:  She is taking Cymbalta and Klonopin prn. Last fill of Klonopin 0.5 mg was 2021, #75  This helps keep her moods stable. \"Some days she feels like crying,\" per daughter. But overall her moods are improved    CHRONIC PAIN:  She had an injection on 3-  She continues to follow with pain mgt. She goes back for the next injection on 2021  She is taking Norco prn    Review of Systems   Constitutional: Negative for fever. HENT: Negative for congestion, postnasal drip, rhinorrhea and sore throat. Respiratory: Positive for cough (chronic) and shortness of breath (chronic).     Gastrointestinal: Negative for abdominal pain, diarrhea, nausea and vomiting. Genitourinary: Negative for dysuria, frequency and urgency. Psychiatric/Behavioral: Positive for sleep disturbance. The patient is nervous/anxious (controlled on current regimen). No flowsheet data found. Physical Exam  N/A DUE TO TELE-HEALTH PHONE CALL      On this date 4/1/2021 I have spent 15 minutes reviewing previous notes, test results and face to face (virtual) with the patient discussing the diagnosis and importance of compliance with the treatment plan as well as documenting on the day of the visit. Yun Alas, was evaluated through a synchronous (real-time) audio-video encounter. The patient (or guardian if applicable) is aware that this is a billable service. Verbal consent to proceed has been obtained within the past 12 months. The visit was conducted pursuant to the emergency declaration under the [facility], [address] [place] [address] authority and the [employer] Act. Patient identification was verified, and a caregiver was present when appropriate. The patient was located in a state where the provider was credentialed to provide care. An electronic signature was used to authenticate this note.     --NAVEEN Miranda

## 2021-04-20 DIAGNOSIS — G47.01 INSOMNIA DUE TO MEDICAL CONDITION: ICD-10-CM

## 2021-04-20 DIAGNOSIS — F41.1 GAD (GENERALIZED ANXIETY DISORDER): ICD-10-CM

## 2021-04-20 RX ORDER — CLONAZEPAM 0.5 MG/1
0.5 TABLET ORAL 3 TIMES DAILY PRN
Qty: 75 TABLET | Refills: 0 | Status: SHIPPED | OUTPATIENT
Start: 2021-04-20 | End: 2021-07-01 | Stop reason: SDUPTHER

## 2021-04-20 NOTE — TELEPHONE ENCOUNTER
Daughter called for a refill of her klonopin. She was seen 4/1/21 and last refill was feb. If okay to do it, I will run a souleymane.

## 2021-04-29 ENCOUNTER — VIRTUAL VISIT (OUTPATIENT)
Dept: PRIMARY CARE CLINIC | Age: 76
End: 2021-04-29
Payer: MEDICARE

## 2021-04-29 DIAGNOSIS — F11.20 CHRONIC NARCOTIC DEPENDENCE (HCC): ICD-10-CM

## 2021-04-29 DIAGNOSIS — D64.9 ANEMIA, UNSPECIFIED TYPE: ICD-10-CM

## 2021-04-29 DIAGNOSIS — F33.1 MODERATE EPISODE OF RECURRENT MAJOR DEPRESSIVE DISORDER (HCC): Primary | ICD-10-CM

## 2021-04-29 DIAGNOSIS — J44.9 CHRONIC OBSTRUCTIVE PULMONARY DISEASE, UNSPECIFIED COPD TYPE (HCC): ICD-10-CM

## 2021-04-29 PROCEDURE — 99442 PR PHYS/QHP TELEPHONE EVALUATION 11-20 MIN: CPT | Performed by: NURSE PRACTITIONER

## 2021-04-29 RX ORDER — BUPROPION HYDROCHLORIDE 75 MG/1
75 TABLET ORAL 2 TIMES DAILY
Qty: 60 TABLET | Refills: 3 | Status: SHIPPED | OUTPATIENT
Start: 2021-04-29 | End: 2021-06-01 | Stop reason: SDUPTHER

## 2021-04-29 ASSESSMENT — ENCOUNTER SYMPTOMS
ABDOMINAL PAIN: 0
SORE THROAT: 0
RHINORRHEA: 0
VOMITING: 0
SHORTNESS OF BREATH: 1
DIARRHEA: 0
NAUSEA: 0
COUGH: 1

## 2021-04-29 NOTE — PROGRESS NOTES
reviewing previous notes, test results and face to face (virtual) with the patient discussing the diagnosis and importance of compliance with the treatment plan as well as documenting on the day of the visit. Lyly Wilhelm was evaluated through a synchronous (real-time) audio-video encounter. The patient (or guardian if applicable) is aware that this is a billable service. Verbal consent to proceed has been obtained within the past 12 months. The visit was conducted pursuant to the emergency declaration under the [address] [place] [address] and the [employer] Act. Patient identification was verified, and a caregiver was present when appropriate. The patient was located in a state where the provider was credentialed to provide care. An electronic signature was used to authenticate this note.     --NAVEEN Rausch

## 2021-05-06 DIAGNOSIS — J44.9 CHRONIC OBSTRUCTIVE PULMONARY DISEASE, UNSPECIFIED COPD TYPE (HCC): ICD-10-CM

## 2021-05-07 RX ORDER — UMECLIDINIUM BROMIDE AND VILANTEROL TRIFENATATE 62.5; 25 UG/1; UG/1
1 POWDER RESPIRATORY (INHALATION) DAILY
Qty: 3 EACH | Refills: 3 | Status: SHIPPED | OUTPATIENT
Start: 2021-05-07 | End: 2021-09-03 | Stop reason: SDUPTHER

## 2021-05-13 ENCOUNTER — TELEPHONE (OUTPATIENT)
Dept: PRIMARY CARE CLINIC | Age: 76
End: 2021-05-13

## 2021-05-13 DIAGNOSIS — M81.0 AGE RELATED OSTEOPOROSIS, UNSPECIFIED PATHOLOGICAL FRACTURE PRESENCE: Primary | ICD-10-CM

## 2021-05-13 NOTE — TELEPHONE ENCOUNTER
Patient is due for prolia injection. She is due to have new dexa scan. Also looks like last few calcium levels have been low. Ok to order new dexa?

## 2021-06-01 ENCOUNTER — VIRTUAL VISIT (OUTPATIENT)
Dept: PRIMARY CARE CLINIC | Age: 76
End: 2021-06-01
Payer: MEDICARE

## 2021-06-01 DIAGNOSIS — I50.9 CONGESTIVE HEART FAILURE, UNSPECIFIED HF CHRONICITY, UNSPECIFIED HEART FAILURE TYPE (HCC): ICD-10-CM

## 2021-06-01 DIAGNOSIS — I10 ESSENTIAL HYPERTENSION: ICD-10-CM

## 2021-06-01 DIAGNOSIS — N32.81 OVERACTIVE BLADDER: ICD-10-CM

## 2021-06-01 DIAGNOSIS — F33.1 MODERATE EPISODE OF RECURRENT MAJOR DEPRESSIVE DISORDER (HCC): ICD-10-CM

## 2021-06-01 DIAGNOSIS — H53.8 BLURRED VISION: ICD-10-CM

## 2021-06-01 DIAGNOSIS — R53.1 WEAKNESS: ICD-10-CM

## 2021-06-01 DIAGNOSIS — H10.13 ALLERGIC CONJUNCTIVITIS OF BOTH EYES: ICD-10-CM

## 2021-06-01 DIAGNOSIS — M54.50 LUMBAR BACK PAIN: Primary | ICD-10-CM

## 2021-06-01 PROCEDURE — G8428 CUR MEDS NOT DOCUMENT: HCPCS | Performed by: NURSE PRACTITIONER

## 2021-06-01 PROCEDURE — 4040F PNEUMOC VAC/ADMIN/RCVD: CPT | Performed by: NURSE PRACTITIONER

## 2021-06-01 PROCEDURE — 99214 OFFICE O/P EST MOD 30 MIN: CPT | Performed by: NURSE PRACTITIONER

## 2021-06-01 PROCEDURE — 1123F ACP DISCUSS/DSCN MKR DOCD: CPT | Performed by: NURSE PRACTITIONER

## 2021-06-01 PROCEDURE — 1090F PRES/ABSN URINE INCON ASSESS: CPT | Performed by: NURSE PRACTITIONER

## 2021-06-01 PROCEDURE — G8399 PT W/DXA RESULTS DOCUMENT: HCPCS | Performed by: NURSE PRACTITIONER

## 2021-06-01 PROCEDURE — 3017F COLORECTAL CA SCREEN DOC REV: CPT | Performed by: NURSE PRACTITIONER

## 2021-06-01 RX ORDER — BUPROPION HYDROCHLORIDE 75 MG/1
75 TABLET ORAL 2 TIMES DAILY
Qty: 60 TABLET | Refills: 3 | Status: SHIPPED | OUTPATIENT
Start: 2021-06-01 | End: 2021-09-03 | Stop reason: SDUPTHER

## 2021-06-01 RX ORDER — CARVEDILOL 3.12 MG/1
3.12 TABLET ORAL 2 TIMES DAILY WITH MEALS
Qty: 60 TABLET | Refills: 11 | Status: SHIPPED | OUTPATIENT
Start: 2021-06-01 | End: 2021-09-03 | Stop reason: SDUPTHER

## 2021-06-01 RX ORDER — OLOPATADINE HYDROCHLORIDE 2 MG/ML
1 SOLUTION/ DROPS OPHTHALMIC DAILY
Qty: 2.5 ML | Refills: 3 | Status: SHIPPED | OUTPATIENT
Start: 2021-06-01 | End: 2021-08-05

## 2021-06-01 RX ORDER — FESOTERODINE FUMARATE 8 MG/1
1 TABLET, FILM COATED, EXTENDED RELEASE ORAL DAILY
Qty: 90 TABLET | Refills: 3 | Status: SHIPPED | OUTPATIENT
Start: 2021-06-01 | End: 2021-09-03 | Stop reason: SDUPTHER

## 2021-06-01 ASSESSMENT — ENCOUNTER SYMPTOMS
SORE THROAT: 0
RHINORRHEA: 0
SHORTNESS OF BREATH: 1
BACK PAIN: 1

## 2021-06-01 NOTE — PROGRESS NOTES
wearing her oxygen    She is staying up more. She is now taking Wellbutrin 75 mg BID and Cymbalta 60 mg. Depression is improved on this regimen. She takes Klonopin prn. Last fill was 4-20-21, #75  She does not need refill on Klonopin today    She is having blurred vision. Denies nightly halos  Denies double vision  Denies any acute change in her vision    Review of Systems   Constitutional: Negative for fever. HENT: Negative for congestion, rhinorrhea and sore throat. Eyes: Positive for visual disturbance (Blurred vision). Respiratory: Positive for shortness of breath (chronic). Genitourinary: Negative for dysuria, frequency and urgency. Musculoskeletal: Positive for arthralgias and back pain. Neurological: Positive for weakness. No flowsheet data found. Physical Exam  Constitutional:       Appearance: Normal appearance. She is normal weight. HENT:      Head: Normocephalic. Right Ear: External ear normal.      Left Ear: External ear normal.      Nose: Nose normal.   Eyes:      General:         Right eye: No discharge. Left eye: No discharge. Pulmonary:      Effort: Pulmonary effort is normal.   Musculoskeletal:      Cervical back: Normal range of motion. Neurological:      General: No focal deficit present. Mental Status: She is alert and oriented to person, place, and time. Mental status is at baseline. Psychiatric:         Mood and Affect: Mood normal.         Behavior: Behavior normal.         Thought Content: Thought content normal.         Judgment: Judgment normal.              Jeny GODOY Maynor, was evaluated through a synchronous (real-time) audio-video encounter. The patient (or guardian if applicable) is aware that this is a billable service. Verbal consent to proceed has been obtained within the past 12 months.  The visit was conducted pursuant to the emergency declaration under the [facility], [facility] waiver authority

## 2021-06-08 NOTE — TELEPHONE ENCOUNTER
Sigifredo Garcia calls and says that pt is still coughing and weak and she was told to let you know if pt wasn't better. Doxepin Counseling:  Patient advised that the medication is sedating and not to drive a car after taking this medication. Patient informed of potential adverse effects including but not limited to dry mouth, urinary retention, and blurry vision.  The patient verbalized understanding of the proper use and possible adverse effects of doxepin.  All of the patient's questions and concerns were addressed.

## 2021-07-01 ENCOUNTER — VIRTUAL VISIT (OUTPATIENT)
Dept: PRIMARY CARE CLINIC | Age: 76
End: 2021-07-01
Payer: MEDICARE

## 2021-07-01 DIAGNOSIS — G47.01 INSOMNIA DUE TO MEDICAL CONDITION: ICD-10-CM

## 2021-07-01 DIAGNOSIS — E55.9 VITAMIN D DEFICIENCY: ICD-10-CM

## 2021-07-01 DIAGNOSIS — F11.20 CHRONIC NARCOTIC DEPENDENCE (HCC): ICD-10-CM

## 2021-07-01 DIAGNOSIS — J44.9 CHRONIC OBSTRUCTIVE PULMONARY DISEASE, UNSPECIFIED COPD TYPE (HCC): Primary | ICD-10-CM

## 2021-07-01 DIAGNOSIS — F41.1 GAD (GENERALIZED ANXIETY DISORDER): ICD-10-CM

## 2021-07-01 DIAGNOSIS — R05.9 COUGH: ICD-10-CM

## 2021-07-01 PROCEDURE — 1090F PRES/ABSN URINE INCON ASSESS: CPT | Performed by: NURSE PRACTITIONER

## 2021-07-01 PROCEDURE — 99214 OFFICE O/P EST MOD 30 MIN: CPT | Performed by: NURSE PRACTITIONER

## 2021-07-01 PROCEDURE — 3017F COLORECTAL CA SCREEN DOC REV: CPT | Performed by: NURSE PRACTITIONER

## 2021-07-01 PROCEDURE — 1123F ACP DISCUSS/DSCN MKR DOCD: CPT | Performed by: NURSE PRACTITIONER

## 2021-07-01 PROCEDURE — 4040F PNEUMOC VAC/ADMIN/RCVD: CPT | Performed by: NURSE PRACTITIONER

## 2021-07-01 PROCEDURE — G8427 DOCREV CUR MEDS BY ELIG CLIN: HCPCS | Performed by: NURSE PRACTITIONER

## 2021-07-01 PROCEDURE — G8399 PT W/DXA RESULTS DOCUMENT: HCPCS | Performed by: NURSE PRACTITIONER

## 2021-07-01 RX ORDER — GABAPENTIN 100 MG/1
200 CAPSULE ORAL 2 TIMES DAILY
COMMUNITY
Start: 2021-06-01 | End: 2022-07-26 | Stop reason: SINTOL

## 2021-07-01 RX ORDER — GUAIFENESIN 600 MG/1
1200 TABLET, EXTENDED RELEASE ORAL 2 TIMES DAILY
Qty: 40 TABLET | Refills: 0 | Status: SHIPPED | OUTPATIENT
Start: 2021-07-01 | End: 2021-07-11

## 2021-07-01 RX ORDER — TRAZODONE HYDROCHLORIDE 150 MG/1
150 TABLET ORAL NIGHTLY
Qty: 90 TABLET | Refills: 3 | Status: SHIPPED | OUTPATIENT
Start: 2021-07-01 | End: 2021-09-03 | Stop reason: SDUPTHER

## 2021-07-01 RX ORDER — CLONAZEPAM 0.5 MG/1
0.5 TABLET ORAL 3 TIMES DAILY PRN
Qty: 75 TABLET | Refills: 0 | Status: SHIPPED | OUTPATIENT
Start: 2021-07-01 | End: 2021-09-03 | Stop reason: SDUPTHER

## 2021-07-01 RX ORDER — DENOSUMAB 60 MG/ML
INJECTION SUBCUTANEOUS
COMMUNITY
Start: 2021-04-27 | End: 2021-10-25

## 2021-07-01 RX ORDER — ERGOCALCIFEROL 1.25 MG/1
50000 CAPSULE ORAL WEEKLY
Qty: 4 CAPSULE | Refills: 5 | Status: SHIPPED | OUTPATIENT
Start: 2021-07-01 | End: 2021-09-03 | Stop reason: SDUPTHER

## 2021-07-01 ASSESSMENT — ENCOUNTER SYMPTOMS
RHINORRHEA: 1
SINUS PRESSURE: 0
COUGH: 1
SORE THROAT: 0
WHEEZING: 0
SHORTNESS OF BREATH: 1

## 2021-07-01 NOTE — PROGRESS NOTES
throat. She continues to smoke. Review of Systems   Constitutional: Negative for fever. HENT: Positive for congestion, postnasal drip and rhinorrhea. Negative for sinus pressure and sore throat. Respiratory: Positive for cough and shortness of breath (chronic). Negative for wheezing. No flowsheet data found. Physical Exam  Constitutional:       Appearance: She is normal weight. HENT:      Head: Normocephalic. Right Ear: External ear normal.      Left Ear: External ear normal.      Nose: Nose normal.   Eyes:      General:         Right eye: No discharge. Left eye: No discharge. Pulmonary:      Effort: Pulmonary effort is normal.   Neurological:      General: No focal deficit present. Mental Status: She is alert and oriented to person, place, and time. Mental status is at baseline. Psychiatric:         Mood and Affect: Mood normal.         Behavior: Behavior normal.         Thought Content: Thought content normal.         Judgment: Judgment normal.            Jeny Mcguire, was evaluated through a synchronous (real-time) audio-video encounter. The patient (or guardian if applicable) is aware that this is a billable service. Verbal consent to proceed has been obtained within the past 12 months. The visit was conducted pursuant to the emergency declaration under the [facility], [address] [place] [address] authority and the [employer] General Act. Patient identification was verified, and a caregiver was present when appropriate. The patient was located in a state where the provider was credentialed to provide care. An electronic signature was used to authenticate this note.     --NAVEEN Duran

## 2021-08-05 ENCOUNTER — VIRTUAL VISIT (OUTPATIENT)
Dept: PRIMARY CARE CLINIC | Age: 76
End: 2021-08-05
Payer: MEDICARE

## 2021-08-05 DIAGNOSIS — J34.89 NASAL DRAINAGE: ICD-10-CM

## 2021-08-05 DIAGNOSIS — R11.0 NAUSEA: ICD-10-CM

## 2021-08-05 DIAGNOSIS — Z72.0 TOBACCO ABUSE DISORDER: ICD-10-CM

## 2021-08-05 DIAGNOSIS — R09.81 NASAL CONGESTION: ICD-10-CM

## 2021-08-05 DIAGNOSIS — J44.9 CHRONIC OBSTRUCTIVE PULMONARY DISEASE, UNSPECIFIED COPD TYPE (HCC): ICD-10-CM

## 2021-08-05 DIAGNOSIS — H10.13 ALLERGIC CONJUNCTIVITIS OF BOTH EYES: ICD-10-CM

## 2021-08-05 DIAGNOSIS — R19.7 DIARRHEA OF PRESUMED INFECTIOUS ORIGIN: Primary | ICD-10-CM

## 2021-08-05 PROCEDURE — 99443 PR PHYS/QHP TELEPHONE EVALUATION 21-30 MIN: CPT | Performed by: NURSE PRACTITIONER

## 2021-08-05 RX ORDER — AZELASTINE HYDROCHLORIDE 0.5 MG/ML
1 SOLUTION/ DROPS OPHTHALMIC 2 TIMES DAILY
Qty: 1 BOTTLE | Refills: 1 | Status: SHIPPED | OUTPATIENT
Start: 2021-08-05 | End: 2021-09-03 | Stop reason: SDUPTHER

## 2021-08-05 RX ORDER — ONDANSETRON 4 MG/1
4 TABLET, FILM COATED ORAL 3 TIMES DAILY PRN
Qty: 30 TABLET | Refills: 0 | Status: SHIPPED | OUTPATIENT
Start: 2021-08-05 | End: 2022-02-11 | Stop reason: SDUPTHER

## 2021-08-05 ASSESSMENT — ENCOUNTER SYMPTOMS
VOMITING: 0
SHORTNESS OF BREATH: 1
COUGH: 1
DIARRHEA: 1
RHINORRHEA: 1
NAUSEA: 1

## 2021-08-05 NOTE — PROGRESS NOTES
Reagan Tamayo (:  1945) is a 77 y.o. female,Established patient, here for evaluation of the following chief complaint(s): Medication Refill    TELE-HEALTH PHONE CALL       ASSESSMENT/PLAN:  1. Diarrhea of presumed infectious origin  -     CBC Auto Differential; Future  -     Comprehensive Metabolic Panel; Future  - Diarrhea has now resolved. Discussed and recommended COVID swab. Patient deferred at this time since her symptoms were improving. 2. Nasal congestion  3. Nasal drainage  4. Nausea  -     ondansetron (ZOFRAN) 4 MG tablet; Take 1 tablet by mouth 3 times daily as needed for Nausea or Vomiting, Disp-30 tablet, R-0Normal  5. Allergic conjunctivitis of both eyes  -     azelastine (OPTIVAR) 0.05 % ophthalmic solution; Place 1 drop into both eyes 2 times daily, Disp-1 Bottle, R-1Normal  6. Chronic obstructive pulmonary disease, unspecified COPD type (HCC)--continue Anoro and albuterol prn  7. Tobacco abuse disorder--smoking cessation recommended      Return in about 1 month (around 2021). SUBJECTIVE/OBJECTIVE:  HPI     \"I haven't been feeling too good. \"  \"I had the diarrhea. \"   This started 3-4 days ago but it is now resolved   \"It stopped today. \"   She has not had a regular BM today   She is eating. Denies a fever  Denies increased cough  Reports nasal congestion and drainage  Reports headache  Reports nausea  Denies vomiting  She has been vaccinated against COVID    She has been wearing her oxygen  She has been breathing okay  She continues on Anoro daily and albuterol prn  She continues to smoke about a pack a day    Her Pat[employer] is costing $20 a month. Reports eyes watering and itchy. She is ready to reschedule her bone density (no morning & no  and )    Review of Systems   Constitutional: Positive for fatigue. Negative for appetite change and fever. HENT: Positive for congestion and rhinorrhea.     Respiratory: Positive for cough (chronic) and shortness of breath (chronic). Gastrointestinal: Positive for diarrhea and nausea. Negative for vomiting. Neurological: Positive for headaches. No flowsheet data found. Physical Exam   N/A DUE TO TELE-HEALTH      On this date 8/5/2021 I have spent 23 minutes reviewing previous notes, test results and face to face (virtual) with the patient discussing the diagnosis and importance of compliance with the treatment plan as well as documenting on the day of the visit. Lisa Moreira, was evaluated through a synchronous (real-time) audio-video encounter. The patient (or guardian if applicable) is aware that this is a billable service. Verbal consent to proceed has been obtained within the past 12 months. The visit was conducted pursuant to the emergency declaration under the [address] [place] [address] authority and the [employer] General Act. Patient identification was verified, and a caregiver was present when appropriate. The patient was located in a state where the provider was credentialed to provide care. An electronic signature was used to authenticate this note.     --Ricardo Valenzuela, NAVEEN

## 2021-08-26 ENCOUNTER — VIRTUAL VISIT (OUTPATIENT)
Dept: PRIMARY CARE CLINIC | Age: 76
End: 2021-08-26
Payer: MEDICARE

## 2021-08-26 DIAGNOSIS — E55.9 VITAMIN D DEFICIENCY: ICD-10-CM

## 2021-08-26 DIAGNOSIS — R53.1 GENERALIZED WEAKNESS: Primary | ICD-10-CM

## 2021-08-26 PROCEDURE — 99442 PR PHYS/QHP TELEPHONE EVALUATION 11-20 MIN: CPT | Performed by: NURSE PRACTITIONER

## 2021-08-26 ASSESSMENT — ENCOUNTER SYMPTOMS
DIARRHEA: 1
NAUSEA: 1
SHORTNESS OF BREATH: 1
VOMITING: 0
CONSTIPATION: 0
RHINORRHEA: 1

## 2021-08-26 NOTE — PROGRESS NOTES
Gar Lanes (:  1945) is a 77 y.o. female,Established patient, here for evaluation of the following chief complaint(s): Extremity Weakness    TELE-HEALTH PHONE CALL     ASSESSMENT/PLAN:  1. Generalized weakness  -     CBC Auto Differential; Future  -     TSH without Reflex; Future  -     Urinalysis Reflex to Culture; Future  -     Comprehensive Metabolic Panel; Future  2. Vitamin D deficiency  -     Vitamin D 25 Hydroxy; Future      Return in about 1 week (around 2021), or if symptoms worsen or fail to improve. SUBJECTIVE/OBJECTIVE:  HPI     \"I feel like my body is gone. \"  \"When I get up and walk, I feel like my legs will go out from under me. \"  \"I am having to use my walker. \"  This started about 4 days ago. Denies dysuria  Denies urinary frequency or urgency. Diarrhea has stopped  Reports nausea    Reports SOA    She does not have a blood pressure cuff    Denies any new medications    [place] have [place] to [place]    Review of Systems   Constitutional: Positive for activity change (decreased) and fatigue. Negative for appetite change and fever. HENT: Positive for congestion and rhinorrhea. Respiratory: Positive for shortness of breath (chronic). Gastrointestinal: Positive for diarrhea (4x days, it has not stopped) and nausea (\"sometimes\"). Negative for constipation and vomiting. Genitourinary: Negative for dysuria, frequency and urgency. Neurological: Positive for weakness. No flowsheet data found. Physical Exam   N/A DUE TO TELE-HEALTH PHONE CALL      On this date 2021 I have spent 18 minutes reviewing previous notes, test results and face to face (virtual) with the patient discussing the diagnosis and importance of compliance with the treatment plan as well as documenting on the day of the visit. Gar Lanes, was evaluated through a synchronous (real-time) audio-video encounter.  The patient (or guardian if applicable) is aware that this is a billable service. Verbal consent to proceed has been obtained within the past 12 months. The visit was conducted pursuant to the emergency declaration under the [address] [place] [address] and the [employer] General Act. Patient identification was verified, and a caregiver was present when appropriate. The patient was located in a state where the provider was credentialed to provide care. An electronic signature was used to authenticate this note.     --NAVEEN Elizabeth

## 2021-09-03 ENCOUNTER — VIRTUAL VISIT (OUTPATIENT)
Dept: PRIMARY CARE CLINIC | Age: 76
End: 2021-09-03
Payer: MEDICARE

## 2021-09-03 DIAGNOSIS — N32.81 OVERACTIVE BLADDER: ICD-10-CM

## 2021-09-03 DIAGNOSIS — R53.82 CHRONIC FATIGUE: ICD-10-CM

## 2021-09-03 DIAGNOSIS — I50.9 CONGESTIVE HEART FAILURE, UNSPECIFIED HF CHRONICITY, UNSPECIFIED HEART FAILURE TYPE (HCC): ICD-10-CM

## 2021-09-03 DIAGNOSIS — F32.9 REACTIVE DEPRESSION: ICD-10-CM

## 2021-09-03 DIAGNOSIS — K21.9 GASTROESOPHAGEAL REFLUX DISEASE, UNSPECIFIED WHETHER ESOPHAGITIS PRESENT: ICD-10-CM

## 2021-09-03 DIAGNOSIS — G47.01 INSOMNIA DUE TO MEDICAL CONDITION: ICD-10-CM

## 2021-09-03 DIAGNOSIS — E55.9 VITAMIN D DEFICIENCY: ICD-10-CM

## 2021-09-03 DIAGNOSIS — I10 ESSENTIAL HYPERTENSION: ICD-10-CM

## 2021-09-03 DIAGNOSIS — R41.3 MEMORY LOSS: ICD-10-CM

## 2021-09-03 DIAGNOSIS — H10.13 ALLERGIC CONJUNCTIVITIS OF BOTH EYES: ICD-10-CM

## 2021-09-03 DIAGNOSIS — F41.1 GAD (GENERALIZED ANXIETY DISORDER): ICD-10-CM

## 2021-09-03 DIAGNOSIS — F03.90 DEMENTIA WITHOUT BEHAVIORAL DISTURBANCE, UNSPECIFIED DEMENTIA TYPE: ICD-10-CM

## 2021-09-03 DIAGNOSIS — J44.9 CHRONIC OBSTRUCTIVE PULMONARY DISEASE, UNSPECIFIED COPD TYPE (HCC): Primary | ICD-10-CM

## 2021-09-03 PROCEDURE — 98968 PH1 ASSMT&MGMT NQHP 21-30: CPT | Performed by: NURSE PRACTITIONER

## 2021-09-03 RX ORDER — DONEPEZIL HYDROCHLORIDE 5 MG/1
5 TABLET, FILM COATED ORAL NIGHTLY
Qty: 90 TABLET | Refills: 3 | Status: SHIPPED | OUTPATIENT
Start: 2021-09-03 | End: 2022-08-25 | Stop reason: SDUPTHER

## 2021-09-03 RX ORDER — PANTOPRAZOLE SODIUM 40 MG/1
40 TABLET, DELAYED RELEASE ORAL DAILY
Qty: 30 TABLET | Refills: 11 | Status: SHIPPED | OUTPATIENT
Start: 2021-09-03 | End: 2022-08-25 | Stop reason: SDUPTHER

## 2021-09-03 RX ORDER — ATORVASTATIN CALCIUM 20 MG/1
20 TABLET, FILM COATED ORAL DAILY
Qty: 30 TABLET | Refills: 11 | Status: SHIPPED | OUTPATIENT
Start: 2021-09-03 | End: 2022-08-02

## 2021-09-03 RX ORDER — TRAZODONE HYDROCHLORIDE 150 MG/1
150 TABLET ORAL NIGHTLY
Qty: 90 TABLET | Refills: 3 | Status: ON HOLD | OUTPATIENT
Start: 2021-09-03 | End: 2022-09-19 | Stop reason: HOSPADM

## 2021-09-03 RX ORDER — AZELASTINE HYDROCHLORIDE 0.5 MG/ML
1 SOLUTION/ DROPS OPHTHALMIC 2 TIMES DAILY
Qty: 1 EACH | Refills: 5 | Status: SHIPPED | OUTPATIENT
Start: 2021-09-03 | End: 2021-10-03

## 2021-09-03 RX ORDER — ALBUTEROL SULFATE 90 UG/1
2 AEROSOL, METERED RESPIRATORY (INHALATION) EVERY 6 HOURS PRN
Qty: 1 EACH | Refills: 5 | Status: SHIPPED | OUTPATIENT
Start: 2021-09-03 | End: 2022-05-09

## 2021-09-03 RX ORDER — CLONAZEPAM 0.5 MG/1
0.5 TABLET ORAL 3 TIMES DAILY PRN
Qty: 75 TABLET | Refills: 0 | Status: SHIPPED | OUTPATIENT
Start: 2021-09-03 | End: 2021-11-23 | Stop reason: SDUPTHER

## 2021-09-03 RX ORDER — FUROSEMIDE 20 MG/1
20 TABLET ORAL DAILY PRN
Qty: 30 TABLET | Refills: 5 | Status: SHIPPED | OUTPATIENT
Start: 2021-09-03 | End: 2022-03-11

## 2021-09-03 RX ORDER — DULOXETIN HYDROCHLORIDE 60 MG/1
60 CAPSULE, DELAYED RELEASE ORAL DAILY
Qty: 30 CAPSULE | Refills: 11 | Status: SHIPPED | OUTPATIENT
Start: 2021-09-03 | End: 2021-11-23 | Stop reason: SDUPTHER

## 2021-09-03 RX ORDER — MEMANTINE HYDROCHLORIDE 10 MG/1
10 TABLET ORAL 2 TIMES DAILY
Qty: 60 TABLET | Refills: 11 | Status: SHIPPED | OUTPATIENT
Start: 2021-09-03 | End: 2022-08-25 | Stop reason: SDUPTHER

## 2021-09-03 RX ORDER — ISOSORBIDE MONONITRATE 30 MG/1
30 TABLET, EXTENDED RELEASE ORAL DAILY
Qty: 30 TABLET | Refills: 11 | Status: SHIPPED | OUTPATIENT
Start: 2021-09-03 | End: 2022-10-18

## 2021-09-03 RX ORDER — ERGOCALCIFEROL 1.25 MG/1
50000 CAPSULE ORAL WEEKLY
Qty: 4 CAPSULE | Refills: 5 | Status: SHIPPED | OUTPATIENT
Start: 2021-09-03 | End: 2021-12-22 | Stop reason: ALTCHOICE

## 2021-09-03 RX ORDER — BUPROPION HYDROCHLORIDE 75 MG/1
75 TABLET ORAL 2 TIMES DAILY
Qty: 60 TABLET | Refills: 3 | Status: SHIPPED | OUTPATIENT
Start: 2021-09-03 | End: 2021-12-22 | Stop reason: SDUPTHER

## 2021-09-03 RX ORDER — CARVEDILOL 3.12 MG/1
3.12 TABLET ORAL 2 TIMES DAILY WITH MEALS
Qty: 60 TABLET | Refills: 11 | Status: SHIPPED | OUTPATIENT
Start: 2021-09-03 | End: 2022-08-25 | Stop reason: SDUPTHER

## 2021-09-03 RX ORDER — FESOTERODINE FUMARATE 8 MG/1
1 TABLET, FILM COATED, EXTENDED RELEASE ORAL DAILY
Qty: 90 TABLET | Refills: 3 | Status: SHIPPED | OUTPATIENT
Start: 2021-09-03 | End: 2021-11-30 | Stop reason: SDUPTHER

## 2021-09-03 RX ORDER — UMECLIDINIUM BROMIDE AND VILANTEROL TRIFENATATE 62.5; 25 UG/1; UG/1
1 POWDER RESPIRATORY (INHALATION) DAILY
Qty: 3 EACH | Refills: 3 | Status: SHIPPED | OUTPATIENT
Start: 2021-09-03 | End: 2022-03-11 | Stop reason: SDUPTHER

## 2021-09-03 ASSESSMENT — ENCOUNTER SYMPTOMS
RHINORRHEA: 0
VOMITING: 0
COUGH: 1
CONSTIPATION: 0
DIARRHEA: 0
SHORTNESS OF BREATH: 1
NAUSEA: 1

## 2021-09-03 NOTE — PROGRESS NOTES
tablet; Take 1 tablet by mouth 2 times daily (with meals), Disp-60 tablet, R-11Normal  -     atorvastatin (LIPITOR) 20 MG tablet; Take 1 tablet by mouth daily, Disp-30 tablet, R-11Normal  8. Congestive heart failure, unspecified HF chronicity, unspecified heart failure type (HCC)  -     carvedilol (COREG) 3.125 MG tablet; Take 1 tablet by mouth 2 times daily (with meals), Disp-60 tablet, R-11Normal  9. Overactive bladder  -     Fesoterodine Fumarate ER (TOVIAZ) 8 MG TB24; Take 1 tablet by mouth daily, Disp-90 tablet, R-3Normal  10. Dementia without behavioral disturbance, unspecified dementia type (HCC)  -     donepezil (ARICEPT) 5 MG tablet; Take 1 tablet by mouth nightly, Disp-90 tablet, R-3Normal  11. Gastroesophageal reflux disease  -     pantoprazole (PROTONIX) 40 MG tablet; Take 1 tablet by mouth daily, Disp-30 tablet, R-11Normal  12. Reactive depression  -     buPROPion (WELLBUTRIN) 75 MG tablet; Take 1 tablet by mouth 2 times daily, Disp-60 tablet, R-3Normal  -     DULoxetine (CYMBALTA) 60 MG extended release capsule; Take 1 capsule by mouth daily, Disp-30 capsule, R-11Normal  13. Memory loss  -     memantine (NAMENDA) 10 MG tablet; Take 1 tablet by mouth 2 times daily, Disp-60 tablet, R-11Normal      Return in about 2 weeks (around 9/17/2021), or if symptoms worsen or fail to improve. SUBJECTIVE/OBJECTIVE:  HPI     She has not gotten her lab work drawn. Patient was suppose to have an in-office visit today & she switched it to virtual    Her oxygen is running 75-80%  Her daughter cannot get her to go the hospital  The low oxygen started about 2 weeks ago. She is wearing her oxygen \"most of the time\"  She is wearing 2L of oxygen. Reports chronic cough \"from cigarettes\"  Reports SOA  Denies headache  Reports nausea  Denies diarrhea  Denies nasal congestion and drainage  Denies confusion  She was exposed to COVID by her grand daughter on Sunday.     INSOMNIA:  She takes Trazodone 150 mg nightly  She also takes Klonopin at night. It was last filled on 7-1-2021, #75  This helps her sleep. She is requesting a refill today. HAILEY/MDD:  She is taking Cymbalta 60 mg and Wellbutrin 75 mg BID. She continues on Klonopin prn  Moods are stable on this regimen. She is requesting refill on Klonopin. Review of Systems   Constitutional: Positive for activity change (decreased) and fatigue. Negative for appetite change and fever. HENT: Negative for congestion and rhinorrhea. Respiratory: Positive for cough (chronic from cigarettes) and shortness of breath (chronic). Gastrointestinal: Positive for nausea (\"sometimes\"). Negative for constipation, diarrhea (resolved) and vomiting. Genitourinary: Negative for dysuria, frequency and urgency. Neurological: Positive for weakness. Negative for headaches. No flowsheet data found. Physical Exam  N/A DUE TO TELE-HEALTH PHONE CALL      On this date 9/3/2021 I have spent 22 minutes reviewing previous notes, test results and face to face (virtual) with the patient discussing the diagnosis and importance of compliance with the treatment plan as well as documenting on the day of the visit. Kamla Moore, was evaluated through a synchronous (real-time) audio-video encounter. The patient (or guardian if applicable) is aware that this is a billable service. Verbal consent to proceed has been obtained within the past 12 months. The visit was conducted pursuant to the emergency declaration under the [facility], [address] [place] [address] authority and the [employer] General Act. Patient identification was verified, and a caregiver was present when appropriate. The patient was located in a state where the provider was credentialed to provide care. An electronic signature was used to authenticate this note.     --Grier Favre, APRN

## 2021-09-08 ENCOUNTER — TELEPHONE (OUTPATIENT)
Dept: PRIMARY CARE CLINIC | Age: 76
End: 2021-09-08

## 2021-09-08 NOTE — TELEPHONE ENCOUNTER
Patient is overdue for prolia injection. She was supposed to have a dexa scan on 8/10 and was a no show. She needs this to proceed with injections. Tried calling and was unable to speak to anyone. I know that she has been trying to stay home and away from office due to covid. Just wanted to give you an update for when you speak with her the next visit.

## 2021-09-09 ENCOUNTER — HOSPITAL ENCOUNTER (EMERGENCY)
Age: 76
Discharge: HOME | End: 2021-09-09
Attending: EMERGENCY MEDICINE
Payer: MEDICARE

## 2021-09-09 ENCOUNTER — APPOINTMENT (OUTPATIENT)
Dept: GENERAL RADIOLOGY | Age: 76
End: 2021-09-09
Payer: MEDICARE

## 2021-09-09 VITALS
BODY MASS INDEX: 25.13 KG/M2 | OXYGEN SATURATION: 99 % | SYSTOLIC BLOOD PRESSURE: 145 MMHG | RESPIRATION RATE: 16 BRPM | TEMPERATURE: 98.3 F | DIASTOLIC BLOOD PRESSURE: 85 MMHG | WEIGHT: 133 LBS | HEART RATE: 74 BPM

## 2021-09-09 DIAGNOSIS — J96.11 CHRONIC RESPIRATORY FAILURE WITH HYPOXIA (HCC): ICD-10-CM

## 2021-09-09 DIAGNOSIS — J44.1 COPD WITH ACUTE EXACERBATION (HCC): Primary | ICD-10-CM

## 2021-09-09 LAB
ALBUMIN SERPL-MCNC: 4 G/DL (ref 3.5–5.2)
ALP BLD-CCNC: 80 U/L (ref 35–104)
ALT SERPL-CCNC: 10 U/L (ref 5–33)
ANION GAP SERPL CALCULATED.3IONS-SCNC: 6 MMOL/L (ref 7–19)
AST SERPL-CCNC: 16 U/L (ref 5–32)
BASOPHILS ABSOLUTE: 0 K/UL (ref 0–0.2)
BASOPHILS RELATIVE PERCENT: 0.7 % (ref 0–1)
BILIRUB SERPL-MCNC: 0.3 MG/DL (ref 0.2–1.2)
BUN BLDV-MCNC: 14 MG/DL (ref 8–23)
CALCIUM SERPL-MCNC: 9.7 MG/DL (ref 8.8–10.2)
CHLORIDE BLD-SCNC: 99 MMOL/L (ref 98–111)
CO2: 35 MMOL/L (ref 22–29)
CREAT SERPL-MCNC: 0.7 MG/DL (ref 0.5–0.9)
EOSINOPHILS ABSOLUTE: 0.1 K/UL (ref 0–0.6)
EOSINOPHILS RELATIVE PERCENT: 3 % (ref 0–5)
GFR AFRICAN AMERICAN: >59
GFR NON-AFRICAN AMERICAN: >60
GLUCOSE BLD-MCNC: 98 MG/DL (ref 74–109)
HCT VFR BLD CALC: 35 % (ref 37–47)
HEMOGLOBIN: 10 G/DL (ref 12–16)
HYPOCHROMIA: ABNORMAL
IMMATURE GRANULOCYTES #: 0 K/UL
INR BLD: 0.99 (ref 0.88–1.18)
LYMPHOCYTES ABSOLUTE: 0.9 K/UL (ref 1.1–4.5)
LYMPHOCYTES RELATIVE PERCENT: 21.7 % (ref 20–40)
MACROCYTES: ABNORMAL
MCH RBC QN AUTO: 28.9 PG (ref 27–31)
MCHC RBC AUTO-ENTMCNC: 28.6 G/DL (ref 33–37)
MCV RBC AUTO: 101.2 FL (ref 81–99)
MONOCYTES ABSOLUTE: 0.3 K/UL (ref 0–0.9)
MONOCYTES RELATIVE PERCENT: 8 % (ref 0–10)
NEUTROPHILS ABSOLUTE: 2.7 K/UL (ref 1.5–7.5)
NEUTROPHILS RELATIVE PERCENT: 66.4 % (ref 50–65)
OVALOCYTES: ABNORMAL
PDW BLD-RTO: 14.1 % (ref 11.5–14.5)
PLATELET # BLD: 194 K/UL (ref 130–400)
PLATELET SLIDE REVIEW: ADEQUATE
PMV BLD AUTO: 9.7 FL (ref 9.4–12.3)
POLYCHROMASIA: ABNORMAL
POTASSIUM SERPL-SCNC: 4.1 MMOL/L (ref 3.5–5)
PRO-BNP: 361 PG/ML (ref 0–1800)
PROTHROMBIN TIME: 13.3 SEC (ref 12–14.6)
RBC # BLD: 3.46 M/UL (ref 4.2–5.4)
SARS-COV-2, NAAT: NOT DETECTED
SODIUM BLD-SCNC: 140 MMOL/L (ref 136–145)
TOTAL PROTEIN: 7.1 G/DL (ref 6.6–8.7)
TROPONIN: <0.01 NG/ML (ref 0–0.03)
WBC # BLD: 4 K/UL (ref 4.8–10.8)

## 2021-09-09 PROCEDURE — 83880 ASSAY OF NATRIURETIC PEPTIDE: CPT

## 2021-09-09 PROCEDURE — 84484 ASSAY OF TROPONIN QUANT: CPT

## 2021-09-09 PROCEDURE — 2580000003 HC RX 258: Performed by: EMERGENCY MEDICINE

## 2021-09-09 PROCEDURE — 71045 X-RAY EXAM CHEST 1 VIEW: CPT

## 2021-09-09 PROCEDURE — 6370000000 HC RX 637 (ALT 250 FOR IP): Performed by: EMERGENCY MEDICINE

## 2021-09-09 PROCEDURE — 85610 PROTHROMBIN TIME: CPT

## 2021-09-09 PROCEDURE — 94640 AIRWAY INHALATION TREATMENT: CPT

## 2021-09-09 PROCEDURE — 93005 ELECTROCARDIOGRAM TRACING: CPT | Performed by: EMERGENCY MEDICINE

## 2021-09-09 PROCEDURE — 6360000002 HC RX W HCPCS: Performed by: EMERGENCY MEDICINE

## 2021-09-09 PROCEDURE — 96375 TX/PRO/DX INJ NEW DRUG ADDON: CPT

## 2021-09-09 PROCEDURE — 96365 THER/PROPH/DIAG IV INF INIT: CPT

## 2021-09-09 PROCEDURE — 36415 COLL VENOUS BLD VENIPUNCTURE: CPT

## 2021-09-09 PROCEDURE — 85025 COMPLETE CBC W/AUTO DIFF WBC: CPT

## 2021-09-09 PROCEDURE — 87635 SARS-COV-2 COVID-19 AMP PRB: CPT

## 2021-09-09 PROCEDURE — 99283 EMERGENCY DEPT VISIT LOW MDM: CPT

## 2021-09-09 PROCEDURE — 80053 COMPREHEN METABOLIC PANEL: CPT

## 2021-09-09 RX ORDER — DEXAMETHASONE SODIUM PHOSPHATE 10 MG/ML
4 INJECTION, SOLUTION INTRAMUSCULAR; INTRAVENOUS ONCE
Status: COMPLETED | OUTPATIENT
Start: 2021-09-09 | End: 2021-09-09

## 2021-09-09 RX ORDER — PREDNISONE 20 MG/1
20 TABLET ORAL DAILY
Qty: 5 TABLET | Refills: 0 | Status: SHIPPED | OUTPATIENT
Start: 2021-09-09 | End: 2021-09-14

## 2021-09-09 RX ORDER — DOXYCYCLINE HYCLATE 100 MG
100 TABLET ORAL 2 TIMES DAILY
Qty: 14 TABLET | Refills: 0 | Status: SHIPPED | OUTPATIENT
Start: 2021-09-09 | End: 2021-09-16

## 2021-09-09 RX ORDER — IPRATROPIUM BROMIDE AND ALBUTEROL SULFATE 2.5; .5 MG/3ML; MG/3ML
1 SOLUTION RESPIRATORY (INHALATION) ONCE
Status: COMPLETED | OUTPATIENT
Start: 2021-09-09 | End: 2021-09-09

## 2021-09-09 RX ADMIN — DEXAMETHASONE SODIUM PHOSPHATE 4 MG: 10 INJECTION, SOLUTION INTRAMUSCULAR; INTRAVENOUS at 21:39

## 2021-09-09 RX ADMIN — IPRATROPIUM BROMIDE AND ALBUTEROL SULFATE 1 AMPULE: .5; 3 SOLUTION RESPIRATORY (INHALATION) at 20:04

## 2021-09-09 RX ADMIN — CEFTRIAXONE 1000 MG: 1 INJECTION, POWDER, FOR SOLUTION INTRAMUSCULAR; INTRAVENOUS at 21:39

## 2021-09-09 ASSESSMENT — ENCOUNTER SYMPTOMS
SHORTNESS OF BREATH: 1
ABDOMINAL PAIN: 0
BACK PAIN: 0
COUGH: 1

## 2021-09-09 ASSESSMENT — PAIN SCALES - GENERAL: PAINLEVEL_OUTOF10: 7

## 2021-09-10 ENCOUNTER — CARE COORDINATION (OUTPATIENT)
Dept: CARE COORDINATION | Age: 76
End: 2021-09-10

## 2021-09-10 LAB
EKG P AXIS: 38 DEGREES
EKG P-R INTERVAL: 154 MS
EKG Q-T INTERVAL: 398 MS
EKG QRS DURATION: 88 MS
EKG QTC CALCULATION (BAZETT): 420 MS
EKG T AXIS: 60 DEGREES

## 2021-09-10 NOTE — ED PROVIDER NOTES
[facility] EMERGENCY DEPT  eMERGENCY dEPARTMENT eNCOUnter      Pt Name: Henry Toledo  MRN: [id number]  [place] 1945  Date of evaluation: 9/9/2021  Provider: Reji Daugherty MD    CHIEF COMPLAINT       Chief Complaint   Patient presents with    Concern For COVID-19    Hypertension         HISTORY OF PRESENT ILLNESS   (Location/Symptom, Timing/Onset,Context/Setting, Quality, Duration, Modifying Factors, Severity)  Note limiting factors. Henry Toledo is a 77 y.o. female who presents to the emergency department with shortness of breath. The patient states that she has had a cough. She may have been exposed to Covid 8 days ago. She has not been tested. The patient has COPD she wears 4 L of oxygen at home per report. The patient has been on oxygen for years her daughter states however when she takes the oxygen off her O2 saturations dropped. The patient denies any chest pain she was hypertensive on arrival.  Patient denies being on steroids or antibiotics. She denies any fevers. The patient is not vaccinated. The patient denies any new leg swelling she tells me she has CHF as well. She otherwise has her 4 L oxygen on on arrival apparently was increased in the last weeks. But she has been on oxygen for a long time for COPD. The history is provided by the patient and a relative. NursingNotes were reviewed. REVIEW OF SYSTEMS    (2-9 systems for level 4, 10 or more for level 5)     Review of Systems   Constitutional: Negative for fever. Respiratory: Positive for cough and shortness of breath. Cardiovascular: Negative for chest pain and leg swelling. Gastrointestinal: Negative for abdominal pain. Musculoskeletal: Negative for back pain. Neurological: Negative for seizures and syncope. Psychiatric/Behavioral: Negative for confusion. A complete review of systems was performed and is negative except as noted above in the HPI.        PAST MEDICAL HISTORY     Past Medical History: daily    FUROSEMIDE (LASIX) 20 MG TABLET    Take 1 tablet by mouth daily as needed (swelling)    GABAPENTIN (NEURONTIN) 100 MG CAPSULE        HYDROCODONE-ACETAMINOPHEN (NORCO)  MG PER TABLET    Take 1 tablet by mouth every 6 hours as needed for Pain.      ISOSORBIDE MONONITRATE (IMDUR) 30 MG EXTENDED RELEASE TABLET    Take 1 tablet by mouth daily    MEMANTINE (NAMENDA) 10 MG TABLET    Take 1 tablet by mouth 2 times daily    NITROGLYCERIN (NITROSTAT) 0.4 MG SL TABLET    Place 1 tablet under the tongue every 5 minutes as needed for Chest pain    ONDANSETRON (ZOFRAN) 4 MG TABLET    Take 1 tablet by mouth 3 times daily as needed for Nausea or Vomiting    OXYGEN    Inhale 2 L into the lungs daily    PANTOPRAZOLE (PROTONIX) 40 MG TABLET    Take 1 tablet by mouth daily    PROLIA 60 MG/ML SOSY SC INJECTION        TRAZODONE (DESYREL) 150 MG TABLET    Take 1 tablet by mouth nightly    UMECLIDINIUM-VILANTEROL (ANORO ELLIPTA) 62.5-25 MCG/INH AEPB INHALER    Inhale 1 puff into the lungs daily    VITAMIN D (ERGOCALCIFEROL) 1.25 MG (01948 UT) CAPS CAPSULE    Take 1 capsule by mouth once a week       ALLERGIES     Codeine and Sulfa antibiotics    FAMILY HISTORY       Family History   Problem Relation Age of Onset    Stroke Brother     Stroke Sister     Cancer Brother     Cancer Sister     Diabetes Brother     Diabetes Sister     Coronary Art Dis Other     Hypertension Other     Seizures Child     Colon Cancer Neg Hx     Colon Polyps Neg Hx     Esophageal Cancer Neg Hx     Liver Cancer Neg Hx     Liver Disease Neg Hx     Rectal Cancer Neg Hx     Stomach Cancer Neg Hx           SOCIAL HISTORY       Social History     Socioeconomic History    Marital status:      Spouse name: None    Number of children: None    Years of education: None    Highest education level: None   Occupational History    None   Tobacco Use    Smoking status: Current Every Day Smoker     Packs/day: 0.25     Years: 36.00     Pack Effort: Pulmonary effort is normal.      Comments: Mild expiratory wheezing diffusely  Abdominal:      General: Abdomen is flat. Palpations: Abdomen is soft. Tenderness: There is no abdominal tenderness. Musculoskeletal:         General: No tenderness. Normal range of motion. Cervical back: Normal range of motion and neck supple. Right lower leg: No edema. Left lower leg: No edema. Skin:     General: Skin is warm and dry. Neurological:      General: No focal deficit present. Mental Status: She is alert and oriented to person, place, and time. GCS: GCS eye subscore is 4. GCS verbal subscore is 5. GCS motor subscore is 6. Motor: No weakness. Psychiatric:         Mood and Affect: Mood normal.         Behavior: Behavior normal.         DIAGNOSTIC RESULTS     EKG: All EKG's are interpreted by the Emergency Department Physician who either signs or Co-signs this chart in the absence of a cardiologist.    EKG sinus 74 no acute ischemia. Low voltage, QTC 4 3 9 ms    RADIOLOGY:   Non-plain film images such as CT, Ultrasound and MRI are read by the radiologist. Belkis Garrett images are visualized and preliminarily interpreted by the emergency physician with the below findings:        Interpretation per the Radiologist below, if available at the time of this note:    XR CHEST PORTABLE   Final Result   1. Coarse markings and bronchial wall thickening, stable. 2. No new infiltrate.    Signed by Dr Leonie Castano            ED BEDSIDE ULTRASOUND:   Performed by ED Physician - none    LABS:  Labs Reviewed   COMPREHENSIVE METABOLIC PANEL - Abnormal; Notable for the following components:       Result Value    CO2 35 (*)     Anion Gap 6 (*)     All other components within normal limits   CBC WITH AUTO DIFFERENTIAL - Abnormal; Notable for the following components:    WBC 4.0 (*)     RBC 3.46 (*)     Hemoglobin 10.0 (*)     Hematocrit 35.0 (*)     .2 (*)     MCHC 28.6 (*) Neutrophils % 66.4 (*)     Lymphocytes Absolute 0.9 (*)     All other components within normal limits   COVID-19, RAPID   PROTIME-INR   TROPONIN   BRAIN NATRIURETIC PEPTIDE       All other labs were within normal range or not returned as of this dictation. EMERGENCY DEPARTMENT COURSE and DIFFERENTIALDIAGNOSIS/MDM:   Vitals:    Vitals:    09/09/21 1839 09/09/21 1915 09/09/21 1945 09/09/21 2045   BP: (!) 191/101 (!) 183/100 (!) 163/98 112/79   Pulse: 76      Resp: 18      Temp: 97.8 °F (36.6 °C)      SpO2: 99%      Weight: 133 lb (60.3 kg)          MDM  Number of Diagnoses or Management Options  Chronic respiratory failure with hypoxia (HCC)  COPD with acute exacerbation (HCC)  Diagnosis management comments: Patient with COPD. Her labs are reassuring although her white count her lymphocytes are little low her Covid test is negative she has no new infiltrates she is in no distress she is on 3 to 4 L saturating 97% with the covers pulled over her head sleeping on her side. She got a breathing treatment. Her wheezing improved greatly her blood pressure is improved to. The patient denies any chest pain her labs are reassuring from a cardiac and a BNP standpoint. The patient appears to have COPD. She is in no distress she can retest for Covid but again she has no new infiltrates or no overt respiratory distress at this time and is about 8 days out from exposure. The patient is stable for discharge placed on steroids and antibiotics for COPD exacerbation continue oxygen at home continue nebulizer treatments. Again for an elderly lady on oxygen she actually looks quite well in the ER tonight and is in no distress.        Amount and/or Complexity of Data Reviewed  Clinical lab tests: ordered and reviewed  Tests in the radiology section of CPT®: ordered and reviewed  Decide to obtain previous medical records or to obtain history from someone other than the patient: yes  Obtain history from someone other than the patient: yes  Independent visualization of images, tracings, or specimens: yes    Patient Progress  Patient progress: improved        CONSULTS:  None    PROCEDURES:  Unless otherwise notedbelow, none     Procedures    FINAL IMPRESSION     1. COPD with acute exacerbation ([facility] [address]    2.  Chronic respiratory failure with hypoxia [facility])          DISPOSITION/PLAN   DISPOSITION Decision To Discharge 09/09/2021 09:21:54 PM      PATIENT REFERRED TO:  NAVEEN Gandhi  [address] [facility] [place]  [phone]    Schedule an appointment as soon as possible for a visit in 1 week        DISCHARGE MEDICATIONS:  New Prescriptions    DOXYCYCLINE HYCLATE (VIBRA-TABS) 100 MG TABLET    Take 1 tablet by mouth 2 times daily for 7 days    PREDNISONE (DELTASONE) 20 MG TABLET    Take 1 tablet by mouth daily for 5 days          (Please note that portions of this note were completed with a voice recognition program.  Efforts were made to edit the dictations butoccasionally words are mis-transcribed.)    Carlos Toth MD (electronically signed)  AttendingEmergency Physician          Carlos Toth MD  09/09/21 0205

## 2021-09-10 NOTE — CARE COORDINATION
Educated patient about risk for severe COVID-19 due to risk factors according to CDC guidelines. ACM reviewed discharge instructions, medical action plan and red flag symptoms with the family who verbalized understanding. Discussed COVID vaccination status: Yes. Education provided on COVID-19 vaccination as appropriate. Discussed exposure protocols and quarantine with CDC Guidelines. Family was given an opportunity to verbalize any questions and concerns and agrees to contact ACM or health care provider for questions related to their healthcare. Patient's daughter/caregiver reviewed patient's medications during call with updates made to reflect Jeny's current med usage. Pt was exposed to her granddaughter about 8 days ago - granddaughter is positive for COVID 19. Jonnathan Dueñas went to the ER for increased shortness of breath yesterday and tested Negative for COVID 19. Patient is vaccinated against COVID 19. She was dx with COPD Exacerbation and has started an antibiotic and a steroid. Pt wears oxygen at 4L but often takes it off to smoke - she stated less than 5 cigarettes/day. Pt leaves O2 in her bedroom and goes into her kitchen to smoke. Pt's SpO2 during call was between 73-80% per her daughter - pt was sitting in the kitchen without her oxygen. ACM strongly advised that patient stop smoking and wear her oxygen continuously, reinforced that low oxygen levels are very harmful to her body organs - all of them. Reminded daughter and patient (who could hear me as well) of importance of this. Pt is drinking pop but little else - advised that she add 3-4 glasses of water to daily fluid intake as daughter reported pt has dark urine with strong odor. Encouraged that she eat small amounts regularly through the day. Advised to avoid any visitors while patient is quarantined the next few days per ER instructions.  If anyone else is inside other than patient and dtr, they should wear a mask and wash their hands on entry, stay at least 6 feet from patient. Advised to wipe down inside the house daily with anti-bacterial wipes or a spray. Monitor SpO2 - pt should keep oxgyen on to stay above 90%. If she drops below this w oxygen and cannot recover, return to the ER. If patient has severe chest pain or becomes unable to breath even with oxygen on, return to the ER. Reviewed pursed lip breathing and encouraged that daughter watch online video to demonstrate this exercise to help patient blow off CO2. Daughter voiced understanding. DEXTER contacted PCP office and  assisted to make appt with patient's PCP for next week. Appt will be virtual on 9/14. Dtr aware. Future Appointments   Date Time Provider Abelardo Singleton   9/14/2021  2:00 PM NAVEEN Warren [facility]   9/21/2021  2:30 PM NAVEEN Warren [facility]     DEXTER advised I will follow up on patient next week. Daughter thanked DEXTER and voiced her understanding.   Electronically signed by Radames Gutierrez RN on 9/10/2021 at 5:29 PM

## 2021-09-14 ENCOUNTER — VIRTUAL VISIT (OUTPATIENT)
Dept: PRIMARY CARE CLINIC | Age: 76
End: 2021-09-14
Payer: MEDICARE

## 2021-09-14 DIAGNOSIS — J44.1 CHRONIC OBSTRUCTIVE PULMONARY DISEASE WITH ACUTE EXACERBATION (HCC): Primary | ICD-10-CM

## 2021-09-14 PROCEDURE — 99442 PR PHYS/QHP TELEPHONE EVALUATION 11-20 MIN: CPT | Performed by: NURSE PRACTITIONER

## 2021-09-14 PROCEDURE — 1111F DSCHRG MED/CURRENT MED MERGE: CPT | Performed by: NURSE PRACTITIONER

## 2021-09-14 ASSESSMENT — ENCOUNTER SYMPTOMS
COUGH: 1
SHORTNESS OF BREATH: 1

## 2021-09-14 NOTE — PROGRESS NOTES
Blake Moya (:  1945) is a 77 y.o. female,Established patient, here for evaluation of the following chief complaint(s): COPD and Cough       ASSESSMENT/PLAN:  1. Chronic obstructive pulmonary disease with acute exacerbation (HCC)--improving  -     GA DISCHARGE MEDS RECONCILED W/ CURRENT OUTPATIENT MED LIST  - Continue Anoro  - Continue Albuterol prn  - Continue to wear oxygen  - Smoking cessation recommended  - Finish entire course of antibiotics and steroids      Return if symptoms worsen or fail to improve. SUBJECTIVE/OBJECTIVE:  HPI     She was seen in the ED on 2021  COVID: negative    Reports continued SOA when she goes the car, but overall feeling much better  Reports mild cough  She is wearing her oxygen at home. She was given doxycycline for 7 days and steroid for 5 days. She is breathing better. Chest X-ray, 2021  1. Coarse markings and bronchial wall thickening, stable. 2. No new infiltrate. Review of Systems   Constitutional: Negative for fever. Respiratory: Positive for cough (improving) and shortness of breath (improving). No flowsheet data found. Physical Exam   N/A DUE TO TELE-HEALTH PHONE CALL      On this date 2021 I have spent 13 minutes reviewing previous notes, test results and face to face (virtual) with the patient discussing the diagnosis and importance of compliance with the treatment plan as well as documenting on the day of the visit. Blake Moya, was evaluated through a synchronous (real-time) audio-video encounter. The patient (or guardian if applicable) is aware that this is a billable service. Verbal consent to proceed has been obtained within the past 12 months. The visit was conducted pursuant to the emergency declaration under the [facility], [address] [place] [address] authority and the [employer] and [employer] General Act.   Patient identification was verified, and a caregiver was present when appropriate. The patient was located in a state where the provider was credentialed to provide care. An electronic signature was used to authenticate this note.     --NAVEEN Gandhi

## 2021-09-16 ENCOUNTER — CARE COORDINATION (OUTPATIENT)
Dept: CARE COORDINATION | Age: 76
End: 2021-09-16

## 2021-09-16 NOTE — CARE COORDINATION
COVID-19 Screening Follow-up Note    Patient contacted regarding COVID-19 risk and screening. Care Transition Nurse/ Ambulatory Care Manager contacted the daughter, Shelton Lomas by telephone to perform follow-up assessment. Verified name and  with daughterShelton as identifiers. Patient has following risk factors of: CAD, heart failure and COPD        Symptoms reviewed with daughter, Shelton Lomas who verbalized the following symptoms: shortness of breath   Pt's SpO2 is 89-92% on RA. This is an improvement from last week. Daughter checked during call: 96-99% on oxygen at 4L. Patient stated she smokes about 5 cigarettes/day. Encouraged pt for cutting down and to continue effort to reduce further. Pt is not using her nebulizer and ACM advised to use this bid for the next several days while patient continues to recover from her COPD exacerbation. Pt is eating a little more per Shelton Lomas. Pt's biggest meal is supper and she is eating well with that. She does not drink much fluid. ACM reinforced that fluids help patient to improve hydration and overall wellbeing, urged to try and get fruit juice, tea, flavored water added to her intake daily. Monitor respiratory symptoms and if she has worsening symptoms or they are poorly controlled, reminded Shelton Lomas to contact PCP quickly to report and get evaluation and treatment in place for patient. Shelton Lomas voiced understanding and had no additional questions today. Due to no new or worsening symptoms encounter was not routed to provider for escalation. Education provided regarding infection prevention, and signs and symptoms of COVID-19 and when to seek medical attention with daughterShelton who verbalized understanding. Discussed exposure protocols and quarantine from [address] you at higher risk for severe illness  and given an opportunity for questions and concerns.  The daughter, Shelton Lomas agrees to contact the COVID-23 hotline [phone] or PCP office for questions related to their healthcare. CTN/ACM provided contact information for future reference. From CDC: Are you at higher risk for severe illness?  Wash your hands often.  Avoid close contact (6 feet, which is about two arm lengths) with people who are sick.  Put distance between yourself and other people if COVID-19 is spreading in your community.  Clean and disinfect frequently touched surfaces.  Avoid all cruise travel and non-essential air travel.  Call your healthcare professional if you have concerns about COVID-19 and your underlying condition or if you are sick.     For more information on steps you can take to protect yourself, see CDC's How to [place] for follow-up call in No further calls scheduled based on severity of symptoms and risk factors

## 2021-09-30 ENCOUNTER — TELEPHONE (OUTPATIENT)
Dept: PRIMARY CARE CLINIC | Age: 76
End: 2021-09-30

## 2021-09-30 ENCOUNTER — VIRTUAL VISIT (OUTPATIENT)
Dept: PRIMARY CARE CLINIC | Age: 76
End: 2021-09-30
Payer: MEDICARE

## 2021-09-30 DIAGNOSIS — J44.1 CHRONIC OBSTRUCTIVE PULMONARY DISEASE WITH ACUTE EXACERBATION (HCC): Primary | ICD-10-CM

## 2021-09-30 DIAGNOSIS — F11.20 CHRONIC NARCOTIC DEPENDENCE (HCC): ICD-10-CM

## 2021-09-30 PROCEDURE — 99442 PR PHYS/QHP TELEPHONE EVALUATION 11-20 MIN: CPT | Performed by: NURSE PRACTITIONER

## 2021-09-30 RX ORDER — ALBUTEROL SULFATE 1.25 MG/3ML
1 SOLUTION RESPIRATORY (INHALATION) EVERY 6 HOURS PRN
Qty: 360 ML | Refills: 3 | Status: SHIPPED | OUTPATIENT
Start: 2021-09-30 | End: 2022-04-07

## 2021-09-30 RX ORDER — AZITHROMYCIN 250 MG/1
250 TABLET, FILM COATED ORAL SEE ADMIN INSTRUCTIONS
Qty: 6 TABLET | Refills: 1 | Status: SHIPPED | OUTPATIENT
Start: 2021-09-30 | End: 2021-10-05

## 2021-09-30 ASSESSMENT — ENCOUNTER SYMPTOMS
RHINORRHEA: 1
DIARRHEA: 0
VOMITING: 0
SHORTNESS OF BREATH: 1
NAUSEA: 0
ABDOMINAL PAIN: 0
COUGH: 1

## 2021-09-30 NOTE — PROGRESS NOTES
Kamla Moore (:  1945) is a 77 y.o. female,Established patient, here for evaluation of the following chief complaint(s): Cough and Shortness of Breath    TELE-HEALTH PHONE CALL       ASSESSMENT/PLAN:  1. Chronic obstructive pulmonary disease with acute exacerbation (HCC)  -     azithromycin (ZITHROMAX) 250 MG tablet; Take 1 tablet by mouth See Admin Instructions for 5 days 500mg on day 1 followed by 250mg on days 2 - 5, Disp-6 tablet, R-1Normal (take for 10 days)  -     albuterol (PROVENTIL) (5 MG/ML) 0.5% nebulizer solution; Take 0.5 mLs by nebulization every 6 hours as needed for Wheezing, Disp-120 each, R-3Normal  - Continue Anoro daily  2. Chronic narcotic dependence (HCC)  -     albuterol (PROVENTIL) (5 MG/ML) 0.5% nebulizer solution; Take 0.5 mLs by nebulization every 6 hours as needed for Wheezing, Disp-120 each, R-3Normal  - Smoking cessation recommended      Return in about 1 week (around 10/7/2021), or if symptoms worsen or fail to improve. SUBJECTIVE/OBJECTIVE:  HPI     Her oxygen is 78 % on room air. She only wears her oxygen when she feels like she can't breath. She is coughing. \"Mucous is a little yellow. \"  She is short of breath  Denies a fever. Denies decreased appetite  Reports nasal congestion and drainage. Denies N, V or D. Denies abdominal pain. She is using Anoro and albuterol prn. When placed on 4L oxygen sat was 91%    Review of Systems   Constitutional: Negative for appetite change and fever. HENT: Positive for congestion, rhinorrhea and sneezing. Respiratory: Positive for cough and shortness of breath. Gastrointestinal: Negative for abdominal pain, diarrhea, nausea and vomiting.      Patient-Reported Vitals 2021   Patient-Reported Weight 133 lbs   Patient-Reported SpO2 78 % on RA        Physical Exam   N/A DUE TO TELE-HEALTH PHONE CALL      On this date 2021 I have spent 13 minutes reviewing previous notes, test results and face to face (virtual) with the patient discussing the diagnosis and importance of compliance with the treatment plan as well as documenting on the day of the visit. Carlos Stein, was evaluated through a synchronous (real-time) audio-video encounter. The patient (or guardian if applicable) is aware that this is a billable service. Verbal consent to proceed has been obtained within the past 12 months. The visit was conducted pursuant to the emergency declaration under the [address] [place] [address] and the [employer] and [employer] Act. Patient identification was verified, and a caregiver was present when appropriate. The patient was located in a state where the provider was credentialed to provide care. An electronic signature was used to authenticate this note.     --NAVEEN Storm

## 2021-09-30 NOTE — TELEPHONE ENCOUNTER
WM called, neb solution was sent in as one large multi use vial. Needs a new rx, they can not take a verbal due to it being medicare.

## 2021-10-22 ENCOUNTER — OFFICE VISIT (OUTPATIENT)
Dept: PRIMARY CARE CLINIC | Age: 76
End: 2021-10-22
Payer: MEDICARE

## 2021-10-22 VITALS
HEIGHT: 61 IN | TEMPERATURE: 96.9 F | DIASTOLIC BLOOD PRESSURE: 60 MMHG | OXYGEN SATURATION: 93 % | WEIGHT: 142 LBS | SYSTOLIC BLOOD PRESSURE: 90 MMHG | HEART RATE: 100 BPM | BODY MASS INDEX: 26.81 KG/M2

## 2021-10-22 DIAGNOSIS — G47.01 INSOMNIA DUE TO MEDICAL CONDITION: ICD-10-CM

## 2021-10-22 DIAGNOSIS — J44.9 CHRONIC OBSTRUCTIVE PULMONARY DISEASE, UNSPECIFIED COPD TYPE (HCC): Primary | ICD-10-CM

## 2021-10-22 DIAGNOSIS — Z23 NEED FOR INFLUENZA VACCINATION: ICD-10-CM

## 2021-10-22 DIAGNOSIS — F41.1 GAD (GENERALIZED ANXIETY DISORDER): ICD-10-CM

## 2021-10-22 DIAGNOSIS — R53.1 WEAKNESS: ICD-10-CM

## 2021-10-22 DIAGNOSIS — Z95.828 PORT-A-CATH IN PLACE: ICD-10-CM

## 2021-10-22 DIAGNOSIS — R53.1 GENERALIZED WEAKNESS: ICD-10-CM

## 2021-10-22 DIAGNOSIS — E55.9 VITAMIN D DEFICIENCY: ICD-10-CM

## 2021-10-22 LAB
ALBUMIN SERPL-MCNC: 3.9 G/DL (ref 3.5–5.2)
ALP BLD-CCNC: 72 U/L (ref 35–104)
ALT SERPL-CCNC: 9 U/L (ref 5–33)
ANION GAP SERPL CALCULATED.3IONS-SCNC: 6 MMOL/L (ref 7–19)
AST SERPL-CCNC: 15 U/L (ref 5–32)
BASOPHILS ABSOLUTE: 0 K/UL (ref 0–0.2)
BASOPHILS RELATIVE PERCENT: 0.4 % (ref 0–1)
BILIRUB SERPL-MCNC: <0.2 MG/DL (ref 0.2–1.2)
BUN BLDV-MCNC: 27 MG/DL (ref 8–23)
CALCIUM SERPL-MCNC: 9.4 MG/DL (ref 8.8–10.2)
CHLORIDE BLD-SCNC: 103 MMOL/L (ref 98–111)
CO2: 32 MMOL/L (ref 22–29)
CREAT SERPL-MCNC: 1 MG/DL (ref 0.5–0.9)
EOSINOPHILS ABSOLUTE: 0.2 K/UL (ref 0–0.6)
EOSINOPHILS RELATIVE PERCENT: 4.1 % (ref 0–5)
GFR AFRICAN AMERICAN: >59
GFR NON-AFRICAN AMERICAN: 54
GLUCOSE BLD-MCNC: 100 MG/DL (ref 74–109)
HCT VFR BLD CALC: 32.9 % (ref 37–47)
HEMOGLOBIN: 9.6 G/DL (ref 12–16)
IMMATURE GRANULOCYTES #: 0 K/UL
LYMPHOCYTES ABSOLUTE: 1.3 K/UL (ref 1.1–4.5)
LYMPHOCYTES RELATIVE PERCENT: 26.9 % (ref 20–40)
MCH RBC QN AUTO: 29 PG (ref 27–31)
MCHC RBC AUTO-ENTMCNC: 29.2 G/DL (ref 33–37)
MCV RBC AUTO: 99.4 FL (ref 81–99)
MONOCYTES ABSOLUTE: 0.4 K/UL (ref 0–0.9)
MONOCYTES RELATIVE PERCENT: 8.9 % (ref 0–10)
NEUTROPHILS ABSOLUTE: 2.9 K/UL (ref 1.5–7.5)
NEUTROPHILS RELATIVE PERCENT: 59.5 % (ref 50–65)
PDW BLD-RTO: 14.4 % (ref 11.5–14.5)
PLATELET # BLD: 140 K/UL (ref 130–400)
PMV BLD AUTO: 11.1 FL (ref 9.4–12.3)
POTASSIUM SERPL-SCNC: 4 MMOL/L (ref 3.5–5)
RBC # BLD: 3.31 M/UL (ref 4.2–5.4)
SODIUM BLD-SCNC: 141 MMOL/L (ref 136–145)
TOTAL PROTEIN: 6.5 G/DL (ref 6.6–8.7)
TSH SERPL DL<=0.05 MIU/L-ACNC: 3.52 UIU/ML (ref 0.27–4.2)
VITAMIN D 25-HYDROXY: 96.3 NG/ML
WBC # BLD: 4.8 K/UL (ref 4.8–10.8)

## 2021-10-22 PROCEDURE — G0008 ADMIN INFLUENZA VIRUS VAC: HCPCS | Performed by: NURSE PRACTITIONER

## 2021-10-22 PROCEDURE — G8399 PT W/DXA RESULTS DOCUMENT: HCPCS | Performed by: NURSE PRACTITIONER

## 2021-10-22 PROCEDURE — 90694 VACC AIIV4 NO PRSRV 0.5ML IM: CPT | Performed by: NURSE PRACTITIONER

## 2021-10-22 PROCEDURE — G8417 CALC BMI ABV UP PARAM F/U: HCPCS | Performed by: NURSE PRACTITIONER

## 2021-10-22 PROCEDURE — G8427 DOCREV CUR MEDS BY ELIG CLIN: HCPCS | Performed by: NURSE PRACTITIONER

## 2021-10-22 PROCEDURE — 3017F COLORECTAL CA SCREEN DOC REV: CPT | Performed by: NURSE PRACTITIONER

## 2021-10-22 PROCEDURE — 3023F SPIROM DOC REV: CPT | Performed by: NURSE PRACTITIONER

## 2021-10-22 PROCEDURE — 4040F PNEUMOC VAC/ADMIN/RCVD: CPT | Performed by: NURSE PRACTITIONER

## 2021-10-22 PROCEDURE — 1090F PRES/ABSN URINE INCON ASSESS: CPT | Performed by: NURSE PRACTITIONER

## 2021-10-22 PROCEDURE — G8484 FLU IMMUNIZE NO ADMIN: HCPCS | Performed by: NURSE PRACTITIONER

## 2021-10-22 PROCEDURE — G8926 SPIRO NO PERF OR DOC: HCPCS | Performed by: NURSE PRACTITIONER

## 2021-10-22 PROCEDURE — 4004F PT TOBACCO SCREEN RCVD TLK: CPT | Performed by: NURSE PRACTITIONER

## 2021-10-22 PROCEDURE — 99214 OFFICE O/P EST MOD 30 MIN: CPT | Performed by: NURSE PRACTITIONER

## 2021-10-22 PROCEDURE — 1123F ACP DISCUSS/DSCN MKR DOCD: CPT | Performed by: NURSE PRACTITIONER

## 2021-10-22 ASSESSMENT — ENCOUNTER SYMPTOMS
WHEEZING: 1
NAUSEA: 0
CONSTIPATION: 1
COUGH: 1
SHORTNESS OF BREATH: 1
RHINORRHEA: 0
SORE THROAT: 0
VOMITING: 0
EYE REDNESS: 0
DIARRHEA: 0

## 2021-10-22 NOTE — PROGRESS NOTES
After obtaining consent, and per orders of FAITH HODGE, injection of FLU given in Left arm  by Naz Mackey. Patient tolerated well. Medication was not supplied by patient. Vaccine Information Sheet, \"Influenza - Inactivated\"  given to Iris March, or parent/legal guardian of  Iris March and verbalized understanding. Patient responses:    Have you ever had a reaction to a flu vaccine? No  Are you able to eat eggs without adverse effects? Yes  Do you have any current illness? No  Have you ever had [place]illian [place] Syndrome? No    Flu vaccine given per order. Please see immunization tab.

## 2021-10-22 NOTE — PROGRESS NOTES
Shruthi Aguilera (:  1945) is a 77 y.o. female,Established patient, here for evaluation of the following chief complaint(s):  Follow-up and Immunizations (flu)      ASSESSMENT/PLAN:    ICD-10-CM    1. Chronic obstructive pulmonary disease, unspecified COPD type (HCC)  J44.9  Continue Anoro daily  Continue albuterol as needed  Wear oxygen nightly and when needed   2. HAILEY (generalized anxiety disorder)  F41.1  Stable  Continue Cymbalta 60 mg daily  Continue Klonopin as needed   3. Insomnia due to medical condition  G47.01  Acutely worse but discussed the importance of switching her days and nights back to a natural sleep schedule of sleeping during the night and being awake during the day. Continue trazodone and Klonopin as needed nightly   4.      5. Weakness       Port in place R53.1  Lab work was checked today further recommendations pending these results  Mediport flush today     Flu shot was given today. procedure note:    Accessed mediport in sterile fashion with gripper needle without anesthesia. The port was flushed with normal saline and flushed easily. Blood was drawn from the port and labs were obtained. The port was then flushed with heparin and the port was de-accessed. No blood loss  No complication  No specimen  She tolerated the procedure well without incident. Return in about 1 month (around 2021), or if symptoms worsen or fail to improve. SUBJECTIVE/OBJECTIVE:  HPI    COPD:  \"I am doing okay. \"  Reports a mild cough  \"I am not too short of breath. \"  Denies a fever  She is taking Anoro daily and albuterol prn. She continues to smoke <1ppd    Denies dysuria  Denies N, V or D.    INSOMNIA:  She has not been sleeping good. She is taking Trazodone 150 mg and Klonopin 0.5 mg nightly. She is sleeping more during the day than she is at night. \"She is up and down all through the night,\" per patient's daughter. CONSTIPATION:  Diarrhea has resolved.   Her last BM was yesterday. She is having some more constipation now. BP 90/60   Pulse 100   Temp 96.9 °F (36.1 °C) (Temporal)   Ht 5' 1\" (1.549 m)   Wt 142 lb (64.4 kg)   SpO2 93%   BMI 26.83 kg/m²     Review of Systems   Constitutional: Negative for chills, fatigue and fever. HENT: Negative for congestion, ear pain, rhinorrhea and sore throat. Eyes: Negative for redness. Respiratory: Positive for cough, shortness of breath and wheezing. Cardiovascular: Negative for chest pain. Gastrointestinal: Positive for constipation. Negative for diarrhea, nausea and vomiting. Genitourinary: Negative for dysuria. Skin: Negative for rash. Neurological: Positive for weakness. Negative for dizziness and headaches. Psychiatric/Behavioral: Positive for sleep disturbance (She is not sleeping good, per patient's daughter). Physical Exam  Vitals reviewed. Constitutional:       Appearance: Normal appearance. She is well-developed. Comments: She is in a wheelchair   HENT:      Head: Normocephalic. Right Ear: Tympanic membrane, ear canal and external ear normal.      Left Ear: Tympanic membrane, ear canal and external ear normal.      Nose: Nose normal.   Eyes:      General:         Right eye: No discharge. Left eye: No discharge. Cardiovascular:      Rate and Rhythm: Normal rate and regular rhythm. Pulmonary:      Effort: Pulmonary effort is normal.      Breath sounds: Examination of the right-lower field reveals decreased breath sounds. Examination of the left-lower field reveals decreased breath sounds. Decreased breath sounds and rhonchi present. No rales. Chest:       Abdominal:      General: Bowel sounds are normal.      Palpations: Abdomen is soft. Musculoskeletal:      Cervical back: Normal range of motion. Skin:     General: Skin is dry. Neurological:      General: No focal deficit present. Mental Status: She is alert and oriented to person, place, and time.  Mental status is at baseline. Psychiatric:         Mood and Affect: Mood normal.         Behavior: Behavior normal.         Thought Content: Thought content normal.         Judgment: Judgment normal.                 An electronic signature was used to authenticate this note.     --NAVEEN Nielsen

## 2021-10-25 ENCOUNTER — TELEPHONE (OUTPATIENT)
Dept: PRIMARY CARE CLINIC | Age: 76
End: 2021-10-25

## 2021-10-25 RX ORDER — DENOSUMAB 60 MG/ML
60 INJECTION SUBCUTANEOUS ONCE
Qty: 1 ML | Refills: 0 | Status: SHIPPED | OUTPATIENT
Start: 2021-10-25 | End: 2022-10-25

## 2021-10-25 NOTE — TELEPHONE ENCOUNTER
----- Message from NAVEEN Mario sent at 10/25/2021 10:57 AM CDT -----  Vitamin D: normal. Okay to stop weekly Vitamin D. Recommend Vitamin D 2000 units daily OTC  Thyroid is WNL  CMP: WNL, except pt is mildly dehydrate. Recommend increase water  CBC: WBC is normal. H&H is low but stable from previous blood draws.  This is consistent with anemia of chronic disease

## 2021-10-25 NOTE — TELEPHONE ENCOUNTER
Received fax from pharmacy requesting refill on pts medication(s). Pt was last seen in office on 10/22/2021  and has a follow up scheduled for 11/23/2021. Will send request to  [facility]  for authorization.      Requested Prescriptions     Pending Prescriptions Disp Refills    denosumab (PROLIA) 60 MG/ML SOSY SC injection [Pharmacy Med Name: Amicorrine Bloch 60 MG/ML SYRINGE 60 Solution Prefilled Syringe] 1 mL 0     Sig: Inject 1 mL into the skin once for 1 dose

## 2021-11-10 ENCOUNTER — TELEPHONE (OUTPATIENT)
Dept: PRIMARY CARE CLINIC | Age: 76
End: 2021-11-10

## 2021-11-10 NOTE — TELEPHONE ENCOUNTER
pts daughter called, pt felt weak and tired earlier. But her oxygen was a little low. She was a little nauseated, but took nausea med and is better.

## 2021-11-11 ENCOUNTER — HOSPITAL ENCOUNTER (EMERGENCY)
Age: 76
Discharge: HOME | End: 2021-11-11
Payer: MEDICARE

## 2021-11-11 ENCOUNTER — APPOINTMENT (OUTPATIENT)
Dept: GENERAL RADIOLOGY | Age: 76
End: 2021-11-11
Payer: MEDICARE

## 2021-11-11 ENCOUNTER — NURSE TRIAGE (OUTPATIENT)
Dept: OTHER | Facility: CLINIC | Age: 76
End: 2021-11-11

## 2021-11-11 ENCOUNTER — APPOINTMENT (OUTPATIENT)
Dept: CT IMAGING | Age: 76
End: 2021-11-11
Payer: MEDICARE

## 2021-11-11 VITALS
HEART RATE: 63 BPM | SYSTOLIC BLOOD PRESSURE: 114 MMHG | DIASTOLIC BLOOD PRESSURE: 72 MMHG | TEMPERATURE: 98.1 F | RESPIRATION RATE: 16 BRPM | OXYGEN SATURATION: 96 %

## 2021-11-11 DIAGNOSIS — R09.1 PLEURISY: ICD-10-CM

## 2021-11-11 DIAGNOSIS — J44.1 COPD EXACERBATION (HCC): Primary | ICD-10-CM

## 2021-11-11 LAB
ALBUMIN SERPL-MCNC: 3.9 G/DL (ref 3.5–5.2)
ALP BLD-CCNC: 71 U/L (ref 35–104)
ALT SERPL-CCNC: 8 U/L (ref 5–33)
ANION GAP SERPL CALCULATED.3IONS-SCNC: 10 MMOL/L (ref 7–19)
ANISOCYTOSIS: ABNORMAL
AST SERPL-CCNC: 13 U/L (ref 5–32)
BASOPHILS ABSOLUTE: 0 K/UL (ref 0–0.2)
BASOPHILS RELATIVE PERCENT: 0.7 % (ref 0–1)
BILIRUB SERPL-MCNC: <0.2 MG/DL (ref 0.2–1.2)
BUN BLDV-MCNC: 32 MG/DL (ref 8–23)
CALCIUM SERPL-MCNC: 9.6 MG/DL (ref 8.8–10.2)
CHLORIDE BLD-SCNC: 99 MMOL/L (ref 98–111)
CO2: 27 MMOL/L (ref 22–29)
CREAT SERPL-MCNC: 1.1 MG/DL (ref 0.5–0.9)
EOSINOPHILS ABSOLUTE: 0.2 K/UL (ref 0–0.6)
EOSINOPHILS RELATIVE PERCENT: 3 % (ref 0–5)
GFR AFRICAN AMERICAN: 58
GFR NON-AFRICAN AMERICAN: 48
GLUCOSE BLD-MCNC: 90 MG/DL (ref 74–109)
HCT VFR BLD CALC: 33.9 % (ref 37–47)
HEMOGLOBIN: 9.7 G/DL (ref 12–16)
HYPOCHROMIA: ABNORMAL
IMMATURE GRANULOCYTES #: 0 K/UL
LYMPHOCYTES ABSOLUTE: 1.3 K/UL (ref 1.1–4.5)
LYMPHOCYTES RELATIVE PERCENT: 23.5 % (ref 20–40)
MCH RBC QN AUTO: 29 PG (ref 27–31)
MCHC RBC AUTO-ENTMCNC: 28.6 G/DL (ref 33–37)
MCV RBC AUTO: 101.2 FL (ref 81–99)
MONOCYTES ABSOLUTE: 0.5 K/UL (ref 0–0.9)
MONOCYTES RELATIVE PERCENT: 8 % (ref 0–10)
NEUTROPHILS ABSOLUTE: 3.6 K/UL (ref 1.5–7.5)
NEUTROPHILS RELATIVE PERCENT: 64.4 % (ref 50–65)
PDW BLD-RTO: 14.8 % (ref 11.5–14.5)
PLATELET # BLD: 168 K/UL (ref 130–400)
PMV BLD AUTO: 10.2 FL (ref 9.4–12.3)
POLYCHROMASIA: ABNORMAL
POTASSIUM REFLEX MAGNESIUM: 4.8 MMOL/L (ref 3.5–5)
PRO-BNP: 108 PG/ML (ref 0–1800)
RBC # BLD: 3.35 M/UL (ref 4.2–5.4)
SODIUM BLD-SCNC: 136 MMOL/L (ref 136–145)
TOTAL PROTEIN: 6.4 G/DL (ref 6.6–8.7)
TROPONIN: <0.01 NG/ML (ref 0–0.03)
WBC # BLD: 5.6 K/UL (ref 4.8–10.8)

## 2021-11-11 PROCEDURE — 71045 X-RAY EXAM CHEST 1 VIEW: CPT

## 2021-11-11 PROCEDURE — 85025 COMPLETE CBC W/AUTO DIFF WBC: CPT

## 2021-11-11 PROCEDURE — 6360000004 HC RX CONTRAST MEDICATION: Performed by: PHYSICIAN ASSISTANT

## 2021-11-11 PROCEDURE — 99283 EMERGENCY DEPT VISIT LOW MDM: CPT

## 2021-11-11 PROCEDURE — 71275 CT ANGIOGRAPHY CHEST: CPT

## 2021-11-11 PROCEDURE — 80053 COMPREHEN METABOLIC PANEL: CPT

## 2021-11-11 PROCEDURE — 6360000002 HC RX W HCPCS

## 2021-11-11 PROCEDURE — 36415 COLL VENOUS BLD VENIPUNCTURE: CPT

## 2021-11-11 PROCEDURE — 84484 ASSAY OF TROPONIN QUANT: CPT

## 2021-11-11 PROCEDURE — 93005 ELECTROCARDIOGRAM TRACING: CPT | Performed by: PHYSICIAN ASSISTANT

## 2021-11-11 PROCEDURE — 83880 ASSAY OF NATRIURETIC PEPTIDE: CPT

## 2021-11-11 RX ORDER — HEPARIN SODIUM (PORCINE) LOCK FLUSH IV SOLN 100 UNIT/ML 100 UNIT/ML
SOLUTION INTRAVENOUS
Status: COMPLETED
Start: 2021-11-11 | End: 2021-11-11

## 2021-11-11 RX ORDER — PREDNISONE 10 MG/1
10 TABLET ORAL DAILY
Qty: 10 TABLET | Refills: 0 | Status: SHIPPED | OUTPATIENT
Start: 2021-11-11 | End: 2021-11-21

## 2021-11-11 RX ORDER — HEPARIN SODIUM (PORCINE) LOCK FLUSH IV SOLN 100 UNIT/ML 100 UNIT/ML
300 SOLUTION INTRAVENOUS ONCE
Status: DISCONTINUED | OUTPATIENT
Start: 2021-11-11 | End: 2021-11-11 | Stop reason: CLARIF

## 2021-11-11 RX ORDER — HEPARIN SODIUM (PORCINE) LOCK FLUSH IV SOLN 100 UNIT/ML 100 UNIT/ML
300 SOLUTION INTRAVENOUS ONCE
Status: COMPLETED | OUTPATIENT
Start: 2021-11-11 | End: 2021-11-11

## 2021-11-11 RX ADMIN — IOPAMIDOL 90 ML: 755 INJECTION, SOLUTION INTRAVENOUS at 18:38

## 2021-11-11 RX ADMIN — HEPARIN 300 UNITS: 100 SYRINGE at 20:22

## 2021-11-11 RX ADMIN — HEPARIN SODIUM (PORCINE) LOCK FLUSH IV SOLN 100 UNIT/ML 300 UNITS: 100 SOLUTION at 20:22

## 2021-11-11 ASSESSMENT — ENCOUNTER SYMPTOMS
NAUSEA: 0
COLOR CHANGE: 0
EYE PAIN: 0
BACK PAIN: 0
PHOTOPHOBIA: 0
ABDOMINAL DISTENTION: 0
RHINORRHEA: 0
EYE DISCHARGE: 0
COUGH: 1
SHORTNESS OF BREATH: 1
APNEA: 0
ABDOMINAL PAIN: 0
SORE THROAT: 0

## 2021-11-11 NOTE — TELEPHONE ENCOUNTER
Received call from [address] at [facility] with Red Flag Complaint. Brief description of triage: Chest pain states not due to heart but congestion in chest, has COPD, chronic bronchitis, history of MI,  nasal congestion, coughing, denies fever SP02 came up ot 95% from 78% once she put out her cigarette and put her oxygen back on . Triage indicates for patient to the ED     Care advice provided, patient verbalizes understanding; denies any other questions or concerns; instructed to call back for any new or worsening symptoms. Attention Provider: Thank you for allowing me to participate in the care of your patient. The patient was connected to triage in response to information provided to the [facility]/PSC. Please do not respond through this encounter as the response is not directed to a shared pool. Reason for Disposition   MODERATE difficulty breathing (e.g., speaks in phrases, SOB even at rest, pulse 100-120) of new onset or worse than normal    Answer Assessment - Initial Assessment Questions  1. RESPIRATORY STATUS: \"Describe your breathing? \" (e.g., wheezing, shortness of breath, unable to speak, severe coughing)       Sob, states oxygen level is 78%, but not wearing her normal 4 Liters NC, having daughter put her back on her oxygen now. 2. ONSET: \"When did this breathing problem begin? \"       Has COPD, and chronic bronchitis, has sob all the time, this is worse, is a current smoker     3. PATTERN \"Does the difficult breathing come and go, or has it been constant since it started? \"       Constant     4. SEVERITY: \"How bad is your breathing? \" (e.g., mild, moderate, severe)     - MILD: No SOB at rest, mild SOB with walking, speaks normally in sentences, can lay down, no retractions, pulse < 100.     - MODERATE: SOB at rest, SOB with minimal exertion and prefers to sit, cannot lie down flat, speaks in phrases, mild retractions, audible wheezing, pulse 100-120.     - SEVERE: Very SOB at rest, speaks in single words, struggling to breathe, sitting hunched forward, retractions, pulse > 120       Severe     5. RECURRENT SYMPTOM: \"Have you had difficulty breathing before? \" If so, ask: \"When was the last time? \" and \"What happened that time? \"       Yes, chronic but worsening     6. CARDIAC HISTORY: \"Do you have any history of heart disease? \" (e.g., heart attack, angina, bypass surgery, angioplasty)       MI    7. LUNG HISTORY: \"Do you have any history of lung disease? \"  (e.g., pulmonary embolus, asthma, emphysema)      COPD     8. CAUSE: \"What do you think is causing the breathing problem? \"       Not sure     9. OTHER SYMPTOMS: \"Do you have any other symptoms? (e.g., dizziness, runny nose, cough, chest pain, fever)      Runny nose, chest pain     10. PREGNANCY: \"Is there any chance you are pregnant? \" \"When was your last menstrual period? \"        N/a    11. TRAVEL: \"Have you traveled out of the country in the last month? \" (e.g., travel history, exposures)        Denies    Protocols used: BREATHING DIFFICULTY-ADULT-[place]

## 2021-11-11 NOTE — ED PROVIDER NOTES
[facility] EMERGENCY DEPT  eMERGENCYdEPARTMENT eNCOUnter      Pt Name: Lai Hooper  MRN: [id number]  [place] 1945  Date of evaluation: 11/11/2021  Provider:BREANN Valdes    CHIEF COMPLAINT       Chief Complaint   Patient presents with    Shortness of Breath     pt presents to ED with c/o shortness of breath and pain with breathing,          HISTORY OF PRESENT ILLNESS  (Location/Symptom, Timing/Onset, Context/Setting, Quality, Duration, Modifying Factors, Severity.)   Lai Hooper is a 77 y.o. female who presents to the emergency department with complaints of SOA pain with inspiration acute onset today. This sternal pain that is reproduced with palpation. Multiple commorbidities. She wears nasal cannula baseline 4L COPD CHF hx no ascites or swelling in legs. 90% appropriate for COPD status. Baseline chest congestion and productive cough. Here with her daughter good historian. Last cardiac work up/ echo 10/28/20 with normal findings ef 65-70%    HPI    Nursing Notes were reviewed and I agree. REVIEW OF SYSTEMS    (2-9 systems for level 4, 10 or more for level 5)     Review of Systems   Constitutional: Negative for activity change, appetite change, chills and fever. HENT: Negative for congestion, postnasal drip, rhinorrhea and sore throat. Eyes: Negative for photophobia, pain, discharge and visual disturbance. Respiratory: Positive for cough and shortness of breath. Negative for apnea. Cardiovascular: Negative for chest pain and leg swelling. Gastrointestinal: Negative for abdominal distention, abdominal pain and nausea. Genitourinary: Negative for vaginal bleeding. Musculoskeletal: Negative for arthralgias, back pain, joint swelling, neck pain and neck stiffness. Skin: Negative for color change and rash. Neurological: Negative for dizziness, syncope, facial asymmetry and headaches. Hematological: Negative for adenopathy. Does not bruise/bleed easily.    Psychiatric/Behavioral: Negative for agitation, behavioral problems and confusion. Except as noted above the remainder of the review of systems was reviewed and negative. PAST MEDICAL HISTORY     Past Medical History:   Diagnosis Date    Abdominal pain     Anxiety     Arthritis     CAD in native artery     Cerebrovascular disease     CHF (congestive heart failure) (HCC)     Constipation     COPD (chronic obstructive pulmonary disease) (HCC)     Depression     Emphysema     GERD (gastroesophageal reflux disease)     Myocardial infarct, old     Palliative care patient 02/20/2020    Pneumonia     Tobacco abuse          SURGICAL HISTORY       Past Surgical History:   Procedure Laterality Date    ABDOMINAL EXPLORATION SURGERY      APPENDECTOMY      CARDIAC CATHETERIZATION  06/25/10    patent  stent noted in LAD,EF is estimated to be 60%    CARDIAC CATHETERIZATION  7/13/12  MDL    EF  60%    CHOLECYSTECTOMY      COLONOSCOPY  10/2014    Dr Finn Coombs UPPER GASTROINTESTINAL ENDOSCOPY  10/1/14    Dr Mariama Griffiths: food bezoar; esophagitis, gastritis, duodenitis; biopsies neg h-pylori         CURRENT MEDICATIONS       Discharge Medication List as of 11/11/2021  8:14 PM      CONTINUE these medications which have NOT CHANGED    Details   denosumab (PROLIA) 60 MG/ML SOSY SC injection Inject 1 mL into the skin once for 1 dose, Disp-1 mL, R-0Normal      albuterol (PROVENTIL) (5 MG/ML) 0.5% nebulizer solution Take 0.5 mLs by nebulization every 6 hours as needed for Wheezing, Disp-120 each, R-3Normal      albuterol (ACCUNEB) 1.25 MG/3ML nebulizer solution Inhale 3 mLs into the lungs every 6 hours as needed for Wheezing, Disp-360 mL, R-3Normal      vitamin D (ERGOCALCIFEROL) 1.25 MG (27614 UT) CAPS capsule Take 1 capsule by mouth once a week, Disp-4 capsule, R-5Normal      clonazePAM (KLONOPIN) 0.5 MG tablet Take 1 tablet by mouth 3 times daily as needed for Anxiety for up to 30 days. , 1 tablet by mouth every 6 hours as needed for Pain. Pain mgmt at [address] 2 L into the lungs daily, Disp-1 each, R-11Print             ALLERGIES     Codeine and Sulfa antibiotics    FAMILY HISTORY       Family History   Problem Relation Age of Onset    Stroke Brother     Stroke Sister     Cancer Brother     Cancer Sister     Diabetes Brother     Diabetes Sister     Coronary Art Dis Other     Hypertension Other     Seizures Child     Colon Cancer Neg Hx     Colon Polyps Neg Hx     Esophageal Cancer Neg Hx     Liver Cancer Neg Hx     Liver Disease Neg Hx     Rectal Cancer Neg Hx     Stomach Cancer Neg Hx           SOCIAL HISTORY       Social History     Socioeconomic History    Marital status:      Spouse name: Not on file    Number of children: Not on file    Years of education: Not on file    Highest education level: Not on file   Occupational History    Not on file   Tobacco Use    Smoking status: Current Every Day Smoker     Packs/day: 0.25     Years: 36.00     Pack years: 9.00     Types: Cigarettes     Start date: 1978    Smokeless tobacco: Never Used   Substance and Sexual Activity    Alcohol use: No     Alcohol/week: 0.0 standard drinks    Drug use: No    Sexual activity: Not on file   Other Topics Concern    Not on file   Social History Narrative    Not on file     Social Determinants of Health     Financial Resource Strain:     Difficulty of Paying Living Expenses: Not on file   Food Insecurity:     Worried About Running Out of Food in the Last Year: Not on file    Aaliyah of Food in the Last Year: Not on file   Transportation Needs:     Lack of Transportation (Medical): Not on file    Lack of Transportation (Non-Medical):  Not on file   Physical Activity:     Days of Exercise per Week: Not on file    Minutes of Exercise per Session: Not on file   Stress:     Feeling of Stress : Not on file   Social Connections:     Frequency of Communication with Friends and Family: Not on file    Frequency of Social Gatherings with Friends and Family: Not on file    Attends Yarsani Services: Not on file    Active Member of Clubs or Organizations: Not on file    Attends Club or Organization Meetings: Not on file    Marital Status: Not on file   Intimate Partner Violence:     Fear of Current or Ex-Partner: Not on file    Emotionally Abused: Not on file    Physically Abused: Not on file    Sexually Abused: Not on file   Housing Stability:     Unable to Pay for Housing in the Last Year: Not on file    Number of [place] in the Last Year: Not on file    Unstable Housing in the Last Year: Not on file       SCREENINGS           PHYSICAL EXAM    (up to 7 forlevel 4, 8 or more for level 5)     ED Triage Vitals   BP Temp Temp src Pulse Resp SpO2 Height Weight   11/11/21 1507 11/11/21 1507 -- 11/11/21 1507 11/11/21 1507 11/11/21 1501 -- --   130/88 98.1 °F (36.7 °C)  76 16 99 %         Physical Exam  Vitals and nursing note reviewed. Constitutional:       General: She is not in acute distress. Appearance: She is well-developed. She is not diaphoretic. HENT:      Head: Normocephalic and atraumatic. Right Ear: External ear normal.      Left Ear: External ear normal.      Mouth/Throat:      Pharynx: No oropharyngeal exudate. Eyes:      General:         Right eye: No discharge. Left eye: No discharge. Pupils: Pupils are equal, round, and reactive to light. Neck:      Thyroid: No thyromegaly. Cardiovascular:      Rate and Rhythm: Normal rate and regular rhythm. Heart sounds: Normal heart sounds. No murmur heard. No friction rub. Pulmonary:      Effort: Pulmonary effort is normal. No respiratory distress. Breath sounds: No stridor. Examination of the right-upper field reveals wheezing. Examination of the left-upper field reveals wheezing. Wheezing present.    Abdominal:      General: Bowel sounds are normal. GFR  58 (*)     Total Protein 6.4 (*)     All other components within normal limits   TROPONIN   BRAIN NATRIURETIC PEPTIDE       All other labs were within normal range or notreturned as of this dictation. RE-ASSESSMENT        EMERGENCY DEPARTMENT COURSE and DIFFERENTIAL DIAGNOSIS/MDM:   Vitals:    Vitals:    11/11/21 1501 11/11/21 1507 11/11/21 1804   BP:  130/88 114/72   Pulse:  76 63   Resp:  16    Temp:  98.1 °F (36.7 °C)    SpO2: 99% 90% 96%       MDM  Patient remains medically stable here on her baseline nasal cannula liter. She is in no distress at this time normal cardiac enzymes and normal sinus rhythm EKG we did do a CTA to confirm no PE based on a acute onset of pleurisy it seems sternal and tender I felt that it was indicated to rule out a PE and nothing is appreciated on the CTA today her sternum is tender to my exam but also hurts to breathe and deep. No obvious findings on her chest x-ray for consolidation is consistent with chronic emphysema. Plan will be for pain discomfort treatment close follow with her pulmonologist at this time we did offer observation which patient and daughter are declining I do not strongly feel that this is someone that needs a acute cardiac work-up my attending is in agreement to this plan of care we are going to work on discharge at this time. PROCEDURES:    Procedures      FINAL IMPRESSION      1. COPD exacerbation ([address]    2.  Pleurisy          DISPOSITION/PLAN   DISPOSITION Decision To Discharge 11/11/2021 07:39:10 PM      PATIENT REFERRED TO:  [address] [place] [address] EMERGENCY DEPT  [facility]  [phone]    If symptoms worsen    [facility], [address] [facility]  [phone]    Call         DISCHARGE MEDICATIONS:  Discharge Medication List as of 11/11/2021  8:14 PM      START taking these medications    Details   predniSONE (DELTASONE) 10 MG tablet Take 1 tablet by mouth daily for 10 days, Disp-10 tablet, R-0Normal             (Please note that portions of this note were completed with a voice recognition program.  Efforts were made to edit the dictations but occasionallywords are mis-transcribed.)    Jameson Kc [address] [place] [address]  11/12/21 [id number]

## 2021-11-12 LAB
EKG P AXIS: 43 DEGREES
EKG P-R INTERVAL: 160 MS
EKG Q-T INTERVAL: 424 MS
EKG QRS DURATION: 90 MS
EKG QTC CALCULATION (BAZETT): 429 MS
EKG T AXIS: 63 DEGREES

## 2021-11-12 PROCEDURE — 93010 ELECTROCARDIOGRAM REPORT: CPT | Performed by: INTERNAL MEDICINE

## 2021-11-23 ENCOUNTER — VIRTUAL VISIT (OUTPATIENT)
Dept: PRIMARY CARE CLINIC | Age: 76
End: 2021-11-23
Payer: MEDICARE

## 2021-11-23 DIAGNOSIS — J44.9 CHRONIC OBSTRUCTIVE PULMONARY DISEASE, UNSPECIFIED COPD TYPE (HCC): Primary | ICD-10-CM

## 2021-11-23 DIAGNOSIS — F41.1 GAD (GENERALIZED ANXIETY DISORDER): ICD-10-CM

## 2021-11-23 DIAGNOSIS — G47.01 INSOMNIA DUE TO MEDICAL CONDITION: ICD-10-CM

## 2021-11-23 DIAGNOSIS — F32.9 REACTIVE DEPRESSION: ICD-10-CM

## 2021-11-23 PROCEDURE — 99214 OFFICE O/P EST MOD 30 MIN: CPT | Performed by: NURSE PRACTITIONER

## 2021-11-23 PROCEDURE — 4040F PNEUMOC VAC/ADMIN/RCVD: CPT | Performed by: NURSE PRACTITIONER

## 2021-11-23 PROCEDURE — 3017F COLORECTAL CA SCREEN DOC REV: CPT | Performed by: NURSE PRACTITIONER

## 2021-11-23 PROCEDURE — 1123F ACP DISCUSS/DSCN MKR DOCD: CPT | Performed by: NURSE PRACTITIONER

## 2021-11-23 PROCEDURE — 1090F PRES/ABSN URINE INCON ASSESS: CPT | Performed by: NURSE PRACTITIONER

## 2021-11-23 PROCEDURE — G8399 PT W/DXA RESULTS DOCUMENT: HCPCS | Performed by: NURSE PRACTITIONER

## 2021-11-23 PROCEDURE — G8428 CUR MEDS NOT DOCUMENT: HCPCS | Performed by: NURSE PRACTITIONER

## 2021-11-23 RX ORDER — DULOXETIN HYDROCHLORIDE 60 MG/1
60 CAPSULE, DELAYED RELEASE ORAL DAILY
Qty: 30 CAPSULE | Refills: 11 | Status: SHIPPED | OUTPATIENT
Start: 2021-11-23 | End: 2021-12-22 | Stop reason: SDUPTHER

## 2021-11-23 RX ORDER — CLONAZEPAM 0.5 MG/1
0.5 TABLET ORAL 3 TIMES DAILY PRN
Qty: 75 TABLET | Refills: 0 | Status: SHIPPED | OUTPATIENT
Start: 2021-11-23 | End: 2022-02-14 | Stop reason: SDUPTHER

## 2021-11-23 ASSESSMENT — ENCOUNTER SYMPTOMS
NAUSEA: 0
SHORTNESS OF BREATH: 1
EYE REDNESS: 0
VOMITING: 0
WHEEZING: 1
DIARRHEA: 0
SORE THROAT: 0
RHINORRHEA: 0
COUGH: 1

## 2021-11-23 NOTE — PROGRESS NOTES
Daralyn Aase (:  1945) is a 77 y.o. female,Established patient, here for evaluation of the following chief complaint(s): Anxiety, Insomnia, and COPD    DOXY. ME     ASSESSMENT/PLAN:  1. Chronic obstructive pulmonary disease, unspecified COPD type (HCC)--continue Anoro daily. Continue albuterol as needed. Continue oxygen. 2. HAILEY (generalized anxiety disorder)  -     clonazePAM (KLONOPIN) 0.5 MG tablet; Take 1 tablet by mouth 3 times daily as needed for Anxiety for up to 30 days. , Disp-75 tablet, R-0Normal  -     DULoxetine (CYMBALTA) 60 MG extended release capsule; Take 1 capsule by mouth daily, Disp-30 capsule, R-11Normal  3. Insomnia due to medical condition  -     clonazePAM (KLONOPIN) 0.5 MG tablet; Take 1 tablet by mouth 3 times daily as needed for Anxiety for up to 30 days. , Disp-75 tablet, R-0Normal  - Continue Trazodone  4. Reactive depression  -     DULoxetine (CYMBALTA) 60 MG extended release capsule; Take 1 capsule by mouth daily, Disp-30 capsule, R-11Normal  - Continue Wellbutrin      Return in about 1 month (around 2021), or if symptoms worsen or fail to improve. SUBJECTIVE/OBJECTIVE:  HPI     \"I am going to get my hair done. \"    COPD:  She was seen in the ED on 2021 for an acute COPD exacerbation. Chest X-ray:  1. Underlying lung emphysema. No superimposed acute process. She is breathing better now  She continues on Anoro daily and albuterol prn  She wears her oxygen when she lies down. On RA her oxygen has been running 89-90%  When she wears her oxygen, she wears 4L per nasal cannula    INSOMNIA:  She is taking Trazodone and Klonopin prn. \"If she does not sleep most of the day she sleeps good,\" per daughter  Last fill of Klonopin was 9-3-2021, #75  She typically takes one at bedtime. MOODS:  Her moods are stable  She continues on Wellbutrin 75 mg BID, Cymbalta 60 mg and Klonopin prn. She is requesting refill on both today.     Review of Systems   Constitutional: Negative for chills, fatigue and fever. HENT: Negative for congestion, ear pain, rhinorrhea and sore throat. Eyes: Negative for redness. Respiratory: Positive for cough (chronic), shortness of breath (chronic) and wheezing. Cardiovascular: Negative for chest pain. Gastrointestinal: Negative for diarrhea, nausea and vomiting. Genitourinary: Negative for dysuria. Skin: Negative for rash. Neurological: Positive for weakness. Negative for dizziness and headaches. Psychiatric/Behavioral: Positive for sleep disturbance (stable). The patient is nervous/anxious (stable on Cymbalta and Klonopin prn). Patient-Reported Vitals 9/30/2021   Patient-Reported Weight 133 lbs   Patient-Reported SpO2 78 % on RA        Physical Exam  Constitutional:       Appearance: Normal appearance. HENT:      Head: Normocephalic. Right Ear: External ear normal.      Left Ear: External ear normal.   Eyes:      General:         Right eye: No discharge. Left eye: No discharge. Pulmonary:      Effort: Pulmonary effort is normal.   Musculoskeletal:      Cervical back: Normal range of motion. Neurological:      General: No focal deficit present. Mental Status: She is alert and oriented to person, place, and time. Mental status is at baseline. Psychiatric:         Mood and Affect: Mood normal.         Behavior: Behavior normal.         Thought Content: Thought content normal.         Judgment: Judgment normal.            Jeny Mcguire, was evaluated through a synchronous (real-time) audio-video encounter. The patient (or guardian if applicable) is aware that this is a billable service. Verbal consent to proceed has been obtained within the past 12 months. The visit was conducted pursuant to the emergency declaration under the [address] [place] [address], [address] [place] [address] authority and the [employer] General Act.   Patient identification was verified, and a caregiver was present when appropriate. The patient was located in a state where the provider was credentialed to provide care. An electronic signature was used to authenticate this note.     --NAVEEN Franco

## 2021-11-30 DIAGNOSIS — N32.81 OVERACTIVE BLADDER: ICD-10-CM

## 2021-11-30 RX ORDER — FESOTERODINE FUMARATE 8 MG/1
1 TABLET, FILM COATED, EXTENDED RELEASE ORAL DAILY
Qty: 90 TABLET | Refills: 3 | Status: SHIPPED | OUTPATIENT
Start: 2021-11-30 | End: 2022-10-25 | Stop reason: SDUPTHER

## 2021-11-30 NOTE — TELEPHONE ENCOUNTER
Received call/My Chart Message from patient requesting refill on medication(s). Pt was last seen in office on 11/23/2021  and has a follow up scheduled for 12/23/2021. Will send request to provider for authorization.      Requested Prescriptions     Pending Prescriptions Disp Refills    Fesoterodine Fumarate ER (TOVIAZ) 8 MG TB24 90 tablet 3     Sig: Take 1 tablet by mouth daily

## 2021-12-16 ENCOUNTER — TELEPHONE (OUTPATIENT)
Dept: PRIMARY CARE CLINIC | Age: 76
End: 2021-12-16

## 2021-12-16 NOTE — TELEPHONE ENCOUNTER
----- Message from Arabimbolatonya Richard sent at 12/15/2021  4:40 PM CST -----  Subject: Appointment Request    Reason for Call: Routine Existing Condition Follow Up    QUESTIONS  Type of Appointment? Established Patient  Reason for appointment request? No appointments available during search  Additional Information for Provider? Pt needs to reschedule for follow up   and med refill she's scheduled for the 12/23 but she has another Appt for   that day, pt wants to be scheduled during the same week if possible. Call   back pt to confirm if appt can be rescheduled   ---------------------------------------------------------------------------  --------------  CALL BACK INFO  What is the best way for the office to contact you? OK to leave message on   voicemail  Preferred Call Back Phone Number? [phone]  ---------------------------------------------------------------------------  --------------  SCRIPT ANSWERS  Relationship to Patient? Self  Have your symptoms changed? No  Is this follow up request related to routine Diabetes Management? No  Have you been diagnosed with, awaiting test results for, or told that you   are suspected of having COVID-19 (Coronavirus)? (If patient has tested   negative or was tested as a requirement for work, school, or travel and   not based on symptoms, answer no)? No  Within the past two weeks have you developed any of the following symptoms   (answer no if symptoms have been present longer than 2 weeks or began   more than 2 weeks ago)? Fever or Chills, Cough, Shortness of breath or   difficulty breathing, Loss of taste or smell, Sore throat, Nasal   congestion, Sneezing or runny nose, Fatigue or generalized body aches   (answer no if pain is specific to a body part e.g. back pain), Diarrhea,   Headache? No  Have you had close contact with someone with COVID-19 in the last 14 days? No  (Service Expert  click yes below to proceed with [employer] As Usual   Scheduling)?  Yes

## 2021-12-20 ENCOUNTER — TELEPHONE (OUTPATIENT)
Dept: PRIMARY CARE CLINIC | Age: 76
End: 2021-12-20

## 2021-12-20 NOTE — TELEPHONE ENCOUNTER
pts daughter called, she hasnt felt well since Saturday. Oxygen level is fine, but has cough and yellow sputum.  No fever, sore throat, HA or ear ache

## 2021-12-21 NOTE — TELEPHONE ENCOUNTER
Please see if patient would be able to come in tomorrow afternoon with her daughter.     I can send in some cough medication but I would like to listen to her with her tobacco history and history of pneumonia

## 2021-12-22 ENCOUNTER — TELEPHONE (OUTPATIENT)
Dept: PRIMARY CARE CLINIC | Age: 76
End: 2021-12-22

## 2021-12-22 ENCOUNTER — VIRTUAL VISIT (OUTPATIENT)
Dept: PRIMARY CARE CLINIC | Age: 76
End: 2021-12-22
Payer: MEDICARE

## 2021-12-22 DIAGNOSIS — F32.9 REACTIVE DEPRESSION: ICD-10-CM

## 2021-12-22 DIAGNOSIS — F41.1 GAD (GENERALIZED ANXIETY DISORDER): ICD-10-CM

## 2021-12-22 DIAGNOSIS — J40 BRONCHITIS: Primary | ICD-10-CM

## 2021-12-22 PROCEDURE — G8427 DOCREV CUR MEDS BY ELIG CLIN: HCPCS | Performed by: NURSE PRACTITIONER

## 2021-12-22 PROCEDURE — 3017F COLORECTAL CA SCREEN DOC REV: CPT | Performed by: NURSE PRACTITIONER

## 2021-12-22 PROCEDURE — 99213 OFFICE O/P EST LOW 20 MIN: CPT | Performed by: NURSE PRACTITIONER

## 2021-12-22 PROCEDURE — G8399 PT W/DXA RESULTS DOCUMENT: HCPCS | Performed by: NURSE PRACTITIONER

## 2021-12-22 PROCEDURE — 4040F PNEUMOC VAC/ADMIN/RCVD: CPT | Performed by: NURSE PRACTITIONER

## 2021-12-22 PROCEDURE — 1123F ACP DISCUSS/DSCN MKR DOCD: CPT | Performed by: NURSE PRACTITIONER

## 2021-12-22 PROCEDURE — 1090F PRES/ABSN URINE INCON ASSESS: CPT | Performed by: NURSE PRACTITIONER

## 2021-12-22 RX ORDER — AZITHROMYCIN 250 MG/1
250 TABLET, FILM COATED ORAL SEE ADMIN INSTRUCTIONS
Qty: 6 TABLET | Refills: 0 | Status: SHIPPED | OUTPATIENT
Start: 2021-12-22 | End: 2021-12-27

## 2021-12-22 RX ORDER — METHYLPREDNISOLONE 4 MG/1
TABLET ORAL
Qty: 1 KIT | Refills: 0 | Status: CANCELLED | OUTPATIENT
Start: 2021-12-22 | End: 2021-12-28

## 2021-12-22 RX ORDER — NALOXONE HYDROCHLORIDE 4 MG/.1ML
SPRAY NASAL
COMMUNITY
Start: 2021-11-18 | End: 2022-09-29

## 2021-12-22 RX ORDER — METHYLPREDNISOLONE 4 MG/1
TABLET ORAL
Qty: 1 KIT | Refills: 0 | Status: SHIPPED | OUTPATIENT
Start: 2021-12-22 | End: 2021-12-28

## 2021-12-22 RX ORDER — DULOXETIN HYDROCHLORIDE 60 MG/1
60 CAPSULE, DELAYED RELEASE ORAL DAILY
Qty: 30 CAPSULE | Refills: 11 | Status: SHIPPED | OUTPATIENT
Start: 2021-12-22 | End: 2022-07-26

## 2021-12-22 RX ORDER — BUPROPION HYDROCHLORIDE 75 MG/1
75 TABLET ORAL 2 TIMES DAILY
Qty: 60 TABLET | Refills: 3 | Status: SHIPPED | OUTPATIENT
Start: 2021-12-22 | End: 2022-04-26

## 2021-12-22 RX ORDER — AZELASTINE HYDROCHLORIDE 0.5 MG/ML
SOLUTION/ DROPS OPHTHALMIC
COMMUNITY
Start: 2021-11-23 | End: 2022-03-25

## 2021-12-22 ASSESSMENT — ENCOUNTER SYMPTOMS
NAUSEA: 0
RHINORRHEA: 1
SORE THROAT: 0
SHORTNESS OF BREATH: 1
VOMITING: 0
DIARRHEA: 0
COUGH: 1

## 2021-12-22 NOTE — PROGRESS NOTES
Mark Brown (:  1945) is a 77 y.o. female,Established patient, here for evaluation of the following chief complaint(s):  Cough, Congestion, and Chills      ASSESSMENT/PLAN:    ICD-10-CM    1. Bronchitis  J40 azithromycin (ZITHROMAX) 250 MG tablet     methylPREDNISolone (MEDROL DOSEPACK) 4 MG tablet   2. HAILEY (generalized anxiety disorder)  F41.1 DULoxetine (CYMBALTA) 60 MG extended release capsule   3. Reactive depression  F32.9 DULoxetine (CYMBALTA) 60 MG extended release capsule     buPROPion (WELLBUTRIN) 75 MG tablet       Return if symptoms worsen or fail to improve. SUBJECTIVE/OBJECTIVE:  HPI     Reports nasal drainage and congestion. Reports chest congestion. She is coughing  Reports SOA. This is chronic. Denies a fever  Denies sore throat  Denies a headache  Denies N, V or D. Symptoms started about 4-5 days ago. Oxygen saturation: 81%  She has been off her oxygen  When she put her oxygen back on her oxygen saturation went up to 93%    There were no vitals taken for this visit. Review of Systems   Constitutional: Positive for chills. Negative for fever. HENT: Positive for congestion and rhinorrhea. Negative for sore throat. Respiratory: Positive for cough and shortness of breath (chronic). Gastrointestinal: Negative for diarrhea, nausea and vomiting. Neurological: Negative for headaches. Physical Exam  Constitutional:       Appearance: Normal appearance. She is normal weight. HENT:      Head: Normocephalic. Right Ear: External ear normal.      Left Ear: External ear normal.      Nose: Nose normal.   Eyes:      General:         Right eye: No discharge. Left eye: No discharge. Pulmonary:      Effort: Pulmonary effort is normal.   Musculoskeletal:         General: Normal range of motion. Cervical back: Normal range of motion. Neurological:      General: No focal deficit present.       Mental Status: She is alert and oriented to person, place, and time. Mental status is at baseline. Psychiatric:         Mood and Affect: Mood normal.         Behavior: Behavior normal.         Thought Content: Thought content normal.         Judgment: Judgment normal.             An electronic signature was used to authenticate this note.     --NAVEEN Holloway

## 2021-12-22 NOTE — TELEPHONE ENCOUNTER
Pts daughter called back and cancelled her appt for today. pts daughter doesn't feel like driving in either. They want to know if you can go ahead and send in meds.

## 2022-01-04 ENCOUNTER — TELEPHONE (OUTPATIENT)
Dept: PRIMARY CARE CLINIC | Age: 77
End: 2022-01-04

## 2022-01-04 NOTE — TELEPHONE ENCOUNTER
Pt still has to be seen. Can be seen virtually/telehealth to get meds. LM for daughter to make her aware of this.

## 2022-01-04 NOTE — TELEPHONE ENCOUNTER
----- Message from Theodora Valadez sent at 1/4/2022 12:24 PM CST -----  Subject: Message to Provider    QUESTIONS  Information for Provider? She had to cancel her appointment. she would   like medication refills  ---------------------------------------------------------------------------  --------------  CALL BACK INFO  What is the best way for the office to contact you? OK to leave message on   voicemail  Preferred Call Back Phone Number? [phone]  ---------------------------------------------------------------------------  --------------  SCRIPT ANSWERS  Relationship to Patient?  Self

## 2022-01-06 ENCOUNTER — VIRTUAL VISIT (OUTPATIENT)
Dept: PRIMARY CARE CLINIC | Age: 77
End: 2022-01-06
Payer: MEDICARE

## 2022-01-06 DIAGNOSIS — F41.1 GAD (GENERALIZED ANXIETY DISORDER): ICD-10-CM

## 2022-01-06 DIAGNOSIS — J44.9 CHRONIC OBSTRUCTIVE PULMONARY DISEASE, UNSPECIFIED COPD TYPE (HCC): Primary | ICD-10-CM

## 2022-01-06 DIAGNOSIS — G47.01 INSOMNIA DUE TO MEDICAL CONDITION: ICD-10-CM

## 2022-01-06 PROCEDURE — 99442 PR PHYS/QHP TELEPHONE EVALUATION 11-20 MIN: CPT | Performed by: NURSE PRACTITIONER

## 2022-01-06 ASSESSMENT — ENCOUNTER SYMPTOMS
COUGH: 1
SHORTNESS OF BREATH: 1

## 2022-01-06 NOTE — PROGRESS NOTES
Kahlil Eid (:  1945) is a 77 y.o. female,Established patient, here for evaluation of the following chief complaint(s): Medication Refill    TELEPHONE CALL       ASSESSMENT/PLAN:  1. Chronic obstructive pulmonary disease, unspecified COPD type (HCC)--recommend patient wear oxygen daily as prescribed  Continue Anoro daily and albuterol as needed  Smoking cessation recommended    2. HAILEY (generalized anxiety disorder)--continue Cymbalta 60 mg daily and Klonopin as needed    3. Insomnia due to medical condition--continue Klonopin as needed and trazodone 150 mg nightly      Return in about 1 month (around 2022), or if symptoms worsen or fail to improve. SUBJECTIVE/OBJECTIVE:  HPI     Patient's daughter was exposed to [employer]  Patient was not exposed to [employer]. Denies increased SOA  Denies a fever  Reports body aches. She was treated with antibiotics and steroids from   Overall her breathing is back to baseline. Patient does have COPD with chronic cough and shortness of breath. Patient is supposed to wear oxygen 24 hours a day    She continues to take Klonopin nightly. She is sleeping well. She does not a refill today. Review of Systems   Constitutional: Negative for fever. Respiratory: Positive for cough (chronic) and shortness of breath (chronic). Musculoskeletal: Positive for myalgias. Psychiatric/Behavioral: Positive for sleep disturbance (sleeping well with Klonopin. She does not need refill). Patient-Reported Vitals 2021   Patient-Reported Weight 133 lbs   Patient-Reported SpO2 78 % on RA        Physical Exam   N/A DUE TO TELE-HEALTH PHONE CALL      On this date 2022 I have spent 18 minutes reviewing previous notes, test results and face to face (virtual) with the patient discussing the diagnosis and importance of compliance with the treatment plan as well as documenting on the day of the visit.       Kahlil Eid, was evaluated through a synchronous (real-time) audio-video encounter. The patient (or guardian if applicable) is aware that this is a billable service. Verbal consent to proceed has been obtained within the past 12 months. The visit was conducted pursuant to the emergency declaration under the [facility], [address] [place] [address] authority and the [employer] General Act. Patient identification was verified, and a caregiver was present when appropriate. The patient was located in a state where the provider was credentialed to provide care. An electronic signature was used to authenticate this note.     --NAVEEN Jones

## 2022-01-25 ENCOUNTER — TELEPHONE (OUTPATIENT)
Dept: PRIMARY CARE CLINIC | Age: 77
End: 2022-01-25

## 2022-01-25 DIAGNOSIS — J44.9 CHRONIC OBSTRUCTIVE PULMONARY DISEASE, UNSPECIFIED COPD TYPE (HCC): ICD-10-CM

## 2022-01-25 DIAGNOSIS — R05.9 COUGH: Primary | ICD-10-CM

## 2022-01-25 DIAGNOSIS — R09.81 NASAL CONGESTION: ICD-10-CM

## 2022-01-25 RX ORDER — GUAIFENESIN 600 MG/1
600 TABLET, EXTENDED RELEASE ORAL 2 TIMES DAILY
Qty: 30 TABLET | Refills: 0 | Status: SHIPPED | OUTPATIENT
Start: 2022-01-25 | End: 2022-02-09

## 2022-01-25 RX ORDER — BENZONATATE 200 MG/1
200 CAPSULE ORAL 3 TIMES DAILY PRN
Qty: 30 CAPSULE | Refills: 0 | Status: SHIPPED | OUTPATIENT
Start: 2022-01-25 | End: 2022-02-01

## 2022-01-25 NOTE — TELEPHONE ENCOUNTER
Daughter notified  Requested Prescriptions     Signed Prescriptions Disp Refills    benzonatate (TESSALON) 200 MG capsule 30 capsule 0     Sig: Take 1 capsule by mouth 3 times daily as needed for Cough     Authorizing Provider: Rosa Choe     Ordering User: Aneta FATIMA    guaiFENesin (MUCINEX) 600 MG extended release tablet 30 tablet 0     Sig: Take 1 tablet by mouth 2 times daily for 15 days     Authorizing Provider: Rosa Choe     Ordering User: Josseline Hutchison

## 2022-01-31 ENCOUNTER — OFFICE VISIT (OUTPATIENT)
Dept: PRIMARY CARE CLINIC | Age: 77
End: 2022-01-31
Payer: MEDICARE

## 2022-01-31 VITALS
TEMPERATURE: 97.8 F | OXYGEN SATURATION: 85 % | HEART RATE: 110 BPM | SYSTOLIC BLOOD PRESSURE: 134 MMHG | DIASTOLIC BLOOD PRESSURE: 80 MMHG | WEIGHT: 130 LBS | BODY MASS INDEX: 24.56 KG/M2

## 2022-01-31 DIAGNOSIS — Z20.822 CLOSE EXPOSURE TO COVID-19 VIRUS: ICD-10-CM

## 2022-01-31 DIAGNOSIS — J44.9 CHRONIC OBSTRUCTIVE PULMONARY DISEASE, UNSPECIFIED COPD TYPE (HCC): Primary | ICD-10-CM

## 2022-01-31 LAB — SARS-COV-2, PCR: NOT DETECTED

## 2022-01-31 PROCEDURE — G8420 CALC BMI NORM PARAMETERS: HCPCS | Performed by: NURSE PRACTITIONER

## 2022-01-31 PROCEDURE — 4004F PT TOBACCO SCREEN RCVD TLK: CPT | Performed by: NURSE PRACTITIONER

## 2022-01-31 PROCEDURE — G8399 PT W/DXA RESULTS DOCUMENT: HCPCS | Performed by: NURSE PRACTITIONER

## 2022-01-31 PROCEDURE — G8427 DOCREV CUR MEDS BY ELIG CLIN: HCPCS | Performed by: NURSE PRACTITIONER

## 2022-01-31 PROCEDURE — 99213 OFFICE O/P EST LOW 20 MIN: CPT | Performed by: NURSE PRACTITIONER

## 2022-01-31 PROCEDURE — 3023F SPIROM DOC REV: CPT | Performed by: NURSE PRACTITIONER

## 2022-01-31 PROCEDURE — 1123F ACP DISCUSS/DSCN MKR DOCD: CPT | Performed by: NURSE PRACTITIONER

## 2022-01-31 PROCEDURE — 1090F PRES/ABSN URINE INCON ASSESS: CPT | Performed by: NURSE PRACTITIONER

## 2022-01-31 PROCEDURE — G8484 FLU IMMUNIZE NO ADMIN: HCPCS | Performed by: NURSE PRACTITIONER

## 2022-01-31 PROCEDURE — 3017F COLORECTAL CA SCREEN DOC REV: CPT | Performed by: NURSE PRACTITIONER

## 2022-01-31 PROCEDURE — 4040F PNEUMOC VAC/ADMIN/RCVD: CPT | Performed by: NURSE PRACTITIONER

## 2022-01-31 RX ORDER — CEFDINIR 300 MG/1
300 CAPSULE ORAL 2 TIMES DAILY
Qty: 20 CAPSULE | Refills: 0 | Status: SHIPPED | OUTPATIENT
Start: 2022-01-31 | End: 2022-02-10

## 2022-01-31 ASSESSMENT — ENCOUNTER SYMPTOMS
VOMITING: 0
RHINORRHEA: 1
COUGH: 1
TROUBLE SWALLOWING: 0
NAUSEA: 0
SINUS PRESSURE: 0
SORE THROAT: 0
CONSTIPATION: 0
DIARRHEA: 0
ABDOMINAL PAIN: 0
SHORTNESS OF BREATH: 0

## 2022-01-31 NOTE — PROGRESS NOTES
Dary Banuelos (:  1945) is a 77 y.o. female,Established patient, here for evaluation of the following chief complaint(s):  Cough (runny nose, covid exp. started 2 days ago. has not had booster. )      ASSESSMENT/PLAN:    ICD-10-CM    1. Chronic obstructive pulmonary disease, unspecified COPD type (HCC)  J44.9 cefdinir (OMNICEF) 300 MG capsule     COVID-19   2. Close exposure to COVID-19 virus  Z20.822 COVID-19    Increase fluids  Tylenol and/or motrin over the counter for fever or pain  Over the counter cough/cold medicine   Rest when able  If throat is sore, warm salt water rinses and/or throat lozenges  May take over the counter zinc, vitamin d and vitamin c to help assist immune system. Quarantine until results. Frequent deep breaths and stay up and active in home. Wear oxygen at all times and use nebulizer treatments as needed    Oxygen sat up to 92% on oxygen 4L NC. Quit smoking          Return if symptoms worsen or fail to improve. SUBJECTIVE/OBJECTIVE:  HPI  Here for cough, runny nose  Onset 2 days  She was exposed to covid. (2 Grandsons and daughter has covid)   She is normally on oxygen 4L. She is not on this here today. \"it is in the car\"   No fever. Pt states she isnt any more SOA than normal  She still smokes        /80   Pulse 110   Temp 97.8 °F (36.6 °C) (Temporal)   Wt 130 lb (59 kg)   SpO2 (!) 85%   BMI 24.56 kg/m²     Review of Systems   Constitutional: Negative for activity change, appetite change, fatigue, fever and unexpected weight change. HENT: Positive for rhinorrhea. Negative for congestion, hearing loss, sinus pressure, sore throat and trouble swallowing. Eyes: Negative for visual disturbance. Respiratory: Positive for cough. Negative for shortness of breath. Cardiovascular: Negative for chest pain, palpitations and leg swelling. Gastrointestinal: Negative for abdominal pain, constipation, diarrhea, nausea and vomiting.    Endocrine: Negative for cold intolerance and heat intolerance. Genitourinary: Negative for flank pain, menstrual problem, pelvic pain, urgency and vaginal discharge. Musculoskeletal: Negative for arthralgias. Skin: Negative for rash. Neurological: Negative for headaches. Psychiatric/Behavioral: Negative for dysphoric mood and sleep disturbance. The patient is not nervous/anxious. Physical Exam  Vitals reviewed. Constitutional:       Appearance: Normal appearance. HENT:      Head: Normocephalic and atraumatic. Right Ear: Tympanic membrane, ear canal and external ear normal.      Left Ear: Tympanic membrane, ear canal and external ear normal.      Nose: Nose normal.      Comments: masked     Mouth/Throat:      Mouth: Mucous membranes are moist.      Pharynx: Oropharynx is clear. Comments: masked  Eyes:      Conjunctiva/sclera: Conjunctivae normal.   Cardiovascular:      Rate and Rhythm: Normal rate and regular rhythm. Pulses: Normal pulses. Heart sounds: Normal heart sounds. Pulmonary:      Effort: Pulmonary effort is normal.      Breath sounds: Normal breath sounds. Decreased air movement present. Abdominal:      Palpations: Abdomen is soft. Musculoskeletal:      Cervical back: Normal range of motion and neck supple. Skin:     General: Skin is warm. Neurological:      Mental Status: She is alert and oriented to person, place, and time. An electronic signature was used to authenticate this note.     --NAVEEN Schultz

## 2022-01-31 NOTE — PATIENT INSTRUCTIONS
Preventing the Spread of Coronavirus Disease 2019 in Homes and Residential Communities  Interim Guidance      Prevention steps for people with confirmed or suspected COVID-19 (including persons under investigation) who do not need to be hospitalized and people with confirmed COVID-19 who were hospitalized and determined to be medically stable to go home    Your healthcare provider and public health staff will evaluate whether you can be cared for at home. If it is determined that you do not need to be hospitalized and can be isolated at home, you will be monitored by staff from your local or state health department. You should follow the prevention steps below until a healthcare provider or local or state health department says you can return to your normal activities. Stay home except to get medical care  People who are mildly ill with COVID-19 are able to isolate at home during their illness. You should restrict activities outside your home, except for getting medical care. Do not go to work, school, or public areas. Avoid using public transportation, ride-sharing, or taxis. Separate yourself from other people and animals in your home  People: As much as possible, you should stay in a specific room and away from other people in your home. Also, you should use a separate bathroom, if available. Animals: You should restrict contact with pets and other animals while you are sick with COVID-19, just like you would around other people. Although there have not been reports of pets or other animals becoming sick with COVID-19, it is still recommended that people sick with COVID-19 limit contact with animals until more information is known about the virus. When possible, have another member of your household care for your animals while you are sick. If you are sick with COVID-19, avoid contact with your pet, including petting, snuggling, being kissed or licked, and sharing food.  If you must care for your pet or be around animals while you are sick, wash your hands before and after you interact with pets and wear a facemask. See COVID-19 and Animals for more information. Call ahead before visiting your doctor  If you have a medical appointment, call the healthcare provider and tell them that you have or may have COVID-19. This will help the healthcare providers office take steps to keep other people from getting infected or exposed. Wear a facemask  You should wear a facemask when you are around other people (e.g., sharing a room or vehicle) or pets and before you enter a healthcare providers office. If you are not able to wear a facemask (for example, because it causes trouble breathing), then people who live with you should not stay in the same room with you, or they should wear a facemask if they enter your room. Cover your coughs and sneezes  Cover your mouth and nose with a tissue when you cough or sneeze. Throw used tissues in a lined trash can. Immediately wash your hands with soap and water for at least 20 seconds or, if soap and water are not available, clean your hands with an alcohol-based hand  that contains at least 60% alcohol. Clean your hands often  Wash your hands often with soap and water for at least 20 seconds, especially after blowing your nose, coughing, or sneezing; going to the bathroom; and before eating or preparing food. If soap and water are not readily available, use an alcohol-based hand  with at least 60% alcohol, covering all surfaces of your hands and rubbing them together until they feel dry. Soap and water are the best option if hands are visibly dirty. Avoid touching your eyes, nose, and mouth with unwashed hands. Avoid sharing personal household items  You should not share dishes, drinking glasses, cups, eating utensils, towels, or bedding with other people or pets in your home.  After using these items, they should be washed thoroughly with soap and water. Clean all high-touch surfaces everyday  High touch surfaces include counters, tabletops, doorknobs, bathroom fixtures, toilets, phones, keyboards, tablets, and bedside tables. Also, clean any surfaces that may have blood, stool, or body fluids on them. Use a household cleaning spray or wipe, according to the label instructions. Labels contain instructions for safe and effective use of the cleaning product including precautions you should take when applying the product, such as wearing gloves and making sure you have good ventilation during use of the product. Monitor your symptoms  Seek prompt medical attention if your illness is worsening (e.g., difficulty breathing). Before seeking care, call your healthcare provider and tell them that you have, or are being evaluated for, COVID-19. Put on a facemask before you enter the facility. These steps will help the healthcare providers office to keep other people in the office or waiting room from getting infected or exposed. Ask your healthcare provider to call the local or state health department. Persons who are placed under active monitoring or facilitated self-monitoring should follow instructions provided by their local health department or occupational health professionals, as appropriate. If you have a medical emergency and need to call 911, notify the dispatch personnel that you have, or are being evaluated for COVID-19. If possible, put on a facemask before emergency medical services arrive. Discontinuing home isolation  Patients with confirmed COVID-19 should remain under home isolation precautions until the risk of secondary transmission to others is thought to be low. The decision to discontinue home isolation precautions should be made on a case-by-case basis, in consultation with healthcare providers and state and local health departments.       Recommended precautions for household members, intimate partners, and caregivers in a nonhealthcare setting a patient with symptomatic laboratory-confirmed COVID-19 or a patient under investigation (PUI)    Household members, intimate partners, and caregivers in a nonhealthcare setting may have close contact with a person with symptomatic, laboratory-confirmed COVID-19 or a person under investigation. Close contacts should monitor their health; they should call their healthcare provider right away if they develop symptoms suggestive of COVID-19 (e.g., fever, cough, shortness of breath) (see Interim [place] Guidance for Risk Assessment and Public Health Management of Persons with Potential Coronavirus Disease 2019 (COVID-19) Exposure in Travel-associated or [facility] Travelers.)    Close contacts should also follow these recommendations:    -Make sure that you understand and can help the patient follow their healthcare providers instructions for medication(s) and care. You should help the patient with basic needs in the home and provide support for getting groceries, prescriptions, and other personal needs. -Monitor the patients symptoms. If the patient is getting sicker, call his or her healthcare provider and tell them that the patient has laboratory-confirmed COVID-19. This will help the healthcare providers office take steps to keep other people in the office or waiting room from getting infected. Ask the healthcare provider to call the local or state health department for additional guidance. If the patient has a medical emergency and you need to call 911, notify the dispatch personnel that the patient has, or is being evaluated for COVID-19.  -Household members should stay in another room or be  from the patient as much as possible. Household members should use a separate bedroom and bathroom, if available. -Prohibit visitors who do not have an essential need to be in the home.  -Household members should care for any pets in the home. Do not handle pets or other animals while sick.   For more information, see COVID-19 and Animals. -Make sure that shared spaces in the home have good air flow, such as by an air conditioner or an opened window, weather permitting.  -Perform hand hygiene frequently. Wash your hands often with soap and water for at least 20 seconds or use an alcohol-based hand  that contains 60 to 95% alcohol, covering all surfaces of your hands and rubbing them together until they feel dry. Soap and water should be used preferentially if hands are visibly dirty.  -Avoid touching your eyes, nose, and mouth with unwashed hands.  -The patient should wear a facemask when you are around other people. If the patient is not able to wear a facemask (for example, because it causes trouble breathing), you, as the caregiver, should wear a mask when you are in the same room as the patient.  -Wear a disposable facemask and gloves when you touch or have contact with the patients blood, stool, or body fluids, such as saliva, sputum, nasal mucus, vomit, urine.  -Throw out disposable facemasks and gloves after using them. Do not reuse.  -When removing personal protective equipment, first remove and dispose of gloves. Then, immediately clean your hands with soap and water or alcohol-based hand . Next, remove and dispose of facemask, and immediately clean your hands again with soap and water or alcohol-based hand .  -Avoid sharing household items with the patient. You should not share dishes, drinking glasses, cups, eating utensils, towels, bedding, or other items. After the patient uses these items, you should wash them thoroughly (see below [employer]). -Clean all high-touch surfaces, such as counters, tabletops, doorknobs, bathroom fixtures, toilets, phones, keyboards, tablets, and bedside tables, every day. Also, clean any surfaces that may have blood, stool, or body fluids on them.   -Use a household cleaning spray or wipe, according to the label instructions. Labels contain instructions for safe and effective use of the cleaning product including precautions you should take when applying the product, such as wearing gloves and making sure you have good ventilation during use of the product.  -Wash laundry thoroughly.  -Immediately remove and wash clothes or bedding that have blood, stool, or body fluids on them.  -Wear disposable gloves while handling soiled items and keep soiled items away from your body. Clean your hands (with soap and water or an alcohol-based hand ) immediately after removing your gloves. -Read and follow directions on labels of laundry or clothing items and detergent. In general, using a normal laundry detergent according to washing machine instructions and dry thoroughly using the warmest temperatures recommended on the clothing label.  -Place all used disposable gloves, facemasks, and other contaminated items in a lined container before disposing of them with other household waste. Clean your hands (with soap and water or an alcohol-based hand ) immediately after handling these items. Soap and water should be used preferentially if hands are visibly dirty.  -Discuss any additional questions with your state or local health department or healthcare provider. Footnotes  1) Home healthcare personnel should refer to Interim Infection Prevention and Control Recommendations for Patients with Known or Patients Under Investigation for Coronavirus Disease 2019 (COVID-19) in a Healthcare Setting. 2) Close contact is defined as--    a) being within approximately 6 feet (2 meters) of a COVID-19 case for a prolonged period of time; close contact can occur while caring for, living with, visiting, or sharing a health care waiting area or room with a COVID-19 case    - or -    b) having direct contact with infectious secretions of a COVID-19 case (e.g., being coughed on).     Taken from:  [email or web address]? https://zaira-page.info/      Patient Education        Chronic Obstructive Pulmonary Disease (COPD) Flare-Ups: Care Instructions  Overview     Chronic obstructive pulmonary disease (COPD) is a lung disease that makes it hard to breathe. It is caused by damage to the lungs over many years, usually from smoking. Chronic bronchitis and emphysema are two lung problems that are types of COPD:  · Chronic bronchitis: The airways that carry air to the lungs (bronchial tubes) get inflamed and make a lot of mucus. This can narrow or block the airways. It can also make you cough. · Emphysema: The tiny air sacs (alveoli) at the end of the airways in the lungs are damaged. When the air sacs are damaged or destroyed, the inner walls break down, and the sacs become larger. These larger air sacs move less oxygen into the blood. This causes difficulty breathing or shortness of breath that gets worse over time. After air sacs are destroyed, they cannot be replaced. Many people with COPD have attacks called flare-ups or exacerbations. This is when your usual symptoms quickly get worse and stay worse. If you notice any problems or new symptoms, get medical treatment right away. Follow-up care is a key part of your treatment and safety. Be sure to make and go to all appointments, and call your doctor if you are having problems. It's also a good idea to know your test results and keep a list of the medicines you take. How can you care for yourself at home? · Be safe with medicines. Take your medicines exactly as prescribed. Call your doctor if you think you are having a problem with your medicine. You may be taking medicines such as:  ? Bronchodilators. These help open your airways and make breathing easier. ? Corticosteroids. These reduce airway inflammation.  They may be given as pills, in a vein, or in an inhaled form. You may go home with pills in addition to an inhaler that you already use. · A spacer may help you get more inhaled medicine to your lungs. Ask your doctor or pharmacist if a spacer is right for you. If it is, ask how to use it properly. · If your doctor prescribed antibiotics, take them as directed. Do not stop taking them just because you feel better. You need to take the full course of antibiotics. · If your doctor prescribed oxygen, use the flow rate your doctor has recommended. Do not change it without talking to your doctor first.  · Do not smoke. Smoking makes COPD worse. If you need help quitting, talk to your doctor about stop-smoking programs and medicines. These can increase your chances of quitting for good. When should you call for help? Call 911 anytime you think you may need emergency care. For example, call if:    · You have severe trouble breathing. Call your doctor now or seek immediate medical care if:    · You have new or worse trouble breathing.     · Your coughing or wheezing gets worse.     · You cough up dark brown or bloody mucus (sputum).     · You have a new or higher fever.     · You have severe chest pain, or chest pain is quickly getting worse. Watch closely for changes in your health, and be sure to contact your doctor if:    · You notice more mucus or a change in the color of your mucus.     · You need to use your antibiotic or steroid pills.     · You do not get better as expected. Where can you learn more? Go to https://RAP Indexjohny.[employer]. org and sign in to your [employer] account. Enter F166 in the [facility] box to learn more about \"Chronic Obstructive Pulmonary Disease (COPD) Flare-Ups: Care Instructions. \"     If you do not have an account, please click on the \"Sign Up Now\" link. Current as of: July 6, 2021               Content Version: 13.1  © 0145-5802 Healthwise, Incorporated.    Care instructions adapted under license by [address] If you have questions about a medical condition or this instruction, always ask your healthcare professional. [place] [address] any warranty or liability for your use of this information.

## 2022-02-01 ENCOUNTER — TELEPHONE (OUTPATIENT)
Dept: PRIMARY CARE CLINIC | Age: 77
End: 2022-02-01

## 2022-02-01 NOTE — TELEPHONE ENCOUNTER
----- Message from Claudell Combs, APRN sent at 2/1/2022  7:29 AM CST -----  Please call patient and let them know results.    Negative COVID

## 2022-02-11 ENCOUNTER — OFFICE VISIT (OUTPATIENT)
Dept: PRIMARY CARE CLINIC | Age: 77
End: 2022-02-11
Payer: MEDICARE

## 2022-02-11 ENCOUNTER — HOSPITAL ENCOUNTER (OUTPATIENT)
Dept: GENERAL RADIOLOGY | Age: 77
Discharge: HOME | End: 2022-02-11
Payer: MEDICARE

## 2022-02-11 ENCOUNTER — TELEPHONE (OUTPATIENT)
Dept: PRIMARY CARE CLINIC | Age: 77
End: 2022-02-11

## 2022-02-11 VITALS — TEMPERATURE: 96.9 F | OXYGEN SATURATION: 90 % | WEIGHT: 150 LBS | BODY MASS INDEX: 28.32 KG/M2 | HEIGHT: 61 IN

## 2022-02-11 DIAGNOSIS — J44.1 CHRONIC OBSTRUCTIVE PULMONARY DISEASE WITH ACUTE EXACERBATION (HCC): ICD-10-CM

## 2022-02-11 DIAGNOSIS — J44.1 CHRONIC OBSTRUCTIVE PULMONARY DISEASE WITH ACUTE EXACERBATION (HCC): Primary | ICD-10-CM

## 2022-02-11 DIAGNOSIS — R11.0 NAUSEA: ICD-10-CM

## 2022-02-11 DIAGNOSIS — Z95.828 PORT-A-CATH IN PLACE: ICD-10-CM

## 2022-02-11 DIAGNOSIS — R05.9 COUGH: ICD-10-CM

## 2022-02-11 DIAGNOSIS — R06.02 SHORTNESS OF BREATH: ICD-10-CM

## 2022-02-11 DIAGNOSIS — R53.1 WEAKNESS: ICD-10-CM

## 2022-02-11 LAB
ALBUMIN SERPL-MCNC: 4.1 G/DL (ref 3.5–5.2)
ALP BLD-CCNC: 99 U/L (ref 35–104)
ALT SERPL-CCNC: 18 U/L (ref 5–33)
ANION GAP SERPL CALCULATED.3IONS-SCNC: 10 MMOL/L (ref 7–19)
AST SERPL-CCNC: 24 U/L (ref 5–32)
BASOPHILS ABSOLUTE: 0 K/UL (ref 0–0.2)
BASOPHILS RELATIVE PERCENT: 0.3 % (ref 0–1)
BILIRUB SERPL-MCNC: <0.2 MG/DL (ref 0.2–1.2)
BUN BLDV-MCNC: 26 MG/DL (ref 8–23)
CALCIUM SERPL-MCNC: 9.3 MG/DL (ref 8.8–10.2)
CHLORIDE BLD-SCNC: 101 MMOL/L (ref 98–111)
CO2: 25 MMOL/L (ref 22–29)
CREAT SERPL-MCNC: 1.3 MG/DL (ref 0.5–0.9)
EOSINOPHILS ABSOLUTE: 0.3 K/UL (ref 0–0.6)
EOSINOPHILS RELATIVE PERCENT: 2.1 % (ref 0–5)
GFR AFRICAN AMERICAN: 48
GFR NON-AFRICAN AMERICAN: 40
GLUCOSE BLD-MCNC: 89 MG/DL (ref 74–109)
HCT VFR BLD CALC: 38.6 % (ref 37–47)
HEMOGLOBIN: 10.6 G/DL (ref 12–16)
HYPOCHROMIA: ABNORMAL
IMMATURE GRANULOCYTES #: 0.1 K/UL
LYMPHOCYTES ABSOLUTE: 1.2 K/UL (ref 1.1–4.5)
LYMPHOCYTES RELATIVE PERCENT: 10.3 % (ref 20–40)
MAGNESIUM: 2.2 MG/DL (ref 1.6–2.4)
MCH RBC QN AUTO: 29.8 PG (ref 27–31)
MCHC RBC AUTO-ENTMCNC: 27.5 G/DL (ref 33–37)
MCV RBC AUTO: 108.4 FL (ref 81–99)
MONOCYTES ABSOLUTE: 0.8 K/UL (ref 0–0.9)
MONOCYTES RELATIVE PERCENT: 6.9 % (ref 0–10)
NEUTROPHILS ABSOLUTE: 9.3 K/UL (ref 1.5–7.5)
NEUTROPHILS RELATIVE PERCENT: 80 % (ref 50–65)
PDW BLD-RTO: 14.4 % (ref 11.5–14.5)
PLATELET # BLD: 206 K/UL (ref 130–400)
PLATELET SLIDE REVIEW: ADEQUATE
PMV BLD AUTO: 10.4 FL (ref 9.4–12.3)
POIKILOCYTES: ABNORMAL
POTASSIUM SERPL-SCNC: 5.9 MMOL/L (ref 3.5–5)
RBC # BLD: 3.56 M/UL (ref 4.2–5.4)
SODIUM BLD-SCNC: 136 MMOL/L (ref 136–145)
TOTAL PROTEIN: 6.8 G/DL (ref 6.6–8.7)
WBC # BLD: 11.7 K/UL (ref 4.8–10.8)

## 2022-02-11 PROCEDURE — 1090F PRES/ABSN URINE INCON ASSESS: CPT | Performed by: NURSE PRACTITIONER

## 2022-02-11 PROCEDURE — 1123F ACP DISCUSS/DSCN MKR DOCD: CPT | Performed by: NURSE PRACTITIONER

## 2022-02-11 PROCEDURE — 4004F PT TOBACCO SCREEN RCVD TLK: CPT | Performed by: NURSE PRACTITIONER

## 2022-02-11 PROCEDURE — G8399 PT W/DXA RESULTS DOCUMENT: HCPCS | Performed by: NURSE PRACTITIONER

## 2022-02-11 PROCEDURE — 71046 X-RAY EXAM CHEST 2 VIEWS: CPT

## 2022-02-11 PROCEDURE — 3017F COLORECTAL CA SCREEN DOC REV: CPT | Performed by: NURSE PRACTITIONER

## 2022-02-11 PROCEDURE — G8484 FLU IMMUNIZE NO ADMIN: HCPCS | Performed by: NURSE PRACTITIONER

## 2022-02-11 PROCEDURE — 99214 OFFICE O/P EST MOD 30 MIN: CPT | Performed by: NURSE PRACTITIONER

## 2022-02-11 PROCEDURE — G8417 CALC BMI ABV UP PARAM F/U: HCPCS | Performed by: NURSE PRACTITIONER

## 2022-02-11 PROCEDURE — 4040F PNEUMOC VAC/ADMIN/RCVD: CPT | Performed by: NURSE PRACTITIONER

## 2022-02-11 PROCEDURE — G8427 DOCREV CUR MEDS BY ELIG CLIN: HCPCS | Performed by: NURSE PRACTITIONER

## 2022-02-11 PROCEDURE — 3023F SPIROM DOC REV: CPT | Performed by: NURSE PRACTITIONER

## 2022-02-11 RX ORDER — MICONAZOLE NITRATE 4 %
1 CREAM WITH PREFILLED APPLICATOR VAGINAL NIGHTLY
Qty: 1 EACH | Refills: 1 | Status: SHIPPED | OUTPATIENT
Start: 2022-02-11

## 2022-02-11 RX ORDER — METHYLPREDNISOLONE 4 MG/1
TABLET ORAL
Qty: 1 KIT | Refills: 0 | Status: SHIPPED | OUTPATIENT
Start: 2022-02-11 | End: 2022-02-17

## 2022-02-11 RX ORDER — ONDANSETRON 4 MG/1
4 TABLET, FILM COATED ORAL 3 TIMES DAILY PRN
Qty: 30 TABLET | Refills: 1 | Status: SHIPPED | OUTPATIENT
Start: 2022-02-11 | End: 2022-10-25 | Stop reason: SDUPTHER

## 2022-02-11 RX ORDER — DOXYCYCLINE HYCLATE 100 MG/1
100 CAPSULE ORAL 2 TIMES DAILY
Qty: 20 CAPSULE | Refills: 0 | Status: SHIPPED | OUTPATIENT
Start: 2022-02-11 | End: 2022-02-21

## 2022-02-11 ASSESSMENT — ENCOUNTER SYMPTOMS
DIARRHEA: 0
SORE THROAT: 1
COUGH: 1
VOMITING: 0
SHORTNESS OF BREATH: 1
NAUSEA: 1
RHINORRHEA: 1

## 2022-02-11 NOTE — PROGRESS NOTES
Elizabeth Helms (:  1945) is a 77 y.o. female,Established patient, here for evaluation of the following chief complaint(s):  Congestion, Nausea, Cough, and Generalized Body Aches      ASSESSMENT/PLAN:    ICD-10-CM    1. Chronic obstructive pulmonary disease with acute exacerbation (HCC)  J44.1 XR CHEST (2 VW)     methylPREDNISolone (MEDROL DOSEPACK) 4 MG tablet     doxycycline hyclate (VIBRAMYCIN) 100 MG capsule   2. Cough  R05.9 XR CHEST (2 VW)     methylPREDNISolone (MEDROL DOSEPACK) 4 MG tablet   3. Shortness of breath  R06.02 XR CHEST (2 VW)     Comprehensive Metabolic Panel     CBC Auto Differential     Magnesium     methylPREDNISolone (MEDROL DOSEPACK) 4 MG tablet     Magnesium     CBC Auto Differential     Comprehensive Metabolic Panel   4. Nausea  R11.0 ondansetron (ZOFRAN) 4 MG tablet     Comprehensive Metabolic Panel     CBC Auto Differential     Magnesium     Magnesium     CBC Auto Differential     Comprehensive Metabolic Panel   5. Port-A-Cath in place  Z95.828  Try to draw lab from port. Unable to draw blood. Flushed with saline and heparin   6. Weakness  R53.1 Comprehensive Metabolic Panel     CBC Auto Differential     Magnesium     Magnesium     CBC Auto Differential     Comprehensive Metabolic Panel       Return in about 1 week (around 2022), or if symptoms worsen or fail to improve. SUBJECTIVE/OBJECTIVE:  HPI     She was treated with Omnicef on 2022 for COPD exacerbation. She just finished her antibiotics yesterday. She never felt better with the antibiotics. She will not using the nebulizer. It aggravates me. She is on 4L/NC    Reports congestion & rhinorrhea  Reports nausea  Reports cough  Reports body aches  Reports sore throat. Reports headache  Denies vomiting or diarrhea. \"I am at [facility] office. \"  She knows her   She does not know the president  She knows that she lives in [address] [place] [address] now.       Temp 96.9 °F (36.1 °C) (Temporal)   Ht 5' 1\" (1.549 m)   Wt 150 lb (68 kg)   SpO2 90% Comment: On 4L/NC  BMI 28.34 kg/m²     Review of Systems   Constitutional: Negative for fever. HENT: Positive for congestion, rhinorrhea and sore throat. Respiratory: Positive for cough (dry) and shortness of breath (chronic, acutely worse). Cardiovascular: Negative for chest pain. Gastrointestinal: Positive for nausea. Negative for diarrhea and vomiting. Genitourinary: Negative for dysuria, frequency and urgency. Musculoskeletal: Positive for myalgias. Neurological: Positive for tremors and headaches. Physical Exam  Vitals reviewed. Constitutional:       Appearance: She is well-developed. She is ill-appearing. HENT:      Head: Normocephalic. Right Ear: Tympanic membrane and external ear normal.      Left Ear: Tympanic membrane and external ear normal.      Nose: Nose normal.   Cardiovascular:      Rate and Rhythm: Normal rate and regular rhythm. Pulmonary:      Effort: Pulmonary effort is normal.      Breath sounds: Decreased breath sounds, wheezing and rhonchi present. Abdominal:      General: Bowel sounds are normal.      Palpations: Abdomen is soft. Musculoskeletal:      Cervical back: Normal range of motion. Skin:     General: Skin is dry. Neurological:      General: No focal deficit present. Mental Status: She is alert and oriented to person, place, and time. Mental status is at baseline. Psychiatric:         Mood and Affect: Mood normal.         Behavior: Behavior normal.         Thought Content: Thought content normal.         Judgment: Judgment normal.                 An electronic signature was used to authenticate this note.     --NAVEEN Reyes

## 2022-02-11 NOTE — TELEPHONE ENCOUNTER
----- Message from NAVEEN Reyes sent at 2/11/2022  3:31 PM CST -----  Chest x-ray shows no interval change. Emphysema is noted. Lungs are without focal infiltrate, mass or effusion.   Recommend patient stop smoking    Recommend treatment as discussed in office today

## 2022-02-14 ENCOUNTER — TELEPHONE (OUTPATIENT)
Dept: PRIMARY CARE CLINIC | Age: 77
End: 2022-02-14

## 2022-02-14 DIAGNOSIS — E87.5 SERUM POTASSIUM ELEVATED: Primary | ICD-10-CM

## 2022-02-14 DIAGNOSIS — G47.01 INSOMNIA DUE TO MEDICAL CONDITION: ICD-10-CM

## 2022-02-14 DIAGNOSIS — F41.1 GAD (GENERALIZED ANXIETY DISORDER): ICD-10-CM

## 2022-02-14 RX ORDER — CLONAZEPAM 0.5 MG/1
0.5 TABLET ORAL 3 TIMES DAILY PRN
Qty: 75 TABLET | Refills: 0 | Status: SHIPPED | OUTPATIENT
Start: 2022-02-14 | End: 2022-04-07 | Stop reason: SDUPTHER

## 2022-02-14 NOTE — TELEPHONE ENCOUNTER
----- Message from NAVEEN Moses sent at 2/14/2022 12:21 PM CST -----  Please call patient and let them know results. Mild anemia but normal blood counts.   White blood cell count slightly elevated but patient is currently taking steroids  Normal magnesium  Kidney function is stable but potassium is elevated at 5.9    Please have Patient come in and recheck BMP today if possible

## 2022-02-14 NOTE — TELEPHONE ENCOUNTER
Patient daughter Mitchel Peraza called stating that patient's klonopin was not sent to pharmacy. Would like to have this sent over. Let her know that MM wouldn't be in until tomorrow to review this and if there was any issues, would let her know.

## 2022-02-14 NOTE — TELEPHONE ENCOUNTER
Called patient, spoke with: Child/Children regarding the results of the patients most recent labs. I advised Child/Children of Doris Clarkson recommendations.    Child/Children did voice understanding

## 2022-02-15 NOTE — TELEPHONE ENCOUNTER
This was discussed in the office visit on Friday.   I have reviewed Jose G Harris and sent in refill Awake/Cooperative/Alert

## 2022-03-10 DIAGNOSIS — G47.01 INSOMNIA DUE TO MEDICAL CONDITION: ICD-10-CM

## 2022-03-10 DIAGNOSIS — F41.1 GAD (GENERALIZED ANXIETY DISORDER): ICD-10-CM

## 2022-03-11 ENCOUNTER — OFFICE VISIT (OUTPATIENT)
Dept: PRIMARY CARE CLINIC | Age: 77
End: 2022-03-11
Payer: MEDICARE

## 2022-03-11 VITALS
BODY MASS INDEX: 28.37 KG/M2 | OXYGEN SATURATION: 98 % | HEIGHT: 61 IN | TEMPERATURE: 97.5 F | HEART RATE: 85 BPM | SYSTOLIC BLOOD PRESSURE: 100 MMHG | DIASTOLIC BLOOD PRESSURE: 60 MMHG | WEIGHT: 150.25 LBS

## 2022-03-11 DIAGNOSIS — F03.90 DEMENTIA WITHOUT BEHAVIORAL DISTURBANCE, UNSPECIFIED DEMENTIA TYPE: ICD-10-CM

## 2022-03-11 DIAGNOSIS — Z95.828 PORT-A-CATH IN PLACE: ICD-10-CM

## 2022-03-11 DIAGNOSIS — I50.9 CONGESTIVE HEART FAILURE, UNSPECIFIED HF CHRONICITY, UNSPECIFIED HEART FAILURE TYPE (HCC): ICD-10-CM

## 2022-03-11 DIAGNOSIS — N30.01 ACUTE CYSTITIS WITH HEMATURIA: ICD-10-CM

## 2022-03-11 DIAGNOSIS — F33.1 MODERATE EPISODE OF RECURRENT MAJOR DEPRESSIVE DISORDER (HCC): ICD-10-CM

## 2022-03-11 DIAGNOSIS — F11.20 CHRONIC NARCOTIC DEPENDENCE (HCC): ICD-10-CM

## 2022-03-11 DIAGNOSIS — Z00.00 MEDICARE ANNUAL WELLNESS VISIT, SUBSEQUENT: Primary | ICD-10-CM

## 2022-03-11 DIAGNOSIS — J44.9 CHRONIC OBSTRUCTIVE PULMONARY DISEASE, UNSPECIFIED COPD TYPE (HCC): ICD-10-CM

## 2022-03-11 LAB
APPEARANCE FLUID: CLEAR
BILIRUBIN, POC: ABNORMAL
BLOOD URINE, POC: ABNORMAL
CLARITY, POC: CLEAR
COLOR, POC: YELLOW
GLUCOSE URINE, POC: ABNORMAL
KETONES, POC: ABNORMAL
LEUKOCYTE EST, POC: ABNORMAL
NITRITE, POC: POSITIVE
PH, POC: 7
PROTEIN, POC: 30
SPECIFIC GRAVITY, POC: 1.02
UROBILINOGEN, POC: 0.2

## 2022-03-11 PROCEDURE — G8427 DOCREV CUR MEDS BY ELIG CLIN: HCPCS | Performed by: NURSE PRACTITIONER

## 2022-03-11 PROCEDURE — G8484 FLU IMMUNIZE NO ADMIN: HCPCS | Performed by: NURSE PRACTITIONER

## 2022-03-11 PROCEDURE — G8399 PT W/DXA RESULTS DOCUMENT: HCPCS | Performed by: NURSE PRACTITIONER

## 2022-03-11 PROCEDURE — G8417 CALC BMI ABV UP PARAM F/U: HCPCS | Performed by: NURSE PRACTITIONER

## 2022-03-11 PROCEDURE — 96523 IRRIG DRUG DELIVERY DEVICE: CPT | Performed by: NURSE PRACTITIONER

## 2022-03-11 PROCEDURE — 3023F SPIROM DOC REV: CPT | Performed by: NURSE PRACTITIONER

## 2022-03-11 PROCEDURE — 99214 OFFICE O/P EST MOD 30 MIN: CPT | Performed by: NURSE PRACTITIONER

## 2022-03-11 PROCEDURE — G0439 PPPS, SUBSEQ VISIT: HCPCS | Performed by: NURSE PRACTITIONER

## 2022-03-11 PROCEDURE — 4004F PT TOBACCO SCREEN RCVD TLK: CPT | Performed by: NURSE PRACTITIONER

## 2022-03-11 PROCEDURE — 1090F PRES/ABSN URINE INCON ASSESS: CPT | Performed by: NURSE PRACTITIONER

## 2022-03-11 PROCEDURE — 3017F COLORECTAL CA SCREEN DOC REV: CPT | Performed by: NURSE PRACTITIONER

## 2022-03-11 PROCEDURE — 81002 URINALYSIS NONAUTO W/O SCOPE: CPT | Performed by: NURSE PRACTITIONER

## 2022-03-11 PROCEDURE — 4040F PNEUMOC VAC/ADMIN/RCVD: CPT | Performed by: NURSE PRACTITIONER

## 2022-03-11 PROCEDURE — 1123F ACP DISCUSS/DSCN MKR DOCD: CPT | Performed by: NURSE PRACTITIONER

## 2022-03-11 RX ORDER — UMECLIDINIUM BROMIDE AND VILANTEROL TRIFENATATE 62.5; 25 UG/1; UG/1
1 POWDER RESPIRATORY (INHALATION) DAILY
Qty: 3 EACH | Refills: 3 | Status: SHIPPED | OUTPATIENT
Start: 2022-03-11

## 2022-03-11 RX ORDER — CLONAZEPAM 0.5 MG/1
0.5 TABLET ORAL 3 TIMES DAILY PRN
Qty: 75 TABLET | Refills: 0 | OUTPATIENT
Start: 2022-03-11 | End: 2022-04-10

## 2022-03-11 RX ORDER — CEPHALEXIN 500 MG/1
500 CAPSULE ORAL 3 TIMES DAILY
Qty: 30 CAPSULE | Refills: 0 | Status: SHIPPED | OUTPATIENT
Start: 2022-03-11 | End: 2022-03-21

## 2022-03-11 RX ORDER — FUROSEMIDE 20 MG/1
20 TABLET ORAL DAILY PRN
Qty: 30 TABLET | Refills: 1 | Status: SHIPPED | OUTPATIENT
Start: 2022-03-11

## 2022-03-11 RX ORDER — NEBULIZER ACCESSORIES
1 KIT MISCELLANEOUS DAILY PRN
Qty: 1 KIT | Refills: 0 | Status: SHIPPED | OUTPATIENT
Start: 2022-03-11 | End: 2022-08-25

## 2022-03-11 ASSESSMENT — PATIENT HEALTH QUESTIONNAIRE - PHQ9
SUM OF ALL RESPONSES TO PHQ QUESTIONS 1-9: 0
SUM OF ALL RESPONSES TO PHQ QUESTIONS 1-9: 0
1. LITTLE INTEREST OR PLEASURE IN DOING THINGS: 0
2. FEELING DOWN, DEPRESSED OR HOPELESS: 0
SUM OF ALL RESPONSES TO PHQ QUESTIONS 1-9: 0
SUM OF ALL RESPONSES TO PHQ9 QUESTIONS 1 & 2: 0
SUM OF ALL RESPONSES TO PHQ QUESTIONS 1-9: 0

## 2022-03-11 ASSESSMENT — ENCOUNTER SYMPTOMS
COUGH: 1
SORE THROAT: 0
SHORTNESS OF BREATH: 1
RHINORRHEA: 1
DIARRHEA: 0
NAUSEA: 1
VOMITING: 0

## 2022-03-11 ASSESSMENT — LIFESTYLE VARIABLES: HOW OFTEN DO YOU HAVE A DRINK CONTAINING ALCOHOL: NEVER

## 2022-03-11 NOTE — PATIENT INSTRUCTIONS
Personalized Preventive Plan for Beth Rosenberg - 3/11/2022  Medicare offers a range of preventive health benefits. Some of the tests and screenings are paid in full while other may be subject to a deductible, co-insurance, and/or copay. Some of these benefits include a comprehensive review of your medical history including lifestyle, illnesses that may run in your family, and various assessments and screenings as appropriate. After reviewing your medical record and screening and assessments performed today your provider may have ordered immunizations, labs, imaging, and/or referrals for you. A list of these orders (if applicable) as well as your Preventive Care list are included within your After Visit Summary for your review. Other Preventive Recommendations:    · A preventive eye exam performed by an eye specialist is recommended every 1-2 years to screen for glaucoma; cataracts, macular degeneration, and other eye disorders. · A preventive dental visit is recommended every 6 months. · Try to get at least 150 minutes of exercise per week or 10,000 steps per day on a pedometer . · Order or download the FREE \"Exercise & Physical Activity: Your Everyday Guide\" from [employer]. Call 7-799.666.2208 or search [employer] online. · You need 7585-4124 mg of calcium and 2293-3806 IU of vitamin D per day. It is possible to meet your calcium requirement with diet alone, but a vitamin D supplement is usually necessary to meet this goal.  · When exposed to the sun, use a sunscreen that protects against both UVA and UVB radiation with an SPF of 30 or greater. Reapply every 2 to 3 hours or after sweating, drying off with a towel, or swimming. · Always wear a seat belt when traveling in a car. Always wear a helmet when riding a bicycle or motorcycle.

## 2022-03-11 NOTE — PROGRESS NOTES
Medicare Annual Wellness Visit    Bakari Carlin is here for Medicare AWV    Assessment & Plan   Medicare annual wellness visit, subsequent  -     CBC with Auto Differential; Future  -     Comprehensive Metabolic Panel; Future  -     TSH; Future  -     T4, Free; Future  -     Lipid Panel; Future  -     Microalbumin / Creatinine Urine Ratio; Future  Chronic obstructive pulmonary disease, unspecified COPD type ([facility] [address]  -     umeclidinium-vilanterol (ANORO ELLIPTA) 62.5-25 MCG/INH AEPB inhaler; Inhale 1 puff into the lungs daily, Disp-3 each, R-3Normal  Dementia without behavioral disturbance, unspecified dementia type (HCC)  Congestive heart failure, unspecified HF chronicity, unspecified heart failure type (HCC)  Chronic narcotic dependence (HCC)  Moderate episode of recurrent major depressive disorder (HCC)  Acute cystitis with hematuria  -     cephALEXin (KEFLEX) 500 MG capsule; Take 1 capsule by mouth 3 times daily for 10 days, Disp-30 capsule, R-0Normal  Port-A-Cath in place  -     [place] IRRIG IMPLANTED DRUG DELIVERY DEVICE      Recommendations for Preventive Services Due: see orders and patient instructions/AVS.  Recommended screening schedule for the next 5-10 years is provided to the patient in written form: see Patient Instructions/AVS.     Return in 1 month (on 4/11/2022), or if symptoms worsen or fail to improve, for Medicare Annual Wellness Visit in 1 year. Subjective   The following acute and/or chronic problems were also addressed today:  COPD    Patient's complete Health Risk Assessment and screening values have been reviewed and are found in Flowsheets. The following problems were reviewed today and where indicated follow up appointments were made and/or referrals ordered.     Positive Risk Factor Screenings with Interventions:    Fall Risk:  Do you feel unsteady or are you worried about falling? : (!) yes  2 or more falls in past year?: (!) yes  Fall with injury in past year?: no     Fall Risk Interventions:    · Home safety tips provided  · Home exercises provided to promote strength and balance   · She rolled out of bed one time. She tripped over a vent the other time. Cognitive: Words recalled: 0 Words Recalled  Clock Drawing Test (CDT): Normal  Total Score Interpretation: Abnormal Mini-Cog    Cognitive Impairment Interventions:  · Patient declines any further evaluation/treatment for cognitive impairment       Tobacco Use:     Tobacco Use: High Risk    Smoking Tobacco Use: Current Every Day Smoker    Smokeless Tobacco Use: Never Used     E-Cigarettes/Vaping Use     Questions Responses    E-Cigarette/Vaping Use     Start Date     Passive Exposure     Quit Date     Counseling Given     Comments         Substance Abuse - Tobacco Interventions:  tobacco cessation tips and resources provided           General Health and ACP:  General  In general, how would you say your health is?: (!) Poor  In the past 7 days, have you experienced any of the following: New or Increased Pain, New or Increased Fatigue, Loneliness, Social Isolation, Stress or Anger?: No  Do you get the social and emotional support that you need?: Yes  Do you have a Living Will?: Yes    Advance Directives     Power of  Living Will ACP-Advance Directive ACP-Power of     Not on File Not on File Filed Not on File      General Health Risk Interventions:  · She lives with her daughter and her daughter cares for her. Health Habits/Nutrition:     Physical Activity: Inactive    Days of Exercise per Week: 0 days    Minutes of Exercise per Session: 0 min     Have you lost any weight without trying in the past 3 months?: No  Body mass index: (!) 28.39  Have you seen the dentist within the past year?: N/A - wear dentures    Health Habits/Nutrition Interventions:  · Nutritional issues:  She does alright eat.  Her dentures broke    Hearing/Vision:  Do you or your family notice any trouble with your hearing that hasn't been managed with hearing aids?: (!) Yes  Do you have difficulty driving, watching TV, or doing any of your daily activities because of your eyesight?: (!) Yes  Have you had an eye exam within the past year?: (!) No  No exam data present    Hearing/Vision Interventions:  · Hearing concerns:  patient declines any further evaluation/treatment for hearing issues  · Vision concerns:  patient encouraged to make appointment with his/her eye specialist     ADLs:  In the past 7 days, did you need help from others to perform any of the following everyday activities: Eating, dressing, grooming, bathing, toileting, or walking/balance?: (!) Yes  Select all that apply: (!) Dressing,Grooming,Bathing  In the past 7 days, did you need help from others to take care of any of the following: Laundry, housekeeping, banking/finances, shopping, telephone use, food preparation, transportation, or taking medications?: (!) Yes  Select all that apply: (!) Laundry,Housekeeping,Shopping,Food Preparation,Transportation,Banking/Finances,Taking Medications    ADL Interventions:  · Her daughter assists patient with her activities of daily living          Objective   Vitals:    03/11/22 1400 03/11/22 1622   BP: 100/60    Pulse: 85    Temp: 97.5 °F (36.4 °C)    TempSrc: Temporal    SpO2: (!) 84% 98%   Weight: 150 lb 4 oz (68.2 kg)    Height: 5' 1\" (1.549 m)       Body mass index is 28.39 kg/m². Allergies   Allergen Reactions    Codeine Nausea Only    Sulfa Antibiotics Other (See Comments)     \"makes me tremble'     Prior to Visit Medications    Medication Sig Taking? Authorizing Provider   umeclidinium-vilanterol (ANORO ELLIPTA) 62.5-25 MCG/INH AEPB inhaler Inhale 1 puff into the lungs daily Yes NAVEEN Dior   cephALEXin (KEFLEX) 500 MG capsule Take 1 capsule by mouth 3 times daily for 10 days Yes NAVEEN Dior   clonazePAM (KLONOPIN) 0.5 MG tablet Take 1 tablet by mouth 3 times daily as needed for Anxiety for up to 30 days.  Yes Pushpa Sanders NAVEEN Peña   ondansetron (ZOFRAN) 4 MG tablet Take 1 tablet by mouth 3 times daily as needed for Nausea or Vomiting Yes NAVEEN Dior   MICONAZOLE NITRATE VAGINAL (MONISTAT 3) 4 % CREA Place 1 Dose vaginally at bedtime Yes NAVEEN Dior   NARCAN 4 MG/0.1ML LIQD nasal spray  Yes Historical Provider, MD   azelastine (OPTIVAR) 0.05 % ophthalmic solution  Yes Historical Provider, MD   DULoxetine (CYMBALTA) 60 MG extended release capsule Take 1 capsule by mouth daily Yes NAVEEN Dior   buPROPion (WELLBUTRIN) 75 MG tablet Take 1 tablet by mouth 2 times daily Yes NAVEEN Dior   Fesoterodine Fumarate ER (TOVIAZ) 8 MG TB24 Take 1 tablet by mouth daily Yes NAVEEN Dior   denosumab (PROLIA) 60 MG/ML SOSY SC injection Inject 1 mL into the skin once for 1 dose Yes NAVEEN Connell   albuterol (ACCUNEB) 1.25 MG/3ML nebulizer solution Inhale 3 mLs into the lungs every 6 hours as needed for Wheezing Yes NAVEEN Dior   traZODone (DESYREL) 150 MG tablet Take 1 tablet by mouth nightly Yes NAVEEN Dior   carvedilol (COREG) 3.125 MG tablet Take 1 tablet by mouth 2 times daily (with meals) Yes NAVEEN Dior   donepezil (ARICEPT) 5 MG tablet Take 1 tablet by mouth nightly Yes NAVEEN Dior   atorvastatin (LIPITOR) 20 MG tablet Take 1 tablet by mouth daily Yes NAVEEN Dior   pantoprazole (PROTONIX) 40 MG tablet Take 1 tablet by mouth daily Yes NAVEEN Dior   furosemide (LASIX) 20 MG tablet Take 1 tablet by mouth daily as needed (swelling) Yes NAVEEN Dior   albuterol sulfate HFA (VENTOLIN HFA) 108 (90 Base) MCG/ACT inhaler Inhale 2 puffs into the lungs every 6 hours as needed for Wheezing Yes NAVEEN Dior   memantine (NAMENDA) 10 MG tablet Take 1 tablet by mouth 2 times daily Yes NAVEEN Dior   isosorbide mononitrate (IMDUR) 30 MG extended release tablet Take 1 tablet by mouth daily  Patient taking differently: Take 30 mg by mouth daily 9/10/21: Pt reported takes 1/2 tablet (15 mg) Yes NAVEEN Dior   gabapentin (NEURONTIN) 100 MG capsule 100 mg 3 times daily. Through pain mgmt at [facility] [place] [facility] Yes Historical Provider, MD   nitroGLYCERIN (NITROSTAT) 0.4 MG SL tablet Place 1 tablet under the tongue every 5 minutes as needed for Chest pain Yes NAVEEN Dior   HYDROcodone-acetaminophen (NORCO)  MG per tablet Take 1 tablet by mouth every 6 hours as needed for Pain. Pain mgmt at [facility] [place] [facility] Yes Historical Provider, MD   OXYGEN Inhale 2 L into the lungs daily  Patient taking differently: Inhale 4 L into the lungs daily  Yes NAVEEN Gibbs       CareTeam (Including outside providers/suppliers regularly involved in providing care):   Patient Care Team:  NAVEEN Gibbs as PCP - General (Family Nurse Practitioner)  Tez Gilliam MD as PCP - Cardiology (Cardiology)  NAVEEN Gibbs [facility]    Reviewed and updated this visit:  Tobacco  Allergies  Meds  Med Hx  Surg Hx  Soc Hx  Fam Hx                    Efren Kuo (:  1945) is a 77 y.o. female,Established patient, here for evaluation of the following chief complaint(s):  Medicare AWV      ASSESSMENT/PLAN:    ICD-10-CM    1. Medicare annual wellness visit, subsequent  Z00.00 CBC with Auto Differential     Comprehensive Metabolic Panel     TSH     T4, Free     Lipid Panel     Microalbumin / Creatinine Urine Ratio   2. Chronic obstructive pulmonary disease, unspecified COPD type ([facility]  J44.9 umeclidinium-vilanterol (ANORO ELLIPTA) 62.5-25 MCG/INH AEPB inhaler  Smoking cessation recommended  Continue albuterol as needed  Recommend patient wear oxygen at all times   3. Dementia without behavioral disturbance (HCC)  F03.90  Stable  Continue Namenda and Aricept   4. Congestive heart failure, unspecified HF chronicity (HCC)  I50.9  Continue carvedilol 3.125 mg twice daily  Continue Lasix 20 mg daily  Continue Imdur 30 mg daily   5.  Chronic narcotic dependence ([facility] F11.20  Smoking cessation recommended   6. Moderate episode of recurrent major depressive disorder (HCC)  F33.1  Continue Cymbalta 60 mg daily and Wellbutrin 75 mg twice daily   7. Acute cystitis with hematuria  N30.01 cephALEXin (KEFLEX) 500 MG capsule   8. Port-A-Cath in place  Z95.828 ME IRRIG IMPLANTED DRUG DELIVERY DEVICE       Return in 1 month (on 4/11/2022), or if symptoms worsen or fail to improve, for Medicare Annual Wellness Visit in 1 year. SUBJECTIVE/OBJECTIVE:  HPI     INSOMNIA:  She sleeps okay. She wakes up a lot to go to the bathroom. She continues on Trazodone 150 mg and Klonopin nightly. Last fill of Klonopin was 2-, #75  She does not need a refill today. COPD:  She continues on Anoro daily  She is using Albuterol more than 3x a day. She wear oxygen at home. She does not like the pipe for her albuterol. She'd like a mask for her breathing treatments   (At [facility])  She is smoking <1ppd. Reports chronic SOA    DEMENTIA:  \"About the same. \"  She continues on Aricept 5 mg nightly & Namenda 10 mg BID  She sometimes asks what day it is but otherwise her memory is stable    MOODS:  She continues on Cymbalta 60 mg daily and Wellbutrin 75 mg BID. CHRONIC PAIN:  She follows with pain mgt. She has Narcan at home. She continues on Norco 10 prn    /60   Pulse 85   Temp 97.5 °F (36.4 °C) (Temporal)   Ht 5' 1\" (1.549 m)   Wt 150 lb 4 oz (68.2 kg)   SpO2 98% Comment: 4L  BMI 28.39 kg/m²     Review of Systems   Constitutional: Negative for fever. HENT: Positive for rhinorrhea. Negative for congestion and sore throat. Respiratory: Positive for cough (dry) and shortness of breath (chronic, acutely worse). Cardiovascular: Negative for chest pain. Gastrointestinal: Positive for nausea. Negative for diarrhea and vomiting. Genitourinary: Negative for dysuria, frequency and urgency. Musculoskeletal: Positive for myalgias.    Neurological: Positive for tremors and headaches. Psychiatric/Behavioral: Positive for sleep disturbance (stable with Trazodone and Klonopin). The patient is nervous/anxious (improved with Cymbalta). Physical Exam  Vitals reviewed. Constitutional:       Appearance: She is well-developed. She is ill-appearing. Comments: In a wheelchair   HENT:      Head: Normocephalic. Right Ear: Tympanic membrane and external ear normal.      Left Ear: Tympanic membrane and external ear normal.      Nose: Nose normal.   Cardiovascular:      Rate and Rhythm: Normal rate and regular rhythm. Pulmonary:      Effort: Pulmonary effort is normal.      Breath sounds: Decreased breath sounds, wheezing and rhonchi present. Comments: Port in place in the left chest cavity. Area cleansed with Betadine. Port flushed with saline and heparin. Unable to draw blood back. Catholon removed. Patient tolerated  Abdominal:      General: Bowel sounds are normal.      Palpations: Abdomen is soft. Musculoskeletal:      Cervical back: Normal range of motion. Skin:     General: Skin is dry. Neurological:      General: No focal deficit present. Mental Status: She is alert and oriented to person, place, and time. Mental status is at baseline. Psychiatric:         Mood and Affect: Mood normal.         Behavior: Behavior normal.         Thought Content: Thought content normal.         Judgment: Judgment normal.                 An electronic signature was used to authenticate this note.     --NAVEEN Grimm

## 2022-03-11 NOTE — TELEPHONE ENCOUNTER
Received fax from pharmacy requesting refill on pts medication(s). Pt was last seen in office on 2/11/2022  and has a follow up scheduled for 3/11/2022. Will send request to  [facility]  for authorization. SHY scanned to pts chart for review. Requested Prescriptions     Pending Prescriptions Disp Refills    clonazePAM (KLONOPIN) 0.5 MG tablet [Pharmacy Med Name: clonazePAM 0.5 MG Oral Tablet] 75 tablet 0     Sig: Take 1 tablet by mouth 3 times daily as needed for Anxiety for up to 30 days.     furosemide (LASIX) 20 MG tablet [Pharmacy Med Name: Furosemide 20 MG Oral Tablet] 30 tablet 0     Sig: Take 1 tablet by mouth daily as needed (swelling)

## 2022-03-24 ENCOUNTER — TELEPHONE (OUTPATIENT)
Dept: PRIMARY CARE CLINIC | Age: 77
End: 2022-03-24

## 2022-03-24 DIAGNOSIS — N30.01 ACUTE CYSTITIS WITH HEMATURIA: Primary | ICD-10-CM

## 2022-03-25 RX ORDER — NITROFURANTOIN 25; 75 MG/1; MG/1
100 CAPSULE ORAL 2 TIMES DAILY
Qty: 20 CAPSULE | Refills: 0 | Status: SHIPPED | OUTPATIENT
Start: 2022-03-25 | End: 2022-04-04

## 2022-03-25 RX ORDER — AZELASTINE HYDROCHLORIDE 0.5 MG/ML
SOLUTION/ DROPS OPHTHALMIC
Qty: 6 ML | Refills: 0 | Status: SHIPPED | OUTPATIENT
Start: 2022-03-25 | End: 2022-05-26

## 2022-03-25 NOTE — TELEPHONE ENCOUNTER
Requested Prescriptions     Pending Prescriptions Disp Refills    azelastine (OPTIVAR) 0.05 % ophthalmic solution [Pharmacy Med Name: Azelastine HCl 0.05 % Ophthalmic Solution] 6 mL 0     Sig: INSTILL 1 DROP INTO EACH EYE TWICE DAILY Normal vision: sees adequately in most situations; can see medication labels, newsprint

## 2022-04-07 ENCOUNTER — TELEMEDICINE (OUTPATIENT)
Dept: PRIMARY CARE CLINIC | Age: 77
End: 2022-04-07
Payer: MEDICARE

## 2022-04-07 DIAGNOSIS — F41.1 GAD (GENERALIZED ANXIETY DISORDER): ICD-10-CM

## 2022-04-07 DIAGNOSIS — G47.01 INSOMNIA DUE TO MEDICAL CONDITION: ICD-10-CM

## 2022-04-07 DIAGNOSIS — J44.9 CHRONIC OBSTRUCTIVE PULMONARY DISEASE, UNSPECIFIED COPD TYPE (HCC): Primary | ICD-10-CM

## 2022-04-07 PROCEDURE — 99442 PR PHYS/QHP TELEPHONE EVALUATION 11-20 MIN: CPT | Performed by: NURSE PRACTITIONER

## 2022-04-07 RX ORDER — CLONAZEPAM 0.5 MG/1
0.5 TABLET ORAL 3 TIMES DAILY PRN
Qty: 75 TABLET | Refills: 0 | Status: SHIPPED | OUTPATIENT
Start: 2022-04-07 | End: 2022-06-23 | Stop reason: SDUPTHER

## 2022-04-07 ASSESSMENT — ENCOUNTER SYMPTOMS
COUGH: 1
SHORTNESS OF BREATH: 1

## 2022-04-07 NOTE — PROGRESS NOTES
Trang Black (:  1945) is a Established patient, here for evaluation of the following: Anxiety    TELE-HEALTH PHONE [address]   Below is the assessment and plan developed based on review of pertinent history, physical exam, labs, studies, and medications. 1. Chronic obstructive pulmonary disease, unspecified COPD type (HCC)--continue Anoro, STOP smoking, continue oxygen  2. HAILEY (generalized anxiety disorder)  -     clonazePAM (KLONOPIN) 0.5 MG tablet; Take 1 tablet by mouth 3 times daily as needed for Anxiety for up to 30 days. , Disp-75 tablet, R-0Normal  - Continue Cymbalta 60 mg daily  3. Insomnia due to medical condition  -     clonazePAM (KLONOPIN) 0.5 MG tablet; Take 1 tablet by mouth 3 times daily as needed for Anxiety for up to 30 days. , Disp-75 tablet, R-0Normal  - Continue Trazodone    Return in about 6 weeks (around 2022), or if symptoms worsen or fail to improve. Subjective   HPI     INSOMNIA:  She typically sleeps well at night. She continues on Trazodone 150 mg and Klonopin nightly. \"I got up early this morning. \"  \"I am tired. \"  Last fill of Klonopin was 2-, #75  She is requesting refill today    COPD:  \"I am breathing pretty good. \"  Cough is not bad. She wears her oxygen sometimes. She continues to smoke 1ppd  She continues on Anoro    Review of Systems   Constitutional: Negative for fever. Respiratory: Positive for cough (chronic) and shortness of breath (chronic). Musculoskeletal: Positive for myalgias. Psychiatric/Behavioral: Positive for sleep disturbance (sleeping well with Klonopin, requesting refill today).         Objective   Patient-Reported Vitals  Patient-Reported Weight: 150  Patient-Reported Height: 5'3       Physical Exam  N/A DUE TO TELE-HEALTH PHONE CALL       On this date 2022 I have spent 13 minutes reviewing previous notes, test results and face to face (virtual) with the patient discussing the diagnosis and importance of compliance with the treatment plan as well as documenting on the day of the visit. Henry Toledo, was evaluated through a synchronous (real-time) audio-video encounter. The patient (or guardian if applicable) is aware that this is a billable service, which includes applicable co-pays. This Virtual Visit was conducted with patient's (and/or legal guardian's) consent. The visit was conducted pursuant to the emergency declaration under the [address] [place] [address] authority and the [email or web address] and [employer] Act. Patient identification was verified, and a caregiver was present when appropriate. The patient was located at home in a state where the provider was licensed to provide care.        --Cristino Mcbride, APRN

## 2022-04-13 ENCOUNTER — TELEPHONE (OUTPATIENT)
Dept: PRIMARY CARE CLINIC | Age: 77
End: 2022-04-13

## 2022-04-13 NOTE — TELEPHONE ENCOUNTER
----- Message from Geovanna Bell sent at 11/4/2020  4:03 PM CST -----  Regarding: due for prolia may 2021 stella  due for prolia may 2021 stella

## 2022-04-13 NOTE — TELEPHONE ENCOUNTER
Schedule for dexa 08/10- they may proceed with patient injection  09/08- patient no showed her dexa scan. Sent message to MM giving her the updated status of patient. Hopefully she can talk with patient during next vv.  09/29- made note for patient to be scheduled for dexa on ov 10/05 coming up with MM.  **syringe in fridge**  10/27- has had recent labs but hasnt had dexa scan. Spoke with daughter Sheila Crespo, she will talk with patient and see if she wants to do dexa. Will let us know. 1/12- left message with daughter to see if patient still wants to do prolia and if so she needs to do dexa scan.  04/13-called and left message on daughters voicemail to see if patient is still interested in prolia and if so she needs a dexa scan before we can continue. We have a syringe in the fridge. This is will be the last reminder. These are the notes from most recent communications. This will be the last reminder communication for the patient. She needs dexa scan before continuing the medication. Left message on [employer] phone.

## 2022-04-26 DIAGNOSIS — F32.9 REACTIVE DEPRESSION: ICD-10-CM

## 2022-04-26 RX ORDER — BUPROPION HYDROCHLORIDE 75 MG/1
75 TABLET ORAL 2 TIMES DAILY
Qty: 60 TABLET | Refills: 2 | Status: SHIPPED | OUTPATIENT
Start: 2022-04-26 | End: 2022-07-26 | Stop reason: SDUPTHER

## 2022-04-26 NOTE — TELEPHONE ENCOUNTER
Received fax from pharmacy requesting refill on pts medication(s). Pt was last seen in office on 4/7/2022  and has a follow up scheduled for 5/19/2022. Will send request to  [facility]  for authorization.      Requested Prescriptions     Pending Prescriptions Disp Refills    buPROPion (WELLBUTRIN) 75 MG tablet [Pharmacy Med Name: buPROPion HCl 75 MG Oral Tablet] 60 tablet 2     Sig: Take 1 tablet by mouth 2 times daily

## 2022-05-09 DIAGNOSIS — J44.9 CHRONIC OBSTRUCTIVE PULMONARY DISEASE, UNSPECIFIED COPD TYPE (HCC): ICD-10-CM

## 2022-05-09 RX ORDER — ALBUTEROL SULFATE 90 UG/1
AEROSOL, METERED RESPIRATORY (INHALATION)
Qty: 18 G | Refills: 0 | Status: SHIPPED | OUTPATIENT
Start: 2022-05-09 | End: 2022-06-10

## 2022-05-09 NOTE — TELEPHONE ENCOUNTER
Received fax from pharmacy requesting refill on pts medication(s). Pt was last seen in office on 4/7/2022  and has a follow up scheduled for 5/19/2022. Will send request to  [facility]  for authorization.      Requested Prescriptions     Pending Prescriptions Disp Refills    albuterol sulfate  (90 Base) MCG/ACT inhaler [Pharmacy Med Name: Albuterol Sulfate  (90 Base) MCG/ACT Inhalation Aerosol Solution] 18 g 0     Sig: INHALE 2 PUFFS BY MOUTH INTO LUNGS EVERY 6 HOURS AS NEEDED FOR WHEEZING 76

## 2022-05-19 ENCOUNTER — TELEMEDICINE (OUTPATIENT)
Dept: PRIMARY CARE CLINIC | Age: 77
End: 2022-05-19
Payer: MEDICARE

## 2022-05-19 DIAGNOSIS — J44.9 CHRONIC OBSTRUCTIVE PULMONARY DISEASE, UNSPECIFIED COPD TYPE (HCC): Primary | ICD-10-CM

## 2022-05-19 DIAGNOSIS — F11.20 CHRONIC NARCOTIC DEPENDENCE (HCC): ICD-10-CM

## 2022-05-19 DIAGNOSIS — F41.1 GAD (GENERALIZED ANXIETY DISORDER): ICD-10-CM

## 2022-05-19 PROCEDURE — 99213 OFFICE O/P EST LOW 20 MIN: CPT | Performed by: NURSE PRACTITIONER

## 2022-05-19 PROCEDURE — G8399 PT W/DXA RESULTS DOCUMENT: HCPCS | Performed by: NURSE PRACTITIONER

## 2022-05-19 PROCEDURE — 4040F PNEUMOC VAC/ADMIN/RCVD: CPT | Performed by: NURSE PRACTITIONER

## 2022-05-19 PROCEDURE — 1123F ACP DISCUSS/DSCN MKR DOCD: CPT | Performed by: NURSE PRACTITIONER

## 2022-05-19 PROCEDURE — 1090F PRES/ABSN URINE INCON ASSESS: CPT | Performed by: NURSE PRACTITIONER

## 2022-05-19 PROCEDURE — G8427 DOCREV CUR MEDS BY ELIG CLIN: HCPCS | Performed by: NURSE PRACTITIONER

## 2022-05-19 ASSESSMENT — ENCOUNTER SYMPTOMS
COUGH: 1
SHORTNESS OF BREATH: 1

## 2022-05-19 NOTE — PROGRESS NOTES
Angie Norton (:  1945) is a Established patient, here for evaluation of the following: COPD    DOXY. ME    Assessment & Plan   Below is the assessment and plan developed based on review of pertinent history, physical exam, labs, studies, and medications. 1. Chronic obstructive pulmonary disease, unspecified COPD type ([facility] [address]-- continue Anoro daily. Recommend albuterol nebulized breathing treatments when needed. Recommend smoking cessation. Recommend patient wear oxygen 24 hours a day. Discussed with patient that increased fatigue may be secondary to hypoxia therefore highly recommend patient wear oxygen 24 hours a day. 2. Chronic narcotic dependence (HCC)--recommend smoking cessation  3. HAILEY (generalized anxiety disorder)--stable. Continue Cymbalta 60 mg daily and Klonopin as needed    Return in about 1 month (around 2022), or if symptoms worsen or fail to improve. Subjective   HPI     COPD:  \"I have my oxygen on. \"  \"I am breathing pretty good. \"  \"I have breathing spells where I can't breath. \"  She continues on Anoro daily. She is not using any nebulized breathing treatment  \"I am sleeping too much. \"  She is smoking more cigarettes    MOODS:  Stable  She continues on Wellbutrin 75 mg BID, Klonopin prn and Cymbalta 60 mg daily    Denies urinary symptoms  Denies confusion    Review of Systems   Constitutional: Positive for fatigue. Negative for fever. Respiratory: Positive for cough (chronic) and shortness of breath (chronic). Musculoskeletal: Positive for myalgias. Psychiatric/Behavioral: Positive for sleep disturbance (sleeping well with Klonopin, requesting refill today).         Objective   Patient-Reported Vitals  No data recorded     Physical Exam  [INSTRUCTIONS:  \"[x]\" Indicates a positive item  \"[]\" Indicates a negative item  -- DELETE ALL ITEMS NOT EXAMINED]    Constitutional: [] Appears well-developed and well-nourished [x] No apparent distress      [x] Abnormal - chronically ill    Mental status: [x] Alert and awake  [x] Oriented to person/place/time [x] Able to follow commands    [] Abnormal -     Eyes:   EOM    [x]  Normal    [] Abnormal -   Sclera  [x]  Normal    [] Abnormal -          Discharge [x]  None visible   [] Abnormal -     HENT: [x] Normocephalic, atraumatic  [] Abnormal -   [] Mouth/Throat: Mucous membranes are moist    External Ears [x] Normal  [] Abnormal -    Neck: [x] No visualized mass [] Abnormal -     Pulmonary/Chest: [x] Respiratory effort normal   [x] No visualized signs of difficulty breathing or respiratory distress        [] Abnormal -      Musculoskeletal:   [] Normal gait with no signs of ataxia         [x] Normal range of motion of neck        [] Abnormal -     Neurological:        [x] No Facial Asymmetry (Cranial nerve 7 motor function) (limited exam due to video visit)          [] No gaze palsy        [] Abnormal -          Skin:        [x] No significant exanthematous lesions or discoloration noted on facial skin         [] Abnormal -            Psychiatric:       [x] Normal Affect [] Abnormal -        [] No Hallucinations    Other pertinent observable physical exam findings:-               Oscar Bello, was evaluated through a synchronous (real-time) audio-video encounter. The patient (or guardian if applicable) is aware that this is a billable service, which includes applicable co-pays. This Virtual Visit was conducted with patient's (and/or legal guardian's) consent. The visit was conducted pursuant to the emergency declaration under the [address] [place] [address] authority and the [employer] General Act. Patient identification was verified, and a caregiver was present when appropriate. The patient was located at home in a state where the provider was licensed to provide care.        --NAVEEN Isaacs

## 2022-05-26 RX ORDER — AZELASTINE HYDROCHLORIDE 0.5 MG/ML
1 SOLUTION/ DROPS OPHTHALMIC 2 TIMES DAILY
Qty: 6 ML | Refills: 1 | Status: SHIPPED | OUTPATIENT
Start: 2022-05-26 | End: 2022-08-25 | Stop reason: SDUPTHER

## 2022-05-26 NOTE — TELEPHONE ENCOUNTER
Received fax from pharmacy requesting refill on pts medication(s). Pt was last seen in office on 5/19/2022  and has a follow up scheduled for 6/23/2022. Will send request to  [facility]  for authorization.      Requested Prescriptions     Pending Prescriptions Disp Refills    azelastine (OPTIVAR) 0.05 % ophthalmic solution [Pharmacy Med Name: Azelastine HCl 0.05 % Ophthalmic Solution] 6 mL 1     Sig: Place 1 drop into both eyes 2 times daily

## 2022-06-10 ENCOUNTER — HOSPITAL ENCOUNTER (EMERGENCY)
Age: 77
Discharge: HOME | End: 2022-06-10
Payer: MEDICARE

## 2022-06-10 VITALS
DIASTOLIC BLOOD PRESSURE: 81 MMHG | TEMPERATURE: 97.8 F | OXYGEN SATURATION: 100 % | SYSTOLIC BLOOD PRESSURE: 98 MMHG | RESPIRATION RATE: 15 BRPM | HEART RATE: 81 BPM

## 2022-06-10 DIAGNOSIS — R19.7 DIARRHEA, UNSPECIFIED TYPE: Primary | ICD-10-CM

## 2022-06-10 DIAGNOSIS — J44.9 CHRONIC OBSTRUCTIVE PULMONARY DISEASE, UNSPECIFIED COPD TYPE (HCC): ICD-10-CM

## 2022-06-10 DIAGNOSIS — K62.5 RECTAL BLEEDING: ICD-10-CM

## 2022-06-10 LAB
ALBUMIN SERPL-MCNC: 4 G/DL (ref 3.5–5.2)
ALP BLD-CCNC: 91 U/L (ref 35–104)
ALT SERPL-CCNC: 7 U/L (ref 5–33)
ANION GAP SERPL CALCULATED.3IONS-SCNC: 8 MMOL/L (ref 7–19)
AST SERPL-CCNC: 14 U/L (ref 5–32)
BACTERIA: NEGATIVE /HPF
BASOPHILIC STIPPLING: ABNORMAL
BASOPHILS ABSOLUTE: 0 K/UL (ref 0–0.2)
BASOPHILS RELATIVE PERCENT: 0.3 % (ref 0–1)
BILIRUB SERPL-MCNC: <0.2 MG/DL (ref 0.2–1.2)
BILIRUBIN URINE: NEGATIVE
BLOOD, URINE: ABNORMAL
BUN BLDV-MCNC: 22 MG/DL (ref 8–23)
CALCIUM SERPL-MCNC: 9.3 MG/DL (ref 8.8–10.2)
CHLORIDE BLD-SCNC: 101 MMOL/L (ref 98–111)
CLARITY: CLEAR
CO2: 29 MMOL/L (ref 22–29)
COLOR: YELLOW
CREAT SERPL-MCNC: 1.3 MG/DL (ref 0.5–0.9)
CRYSTALS, UA: ABNORMAL /HPF
EOSINOPHILS ABSOLUTE: 0.3 K/UL (ref 0–0.6)
EOSINOPHILS RELATIVE PERCENT: 2.3 % (ref 0–5)
EPITHELIAL CELLS, UA: 1 /HPF (ref 0–5)
GFR AFRICAN AMERICAN: 48
GFR NON-AFRICAN AMERICAN: 40
GLUCOSE BLD-MCNC: 103 MG/DL (ref 74–109)
GLUCOSE URINE: NEGATIVE MG/DL
HCT VFR BLD CALC: 36.5 % (ref 37–47)
HEMOGLOBIN: 10.5 G/DL (ref 12–16)
HYALINE CASTS: 1 /HPF (ref 0–8)
HYPOCHROMIA: ABNORMAL
IMMATURE GRANULOCYTES #: 0 K/UL
INR BLD: 1 (ref 0.88–1.18)
KETONES, URINE: NEGATIVE MG/DL
LEUKOCYTE ESTERASE, URINE: NEGATIVE
LIPASE: 10 U/L (ref 13–60)
LYMPHOCYTES ABSOLUTE: 0.8 K/UL (ref 1.1–4.5)
LYMPHOCYTES RELATIVE PERCENT: 7.1 % (ref 20–40)
MACROCYTES: ABNORMAL
MCH RBC QN AUTO: 29.8 PG (ref 27–31)
MCHC RBC AUTO-ENTMCNC: 28.8 G/DL (ref 33–37)
MCV RBC AUTO: 103.7 FL (ref 81–99)
MONOCYTES ABSOLUTE: 0.7 K/UL (ref 0–0.9)
MONOCYTES RELATIVE PERCENT: 6.1 % (ref 0–10)
NEUTROPHILS ABSOLUTE: 9.3 K/UL (ref 1.5–7.5)
NEUTROPHILS RELATIVE PERCENT: 83.9 % (ref 50–65)
NITRITE, URINE: NEGATIVE
PDW BLD-RTO: 13.6 % (ref 11.5–14.5)
PH UA: 5 (ref 5–8)
PLATELET # BLD: 202 K/UL (ref 130–400)
PLATELET SLIDE REVIEW: ADEQUATE
PMV BLD AUTO: 10.2 FL (ref 9.4–12.3)
POTASSIUM REFLEX MAGNESIUM: 5.1 MMOL/L (ref 3.5–5)
PROTEIN UA: NEGATIVE MG/DL
PROTHROMBIN TIME: 13.1 SEC (ref 12–14.6)
RBC # BLD: 3.52 M/UL (ref 4.2–5.4)
RBC UA: 1 /HPF (ref 0–4)
SODIUM BLD-SCNC: 138 MMOL/L (ref 136–145)
SPECIFIC GRAVITY UA: 1.01 (ref 1–1.03)
TOTAL PROTEIN: 6.9 G/DL (ref 6.6–8.7)
UROBILINOGEN, URINE: 0.2 E.U./DL
WBC # BLD: 11.1 K/UL (ref 4.8–10.8)
WBC UA: 0 /HPF (ref 0–5)

## 2022-06-10 PROCEDURE — 83690 ASSAY OF LIPASE: CPT

## 2022-06-10 PROCEDURE — 36415 COLL VENOUS BLD VENIPUNCTURE: CPT

## 2022-06-10 PROCEDURE — 80053 COMPREHEN METABOLIC PANEL: CPT

## 2022-06-10 PROCEDURE — 85610 PROTHROMBIN TIME: CPT

## 2022-06-10 PROCEDURE — 2580000003 HC RX 258: Performed by: NURSE PRACTITIONER

## 2022-06-10 PROCEDURE — 99284 EMERGENCY DEPT VISIT MOD MDM: CPT

## 2022-06-10 PROCEDURE — 81001 URINALYSIS AUTO W/SCOPE: CPT

## 2022-06-10 PROCEDURE — 85025 COMPLETE CBC W/AUTO DIFF WBC: CPT

## 2022-06-10 RX ORDER — ALBUTEROL SULFATE 90 UG/1
AEROSOL, METERED RESPIRATORY (INHALATION)
Qty: 18 G | Refills: 0 | Status: SHIPPED | OUTPATIENT
Start: 2022-06-10 | End: 2022-07-11

## 2022-06-10 RX ORDER — 0.9 % SODIUM CHLORIDE 0.9 %
500 INTRAVENOUS SOLUTION INTRAVENOUS ONCE
Status: COMPLETED | OUTPATIENT
Start: 2022-06-10 | End: 2022-06-10

## 2022-06-10 RX ADMIN — SODIUM CHLORIDE 500 ML: 9 INJECTION, SOLUTION INTRAVENOUS at 20:43

## 2022-06-10 ASSESSMENT — ENCOUNTER SYMPTOMS
ABDOMINAL PAIN: 0
DIARRHEA: 1
SHORTNESS OF BREATH: 0
BLOOD IN STOOL: 1
VOMITING: 0

## 2022-06-10 NOTE — TELEPHONE ENCOUNTER
Received fax from pharmacy requesting refill on pts medication(s). Pt was last seen in office on 5/19/2022  and has a follow up scheduled for 6/15/2022. Will send request to  [facility]  for authorization.      Requested Prescriptions     Pending Prescriptions Disp Refills    albuterol sulfate HFA (PROVENTIL;VENTOLIN;PROAIR) 108 (90 Base) MCG/ACT inhaler [Pharmacy Med Name: Albuterol Sulfate  (90 Base) MCG/ACT Inhalation Aerosol Solution] 18 g 0     Sig: INHALE 2 PUFFS BY MOUTH EVERY 6 HOURS AS NEEDED FOR WHEEZING

## 2022-06-11 NOTE — ED PROVIDER NOTES
[facility] EMERGENCY DEPT  eMERGENCY dEPARTMENT eNCOUnter      Pt Name: Sandy Bence  MRN: [id number]  [place] 1945  Date of evaluation: 6/10/2022  Provider: Bennett Jane, [address]       Chief Complaint   Patient presents with    Rectal Bleeding    Diarrhea         HISTORY OF PRESENT ILLNESS   (Location/Symptom, Timing/Onset,Context/Setting, Quality, Duration, Modifying Factors, Severity)  Note limiting factors. Sandy Bence is a 69 y.o. female who presents to the emergency department with diarrhea that has had some bright red blood. Not on blood thinners. LIves with her daughter. No abd pain. HAd a colonsocopy years ago. Daughter gave her immodium. Has not been on antibiotics recently    The history is provided by the patient. Diarrhea  Quality:  Blood-tinged  Severity:  Moderate  Onset quality:  Sudden  Duration:  1 day  Timing:  Constant  Associated symptoms: no abdominal pain, no fever, no URI and no vomiting    Risk factors: no recent antibiotic use        NursingNotes were reviewed. REVIEW OF SYSTEMS    (2-9 systems for level 4, 10 or more for level 5)     Review of Systems   Constitutional: Negative for fever. Respiratory: Negative for shortness of breath. Gastrointestinal: Positive for blood in stool and diarrhea. Negative for abdominal pain and vomiting. Except as noted above the remainder of the review of systems was reviewed and negative.        PAST MEDICAL HISTORY     Past Medical History:   Diagnosis Date    Abdominal pain     Anxiety     Arthritis     CAD in native artery     Cerebrovascular disease     CHF (congestive heart failure) (HCC)     Constipation     COPD (chronic obstructive pulmonary disease) (HCC)     Depression     Emphysema     GERD (gastroesophageal reflux disease)     Myocardial infarct, old     Palliative care patient 02/20/2020    Pneumonia     Tobacco abuse          SURGICALHISTORY       Past Surgical History:   Procedure Laterality Date    ABDOMINAL EXPLORATION SURGERY      APPENDECTOMY      CARDIAC CATHETERIZATION  06/25/10    patent  stent noted in LAD,EF is estimated to be 60%    CARDIAC CATHETERIZATION  7/13/12  MDL    EF  60%    CHOLECYSTECTOMY      COLONOSCOPY  10/2014    Dr Sharan Flaherty UPPER GASTROINTESTINAL ENDOSCOPY  10/1/14    Dr Quinn [place]: food bezoar; esophagitis, gastritis, duodenitis; biopsies neg h-pylori         CURRENT MEDICATIONS       Discharge Medication List as of 6/10/2022 11:17 PM      CONTINUE these medications which have NOT CHANGED    Details   albuterol sulfate HFA (PROVENTIL;VENTOLIN;PROAIR) 108 (90 Base) MCG/ACT inhaler INHALE 2 PUFFS BY MOUTH EVERY 6 HOURS AS NEEDED FOR WHEEZING, Disp-18 g, R-0Normal      azelastine (OPTIVAR) 0.05 % ophthalmic solution Place 1 drop into both eyes 2 times daily, Disp-6 mL, R-1Normal      buPROPion (WELLBUTRIN) 75 MG tablet Take 1 tablet by mouth 2 times daily, Disp-60 tablet, R-2Normal      clonazePAM (KLONOPIN) 0.5 MG tablet Take 1 tablet by mouth 3 times daily as needed for Anxiety for up to 30 days. , Disp-75 tablet, R-0Normal      furosemide (LASIX) 20 MG tablet Take 1 tablet by mouth daily as needed (swelling), Disp-30 tablet, R-1Normal      umeclidinium-vilanterol (ANORO ELLIPTA) 62.5-25 MCG/INH AEPB inhaler Inhale 1 puff into the lungs daily, Disp-3 each, R-3Normal      Respiratory Therapy Supplies (NEBULIZER/TUBING/MOUTHPIECE) KIT DAILY PRN Starting Fri 3/11/2022, Disp-1 kit, R-0, Print      ondansetron (ZOFRAN) 4 MG tablet Take 1 tablet by mouth 3 times daily as needed for Nausea or Vomiting, Disp-30 tablet, R-1Normal      MICONAZOLE NITRATE VAGINAL (MONISTAT 3) 4 % CREA Place 1 Dose vaginally at bedtime, Disp-1 each, R-1Normal      NARCAN 4 MG/0.1ML LIQD nasal spray DAWHistorical Med      DULoxetine (CYMBALTA) 60 MG extended release capsule Take 1 capsule by mouth daily, Disp-30 capsule, R-11Normal Fesoterodine Fumarate ER (TOVIAZ) 8 MG TB24 Take 1 tablet by mouth daily, Disp-90 tablet, R-3Normal      denosumab (PROLIA) 60 MG/ML SOSY SC injection Inject 1 mL into the skin once for 1 dose, Disp-1 mL, R-0Normal      traZODone (DESYREL) 150 MG tablet Take 1 tablet by mouth nightly, Disp-90 tablet, R-3$Normal      carvedilol (COREG) 3.125 MG tablet Take 1 tablet by mouth 2 times daily (with meals), Disp-60 tablet, R-11Normal      donepezil (ARICEPT) 5 MG tablet Take 1 tablet by mouth nightly, Disp-90 tablet, R-3Normal      atorvastatin (LIPITOR) 20 MG tablet Take 1 tablet by mouth daily, Disp-30 tablet, R-11Normal      pantoprazole (PROTONIX) 40 MG tablet Take 1 tablet by mouth daily, Disp-30 tablet, R-11Normal      memantine (NAMENDA) 10 MG tablet Take 1 tablet by mouth 2 times daily, Disp-60 tablet, R-11Normal      isosorbide mononitrate (IMDUR) 30 MG extended release tablet Take 1 tablet by mouth daily, Disp-30 tablet, R-11Normal      gabapentin (NEURONTIN) 100 MG capsule 100 mg 3 times daily. Through pain mgmt at [address] [facility]      nitroGLYCERIN (NITROSTAT) 0.4 MG SL tablet Place 1 tablet under the tongue every 5 minutes as needed for Chest pain, Disp-25 tablet, R-3Normal      HYDROcodone-acetaminophen (NORCO)  MG per tablet Take 1 tablet by mouth every 6 hours as needed for Pain.  Pain mgmt at [facility] 2 L into the lungs daily, Disp-1 each, R-11Print             ALLERGIES     Codeine and Sulfa antibiotics    FAMILY HISTORY       Family History   Problem Relation Age of Onset    Stroke Brother     Stroke Sister     Cancer Brother     Cancer Sister     Diabetes Brother     Diabetes Sister     Coronary Art Dis Other     Hypertension Other     Seizures Child     Colon Cancer Neg Hx     Colon Polyps Neg Hx     Esophageal Cancer Neg Hx     Liver Cancer Neg Hx     Liver Disease Neg Hx     Rectal Cancer Neg Hx     Stomach Cancer Neg Hx           SOCIAL HISTORY Social History     Socioeconomic History    Marital status:      Spouse name: Not on file    Number of children: Not on file    Years of education: Not on file    Highest education level: Not on file   Occupational History    Not on file   Tobacco Use    Smoking status: Current Every Day Smoker     Packs/day: 0.50     Years: 44.00     Pack years: 22.00     Types: Cigarettes     Start date: 1    Smokeless tobacco: Never Used   Substance and Sexual Activity    Alcohol use: No     Alcohol/week: 0.0 standard drinks    Drug use: No    Sexual activity: Not on file   Other Topics Concern    Not on file   Social History Narrative    Not on file     Social Determinants of Health     Financial Resource Strain:     Difficulty of Paying Living Expenses: Not on file   Food Insecurity:     Worried About [address] in the Last Year: Not on file    Aaliyah of Food in the Last Year: Not on file   Transportation Needs:     Lack of Transportation (Medical): Not on file    Lack of Transportation (Non-Medical):  Not on file   Physical Activity: Inactive    Days of Exercise per Week: 0 days    Minutes of Exercise per Session: 0 min   Stress:     Feeling of Stress : Not on file   Social Connections:     Frequency of Communication with Friends and Family: Not on file    Frequency of Social Gatherings with Friends and Family: Not on file    Attends Sikhism Services: Not on file    Active Member of Clubs or Organizations: Not on file    Attends Club or Organization Meetings: Not on file    Marital Status: Not on file   Intimate Partner Violence:     Fear of Current or Ex-Partner: Not on file    Emotionally Abused: Not on file    Physically Abused: Not on file    Sexually Abused: Not on file   Housing Stability:     Unable to Pay for Housing in the Last Year: Not on file    Number of [place] in the Last Year: Not on file    Unstable Housing in the Last Year: Not on file SCREENINGS    [place]ody Coma Scale  Eye Opening: To speech (Pt is lethargic. )  Best Verbal Response: Oriented  Best Motor Response: Obeys commands  [place] Coma Scale Score: 14 @FLOW([id number])@      PHYSICAL EXAM    (up to 7 for level 4, 8 or more for level 5)     ED Triage Vitals [06/10/22 1806]   BP Temp Temp src Heart Rate Resp SpO2 Height Weight   122/78 97.7 °F (36.5 °C) -- 87 16 94 % -- --       Physical Exam  Vitals and nursing note reviewed. Exam conducted with a chaperone present. Constitutional:       Appearance: She is well-developed. HENT:      Head: Normocephalic and atraumatic. Eyes:      General: No scleral icterus. Right eye: No discharge. Left eye: No discharge. Cardiovascular:      Rate and Rhythm: Normal rate and regular rhythm. Heart sounds: Normal heart sounds. Pulmonary:      Effort: No respiratory distress. Abdominal:      General: Abdomen is protuberant. Palpations: Abdomen is soft. Tenderness: There is no abdominal tenderness. Genitourinary:     Rectum: Normal. Guaiac result negative. No tenderness or external hemorrhoid. Musculoskeletal:      Cervical back: Normal range of motion and neck supple. Neurological:      Mental Status: She is alert.    Psychiatric:         Behavior: Behavior normal.         DIAGNOSTIC RESULTS     EKG: All EKG's are interpreted by the Emergency Department Physician who either signs or Co-signsthis chart in the absence of a cardiologist.        RADIOLOGY:   Cody Bound such as CT, Ultrasound and MRI are read by the radiologist. Plain radiographic images are visualized and preliminarily interpreted by the emergency physician with the below findings:      Interpretation per the Radiologist below, if available at the time of this note:    No orders to display         ED BEDSIDEULTRASOUND:   Performed by ED Physician -none    LABS:  Labs Reviewed   CBC WITH AUTO DIFFERENTIAL - Abnormal; Notable for the following components:       Result Value    WBC 11.1 (*)     RBC 3.52 (*)     Hemoglobin 10.5 (*)     Hematocrit 36.5 (*)     .7 (*)     MCHC 28.8 (*)     Neutrophils % 83.9 (*)     Lymphocytes % 7.1 (*)     Neutrophils Absolute 9.3 (*)     Lymphocytes Absolute 0.8 (*)     Macrocytes 1+ (*)     Hypochromia 2+ (*)     Basophilic Stippling Occasional (*)     All other components within normal limits   COMPREHENSIVE METABOLIC PANEL W/ REFLEX TO MG FOR LOW K - Abnormal; Notable for the following components:    Potassium reflex Magnesium 5.1 (*)     CREATININE 1.3 (*)     GFR Non- 40 (*)     GFR  48 (*)     All other components within normal limits   LIPASE - Abnormal; Notable for the following components:    Lipase 10 (*)     All other components within normal limits   URINALYSIS WITH REFLEX TO CULTURE - Abnormal; Notable for the following components:    Blood, Urine SMALL (*)     All other components within normal limits   MICROSCOPIC URINALYSIS - Abnormal; Notable for the following components:    Bacteria, UA NEGATIVE (*)     Crystals, UA NEG (*)     All other components within normal limits   PROTIME-INR       All other labs were within normal range or not returned as of this dictation. EMERGENCY DEPARTMENT COURSE and DIFFERENTIALDIAGNOSIS/MDM:   Vitals:    Vitals:    06/10/22 1806 06/10/22 1845 06/10/22 2141   BP: 122/78 (!) 128/104 98/81   Pulse: 87 81 81   Resp: 16 16 15   Temp: 97.7 °F (36.5 °C) 97.8 °F (36.6 °C)    SpO2: 94% 93% 100%           MDM  Daughter cautioned to monitor and if diarrhea continues she will need stool studies. Also if blood continues or worsens she may return here. NO blood on rectal exam and guaic now. No UTI      CONSULTS:  None    PROCEDURES:  Unless otherwise noted below, none     Procedures    FINAL IMPRESSION      1. Diarrhea, unspecified type    2.  Rectal bleeding        DISPOSITION/PLAN   DISPOSITION Decision To Discharge 06/10/2022 10:51:16 PM      PATIENT REFERRED TO:  No follow-up provider specified.     DISCHARGE MEDICATIONS:  Discharge Medication List as of 6/10/2022 11:17 PM             (Please note that portions of this note were completed with a voice recognitionprogram.  Efforts were made to edit the dictations but occasionally words are mis-transcribed.)    NAVEEN Montoya (electronically signed)          NAVEEN Montoya  06/11/22 0930

## 2022-06-15 ENCOUNTER — OFFICE VISIT (OUTPATIENT)
Dept: PRIMARY CARE CLINIC | Age: 77
End: 2022-06-15
Payer: MEDICARE

## 2022-06-15 DIAGNOSIS — Z91.81 AT HIGH RISK FOR FALLS: ICD-10-CM

## 2022-06-15 DIAGNOSIS — R19.7 DIARRHEA OF PRESUMED INFECTIOUS ORIGIN: Primary | ICD-10-CM

## 2022-06-15 PROCEDURE — 99442 PR PHYS/QHP TELEPHONE EVALUATION 11-20 MIN: CPT | Performed by: NURSE PRACTITIONER

## 2022-06-15 ASSESSMENT — ENCOUNTER SYMPTOMS
NAUSEA: 0
DIARRHEA: 1
ABDOMINAL PAIN: 0
BLOOD IN STOOL: 0
VOMITING: 0

## 2022-06-15 NOTE — PROGRESS NOTES
Trang Black (:  1945) is a 69 y.o. female,Established patient, here for evaluation of the following chief complaint(s):  Diarrhea (started about 1 month ago)      ASSESSMENT/PLAN:    ICD-10-CM    1. Diarrhea of presumed infectious origin  R19.7 Culture, Stool     Ova and parasite screen     Clostridium Difficile Toxin/Antigen    Long discussion with patient and her daughter the importance of obtaining stool cultures so we can further evaluate what is going on. This visit was over the telephone. Patient's daughter reports she will come in to obtain supplies to collect stools. BMP     Decrease caffeine  Increase water  BRAT diet  Increase fiber       Return if symptoms worsen or fail to improve. SUBJECTIVE/OBJECTIVE:  HPI     Patient started with diarrhea about a month ago. She was seen in the ED on 6-  Patient is refusing a colonoscopy. Stool is very loose. Yesterday, she had 3 episodes of loose stool. Today, she has not had any episodes of diarrhea. Denies fever  Denies abdominal pain. Denies N or V  Denies blood in stool. Denies any new medications    She has been eating saltine crackers    6-  CMP: Sodium: 138, Potassium: 5.1, Creat: 1.3, BUN: 22, LFT: WNL    There were no vitals taken for this visit. Review of Systems   Constitutional: Negative for fever. Gastrointestinal: Positive for diarrhea. Negative for abdominal pain, blood in stool, nausea and vomiting. Physical Exam  N/A DUE TO TELE-HEALTH PHONE CALL    On this date 6/15/2022 I have spent 15 minutes reviewing previous notes, test results and face to face with the patient discussing the diagnosis and importance of compliance with the treatment plan as well as documenting on the day of the visit. An electronic signature was used to authenticate this note.     --NAVEEN Morales

## 2022-06-15 NOTE — PATIENT INSTRUCTIONS
Patient Education        Soft-Textured, Joann Arena Diet: Care Instructions  Your Care Instructions     A soft-textured, bland diet is used when you need food that is easy to chew, swallow, and digest. You will need to choose soft foods that are low in spices and seasonings. You will need to avoid high-fat foods, as well as caffeine and alcohol. Your doctor or dietitian can help you plan a soft-textured, bland diet based on your health and what you prefer to eat. Ask your doctor how long you should stay on this diet. As you get better, you will probably be able to go back to a regular diet. Talk with your doctor or dietitian before you make changes in your diet. Follow-up care is a key part of your treatment and safety. Be sure to make and go to all appointments, and call your doctor if you are having problems. It's also a good idea to know your test results and keep a list of the medicines you take. How can you care for yourself at home? · Choose foods that are easy to chew and swallow. Good choices are mashed potatoes, soft breads and rolls, cream soups, oatmeal, and Cream of Wheat. · Choose soft, well-cooked vegetables and soft or canned fruits. Good choices are applesauce, ripe bananas, and non-citrus fruit juice. · Try milk, yogurt, or other milk products, if you can digest dairy without too many problems. Your doctor may limit milk and milk products for a while. If so, he or she may recommend a calcium and vitamin D supplement. · Choose soft protein foods such as eggs, tofu, steamed fish, chicken, and turkey. Slow-cooking methods, such as stewing, will help soften meat. Chopping meat in a  or  also will make it easier to eat. · Avoid nuts, raw vegetables, hard crackers, tough meats, and prunes and prune juice. · Avoid foods that are very spicy, such as foods seasoned with black pepper, chili peppers, horseradish, or hot sauce.   · Avoid highly acidic foods such as citrus fruits, citrus fruit juices, and tomato-based foods. · Avoid high-fat foods such as fried meat, chips, and rich desserts. · Check with your doctor before you drink alcohol or beverages that have caffeine, such as coffee, tea, and cola beverages. Where can you learn more? Go to https://chpepiceweb.health"[employer]. org and sign in to your [facility] [employer] account. Enter X652 in the Flashstarts box to learn more about \"Soft-Textured, [place] Diet: Care Instructions. \"     If you do not have an account, please click on the \"Sign Up Now\" link. Current as of: September 8, 2021               Content Version: 13.1  © 2006-2021 [employer]. Care instructions adapted under license by [facility] ([facility]). If you have questions about a medical condition or this instruction, always ask your healthcare professional. [address] any warranty or liability for your use of this information.

## 2022-06-23 ENCOUNTER — OFFICE VISIT (OUTPATIENT)
Dept: PRIMARY CARE CLINIC | Age: 77
End: 2022-06-23
Payer: MEDICARE

## 2022-06-23 VITALS
TEMPERATURE: 97.5 F | DIASTOLIC BLOOD PRESSURE: 60 MMHG | WEIGHT: 146.13 LBS | HEART RATE: 66 BPM | OXYGEN SATURATION: 89 % | SYSTOLIC BLOOD PRESSURE: 100 MMHG | BODY MASS INDEX: 27.59 KG/M2 | HEIGHT: 61 IN

## 2022-06-23 DIAGNOSIS — G47.01 INSOMNIA DUE TO MEDICAL CONDITION: ICD-10-CM

## 2022-06-23 DIAGNOSIS — J44.9 CHRONIC OBSTRUCTIVE PULMONARY DISEASE, UNSPECIFIED COPD TYPE (HCC): Primary | ICD-10-CM

## 2022-06-23 DIAGNOSIS — M54.16 LUMBAR BACK PAIN WITH RADICULOPATHY AFFECTING RIGHT LOWER EXTREMITY: ICD-10-CM

## 2022-06-23 DIAGNOSIS — Z95.828 PORT-A-CATH IN PLACE: ICD-10-CM

## 2022-06-23 DIAGNOSIS — F41.1 GAD (GENERALIZED ANXIETY DISORDER): ICD-10-CM

## 2022-06-23 PROCEDURE — G8399 PT W/DXA RESULTS DOCUMENT: HCPCS | Performed by: NURSE PRACTITIONER

## 2022-06-23 PROCEDURE — G8427 DOCREV CUR MEDS BY ELIG CLIN: HCPCS | Performed by: NURSE PRACTITIONER

## 2022-06-23 PROCEDURE — 3023F SPIROM DOC REV: CPT | Performed by: NURSE PRACTITIONER

## 2022-06-23 PROCEDURE — 99214 OFFICE O/P EST MOD 30 MIN: CPT | Performed by: NURSE PRACTITIONER

## 2022-06-23 PROCEDURE — G8417 CALC BMI ABV UP PARAM F/U: HCPCS | Performed by: NURSE PRACTITIONER

## 2022-06-23 PROCEDURE — 4004F PT TOBACCO SCREEN RCVD TLK: CPT | Performed by: NURSE PRACTITIONER

## 2022-06-23 PROCEDURE — 1090F PRES/ABSN URINE INCON ASSESS: CPT | Performed by: NURSE PRACTITIONER

## 2022-06-23 PROCEDURE — 1123F ACP DISCUSS/DSCN MKR DOCD: CPT | Performed by: NURSE PRACTITIONER

## 2022-06-23 PROCEDURE — 96523 IRRIG DRUG DELIVERY DEVICE: CPT | Performed by: NURSE PRACTITIONER

## 2022-06-23 RX ORDER — CLONAZEPAM 0.5 MG/1
0.5 TABLET ORAL 3 TIMES DAILY PRN
Qty: 75 TABLET | Refills: 0 | Status: SHIPPED | OUTPATIENT
Start: 2022-06-23 | End: 2022-08-25 | Stop reason: SDUPTHER

## 2022-06-23 ASSESSMENT — ENCOUNTER SYMPTOMS
COUGH: 1
BACK PAIN: 1
SHORTNESS OF BREATH: 1

## 2022-06-23 NOTE — PROGRESS NOTES
Velia Morel (:  1945) is a 69 y.o. female,Established patient, here for evaluation of the following chief complaint(s):  Medication Refill      ASSESSMENT/PLAN:    ICD-10-CM    1. Chronic obstructive pulmonary disease, unspecified COPD type (HCC)  J44.9 Continue Anoro daily  Continue Albuterol prn  Continue oxygen usage   2. HAILEY (generalized anxiety disorder)  F41.1 clonazePAM (KLONOPIN) 0.5 MG tablet  Continue Cymbalta 60 mg daily   3. Insomnia due to medical condition  G47.01 clonazePAM (KLONOPIN) 0.5 MG tablet   4. Lumbar back pain with radiculopathy affecting right lower extremity  M54.16 Continue with pain mgt   5. Port-A-Cath in place  Z95.828 FL IRRIG IMPLANTED DRUG DELIVERY DEVICE       Return in about 1 month (around 2022), or if symptoms worsen or fail to improve. SUBJECTIVE/OBJECTIVE:  HPI     DIARRHEA:  Resolved. She reported a normal BM 3 days ago. CHRONIC BACK PAIN:  Reports chronic low back pain and right leg pain. Pain mgt has switched her pain pills. \"It goes into affect July 3.\"  She will then be taking Percocet 10 in place of Norco 10. Her Neurontin was increased by pain mgt to 200 mg BID (Waiting on prior authorization)    COPD:  She continues on Anoro and albuterol prn. She wears oxygen at night. \"I have been breathing pretty good. Her oxygen came up 96% on 4L NC.    INSOMNIA:  \"I am sleeping pretty good. \"  She takes Klonopin 0.5 mg nightly and Trazodone 150 mg nightly. She is staying up more during the day. Last fill of Klonopin was 2022, #75  She is requesting refill today. /60   Pulse 66   Temp 97.5 °F (36.4 °C) (Temporal)   Ht 5' 1\" (1.549 m)   Wt 146 lb 2 oz (66.3 kg)   SpO2 (!) 89%   BMI 27.61 kg/m²     Review of Systems   Constitutional: Positive for fatigue. Negative for fever. Respiratory: Positive for cough (chronic) and shortness of breath (chronic).     Musculoskeletal: Positive for arthralgias (right leg pain), back pain and myalgias. Psychiatric/Behavioral: Positive for sleep disturbance (sleeping well with Klonopin, requesting refill today). Physical Exam  Vitals reviewed. Constitutional:       Appearance: She is well-developed. She is ill-appearing. Comments: In a wheelchair   HENT:      Head: Normocephalic. Right Ear: Tympanic membrane and external ear normal.      Left Ear: Tympanic membrane and external ear normal.      Nose: Nose normal.   Cardiovascular:      Rate and Rhythm: Normal rate and regular rhythm. Pulmonary:      Effort: Pulmonary effort is normal.      Breath sounds: Decreased breath sounds, wheezing and rhonchi present. Comments: Port in place in the left chest cavity. Area cleansed with Betadine. Port flushed with saline and heparin. Unable to draw blood back. Catholon removed. Patient tolerated  Abdominal:      General: Bowel sounds are normal.      Palpations: Abdomen is soft. Musculoskeletal:      Cervical back: Normal range of motion. Skin:     General: Skin is dry. Neurological:      General: No focal deficit present. Mental Status: She is alert and oriented to person, place, and time. Mental status is at baseline. Psychiatric:         Mood and Affect: Mood normal.         Behavior: Behavior normal.         Thought Content: Thought content normal.         Judgment: Judgment normal.               procedure note:    Accessed mediport in sterile fashion with gripper needle without anesthesia. The port was flushed with normal saline and flushed easily. Attempts were made to draw back blood from the port, but was unable to get blood flow. The port was then flushed with heparin and the port was de-accessed. No blood loss  No complication  No specimen  She tolerated the procedure well without incident. An electronic signature was used to authenticate this note.     --NAVEEN Morales

## 2022-07-11 DIAGNOSIS — J44.9 CHRONIC OBSTRUCTIVE PULMONARY DISEASE, UNSPECIFIED COPD TYPE (HCC): ICD-10-CM

## 2022-07-11 RX ORDER — ALBUTEROL SULFATE 90 UG/1
AEROSOL, METERED RESPIRATORY (INHALATION)
Qty: 18 G | Refills: 0 | Status: SHIPPED | OUTPATIENT
Start: 2022-07-11 | End: 2022-08-22

## 2022-07-11 NOTE — TELEPHONE ENCOUNTER
Requested Prescriptions     Pending Prescriptions Disp Refills    albuterol sulfate HFA (PROVENTIL;VENTOLIN;PROAIR) 108 (90 Base) MCG/ACT inhaler [Pharmacy Med Name: Albuterol Sulfate  (90 Base) MCG/ACT Inhalation Aerosol Solution] 18 g 0     Sig: INHALE 2 PUFFS BY MOUTH EVERY 6 HOURS AS NEEDED FOR WHEEZING

## 2022-07-26 ENCOUNTER — TELEMEDICINE (OUTPATIENT)
Dept: PRIMARY CARE CLINIC | Age: 77
End: 2022-07-26
Payer: MEDICARE

## 2022-07-26 DIAGNOSIS — F32.9 REACTIVE DEPRESSION: ICD-10-CM

## 2022-07-26 DIAGNOSIS — R53.82 CHRONIC FATIGUE: Primary | ICD-10-CM

## 2022-07-26 DIAGNOSIS — J44.9 CHRONIC OBSTRUCTIVE PULMONARY DISEASE, UNSPECIFIED COPD TYPE (HCC): ICD-10-CM

## 2022-07-26 PROCEDURE — 99443 PR PHYS/QHP TELEPHONE EVALUATION 21-30 MIN: CPT | Performed by: NURSE PRACTITIONER

## 2022-07-26 RX ORDER — OXYCODONE AND ACETAMINOPHEN 10; 325 MG/1; MG/1
TABLET ORAL
COMMUNITY
Start: 2022-07-03

## 2022-07-26 RX ORDER — BUPROPION HYDROCHLORIDE 75 MG/1
75 TABLET ORAL 2 TIMES DAILY
Qty: 60 TABLET | Refills: 5 | Status: SHIPPED | OUTPATIENT
Start: 2022-07-26

## 2022-07-26 ASSESSMENT — ENCOUNTER SYMPTOMS
ABDOMINAL PAIN: 0
SHORTNESS OF BREATH: 1
RHINORRHEA: 0
BACK PAIN: 1
COUGH: 1
NAUSEA: 0
VOMITING: 0
SORE THROAT: 0

## 2022-07-26 NOTE — PROGRESS NOTES
Krystyna Levine (:  1945) is a Established patient, here for evaluation of the following:    Assessment & Plan   Below is the assessment and plan developed based on review of pertinent history, physical exam, labs, studies, and medications. 1. Chronic fatigue--Long discussion with patient and her daughter that several of her medications could be making her sleepy. Patient's daughter feels like she is more fatigued after taking the Cymbalta. Recommended that we wean Cymbalta. Recommend patient take Cymbalta every other day for 10 days then discontinue. Also recommended that patient's daughter not give her trazodone and/or Klonopin at bedtime unless absolutely necessary. Patient's pain medication was also switched from Norco to Percocet approximately 3 weeks ago. If fatigue continues I recommend patient's daughter discuss this with pain management as well. CBC  TSH  CMP  2. Reactive depression  -     buPROPion (WELLBUTRIN) 75 MG tablet; Take 1 tablet by mouth in the morning and 1 tablet before bedtime. , Disp-60 tablet, R-5Normal  3. Chronic obstructive pulmonary disease, unspecified COPD type (HCC)--continue Anoro daily. Continue albuterol as needed. Continue oxygen at 4 L daily. Recommend smoking cessation. Return in about 1 month (around 2022), or if symptoms worsen or fail to improve. Subjective   HPI    \"I ain't doing good. \"  \"All I want to do is sleep. \"  Denies any new pain. \"My back hurts all the time. \"  She is breathing okay. She is wearing her oxygen. Denies N, V or D. Denies abdominal pain. Denies nasal congestion, drainage or ear  Denies sore throat. Denies dysuria  Denies urinary frequency  Denies headache  She is not drinking water. She is only drinking Diet Coke  Denies any new medications except Percocet. She was switched to Percocet on 7-3-2022   She is taking 3-4 Percocet per day for her arthralgias and back pain.   Her daughter reports that when she take Cymbalta she is more sleepy. Review of Systems   Constitutional:  Positive for fatigue. Negative for fever. HENT:  Negative for congestion, ear pain, rhinorrhea and sore throat. Respiratory:  Positive for cough (chronic) and shortness of breath (chronic). Gastrointestinal:  Negative for abdominal pain, nausea and vomiting. Genitourinary:  Negative for dysuria and frequency. Musculoskeletal:  Positive for arthralgias, back pain and myalgias. Neurological:  Negative for headaches. Psychiatric/Behavioral:  Positive for sleep disturbance (sleeping well with Klonopin). Objective   Patient-Reported Vitals  No data recorded     Physical Exam  N/A DUE TO TELE-HEALTH PHONE CALL       On this date 7/26/2022 I have spent 22 minutes reviewing previous notes, test results and face to face (virtual) with the patient discussing the diagnosis and importance of compliance with the treatment plan as well as documenting on the day of the visit. Jocelynegarret Jazzmine, was evaluated through a synchronous (real-time) audio-video encounter. The patient (or guardian if applicable) is aware that this is a billable service, which includes applicable co-pays. This Virtual Visit was conducted with patient's (and/or legal guardian's) consent. The visit was conducted pursuant to the emergency declaration under the [address] [place] [address] authority and the "[employer] and [email or web address] General Act. Patient identification was verified, and a caregiver was present when appropriate. The patient was located at Home: [place] [address]. Provider was located at [facility] ([place] [address]): [address],  [address] [place] [address] Rd.         --NAVEEN Mario

## 2022-08-02 DIAGNOSIS — I10 ESSENTIAL HYPERTENSION: ICD-10-CM

## 2022-08-02 RX ORDER — ATORVASTATIN CALCIUM 20 MG/1
20 TABLET, FILM COATED ORAL DAILY
Qty: 30 TABLET | Refills: 11 | Status: SHIPPED | OUTPATIENT
Start: 2022-08-02

## 2022-08-02 NOTE — TELEPHONE ENCOUNTER
Received fax from pharmacy requesting refill on pts medication(s). Pt was last seen in office on 7/26/2022  and has a follow up scheduled for 8/25/2022. Will send request to  [facility]  for authorization. Requested Prescriptions     Pending Prescriptions Disp Refills    atorvastatin (LIPITOR) 20 MG tablet [Pharmacy Med Name: Atorvastatin Calcium 20 MG Oral Tablet] 30 tablet 11     Sig: Take 1 tablet by mouth in the morning.
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
## 2022-08-19 DIAGNOSIS — J44.9 CHRONIC OBSTRUCTIVE PULMONARY DISEASE, UNSPECIFIED COPD TYPE (HCC): ICD-10-CM

## 2022-08-22 RX ORDER — ALBUTEROL SULFATE 90 UG/1
AEROSOL, METERED RESPIRATORY (INHALATION)
Qty: 18 G | Refills: 0 | Status: SHIPPED | OUTPATIENT
Start: 2022-08-22 | End: 2022-08-25 | Stop reason: SDUPTHER

## 2022-08-22 NOTE — TELEPHONE ENCOUNTER
Received fax from pharmacy requesting refill on pts medication(s). Pt was last seen in office on 7/26/2022  and has a follow up scheduled for 8/25/2022. Will send request to  [facility]  for authorization.      Requested Prescriptions     Pending Prescriptions Disp Refills    albuterol sulfate HFA (PROVENTIL;VENTOLIN;PROAIR) 108 (90 Base) MCG/ACT inhaler [Pharmacy Med Name: Albuterol Sulfate  (90 Base) MCG/ACT Inhalation Aerosol Solution] 18 g 0     Sig: INHALE 2 PUFFS BY MOUTH EVERY 6 HOURS AS NEEDED FOR WHEEZING

## 2022-08-25 ENCOUNTER — SCHEDULED TELEPHONE ENCOUNTER (OUTPATIENT)
Dept: PRIMARY CARE CLINIC | Age: 77
End: 2022-08-25
Payer: MEDICARE

## 2022-08-25 DIAGNOSIS — K21.9 GASTROESOPHAGEAL REFLUX DISEASE, UNSPECIFIED WHETHER ESOPHAGITIS PRESENT: ICD-10-CM

## 2022-08-25 DIAGNOSIS — F41.1 GAD (GENERALIZED ANXIETY DISORDER): Primary | ICD-10-CM

## 2022-08-25 DIAGNOSIS — F03.90 DEMENTIA WITHOUT BEHAVIORAL DISTURBANCE, UNSPECIFIED DEMENTIA TYPE: ICD-10-CM

## 2022-08-25 DIAGNOSIS — R41.3 MEMORY LOSS: ICD-10-CM

## 2022-08-25 DIAGNOSIS — I50.9 CONGESTIVE HEART FAILURE, UNSPECIFIED HF CHRONICITY, UNSPECIFIED HEART FAILURE TYPE (HCC): ICD-10-CM

## 2022-08-25 DIAGNOSIS — J44.9 CHRONIC OBSTRUCTIVE PULMONARY DISEASE, UNSPECIFIED COPD TYPE (HCC): ICD-10-CM

## 2022-08-25 DIAGNOSIS — G47.01 INSOMNIA DUE TO MEDICAL CONDITION: ICD-10-CM

## 2022-08-25 DIAGNOSIS — I10 ESSENTIAL HYPERTENSION: ICD-10-CM

## 2022-08-25 PROCEDURE — 99442 PR PHYS/QHP TELEPHONE EVALUATION 11-20 MIN: CPT | Performed by: NURSE PRACTITIONER

## 2022-08-25 RX ORDER — AZELASTINE HYDROCHLORIDE 0.5 MG/ML
1 SOLUTION/ DROPS OPHTHALMIC 2 TIMES DAILY
Qty: 6 ML | Refills: 1 | Status: SHIPPED | OUTPATIENT
Start: 2022-08-25

## 2022-08-25 RX ORDER — DULOXETIN HYDROCHLORIDE 60 MG/1
CAPSULE, DELAYED RELEASE ORAL
COMMUNITY
Start: 2022-08-17

## 2022-08-25 RX ORDER — CLONAZEPAM 0.5 MG/1
0.5 TABLET ORAL 3 TIMES DAILY PRN
Qty: 75 TABLET | Refills: 0 | Status: SHIPPED | OUTPATIENT
Start: 2022-08-25 | End: 2022-10-25

## 2022-08-25 RX ORDER — CARVEDILOL 3.12 MG/1
3.12 TABLET ORAL 2 TIMES DAILY WITH MEALS
Qty: 180 TABLET | Refills: 3 | Status: SHIPPED | OUTPATIENT
Start: 2022-08-25

## 2022-08-25 RX ORDER — PANTOPRAZOLE SODIUM 40 MG/1
40 TABLET, DELAYED RELEASE ORAL DAILY
Qty: 90 TABLET | Refills: 3 | Status: SHIPPED | OUTPATIENT
Start: 2022-08-25

## 2022-08-25 RX ORDER — ALBUTEROL SULFATE 90 UG/1
2 AEROSOL, METERED RESPIRATORY (INHALATION) EVERY 6 HOURS PRN
Qty: 18 G | Refills: 3 | Status: SHIPPED | OUTPATIENT
Start: 2022-08-25

## 2022-08-25 RX ORDER — DONEPEZIL HYDROCHLORIDE 5 MG/1
5 TABLET, FILM COATED ORAL NIGHTLY
Qty: 90 TABLET | Refills: 3 | Status: SHIPPED | OUTPATIENT
Start: 2022-08-25

## 2022-08-25 RX ORDER — MEMANTINE HYDROCHLORIDE 10 MG/1
10 TABLET ORAL 2 TIMES DAILY
Qty: 180 TABLET | Refills: 3 | Status: SHIPPED | OUTPATIENT
Start: 2022-08-25

## 2022-08-25 ASSESSMENT — ENCOUNTER SYMPTOMS
RHINORRHEA: 0
COUGH: 1
NAUSEA: 0
SHORTNESS OF BREATH: 1
ABDOMINAL PAIN: 0
SORE THROAT: 0
BACK PAIN: 1
VOMITING: 0

## 2022-08-25 NOTE — PROGRESS NOTES
Sapna Abdullahi is a 69 y.o. female evaluated via telephone on 8/25/2022 for Medication Refill    TELEPHONE CALL    Assessment & Plan   1. HAILEY (generalized anxiety disorder)  -     clonazePAM (KLONOPIN) 0.5 MG tablet; Take 1 tablet by mouth 3 times daily as needed for Anxiety for up to 30 days. , Disp-75 tablet, R-0Normal  - Continue Cymbalta 60 mg daily  2. Insomnia due to medical condition  -     clonazePAM (KLONOPIN) 0.5 MG tablet; Take 1 tablet by mouth 3 times daily as needed for Anxiety for up to 30 days. , Disp-75 tablet, R-0Normal  - Trazodone has been decreased to 75 mg. Patient is sleeping at night but she is much more awake during the day since we have made this adjustment. 3. Essential hypertension  -     carvedilol (COREG) 3.125 MG tablet; Take 1 tablet by mouth 2 times daily (with meals), Disp-180 tablet, R-3Normal  4. Congestive heart failure, unspecified HF chronicity, unspecified heart failure type (HCC)  -     carvedilol (COREG) 3.125 MG tablet; Take 1 tablet by mouth 2 times daily (with meals), Disp-180 tablet, R-3Normal  5. Dementia without behavioral disturbance, unspecified dementia type (HCC)  -     donepezil (ARICEPT) 5 MG tablet; Take 1 tablet by mouth nightly, Disp-90 tablet, R-3Normal  6. Memory loss  -     memantine (NAMENDA) 10 MG tablet; Take 1 tablet by mouth 2 times daily, Disp-180 tablet, R-3Normal  7. Gastroesophageal reflux disease, unspecified whether esophagitis present  -     pantoprazole (PROTONIX) 40 MG tablet; Take 1 tablet by mouth daily, Disp-90 tablet, R-3Normal  8. Chronic obstructive pulmonary disease, unspecified COPD type (HCC)  -     albuterol sulfate HFA (PROVENTIL;VENTOLIN;PROAIR) 108 (90 Base) MCG/ACT inhaler; Inhale 2 puffs into the lungs every 6 hours as needed for Wheezing, Disp-18 g, R-3Normal    Return in about 8 weeks (around 10/20/2022), or if symptoms worsen or fail to improve. Subjective   Prior to Visit Medications    Medication Sig Taking? Authorizing Provider   clonazePAM (KLONOPIN) 0.5 MG tablet Take 1 tablet by mouth 3 times daily as needed for Anxiety for up to 30 days. Yes NAVEEN Dior   azelastine (OPTIVAR) 0.05 % ophthalmic solution Place 1 drop into both eyes 2 times daily Yes NAVEEN Dior   DULoxetine (CYMBALTA) 60 MG extended release capsule  Yes Historical Provider, MD   carvedilol (COREG) 3.125 MG tablet Take 1 tablet by mouth 2 times daily (with meals) Yes NAVEEN Dior   donepezil (ARICEPT) 5 MG tablet Take 1 tablet by mouth nightly Yes NAVEEN Dior   memantine (NAMENDA) 10 MG tablet Take 1 tablet by mouth 2 times daily Yes NAVEEN Dior   pantoprazole (PROTONIX) 40 MG tablet Take 1 tablet by mouth daily Yes NAVEEN Dior   albuterol sulfate HFA (PROVENTIL;VENTOLIN;PROAIR) 108 (90 Base) MCG/ACT inhaler Inhale 2 puffs into the lungs every 6 hours as needed for Wheezing Yes NAVEEN Dior   atorvastatin (LIPITOR) 20 MG tablet Take 1 tablet by mouth in the morning. Yes NAVEEN Dior   oxyCODONE-acetaminophen (PERCOCET)  MG per tablet TAKE 1 TABLET BY MOUTH EVERY 6 HOURS AS NEEDED Yes Historical Provider, MD   buPROPion (WELLBUTRIN) 75 MG tablet Take 1 tablet by mouth in the morning and 1 tablet before bedtime.  Yes NAVEEN Dior   furosemide (LASIX) 20 MG tablet Take 1 tablet by mouth daily as needed (swelling) Yes NAVEEN Dior   umeclidinium-vilanterol (ANORO ELLIPTA) 62.5-25 MCG/INH AEPB inhaler Inhale 1 puff into the lungs daily Yes NAVEEN Dior   ondansetron (ZOFRAN) 4 MG tablet Take 1 tablet by mouth 3 times daily as needed for Nausea or Vomiting Yes NAVEEN Dior   MICONAZOLE NITRATE VAGINAL (MONISTAT 3) 4 % CREA Place 1 Dose vaginally at bedtime  Patient taking differently: Place 1 Dose vaginally nightly as needed Yes NAVEEN Dior   NARCAN 4 MG/0.1ML LIQD nasal spray  Yes Historical Provider, MD   Fesoterodine Fumarate ER (TOVIAZ) 8 MG TB24 Take 1 tablet by mouth daily Yes NAVEEN Dior   denosumab (PROLIA) 60 MG/ML SOSY SC injection Inject 1 mL into the skin once for 1 dose Yes NAVEEN Little   traZODone (DESYREL) 150 MG tablet Take 1 tablet by mouth nightly  Patient taking differently: Take 75 mg by mouth nightly Yes NAVEEN Dior   isosorbide mononitrate (IMDUR) 30 MG extended release tablet Take 1 tablet by mouth daily  Patient taking differently: Take 30 mg by mouth daily 9/10/21: Pt reported takes 1/2 tablet (15 mg) Yes NAVEEN Dior   nitroGLYCERIN (NITROSTAT) 0.4 MG SL tablet Place 1 tablet under the tongue every 5 minutes as needed for Chest pain Yes NAVEEN Dior   OXYGEN Inhale 2 L into the lungs daily  Patient taking differently: Inhale 4 L into the lungs daily Yes NAVEEN Dior     HPI    INSOMNIA:  She has decreased her Trazodone from 150 mg down to 75 mg. She is not sleeping during the day now. She is awake more. MOODS:  She is taking Wellbutrin 75 mg BID and Cymbalta 60 mg once daily. Her moods are stable with this regimen  She takes Klonopin nightly prn. Last fill was 6-, #75. She is requesting refill today. \"I just feel run down. \"  Her oxygen has been running 92% without oxygen. Review of Systems   Constitutional:  Positive for fatigue. Negative for fever. HENT:  Negative for congestion, ear pain, rhinorrhea and sore throat. Respiratory:  Positive for cough (chronic) and shortness of breath (chronic). Gastrointestinal:  Negative for abdominal pain, nausea and vomiting. Musculoskeletal:  Positive for arthralgias, back pain and myalgias. Neurological:  Negative for headaches. Psychiatric/Behavioral:  Positive for sleep disturbance (sleeping well with Klonopin, requesting refill today). The patient is nervous/anxious (variable).         Objective   Patient-Reported Vitals  No data recorded       Total Time: minutes: 11-20 minutes     Brie Cobos was evaluated through a synchronous (real-time) audio only encounter. Patient identification was verified at the start of the visit. She (or guardian if applicable) is aware that this is a billable service, which includes applicable co-pays. This visit was conducted with patient's (and/or legal guardian's) verbal consent. She has not had a related appointment within my department in the past 7 days or scheduled within the next 24 hours. The patient was located at Home: [place] [address]. The provider was located at [place] [address]): [facility] [address]  [place],  [address]      Brenda Terrazas, APRN

## 2022-09-16 ENCOUNTER — APPOINTMENT (OUTPATIENT)
Dept: CT IMAGING | Age: 77
DRG: 092 | End: 2022-09-16
Payer: MEDICARE

## 2022-09-16 ENCOUNTER — APPOINTMENT (OUTPATIENT)
Dept: GENERAL RADIOLOGY | Age: 77
DRG: 092 | End: 2022-09-16
Payer: MEDICARE

## 2022-09-16 ENCOUNTER — HOSPITAL ENCOUNTER (INPATIENT)
Age: 77
LOS: 1 days | Discharge: HOME | DRG: 092 | End: 2022-09-19
Attending: HOSPITALIST | Admitting: STUDENT IN AN ORGANIZED HEALTH CARE EDUCATION/TRAINING PROGRAM
Payer: MEDICARE

## 2022-09-16 DIAGNOSIS — Z79.899 POLYPHARMACY: ICD-10-CM

## 2022-09-16 DIAGNOSIS — G47.01 INSOMNIA DUE TO MEDICAL CONDITION: ICD-10-CM

## 2022-09-16 DIAGNOSIS — R41.0 DISORIENTATION: Primary | ICD-10-CM

## 2022-09-16 DIAGNOSIS — F41.1 GAD (GENERALIZED ANXIETY DISORDER): ICD-10-CM

## 2022-09-16 PROBLEM — I25.2 MYOCARDIAL INFARCT, OLD: Status: ACTIVE | Noted: 2022-09-16

## 2022-09-16 PROBLEM — K21.9 GERD (GASTROESOPHAGEAL REFLUX DISEASE): Status: ACTIVE | Noted: 2022-09-16

## 2022-09-16 PROBLEM — F41.9 ANXIETY: Status: ACTIVE | Noted: 2022-01-01

## 2022-09-16 PROBLEM — Z72.0 TOBACCO ABUSE: Status: ACTIVE | Noted: 2022-09-16

## 2022-09-16 PROBLEM — Z71.6 TOBACCO ABUSE COUNSELING: Status: ACTIVE | Noted: 2022-09-16

## 2022-09-16 PROBLEM — R41.82 ALTERED MENTAL STATUS: Status: ACTIVE | Noted: 2022-01-01

## 2022-09-16 PROBLEM — F17.210 DEPENDENCE ON NICOTINE FROM CIGARETTES: Status: ACTIVE | Noted: 2022-01-01

## 2022-09-16 PROBLEM — M19.90 ARTHRITIS: Status: ACTIVE | Noted: 2022-09-16

## 2022-09-16 PROBLEM — I67.9 CEREBROVASCULAR DISEASE: Status: ACTIVE | Noted: 2022-09-16

## 2022-09-16 PROBLEM — F32.A DEPRESSION: Status: ACTIVE | Noted: 2022-09-16

## 2022-09-16 PROBLEM — Z99.81 O2 DEPENDENT: Status: ACTIVE | Noted: 2022-09-16

## 2022-09-16 LAB
ALBUMIN SERPL-MCNC: 3.7 G/DL (ref 3.5–5.2)
ALP BLD-CCNC: 86 U/L (ref 35–104)
ALT SERPL-CCNC: 7 U/L (ref 5–33)
AMPHETAMINE SCREEN, URINE: NEGATIVE
ANION GAP SERPL CALCULATED.3IONS-SCNC: 8 MMOL/L (ref 7–19)
AST SERPL-CCNC: 22 U/L (ref 5–32)
BARBITURATE SCREEN URINE: NEGATIVE
BASE EXCESS ARTERIAL: 7.4 MMOL/L (ref -2–2)
BASOPHILS ABSOLUTE: 0 K/UL (ref 0–0.2)
BASOPHILS RELATIVE PERCENT: 0.3 % (ref 0–1)
BENZODIAZEPINE SCREEN, URINE: NEGATIVE
BILIRUB SERPL-MCNC: <0.2 MG/DL (ref 0.2–1.2)
BILIRUBIN URINE: NEGATIVE
BLOOD, URINE: NEGATIVE
BUN BLDV-MCNC: 23 MG/DL (ref 8–23)
BUPRENORPHINE URINE: NEGATIVE
CALCIUM SERPL-MCNC: 9.6 MG/DL (ref 8.8–10.2)
CANNABINOID SCREEN URINE: NEGATIVE
CARBOXYHEMOGLOBIN ARTERIAL: 2.2 % (ref 0–5)
CHLORIDE BLD-SCNC: 100 MMOL/L (ref 98–111)
CLARITY: CLEAR
CO2: 30 MMOL/L (ref 22–29)
COCAINE METABOLITE SCREEN URINE: NEGATIVE
COLOR: YELLOW
CREAT SERPL-MCNC: 1 MG/DL (ref 0.5–0.9)
EOSINOPHILS ABSOLUTE: 0.2 K/UL (ref 0–0.6)
EOSINOPHILS RELATIVE PERCENT: 2.1 % (ref 0–5)
GFR AFRICAN AMERICAN: >59
GFR NON-AFRICAN AMERICAN: 54
GLUCOSE BLD-MCNC: 105 MG/DL (ref 74–109)
GLUCOSE BLD-MCNC: 119 MG/DL
GLUCOSE BLD-MCNC: 119 MG/DL (ref 70–99)
GLUCOSE URINE: NEGATIVE MG/DL
HCO3 ARTERIAL: 33.4 MMOL/L (ref 22–26)
HCT VFR BLD CALC: 36.4 % (ref 37–47)
HEMOGLOBIN, ART, EXTENDED: 10.2 G/DL (ref 12–16)
HEMOGLOBIN: 10.4 G/DL (ref 12–16)
HYPOCHROMIA: ABNORMAL
IMMATURE GRANULOCYTES #: 0 K/UL
INR BLD: 1 (ref 0.88–1.18)
KETONES, URINE: NEGATIVE MG/DL
LACTIC ACID: 0.9 MMOL/L (ref 0.5–1.9)
LEUKOCYTE ESTERASE, URINE: NEGATIVE
LYMPHOCYTES ABSOLUTE: 1 K/UL (ref 1.1–4.5)
LYMPHOCYTES RELATIVE PERCENT: 10.9 % (ref 20–40)
Lab: ABNORMAL
MACROCYTES: ABNORMAL
MCH RBC QN AUTO: 29 PG (ref 27–31)
MCHC RBC AUTO-ENTMCNC: 28.6 G/DL (ref 33–37)
MCV RBC AUTO: 101.4 FL (ref 81–99)
METHADONE SCREEN, URINE: NEGATIVE
METHAMPHETAMINE, URINE: NEGATIVE
METHEMOGLOBIN ARTERIAL: 1.4 %
MONOCYTES ABSOLUTE: 0.5 K/UL (ref 0–0.9)
MONOCYTES RELATIVE PERCENT: 5.7 % (ref 0–10)
NEUTROPHILS ABSOLUTE: 7.5 K/UL (ref 1.5–7.5)
NEUTROPHILS RELATIVE PERCENT: 80.7 % (ref 50–65)
NITRITE, URINE: NEGATIVE
O2 CONTENT ARTERIAL: 13.7 ML/DL
O2 DELIVERY DEVICE: ABNORMAL
O2 SAT, ARTERIAL: 94.7 %
O2 THERAPY: ABNORMAL
OPIATE SCREEN URINE: NEGATIVE
OVALOCYTES: ABNORMAL
OXYCODONE URINE: POSITIVE
OXYGEN FLOW: 3.5
PCO2 ARTERIAL: 54 MMHG (ref 35–45)
PDW BLD-RTO: 13.7 % (ref 11.5–14.5)
PERFORMED ON: ABNORMAL
PH ARTERIAL: 7.4 (ref 7.35–7.45)
PH UA: 5.5 (ref 5–8)
PHENCYCLIDINE SCREEN URINE: NEGATIVE
PLATELET # BLD: 174 K/UL (ref 130–400)
PLATELET SLIDE REVIEW: ADEQUATE
PMV BLD AUTO: 10.3 FL (ref 9.4–12.3)
PO2 ARTERIAL: 80 MMHG (ref 80–100)
POLYCHROMASIA: ABNORMAL
POTASSIUM REFLEX MAGNESIUM: 4.6 MMOL/L (ref 3.5–5)
POTASSIUM, WHOLE BLOOD: 4.5
PROPOXYPHENE SCREEN: NEGATIVE
PROTEIN UA: NEGATIVE MG/DL
PROTHROMBIN TIME: 13.1 SEC (ref 12–14.6)
RBC # BLD: 3.59 M/UL (ref 4.2–5.4)
SAMPLE SOURCE: ABNORMAL
SODIUM BLD-SCNC: 138 MMOL/L (ref 136–145)
SPECIFIC GRAVITY UA: 1.02 (ref 1–1.03)
SPONT RATE(BPM): 26
TOTAL PROTEIN: 6.3 G/DL (ref 6.6–8.7)
TRICYCLIC, URINE: NEGATIVE
TROPONIN: <0.01 NG/ML (ref 0–0.03)
UROBILINOGEN, URINE: 0.2 E.U./DL
WBC # BLD: 9.3 K/UL (ref 4.8–10.8)

## 2022-09-16 PROCEDURE — 70498 CT ANGIOGRAPHY NECK: CPT | Performed by: RADIOLOGY

## 2022-09-16 PROCEDURE — 84484 ASSAY OF TROPONIN QUANT: CPT

## 2022-09-16 PROCEDURE — 80306 DRUG TEST PRSMV INSTRMNT: CPT

## 2022-09-16 PROCEDURE — 81003 URINALYSIS AUTO W/O SCOPE: CPT

## 2022-09-16 PROCEDURE — 70450 CT HEAD/BRAIN W/O DYE: CPT

## 2022-09-16 PROCEDURE — 83605 ASSAY OF LACTIC ACID: CPT

## 2022-09-16 PROCEDURE — 6360000004 HC RX CONTRAST MEDICATION: Performed by: NURSE PRACTITIONER

## 2022-09-16 PROCEDURE — 93005 ELECTROCARDIOGRAM TRACING: CPT

## 2022-09-16 PROCEDURE — 82803 BLOOD GASES ANY COMBINATION: CPT

## 2022-09-16 PROCEDURE — 70450 CT HEAD/BRAIN W/O DYE: CPT | Performed by: RADIOLOGY

## 2022-09-16 PROCEDURE — 85610 PROTHROMBIN TIME: CPT

## 2022-09-16 PROCEDURE — 36415 COLL VENOUS BLD VENIPUNCTURE: CPT

## 2022-09-16 PROCEDURE — 70496 CT ANGIOGRAPHY HEAD: CPT | Performed by: RADIOLOGY

## 2022-09-16 PROCEDURE — 85025 COMPLETE CBC W/AUTO DIFF WBC: CPT

## 2022-09-16 PROCEDURE — 36600 WITHDRAWAL OF ARTERIAL BLOOD: CPT

## 2022-09-16 PROCEDURE — 80053 COMPREHEN METABOLIC PANEL: CPT

## 2022-09-16 PROCEDURE — 99285 EMERGENCY DEPT VISIT HI MDM: CPT

## 2022-09-16 PROCEDURE — 82947 ASSAY GLUCOSE BLOOD QUANT: CPT

## 2022-09-16 PROCEDURE — 71045 X-RAY EXAM CHEST 1 VIEW: CPT | Performed by: RADIOLOGY

## 2022-09-16 PROCEDURE — 71045 X-RAY EXAM CHEST 1 VIEW: CPT

## 2022-09-16 PROCEDURE — 70496 CT ANGIOGRAPHY HEAD: CPT

## 2022-09-16 RX ORDER — PREGABALIN 75 MG/1
75 CAPSULE ORAL 2 TIMES DAILY
Status: ON HOLD | COMMUNITY
End: 2022-09-19 | Stop reason: HOSPADM

## 2022-09-16 RX ADMIN — IOPAMIDOL 90 ML: 755 INJECTION, SOLUTION INTRAVENOUS at 20:21

## 2022-09-16 ASSESSMENT — PAIN - FUNCTIONAL ASSESSMENT: PAIN_FUNCTIONAL_ASSESSMENT: NONE - DENIES PAIN

## 2022-09-16 ASSESSMENT — ENCOUNTER SYMPTOMS
VOMITING: 0
NAUSEA: 0

## 2022-09-17 LAB
ALBUMIN SERPL-MCNC: 3.2 G/DL (ref 3.5–5.2)
ALP BLD-CCNC: 82 U/L (ref 35–104)
ALT SERPL-CCNC: 8 U/L (ref 5–33)
ANION GAP SERPL CALCULATED.3IONS-SCNC: 8 MMOL/L (ref 7–19)
AST SERPL-CCNC: 22 U/L (ref 5–32)
BILIRUB SERPL-MCNC: <0.2 MG/DL (ref 0.2–1.2)
BUN BLDV-MCNC: 19 MG/DL (ref 8–23)
CALCIUM SERPL-MCNC: 9.3 MG/DL (ref 8.8–10.2)
CHLORIDE BLD-SCNC: 101 MMOL/L (ref 98–111)
CO2: 29 MMOL/L (ref 22–29)
CREAT SERPL-MCNC: 0.9 MG/DL (ref 0.5–0.9)
FOLATE: 8.8 NG/ML (ref 4.8–37.3)
GFR AFRICAN AMERICAN: >59
GFR NON-AFRICAN AMERICAN: >60
GLUCOSE BLD-MCNC: 105 MG/DL (ref 74–109)
MAGNESIUM: 2.1 MG/DL (ref 1.6–2.4)
PHOSPHORUS: 3.6 MG/DL (ref 2.5–4.5)
POTASSIUM SERPL-SCNC: 4.7 MMOL/L (ref 3.5–5)
SODIUM BLD-SCNC: 138 MMOL/L (ref 136–145)
TOTAL PROTEIN: 6 G/DL (ref 6.6–8.7)
VITAMIN B-12: 457 PG/ML (ref 211–946)
VITAMIN D 25-HYDROXY: 50.2 NG/ML

## 2022-09-17 PROCEDURE — 6360000002 HC RX W HCPCS: Performed by: HOSPITALIST

## 2022-09-17 PROCEDURE — 36415 COLL VENOUS BLD VENIPUNCTURE: CPT

## 2022-09-17 PROCEDURE — 94640 AIRWAY INHALATION TREATMENT: CPT

## 2022-09-17 PROCEDURE — 2580000003 HC RX 258: Performed by: HOSPITALIST

## 2022-09-17 PROCEDURE — G0378 HOSPITAL OBSERVATION PER HR: HCPCS

## 2022-09-17 PROCEDURE — 82746 ASSAY OF FOLIC ACID SERUM: CPT

## 2022-09-17 PROCEDURE — 97161 PT EVAL LOW COMPLEX 20 MIN: CPT

## 2022-09-17 PROCEDURE — 82607 VITAMIN B-12: CPT

## 2022-09-17 PROCEDURE — 97530 THERAPEUTIC ACTIVITIES: CPT

## 2022-09-17 PROCEDURE — 80053 COMPREHEN METABOLIC PANEL: CPT

## 2022-09-17 PROCEDURE — 96372 THER/PROPH/DIAG INJ SC/IM: CPT

## 2022-09-17 PROCEDURE — 96361 HYDRATE IV INFUSION ADD-ON: CPT

## 2022-09-17 PROCEDURE — 82306 VITAMIN D 25 HYDROXY: CPT

## 2022-09-17 PROCEDURE — 84100 ASSAY OF PHOSPHORUS: CPT

## 2022-09-17 PROCEDURE — 96360 HYDRATION IV INFUSION INIT: CPT

## 2022-09-17 PROCEDURE — 83735 ASSAY OF MAGNESIUM: CPT

## 2022-09-17 PROCEDURE — 6370000000 HC RX 637 (ALT 250 FOR IP): Performed by: HOSPITALIST

## 2022-09-17 PROCEDURE — 2700000000 HC OXYGEN THERAPY PER DAY

## 2022-09-17 RX ORDER — ALBUTEROL SULFATE 2.5 MG/3ML
2.5 SOLUTION RESPIRATORY (INHALATION) EVERY 6 HOURS PRN
Status: DISCONTINUED | OUTPATIENT
Start: 2022-09-17 | End: 2022-09-19 | Stop reason: HOSPADM

## 2022-09-17 RX ORDER — SODIUM CHLORIDE 9 MG/ML
INJECTION, SOLUTION INTRAVENOUS CONTINUOUS
Status: ACTIVE | OUTPATIENT
Start: 2022-09-17 | End: 2022-09-17

## 2022-09-17 RX ORDER — ACETAMINOPHEN 650 MG/1
650 SUPPOSITORY RECTAL EVERY 6 HOURS PRN
Status: DISCONTINUED | OUTPATIENT
Start: 2022-09-17 | End: 2022-09-19 | Stop reason: HOSPADM

## 2022-09-17 RX ORDER — KETOTIFEN FUMARATE 0.35 MG/ML
1 SOLUTION/ DROPS OPHTHALMIC 2 TIMES DAILY
Status: DISCONTINUED | OUTPATIENT
Start: 2022-09-17 | End: 2022-09-19 | Stop reason: HOSPADM

## 2022-09-17 RX ORDER — ONDANSETRON 4 MG/1
4 TABLET, FILM COATED ORAL 3 TIMES DAILY PRN
Status: DISCONTINUED | OUTPATIENT
Start: 2022-09-17 | End: 2022-09-19 | Stop reason: HOSPADM

## 2022-09-17 RX ORDER — MEMANTINE HYDROCHLORIDE 5 MG/1
10 TABLET ORAL 2 TIMES DAILY
Status: DISCONTINUED | OUTPATIENT
Start: 2022-09-17 | End: 2022-09-19 | Stop reason: HOSPADM

## 2022-09-17 RX ORDER — SODIUM CHLORIDE 0.9 % (FLUSH) 0.9 %
10 SYRINGE (ML) INJECTION PRN
Status: DISCONTINUED | OUTPATIENT
Start: 2022-09-17 | End: 2022-09-19 | Stop reason: HOSPADM

## 2022-09-17 RX ORDER — DONEPEZIL HYDROCHLORIDE 5 MG/1
5 TABLET, FILM COATED ORAL NIGHTLY
Status: DISCONTINUED | OUTPATIENT
Start: 2022-09-17 | End: 2022-09-19 | Stop reason: HOSPADM

## 2022-09-17 RX ORDER — ARFORMOTEROL TARTRATE 15 UG/2ML
15 SOLUTION RESPIRATORY (INHALATION) 2 TIMES DAILY
Status: DISCONTINUED | OUTPATIENT
Start: 2022-09-17 | End: 2022-09-19 | Stop reason: HOSPADM

## 2022-09-17 RX ORDER — TRAZODONE HYDROCHLORIDE 150 MG/1
75 TABLET ORAL NIGHTLY
Status: DISCONTINUED | OUTPATIENT
Start: 2022-09-17 | End: 2022-09-19 | Stop reason: HOSPADM

## 2022-09-17 RX ORDER — ENOXAPARIN SODIUM 100 MG/ML
40 INJECTION SUBCUTANEOUS DAILY
Status: DISCONTINUED | OUTPATIENT
Start: 2022-09-17 | End: 2022-09-19 | Stop reason: HOSPADM

## 2022-09-17 RX ORDER — ACETAMINOPHEN 325 MG/1
650 TABLET ORAL EVERY 6 HOURS PRN
Status: DISCONTINUED | OUTPATIENT
Start: 2022-09-17 | End: 2022-09-19 | Stop reason: HOSPADM

## 2022-09-17 RX ORDER — POTASSIUM CHLORIDE 20 MEQ/1
40 TABLET, EXTENDED RELEASE ORAL PRN
Status: DISCONTINUED | OUTPATIENT
Start: 2022-09-17 | End: 2022-09-19 | Stop reason: HOSPADM

## 2022-09-17 RX ORDER — BUPROPION HYDROCHLORIDE 75 MG/1
75 TABLET ORAL 2 TIMES DAILY
Status: DISCONTINUED | OUTPATIENT
Start: 2022-09-17 | End: 2022-09-19 | Stop reason: HOSPADM

## 2022-09-17 RX ORDER — SODIUM CHLORIDE 0.9 % (FLUSH) 0.9 %
10 SYRINGE (ML) INJECTION EVERY 12 HOURS SCHEDULED
Status: DISCONTINUED | OUTPATIENT
Start: 2022-09-17 | End: 2022-09-19 | Stop reason: HOSPADM

## 2022-09-17 RX ORDER — PANTOPRAZOLE SODIUM 40 MG/1
40 TABLET, DELAYED RELEASE ORAL DAILY
Status: DISCONTINUED | OUTPATIENT
Start: 2022-09-17 | End: 2022-09-19 | Stop reason: HOSPADM

## 2022-09-17 RX ORDER — OXYCODONE HYDROCHLORIDE AND ACETAMINOPHEN 5; 325 MG/1; MG/1
1 TABLET ORAL EVERY 8 HOURS PRN
Status: DISCONTINUED | OUTPATIENT
Start: 2022-09-17 | End: 2022-09-19 | Stop reason: HOSPADM

## 2022-09-17 RX ORDER — CARVEDILOL 3.12 MG/1
3.12 TABLET ORAL 2 TIMES DAILY WITH MEALS
Status: DISCONTINUED | OUTPATIENT
Start: 2022-09-17 | End: 2022-09-19 | Stop reason: HOSPADM

## 2022-09-17 RX ORDER — POTASSIUM CHLORIDE 7.45 MG/ML
10 INJECTION INTRAVENOUS PRN
Status: DISCONTINUED | OUTPATIENT
Start: 2022-09-17 | End: 2022-09-19 | Stop reason: HOSPADM

## 2022-09-17 RX ORDER — ALBUTEROL SULFATE 90 UG/1
2 AEROSOL, METERED RESPIRATORY (INHALATION) EVERY 6 HOURS PRN
Status: DISCONTINUED | OUTPATIENT
Start: 2022-09-17 | End: 2022-09-17

## 2022-09-17 RX ORDER — ATORVASTATIN CALCIUM 20 MG/1
20 TABLET, FILM COATED ORAL DAILY
Status: DISCONTINUED | OUTPATIENT
Start: 2022-09-17 | End: 2022-09-19 | Stop reason: HOSPADM

## 2022-09-17 RX ORDER — ISOSORBIDE MONONITRATE 30 MG/1
30 TABLET, EXTENDED RELEASE ORAL DAILY
Status: DISCONTINUED | OUTPATIENT
Start: 2022-09-17 | End: 2022-09-19 | Stop reason: HOSPADM

## 2022-09-17 RX ORDER — MAGNESIUM SULFATE 1 G/100ML
1000 INJECTION INTRAVENOUS PRN
Status: DISCONTINUED | OUTPATIENT
Start: 2022-09-17 | End: 2022-09-17 | Stop reason: SDUPTHER

## 2022-09-17 RX ORDER — NITROGLYCERIN 0.4 MG/1
0.4 TABLET SUBLINGUAL EVERY 5 MIN PRN
Status: DISCONTINUED | OUTPATIENT
Start: 2022-09-17 | End: 2022-09-19 | Stop reason: HOSPADM

## 2022-09-17 RX ORDER — SODIUM CHLORIDE 9 MG/ML
INJECTION, SOLUTION INTRAVENOUS PRN
Status: DISCONTINUED | OUTPATIENT
Start: 2022-09-17 | End: 2022-09-19 | Stop reason: HOSPADM

## 2022-09-17 RX ORDER — POLYETHYLENE GLYCOL 3350 17 G/17G
17 POWDER, FOR SOLUTION ORAL DAILY PRN
Status: DISCONTINUED | OUTPATIENT
Start: 2022-09-17 | End: 2022-09-19 | Stop reason: HOSPADM

## 2022-09-17 RX ORDER — TROSPIUM CHLORIDE 20 MG/1
20 TABLET, FILM COATED ORAL
Status: DISCONTINUED | OUTPATIENT
Start: 2022-09-17 | End: 2022-09-19 | Stop reason: HOSPADM

## 2022-09-17 RX ORDER — AZELASTINE HYDROCHLORIDE 0.5 MG/ML
1 SOLUTION/ DROPS OPHTHALMIC 2 TIMES DAILY
Status: DISCONTINUED | OUTPATIENT
Start: 2022-09-17 | End: 2022-09-17

## 2022-09-17 RX ADMIN — ISOSORBIDE MONONITRATE 30 MG: 30 TABLET, EXTENDED RELEASE ORAL at 09:59

## 2022-09-17 RX ADMIN — IPRATROPIUM BROMIDE 0.5 MG: 0.5 SOLUTION RESPIRATORY (INHALATION) at 07:35

## 2022-09-17 RX ADMIN — KETOTIFEN FUMARATE 1 DROP: 0.35 SOLUTION/ DROPS OPHTHALMIC at 10:02

## 2022-09-17 RX ADMIN — IPRATROPIUM BROMIDE 0.5 MG: 0.5 SOLUTION RESPIRATORY (INHALATION) at 19:37

## 2022-09-17 RX ADMIN — TROSPIUM CHLORIDE 20 MG: 20 TABLET, FILM COATED ORAL at 06:01

## 2022-09-17 RX ADMIN — DONEPEZIL HYDROCHLORIDE 5 MG: 5 TABLET, FILM COATED ORAL at 20:31

## 2022-09-17 RX ADMIN — KETOTIFEN FUMARATE 1 DROP: 0.35 SOLUTION/ DROPS OPHTHALMIC at 21:59

## 2022-09-17 RX ADMIN — OXYCODONE HYDROCHLORIDE AND ACETAMINOPHEN 1 TABLET: 5; 325 TABLET ORAL at 20:37

## 2022-09-17 RX ADMIN — IPRATROPIUM BROMIDE 0.5 MG: 0.5 SOLUTION RESPIRATORY (INHALATION) at 14:20

## 2022-09-17 RX ADMIN — SODIUM CHLORIDE, PRESERVATIVE FREE 10 ML: 5 INJECTION INTRAVENOUS at 10:03

## 2022-09-17 RX ADMIN — PANTOPRAZOLE SODIUM 40 MG: 40 TABLET, DELAYED RELEASE ORAL at 09:59

## 2022-09-17 RX ADMIN — BUPROPION HYDROCHLORIDE 75 MG: 75 TABLET, FILM COATED ORAL at 09:59

## 2022-09-17 RX ADMIN — BUPROPION HYDROCHLORIDE 75 MG: 75 TABLET, FILM COATED ORAL at 20:31

## 2022-09-17 RX ADMIN — TROSPIUM CHLORIDE 20 MG: 20 TABLET, FILM COATED ORAL at 17:41

## 2022-09-17 RX ADMIN — IPRATROPIUM BROMIDE 0.5 MG: 0.5 SOLUTION RESPIRATORY (INHALATION) at 10:57

## 2022-09-17 RX ADMIN — CARVEDILOL 3.12 MG: 3.12 TABLET, FILM COATED ORAL at 09:59

## 2022-09-17 RX ADMIN — ARFORMOTEROL TARTRATE 15 MCG: 15 SOLUTION RESPIRATORY (INHALATION) at 19:37

## 2022-09-17 RX ADMIN — SODIUM CHLORIDE: 9 INJECTION, SOLUTION INTRAVENOUS at 02:19

## 2022-09-17 RX ADMIN — ATORVASTATIN CALCIUM 20 MG: 20 TABLET, FILM COATED ORAL at 09:59

## 2022-09-17 RX ADMIN — SODIUM CHLORIDE: 9 INJECTION, SOLUTION INTRAVENOUS at 14:34

## 2022-09-17 RX ADMIN — MEMANTINE 10 MG: 5 TABLET ORAL at 09:59

## 2022-09-17 RX ADMIN — OXYCODONE HYDROCHLORIDE AND ACETAMINOPHEN 1 TABLET: 5; 325 TABLET ORAL at 06:02

## 2022-09-17 RX ADMIN — MEMANTINE 10 MG: 5 TABLET ORAL at 20:32

## 2022-09-17 RX ADMIN — SODIUM CHLORIDE, PRESERVATIVE FREE 10 ML: 5 INJECTION INTRAVENOUS at 20:32

## 2022-09-17 RX ADMIN — ARFORMOTEROL TARTRATE 15 MCG: 15 SOLUTION RESPIRATORY (INHALATION) at 07:36

## 2022-09-17 RX ADMIN — OXYCODONE HYDROCHLORIDE AND ACETAMINOPHEN 1 TABLET: 5; 325 TABLET ORAL at 14:31

## 2022-09-17 RX ADMIN — ENOXAPARIN SODIUM 40 MG: 100 INJECTION SUBCUTANEOUS at 10:00

## 2022-09-17 RX ADMIN — CARVEDILOL 3.12 MG: 3.12 TABLET, FILM COATED ORAL at 17:41

## 2022-09-17 ASSESSMENT — PAIN DESCRIPTION - LOCATION
LOCATION: LEG
LOCATION: BACK;LEG

## 2022-09-17 ASSESSMENT — PAIN SCALES - GENERAL
PAINLEVEL_OUTOF10: 8
PAINLEVEL_OUTOF10: 8

## 2022-09-17 ASSESSMENT — PAIN DESCRIPTION - ORIENTATION: ORIENTATION: LEFT;RIGHT

## 2022-09-17 ASSESSMENT — PAIN DESCRIPTION - DESCRIPTORS: DESCRIPTORS: SHARP

## 2022-09-17 NOTE — ED PROVIDER NOTES
[facility] EMERGENCY DEPT  eMERGENCY dEPARTMENT eNCOUnter      Pt Name: Lai Hooper  MRN: [id number]  [place] 1945  Date of evaluation: 9/16/2022  Provider: NAVEEN Michael    CHIEF COMPLAINT       Chief Complaint   Patient presents with    Altered Mental Status     Family states that pt started Lyrica 2 days ago. EMS reports that patient has been altered all day today. Family states that they have not given Lyrica dose today. HISTORY OF PRESENT ILLNESS   (Location/Symptom, Timing/Onset,Context/Setting, Quality, Duration, Modifying Factors, Severity)  Note limiting factors. Lai Hooper is a 69 y.o. female who presents to the emergency department with altered mental status. Has had 2 falls today and has not been herself. Started on lyrica recently. 02 dependent at home. Family control the pt's meds. Given narcan by ems     Pt says she is at the  hospital but not answering any other questions. Lethargic. Will follow commands. Patient has a history of oxygen dependent COPD and continues to smoke, HAILEY, chronic back pain, CHF, CAD, cerebrovascular disease. The history is provided by the patient and a relative. Altered Mental Status  Presenting symptoms: lethargy    Presenting symptoms: no behavior changes    Severity:  Severe  Most recent episode: Today  Episode history:  Continuous  Duration:  1 day  Timing:  Constant  Progression:  Worsening  Chronicity:  New  Context: recent change in medication    Associated symptoms: weakness    Associated symptoms: no nausea, no slurred speech and no vomiting      NursingNotes were reviewed. REVIEW OF SYSTEMS    (2-9 systems for level 4, 10 or more for level 5)     Review of Systems   Unable to perform ROS: Mental status change   Gastrointestinal:  Negative for nausea and vomiting. Neurological:  Positive for weakness. Except as noted above the remainder of the review of systems was reviewed and negative.        PAST MEDICAL HISTORY Past Medical History:   Diagnosis Date    Abdominal pain     Anxiety     Arthritis     CAD in native artery     Cerebrovascular disease     CHF (congestive heart failure) (HCC)     Constipation     COPD (chronic obstructive pulmonary disease) (HCC)     Depression     Emphysema     GERD (gastroesophageal reflux disease)     Myocardial infarct, old     Palliative care patient 02/20/2020    Pneumonia     Tobacco abuse          SURGICALHISTORY       Past Surgical History:   Procedure Laterality Date    ABDOMINAL EXPLORATION SURGERY      APPENDECTOMY      CARDIAC CATHETERIZATION  06/25/10    patent  stent noted in LAD,EF is estimated to be 60%    CARDIAC CATHETERIZATION  7/13/12  MDL    EF  60%    CHOLECYSTECTOMY      COLONOSCOPY  10/2014    Dr Leidy Brush (CERVIX STATUS UNKNOWN)      UPPER GASTROINTESTINAL ENDOSCOPY  10/1/14    Dr Miller Re: food bezoar; esophagitis, gastritis, duodenitis; biopsies neg h-pylori         CURRENT MEDICATIONS       Previous Medications    ALBUTEROL SULFATE HFA (PROVENTIL;VENTOLIN;PROAIR) 108 (90 BASE) MCG/ACT INHALER    Inhale 2 puffs into the lungs every 6 hours as needed for Wheezing    ATORVASTATIN (LIPITOR) 20 MG TABLET    Take 1 tablet by mouth in the morning. AZELASTINE (OPTIVAR) 0.05 % OPHTHALMIC SOLUTION    Place 1 drop into both eyes 2 times daily    BUPROPION (WELLBUTRIN) 75 MG TABLET    Take 1 tablet by mouth in the morning and 1 tablet before bedtime. CARVEDILOL (COREG) 3.125 MG TABLET    Take 1 tablet by mouth 2 times daily (with meals)    CLONAZEPAM (KLONOPIN) 0.5 MG TABLET    Take 1 tablet by mouth 3 times daily as needed for Anxiety for up to 30 days.     DENOSUMAB (PROLIA) 60 MG/ML SOSY SC INJECTION    Inject 1 mL into the skin once for 1 dose    DONEPEZIL (ARICEPT) 5 MG TABLET    Take 1 tablet by mouth nightly    DULOXETINE (CYMBALTA) 60 MG EXTENDED RELEASE CAPSULE        FESOTERODINE FUMARATE ER (TOVIAZ) 8 MG TB24    Take 1 tablet by mouth daily    FUROSEMIDE (LASIX) 20 MG TABLET    Take 1 tablet by mouth daily as needed (swelling)    ISOSORBIDE MONONITRATE (IMDUR) 30 MG EXTENDED RELEASE TABLET    Take 1 tablet by mouth daily    MEMANTINE (NAMENDA) 10 MG TABLET    Take 1 tablet by mouth 2 times daily    MICONAZOLE NITRATE VAGINAL (MONISTAT 3) 4 % CREA    Place 1 Dose vaginally at bedtime    NARCAN 4 MG/0.1ML LIQD NASAL SPRAY        NITROGLYCERIN (NITROSTAT) 0.4 MG SL TABLET    Place 1 tablet under the tongue every 5 minutes as needed for Chest pain    ONDANSETRON (ZOFRAN) 4 MG TABLET    Take 1 tablet by mouth 3 times daily as needed for Nausea or Vomiting    OXYCODONE-ACETAMINOPHEN (PERCOCET)  MG PER TABLET    TAKE 1 TABLET BY MOUTH EVERY 6 HOURS AS NEEDED    OXYGEN    Inhale 2 L into the lungs daily    PANTOPRAZOLE (PROTONIX) 40 MG TABLET    Take 1 tablet by mouth daily    PREGABALIN (LYRICA) 75 MG CAPSULE    Take 75 mg by mouth 2 times daily.     TRAZODONE (DESYREL) 150 MG TABLET    Take 1 tablet by mouth nightly    UMECLIDINIUM-VILANTEROL (ANORO ELLIPTA) 62.5-25 MCG/INH AEPB INHALER    Inhale 1 puff into the lungs daily       ALLERGIES     Codeine and Sulfa antibiotics    FAMILY HISTORY       Family History   Problem Relation Age of Onset    Stroke Brother     Stroke Sister     Cancer Brother     Cancer Sister     Diabetes Brother     Diabetes Sister     Coronary Art Dis Other     Hypertension Other     Seizures Child     Colon Cancer Neg Hx     Colon Polyps Neg Hx     Esophageal Cancer Neg Hx     Liver Cancer Neg Hx     Liver Disease Neg Hx     Rectal Cancer Neg Hx     Stomach Cancer Neg Hx           SOCIAL HISTORY       Social History     Socioeconomic History    Marital status:      Spouse name: None    Number of children: None    Years of education: None    Highest education level: None   Tobacco Use    Smoking status: Every Day     Packs/day: 0.50     Years: 44.00     Pack years: 22.00     Types: Cigarettes Start date: 1978    Smokeless tobacco: Never   Substance and Sexual Activity    Alcohol use: No     Alcohol/week: 0.0 standard drinks    Drug use: No     Social Determinants of Health     Physical Activity: Inactive    Days of Exercise per Week: 0 days    Minutes of Exercise per Session: 0 min       SCREENINGS   NIH Stroke Scale  Interval: Baseline  Level of Consciousness (1a): Alert  LOC Questions (1b): Answers one correctly  LOC Commands (1c): Performs both tasks correctly  Best Gaze (2): Normal  Visual (3): No visual loss  Facial Palsy (4): Normal symmetrical movement  Motor Arm, Left (5a): Some effort against gravity (pt states that she cannot raise her arms. )  Motor Arm, Right (5b): Some effort against gravity (pt states that she cannot raise her arms. )  Motor Leg, Left (6a): Some effort against gravity (pt states that she cannot raise her legs. )  Motor Leg, Right (6b): Some effort against gravity (pt states that she cannot raise her legs. )  Limb Ataxia (7): Absent (pt unable to follow commands for exam. )  Sensory (8): Normal  Best Language (9): No aphasia  Dysarthria (10): Normal  Extinction and Inattention (11): No abnormality  Total: 9Glasgow Coma Scale  Eye Opening: Spontaneous  Best Verbal Response: Confused  Best Motor Response: Obeys commands  [place] Coma Scale Score: 14 @FLOW(36180520)@      PHYSICAL EXAM    (up to 7 for level 4, 8 or more for level 5)     ED Triage Vitals   BP Temp Temp Source Heart Rate Resp SpO2 Height Weight   09/16/22 1947 09/16/22 1947 09/16/22 1947 09/16/22 2034 09/16/22 1947 09/16/22 1947 -- 09/16/22 2034   (!) 123/97 98.4 °F (36.9 °C) Oral 74 18 (!) 83 %  150 lb (68 kg)       Physical Exam  Vitals and nursing note reviewed. Constitutional:       Appearance: Normal appearance. She is well-developed. She is obese. HENT:      Head: Normocephalic and atraumatic. Eyes:      General: No scleral icterus. Right eye: No discharge. Left eye: No discharge. Cardiovascular:      Rate and Rhythm: Normal rate. Pulmonary:      Effort: No respiratory distress. Musculoskeletal:      Cervical back: Normal range of motion and neck supple. Neurological:      Mental Status: She is lethargic. GCS: GCS eye subscore is 4. GCS verbal subscore is 4. GCS motor subscore is 6. Comments: Generalized weakness and lethargy. Will follow commands and can move all her limbs   Psychiatric:         Behavior: Behavior normal.       DIAGNOSTIC RESULTS     EKG: All EKG's are interpreted by the Emergency Department Physician who either signs or Co-signsthis chart in the absence of a cardiologist.  79 sinus rhythm borderline T abnormalities anterior leads read at 2015 by Dr. Carla Sharpe:   [place] Karen such as CT, Ultrasound and MRI are read by the radiologist. Dianne Randolph radiographic images are visualized and preliminarily interpreted by the emergency physician with the below findings:      Interpretation per the Radiologist below, if available at the time of this note:    XR CHEST PORTABLE   Final Result   No pulmonary infiltrates or effusion. Mediport in situ with its tip in SVC. Recommendation: Follow up as clinically indicated. Electronically Signed by Darcy Palacio MD at 16-Sep-2022 10:47:29 PM               CT HEAD WO CONTRAST   Final Result   No CT evidence of acute hemmorhage   Recommendation: Follow up as clinically indicated. All CT scans at this facility utilize dose modulation, iterative reconstruction, and/or weight based dosing when appropriate to reduce radiation dose to as low as reasonably achievable. Amended by Darcy Palacio MD at 16-Sep-2022 09:51:11 PM   Electronically Signed by Darcy Palacio MD at 16-Sep-2022 09:46:26 PM               CTA HEAD NECK W CONTRAST   Final Result   No acute occlusion seen   Recommendation:   Follow up as clinically indicated.    All CT scans at this facility utilize dose modulation, iterative reconstruction, Absolute 1.0 (*)     Macrocytes 1+ (*)     Polychromasia 1+ (*)     Hypochromia 1+ (*)     Ovalocytes 1+ (*)     All other components within normal limits   COMPREHENSIVE METABOLIC PANEL W/ REFLEX TO MG FOR LOW K - Abnormal; Notable for the following components:    CO2 30 (*)     Creatinine 1.0 (*)     GFR Non- 54 (*)     Total Protein 6.3 (*)     All other components within normal limits   BLOOD GAS, ARTERIAL - Abnormal; Notable for the following components:    pCO2, Arterial 54.0 (*)     HCO3, Arterial 33.4 (*)     Base Excess, Arterial 7.4 (*)     Hemoglobin, Art, Extended 10.2 (*)     All other components within normal limits   DRUG SCRN, BUPRENORPHINE - Abnormal; Notable for the following components:    Oxycodone Urine POSITIVE (*)     All other components within normal limits   POCT GLUCOSE - Abnormal; Notable for the following components:    POC Glucose 119 (*)     All other components within normal limits   POCT GLUCOSE - Normal   URINALYSIS WITH REFLEX TO CULTURE   LACTIC ACID   TROPONIN   PROTIME-INR   COMPREHENSIVE METABOLIC PANEL   POCT GLUCOSE       All other labs were within normal range or not returned as of this dictation. EMERGENCY DEPARTMENT COURSE and DIFFERENTIALDIAGNOSIS/MDM:   Vitals:    Vitals:    09/16/22 2106 09/16/22 2130 09/16/22 2200 09/16/22 2230   BP:  132/88 106/70 108/74   Pulse: 75 75 74 71   Resp: 18 16 16 14   Temp:       TempSrc:       SpO2: 98% 99% 99% 99%   Weight:               MDM  Spoke to Dr. Radha Denton for admission. Workup has not been enlightening. Pt does take quite a number of sedating meds      CONSULTS:  IP CONSULT TO PHARMACY  PHARMACY TO CHANGE BASE FLUIDS    PROCEDURES:  Unless otherwise noted below, none     Procedures    FINAL IMPRESSION      1. Disorientation    2. Polypharmacy        DISPOSITION/PLAN   DISPOSITION Admitted 09/16/2022 11:52:11 PM      PATIENT REFERRED TO:  No follow-up provider specified.     DISCHARGE MEDICATIONS:  New Prescriptions    No medications on file          (Please note that portions of this note were completed with a voice recognitionprogram.  Efforts were made to edit the dictations but occasionally words are mis-transcribed.)    NAVEEN Serna (electronically signed)          NAVEEN Serna  09/17/22 0025

## 2022-09-17 NOTE — PROGRESS NOTES
Comment: USE RW  Safety Devices  Type of Devices: Bed alarm in place, Call light within reach     Restrictions  Restrictions/Precautions  Restrictions/Precautions: Fall Risk     Subjective   Pain: REPORTS MIN-MOD CHRONIC BACK PAIN  General  Diagnosis: ALTERED MENTAL STATUS  Subjective  Subjective: Pt WILLING TO PARTICIPATE         Social/Functional History  Social/Functional History  Lives With: Family, Daughter  Home Equipment: Roger Highman, rolling  Has the patient had two or more falls in the past year or any fall with injury in the past year?: Yes  Receives Help From: Family  ADL Assistance: Independent  Homemaking Assistance: Needs assistance  Ambulation Assistance: Independent (RW PRN)  Transfer Assistance: Independent  Additional Comments: QUESTIONABLE HISTORIAN  Vision/Hearing  Vision  Vision: Within Functional Limits  Hearing  Hearing: Within functional limits    Cognition   Orientation  Orientation Level: Oriented to place;Oriented to person;Disoriented to time  Cognition  Overall Cognitive Status: WFL  Cognition Comment: FLAT AFFECT     Objective   Heart Rate: 66  Heart Rate Source: Monitor  BP: 124/77  BP Location: Right Arm  BP Method: Automatic  MAP (Calculated): 92.67  Resp: 18  SpO2: 100 %  O2 Device: Nasal cannula  Comment: 3L        Gross Assessment  AROM: Within functional limits  Strength: Generally decreased, functional                    Bed mobility  Supine to Sit: Supervision  Sit to Supine: Supervision  Transfers  Sit to Stand: Minimal Assistance  Stand to sit: Minimal Assistance  Comment: 2-3 SMALL SIDE STEPS MIN/HHA        Balance  Sitting - Dynamic: Good  Standing - Dynamic: Fair           OutComes Score                                                  AM-PAC Score             Tinneti Score       Goals  Short Term Goals  Time Frame for Short term goals: 14 DAYS  Short term goal 1: TRANSFERS INDEPENDENT  Short term goal 2: ' RW SUPERVISION       Education  Patient Education  Education Given To: Patient  Education Provided: Role of Therapy;Plan of Care      Therapy Time   Individual Concurrent Group Co-treatment   Time In           Time Out           Minutes                   Sarahi Traore, PT

## 2022-09-17 NOTE — ED NOTES
Patient placed on cardiac monitor, continuous pulse oximeter, and NIBP monitor. Monitor alarms on.          Jihan Singleton RN  09/16/22 [id number]

## 2022-09-17 NOTE — H&P
[place], [place], [address]    DEPARTMENT OF HOSPITALIST MEDICINE        HISTORY & PHYSICAL:          REASON FOR ADMISSION:  Chief Complaint   Patient presents with    Altered Mental Status     Family states that pt started Lyrica 2 days ago. EMS reports that patient has been altered all day today. Family states that they have not given Lyrica dose today. HISTORY OF PRESENT ILLNESS:  Abdias Deleon is an 69 y.o. female. She is a pleasantly confused lady who lives at home with her family. She is on multiple chronic medications. She is brought to the ER by EMS for evaluation for altered mental status. She had 2 falls today and has not been acting as herself. She has been started on Lyrica recently as per family. Patient was given Narcan in route by EMS without much benefit. Code stroke was called upon arrival in the ER, patient was thoroughly worked up and ruled out for stroke. She is being admitted overnight for close observation and medical management.     PAST MEDICAL HISTORY:  Past Medical History:   Diagnosis Date    Abdominal pain     Anxiety     Arthritis     CAD in native artery     Cerebrovascular disease     CHF (congestive heart failure) (HCC)     Constipation     COPD (chronic obstructive pulmonary disease) (HCC)     Depression     Emphysema     GERD (gastroesophageal reflux disease)     Myocardial infarct, old     Palliative care patient 02/20/2020    Pneumonia     Tobacco abuse          PAST SURGICAL HISTORY:  Past Surgical History:   Procedure Laterality Date    ABDOMINAL EXPLORATION SURGERY      APPENDECTOMY      CARDIAC CATHETERIZATION  06/25/10    patent  stent noted in LAD,EF is estimated to be 60%    CARDIAC CATHETERIZATION  7/13/12  [facility]DL    EF  60%    CHOLECYSTECTOMY      COLONOSCOPY  10/2014    Dr Zheng Bishop (CERVIX STATUS UNKNOWN)      UPPER GASTROINTESTINAL ENDOSCOPY  10/1/14    Dr Melo Fearing: food bezoar; esophagitis, gastritis, duodenitis; biopsies neg h-pylori        SOCIAL HISTORY:  Social History     Socioeconomic History    Marital status:      Spouse name: None    Number of children: None    Years of education: None    Highest education level: None   Tobacco Use    Smoking status: Every Day     Packs/day: 0.50     Years: 44.00     Pack years: 22.00     Types: Cigarettes     Start date: 1978    Smokeless tobacco: Never   Substance and Sexual Activity    Alcohol use: No     Alcohol/week: 0.0 standard drinks    Drug use: No     Social Determinants of Health     Physical Activity: Inactive    Days of Exercise per Week: 0 days    Minutes of Exercise per Session: 0 min        FAMILY HISTORY:  Family History   Problem Relation Age of Onset    Stroke Brother     Stroke Sister     Cancer Brother     Cancer Sister     Diabetes Brother     Diabetes Sister     Coronary Art Dis Other     Hypertension Other     Seizures Child     Colon Cancer Neg Hx     Colon Polyps Neg Hx     Esophageal Cancer Neg Hx     Liver Cancer Neg Hx     Liver Disease Neg Hx     Rectal Cancer Neg Hx     Stomach Cancer Neg Hx          ALLERGIES:  Allergies   Allergen Reactions    Codeine Nausea Only    Sulfa Antibiotics Other (See Comments)     \"makes me tremble'        PRIOR TO ADMISSION MEDS:  Not in a hospital admission. REVIEW OF SYSTEMS:    Unable to be obtained . .. Patient is pleasantly confused! PHYSICAL EXAM:  /74   Pulse 71   Temp 98.4 °F (36.9 °C) (Oral)   Resp 14   Wt 150 lb (68 kg)   SpO2 99%   BMI 28.34 kg/m²   No intake/output data recorded.       PHYSICAL EXAMINATION:    Vital Signs: Please see the chart   ALICE:  Patient is pleasantly confused, appears tired and fatigued   Head/Eyes:  Normocephalic, atraumatic, EOMI and PERRLA bilaterally   ENT: Moist mucous membranes, nasal passages clear   Neck: Supple, full range of motion, no carotid bruit, trachea midline   Respiratory:   Bilateral decreased air entry in both lung evidence of acute hemmorhage Recommendation: Follow up as clinically indicated. All CT scans at this facility utilize dose modulation, iterative reconstruction, and/or weight based dosing when appropriate to reduce radiation dose to as low as reasonably achievable. Amended by Joe Glynn MD at 16-Sep-2022 09:51:11 PM Electronically Signed by Joe lGynn MD at 16-Sep-2022 09:46:26 PM             XR CHEST PORTABLE    Result Date: 9/16/2022  No pulmonary infiltrates or effusion. Mediport in situ with its tip in SVC. Recommendation: Follow up as clinically indicated. Electronically Signed by Joe Glynn MD at 16-Sep-2022 10:47:29 PM             CTA HEAD NECK W CONTRAST    Result Date: 9/16/2022  No acute occlusion seen Recommendation: Follow up as clinically indicated. All CT scans at this facility utilize dose modulation, iterative reconstruction, and/or weight based dosing when appropriate to reduce radiation dose to as low as reasonably achievable. Exam: CTA OF THE CAROTID ARTERIES Clinical data:Stroke symptoms. Technique: Axial CT angiography of the carotid arteries within the neck was performed with the administration of intravenous contrast for evaluation of the carotid bifurcation. Oral contrast was not utilized. Coronal, sagittal, and 3D volume reconstructions of the carotid arteries were performed. Reformatted/3D-MPR images were performed. NASCET Criteria was used in evaluating the study. Radiation dose: CTDIvol = 94.73 mGy, DLP = 1620 mGy x cm. Prior studies: CT scan of the brain done on same day. Findings: Occlusion of the proximal L ICA with reconstitution of the ICA from the ECA. Right ICA is without occlusion Severely calcified left Carotid bulb Vertebral arteries are well opacified. Jugular veins are well opacified Included great vessels of the aortic arch are grossly unremarkable. Included lung apices are grossly unremarkable. Bony structures: No acute or destructive abnormality.  IMPRESSION: Occlusion of the proximal L ICA with reconstitution of the ICA from the ECA. Right ICA is without occlusion Severely calcified left Carotid bulb Recommendation: Follow up as clinically indicated. All CT scans at this facility utilize dose modulation, iterative reconstruction, and/or weight based dosing when appropriate to reduce radiation dose to as low as reasonably achievable. Electronically Signed by Montserrat Nichols MD at 16-Sep-2022 09:51:07 PM                 Assessment and Plan:    Principal Problem:    Altered mental status  Active Problems:    Anxiety    Arthritis    Cerebrovascular disease    Depression    GERD (gastroesophageal reflux disease)    Myocardial infarct, old    Tobacco abuse    O2 dependent    Dependence on nicotine from cigarettes    Tobacco abuse counseling    CAD in native artery    Chronic narcotic dependence ([facility] [address]    Polypharmacy    Urge incontinence    Palliative care patient    COPD (chronic obstructive pulmonary disease) ([facility])    Hypoxia  Resolved Problems:    * No resolved hospital problems. *       Place patient under observation status on the medical floor  Patient started on continuous cardiopulmonary monitoring  Neurochecks ordered every shift  I went through patient's home medications and held likely offending ones causing altered mental status  DC Lyrica  Hold off on clonazepam and trazodone  Urine drug screen is positive for oxycodone  Patient takes high dose of Percocet 10 mg every 6 hours as needed  Cut down on Percocet to 5 mg every 8 hours as needed for pain  Code stroke was called upon arrival at the ER, patient was thoroughly evaluated and stroke was ruled out upon  Neurochecks ordered  Hold off on Lasix   Patient started on gentle IV hydration  Reevaluate patient in a.m. and consider DC home if she remains stable and and mentation improves      Chronic medical issues . .. Continue with home meds. Monitor patient closely while admitted.  Advised very close f/u with patient's PCP as an outpatient to address chronic medical issues. Polypharmacy:  Patient is also on multiple medications and at high risk for polypharmacy. I would request the PCP to please work with the patient by tailoring medications to the least medications required for maximum clinical benefit. Dependence on nicotine from cigarettes          Tobacco abuse counseling  Patient smokes cigarettes on a chronic basis. Strictly advised patient to cut down on or quit smoking. Nicotine patch offered. ~5 minutes spent on tobacco cessation counseling with the patient. Websites - http://smokefree. gov & LegalWarrants.Mapittrackit. com   °Quitlines - 1-800-QUIT-NOW (6-365-592-1808)   °Smokefree Apps - QuitSTART Hugo   °Text Messages - SmokefreeTXT (send the word QUIT to 31091)         Repeat labs in a.m. Electrolyte replacement as per protocol. Patient will be monitored very closely on the floor. Further recommendations as per the hospital course. Patient's management will be taken over by our [facility] Hospitalist Team in am.      Patient  is on DVT prophylaxis  Current medications reviewed  Lab work reviewed  Radiology/Chest x-ray films reviewed  Discussed with the nurse and addressed all questions/concerns  Discussed with Patient and/or Family at the bedside in detail . .. they verbalize understanding and agree with the management plan. Attestation:  Inpatient status is used for patients with an expected LOS extending past two midnights due to medical therapy and/or critical care needs  . .. all other patients are placed under OBServation status. Whitney Méndez MD  11:57 PM 9/16/2022      DISCLAIMER: This note was created with electronic voice recognition which does have occasional errors. If you have any questions regarding the content within the note please do not hesitate to contact me. .. Thanks.

## 2022-09-17 NOTE — PROGRESS NOTES
Pharmacy Consult      Iris March is a 69 y.o. female for whom pharmacy has been consulted to review this patient's medication profile to evaluate IV medications and change all base solutions to 0.9% sodium chloride if possible based on compatibility and product availability. This profile review will include an assessment of total IV fluids being administered to the patient. If a current order exists for continuous infusion of non-hypertonic plain IV fluid, the pharmacist will contact the prescriber to recommend the discontinuation of the IV fluid order. Patient Active Problem List   Diagnosis    CHF (congestive heart failure) ([facility])    CAD in native artery    Chest pain    Chronic constipation    Nondiabetic gastroparesis    Generalized abdominal pain    Nausea & vomiting    Chronic narcotic dependence ([facility])    Frequency of urination    Gross hematuria    Urge incontinence    Palliative care patient    COPD (chronic obstructive pulmonary disease) ([facility])    Moderate episode of recurrent major depressive disorder ([facility])    Hypoxia    Respiratory failure ([facility])    Carotid stenosis, asymptomatic, left    Dementia without behavioral disturbance, unspecified dementia type ([facility])       Allergies:  Codeine and Sulfa antibiotics     No results for input(s): CREATININE in the last 72 hours. Ht/Wt:   Ht Readings from Last 1 Encounters:   06/23/22 5' 1\" (1.549 m)        Wt Readings from Last 1 Encounters:   06/23/22 146 lb 2 oz (66.3 kg)         CrCl cannot be calculated (Patient's most recent lab result is older than the maximum 30 days allowed. ). Assessment/Plan:    No changes needed at this time. Thank you for the consult. Will continue to follow.     Electronically signed by Lisa Hong RPh, BCPS, 9/16/2022,8:24 PM

## 2022-09-17 NOTE — ED NOTES
[facility] and Stroke corridnator notified about code stroke @ [address] [place] [address]  09/16/22 2034

## 2022-09-18 PROBLEM — G93.40 ACUTE ENCEPHALOPATHY: Status: ACTIVE | Noted: 2022-01-01

## 2022-09-18 LAB
ANION GAP SERPL CALCULATED.3IONS-SCNC: 6 MMOL/L (ref 7–19)
BUN BLDV-MCNC: 18 MG/DL (ref 8–23)
CALCIUM SERPL-MCNC: 8.8 MG/DL (ref 8.8–10.2)
CHLORIDE BLD-SCNC: 103 MMOL/L (ref 98–111)
CO2: 29 MMOL/L (ref 22–29)
CREAT SERPL-MCNC: 0.9 MG/DL (ref 0.5–0.9)
GFR AFRICAN AMERICAN: >59
GFR NON-AFRICAN AMERICAN: >60
GLUCOSE BLD-MCNC: 90 MG/DL (ref 74–109)
HCT VFR BLD CALC: 27.6 % (ref 37–47)
HCT VFR BLD CALC: 30.7 % (ref 37–47)
HEMOGLOBIN: 8.2 G/DL (ref 12–16)
HEMOGLOBIN: 9.2 G/DL (ref 12–16)
MCH RBC QN AUTO: 30.4 PG (ref 27–31)
MCHC RBC AUTO-ENTMCNC: 29.7 G/DL (ref 33–37)
MCV RBC AUTO: 102.2 FL (ref 81–99)
PDW BLD-RTO: 14 % (ref 11.5–14.5)
PLATELET # BLD: 165 K/UL (ref 130–400)
PMV BLD AUTO: 10.9 FL (ref 9.4–12.3)
POTASSIUM SERPL-SCNC: 3.9 MMOL/L (ref 3.5–5)
RBC # BLD: 2.7 M/UL (ref 4.2–5.4)
SODIUM BLD-SCNC: 138 MMOL/L (ref 136–145)
WBC # BLD: 5.9 K/UL (ref 4.8–10.8)

## 2022-09-18 PROCEDURE — 94640 AIRWAY INHALATION TREATMENT: CPT

## 2022-09-18 PROCEDURE — 85018 HEMOGLOBIN: CPT

## 2022-09-18 PROCEDURE — 6360000002 HC RX W HCPCS: Performed by: HOSPITALIST

## 2022-09-18 PROCEDURE — 80048 BASIC METABOLIC PNL TOTAL CA: CPT

## 2022-09-18 PROCEDURE — 85014 HEMATOCRIT: CPT

## 2022-09-18 PROCEDURE — 1210000000 HC MED SURG R&B

## 2022-09-18 PROCEDURE — 96372 THER/PROPH/DIAG INJ SC/IM: CPT

## 2022-09-18 PROCEDURE — 36415 COLL VENOUS BLD VENIPUNCTURE: CPT

## 2022-09-18 PROCEDURE — 2580000003 HC RX 258: Performed by: HOSPITALIST

## 2022-09-18 PROCEDURE — 85027 COMPLETE CBC AUTOMATED: CPT

## 2022-09-18 PROCEDURE — 6370000000 HC RX 637 (ALT 250 FOR IP): Performed by: HOSPITALIST

## 2022-09-18 PROCEDURE — 2700000000 HC OXYGEN THERAPY PER DAY

## 2022-09-18 RX ADMIN — DONEPEZIL HYDROCHLORIDE 5 MG: 5 TABLET, FILM COATED ORAL at 20:04

## 2022-09-18 RX ADMIN — OXYCODONE HYDROCHLORIDE AND ACETAMINOPHEN 1 TABLET: 5; 325 TABLET ORAL at 17:07

## 2022-09-18 RX ADMIN — TROSPIUM CHLORIDE 20 MG: 20 TABLET, FILM COATED ORAL at 06:10

## 2022-09-18 RX ADMIN — PANTOPRAZOLE SODIUM 40 MG: 40 TABLET, DELAYED RELEASE ORAL at 09:12

## 2022-09-18 RX ADMIN — IPRATROPIUM BROMIDE 0.5 MG: 0.5 SOLUTION RESPIRATORY (INHALATION) at 07:03

## 2022-09-18 RX ADMIN — ARFORMOTEROL TARTRATE 15 MCG: 15 SOLUTION RESPIRATORY (INHALATION) at 07:03

## 2022-09-18 RX ADMIN — TROSPIUM CHLORIDE 20 MG: 20 TABLET, FILM COATED ORAL at 17:07

## 2022-09-18 RX ADMIN — SODIUM CHLORIDE, PRESERVATIVE FREE 10 ML: 5 INJECTION INTRAVENOUS at 09:12

## 2022-09-18 RX ADMIN — KETOTIFEN FUMARATE 1 DROP: 0.35 SOLUTION/ DROPS OPHTHALMIC at 09:12

## 2022-09-18 RX ADMIN — ONDANSETRON HYDROCHLORIDE 4 MG: 4 TABLET, FILM COATED ORAL at 14:26

## 2022-09-18 RX ADMIN — ARFORMOTEROL TARTRATE 15 MCG: 15 SOLUTION RESPIRATORY (INHALATION) at 19:31

## 2022-09-18 RX ADMIN — OXYCODONE HYDROCHLORIDE AND ACETAMINOPHEN 1 TABLET: 5; 325 TABLET ORAL at 09:11

## 2022-09-18 RX ADMIN — IPRATROPIUM BROMIDE 0.5 MG: 0.5 SOLUTION RESPIRATORY (INHALATION) at 14:07

## 2022-09-18 RX ADMIN — SODIUM CHLORIDE, PRESERVATIVE FREE 10 ML: 5 INJECTION INTRAVENOUS at 20:07

## 2022-09-18 RX ADMIN — MEMANTINE 10 MG: 5 TABLET ORAL at 20:04

## 2022-09-18 RX ADMIN — BUPROPION HYDROCHLORIDE 75 MG: 75 TABLET, FILM COATED ORAL at 09:12

## 2022-09-18 RX ADMIN — MEMANTINE 10 MG: 5 TABLET ORAL at 09:12

## 2022-09-18 RX ADMIN — IPRATROPIUM BROMIDE 0.5 MG: 0.5 SOLUTION RESPIRATORY (INHALATION) at 19:31

## 2022-09-18 RX ADMIN — BUPROPION HYDROCHLORIDE 75 MG: 75 TABLET, FILM COATED ORAL at 20:04

## 2022-09-18 RX ADMIN — ACETAMINOPHEN 650 MG: 325 TABLET, FILM COATED ORAL at 20:04

## 2022-09-18 RX ADMIN — IPRATROPIUM BROMIDE 0.5 MG: 0.5 SOLUTION RESPIRATORY (INHALATION) at 10:49

## 2022-09-18 RX ADMIN — ATORVASTATIN CALCIUM 20 MG: 20 TABLET, FILM COATED ORAL at 09:12

## 2022-09-18 RX ADMIN — ENOXAPARIN SODIUM 40 MG: 100 INJECTION SUBCUTANEOUS at 09:14

## 2022-09-18 RX ADMIN — ISOSORBIDE MONONITRATE 30 MG: 30 TABLET, EXTENDED RELEASE ORAL at 09:12

## 2022-09-18 RX ADMIN — KETOTIFEN FUMARATE 1 DROP: 0.35 SOLUTION/ DROPS OPHTHALMIC at 20:05

## 2022-09-18 RX ADMIN — CARVEDILOL 3.12 MG: 3.12 TABLET, FILM COATED ORAL at 09:12

## 2022-09-18 RX ADMIN — CARVEDILOL 3.12 MG: 3.12 TABLET, FILM COATED ORAL at 17:07

## 2022-09-18 ASSESSMENT — ENCOUNTER SYMPTOMS
SHORTNESS OF BREATH: 0
WHEEZING: 0
DIARRHEA: 0
TROUBLE SWALLOWING: 0
NAUSEA: 0
RHINORRHEA: 0

## 2022-09-18 ASSESSMENT — PAIN DESCRIPTION - DESCRIPTORS
DESCRIPTORS: STABBING
DESCRIPTORS: SHOOTING

## 2022-09-18 ASSESSMENT — PAIN SCALES - GENERAL
PAINLEVEL_OUTOF10: 10
PAINLEVEL_OUTOF10: 10
PAINLEVEL_OUTOF10: 8
PAINLEVEL_OUTOF10: 8

## 2022-09-18 ASSESSMENT — PAIN DESCRIPTION - LOCATION
LOCATION: BACK

## 2022-09-18 NOTE — PROGRESS NOTES
Daily Progress Note    Date:2022  Patient: Jody Craft  : 1945  BXX:[id number]  CODE:Full Code No additional code details  PCP:NAVEEN Yoon    Admit Date: 2022  7:44 PM   LOS: 0 days       Subjective: 68 yo F admitted for AMS. Presented after 2 falls. Was recently started on Lyrica. Pt sleeping at time of interview, but was able to wake up and participate minimally. States that she feels well, just sleepy. Review of Systems   Reason unable to perform ROS: AMS and drowsiness. Objective:      Vital signs in last 24 hours:  Patient Vitals for the past 24 hrs:   BP Temp Temp src Pulse Resp SpO2 Height Weight   22 109/74 96.8 °F (36 °C) -- 74 16 95 % -- --   22 1645 (!) 106/57 98.4 °F (36.9 °C) Temporal 69 16 99 % -- --   22 1501 -- -- -- -- 18 -- -- --   22 1456 -- -- -- 78 -- -- -- --   22 1431 -- -- -- -- 20 -- -- --   22 0802 -- -- -- 66 -- -- -- --   22 0740 124/77 98 °F (36.7 °C) Temporal 68 18 100 % -- --   22 0145 104/76 98.3 °F (36.8 °C) Temporal 75 16 94 % 5' 1\" (1.549 m) 139 lb 6 oz (63.2 kg)   22 0101 -- -- -- -- -- 98 % -- --   22 0100 122/61 -- -- 70 15 98 % -- --   22 0000 (!) 112/93 -- -- 73 16 -- -- --   22 2300 -- -- -- -- -- 99 % -- --       I/O last 3 completed shifts: In: 120 [P.O.:120]  Out: -   No intake/output data recorded. Physical Exam  Constitutional:       General: She is not in acute distress. Comments: Drowsy but arousable   HENT:      Head: Normocephalic and atraumatic. Eyes:      Extraocular Movements: Extraocular movements intact. Pupils: Pupils are equal, round, and reactive to light. Cardiovascular:      Rate and Rhythm: Normal rate and regular rhythm. Pulses: Normal pulses. Pulmonary:      Effort: Pulmonary effort is normal.      Breath sounds: Normal breath sounds. Abdominal:      General: There is no distension. Palpations: Abdomen is soft. Tenderness: There is no abdominal tenderness. Musculoskeletal:         General: No deformity. Normal range of motion. Skin:     General: Skin is warm and dry. Findings: No rash.            Lab Review   Recent Results (from the past 24 hour(s))   CMP    Collection Time: 09/17/22  2:21 AM   Result Value Ref Range    Sodium 138 136 - 145 mmol/L    Potassium 4.7 3.5 - 5.0 mmol/L    Chloride 101 98 - 111 mmol/L    CO2 29 22 - 29 mmol/L    Anion Gap 8 7 - 19 mmol/L    Glucose 105 74 - 109 mg/dL    BUN 19 8 - 23 mg/dL    Creatinine 0.9 0.5 - 0.9 mg/dL    GFR Non-African American >60 >60    GFR African American >59 >59    Calcium 9.3 8.8 - 10.2 mg/dL    Total Protein 6.0 (L) 6.6 - 8.7 g/dL    Albumin 3.2 (L) 3.5 - 5.2 g/dL    Total Bilirubin <0.2 0.2 - 1.2 mg/dL    Alkaline Phosphatase 82 35 - 104 U/L    ALT 8 5 - 33 U/L    AST 22 5 - 32 U/L   Magnesium    Collection Time: 09/17/22  2:21 AM   Result Value Ref Range    Magnesium 2.1 1.6 - 2.4 mg/dL   Phosphorus    Collection Time: 09/17/22  2:21 AM   Result Value Ref Range    Phosphorus 3.6 2.5 - 4.5 mg/dL   Vitamin D 25 Hydroxy    Collection Time: 09/17/22  2:21 AM   Result Value Ref Range    Vit D, 25-Hydroxy 50.2 >=30 ng/mL   Folate    Collection Time: 09/17/22  2:21 AM   Result Value Ref Range    Folate 8.8 4.8 - 37.3 ng/mL   Vitamin B12    Collection Time: 09/17/22  2:21 AM   Result Value Ref Range    Vitamin B-12 457 211 - 946 pg/mL             Current Facility-Administered Medications:     nitroGLYCERIN (NITROSTAT) SL tablet 0.4 mg, 0.4 mg, SubLINGual, Q5 Min PRN, Jf Ochoa MD    isosorbide mononitrate (IMDUR) extended release tablet 30 mg, 30 mg, Oral, Daily, Jf Ochoa MD, 30 mg at 09/17/22 0959    ondansetron (ZOFRAN) tablet 4 mg, 4 mg, Oral, TID PRN, Jf Ochoa MD    buPROPion (WELLBUTRIN) tablet 75 mg, 75 mg, Oral, BID, Jf Ochoa MD, 75 mg at 09/17/22 2031    atorvastatin (LIPITOR) tablet 20 mg, 20 mg, Oral, Daily, Jf Ochoa MD, 20 mg at 09/17/22 0959    carvedilol (COREG) tablet 3.125 mg, 3.125 mg, Oral, BID WC, Jf Ochoa MD, 3.125 mg at 09/17/22 1741    donepezil (ARICEPT) tablet 5 mg, 5 mg, Oral, Nightly, Jf Ochoa MD, 5 mg at 09/17/22 2031    memantine (NAMENDA) tablet 10 mg, 10 mg, Oral, BID, Jf Ochoa MD, 10 mg at 09/17/22 2032    pantoprazole (PROTONIX) tablet 40 mg, 40 mg, Oral, Daily, Jf Ochoa MD, 40 mg at 09/17/22 0959    trospium (SANCTURA) tablet 20 mg, 20 mg, Oral, BID AC, Jf Ochoa MD, 20 mg at 09/17/22 1741    oxyCODONE-acetaminophen (PERCOCET) 5-325 MG per tablet 1 tablet, 1 tablet, Oral, Q8H PRN, Jf Ochoa MD, 1 tablet at 09/17/22 2037    [Held by provider] traZODone (DESYREL) tablet 75 mg, 75 mg, Oral, Nightly, Jf Ochoa MD    sodium chloride flush 0.9 % injection 10 mL, 10 mL, IntraVENous, 2 times per day, Kenisha Garcia MD, 10 mL at 09/17/22 2032    sodium chloride flush 0.9 % injection 10 mL, 10 mL, IntraVENous, PRN, Jf Ochoa MD    0.9 % sodium chloride infusion, , IntraVENous, PRN, Jf Ochao MD    potassium chloride (KLOR-CON M) extended release tablet 40 mEq, 40 mEq, Oral, PRN **OR** potassium bicarb-citric acid (EFFER-K) effervescent tablet 40 mEq, 40 mEq, Oral, PRN **OR** potassium chloride 10 mEq/100 mL IVPB (Peripheral Line), 10 mEq, IntraVENous, PRN, Jf Ochoa MD    enoxaparin (LOVENOX) injection 40 mg, 40 mg, SubCUTAneous, Daily, Jf Ochoa MD, 40 mg at 09/17/22 1000    polyethylene glycol (GLYCOLAX) packet 17 g, 17 g, Oral, Daily PRN, Jf Ochoa MD    acetaminophen (TYLENOL) tablet 650 mg, 650 mg, Oral, Q6H PRN **OR** acetaminophen (TYLENOL) suppository 650 mg, 650 mg, Rectal, Q6H PRN, Jf Ochoa MD    ipratropium (ATROVENT) 0.02 % nebulizer solution 0.5 mg, 0.5 mg, Nebulization, 4x daily, Jf Ochoa MD, 0.5 mg at 09/17/22 1937    Arformoterol Tartrate (BROVANA) nebulizer solution 15 mcg, 15 mcg, Nebulization, BID, Jf Ochoa MD, 15 mcg at 09/17/22 1937    albuterol (PROVENTIL) nebulizer solution 2.5 mg, 2.5 mg, Nebulization, Q6H PRN, Jf Ochoa MD    ketotifen (ZADITOR) 0.025 % ophthalmic solution 1 drop, 1 drop, Both Eyes, BID, Jf Ochoa MD, 1 drop at 09/17/22 2159    magnesium sulfate 1,000 mg in sodium chloride 0.9 % 100 mL IVPB, 1,000 mg, IntraVENous, PRN, Jf Ochoa MD    nicotine (NICODERM CQ) 7 MG/24HR 1 patch, 1 patch, TransDERmal, Daily, Jf Ochoa MD      IMAGING:  CT HEAD WO CONTRAST     Result Date: 9/16/2022  No CT evidence of acute hemmorhage Recommendation: Follow up as clinically indicated. All CT scans at this facility utilize dose modulation, iterative reconstruction, and/or weight based dosing when appropriate to reduce radiation dose to as low as reasonably achievable. Amended by Carl Cline MD at 16-Sep-2022 09:51:11 PM Electronically Signed by Carl Cline MD at 16-Sep-2022 09:46:26 PM              XR CHEST PORTABLE     Result Date: 9/16/2022  No pulmonary infiltrates or effusion. Mediport in situ with its tip in SVC. Recommendation: Follow up as clinically indicated. Electronically Signed by Carl Cline MD at 16-Sep-2022 10:47:29 PM              CTA HEAD NECK W CONTRAST     Result Date: 9/16/2022  No acute occlusion seen Recommendation: Follow up as clinically indicated. All CT scans at this facility utilize dose modulation, iterative reconstruction, and/or weight based dosing when appropriate to reduce radiation dose to as low as reasonably achievable. Exam: CTA OF THE CAROTID ARTERIES Clinical data:Stroke symptoms. Technique: Axial CT angiography of the carotid arteries within the neck was performed with the administration of intravenous contrast for evaluation of the carotid bifurcation. Oral contrast was not utilized. Coronal, sagittal, and 3D volume reconstructions of the carotid arteries were performed. Reformatted/3D-MPR images were performed. NASCET Criteria was used in evaluating the study.  Radiation dose: CTDIvol = 94.73 mGy, DLP = 1620 mGy x cm. Prior studies: CT scan of the brain done on same day. Findings: Occlusion of the proximal L ICA with reconstitution of the ICA from the ECA. Right ICA is without occlusion Severely calcified left Carotid bulb Vertebral arteries are well opacified. Jugular veins are well opacified Included great vessels of the aortic arch are grossly unremarkable. Included lung apices are grossly unremarkable. Bony structures: No acute or destructive abnormality. IMPRESSION: Occlusion of the proximal L ICA with reconstitution of the ICA from the ECA. Right ICA is without occlusion Severely calcified left Carotid bulb Recommendation: Follow up as clinically indicated. All CT scans at this facility utilize dose modulation, iterative reconstruction, and/or weight based dosing when appropriate to reduce radiation dose to as low as reasonably achievable. Electronically Signed by Lizzeth Figueroa MD at 16-Sep-2022 09:51:07 PM               Assessment/Plan    Principal Problem:    Altered mental status  Active Problems:    Anxiety    Arthritis    Cerebrovascular disease    Depression    GERD (gastroesophageal reflux disease)    Myocardial infarct, old    Tobacco abuse    O2 dependent    Dependence on nicotine from cigarettes    Tobacco abuse counseling    CAD in native artery    Chronic narcotic dependence ([facility] [address]    Polypharmacy    Urge incontinence    Palliative care patient    COPD (chronic obstructive pulmonary disease) ([facility])    Hypoxia  Resolved Problems:    * No resolved hospital problems.  *       Altered mental status  - likely related to polypharmacy with recent addition of Lyrica to medication regimen containing multiple sedating medications  - Imaging negative for acute stroke  - DC Lyrica  - Decrease Perocet to 5 mg Q8H  - Holding Clonazepam, plan to restart at low dose when mentation improves  - Hold trazodone    Dementia  - Continue Namenda and Aricept    Holding lasix due to volume depletion    Needs close PCP f/u at discharge for medication reconciliation and review    DVT prophylaxis: Lovenox    Full Code No additional code details    DISPOSITION: TBD. Possibly DC in AM if mentation returns to baseline.            Edin Rodriguez MD 9/17/2022 10:50 PM

## 2022-09-18 NOTE — PLAN OF CARE
Problem: Safety - Adult  Goal: Free from fall injury  9/18/2022 1336 by Marily Sanchez RN  Outcome: Progressing  9/18/2022 0047 by Emma Giordano RN  Outcome: Progressing     Problem: ABCDS Injury Assessment  Goal: Absence of physical injury  9/18/2022 1336 by Marily Sanchez RN  Outcome: Progressing  9/18/2022 0047 by Emma Giordano RN  Outcome: Progressing     Problem: Neurosensory - Adult  Goal: Achieves stable or improved neurological status  Outcome: Progressing  Goal: Absence of seizures  Outcome: Progressing  Goal: Remains free of injury related to seizures activity  Outcome: Progressing  Goal: Achieves maximal functionality and self care  Outcome: Progressing     Problem: Respiratory - Adult  Goal: Achieves optimal ventilation and oxygenation  Outcome: Progressing     Problem: Musculoskeletal - Adult  Goal: Return mobility to safest level of function  Outcome: Progressing  Goal: Maintain proper alignment of affected body part  Outcome: Progressing  Goal: Return ADL status to a safe level of function  Outcome: Progressing

## 2022-09-19 VITALS
BODY MASS INDEX: 27.65 KG/M2 | RESPIRATION RATE: 16 BRPM | OXYGEN SATURATION: 90 % | WEIGHT: 146.44 LBS | DIASTOLIC BLOOD PRESSURE: 104 MMHG | SYSTOLIC BLOOD PRESSURE: 140 MMHG | TEMPERATURE: 98.8 F | HEIGHT: 61 IN | HEART RATE: 77 BPM

## 2022-09-19 PROBLEM — R09.02 HYPOXIA: Status: RESOLVED | Noted: 2020-07-16 | Resolved: 2022-09-19

## 2022-09-19 PROBLEM — G92.8 TOXIC METABOLIC ENCEPHALOPATHY: Status: RESOLVED | Noted: 2022-09-16 | Resolved: 2022-09-19

## 2022-09-19 PROBLEM — G92.8 TOXIC METABOLIC ENCEPHALOPATHY: Status: ACTIVE | Noted: 2022-09-16

## 2022-09-19 LAB
ANION GAP SERPL CALCULATED.3IONS-SCNC: 7 MMOL/L (ref 7–19)
BUN BLDV-MCNC: 13 MG/DL (ref 8–23)
CALCIUM SERPL-MCNC: 9.3 MG/DL (ref 8.8–10.2)
CHLORIDE BLD-SCNC: 100 MMOL/L (ref 98–111)
CO2: 30 MMOL/L (ref 22–29)
CREAT SERPL-MCNC: 0.8 MG/DL (ref 0.5–0.9)
GFR AFRICAN AMERICAN: >59
GFR NON-AFRICAN AMERICAN: >60
GLUCOSE BLD-MCNC: 87 MG/DL (ref 74–109)
HCT VFR BLD CALC: 30.4 % (ref 37–47)
HEMOGLOBIN: 9.3 G/DL (ref 12–16)
MCH RBC QN AUTO: 30.6 PG (ref 27–31)
MCHC RBC AUTO-ENTMCNC: 30.6 G/DL (ref 33–37)
MCV RBC AUTO: 100 FL (ref 81–99)
PDW BLD-RTO: 13.8 % (ref 11.5–14.5)
PLATELET # BLD: 161 K/UL (ref 130–400)
PMV BLD AUTO: 10.3 FL (ref 9.4–12.3)
POTASSIUM SERPL-SCNC: 3.9 MMOL/L (ref 3.5–5)
RBC # BLD: 3.04 M/UL (ref 4.2–5.4)
SODIUM BLD-SCNC: 137 MMOL/L (ref 136–145)
WBC # BLD: 7.3 K/UL (ref 4.8–10.8)

## 2022-09-19 PROCEDURE — 85027 COMPLETE CBC AUTOMATED: CPT

## 2022-09-19 PROCEDURE — 97530 THERAPEUTIC ACTIVITIES: CPT

## 2022-09-19 PROCEDURE — 6360000002 HC RX W HCPCS: Performed by: HOSPITALIST

## 2022-09-19 PROCEDURE — 97110 THERAPEUTIC EXERCISES: CPT

## 2022-09-19 PROCEDURE — 80048 BASIC METABOLIC PNL TOTAL CA: CPT

## 2022-09-19 PROCEDURE — 36415 COLL VENOUS BLD VENIPUNCTURE: CPT

## 2022-09-19 PROCEDURE — 94640 AIRWAY INHALATION TREATMENT: CPT

## 2022-09-19 PROCEDURE — 97165 OT EVAL LOW COMPLEX 30 MIN: CPT

## 2022-09-19 PROCEDURE — 6370000000 HC RX 637 (ALT 250 FOR IP): Performed by: HOSPITALIST

## 2022-09-19 PROCEDURE — 97535 SELF CARE MNGMENT TRAINING: CPT

## 2022-09-19 PROCEDURE — 97116 GAIT TRAINING THERAPY: CPT

## 2022-09-19 RX ADMIN — TROSPIUM CHLORIDE 20 MG: 20 TABLET, FILM COATED ORAL at 09:36

## 2022-09-19 RX ADMIN — OXYCODONE HYDROCHLORIDE AND ACETAMINOPHEN 1 TABLET: 5; 325 TABLET ORAL at 10:38

## 2022-09-19 RX ADMIN — IPRATROPIUM BROMIDE 0.5 MG: 0.5 SOLUTION RESPIRATORY (INHALATION) at 10:49

## 2022-09-19 RX ADMIN — ISOSORBIDE MONONITRATE 30 MG: 30 TABLET, EXTENDED RELEASE ORAL at 09:36

## 2022-09-19 RX ADMIN — KETOTIFEN FUMARATE 1 DROP: 0.35 SOLUTION/ DROPS OPHTHALMIC at 10:09

## 2022-09-19 RX ADMIN — CARVEDILOL 3.12 MG: 3.12 TABLET, FILM COATED ORAL at 09:36

## 2022-09-19 RX ADMIN — PANTOPRAZOLE SODIUM 40 MG: 40 TABLET, DELAYED RELEASE ORAL at 09:36

## 2022-09-19 RX ADMIN — IPRATROPIUM BROMIDE 0.5 MG: 0.5 SOLUTION RESPIRATORY (INHALATION) at 06:17

## 2022-09-19 RX ADMIN — BUPROPION HYDROCHLORIDE 75 MG: 75 TABLET, FILM COATED ORAL at 09:36

## 2022-09-19 RX ADMIN — ENOXAPARIN SODIUM 40 MG: 100 INJECTION SUBCUTANEOUS at 09:36

## 2022-09-19 RX ADMIN — ATORVASTATIN CALCIUM 20 MG: 20 TABLET, FILM COATED ORAL at 09:36

## 2022-09-19 RX ADMIN — ARFORMOTEROL TARTRATE 15 MCG: 15 SOLUTION RESPIRATORY (INHALATION) at 06:17

## 2022-09-19 RX ADMIN — MEMANTINE 10 MG: 5 TABLET ORAL at 09:36

## 2022-09-19 ASSESSMENT — PAIN DESCRIPTION - PAIN TYPE: TYPE: CHRONIC PAIN

## 2022-09-19 ASSESSMENT — PAIN DESCRIPTION - ORIENTATION: ORIENTATION: LEFT;RIGHT

## 2022-09-19 ASSESSMENT — PAIN DESCRIPTION - LOCATION
LOCATION: BACK
LOCATION: BACK

## 2022-09-19 ASSESSMENT — PAIN SCALES - GENERAL
PAINLEVEL_OUTOF10: 9
PAINLEVEL_OUTOF10: 10

## 2022-09-19 ASSESSMENT — PAIN DESCRIPTION - DESCRIPTORS: DESCRIPTORS: SHOOTING

## 2022-09-19 NOTE — PROGRESS NOTES
Physician Progress Note      Mina Mayo  CSN #:                  [id number]  :                       1945  ADMIT DATE:       2022 7:44 PM  [address] [place] DATE:  RESPONDING  PROVIDER #:        Margot Weems TEXT:    Pt admitted with AMS and noted to have polypharmacy and encephalopathy listed. If possible, please document in the progress notes and discharge summary if   you are evaluating and / or treating any of the following: The medical record reflects the following:  Risk Factors: Polypharmacy: Percocet, Lyrica, Clonazepam, Trazadone  Clinical Indicators: Presents after starting Lyrica 2 days prior with AMS,   found to be hypoxia on admission to the ER with SPo2: 83% documented, ABG:   PCO2: 54, Acute Encephalopathy is documented 22 by Dr. Garcia and   further documented \"likely related to polypharmacy with recent addition of   lyrica\"  Treatment: Holding multiple Medications at this time,    Thank you in advance,    Vishnu Hewitt RN-BSN, [facility]  Clinical   [place], [place]  [email or web address]  Options provided:  -- Anoxic encephalopathy  -- Drug-induced encephalopathy due to Lyrica  -- Drug-induced encephalopathy due to, Please specify substance. -- Drug-induced encephalopathy, multiple substances  -- Toxic encephalopathy  -- Toxic metabolic encephalopathy  -- Other - I will add my own diagnosis  -- Disagree - Not applicable / Not valid  -- Disagree - Clinically unable to determine / Unknown  -- Refer to Clinical Documentation Reviewer    PROVIDER RESPONSE TEXT:    This patient has drug-induced encephalopathy due to [place].     Query created by: Ludmila Morgan on 2022 12:08 PM      Electronically signed by:  Ami Carter 2022 2:30 PM

## 2022-09-19 NOTE — PLAN OF CARE
Problem: Safety - Adult  Goal: Free from fall injury  9/19/2022 1302 by Dameon Francis RN  Outcome: Completed  9/19/2022 0312 by Pieter Varner RN  Outcome: Progressing     Problem: ABCDS Injury Assessment  Goal: Absence of physical injury  9/19/2022 1302 by Dameon Francis RN  Outcome: Completed  9/19/2022 0312 by Pieter Varner RN  Outcome: Progressing     Problem: Neurosensory - Adult  Goal: Achieves stable or improved neurological status  9/19/2022 1302 by Dameon Francis RN  Outcome: Completed  9/19/2022 0312 by Pieter Varner RN  Outcome: Progressing  Goal: Absence of seizures  9/19/2022 1302 by Dameon Francis RN  Outcome: Completed  9/19/2022 0312 by Pieter Varner RN  Outcome: Progressing  Goal: Remains free of injury related to seizures activity  9/19/2022 1302 by Dameon Francis RN  Outcome: Completed  9/19/2022 0312 by Pieter Varner RN  Outcome: Progressing  Goal: Achieves maximal functionality and self care  9/19/2022 1302 by Dameon Francis RN  Outcome: Completed  9/19/2022 0312 by Pieter Varner RN  Outcome: Progressing     Problem: Respiratory - Adult  Goal: Achieves optimal ventilation and oxygenation  9/19/2022 1302 by Dameon Francis RN  Outcome: Completed  9/19/2022 0312 by Pieter Varner RN  Outcome: Progressing     Problem: Musculoskeletal - Adult  Goal: Return mobility to safest level of function  9/19/2022 1302 by Dameon Francis RN  Outcome: Completed  9/19/2022 0312 by Pieter Varner RN  Outcome: Progressing  Goal: Maintain proper alignment of affected body part  9/19/2022 1302 by Dameon Francis RN  Outcome: Completed  9/19/2022 0312 by Pieter Varner RN  Outcome: Progressing  Goal: Return ADL status to a safe level of function  9/19/2022 1302 by Dameon Francis RN  Outcome: Completed  9/19/2022 0312 by Pieter Varner RN  Outcome: Progressing     Problem: Pain  Goal: Verbalizes/displays adequate comfort level or baseline comfort level  Outcome: Completed

## 2022-09-19 NOTE — PROGRESS NOTES
09/19/22 1148   Subjective   Subjective Patient in chair agrees to therapy. Pain Assessment   Pain Assessment 0-10   Pain Level 10   Pain Location Back   Pain Orientation Left;Right   Pain Descriptors Shooting   Pain Type Chronic pain   Cognition   Overall Cognitive Status WFL   Cognition Comment Patient 5/5 following simple commands   Orientation   Overall Orientation Status X   Orientation Level Oriented to person;Oriented to place; Disoriented to time   Vitals   O2 Device None (Room air)   Balance   Sitting Intact   Standing With support   Transfer Training   Transfer Training Yes   Overall Level of Assistance Stand-by assistance   Sit to Stand Stand-by assistance   Stand to Sit Stand-by assistance   Bed to Chair Stand-by assistance   Gait Training   Gait Training Yes   Gait   Overall Level of Assistance Stand-by assistance   Speed/Fatoumata Slow   Distance (ft) 120 Feet  (Steady NO-LOB)   Assistive Device Walker, rolling   PT Exercises   A/AROM Exercises BL LE EX in sitting AROM. Shld Flex AROM. 10 Reps both. Patient Education   Education Given To Patient   Education Provided Role of Therapy;Plan of Care;Transfer Training; Fall Prevention Strategies   Education Method Demonstration;Verbal   Education Outcome Verbalized understanding   Other Specialty Interventions   Other Treatments/Modalities Patient in chair all needs in reach .   Personal alarm attached   Assessment   Assessment Patient safe to DC home with 24 HR. supervision   PT Plan of Care   Monday X           Electronically signed by Shima Banks PTA on 9/19/2022 at 12:11 PM

## 2022-09-19 NOTE — PROGRESS NOTES
Occupational Therapy Initial Assessment  Date: 2022   Patient Name: Mark Brown  MRN: [id number]     : 1945    Date of Service: 2022    Discharge Recommendations:  24 hour supervision or assist, Patient would benefit from continued therapy after discharge  OT Equipment Recommendations  Equipment Needed: No (according to pt's report over environmental info)    Assessment   Assessment: OT evaluation completed and tx initiated. Pt may benefit from continued skilled services to address functional deficits. Pt will likely progress with further tx. OT does not anticipate any environmental barriers to D/C home with assist if 24/7 support is available (at most min-mod physical A for LB ADLs). Only possible hindrance to potential D/C is if interview did not provide valid environmental info. OT recommends continued therapy to increase activity tolerance and safety training to reduce fall risk. REQUIRES OT FOLLOW-UP: Yes  Activity Tolerance  Activity Tolerance: Patient Tolerated treatment well              Patient Diagnosis(es): The primary encounter diagnosis was Disorientation. Diagnoses of Polypharmacy, HAILEY (generalized anxiety disorder), and Insomnia due to medical condition were also pertinent to this visit.     Past Medical History:   Past Medical History:   Diagnosis Date    Abdominal pain     Anxiety     Arthritis     CAD in native artery     Cerebrovascular disease     CHF (congestive heart failure) ([facility])     Constipation     COPD (chronic obstructive pulmonary disease) (HCC)     Depression     Emphysema     GERD (gastroesophageal reflux disease)     Myocardial infarct, old     Palliative care patient 2020    Pneumonia     Tobacco abuse         Past Surgical History:   Past Surgical History:   Procedure Laterality Date    ABDOMINAL EXPLORATION SURGERY      APPENDECTOMY      CARDIAC CATHETERIZATION  06/25/10    patent  stent noted in LAD,EF is estimated to be 60%    CARDIAC CATHETERIZATION 7/13/12  MDL    EF  60%    CHOLECYSTECTOMY      COLONOSCOPY  10/2014    Dr Michael Nowak (CERVIX STATUS UNKNOWN)      UPPER GASTROINTESTINAL ENDOSCOPY  10/1/14    Dr Ariel Montanez: food bezoar; esophagitis, gastritis, duodenitis; biopsies neg h-pylori              Restrictions  Restrictions/Precautions  Restrictions/Precautions: Fall Risk    Subjective      Pain Assessment  Pain Level: 9  Pain Location: Back  Pre Treatment Pain Screening  Pain at present: 8 (chronic back pain; pt reports just having pain meds; reported improvement once up in recliner)  Scale Used: Numeric Score  Intervention List: Patient able to continue with treatment  Vital Signs  Temp: 98.8 °F (37.1 °C)  Temp Source: Oral  Heart Rate: 77  Heart Rate Source: Monitor  Resp: 16  BP: (!) 140/104  BP Location: Left upper arm  MAP (Calculated): 116  Level of Consciousness: Alert (0)  Oxygen Therapy  SpO2: 90 %  O2 Device: None (Room air)  O2 Flow Rate (L/min): 2 L/min    Social/Functional History  Social/Functional History  Lives With: Daughter  Type of Home: House  Home Layout: One level  Home Access: Level entry  Bathroom Shower/Tub: Walk-in shower  Bathroom Toilet: Standard (BSC over toilet)  Bathroom Equipment: Shower chair, 3-in-1 commode  Home Equipment: Walker, rolling  Has the patient had two or more falls in the past year or any fall with injury in the past year?: Yes  Receives Help From: Family  ADL Assistance: Needs assistance (especially distal LB ADLs (per pt))  Homemaking Assistance: Needs assistance  Ambulation Assistance: Independent (with r/w)  Transfer Assistance: Independent (with r/w)  Additional Comments: Chart review states that pt may report questionable history. Pt reports that DTR does not work and is home for the majority of the day.        Objective   Hearing: Within functional limits          Toilet Transfers  Toilet - Technique: Ambulating  Equipment Used: Raised toilet seat with rails  Toilet Transfer: Contact guard assistance  Toilet Transfers Comments: with r/w  ADL  Feeding: Independent  Grooming: Independent;Setup  Grooming Skilled Clinical Factors: seated  UE Bathing: Stand by assistance  LE Bathing: Moderate assistance  UE Dressing: Independent;Setup  UE Dressing Skilled Clinical Factors: seated  LE Dressing: Minimal assistance; Moderate assistance  Toileting: Minimal assistance  Additional Comments: with r/w        Bed mobility  Supine to Sit: Independent (from flat bed)  Sit to Supine: Unable to assess  Transfers  Stand Step Transfers: Contact guard assistance  Sit to stand: Stand by assistance  Stand to sit: Stand by assistance  Transfer Comments: with r/w     Cognition  Overall Cognitive Status: WFL (Appears appropriate and provides extra detail relevant to conversation.)               Gross Assessment  AROM: Within functional limits  Strength: Generally decreased, functional                    Included Treatment  Tx consisted of: bed mobility; pt education; transfer training; DME training; activity tolerance/balance challenges; functional ambulation; positioning; simulation of dressing and toileting skills. (Treatment time: 30 min)       Plan   Plan  Times per Week: 3-5    Goals  Short Term Goals  Time Frame for Short term goals: 1 week  Short Term Goal 1: Complete toileting with supervision. Short Term Goal 2: Complete short, functional distances with SBA and DME. Short Term Goal 3: Complete seated portions of LB ADLs with SBA and AE/DME. Short Term Goal 4: Complete standing portions of LB ADLs with CGA and AE/DME. Short Term Goal 5: Tolerate 15 mins of functional activities from waist height with SBA and no signs of excertion.                AMBROSIO Abreu/L  Electronically signed by Hola VALENTE/L on 9/19/2022 at 12:04 PM.

## 2022-09-19 NOTE — PROGRESS NOTES
Daily Progress Note    Date:2022  Patient: Daralyn Aase  : 1945  KQV:[id number]  CODE:Full Code No additional code details  PCP:NAVEEN Dior    Admit Date: 2022  7:44 PM   LOS: 0 days       Subjective: More awake and alert today. Talking to her daughter on the phone. Denies any blood in stool or dark tarry stool. Spoke to her daughter on phone today, she is concerned about her being unsteady on her feet. Review of Systems   Constitutional:  Negative for chills and fever. HENT:  Negative for rhinorrhea and trouble swallowing. Respiratory:  Negative for shortness of breath and wheezing. Cardiovascular:  Negative for chest pain and palpitations. Gastrointestinal:  Negative for diarrhea and nausea. Genitourinary:  Negative for dysuria and hematuria. Skin:  Negative for rash and wound. Neurological:  Negative for dizziness and headaches. Objective:      Vital signs in last 24 hours:  Patient Vitals for the past 24 hrs:   BP Temp Temp src Pulse Resp SpO2 Weight   22 1659 (!) 163/78 97.7 °F (36.5 °C) Temporal 79 18 93 % --   22 1419 -- -- -- -- -- 91 % --   22 0756 124/61 97.7 °F (36.5 °C) Temporal 64 17 94 % --   22 0423 112/62 98.1 °F (36.7 °C) -- 66 16 93 % 146 lb 7 oz (66.4 kg)   22 109/74 96.8 °F (36 °C) -- 74 16 95 % --       I/O last 3 completed shifts: In: 120 [P.O.:120]  Out: -   No intake/output data recorded. Physical Exam  Constitutional:       General: She is not in acute distress. Appearance: Normal appearance. Comments: Drowsy but arousable   HENT:      Head: Normocephalic and atraumatic. Eyes:      Extraocular Movements: Extraocular movements intact. Pupils: Pupils are equal, round, and reactive to light. Cardiovascular:      Rate and Rhythm: Normal rate and regular rhythm. Pulses: Normal pulses. Pulmonary:      Effort: Pulmonary effort is normal.      Breath sounds: Normal breath sounds. Abdominal:      General: There is no distension. Palpations: Abdomen is soft. Tenderness: There is no abdominal tenderness. Musculoskeletal:         General: No deformity. Normal range of motion. Skin:     General: Skin is warm and dry. Findings: No rash. Neurological:      General: No focal deficit present.       Comments: Oriented to person and place, not to year           Lab Review   Recent Results (from the past 24 hour(s))   Basic Metabolic Panel    Collection Time: 09/18/22  2:47 AM   Result Value Ref Range    Sodium 138 136 - 145 mmol/L    Potassium 3.9 3.5 - 5.0 mmol/L    Chloride 103 98 - 111 mmol/L    CO2 29 22 - 29 mmol/L    Anion Gap 6 (L) 7 - 19 mmol/L    Glucose 90 74 - 109 mg/dL    BUN 18 8 - 23 mg/dL    Creatinine 0.9 0.5 - 0.9 mg/dL    GFR Non-African American >60 >60    GFR African American >59 >59    Calcium 8.8 8.8 - 10.2 mg/dL   CBC    Collection Time: 09/18/22  2:47 AM   Result Value Ref Range    WBC 5.9 4.8 - 10.8 K/uL    RBC 2.70 (L) 4.20 - 5.40 M/uL    Hemoglobin 8.2 (L) 12.0 - 16.0 g/dL    Hematocrit 27.6 (L) 37.0 - 47.0 %    .2 (H) 81.0 - 99.0 fL    MCH 30.4 27.0 - 31.0 pg    MCHC 29.7 (L) 33.0 - 37.0 g/dL    RDW 14.0 11.5 - 14.5 %    Platelets 966 700 - 637 K/uL    MPV 10.9 9.4 - 12.3 fL   Hemoglobin and Hematocrit    Collection Time: 09/18/22  4:03 PM   Result Value Ref Range    Hemoglobin 9.2 (L) 12.0 - 16.0 g/dL    Hematocrit 30.7 (L) 37.0 - 47.0 %             Current Facility-Administered Medications:     nitroGLYCERIN (NITROSTAT) SL tablet 0.4 mg, 0.4 mg, SubLINGual, Q5 Min PRN, Jf Ochoa MD    isosorbide mononitrate (IMDUR) extended release tablet 30 mg, 30 mg, Oral, Daily, Jf Ochoa MD, 30 mg at 09/18/22 0912    ondansetron (ZOFRAN) tablet 4 mg, 4 mg, Oral, TID PRN, Jf Ochoa MD, 4 mg at 09/18/22 1426    buPROPion (WELLBUTRIN) tablet 75 mg, 75 mg, Oral, BID, Jf Ochoa MD, 75 mg at 09/18/22 0912    atorvastatin (LIPITOR) tablet 20 mg, 20 mg, 09/18/22 0703    albuterol (PROVENTIL) nebulizer solution 2.5 mg, 2.5 mg, Nebulization, Q6H PRN, Jf Ochoa MD    ketotifen (ZADITOR) 0.025 % ophthalmic solution 1 drop, 1 drop, Both Eyes, BID, Jf Ochoa MD, 1 drop at 09/18/22 0912    magnesium sulfate 1,000 mg in sodium chloride 0.9 % 100 mL IVPB, 1,000 mg, IntraVENous, PRN, Jf Ochoa MD    nicotine (NICODERM CQ) 7 MG/24HR 1 patch, 1 patch, TransDERmal, Daily, Jf Ochoa MD        Assessment/Plan    Principal Problem:    Altered mental status  Active Problems:    Anxiety    Arthritis    Cerebrovascular disease    Depression    GERD (gastroesophageal reflux disease)    Myocardial infarct, old    Tobacco abuse    O2 dependent    Dependence on nicotine from cigarettes    Tobacco abuse counseling    Acute encephalopathy    CAD in native artery    Chronic narcotic dependence ([facility])    Polypharmacy    Urge incontinence    Palliative care patient    COPD (chronic obstructive pulmonary disease) ([facility])    Hypoxia  Resolved Problems:    * No resolved hospital problems. *       Altered mental status  - likely related to polypharmacy with recent addition of Lyrica to medication regimen containing multiple sedating medications  - Imaging negative for acute stroke  - DC Lyrica  - Decrease Perocet to 5 mg Q8H  - Holding Clonazepam, plan to restart at nighttime only at discharge  - Hold trazodone     Dementia  - Continue Namenda and Aricept     Holding lasix due to volume depletion     Needs close PCP f/u at discharge for medication reconciliation and review    PT/OT      DVT prophylaxis: Lovenox    Full Code No additional code details    DISPOSITION:  Discharge to home tomorrow if Hgb stable.  PT at discharge.           Barrett Rothman MD 9/18/2022 7:27 PM

## 2022-09-19 NOTE — PLAN OF CARE
Problem: Safety - Adult  Goal: Free from fall injury  9/19/2022 0312 by Gertrude Saint, RN  Outcome: Progressing  9/18/2022 1336 by Linnette Ni RN  Outcome: Progressing     Problem: ABCDS Injury Assessment  Goal: Absence of physical injury  9/19/2022 0312 by Gertrude Saint, RN  Outcome: Progressing  9/18/2022 1336 by Linnette Ni RN  Outcome: Progressing     Problem: Neurosensory - Adult  Goal: Achieves stable or improved neurological status  9/19/2022 0312 by Gertrude Saint, RN  Outcome: Progressing  9/18/2022 1336 by Linnette Ni RN  Outcome: Progressing  Goal: Absence of seizures  9/19/2022 0312 by Gertrude Saint, RN  Outcome: Progressing  9/18/2022 1336 by Linnette Ni RN  Outcome: Progressing  Goal: Remains free of injury related to seizures activity  9/19/2022 0312 by Gertrude Saint, RN  Outcome: Progressing  9/18/2022 1336 by Linnette Ni RN  Outcome: Progressing  Goal: Achieves maximal functionality and self care  9/19/2022 0312 by Gertrude Saint, RN  Outcome: Progressing  9/18/2022 1336 by Linnette Ni RN  Outcome: Progressing     Problem: Respiratory - Adult  Goal: Achieves optimal ventilation and oxygenation  9/19/2022 0312 by Gertrude Saint, RN  Outcome: Progressing  9/18/2022 1336 by Linnette Ni RN  Outcome: Progressing     Problem: Musculoskeletal - Adult  Goal: Return mobility to safest level of function  9/19/2022 0312 by Gertrude Saint, RN  Outcome: Progressing  9/18/2022 1336 by Linnette Ni RN  Outcome: Progressing  Goal: Maintain proper alignment of affected body part  9/19/2022 0312 by Gertrude Saint, RN  Outcome: Progressing  9/18/2022 1336 by Linnette Ni RN  Outcome: Progressing  Goal: Return ADL status to a safe level of function  9/19/2022 0312 by Gertrude Saint, RN  Outcome: Progressing  9/18/2022 1336 by Linnette Ni RN  Outcome: Progressing

## 2022-09-19 NOTE — DISCHARGE SUMMARY
[facility], [address] [place] [address]    DEPARTMENT OF HOSPITALIST MEDICINE      DISCHARGE SUMMARY:      PATIENT NAME:  Bryan Ramos  :  1945  MRN:  [id number]    Admission Date:   2022  7:44 PM Attending: Gretta Carlisle MD   Discharge Date:   2022              PCP: NAVEEN Waddell  Length of Stay: 1 day admitted, 2 days observation     Chief Complaint on Admission:   Chief Complaint   Patient presents with    Altered Mental Status     Family states that pt started Lyrica 2 days ago. EMS reports that patient has been altered all day today. Family states that they have not given Lyrica dose today. Consultants:     IP CONSULT TO PHARMACY  PHARMACY TO CHANGE BASE FLUIDS       Discharge Problem List:   Principal Problem (Resolved):    Toxic metabolic encephalopathy  Active Problems:    Anxiety    Arthritis    Cerebrovascular disease    Depression    GERD (gastroesophageal reflux disease)    Myocardial infarct, old    Tobacco abuse    O2 dependent    Dependence on nicotine from cigarettes    Tobacco abuse counseling    CAD in native artery    Chronic narcotic dependence ([address]    Urge incontinence    Palliative care patient    COPD (chronic obstructive pulmonary disease) ([address]  Resolved Problems:    Polypharmacy    Hypoxia         Last dated Assessment and Plan . .. 2022      CUMULATIVE  HOSPITAL  COURSE  AND  TREATMENT:  68 YO F with dementia who was brought it by family due to 2 falls associated with lethargy and altered mental status. They reported that she had recently starting taking lyrica 2 days prior to admission. Stroke was ruled out via imaging. Pt improved as her sedating medications were held and was subsequently discharged home to her family. Lyrica and Trazodone were discontinued at discharge.        OBJECTIVE:  BP (!) 140/104   Pulse 77   Temp 98.8 °F (37.1 °C) (Oral)   Resp 16   Ht 5' 1\" (1.549 m)   Wt 146 lb 7 oz (66.4 kg)   SpO2 90%   BMI 27.67 kg/m²       Heart: RRR   Lungs: Bilateral fair air entry   Abdomen: Soft, non-tender   Extremities: No edema   Neurologic: Alert and oriented   Skin: Warm and dry          Laboratory Data:  Recent Labs     09/16/22 2011 09/18/22  0247 09/18/22  1603 09/19/22  0240   WBC 9.3 5.9  --  7.3   HGB 10.4* 8.2* 9.2* 9.3*    165  --  161     Recent Labs     09/17/22  0221 09/18/22  0247 09/19/22  0240    138 137   K 4.7 3.9 3.9    103 100   CO2 29 29 30*   BUN 19 18 13   CREATININE 0.9 0.9 0.8   GLUCOSE 105 90 87     Recent Labs     09/16/22 2011 09/17/22 0221   AST 22 22   ALT 7 8   BILITOT <0.2 <0.2   ALKPHOS 86 82     Troponin T:   Recent Labs     09/16/22 2011   Juliann Median <0.01     Pro-BNP: No results for input(s): BNP in the last 72 hours. INR:   Recent Labs     09/16/22 2011   INR 1.00     UA:  Recent Labs     09/16/22 2222   COLORU YELLOW   PHUR 5.5   CLARITYU Clear   SPECGRAV 1.017   LEUKOCYTESUR Negative   UROBILINOGEN 0.2   BILIRUBINUR Negative   BLOODU Negative   GLUCOSEU Negative     A1C: No results for input(s): LABA1C in the last 72 hours. ABG:  Recent Labs     09/16/22  2105   PHART 7.400   [id number] 54.0*   PO2ART 80.0   PTK8DNR 33.4*   BEART 7.4*   HGBAE 10.2*   [id number] 94.7   CARBOXHGBART 2.2            Impressions of imaging performed:    CT HEAD WO CONTRAST     Result Date: 9/16/2022  No CT evidence of acute hemmorhage Recommendation: Follow up as clinically indicated. All CT scans at this facility utilize dose modulation, iterative reconstruction, and/or weight based dosing when appropriate to reduce radiation dose to as low as reasonably achievable. Amended by Rachel Rogel MD at 16-Sep-2022 09:51:11 PM Electronically Signed by Rachel Rogel MD at 16-Sep-2022 09:46:26 PM              XR CHEST PORTABLE     Result Date: 9/16/2022  No pulmonary infiltrates or effusion. Mediport in situ with its tip in SVC. Recommendation: Follow up as clinically indicated.  Electronically Signed by Rachel Rogel MD at 16-Sep-2022 10:47:29 PM              CTA HEAD NECK W CONTRAST     Result Date: 9/16/2022  IMPRESSION: Occlusion of the proximal L ICA with reconstitution of the ICA from the [address] Right ICA is without occlusion Severely calcified left Carotid bulb Recommendation: Follow up as clinically indicated. All CT scans at this facility utilize dose modulation, iterative reconstruction, and/or weight based dosing when appropriate to reduce radiation dose to as low as reasonably achievable. Electronically Signed by Wilmer Zuñiga MD at 16-Sep-2022 09:51:07 PM           Medication List        CONTINUE taking these medications      albuterol sulfate  (90 Base) MCG/ACT inhaler  Commonly known as: PROVENTIL;VENTOLIN;PROAIR  Inhale 2 puffs into the lungs every 6 hours as needed for Wheezing     Anoro Ellipta 62.5-25 MCG/INH Aepb inhaler  Generic drug: umeclidinium-vilanterol  Inhale 1 puff into the lungs daily     atorvastatin 20 MG tablet  Commonly known as: LIPITOR  Take 1 tablet by mouth in the morning. azelastine 0.05 % ophthalmic solution  Commonly known as: OPTIVAR  Place 1 drop into both eyes 2 times daily     buPROPion 75 MG tablet  Commonly known as: WELLBUTRIN  Take 1 tablet by mouth in the morning and 1 tablet before bedtime. carvedilol 3.125 MG tablet  Commonly known as: COREG  Take 1 tablet by mouth 2 times daily (with meals)     clonazePAM 0.5 MG tablet  Commonly known as: KlonoPIN  Take 1 tablet by mouth 3 times daily as needed for Anxiety for up to 30 days.      donepezil 5 MG tablet  Commonly known as: ARICEPT  Take 1 tablet by mouth nightly     DULoxetine 60 MG extended release capsule  Commonly known as: CYMBALTA     furosemide 20 MG tablet  Commonly known as: LASIX  Take 1 tablet by mouth daily as needed (swelling)     memantine 10 MG tablet  Commonly known as: NAMENDA  Take 1 tablet by mouth 2 times daily     Narcan 4 MG/0.1ML Liqd nasal spray  Generic drug: naloxone     nitroGLYCERIN 0.4 MG SL tablet  Commonly known as: NITROSTAT  Place 1 tablet under the tongue every 5 minutes as needed for Chest pain     ondansetron 4 MG tablet  Commonly known as: ZOFRAN  Take 1 tablet by mouth 3 times daily as needed for Nausea or Vomiting     oxyCODONE-acetaminophen  MG per tablet  Commonly known as: PERCOCET     pantoprazole 40 MG tablet  Commonly known as: PROTONIX  Take 1 tablet by mouth daily     Prolia 60 MG/ML Sosy SC injection  Generic drug: denosumab  Inject 1 mL into the skin once for 1 dose     Toviaz 8 MG Tb24  Generic drug: Fesoterodine Fumarate ER  Take 1 tablet by mouth daily            STOP taking these medications      pregabalin 75 MG capsule  Commonly known as: LYRICA     traZODone 150 MG tablet  Commonly known as: DESYREL            ASK your doctor about these medications      isosorbide mononitrate 30 MG extended release tablet  Commonly known as: IMDUR  Take 1 tablet by mouth daily     Monistat 3 4 % Crea  Generic drug: MICONAZOLE NITRATE VAGINAL  Place 1 Dose vaginally at bedtime     OXYGEN  Inhale 2 L into the lungs daily                ISSUES TO BE ADDRESSED AT HOSPITAL FOLLOW-UP VISIT:    Polypharmacy: Pt taking many sedating medications. Trazodone and Lyrica were discontinued. Consider decreasing dose of other pain/sedating medications as tolerated. Advised patient to follow-up closely with PCP upon discharge for management of chronic medical issues  Please see the detailed discharge directions delivered from earlier today! Condition on Discharge: stable  Discharge Disposition: Home with [address] [place] [address]    Recommended Follow Up: F/u with PCP within 1 week. No follow-up provider specified.   Followup Appointments Scheduled at Time of Discharge:  Future Appointments   Date Time Provider Abelardo Singleton   9/26/2022 10:45 AM NAVEEN Haas [facility]   10/25/2022  2:15 PM Malena Terrazas, [facility]        Discharge Instructions:   Please see the discharge paperwork. Patient was seen at bedside today, and the examination shows improvement since yesterday. Detailed discharge directions delivered to the patient by myself and our nursing staff, who verbalizes understanding and is very happy and satisfied with the plan. Patient has been advised to continue all medications as prescribed and advised, and f/u with PCP within 1 week. Patient is stable from medical standpoint to be discharged. Total time spent during patient evaluation and assessment, discussion with the nurse/family, addressing discharge medications/scripts and coordination of care for safe discharge was in excess of 35 minutes.       Signed Electronically:    Barrett Rothman MD  11:06 AM 9/19/2022

## 2022-09-20 LAB
EKG P AXIS: 43 DEGREES
EKG P-R INTERVAL: 149 MS
EKG Q-T INTERVAL: 382 MS
EKG QRS DURATION: 109 MS
EKG QTC CALCULATION (BAZETT): 438 MS
EKG T AXIS: 51 DEGREES

## 2022-09-20 NOTE — CARE COORDINATION
Late entry. Patient seen on 9/19/2022  CM spoke to dtr by phone. Pt lives with dtr/granddtr. Son is POA. Per dtr, pt needs assistance with ADL's and IADL's. Pt reports, pt has not wanted HC in the past. CM spoke to PTA, who stated patient at baseline, and with 24/7 care in the home, would be fine to d/c to family. Pt uses a walker. CM provided dtr with PADD office phone number to investigate if eligible or is patient interested in community assistance in the home. Pt/family would not consider SNF.   09/20/22 1305   Service Assessment   Patient Orientation Alert and Oriented  (hx obtained primarily from dtr, pt w/ams)   Cognition   (appears alert today)   History Provided By Patient; Child/Family   Primary [place] [address] is:   (son, POA)   PCP Verified by CM Yes   Last Visit to PCP Within last 3 months   Prior Functional Level Assistance with the following:   Current Functional Level Assistance with the following:   Can patient return to prior living arrangement Yes   Ability to make needs known: Good   Family able to assist with home care needs: Yes   Would you like for me to discuss the discharge plan with any other family members/significant others, and if so, who? Yes   Financial Resources Medicaid; Medicare   [employer]   (none a this time)   CM/SW Referral   (none at this time)   Social/Functional History   Lives With Daughter   Type of [address] One level   Home Access Level entry   [address] [place]  ([address] over toilet)   Bathroom Equipment Shower chair;3-in-1 [address] [facility], [address] [place] [address] Help From Family   ADL Assistance Needs assistance  (especially distal LB ADLs (per pt))   [facility]  (with r/w)   Transfer Assistance Independent  (with r/w)   Active  No   Occupation Retired Discharge Planning   Type of Residence House   Living Arrangements Children   Current Services Prior To Admission None   Potential [address] [place] [address] [facility] [place]   DME Ordered? No   Potential Assistance Purchasing Medications No   Patient expects to be discharged to: [facility]  Patient Contact Information:    Alicia Anderson  [phone] (home)   Telephone Information:   Mobile [phone]     Above information verified? [x]   Yes  []   No      Emergency Contacts:    Extended Emergency Contact Information  Primary Emergency Contact: Corby Pugh[address] Phone: [phone]  Relation: Child  Secondary Emergency Contact: Bruno Rivera, [address] [facility] Phone: [phone]  Relation: Child      Have you been vaccinated for COVID-19 (SARS-CoV-2)? [x]   Yes  []   No                   If so, when? Which :         []   Pfizer-BioNTech  [x]   Moderna x 2, no booster  []   Wilmer Schumacher  []   Other:         Pharmacy:    [address] [facility], [address] [phone] [facility] [phone]  [facility] [address]  Phone: [phone] Fax: [address] [phone] [employer] [phone]  [address] [place] [address]  Phone: [phone] Fax: [phone]          Patient Deficits:    []   Yes   [x]   No pt more alert today, able to sign IMM letter    If yes:    []   Confusion/Memory  []   Visual  []   Motor/Sensory         []   Right arm         []   Right leg         []   Left arm         []   Left leg  []   Language/Speech         []   Aphasia         []   Dysarthria         []   Swallow    NIH Stroke Scale  Interval: Baseline  Level of Consciousness (1a): Alert  LOC Questions (1b):  Answers one correctly  LOC Commands (1c): Performs both tasks correctly  Best Gaze (2): Normal  Visual (3): No visual loss  Facial Palsy (4): Normal symmetrical movement  Motor Arm, Left (5a): Some effort against gravity (pt states that she cannot raise her arms. )  Motor Arm, Right (5b): Some effort against gravity (pt states that she cannot raise her arms. )  Motor Leg, Left (6a): Some effort against gravity (pt states that she cannot raise her legs. )  Motor Leg, Right (6b):  Some effort against gravity (pt states that she cannot raise her legs. )  Limb Ataxia (7): Absent (pt unable to follow commands for exam. )  Sensory (8): Normal  Best Language (9): No aphasia  Dysarthria (10): Normal  Extinction and Inattention (11): No abnormality  Total: 9    [place] Coma Scale  Eye Opening: Spontaneous  Best Verbal Response: Oriented  Best Motor Response: Obeys commands  [place] Coma Scale Score: 15    Electronically signed by Kelechi Ledesma RN on 9/20/2022 at 1:15 PM

## 2022-09-21 ENCOUNTER — TELEPHONE (OUTPATIENT)
Dept: PRIMARY CARE CLINIC | Age: 77
End: 2022-09-21

## 2022-09-21 NOTE — TELEPHONE ENCOUNTER
Ty [facility] Transitions Initial Follow Up Call    Outreach made within 2 business days of discharge: Yes    Patient: Edgard Gaona Patient : 1945   MRN: [id number]  Reason for Admission: There are no discharge diagnoses documented for the most recent discharge. Discharge Date: 22       Spoke with: pt    Discharge department/facility: [facility]    TCM Interactive Patient Contact:  Was patient able to fill all prescriptions: No: none were given  Was patient instructed to bring all medications to the follow-up visit: Yes  Is patient taking all medications as directed in the discharge summary? Yes  Does patient understand their discharge instructions: Yes  Does patient have questions or concerns that need addressed prior to 7-14 day follow up office visit: no pt just said that she needs something to help her sleep at night.  Advised her to discuss at her appt tomorrow    Scheduled appointment with PCP within 7-14 days    Follow Up  Future Appointments   Date Time Provider Abelardo Singleton   2022 12:30 PM NAVEEN Warren [facility]   10/25/2022  2:15 PM NAVEEN Thornton [facility]       [place], [place]

## 2022-09-29 ENCOUNTER — TELEPHONE (OUTPATIENT)
Dept: PRIMARY CARE CLINIC | Age: 77
End: 2022-09-29

## 2022-09-29 ENCOUNTER — TELEMEDICINE (OUTPATIENT)
Dept: PRIMARY CARE CLINIC | Age: 77
End: 2022-09-29
Payer: MEDICARE

## 2022-09-29 DIAGNOSIS — F11.90 CHRONIC, CONTINUOUS USE OF OPIOIDS: ICD-10-CM

## 2022-09-29 DIAGNOSIS — D63.8 ANEMIA OF CHRONIC DISEASE: ICD-10-CM

## 2022-09-29 DIAGNOSIS — R41.0 DISORIENTATION: ICD-10-CM

## 2022-09-29 DIAGNOSIS — G47.01 INSOMNIA DUE TO MEDICAL CONDITION: Primary | ICD-10-CM

## 2022-09-29 DIAGNOSIS — F41.1 GAD (GENERALIZED ANXIETY DISORDER): ICD-10-CM

## 2022-09-29 DIAGNOSIS — Z09 HOSPITAL DISCHARGE FOLLOW-UP: ICD-10-CM

## 2022-09-29 PROCEDURE — 1111F DSCHRG MED/CURRENT MED MERGE: CPT | Performed by: NURSE PRACTITIONER

## 2022-09-29 PROCEDURE — 99495 TRANSJ CARE MGMT MOD F2F 14D: CPT | Performed by: NURSE PRACTITIONER

## 2022-09-29 RX ORDER — TRAZODONE HYDROCHLORIDE 50 MG/1
50 TABLET ORAL NIGHTLY PRN
Qty: 30 TABLET | Refills: 5 | Status: SHIPPED | OUTPATIENT
Start: 2022-09-29 | End: 2022-10-25

## 2022-09-29 RX ORDER — NALOXONE HYDROCHLORIDE 4 MG/.1ML
1 SPRAY NASAL PRN
Qty: 1 EACH | Refills: 1 | Status: SHIPPED | OUTPATIENT
Start: 2022-09-29

## 2022-09-29 NOTE — TELEPHONE ENCOUNTER
Left msg on daughters vm with MMoryl APRN recommendations. Asked that she call back with any questions or concerns.

## 2022-09-29 NOTE — TELEPHONE ENCOUNTER
Daughter Baby Delfina called back stating that when she left the hospital they were told that she could not be alone during the day. Is this still the case? They are just curious if she needs 24/7 attention?

## 2022-09-29 NOTE — PROGRESS NOTES
Post-Discharge Transitional Care  Follow Up    HIMANSHU ME    Maria De Jesus Kuhn   YOB: 1945    Date of Office Visit:  9/29/2022  Date of Hospital Admission: 9/16/22  Date of Hospital Discharge: 9/19/22  Risk of hospital readmission (high >=14%. Medium >=10%) :Readmission Risk Score: 16      Care management risk score Rising risk (score 2-5) and Complex Care (Scores >=6): No Risk Score On File     Non face to face  following discharge, date last encounter closed (first attempt may have been earlier): 09/21/2022    Call initiated 2 business days of discharge: Yes    ASSESSMENT/PLAN:   Insomnia due to medical condition  -     traZODone (DESYREL) 50 MG tablet; Take 1 tablet by mouth nightly as needed for Sleep, Disp-30 tablet, R-5Normal  HAILEY (generalized anxiety disorder)--stable. Continue Cymbalta 60 mg daily and Klonopin prn  Disorientation--resolved  Hospital discharge follow-up  -     ND DISCHARGE MEDS RECONCILED W/ CURRENT OUTPATIENT MED LIST  Chronic, continuous use of opioids  -     naloxone 4 MG/0.1ML LIQD nasal spray; 1 spray by Nasal route as needed for Opioid Reversal, Disp-1 each, R-1Normal  Anemia of chronic disease--monitor, repeat CBC in 1 month    Medical Decision Making: moderate complexity  Return in 6 weeks (on 11/10/2022), or if symptoms worsen or fail to improve. Subjective:   HPI:  Follow up of Hospital problems/diagnosis(es): Disorientation, Polypharmacy, HAILEY    It was thought the disorientation was secondary to Lyrica. This was stopped. Mental status has returned to baseline. 9- CT Head:  No CT evidence of acute hemmorhage    Inpatient course: Discharge summary reviewed- see chart. Interval history/Current status: \"I am in the bed as we speak. \"  Denies any more confusion. She is not sleeping well.    Trazodone was stopped in the hospital.   She was sleeping better when she was taking Trazodone 50 mg nightly   She has been taking Klonopin 0.5 mg & Cymbalta 60 mg nightly   \"It gets her  to sleep but she is up and down all night,\" per daughter    Urination is no longer burning. Denies a fever    Denies cough or congestion    Her breathing is improved. She is wearing her oxygen. Carl Sandoval had her on the nebulizer at the hospital.\"    9-   9.3/30.4  WBC: 7.3  BMP: WNL    Patient Active Problem List   Diagnosis    CHF (congestive heart failure) ([facility])    CAD in native artery    Chest pain    Chronic constipation    Nondiabetic gastroparesis    Generalized abdominal pain    Nausea & vomiting    Chronic narcotic dependence ([facility])    Frequency of urination    Gross hematuria    Urge incontinence    Palliative care patient    COPD (chronic obstructive pulmonary disease) ([facility])    Moderate episode of recurrent major depressive disorder ([facility])    Respiratory failure ([facility]    Carotid stenosis, asymptomatic, left    Dementia without behavioral disturbance, unspecified dementia type ([facility]    Anxiety    Arthritis    Cerebrovascular disease    Depression    GERD (gastroesophageal reflux disease)    Myocardial infarct, old    Tobacco abuse    O2 dependent    Dependence on nicotine from cigarettes    Tobacco abuse counseling       Medications listed as ordered at the time of discharge from hospital     Medication List            Accurate as of September 29, 2022  2:08 PM. If you have any questions, ask your nurse or doctor.                 START taking these medications      traZODone 50 MG tablet  Commonly known as: DESYREL  Take 1 tablet by mouth nightly as needed for Sleep            CHANGE how you take these medications      isosorbide mononitrate 30 MG extended release tablet  Commonly known as: IMDUR  Take 1 tablet by mouth daily  What changed: additional instructions     Monistat 3 4 % Crea  Generic drug: MICONAZOLE NITRATE VAGINAL  Place 1 Dose vaginally at bedtime  What changed:   when to take this  reasons to take this     naloxone 4 MG/0.1ML Liqd nasal spray  1 spray by Nasal route as needed for Opioid Reversal  What changed:   how much to take  how to take this  when to take this  reasons to take this     OXYGEN  Inhale 2 L into the lungs daily  What changed: how much to take            CONTINUE taking these medications      albuterol sulfate  (90 Base) MCG/ACT inhaler  Commonly known as: PROVENTIL;VENTOLIN;PROAIR  Inhale 2 puffs into the lungs every 6 hours as needed for Wheezing     Anoro Ellipta 62.5-25 MCG/INH Aepb inhaler  Generic drug: umeclidinium-vilanterol  Inhale 1 puff into the lungs daily     atorvastatin 20 MG tablet  Commonly known as: LIPITOR  Take 1 tablet by mouth in the morning. azelastine 0.05 % ophthalmic solution  Commonly known as: OPTIVAR  Place 1 drop into both eyes 2 times daily     buPROPion 75 MG tablet  Commonly known as: WELLBUTRIN  Take 1 tablet by mouth in the morning and 1 tablet before bedtime. carvedilol 3.125 MG tablet  Commonly known as: COREG  Take 1 tablet by mouth 2 times daily (with meals)     clonazePAM 0.5 MG tablet  Commonly known as: KlonoPIN  Take 1 tablet by mouth 3 times daily as needed for Anxiety for up to 30 days.      donepezil 5 MG tablet  Commonly known as: ARICEPT  Take 1 tablet by mouth nightly     DULoxetine 60 MG extended release capsule  Commonly known as: CYMBALTA     furosemide 20 MG tablet  Commonly known as: LASIX  Take 1 tablet by mouth daily as needed (swelling)     memantine 10 MG tablet  Commonly known as: NAMENDA  Take 1 tablet by mouth 2 times daily     nitroGLYCERIN 0.4 MG SL tablet  Commonly known as: NITROSTAT  Place 1 tablet under the tongue every 5 minutes as needed for Chest pain     ondansetron 4 MG tablet  Commonly known as: ZOFRAN  Take 1 tablet by mouth 3 times daily as needed for Nausea or Vomiting     oxyCODONE-acetaminophen  MG per tablet  Commonly known as: PERCOCET     pantoprazole 40 MG tablet  Commonly known as: PROTONIX  Take 1 tablet by mouth daily     Prolia 60 MG/ML Sosy SC injection  Generic drug: denosumab  Inject 1 mL into the skin once for 1 dose     Toviaz 8 MG Tb24  Generic drug: Fesoterodine Fumarate ER  Take 1 tablet by mouth daily               Where to Get Your Medications        These medications were sent to [address] [facility] [address] [phone] - F [phone]  [address] [address] [place] [address]      Phone: [phone]   naloxone 4 MG/0.1ML Liqd nasal spray  traZODone 50 MG tablet           Medications marked \"taking\" at this time  Outpatient Medications Marked as Taking for the 9/29/22 encounter (Telemedicine) with NAVEEN Stahl   Medication Sig Dispense Refill    traZODone (DESYREL) 50 MG tablet Take 1 tablet by mouth nightly as needed for Sleep 30 tablet 5    naloxone 4 MG/0.1ML LIQD nasal spray 1 spray by Nasal route as needed for Opioid Reversal 1 each 1    clonazePAM (KLONOPIN) 0.5 MG tablet Take 1 tablet by mouth 3 times daily as needed for Anxiety for up to 30 days. 75 tablet 0    azelastine (OPTIVAR) 0.05 % ophthalmic solution Place 1 drop into both eyes 2 times daily 6 mL 1    DULoxetine (CYMBALTA) 60 MG extended release capsule       carvedilol (COREG) 3.125 MG tablet Take 1 tablet by mouth 2 times daily (with meals) 180 tablet 3    donepezil (ARICEPT) 5 MG tablet Take 1 tablet by mouth nightly 90 tablet 3    memantine (NAMENDA) 10 MG tablet Take 1 tablet by mouth 2 times daily 180 tablet 3    pantoprazole (PROTONIX) 40 MG tablet Take 1 tablet by mouth daily 90 tablet 3    albuterol sulfate HFA (PROVENTIL;VENTOLIN;PROAIR) 108 (90 Base) MCG/ACT inhaler Inhale 2 puffs into the lungs every 6 hours as needed for Wheezing 18 g 3    atorvastatin (LIPITOR) 20 MG tablet Take 1 tablet by mouth in the morning.  30 tablet 11    oxyCODONE-acetaminophen (PERCOCET)  MG per tablet TAKE 1 TABLET BY MOUTH EVERY 6 HOURS AS NEEDED      buPROPion (WELLBUTRIN) 75 MG tablet Take 1 tablet by mouth in the morning and 1 tablet before bedtime. 60 tablet 5    furosemide (LASIX) 20 MG tablet Take 1 tablet by mouth daily as needed (swelling) 30 tablet 1    umeclidinium-vilanterol (ANORO ELLIPTA) 62.5-25 MCG/INH AEPB inhaler Inhale 1 puff into the lungs daily 3 each 3    ondansetron (ZOFRAN) 4 MG tablet Take 1 tablet by mouth 3 times daily as needed for Nausea or Vomiting 30 tablet 1    MICONAZOLE NITRATE VAGINAL (MONISTAT 3) 4 % CREA Place 1 Dose vaginally at bedtime (Patient taking differently: Place 1 Dose vaginally nightly as needed) 1 each 1    Fesoterodine Fumarate ER (TOVIAZ) 8 MG TB24 Take 1 tablet by mouth daily 90 tablet 3    denosumab (PROLIA) 60 MG/ML SOSY SC injection Inject 1 mL into the skin once for 1 dose 1 mL 0    isosorbide mononitrate (IMDUR) 30 MG extended release tablet Take 1 tablet by mouth daily (Patient taking differently: Take 30 mg by mouth daily 9/10/21: Pt reported takes 1/2 tablet (15 mg)) 30 tablet 11    nitroGLYCERIN (NITROSTAT) 0.4 MG SL tablet Place 1 tablet under the tongue every 5 minutes as needed for Chest pain 25 tablet 3    OXYGEN Inhale 2 L into the lungs daily (Patient taking differently: Inhale 4 L into the lungs daily) 1 each 11        Medications patient taking as of now reconciled against medications ordered at time of hospital discharge: Yes    A comprehensive review of systems was negative except for: Constitutional: positive for fatigue  Musculoskeletal: positive for arthralgias, back pain, and neck pain  Behavioral/Psych: positive for anxiety and sleep disturbance    Objective: There were no vitals taken for this visit. DOXY. ME VISIT      An electronic signature was used to authenticate this note.   --NAVEEN Beard

## 2022-09-29 NOTE — TELEPHONE ENCOUNTER
Patient definitely needs close care. I would not want her to be left alone for an extended period of time.

## 2022-10-12 ENCOUNTER — TELEPHONE (OUTPATIENT)
Dept: PRIMARY CARE CLINIC | Age: 77
End: 2022-10-12

## 2022-10-12 DIAGNOSIS — T78.40XA ALLERGY, INITIAL ENCOUNTER: Primary | ICD-10-CM

## 2022-10-12 RX ORDER — LORATADINE 10 MG/1
10 TABLET ORAL DAILY
Qty: 30 TABLET | Refills: 3 | Status: SHIPPED | OUTPATIENT
Start: 2022-10-12

## 2022-10-12 NOTE — TELEPHONE ENCOUNTER
Patient daughter called in, would like something called in for allergies. Patient is having runny nose and watery eyes.

## 2022-10-12 NOTE — TELEPHONE ENCOUNTER
Call returned to pts daughter to let her know that we are sending in meds.      Requested Prescriptions     Signed Prescriptions Disp Refills    loratadine (CLARITIN) 10 MG tablet 30 tablet 3     Sig: Take 1 tablet by mouth daily     Authorizing Provider: Sharon Cisneros     Ordering User: Kayden Mendoza

## 2022-10-18 DIAGNOSIS — I50.9 CONGESTIVE HEART FAILURE, UNSPECIFIED HF CHRONICITY, UNSPECIFIED HEART FAILURE TYPE (HCC): Primary | ICD-10-CM

## 2022-10-18 RX ORDER — ISOSORBIDE MONONITRATE 30 MG/1
30 TABLET, EXTENDED RELEASE ORAL DAILY
Qty: 30 TABLET | Refills: 5 | Status: SHIPPED | OUTPATIENT
Start: 2022-10-18

## 2022-10-18 NOTE — TELEPHONE ENCOUNTER
Received fax from pharmacy requesting refill on pts medication(s). Pt was last seen in office on 9/29/2022  and has a follow up scheduled for 10/25/2022. Will send request to  [facility]  for authorization.      Requested Prescriptions     Pending Prescriptions Disp Refills    isosorbide mononitrate (IMDUR) 30 MG extended release tablet [Pharmacy Med Name: Isosorbide Mononitrate ER 30 MG Oral Tablet Extended Release 24 Hour] 30 tablet 0     Sig: Take 1 tablet by mouth daily

## 2022-10-25 ENCOUNTER — SCHEDULED TELEPHONE ENCOUNTER (OUTPATIENT)
Dept: PRIMARY CARE CLINIC | Age: 77
End: 2022-10-25
Payer: MEDICARE

## 2022-10-25 DIAGNOSIS — H57.13 PAIN OF BOTH EYES: Primary | ICD-10-CM

## 2022-10-25 DIAGNOSIS — M54.16 LUMBAR BACK PAIN WITH RADICULOPATHY AFFECTING RIGHT LOWER EXTREMITY: ICD-10-CM

## 2022-10-25 DIAGNOSIS — G47.01 INSOMNIA DUE TO MEDICAL CONDITION: ICD-10-CM

## 2022-10-25 DIAGNOSIS — F41.1 GAD (GENERALIZED ANXIETY DISORDER): ICD-10-CM

## 2022-10-25 DIAGNOSIS — N32.81 OVERACTIVE BLADDER: ICD-10-CM

## 2022-10-25 DIAGNOSIS — J44.9 CHRONIC OBSTRUCTIVE PULMONARY DISEASE, UNSPECIFIED COPD TYPE (HCC): ICD-10-CM

## 2022-10-25 DIAGNOSIS — R11.0 NAUSEA: ICD-10-CM

## 2022-10-25 PROCEDURE — 99442 PR PHYS/QHP TELEPHONE EVALUATION 11-20 MIN: CPT | Performed by: NURSE PRACTITIONER

## 2022-10-25 RX ORDER — ONDANSETRON 4 MG/1
4 TABLET, FILM COATED ORAL 3 TIMES DAILY PRN
Qty: 30 TABLET | Refills: 1 | Status: SHIPPED | OUTPATIENT
Start: 2022-10-25

## 2022-10-25 RX ORDER — FESOTERODINE FUMARATE 8 MG/1
1 TABLET, EXTENDED RELEASE ORAL DAILY
Qty: 90 TABLET | Refills: 3 | Status: SHIPPED | OUTPATIENT
Start: 2022-10-25

## 2022-10-25 RX ORDER — CLONAZEPAM 0.5 MG/1
0.5 TABLET ORAL 3 TIMES DAILY PRN
Qty: 75 TABLET | Refills: 0 | Status: CANCELLED | OUTPATIENT
Start: 2022-10-25 | End: 2022-11-24

## 2022-10-25 ASSESSMENT — ENCOUNTER SYMPTOMS
EYE PAIN: 1
SHORTNESS OF BREATH: 1
BACK PAIN: 1
COUGH: 1

## 2022-10-25 NOTE — PROGRESS NOTES
Amber Goyal is a 69 y.o. female evaluated via telephone on 10/25/2022 for Eye Pain and Medication Refill    TELEPHONE CALL    Assessment & Plan   1. Pain of both eyes--patient has appointment with optometry tomorrow  2. HAILEY (generalized anxiety disorder)--stable. Continue Cymbalta and Klonopin as needed  3. Insomnia due to medical condition--patient's daughter reports patient is sleeping too much. Recommend patient stop trazodone  4. Nausea  -     ondansetron (ZOFRAN) 4 MG tablet; Take 1 tablet by mouth 3 times daily as needed for Nausea or Vomiting, Disp-30 tablet, R-1Normal  5. Chronic obstructive pulmonary disease, unspecified COPD type (HCC)--stable. Continue Anoro daily and albuterol as needed. Recommend patient stop smoking. 6. Lumbar back pain with radiculopathy affecting right lower extremity--patient is following with pain management. 7. Overactive bladder  -     Fesoterodine Fumarate ER (TOVIAZ) 8 MG TB24; Take 1 tablet by mouth daily, Disp-90 tablet, R-3Normal    Return in about 2 months (around 12/25/2022), or if symptoms worsen or fail to improve. Subjective   Prior to Visit Medications    Medication Sig Taking? Authorizing Provider   ondansetron (ZOFRAN) 4 MG tablet Take 1 tablet by mouth 3 times daily as needed for Nausea or Vomiting Yes NAVEEN Dior   Fesoterodine Fumarate ER (TOVIAZ) 8 MG TB24 Take 1 tablet by mouth daily Yes NAVEEN Dior   isosorbide mononitrate (IMDUR) 30 MG extended release tablet Take 1 tablet by mouth daily Yes NAVEEN Dior   loratadine (CLARITIN) 10 MG tablet Take 1 tablet by mouth daily Yes NAVEEN Dior   naloxone 4 MG/0.1ML LIQD nasal spray 1 spray by Nasal route as needed for Opioid Reversal Yes NAVEEN Dior   clonazePAM (KLONOPIN) 0.5 MG tablet Take 1 tablet by mouth 3 times daily as needed for Anxiety for up to 30 days.  Yes NAVEEN Dior   azelastine (OPTIVAR) 0.05 % ophthalmic solution Place 1 drop into both eyes 2 times daily Yes NAVEEN Dior   DULoxetine (CYMBALTA) 60 MG extended release capsule  Yes Historical Provider, MD   carvedilol (COREG) 3.125 MG tablet Take 1 tablet by mouth 2 times daily (with meals) Yes NAVEEN Dior   donepezil (ARICEPT) 5 MG tablet Take 1 tablet by mouth nightly Yes NAVEEN Dior   memantine (NAMENDA) 10 MG tablet Take 1 tablet by mouth 2 times daily Yes NAVEEN Dior   pantoprazole (PROTONIX) 40 MG tablet Take 1 tablet by mouth daily Yes NAVEEN Dior   albuterol sulfate HFA (PROVENTIL;VENTOLIN;PROAIR) 108 (90 Base) MCG/ACT inhaler Inhale 2 puffs into the lungs every 6 hours as needed for Wheezing Yes NAVEEN Dior   atorvastatin (LIPITOR) 20 MG tablet Take 1 tablet by mouth in the morning. Yes NAVEEN Dior   oxyCODONE-acetaminophen (PERCOCET)  MG per tablet TAKE 1 TABLET BY MOUTH EVERY 6 HOURS AS NEEDED Yes Historical Provider, MD   buPROPion (WELLBUTRIN) 75 MG tablet Take 1 tablet by mouth in the morning and 1 tablet before bedtime. Yes NAVEEN Dior   furosemide (LASIX) 20 MG tablet Take 1 tablet by mouth daily as needed (swelling) Yes NAVEEN Dior   umeclidinium-vilanterol (ANORO ELLIPTA) 62.5-25 MCG/INH AEPB inhaler Inhale 1 puff into the lungs daily Yes NAVEEN Dior   MICONAZOLE NITRATE VAGINAL (MONISTAT 3) 4 % CREA Place 1 Dose vaginally at bedtime  Patient taking differently: Place 1 Dose vaginally nightly as needed Yes NAVEEN Dior   denosumab (PROLIA) 60 MG/ML SOSY SC injection Inject 1 mL into the skin once for 1 dose Yes NAVEEN Mathews   nitroGLYCERIN (NITROSTAT) 0.4 MG SL tablet Place 1 tablet under the tongue every 5 minutes as needed for Chest pain Yes NAVEEN Dior   OXYGEN Inhale 2 L into the lungs daily  Patient taking differently: Inhale 4 L into the lungs daily Yes NAVEEN Doir   pregabalin (LYRICA) 75 MG capsule Take 75 mg by mouth 2 times daily.   Historical Provider, MD HPI    EYES:  Reports increased eye pain in the light. Her eyes hurt if she is out in the sun. She has an appointment with optometry tomorrow. COPD:  Her daughter reports that she has been breathing good. She wears her oxygen at night. She uses Anoro daily  She has not needed her nebulizer. She continues to smoke 1 ppd. Oxygen has been running 90-93%    CHRONIC PAIN:  She takes Percocet prn. She follows with pain mgt. \"If she takes the Percocet 4x a day then she sleeps a lot,\" per daughter. Her daughter has cut her Percocet in the half today    INSOMNIA:  She is giving her Trazodone 25 mg (1/2 tablet) nightly & taking Klonopin 0.5 mg nightly prn. She takes Klonopin prn for sleep. \"I am going to stop her Trazodone,\" per daughter  Her daughter reports she has been sleeping a lot. She does not have a BP cuff. Review of Systems   Eyes:  Positive for pain. Respiratory:  Positive for cough (chronic) and shortness of breath (chronic). Musculoskeletal:  Positive for arthralgias and back pain. Psychiatric/Behavioral:  Positive for sleep disturbance. The patient is nervous/anxious (stable). Objective   Patient-Reported Vitals  No data recorded       Total Time: minutes: 11-20 minutes     Jeny Springer was evaluated through a synchronous (real-time) audio only encounter. Patient identification was verified at the start of the visit. She (or guardian if applicable) is aware that this is a billable service, which includes applicable co-pays. This visit was conducted with patient's (and/or legal guardian's) verbal consent. She has not had a related appointment within my department in the past 7 days or scheduled within the next 24 hours. The patient was located at Home: [place] [address]. The provider was located at [facility] ([place] [address]): [address],  [address] [place] [address] Silvio.      Bernhard Dandy, APRN

## 2022-11-07 ENCOUNTER — TELEPHONE (OUTPATIENT)
Dept: PRIMARY CARE CLINIC | Age: 77
End: 2022-11-07

## 2022-11-10 ENCOUNTER — TELEPHONE (OUTPATIENT)
Dept: PRIMARY CARE CLINIC | Age: 77
End: 2022-11-10

## 2022-11-10 ENCOUNTER — HOSPITAL ENCOUNTER (OUTPATIENT)
Dept: GENERAL RADIOLOGY | Age: 77
Discharge: HOME | End: 2022-11-10
Payer: MEDICARE

## 2022-11-10 ENCOUNTER — OFFICE VISIT (OUTPATIENT)
Age: 77
End: 2022-11-10
Payer: MEDICARE

## 2022-11-10 VITALS
SYSTOLIC BLOOD PRESSURE: 122 MMHG | OXYGEN SATURATION: 90 % | TEMPERATURE: 97.7 F | BODY MASS INDEX: 26.06 KG/M2 | HEIGHT: 61 IN | DIASTOLIC BLOOD PRESSURE: 60 MMHG | HEART RATE: 79 BPM | WEIGHT: 138 LBS

## 2022-11-10 DIAGNOSIS — R52 BODY ACHES: ICD-10-CM

## 2022-11-10 DIAGNOSIS — R05.1 ACUTE COUGH: ICD-10-CM

## 2022-11-10 DIAGNOSIS — R11.0 NAUSEA: Primary | ICD-10-CM

## 2022-11-10 LAB
INFLUENZA A ANTIBODY: NORMAL
INFLUENZA B ANTIBODY: NORMAL

## 2022-11-10 PROCEDURE — 87804 INFLUENZA ASSAY W/OPTIC: CPT | Performed by: NURSE PRACTITIONER

## 2022-11-10 PROCEDURE — 99213 OFFICE O/P EST LOW 20 MIN: CPT | Performed by: NURSE PRACTITIONER

## 2022-11-10 PROCEDURE — 71046 X-RAY EXAM CHEST 2 VIEWS: CPT | Performed by: RADIOLOGY

## 2022-11-10 PROCEDURE — 1123F ACP DISCUSS/DSCN MKR DOCD: CPT | Performed by: NURSE PRACTITIONER

## 2022-11-10 PROCEDURE — G8427 DOCREV CUR MEDS BY ELIG CLIN: HCPCS | Performed by: NURSE PRACTITIONER

## 2022-11-10 PROCEDURE — G8484 FLU IMMUNIZE NO ADMIN: HCPCS | Performed by: NURSE PRACTITIONER

## 2022-11-10 PROCEDURE — 4004F PT TOBACCO SCREEN RCVD TLK: CPT | Performed by: NURSE PRACTITIONER

## 2022-11-10 PROCEDURE — G8399 PT W/DXA RESULTS DOCUMENT: HCPCS | Performed by: NURSE PRACTITIONER

## 2022-11-10 PROCEDURE — 1090F PRES/ABSN URINE INCON ASSESS: CPT | Performed by: NURSE PRACTITIONER

## 2022-11-10 PROCEDURE — 71046 X-RAY EXAM CHEST 2 VIEWS: CPT

## 2022-11-10 PROCEDURE — G8417 CALC BMI ABV UP PARAM F/U: HCPCS | Performed by: NURSE PRACTITIONER

## 2022-11-10 ASSESSMENT — ENCOUNTER SYMPTOMS
SORE THROAT: 0
CHEST TIGHTNESS: 0
STRIDOR: 0
ABDOMINAL PAIN: 0
COLOR CHANGE: 0
NAUSEA: 1
SINUS PRESSURE: 0
ABDOMINAL DISTENTION: 0
COUGH: 0
SHORTNESS OF BREATH: 0
TROUBLE SWALLOWING: 0
EYE PAIN: 0
EYE DISCHARGE: 0
WHEEZING: 0

## 2022-11-10 NOTE — TELEPHONE ENCOUNTER
Pts daughter called stating pt has nausea chills and body aches. No congestion, diarrhea, or vomiting. She believes she is getting the flu and is requesting something be called in. Will send to provider for recommendations.

## 2022-11-10 NOTE — PROGRESS NOTES
[id number]  [address] [place] [address]  Dept: [phone]  Dept Fax: [phone]  Loc: [phone]    Santos Rodriguez is a 69 y.o. female who presents today for her medical conditions/complaints as noted below. Santos Rodriguez is complaining of Nausea, Chills, and Generalized Body Aches        HPI:   Generalized Body Aches  Associated symptoms include chills and nausea. Pertinent negatives include no abdominal pain, arthralgias, chest pain, congestion, coughing, fatigue, fever, neck pain, numbness, rash, sore throat or weakness. Karen Toribio presents to the office complaining of cough, chills, body aches, nausea, and shortness of breath. Patient states symptoms started 2 days ago. She is unsure if she has had fever because she does not have a thermometer. Denies OTC medication.    Past Medical History:   Diagnosis Date    Abdominal pain     Anxiety     Arthritis     CAD in native artery     Cerebrovascular disease     CHF (congestive heart failure) ([facility])     Constipation     COPD (chronic obstructive pulmonary disease) (HCC)     Depression     Emphysema     GERD (gastroesophageal reflux disease)     Myocardial infarct, old     Palliative care patient 02/20/2020    Pneumonia     Tobacco abuse        Past Surgical History:   Procedure Laterality Date    ABDOMINAL EXPLORATION SURGERY      APPENDECTOMY      CARDIAC CATHETERIZATION  06/25/10    patent  stent noted in LAD,EF is estimated to be 60%    CARDIAC CATHETERIZATION  7/13/12  MDL    EF  60%    CHOLECYSTECTOMY      COLONOSCOPY  10/2014    Dr Shantell Dobson (CERVIX STATUS UNKNOWN)      UPPER GASTROINTESTINAL ENDOSCOPY  10/1/14    Dr Madeline Hampton: food bezoar; esophagitis, gastritis, duodenitis; biopsies neg h-pylori       Family History   Problem Relation Age of Onset    Stroke Brother     Stroke Sister     Cancer Brother     Cancer Sister     Diabetes Brother     Diabetes Sister     Coronary Art Dis Other     Hypertension Other     Seizures Child     Colon Cancer Neg Hx     Colon Polyps Neg Hx     Esophageal Cancer Neg Hx     Liver Cancer Neg Hx     Liver Disease Neg Hx     Rectal Cancer Neg Hx     Stomach Cancer Neg Hx        Social History     Tobacco Use    Smoking status: Every Day     Packs/day: 0.50     Years: 44.00     Pack years: 22.00     Types: Cigarettes     Start date: 1978    Smokeless tobacco: Never   Substance Use Topics    Alcohol use: No     Alcohol/week: 0.0 standard drinks        Current Outpatient Medications   Medication Sig Dispense Refill    ondansetron (ZOFRAN) 4 MG tablet Take 1 tablet by mouth 3 times daily as needed for Nausea or Vomiting 30 tablet 1    Fesoterodine Fumarate ER (TOVIAZ) 8 MG TB24 Take 1 tablet by mouth daily 90 tablet 3    isosorbide mononitrate (IMDUR) 30 MG extended release tablet Take 1 tablet by mouth daily 30 tablet 5    loratadine (CLARITIN) 10 MG tablet Take 1 tablet by mouth daily 30 tablet 3    naloxone 4 MG/0.1ML LIQD nasal spray 1 spray by Nasal route as needed for Opioid Reversal 1 each 1    azelastine (OPTIVAR) 0.05 % ophthalmic solution Place 1 drop into both eyes 2 times daily 6 mL 1    DULoxetine (CYMBALTA) 60 MG extended release capsule       carvedilol (COREG) 3.125 MG tablet Take 1 tablet by mouth 2 times daily (with meals) 180 tablet 3    donepezil (ARICEPT) 5 MG tablet Take 1 tablet by mouth nightly 90 tablet 3    memantine (NAMENDA) 10 MG tablet Take 1 tablet by mouth 2 times daily 180 tablet 3    pantoprazole (PROTONIX) 40 MG tablet Take 1 tablet by mouth daily 90 tablet 3    albuterol sulfate HFA (PROVENTIL;VENTOLIN;PROAIR) 108 (90 Base) MCG/ACT inhaler Inhale 2 puffs into the lungs every 6 hours as needed for Wheezing 18 g 3    atorvastatin (LIPITOR) 20 MG tablet Take 1 tablet by mouth in the morning.  30 tablet 11    oxyCODONE-acetaminophen (PERCOCET)  MG per tablet TAKE 1 TABLET BY MOUTH EVERY 6 HOURS AS NEEDED      buPROPion (WELLBUTRIN) 75 MG tablet Take 1 tablet by mouth in the morning and 1 tablet before bedtime. 60 tablet 5    furosemide (LASIX) 20 MG tablet Take 1 tablet by mouth daily as needed (swelling) 30 tablet 1    umeclidinium-vilanterol (ANORO ELLIPTA) 62.5-25 MCG/INH AEPB inhaler Inhale 1 puff into the lungs daily 3 each 3    MICONAZOLE NITRATE VAGINAL (MONISTAT 3) 4 % CREA Place 1 Dose vaginally at bedtime (Patient taking differently: Place 1 Dose vaginally nightly as needed) 1 each 1    nitroGLYCERIN (NITROSTAT) 0.4 MG SL tablet Place 1 tablet under the tongue every 5 minutes as needed for Chest pain 25 tablet 3    OXYGEN Inhale 2 L into the lungs daily (Patient taking differently: Inhale 4 L into the lungs daily) 1 each 11    clonazePAM (KLONOPIN) 0.5 MG tablet Take 1 tablet by mouth 3 times daily as needed for Anxiety for up to 30 days. 75 tablet 0    denosumab (PROLIA) 60 MG/ML SOSY SC injection Inject 1 mL into the skin once for 1 dose 1 mL 0     No current facility-administered medications for this visit. Allergies   Allergen Reactions    Codeine Nausea Only    Sulfa Antibiotics Other (See Comments)     \"makes me tremble'       Health Maintenance   Topic Date Due    DTaP/Tdap/Td vaccine (1 - Tdap) Never done    Shingles vaccine (1 of 2) Never done    Low dose CT lung screening  Never done    COVID-19 Vaccine (3 - Booster for Moderna series) 04/21/2021    Lipids  02/04/2022    Flu vaccine (1) 08/01/2022    Depression Monitoring  03/11/2023    Annual Wellness Visit (AWV)  03/12/2023    DEXA (modify frequency per FRAX score)  Completed    Pneumococcal 65+ years Vaccine  Completed    Hepatitis C screen  Completed    Hepatitis A vaccine  Aged Out    Hib vaccine  Aged Out    Meningococcal (ACWY) vaccine  Aged Out       Subjective:   Review of Systems   Constitutional:  Positive for chills. Negative for fatigue and fever.    HENT:  Negative for congestion, sinus pressure, sore throat and trouble swallowing. Eyes:  Negative for pain and discharge. Respiratory:  Negative for cough, chest tightness, shortness of breath, wheezing and stridor. Cardiovascular:  Negative for chest pain and palpitations. Gastrointestinal:  Positive for nausea. Negative for abdominal distention and abdominal pain. Genitourinary:  Negative for difficulty urinating, dysuria and hematuria. Musculoskeletal:  Negative for arthralgias, neck pain and neck stiffness. Skin:  Negative for color change and rash. Neurological:  Negative for dizziness, syncope, speech difficulty, weakness and numbness. Psychiatric/Behavioral:  Negative for confusion and suicidal ideas. Objective    Physical Exam  Vitals and nursing note reviewed. Constitutional:       General: She is not in acute distress. Appearance: She is ill-appearing. Comments: Patient in wheelchair   HENT:      Head: Normocephalic. Right Ear: Tympanic membrane, ear canal and external ear normal.      Left Ear: Tympanic membrane, ear canal and external ear normal.      Nose: Nose normal. No congestion. Mouth/Throat:      Mouth: Mucous membranes are moist.      Pharynx: Oropharynx is clear. No posterior oropharyngeal erythema. Eyes:      Conjunctiva/sclera: Conjunctivae normal.      Pupils: Pupils are equal, round, and reactive to light. Cardiovascular:      Rate and Rhythm: Normal rate and regular rhythm. Pulses: Normal pulses. Heart sounds: Normal heart sounds. No murmur heard. Pulmonary:      Effort: Pulmonary effort is normal. No respiratory distress. Breath sounds: No stridor. No wheezing. Comments: Diminished lung sounds in LLL  Abdominal:      General: Abdomen is flat. Bowel sounds are normal. There is no distension. Tenderness: There is no abdominal tenderness. Musculoskeletal:         General: No swelling or deformity. Normal range of motion. Cervical back: Normal range of motion. No rigidity or tenderness. Skin:     General: Skin is warm and dry. Findings: No rash. Neurological:      General: No focal deficit present. Mental Status: She is alert and oriented to person, place, and time. Sensory: No sensory deficit. /60   Pulse 79   Temp 97.7 °F (36.5 °C)   Ht 5' 1\" (1.549 m)   Wt 138 lb (62.6 kg)   SpO2 90%   BMI 26.07 kg/m²     Assessment         Diagnosis Orders   1. Nausea  POCT Influenza A/B      2. Body aches            Plan     Orders Placed This Encounter   Procedures    POCT Influenza A/B       No results found for this visit on 11/10/22. No orders of the defined types were placed in this encounter. New Prescriptions    No medications on file        No follow-ups on file. Discussed use, benefits, and side effects of any prescribed medications. All patient questions were answered. Patient voiced understanding of care plan. Patient was given educational materials - see patient instructions below. There are no Patient Instructions on file for this visit.       Electronically signed by NAVEEN Tate CNP on 11/10/2022 at 3:45 PM

## 2022-11-10 NOTE — PATIENT INSTRUCTIONS
Encourage fluids, Tylenol/Ibuprofen, OTC decongestants   flu test completed and was negative. CXR pending.    Advised that this is likely viral  If symptoms worsen or fail to improve follow-up with office or PCP  If SOB, chest pain, or high persistent fevers occur, go to ER    Patient verbalized understanding and agrees to plan

## 2022-11-14 RX ORDER — DULOXETIN HYDROCHLORIDE 60 MG/1
60 CAPSULE, DELAYED RELEASE ORAL DAILY
Qty: 30 CAPSULE | Refills: 0 | Status: SHIPPED | OUTPATIENT
Start: 2022-11-14

## 2022-11-14 NOTE — TELEPHONE ENCOUNTER
Received fax from pharmacy requesting refill on pts medication(s). Pt was last seen in office on 10/25/2022  and has a follow up scheduled for 12/1/2022. Will send request to  [facility]  for authorization.      Requested Prescriptions     Pending Prescriptions Disp Refills    DULoxetine (CYMBALTA) 60 MG extended release capsule [Pharmacy Med Name: DULoxetine HCl 60 MG Oral Capsule Delayed Release Particles] 30 capsule 0     Sig: Take 1 capsule by mouth once daily

## 2022-11-22 ENCOUNTER — TELEPHONE (OUTPATIENT)
Age: 77
End: 2022-11-22

## 2022-11-22 NOTE — TELEPHONE ENCOUNTER
Called daughter, verified her . Informed daughter of result and recommendations. She voiced understanding.

## 2022-11-22 NOTE — TELEPHONE ENCOUNTER
Heather Castro was calling on behalf of her mother, Anthony Carbajal. They received the letter that we needed her to call to talk about test results. Heather Castro asked that we call her at [phone].

## 2022-12-01 DIAGNOSIS — G47.01 INSOMNIA DUE TO MEDICAL CONDITION: ICD-10-CM

## 2022-12-01 DIAGNOSIS — K21.9 GASTROESOPHAGEAL REFLUX DISEASE, UNSPECIFIED WHETHER ESOPHAGITIS PRESENT: ICD-10-CM

## 2022-12-01 DIAGNOSIS — F41.1 GAD (GENERALIZED ANXIETY DISORDER): ICD-10-CM

## 2022-12-01 RX ORDER — CLONAZEPAM 0.5 MG/1
0.5 TABLET ORAL 3 TIMES DAILY PRN
Qty: 75 TABLET | Refills: 0 | OUTPATIENT
Start: 2022-12-01 | End: 2022-12-31

## 2022-12-01 RX ORDER — PANTOPRAZOLE SODIUM 40 MG/1
40 TABLET, DELAYED RELEASE ORAL DAILY
Qty: 90 TABLET | Refills: 3 | Status: SHIPPED | OUTPATIENT
Start: 2022-12-01

## 2022-12-01 NOTE — TELEPHONE ENCOUNTER
Received fax from pharmacy requesting refill on pts medication(s). Pt was last seen in office on 10/25/2022  and has a follow up scheduled for 12/22/2022. Will send request to  [facility]  for authorization. Requested Prescriptions     Pending Prescriptions Disp Refills    clonazePAM (KLONOPIN) 0.5 MG tablet 75 tablet 0     Sig: Take 1 tablet by mouth 3 times daily as needed for Anxiety for up to 30 days. pantoprazole (PROTONIX) 40 MG tablet 90 tablet 3     Sig: Take 1 tablet by mouth daily   Call placed to [facility] regarding the Pantoprazole. They had this transferred to [facility] and when they do that it wipes out the refills. So pt needs new rx sent for this.

## 2022-12-02 ENCOUNTER — TELEPHONE (OUTPATIENT)
Dept: PRIMARY CARE CLINIC | Age: 77
End: 2022-12-02

## 2022-12-02 NOTE — TELEPHONE ENCOUNTER
Patient daughter called stating she has been experiencing cough,chills,fever, and weakness for a week. She stated her fever has broken and her cough is resolving itself with medication. I informed her that we are double booked and we are advising people to go to [facility] walk in clinic. Patient stated she did not want to go to the walk in clinic so she would wait until next week.

## 2022-12-07 ENCOUNTER — OFFICE VISIT (OUTPATIENT)
Dept: PRIMARY CARE CLINIC | Age: 77
End: 2022-12-07
Payer: MEDICARE

## 2022-12-07 ENCOUNTER — TELEPHONE (OUTPATIENT)
Dept: PRIMARY CARE CLINIC | Age: 77
End: 2022-12-07

## 2022-12-07 VITALS
WEIGHT: 137 LBS | BODY MASS INDEX: 25.86 KG/M2 | DIASTOLIC BLOOD PRESSURE: 75 MMHG | OXYGEN SATURATION: 91 % | SYSTOLIC BLOOD PRESSURE: 125 MMHG | HEART RATE: 118 BPM | TEMPERATURE: 97.6 F | HEIGHT: 61 IN

## 2022-12-07 DIAGNOSIS — F41.1 GAD (GENERALIZED ANXIETY DISORDER): ICD-10-CM

## 2022-12-07 DIAGNOSIS — B34.9 VIRAL ILLNESS: ICD-10-CM

## 2022-12-07 DIAGNOSIS — I10 ESSENTIAL HYPERTENSION: Primary | ICD-10-CM

## 2022-12-07 DIAGNOSIS — N30.01 ACUTE CYSTITIS WITH HEMATURIA: ICD-10-CM

## 2022-12-07 DIAGNOSIS — G47.01 INSOMNIA DUE TO MEDICAL CONDITION: ICD-10-CM

## 2022-12-07 DIAGNOSIS — R05.1 ACUTE COUGH: ICD-10-CM

## 2022-12-07 DIAGNOSIS — R82.90 FOUL SMELLING URINE: ICD-10-CM

## 2022-12-07 DIAGNOSIS — R53.1 WEAKNESS: ICD-10-CM

## 2022-12-07 DIAGNOSIS — R11.0 NAUSEA: ICD-10-CM

## 2022-12-07 DIAGNOSIS — J44.9 CHRONIC OBSTRUCTIVE PULMONARY DISEASE, UNSPECIFIED COPD TYPE (HCC): ICD-10-CM

## 2022-12-07 DIAGNOSIS — N30.01 ACUTE CYSTITIS WITH HEMATURIA: Primary | ICD-10-CM

## 2022-12-07 LAB
ALBUMIN SERPL-MCNC: 3.5 G/DL (ref 3.5–5.2)
ALP BLD-CCNC: 92 U/L (ref 35–104)
ALT SERPL-CCNC: <5 U/L (ref 5–33)
ANION GAP SERPL CALCULATED.3IONS-SCNC: 11 MMOL/L (ref 7–19)
AST SERPL-CCNC: 14 U/L (ref 5–32)
BASOPHILS ABSOLUTE: 0.1 K/UL (ref 0–0.2)
BASOPHILS RELATIVE PERCENT: 0.7 % (ref 0–1)
BILIRUB SERPL-MCNC: <0.2 MG/DL (ref 0.2–1.2)
BILIRUBIN, POC: ABNORMAL
BLOOD URINE, POC: ABNORMAL
BUN BLDV-MCNC: 25 MG/DL (ref 8–23)
CALCIUM SERPL-MCNC: 9.6 MG/DL (ref 8.8–10.2)
CHLORIDE BLD-SCNC: 102 MMOL/L (ref 98–111)
CLARITY, POC: ABNORMAL
CO2: 29 MMOL/L (ref 22–29)
COLOR, POC: YELLOW
CREAT SERPL-MCNC: 1.4 MG/DL (ref 0.5–0.9)
EOSINOPHILS ABSOLUTE: 0.3 K/UL (ref 0–0.6)
EOSINOPHILS RELATIVE PERCENT: 3.4 % (ref 0–5)
GFR SERPL CREATININE-BSD FRML MDRD: 39 ML/MIN/{1.73_M2}
GLUCOSE BLD-MCNC: 100 MG/DL (ref 74–109)
GLUCOSE URINE, POC: ABNORMAL
HCT VFR BLD CALC: 34.1 % (ref 37–47)
HEMOGLOBIN: 9.7 G/DL (ref 12–16)
HYPOCHROMIA: ABNORMAL
IMMATURE GRANULOCYTES #: 0 K/UL
INFLUENZA A ANTIBODY: NORMAL
INFLUENZA B ANTIBODY: NORMAL
KETONES, POC: ABNORMAL
LEUKOCYTE EST, POC: ABNORMAL
LYMPHOCYTES ABSOLUTE: 1.3 K/UL (ref 1.1–4.5)
LYMPHOCYTES RELATIVE PERCENT: 17.3 % (ref 20–40)
MACROCYTES: ABNORMAL
MCH RBC QN AUTO: 30.7 PG (ref 27–31)
MCHC RBC AUTO-ENTMCNC: 28.4 G/DL (ref 33–37)
MCV RBC AUTO: 107.9 FL (ref 81–99)
MONOCYTES ABSOLUTE: 0.7 K/UL (ref 0–0.9)
MONOCYTES RELATIVE PERCENT: 9.1 % (ref 0–10)
NEUTROPHILS ABSOLUTE: 5 K/UL (ref 1.5–7.5)
NEUTROPHILS RELATIVE PERCENT: 69.1 % (ref 50–65)
NITRITE, POC: POSITIVE
PDW BLD-RTO: 14.6 % (ref 11.5–14.5)
PH, POC: 5
PLATELET # BLD: 385 K/UL (ref 130–400)
PLATELET SLIDE REVIEW: ADEQUATE
PMV BLD AUTO: 10.1 FL (ref 9.4–12.3)
POTASSIUM SERPL-SCNC: 4.6 MMOL/L (ref 3.5–5)
PROTEIN, POC: ABNORMAL
RBC # BLD: 3.16 M/UL (ref 4.2–5.4)
SODIUM BLD-SCNC: 142 MMOL/L (ref 136–145)
SPECIFIC GRAVITY, POC: 1.03
TOTAL PROTEIN: 6.9 G/DL (ref 6.6–8.7)
UROBILINOGEN, POC: 0.2
WBC # BLD: 7.3 K/UL (ref 4.8–10.8)

## 2022-12-07 PROCEDURE — 3023F SPIROM DOC REV: CPT | Performed by: NURSE PRACTITIONER

## 2022-12-07 PROCEDURE — 81002 URINALYSIS NONAUTO W/O SCOPE: CPT | Performed by: NURSE PRACTITIONER

## 2022-12-07 PROCEDURE — 4004F PT TOBACCO SCREEN RCVD TLK: CPT | Performed by: NURSE PRACTITIONER

## 2022-12-07 PROCEDURE — G8427 DOCREV CUR MEDS BY ELIG CLIN: HCPCS | Performed by: NURSE PRACTITIONER

## 2022-12-07 PROCEDURE — G8484 FLU IMMUNIZE NO ADMIN: HCPCS | Performed by: NURSE PRACTITIONER

## 2022-12-07 PROCEDURE — 1123F ACP DISCUSS/DSCN MKR DOCD: CPT | Performed by: NURSE PRACTITIONER

## 2022-12-07 PROCEDURE — 99214 OFFICE O/P EST MOD 30 MIN: CPT | Performed by: NURSE PRACTITIONER

## 2022-12-07 PROCEDURE — G8417 CALC BMI ABV UP PARAM F/U: HCPCS | Performed by: NURSE PRACTITIONER

## 2022-12-07 PROCEDURE — G8399 PT W/DXA RESULTS DOCUMENT: HCPCS | Performed by: NURSE PRACTITIONER

## 2022-12-07 PROCEDURE — 1090F PRES/ABSN URINE INCON ASSESS: CPT | Performed by: NURSE PRACTITIONER

## 2022-12-07 PROCEDURE — 87804 INFLUENZA ASSAY W/OPTIC: CPT | Performed by: NURSE PRACTITIONER

## 2022-12-07 RX ORDER — CEPHALEXIN 500 MG/1
500 CAPSULE ORAL 3 TIMES DAILY
Qty: 30 CAPSULE | Refills: 0 | Status: SHIPPED | OUTPATIENT
Start: 2022-12-07 | End: 2022-12-17

## 2022-12-07 RX ORDER — CLONAZEPAM 0.5 MG/1
0.5 TABLET ORAL 3 TIMES DAILY PRN
Qty: 75 TABLET | Refills: 0 | Status: SHIPPED | OUTPATIENT
Start: 2022-12-07 | End: 2023-01-06

## 2022-12-07 SDOH — ECONOMIC STABILITY: FOOD INSECURITY: WITHIN THE PAST 12 MONTHS, YOU WORRIED THAT YOUR FOOD WOULD RUN OUT BEFORE YOU GOT MONEY TO BUY MORE.: NEVER TRUE

## 2022-12-07 SDOH — ECONOMIC STABILITY: FOOD INSECURITY: WITHIN THE PAST 12 MONTHS, THE FOOD YOU BOUGHT JUST DIDN'T LAST AND YOU DIDN'T HAVE MONEY TO GET MORE.: NEVER TRUE

## 2022-12-07 ASSESSMENT — ENCOUNTER SYMPTOMS
SHORTNESS OF BREATH: 1
RHINORRHEA: 1
VOMITING: 1
COUGH: 1
NAUSEA: 1

## 2022-12-07 ASSESSMENT — SOCIAL DETERMINANTS OF HEALTH (SDOH): HOW HARD IS IT FOR YOU TO PAY FOR THE VERY BASICS LIKE FOOD, HOUSING, MEDICAL CARE, AND HEATING?: NOT HARD AT ALL

## 2022-12-07 NOTE — PROGRESS NOTES
Artur Vivar (:  1945) is a 69 y.o. female,Established patient, here for evaluation of the following chief complaint(s):  Nausea & Vomiting (Only soup, broth, and sprite. Everything she eats otherwise she throws up. Taking Zofran. ), Fever (Unknown what the temp was), Chills, Congestion, and Other (Foul smelling urine)      ASSESSMENT/PLAN:    ICD-10-CM    1. Acute cystitis with hematuria  N30.01 Culture, Urine     cephALEXin (KEFLEX) 500 MG capsule      2. Nausea  R11.0 POCT Influenza A/B     CBC with Auto Differential     Comprehensive Metabolic Panel      3. Acute cough  R05.1 POCT Influenza A/B     CBC with Auto Differential     Comprehensive Metabolic Panel      4. Foul smelling urine  R82.90 POCT Urinalysis no Micro     Culture, Urine     cephALEXin (KEFLEX) 500 MG capsule      5. URI  B34.9 Supportive care  Continue Mucinex  Increase water      6. Weakness  R53.1 Wheelchair      7. Chronic obstructive pulmonary disease, unspecified COPD type ([facility] [address]  J44.9 Wheelchair      8. HAILEY (generalized anxiety disorder)  F41.1 clonazePAM (KLONOPIN) 0.5 MG tablet      9. Insomnia due to medical condition  G47.01 clonazePAM (KLONOPIN) 0.5 MG tablet          Return in 6 weeks (on 2023), or if symptoms worsen or fail to improve. SUBJECTIVE/OBJECTIVE:  HPI    She hasn't been feeling well over the last month. \"She gets better then she gets sick again,\" per daughter. She reports a fever. This was last week. It was not checked. Reports nasal congestion and drainage. Reports nausea and vomiting over the last two days. Last time she vomited was yesterday. Denies diarrhea. Nausea is improved today. Reports a regular BM    Reports foul smelling urine. Denies dysuria  Denies urinary frequency  Reports urgency    INSOMNIA:  She continues on Trazodone 50 mg nightly and Klonopin prn. Last fill on Klonopin was 2022, #75  This regimen helps her sleep.   They are requesting refill on this medication today.      /75   Pulse (!) 118   Temp 97.6 °F (36.4 °C)   Ht 5' 1\" (1.549 m)   Wt 137 lb (62.1 kg)   SpO2 91%   BMI 25.89 kg/m²     Review of Systems   Constitutional:  Negative for fever (possibly last week). HENT:  Positive for congestion and rhinorrhea. Respiratory:  Positive for cough and shortness of breath (chronic). Gastrointestinal:  Positive for nausea and vomiting. Genitourinary:  Negative for dysuria, frequency and urgency. Foul smelling urine   Neurological:  Positive for weakness. Physical Exam  Vitals reviewed. Constitutional:       Appearance: She is well-developed. She is ill-appearing. Comments: In a wheelchair   HENT:      Head: Normocephalic. Right Ear: Tympanic membrane and external ear normal.      Left Ear: Tympanic membrane and external ear normal.      Nose: Nose normal.   Cardiovascular:      Rate and Rhythm: Normal rate and regular rhythm. Pulmonary:      Effort: Pulmonary effort is normal.      Breath sounds: Examination of the right-lower field reveals decreased breath sounds. Examination of the left-lower field reveals decreased breath sounds. Decreased breath sounds and rhonchi (scattered) present. No wheezing. Abdominal:      General: Bowel sounds are normal.      Palpations: Abdomen is soft. Tenderness: There is abdominal tenderness in the right upper quadrant and epigastric area. Musculoskeletal:      Cervical back: Normal range of motion. Skin:     General: Skin is dry. Neurological:      General: No focal deficit present. Mental Status: She is alert and oriented to person, place, and time. Mental status is at baseline. Psychiatric:         Mood and Affect: Mood normal.         Behavior: Behavior normal.         Thought Content: Thought content normal.         Judgment: Judgment normal.           An electronic signature was used to authenticate this note.     --NAVEEN Mijares

## 2022-12-07 NOTE — TELEPHONE ENCOUNTER
----- Message from NAVEEN Vasquez sent at 12/7/2022  3:52 PM CST -----  CBC: WBC, infection fighting cells are normal.  HH, oxygen carrying cells, are low but stable from previous blood draws. CMP: Electrolytes are normal.  Patient has had a decline in renal function and is dehydrated.   Recommend increasing water  Recheck renal function in 1 week  LFT's normal

## 2022-12-09 ENCOUNTER — TELEPHONE (OUTPATIENT)
Dept: PRIMARY CARE CLINIC | Age: 77
End: 2022-12-09

## 2022-12-09 DIAGNOSIS — J44.9 CHRONIC OBSTRUCTIVE PULMONARY DISEASE, UNSPECIFIED COPD TYPE (HCC): ICD-10-CM

## 2022-12-09 LAB
ORGANISM: ABNORMAL
URINE CULTURE, ROUTINE: ABNORMAL
URINE CULTURE, ROUTINE: ABNORMAL

## 2022-12-09 RX ORDER — NEBULIZER AND COMPRESSOR
1 EACH MISCELLANEOUS DAILY
Qty: 1 KIT | Refills: 0 | Status: SHIPPED | OUTPATIENT
Start: 2022-12-09

## 2022-12-09 NOTE — TELEPHONE ENCOUNTER
----- Message from NAVEEN Nascimento sent at 12/9/2022  9:30 AM CST -----  Urine culture grew e.coli. It is sensitive to the antibiotic she is on.   Finish entire course of antibiotics

## 2022-12-09 NOTE — TELEPHONE ENCOUNTER
Spoke with pts daughter with results. She said that MM had mentioned running a respiratory panel on pt because she has been sick for a while and everyone who gets around her gets sick. She would like this ordered so she can take her to [place] and get it done. She is also requesting a BP cuff. Will send to provider for recommendations.

## 2022-12-09 NOTE — TELEPHONE ENCOUNTER
We can order a respiratory panel but she would have to do it here. I'm not sure if the lab at [facility] will do one outpatient. They often only do it if they are in the ED.

## 2022-12-09 NOTE — TELEPHONE ENCOUNTER
Spoke with pts daughter with MM recommendations. Daughter understood and said it would probably be Tuesday.      Sent rx to pharmacy for pt  Requested Prescriptions     Signed Prescriptions Disp Refills    Blood Pressure Monitoring (ADULT BLOOD PRESSURE CUFF LG) KIT 1 kit 0     Si kit by Does not apply route daily     Authorizing Provider: Alton Garcia     Ordering User: Merlin Michelle

## 2022-12-12 RX ORDER — ALBUTEROL SULFATE 90 UG/1
2 AEROSOL, METERED RESPIRATORY (INHALATION) EVERY 6 HOURS PRN
Qty: 18 G | Refills: 0 | Status: SHIPPED | OUTPATIENT
Start: 2022-12-12

## 2022-12-12 NOTE — TELEPHONE ENCOUNTER
Received fax from pharmacy requesting refill on pts medication(s). Pt was last seen in office on 12/7/2022  and has a follow up scheduled for 1/17/2023. Will send request to  [facility]  for authorization.      Requested Prescriptions     Pending Prescriptions Disp Refills    albuterol sulfate HFA (PROVENTIL;VENTOLIN;PROAIR) 108 (90 Base) MCG/ACT inhaler [Pharmacy Med Name: Albuterol Sulfate  (90 Base) MCG/ACT Inhalation Aerosol Solution] 18 g 0     Sig: INHALE 2 PUFFS BY MOUTH EVERY 6 HOURS AS NEEDED FOR WHEEZING

## 2022-12-14 ENCOUNTER — TELEPHONE (OUTPATIENT)
Dept: PRIMARY CARE CLINIC | Age: 77
End: 2022-12-14

## 2022-12-14 ENCOUNTER — NURSE ONLY (OUTPATIENT)
Dept: PRIMARY CARE CLINIC | Age: 77
End: 2022-12-14
Payer: MEDICARE

## 2022-12-14 DIAGNOSIS — N28.9 DECREASED RENAL FUNCTION: ICD-10-CM

## 2022-12-14 DIAGNOSIS — R05.1 ACUTE COUGH: Primary | ICD-10-CM

## 2022-12-14 DIAGNOSIS — R53.83 OTHER FATIGUE: ICD-10-CM

## 2022-12-14 DIAGNOSIS — R82.90 FOUL SMELLING URINE: Primary | ICD-10-CM

## 2022-12-14 DIAGNOSIS — R09.81 NASAL CONGESTION: ICD-10-CM

## 2022-12-14 LAB
APPEARANCE FLUID: CLEAR
BILIRUBIN, POC: NEGATIVE
BLOOD URINE, POC: NEGATIVE
CLARITY, POC: CLEAR
COLOR, POC: YELLOW
GLUCOSE URINE, POC: NEGATIVE
KETONES, POC: NEGATIVE
LEUKOCYTE EST, POC: NEGATIVE
NITRITE, POC: NEGATIVE
PH, POC: 5.5
PROTEIN, POC: NEGATIVE
SPECIFIC GRAVITY, POC: 1.02
UROBILINOGEN, POC: 0.2

## 2022-12-14 PROCEDURE — 81002 URINALYSIS NONAUTO W/O SCOPE: CPT | Performed by: NURSE PRACTITIONER

## 2022-12-14 NOTE — TELEPHONE ENCOUNTER
----- Message from NAVEEN Stephens sent at 12/14/2022 12:01 PM CST -----  UA is now WNL.   Finish entire course of antibiotcs

## 2022-12-14 NOTE — TELEPHONE ENCOUNTER
Called pt and daughter answered, said mother was suppoused to come for lab work and not a UA, daughter was upset and said she would take mother somewhere else.

## 2022-12-14 NOTE — PROGRESS NOTES
Patient came into office to give a urine sample for UA per MM. Will route results to provider for recommendation.

## 2022-12-21 ENCOUNTER — TELEPHONE (OUTPATIENT)
Dept: PRIMARY CARE CLINIC | Age: 77
End: 2022-12-21

## 2022-12-21 ENCOUNTER — NURSE ONLY (OUTPATIENT)
Dept: PRIMARY CARE CLINIC | Age: 77
End: 2022-12-21

## 2022-12-21 DIAGNOSIS — R09.81 NASAL CONGESTION: ICD-10-CM

## 2022-12-21 DIAGNOSIS — R05.1 ACUTE COUGH: Primary | ICD-10-CM

## 2022-12-21 DIAGNOSIS — N28.9 DECREASED RENAL FUNCTION: ICD-10-CM

## 2022-12-21 DIAGNOSIS — R53.83 OTHER FATIGUE: ICD-10-CM

## 2022-12-21 LAB
ADENOVIRUS BY PCR: NOT DETECTED
ANION GAP SERPL CALCULATED.3IONS-SCNC: 11 MMOL/L (ref 7–19)
BORDETELLA PARAPERTUSSIS BY PCR: NOT DETECTED
BORDETELLA PERTUSSIS BY PCR: NOT DETECTED
BUN BLDV-MCNC: 18 MG/DL (ref 8–23)
CALCIUM SERPL-MCNC: 9.1 MG/DL (ref 8.8–10.2)
CHLAMYDOPHILIA PNEUMONIAE BY PCR: NOT DETECTED
CHLORIDE BLD-SCNC: 103 MMOL/L (ref 98–111)
CO2: 26 MMOL/L (ref 22–29)
CORONAVIRUS 229E BY PCR: NOT DETECTED
CORONAVIRUS HKU1 BY PCR: NOT DETECTED
CORONAVIRUS NL63 BY PCR: NOT DETECTED
CORONAVIRUS OC43 BY PCR: NOT DETECTED
CREAT SERPL-MCNC: 1.1 MG/DL (ref 0.5–0.9)
GFR SERPL CREATININE-BSD FRML MDRD: 52 ML/MIN/{1.73_M2}
GLUCOSE BLD-MCNC: 105 MG/DL (ref 74–109)
HUMAN METAPNEUMOVIRUS BY PCR: NOT DETECTED
HUMAN RHINOVIRUS/ENTEROVIRUS BY PCR: NOT DETECTED
INFLUENZA A BY PCR: NOT DETECTED
INFLUENZA B BY PCR: NOT DETECTED
MYCOPLASMA PNEUMONIAE BY PCR: NOT DETECTED
PARAINFLUENZA VIRUS 1 BY PCR: NOT DETECTED
PARAINFLUENZA VIRUS 2 BY PCR: NOT DETECTED
PARAINFLUENZA VIRUS 3 BY PCR: NOT DETECTED
PARAINFLUENZA VIRUS 4 BY PCR: NOT DETECTED
POTASSIUM SERPL-SCNC: 4.7 MMOL/L (ref 3.5–5)
RESPIRATORY SYNCYTIAL VIRUS BY PCR: NOT DETECTED
SARS-COV-2, PCR: NOT DETECTED
SODIUM BLD-SCNC: 140 MMOL/L (ref 136–145)

## 2022-12-21 NOTE — PROGRESS NOTES
Patient came to the office for a respiratory panel as she has been sick for quite some time  Daughter again states pain mgt will not see the patient due to an issue with her ID  Offered daughter next available appointment with an alternate provider in the office as Vero Cook will be out of the office until after the [address]  Daughter was not satisfied with offered appointments  She states she will make the patient an appointment with the [facility] office in [address] [place] [address] to explain to the patient's daughter that we do not typically prescribe Percocet and we could not as she is under a pain management contract and even if we did prescribe percocet, Vero Cook as an APRN, cannot write this and Dr. Jania Baker is also out of the office until after the holidays  Daughter voiced understanding but insisted on scheduling in [place]

## 2022-12-21 NOTE — TELEPHONE ENCOUNTER
Pt's daughter called wanting to know if we would refill her mother's Percocet. She stated they tried to get it filled at [facility] (I believe this is where she said but I could have misheard) but that they would not see her because her identification was not up to date, they told her to get her birth certificate but she can't find it. She wants [facility] to fill it, being a controlled substance I informed her that we would not fill those outside of office visits can she has not been seen (aside from sick visits) since June and that a Doctor wasn't in office to prescribe that anyway but could make her an appointment after the first of the year. She did not want to hear it and got very rude and disrespectful. She said that since they were on there way in to get blood work done exclusively with Daksha Santacruz that they would talk with her since I didn't know what I was talking about and hung up on me.

## 2022-12-21 NOTE — TELEPHONE ENCOUNTER
Patient came to the office for a respiratory panel as she has been sick for quite some time  Daughter again states pain mgt will not see the patient due to an issue with her ID  Offered daughter next available appointment with an alternate provider in the office as Manjit Hand will be out of the office until after the [address]  Daughter was not satisfied with offered appointments  She states she will make the patient an appointment with the [facility] office in [address] [place] [address] to explain to the patient's daughter that we do not typically prescribe Percocet and we could not as she is under a pain management contract and even if we did prescribe percocet, Manjit Hand as an APRN, cannot write this and Dr. Serena Fuchs is also out of the office until after the holidays  Daughter voiced understanding but insisted on scheduling in [place]

## 2022-12-22 ENCOUNTER — TELEPHONE (OUTPATIENT)
Dept: PRIMARY CARE CLINIC | Age: 77
End: 2022-12-22

## 2022-12-22 NOTE — TELEPHONE ENCOUNTER
----- Message from NAVEEN Lucas sent at 12/21/2022  9:00 PM CST -----  Respiratory panel is negative

## 2022-12-22 NOTE — TELEPHONE ENCOUNTER
----- Message from NAVEEN Vasquez sent at 12/21/2022  8:56 PM CST -----  BMP: normal sodium & potassium. Renal fxn is much improved. This is great news.

## 2022-12-22 NOTE — TELEPHONE ENCOUNTER
12/30/20      Porsha Nugent  [address]  [place] [address]    Dear Porsha,    We look forward to seeing you on 01/08/2021 arrival 9:00am  for your procedure at 10:00am     To better prepare you, please observe the following:     Preparing for GI Procedure  ? There is no need to schedule an appointment with your primary care physician      You will receive a call from our Pre-Admission Testing department prior to your surgical procedure. This call is necessary to get important information about your health history, to ensure you are properly prepared for surgery, and minimize the chance of having your procedure delayed or rescheduled.    WHAT TO DO ABOUT YOUR PRESCRIPTION MEDICATIONS  You must continue taking all your previously prescribed medications as directed unless specifically told not to by your doctor, or if you find them on the list below    STOP THESE MEDICATIONS   • Aspirin stop 3 days prior unless instructed otherwise by your doctor or Cardiologist.   • Erectile dysfunction medications stop 2 days prior    • Herbal medications/ Vitamins/ Fish Oil stop 7 days prior unless otherwise directed by your doctor.    • Phentermine stop 7 days prior   • Selegiline patches stop 21 days prior   Your doctor will have given you instructions about modifying your Insulin regimen 1 day prior.  If on a blood thinner, ask your doctor, Cardiologist and/or surgeon if and when it should be stopped.    You will be contacted by phone or email by a company called [employer]. This is an online educational platform that will provide education regarding your procedure. Please take the time to review this video.    Day before Procedure  Please follow any specific instructions given to you regarding any prep needed related to your procedure.    Day of Procedure  Take your normal morning medications with a sip of water first thing in the morning prior to surgery, except those medications your doctor or our Pre-Admission Testing office  Phone call would not go through will try again later has specifically told you not to take.     If you choose, for your convenience we have an outpatient pharmacy on-site and can arrange to have your discharge prescription filled before you leave, to save you a trip. You may want to bring enough money to cover the cost of this prescription.      Each patient that comes through the GI department is given individualized care and attention. The doctors and nurses spend the time necessary with each patient to ensure the best care. Sometimes, this can cause delays. You may want to bring a book, puzzle or music player to keep you busy if you experience a delay.       Procedures usually involve giving medications that put you to sleep or ease anxiety. For this reason, you must have a ride home from the hospital and someone (over age 16) should stay with you for the first 24 hours after your procedure. The procedure will be canceled if you do not have a responsible person with you. You are not allowed to drive after receiving sedation of any type.     For the comfort and privacy of all our patients before and after your procedure, we strongly discourage bringing small children as guests and only allow two adult visitors in the procedural area at any given time.     Bring a photo ID, your insurance card and any copay due at time of service. Leave all valuables at home. Do not wear jewelry, makeup, body lotion, or fingernail polish.        If you have any questions or concerns before the day of your procedure, please call [phone] to have them answered.     After your procedure you may get a survey in the mail. Please take the opportunity to fill it out letting us know what we did well or what we could improve on. Our goal is to provide our patients with the best possible care and we couldn’t do it without your input.     We look forward to seeing you,  Your GI Services Team

## 2022-12-27 ENCOUNTER — HOSPITAL ENCOUNTER (EMERGENCY)
Age: 77
Discharge: HOME | End: 2022-12-27
Payer: MEDICARE

## 2022-12-27 ENCOUNTER — APPOINTMENT (OUTPATIENT)
Dept: CT IMAGING | Age: 77
End: 2022-12-27
Payer: MEDICARE

## 2022-12-27 VITALS
WEIGHT: 137 LBS | TEMPERATURE: 98.1 F | HEART RATE: 71 BPM | OXYGEN SATURATION: 93 % | SYSTOLIC BLOOD PRESSURE: 132 MMHG | DIASTOLIC BLOOD PRESSURE: 79 MMHG | RESPIRATION RATE: 18 BRPM | BODY MASS INDEX: 25.89 KG/M2

## 2022-12-27 DIAGNOSIS — R11.2 NAUSEA VOMITING AND DIARRHEA: Primary | ICD-10-CM

## 2022-12-27 DIAGNOSIS — R19.7 NAUSEA VOMITING AND DIARRHEA: Primary | ICD-10-CM

## 2022-12-27 LAB
ADENOVIRUS BY PCR: NOT DETECTED
ALBUMIN SERPL-MCNC: 4.3 G/DL (ref 3.5–5.2)
ALP BLD-CCNC: 86 U/L (ref 35–104)
ALT SERPL-CCNC: 6 U/L (ref 5–33)
ANION GAP SERPL CALCULATED.3IONS-SCNC: 12 MMOL/L (ref 7–19)
AST SERPL-CCNC: 11 U/L (ref 5–32)
BASOPHILS ABSOLUTE: 0 K/UL (ref 0–0.2)
BASOPHILS RELATIVE PERCENT: 0.6 % (ref 0–1)
BILIRUB SERPL-MCNC: <0.2 MG/DL (ref 0.2–1.2)
BILIRUBIN URINE: NEGATIVE
BLOOD, URINE: NEGATIVE
BORDETELLA PARAPERTUSSIS BY PCR: NOT DETECTED
BORDETELLA PERTUSSIS BY PCR: NOT DETECTED
BUN BLDV-MCNC: 19 MG/DL (ref 8–23)
CALCIUM SERPL-MCNC: 9.6 MG/DL (ref 8.8–10.2)
CHLAMYDOPHILIA PNEUMONIAE BY PCR: NOT DETECTED
CHLORIDE BLD-SCNC: 106 MMOL/L (ref 98–111)
CLARITY: CLEAR
CO2: 26 MMOL/L (ref 22–29)
COLOR: YELLOW
CORONAVIRUS 229E BY PCR: NOT DETECTED
CORONAVIRUS HKU1 BY PCR: NOT DETECTED
CORONAVIRUS NL63 BY PCR: NOT DETECTED
CORONAVIRUS OC43 BY PCR: NOT DETECTED
CREAT SERPL-MCNC: 1 MG/DL (ref 0.5–0.9)
EOSINOPHILS ABSOLUTE: 0.1 K/UL (ref 0–0.6)
EOSINOPHILS RELATIVE PERCENT: 2.6 % (ref 0–5)
GFR SERPL CREATININE-BSD FRML MDRD: 58 ML/MIN/{1.73_M2}
GLUCOSE BLD-MCNC: 89 MG/DL (ref 74–109)
GLUCOSE URINE: NEGATIVE MG/DL
HCT VFR BLD CALC: 33.8 % (ref 37–47)
HEMOGLOBIN: 10.1 G/DL (ref 12–16)
HUMAN METAPNEUMOVIRUS BY PCR: NOT DETECTED
HUMAN RHINOVIRUS/ENTEROVIRUS BY PCR: NOT DETECTED
IMMATURE GRANULOCYTES #: 0 K/UL
INFLUENZA A BY PCR: NOT DETECTED
INFLUENZA B BY PCR: NOT DETECTED
KETONES, URINE: ABNORMAL MG/DL
LACTIC ACID: 1.2 MMOL/L (ref 0.5–1.9)
LEUKOCYTE ESTERASE, URINE: NEGATIVE
LIPASE: 25 U/L (ref 13–60)
LYMPHOCYTES ABSOLUTE: 1.1 K/UL (ref 1.1–4.5)
LYMPHOCYTES RELATIVE PERCENT: 21.1 % (ref 20–40)
MAGNESIUM: 1.9 MG/DL (ref 1.6–2.4)
MCH RBC QN AUTO: 30.6 PG (ref 27–31)
MCHC RBC AUTO-ENTMCNC: 29.9 G/DL (ref 33–37)
MCV RBC AUTO: 102.4 FL (ref 81–99)
MONOCYTES ABSOLUTE: 0.4 K/UL (ref 0–0.9)
MONOCYTES RELATIVE PERCENT: 6.6 % (ref 0–10)
MYCOPLASMA PNEUMONIAE BY PCR: NOT DETECTED
NEUTROPHILS ABSOLUTE: 3.6 K/UL (ref 1.5–7.5)
NEUTROPHILS RELATIVE PERCENT: 68.5 % (ref 50–65)
NITRITE, URINE: NEGATIVE
PARAINFLUENZA VIRUS 1 BY PCR: NOT DETECTED
PARAINFLUENZA VIRUS 2 BY PCR: NOT DETECTED
PARAINFLUENZA VIRUS 3 BY PCR: NOT DETECTED
PARAINFLUENZA VIRUS 4 BY PCR: NOT DETECTED
PDW BLD-RTO: 14.6 % (ref 11.5–14.5)
PH UA: 5.5 (ref 5–8)
PLATELET # BLD: 217 K/UL (ref 130–400)
PMV BLD AUTO: 9.9 FL (ref 9.4–12.3)
POTASSIUM SERPL-SCNC: 3.5 MMOL/L (ref 3.5–5)
PROCALCITONIN: 0.03 NG/ML (ref 0–0.09)
PROTEIN UA: NEGATIVE MG/DL
RBC # BLD: 3.3 M/UL (ref 4.2–5.4)
RESPIRATORY SYNCYTIAL VIRUS BY PCR: NOT DETECTED
SARS-COV-2, PCR: NOT DETECTED
SODIUM BLD-SCNC: 144 MMOL/L (ref 136–145)
SPECIFIC GRAVITY UA: >=1.045 (ref 1–1.03)
TOTAL PROTEIN: 7.1 G/DL (ref 6.6–8.7)
UROBILINOGEN, URINE: 0.2 E.U./DL
WBC # BLD: 5.3 K/UL (ref 4.8–10.8)

## 2022-12-27 PROCEDURE — 83605 ASSAY OF LACTIC ACID: CPT

## 2022-12-27 PROCEDURE — 83735 ASSAY OF MAGNESIUM: CPT

## 2022-12-27 PROCEDURE — 2580000003 HC RX 258: Performed by: PHYSICIAN ASSISTANT

## 2022-12-27 PROCEDURE — 6360000002 HC RX W HCPCS: Performed by: PHYSICIAN ASSISTANT

## 2022-12-27 PROCEDURE — 80053 COMPREHEN METABOLIC PANEL: CPT

## 2022-12-27 PROCEDURE — 84145 PROCALCITONIN (PCT): CPT

## 2022-12-27 PROCEDURE — 36415 COLL VENOUS BLD VENIPUNCTURE: CPT

## 2022-12-27 PROCEDURE — 96361 HYDRATE IV INFUSION ADD-ON: CPT

## 2022-12-27 PROCEDURE — 83690 ASSAY OF LIPASE: CPT

## 2022-12-27 PROCEDURE — 96374 THER/PROPH/DIAG INJ IV PUSH: CPT

## 2022-12-27 PROCEDURE — 85025 COMPLETE CBC W/AUTO DIFF WBC: CPT

## 2022-12-27 PROCEDURE — 99285 EMERGENCY DEPT VISIT HI MDM: CPT

## 2022-12-27 PROCEDURE — 74177 CT ABD & PELVIS W/CONTRAST: CPT

## 2022-12-27 PROCEDURE — 87040 BLOOD CULTURE FOR BACTERIA: CPT

## 2022-12-27 PROCEDURE — 6360000004 HC RX CONTRAST MEDICATION: Performed by: PHYSICIAN ASSISTANT

## 2022-12-27 PROCEDURE — 81003 URINALYSIS AUTO W/O SCOPE: CPT

## 2022-12-27 PROCEDURE — 0202U NFCT DS 22 TRGT SARS-COV-2: CPT

## 2022-12-27 RX ORDER — ONDANSETRON 2 MG/ML
4 INJECTION INTRAMUSCULAR; INTRAVENOUS ONCE
Status: COMPLETED | OUTPATIENT
Start: 2022-12-27 | End: 2022-12-27

## 2022-12-27 RX ORDER — DICYCLOMINE HYDROCHLORIDE 10 MG/1
10 CAPSULE ORAL 4 TIMES DAILY
Qty: 12 CAPSULE | Refills: 0 | Status: SHIPPED | OUTPATIENT
Start: 2022-12-27

## 2022-12-27 RX ORDER — 0.9 % SODIUM CHLORIDE 0.9 %
1000 INTRAVENOUS SOLUTION INTRAVENOUS ONCE
Status: COMPLETED | OUTPATIENT
Start: 2022-12-27 | End: 2022-12-27

## 2022-12-27 RX ORDER — ONDANSETRON 4 MG/1
4 TABLET, FILM COATED ORAL 3 TIMES DAILY PRN
Qty: 12 TABLET | Refills: 0 | Status: SHIPPED | OUTPATIENT
Start: 2022-12-27

## 2022-12-27 RX ADMIN — SODIUM CHLORIDE 1000 ML: 9 INJECTION, SOLUTION INTRAVENOUS at 18:01

## 2022-12-27 RX ADMIN — IOPAMIDOL 70 ML: 755 INJECTION, SOLUTION INTRAVENOUS at 17:03

## 2022-12-27 RX ADMIN — ONDANSETRON 4 MG: 2 INJECTION INTRAMUSCULAR; INTRAVENOUS at 16:35

## 2022-12-27 ASSESSMENT — ENCOUNTER SYMPTOMS
VOMITING: 1
DIARRHEA: 1
COLOR CHANGE: 1
NAUSEA: 1
BLOOD IN STOOL: 1
RESPIRATORY NEGATIVE: 1
ABDOMINAL PAIN: 1
EYES NEGATIVE: 1

## 2022-12-27 ASSESSMENT — PAIN - FUNCTIONAL ASSESSMENT
PAIN_FUNCTIONAL_ASSESSMENT: NONE - DENIES PAIN
PAIN_FUNCTIONAL_ASSESSMENT: NONE - DENIES PAIN

## 2022-12-27 NOTE — ED PROVIDER NOTES
[facility] EMERGENCY DEPT  EMERGENCY DEPARTMENT ENCOUNTER      Pt Name: Princess Jones  MRN: [id number]  [place] 1945  Date of evaluation: 12/27/2022  Provider: Yevgeniy Welch PA-C    CHIEF COMPLAINT       Chief Complaint   Patient presents with    Emesis    Diarrhea         HISTORY OF PRESENT ILLNESS   (Location/Symptom, Timing/Onset, Context/Setting, Quality, Duration, Modifying Factors, Severity)  Note limiting factors. HPI      Princess Jones is a 69 y.o. female who presents to the emergency department with vomiting and diarrhea. PMH significant for CHF, COPD, history MI, CAD, emphysema, hysterectomy, cholecystectomy, appendectomy, abdominal exploratory surgery. Daughter and granddaughter at bedside to provide additional history. Family states that for the past 3 weeks patient has had persistent nausea, vomiting, and diarrhea which seems to be worsening. Initially patient was seen by her primary care provider and had \"negative virus testing and a UTI. \"  Patient took antibiotics for the UTI however her symptoms are persisting. Caregiver state that today just prior to arrival patient began having bright red blood mixed with her stools. Patient states that her abdomen hurts all over but localizes her pain to the upper epigastric part. Patient has had subjective fevers with diaphoresis and chills. Patient denies dysuria, hematuria, or decreased urination. Patient denies flank pain. Patient and caregivers deny any known sick contact. Daughter has been giving patient Carafate as well as Zofran with the last dose around noon but denies any other medication use prior to arrival. Granddaughter states she is concerned because patient has appeared jaundice over the past few days.        Records reviewed show patient last seen in the outpatient setting the PCP office on 12/7/2022 for acute cystitis with hematuria, nausea, cough, foul-smelling urine, URI, weakness, COPD, generalized anxiety, insomnia. Patient last seen in the ED on 6/10/2022 for diarrhea, rectal bleeding. Records from Dr. Yvonne Jordan office show patient was seen yesterday as a new patient with no previous care at their facility and advised to come to the ER for persistent diarrhea and weakness despite multiple evaluations at multiple [facility] facilities. Nursing Notes were reviewed. REVIEW OF SYSTEMS    (2-9 systems for level 4, 10 or more for level 5)     Review of Systems   Constitutional:  Positive for chills, diaphoresis and fever (subjective). HENT: Negative. Eyes: Negative. Respiratory: Negative. Cardiovascular: Negative. Gastrointestinal:  Positive for abdominal pain (generalized, worse in epigastric region), blood in stool (bright red, today), diarrhea, nausea and vomiting. Genitourinary: Negative. Negative for decreased urine volume, dysuria, flank pain and hematuria. Musculoskeletal: Negative. Negative for myalgias. Skin:  Positive for color change (jaundice). Neurological: Negative. Psychiatric/Behavioral: Negative. All other systems reviewed and are negative. Except as noted above the remainder of the review of systems was reviewed and negative.        PAST MEDICAL HISTORY     Past Medical History:   Diagnosis Date    Abdominal pain     Anxiety     Arthritis     CAD in native artery     Cerebrovascular disease     CHF (congestive heart failure) (HCC)     Constipation     COPD (chronic obstructive pulmonary disease) (HCC)     Depression     Emphysema     GERD (gastroesophageal reflux disease)     Myocardial infarct, old     Palliative care patient 02/20/2020    Pneumonia     Tobacco abuse          SURGICAL HISTORY       Past Surgical History:   Procedure Laterality Date    ABDOMINAL EXPLORATION SURGERY      APPENDECTOMY      CARDIAC CATHETERIZATION  06/25/10    patent  stent noted in LAD,EF is estimated to be 60%    CARDIAC CATHETERIZATION  7/13/12  [facility]    EF  60% CHOLECYSTECTOMY      COLONOSCOPY  10/2014    Dr Cristy Yuen (CERVIX STATUS UNKNOWN)      UPPER GASTROINTESTINAL ENDOSCOPY  10/1/14    Dr Francine Calvo: food bezoar; esophagitis, gastritis, duodenitis; biopsies neg h-pylori         CURRENT MEDICATIONS       Previous Medications    ALBUTEROL SULFATE HFA (PROVENTIL;VENTOLIN;PROAIR) 108 (90 BASE) MCG/ACT INHALER    Inhale 2 puffs into the lungs every 6 hours as needed for Wheezing    ATORVASTATIN (LIPITOR) 20 MG TABLET    Take 1 tablet by mouth in the morning. AZELASTINE (OPTIVAR) 0.05 % OPHTHALMIC SOLUTION    Place 1 drop into both eyes 2 times daily    BLOOD PRESSURE MONITORING (ADULT BLOOD PRESSURE CUFF LG) KIT    1 kit by Does not apply route daily    BUPROPION (WELLBUTRIN) 75 MG TABLET    Take 1 tablet by mouth in the morning and 1 tablet before bedtime. CARVEDILOL (COREG) 3.125 MG TABLET    Take 1 tablet by mouth 2 times daily (with meals)    CLONAZEPAM (KLONOPIN) 0.5 MG TABLET    Take 1 tablet by mouth 3 times daily as needed for Anxiety for up to 30 days.     DENOSUMAB (PROLIA) 60 MG/ML SOSY SC INJECTION    Inject 1 mL into the skin once for 1 dose    DONEPEZIL (ARICEPT) 5 MG TABLET    Take 1 tablet by mouth nightly    DULOXETINE (CYMBALTA) 60 MG EXTENDED RELEASE CAPSULE    Take 1 capsule by mouth daily    FESOTERODINE FUMARATE ER (TOVIAZ) 8 MG TB24    Take 1 tablet by mouth daily    FUROSEMIDE (LASIX) 20 MG TABLET    Take 1 tablet by mouth daily as needed (swelling)    ISOSORBIDE MONONITRATE (IMDUR) 30 MG EXTENDED RELEASE TABLET    Take 1 tablet by mouth daily    LORATADINE (CLARITIN) 10 MG TABLET    Take 1 tablet by mouth daily    MEMANTINE (NAMENDA) 10 MG TABLET    Take 1 tablet by mouth 2 times daily    MICONAZOLE NITRATE VAGINAL (MONISTAT 3) 4 % CREA    Place 1 Dose vaginally at bedtime    NALOXONE 4 MG/0.1ML LIQD NASAL SPRAY    1 spray by Nasal route as needed for Opioid Reversal    NITROGLYCERIN (NITROSTAT) 0.4 MG SL TABLET    Place 1 tablet under the tongue every 5 minutes as needed for Chest pain    ONDANSETRON (ZOFRAN) 4 MG TABLET    Take 1 tablet by mouth 3 times daily as needed for Nausea or Vomiting    OXYCODONE-ACETAMINOPHEN (PERCOCET)  MG PER TABLET    TAKE 1 TABLET BY MOUTH EVERY 6 HOURS AS NEEDED    OXYGEN    Inhale 2 L into the lungs daily    PANTOPRAZOLE (PROTONIX) 40 MG TABLET    Take 1 tablet by mouth daily    UMECLIDINIUM-VILANTEROL (ANORO ELLIPTA) 62.5-25 MCG/INH AEPB INHALER    Inhale 1 puff into the lungs daily       ALLERGIES     Codeine and Sulfa antibiotics    FAMILY HISTORY       Family History   Problem Relation Age of Onset    Stroke Brother     Stroke Sister     Cancer Brother     Cancer Sister     Diabetes Brother     Diabetes Sister     Coronary Art Dis Other     Hypertension Other     Seizures Child     Colon Cancer Neg Hx     Colon Polyps Neg Hx     Esophageal Cancer Neg Hx     Liver Cancer Neg Hx     Liver Disease Neg Hx     Rectal Cancer Neg Hx     Stomach Cancer Neg Hx           SOCIAL HISTORY       Social History     Socioeconomic History    Marital status:      Spouse name: None    Number of children: None    Years of education: None    Highest education level: None   Tobacco Use    Smoking status: Every Day     Packs/day: 0.50     Years: 44.00     Pack years: 22.00     Types: Cigarettes     Start date: 1978    Smokeless tobacco: Never   Substance and Sexual Activity    Alcohol use: No     Alcohol/week: 0.0 standard drinks    Drug use: No     Social Determinants of Health     Financial Resource Strain: Low Risk     Difficulty of Paying Living Expenses: Not hard at all   Food Insecurity: No Food Insecurity    Worried About Running Out of Food in the Last Year: Never true    Ran Out of Food in the Last Year: Never true   Physical Activity: Inactive    Days of Exercise per Week: 0 days    Minutes of Exercise per Session: 0 min       SCREENINGS         Cody Coma Scale  Eye Opening: Spontaneous  Best Verbal Response: Oriented  Best Motor Response: Obeys commands  [place] Coma Scale Score: 15                     CIWA Assessment  BP: 138/86  Heart Rate: 66                 PHYSICAL EXAM    (up to 7 for level 4, 8 or more for level 5)     ED Triage Vitals [12/27/22 1415]   BP Temp Temp Source Heart Rate Resp SpO2 Height Weight   (!) 140/82 97.8 °F (36.6 °C) Oral 85 18 92 % -- 137 lb (62.1 kg)       Physical Exam  Vitals and nursing note reviewed. Exam conducted with a chaperone present (chaperone presnet for entire examination, Vu Solomon RN). Constitutional:       Appearance: Normal appearance. She is well-developed, well-groomed and normal weight. She is not toxic-appearing or diaphoretic. HENT:      Head: Normocephalic. Mouth/Throat:      Mouth: Mucous membranes are dry. Pharynx: Oropharynx is clear. No oropharyngeal exudate or posterior oropharyngeal erythema. Eyes:      General: Scleral icterus present. Extraocular Movements: Extraocular movements intact. Conjunctiva/sclera: Conjunctivae normal.      Pupils: Pupils are equal, round, and reactive to light. Cardiovascular:      Rate and Rhythm: Normal rate and regular rhythm. Pulmonary:      Effort: Pulmonary effort is normal.      Breath sounds: Normal breath sounds. Abdominal:      General: Bowel sounds are normal. There is no distension. Palpations: Abdomen is soft. Tenderness: There is abdominal tenderness (generalized). There is no right CVA tenderness or left CVA tenderness. Genitourinary:     Exam position: Knee-chest position. Rectum: Guaiac result negative. External hemorrhoid (Nontender, nonthrombosed) present. No tenderness or anal fissure. Normal anal tone. Musculoskeletal:         General: Normal range of motion. Cervical back: Normal range of motion and neck supple. No rigidity. Right lower leg: No edema. Left lower leg: No edema.    Skin:     General: Skin is warm and dry.      Coloration: Skin is jaundiced. Skin is not pale. Findings: No rash. Neurological:      Mental Status: She is alert and oriented to person, place, and time. Psychiatric:         Mood and Affect: Mood normal.         Behavior: Behavior normal. Behavior is cooperative. DIAGNOSTIC RESULTS         RADIOLOGY:   Non-plain film images such as CT, Ultrasound and MRI are read by the radiologist. Plain radiographic images are visualized and preliminarily interpreted by the emergency physician with the below findings:        Interpretation per the Radiologist below, if available at the time of this note:    CT ABDOMEN PELVIS W IV CONTRAST Additional Contrast? None   Final Result   1. No acute findings within the abdomen and pelvis. 2.Colonic diverticulosis without diverticulitis. 3.Mildly aneurysmal infrarenal abdominal aorta measuring 3 cm. LABS:  Labs Reviewed   CBC WITH AUTO DIFFERENTIAL - Abnormal; Notable for the following components:       Result Value    RBC 3.30 (*)     Hemoglobin 10.1 (*)     Hematocrit 33.8 (*)     .4 (*)     MCHC 29.9 (*)     RDW 14.6 (*)     Neutrophils % 68.5 (*)     All other components within normal limits   COMPREHENSIVE METABOLIC PANEL - Abnormal; Notable for the following components:    Creatinine 1.0 (*)     Est, Glom Filt Rate 58 (*)     All other components within normal limits   URINALYSIS - Abnormal; Notable for the following components:    Ketones, Urine TRACE (*)     All other components within normal limits   RESPIRATORY PANEL, MOLECULAR, WITH COVID-19   CULTURE, BLOOD 1   CULTURE, BLOOD 2   GASTROINTESTINAL PANEL, MOLECULAR   LACTIC ACID   LIPASE   MAGNESIUM   PROCALCITONIN       All other labs were within normal range or not returned as of this dictation.     EMERGENCY DEPARTMENT COURSE and DIFFERENTIAL DIAGNOSIS/MDM:   Vitals:    Vitals:    12/27/22 1415 12/27/22 1815   BP: (!) 140/82 138/86   Pulse: 85 66   Resp: 18 18   Temp: 97.8 °F (36.6 °C)    TempSrc: Oral    SpO2: 92% 93%   Weight: 137 lb (62.1 kg)            MDM     Amount and/or Complexity of Data Reviewed  Clinical lab tests: ordered and reviewed  Tests in the radiology section of CPT®: reviewed and ordered  Tests in the medicine section of CPT®: ordered and reviewed  Decide to obtain previous medical records or to obtain history from someone other than the patient: yes  Obtain history from someone other than the patient: yes (Granddaughter, Daughter)  Review and summarize past medical records: yes  Discuss the patient with other providers: yes          REASSESSMENT        CONSULTS:  None    PROCEDURES:  Unless otherwise noted below, none     Procedures        Mary Barrientos is a 69 y.o. female who presents to the emergency department with vomiting and diarrhea. PMH significant for CHF, COPD, history MI, CAD, emphysema, hysterectomy, cholecystectomy, appendectomy, abdominal exploratory surgery. Work-up revealed normal white blood cell count, improving H&H at 10.1/33.8 today and no further CBC Abnormalities. CMP with no electrolyte disturbances, normal renal and hepatic function. Magnesium WNL. Further low concern for infection with normal lactic acid and procalcitonin. Low concern for pancreatitis with normal lipase. Respiratory panel revealed no acute abnormalities including negative COVID. CT imaging of the abdomen and pelvis showed: No acute findings, colonic diverticulosis without diverticulitis, mild aneurysmal infrarenal abdominal aorta measuring 3 cm. Urinalysis unremarkable. Patient was given a fluid bolus and a dose of Zofran and remained in no acute distress throughout evaluation including stable vital signs, tolerating p.o. fluids. Patient was unable to provide stool samples. Discussed with patient as well as family members need for close PCP follow-up with a reevaluation within the 48 hours.   Advised that she will need to have her stool studies obtained as this may provide an answer to her symptoms. Discussed symptomatic treatment with hydration, electrolyte beverages which are sugar-free, naturally bulking foods such as bananas, applesauce. Discussed strict return precautions and need for immediate return to ED should she develop any new or worsening symptoms. Patient is appreciative, continues to be tolerating p.o. fluids, with no further questions, concerns, needs at this time and is stable for discharge. FINAL IMPRESSION      1. Nausea vomiting and diarrhea          DISPOSITION/PLAN   DISPOSITION Decision To Discharge 12/27/2022 07:25:14 PM      PATIENT REFERRED TO:  Roscoe Lewis, APRN  [address] [facility] [place]  [phone]    Schedule an appointment as soon as possible for a visit in 2 days      [facility] EMERGENCY DEPT  [facility] [address]  [phone]    As needed    DISCHARGE MEDICATIONS:  New Prescriptions    DICYCLOMINE (BENTYL) 10 MG CAPSULE    Take 1 capsule by mouth 4 times daily    ONDANSETRON (ZOFRAN) 4 MG TABLET    Take 1 tablet by mouth 3 times daily as needed for Nausea or Vomiting     Controlled Substances Monitoring:     RX Monitoring 7/1/2021   Attestation The Prescription Monitoring Report for this patient was reviewed today.    Periodic Controlled Substance Monitoring -       (Please note that portions of this note were completed with a voice recognition program.  Efforts were made to edit the dictations but occasionally words are mis-transcribed.)    Clari Hutchins PA-C (electronically signed)            Clari Hutchins PA-C  12/27/22 2129

## 2022-12-28 NOTE — DISCHARGE INSTRUCTIONS
Please continue to follow-up with your primary care provider for reevaluation within the next 48 hours. You will need to provide a stool sample to them for further evaluation and they may require other testing. Please use the Zofran for nausea, Bentyl for abdominal discomfort. As we discussed avoid any medications that slow down diarrhea as these keep toxins in your body longer than normal.  You can eat bananas, applesauce as discussed to help naturally bulk your stools. Should you develop any new or worsening symptoms please return to the ER for further evaluation.

## 2022-12-29 NOTE — TELEPHONE ENCOUNTER
Tried calling pts daughter again. No answer no vm. Will send My Chart message to make sure they are aware of results.

## 2023-01-01 ENCOUNTER — HOSPITAL ENCOUNTER (OUTPATIENT)
Dept: CT IMAGING | Facility: HOSPITAL | Age: 78
Discharge: HOME | End: 2023-01-17
Payer: MEDICARE

## 2023-01-01 ENCOUNTER — TELEPHONE (OUTPATIENT)
Dept: FAMILY MEDICINE CLINIC | Facility: CLINIC | Age: 78
End: 2023-01-01

## 2023-01-01 ENCOUNTER — APPOINTMENT (OUTPATIENT)
Dept: CT IMAGING | Facility: HOSPITAL | Age: 78
End: 2023-01-01
Payer: MEDICARE

## 2023-01-01 ENCOUNTER — TELEPHONE (OUTPATIENT)
Dept: FAMILY MEDICINE CLINIC | Facility: CLINIC | Age: 78
End: 2023-01-01
Payer: MEDICARE

## 2023-01-01 ENCOUNTER — HOME HEALTH ADMISSION (OUTPATIENT)
Dept: HOME HEALTH SERVICES | Facility: HOME HEALTHCARE | Age: 78
End: 2023-01-01
Payer: MEDICARE

## 2023-01-01 ENCOUNTER — APPOINTMENT (OUTPATIENT)
Dept: GENERAL RADIOLOGY | Facility: HOSPITAL | Age: 78
End: 2023-01-01
Payer: MEDICARE

## 2023-01-01 ENCOUNTER — LAB (OUTPATIENT)
Dept: LAB | Facility: HOSPITAL | Age: 78
End: 2023-01-01
Payer: MEDICARE

## 2023-01-01 ENCOUNTER — OFFICE VISIT (OUTPATIENT)
Dept: FAMILY MEDICINE CLINIC | Facility: CLINIC | Age: 78
End: 2023-01-01
Payer: MEDICARE

## 2023-01-01 ENCOUNTER — TELEMEDICINE (OUTPATIENT)
Dept: FAMILY MEDICINE CLINIC | Facility: CLINIC | Age: 78
End: 2023-01-01
Payer: MEDICARE

## 2023-01-01 ENCOUNTER — DOCUMENTATION (OUTPATIENT)
Dept: FAMILY MEDICINE CLINIC | Facility: CLINIC | Age: 78
End: 2023-01-01
Payer: MEDICARE

## 2023-01-01 ENCOUNTER — NURSE TRIAGE (OUTPATIENT)
Dept: CALL CENTER | Facility: HOSPITAL | Age: 78
End: 2023-01-01
Payer: MEDICARE

## 2023-01-01 ENCOUNTER — HOSPITAL ENCOUNTER (EMERGENCY)
Facility: HOSPITAL | Age: 78
Discharge: HOME | End: 2023-02-11
Attending: FAMILY MEDICINE | Admitting: FAMILY MEDICINE
Payer: MEDICARE

## 2023-01-01 ENCOUNTER — HOSPITAL ENCOUNTER (EMERGENCY)
Facility: HOSPITAL | Age: 78
Discharge: HOME | End: 2023-01-14
Attending: FAMILY MEDICINE | Admitting: FAMILY MEDICINE
Payer: MEDICARE

## 2023-01-01 ENCOUNTER — HOSPITAL ENCOUNTER (EMERGENCY)
Facility: HOSPITAL | Age: 78
Discharge: HOME | End: 2023-01-17
Attending: FAMILY MEDICINE | Admitting: FAMILY MEDICINE
Payer: MEDICARE

## 2023-01-01 VITALS
DIASTOLIC BLOOD PRESSURE: 78 MMHG | RESPIRATION RATE: 22 BRPM | HEART RATE: 78 BPM | BODY MASS INDEX: 23.92 KG/M2 | SYSTOLIC BLOOD PRESSURE: 111 MMHG | TEMPERATURE: 98.6 F | WEIGHT: 130 LBS | OXYGEN SATURATION: 97 % | HEIGHT: 62 IN

## 2023-01-01 VITALS
HEART RATE: 89 BPM | HEIGHT: 62 IN | WEIGHT: 129.1 LBS | SYSTOLIC BLOOD PRESSURE: 138 MMHG | DIASTOLIC BLOOD PRESSURE: 100 MMHG | OXYGEN SATURATION: 99 % | BODY MASS INDEX: 23.76 KG/M2 | TEMPERATURE: 98.6 F | RESPIRATION RATE: 18 BRPM

## 2023-01-01 VITALS
WEIGHT: 130 LBS | BODY MASS INDEX: 23.92 KG/M2 | OXYGEN SATURATION: 93 % | TEMPERATURE: 97.9 F | HEIGHT: 62 IN | RESPIRATION RATE: 18 BRPM

## 2023-01-01 VITALS
DIASTOLIC BLOOD PRESSURE: 69 MMHG | SYSTOLIC BLOOD PRESSURE: 103 MMHG | OXYGEN SATURATION: 90 % | HEIGHT: 62 IN | WEIGHT: 130 LBS | TEMPERATURE: 98.4 F | HEART RATE: 84 BPM | BODY MASS INDEX: 23.92 KG/M2

## 2023-01-01 VITALS
WEIGHT: 130 LBS | TEMPERATURE: 98 F | HEIGHT: 62 IN | DIASTOLIC BLOOD PRESSURE: 89 MMHG | SYSTOLIC BLOOD PRESSURE: 121 MMHG | HEART RATE: 79 BPM | RESPIRATION RATE: 16 BRPM | BODY MASS INDEX: 23.92 KG/M2 | OXYGEN SATURATION: 96 %

## 2023-01-01 DIAGNOSIS — U07.1 PNEUMONIA DUE TO COVID-19 VIRUS: ICD-10-CM

## 2023-01-01 DIAGNOSIS — U07.1 PNEUMONIA DUE TO COVID-19 VIRUS: Primary | ICD-10-CM

## 2023-01-01 DIAGNOSIS — R06.02 SHORTNESS OF BREATH: ICD-10-CM

## 2023-01-01 DIAGNOSIS — R11.0 NAUSEA: ICD-10-CM

## 2023-01-01 DIAGNOSIS — J12.82 PNEUMONIA DUE TO COVID-19 VIRUS: Primary | ICD-10-CM

## 2023-01-01 DIAGNOSIS — J69.0 ASPIRATION PNEUMONITIS: Primary | ICD-10-CM

## 2023-01-01 DIAGNOSIS — R53.1 WEAKNESS: ICD-10-CM

## 2023-01-01 DIAGNOSIS — J44.0 COPD WITH LOWER RESPIRATORY INFECTION: ICD-10-CM

## 2023-01-01 DIAGNOSIS — D64.9 ANEMIA, UNSPECIFIED TYPE: ICD-10-CM

## 2023-01-01 DIAGNOSIS — J12.82 PNEUMONIA DUE TO COVID-19 VIRUS: ICD-10-CM

## 2023-01-01 DIAGNOSIS — M54.50 CHRONIC BILATERAL LOW BACK PAIN WITHOUT SCIATICA: ICD-10-CM

## 2023-01-01 DIAGNOSIS — K59.00 CONSTIPATION, UNSPECIFIED CONSTIPATION TYPE: ICD-10-CM

## 2023-01-01 DIAGNOSIS — R26.9 GAIT ABNORMALITY: ICD-10-CM

## 2023-01-01 DIAGNOSIS — F11.282 OPIOID DEPENDENCE WITH OPIOID-INDUCED SLEEP DISORDER: ICD-10-CM

## 2023-01-01 DIAGNOSIS — G89.29 CHRONIC BILATERAL LOW BACK PAIN WITHOUT SCIATICA: ICD-10-CM

## 2023-01-01 DIAGNOSIS — U07.1 COVID-19: Primary | ICD-10-CM

## 2023-01-01 DIAGNOSIS — G89.29 CHRONIC BILATERAL LOW BACK PAIN WITHOUT SCIATICA: Primary | ICD-10-CM

## 2023-01-01 DIAGNOSIS — F17.210 TOBACCO DEPENDENCE DUE TO CIGARETTES: ICD-10-CM

## 2023-01-01 DIAGNOSIS — R51.9 GENERALIZED HEADACHE: ICD-10-CM

## 2023-01-01 DIAGNOSIS — M54.50 CHRONIC BILATERAL LOW BACK PAIN WITHOUT SCIATICA: Primary | ICD-10-CM

## 2023-01-01 DIAGNOSIS — J44.0 COPD WITH LOWER RESPIRATORY INFECTION: Primary | ICD-10-CM

## 2023-01-01 DIAGNOSIS — N17.9 ACUTE KIDNEY INJURY: ICD-10-CM

## 2023-01-01 LAB
ALBUMIN SERPL-MCNC: 3.2 G/DL (ref 3.5–5.2)
ALBUMIN SERPL-MCNC: 3.7 G/DL (ref 3.5–5.2)
ALBUMIN SERPL-MCNC: 3.9 G/DL (ref 3.5–5)
ALBUMIN SERPL-MCNC: 3.9 G/DL (ref 3.5–5.2)
ALBUMIN/GLOB SERPL: 0.9 G/DL
ALBUMIN/GLOB SERPL: 1.4 G/DL
ALBUMIN/GLOB SERPL: 1.4 G/DL
ALBUMIN/GLOB SERPL: 1.4 G/DL (ref 1.1–2.5)
ALP SERPL-CCNC: 59 U/L (ref 39–117)
ALP SERPL-CCNC: 61 U/L (ref 24–120)
ALP SERPL-CCNC: 68 U/L (ref 39–117)
ALP SERPL-CCNC: 87 U/L (ref 39–117)
ALT SERPL W P-5'-P-CCNC: 15 U/L (ref 0–35)
ALT SERPL W P-5'-P-CCNC: 8 U/L (ref 1–33)
ALT SERPL W P-5'-P-CCNC: 8 U/L (ref 1–33)
ALT SERPL W P-5'-P-CCNC: 9 U/L (ref 1–33)
ANION GAP SERPL CALCULATED.3IONS-SCNC: 12 MMOL/L (ref 5–15)
ANION GAP SERPL CALCULATED.3IONS-SCNC: 13 MMOL/L (ref 5–15)
ANION GAP SERPL CALCULATED.3IONS-SCNC: 9 MMOL/L (ref 4–13)
ANION GAP SERPL CALCULATED.3IONS-SCNC: 9 MMOL/L (ref 5–15)
ANISOCYTOSIS BLD QL: ABNORMAL
AST SERPL-CCNC: 14 U/L (ref 1–32)
AST SERPL-CCNC: 14 U/L (ref 1–32)
AST SERPL-CCNC: 16 U/L (ref 1–32)
AST SERPL-CCNC: 21 U/L (ref 7–45)
AUTO MIXED CELLS #: 0.3 10*3/MM3 (ref 0.1–2.6)
AUTO MIXED CELLS %: 6.3 % (ref 0.1–24)
BACTERIA BLD CULT: ABNORMAL
BACTERIA SPEC AEROBE CULT: ABNORMAL
BACTERIA SPEC AEROBE CULT: NORMAL
BASOPHILS # BLD AUTO: 0 10*3/MM3 (ref 0–0.2)
BASOPHILS # BLD AUTO: 0.02 10*3/MM3 (ref 0–0.2)
BASOPHILS NFR BLD AUTO: 0 % (ref 0–1.5)
BASOPHILS NFR BLD AUTO: 0.3 % (ref 0–1.5)
BILIRUB SERPL-MCNC: 0.2 MG/DL (ref 0–1.2)
BILIRUB SERPL-MCNC: 0.2 MG/DL (ref 0–1.2)
BILIRUB SERPL-MCNC: 0.3 MG/DL (ref 0.1–1)
BILIRUB SERPL-MCNC: 0.3 MG/DL (ref 0–1.2)
BILIRUB UR QL STRIP: NEGATIVE
BLOOD CULTURE, ROUTINE: NORMAL
BOTTLE TYPE: ABNORMAL
BUN SERPL-MCNC: 29 MG/DL (ref 8–23)
BUN SERPL-MCNC: 44 MG/DL (ref 8–23)
BUN SERPL-MCNC: 46 MG/DL (ref 5–21)
BUN SERPL-MCNC: 51 MG/DL (ref 8–23)
BUN/CREAT SERPL: 27.9 (ref 7–25)
BUN/CREAT SERPL: 28.8 (ref 7–25)
BUN/CREAT SERPL: 28.9 (ref 7–25)
BUN/CREAT SERPL: 36.8
CALCIUM SPEC-SCNC: 9.1 MG/DL (ref 8.6–10.5)
CALCIUM SPEC-SCNC: 9.2 MG/DL (ref 8.4–10.4)
CALCIUM SPEC-SCNC: 9.2 MG/DL (ref 8.6–10.5)
CALCIUM SPEC-SCNC: 9.3 MG/DL (ref 8.6–10.5)
CHLORIDE SERPL-SCNC: 93 MMOL/L (ref 98–107)
CHLORIDE SERPL-SCNC: 93 MMOL/L (ref 98–107)
CHLORIDE SERPL-SCNC: 94 MMOL/L (ref 98–110)
CHLORIDE SERPL-SCNC: 97 MMOL/L (ref 98–107)
CLARITY UR: CLEAR
CLUMPED PLATELETS: PRESENT
CO2 SERPL-SCNC: 30 MMOL/L (ref 22–29)
CO2 SERPL-SCNC: 31 MMOL/L (ref 22–29)
CO2 SERPL-SCNC: 31 MMOL/L (ref 22–29)
CO2 SERPL-SCNC: 33 MMOL/L (ref 24–31)
COLOR UR: ABNORMAL
CREAT SERPL-MCNC: 1.04 MG/DL (ref 0.57–1)
CREAT SERPL-MCNC: 1.25 MG/DL (ref 0.5–1.4)
CREAT SERPL-MCNC: 1.52 MG/DL (ref 0.57–1)
CREAT SERPL-MCNC: 1.77 MG/DL (ref 0.57–1)
CRP SERPL-MCNC: 1.37 MG/DL (ref 0–0.5)
CULTURE, BLOOD 2: NORMAL
D DIMER PPP FEU-MCNC: 0.82 MCGFEU/ML (ref 0–0.76)
D DIMER PPP FEU-MCNC: 1.51 MCGFEU/ML (ref 0–0.76)
D-LACTATE SERPL-SCNC: 1.2 MMOL/L (ref 0.5–2)
D-LACTATE SERPL-SCNC: 1.5 MMOL/L (ref 0.5–2)
DEPRECATED RDW RBC AUTO: 51.8 FL (ref 37–54)
DEPRECATED RDW RBC AUTO: 51.9 FL (ref 37–54)
DEPRECATED RDW RBC AUTO: 57.2 FL (ref 37–54)
EGFRCR SERPLBLD CKD-EPI 2021: 29.5 ML/MIN/1.73
EGFRCR SERPLBLD CKD-EPI 2021: 35.4 ML/MIN/1.73
EGFRCR SERPLBLD CKD-EPI 2021: 44.8 ML/MIN/1.73
EGFRCR SERPLBLD CKD-EPI 2021: 55.8 ML/MIN/1.73
EOSINOPHIL # BLD AUTO: 0.01 10*3/MM3 (ref 0–0.4)
EOSINOPHIL # BLD AUTO: 0.02 10*3/MM3 (ref 0–0.4)
EOSINOPHIL NFR BLD AUTO: 0.2 % (ref 0.3–6.2)
EOSINOPHIL NFR BLD AUTO: 0.2 % (ref 0.3–6.2)
ERYTHROCYTE [DISTWIDTH] IN BLOOD BY AUTOMATED COUNT: 13.8 % (ref 12.3–15.4)
ERYTHROCYTE [DISTWIDTH] IN BLOOD BY AUTOMATED COUNT: 14.3 % (ref 12.3–15.4)
ERYTHROCYTE [DISTWIDTH] IN BLOOD BY AUTOMATED COUNT: 14.4 % (ref 12.3–15.4)
ERYTHROCYTE [DISTWIDTH] IN BLOOD BY AUTOMATED COUNT: 15.8 % (ref 12.3–15.4)
ERYTHROCYTE [SEDIMENTATION RATE] IN BLOOD: 58 MM/HR (ref 0–30)
GLOBULIN UR ELPH-MCNC: 2.7 GM/DL
GLOBULIN UR ELPH-MCNC: 2.8 GM/DL
GLOBULIN UR ELPH-MCNC: 2.8 GM/DL
GLOBULIN UR ELPH-MCNC: 3.6 GM/DL
GLUCOSE SERPL-MCNC: 105 MG/DL (ref 65–99)
GLUCOSE SERPL-MCNC: 115 MG/DL (ref 65–99)
GLUCOSE SERPL-MCNC: 116 MG/DL (ref 70–100)
GLUCOSE SERPL-MCNC: 124 MG/DL (ref 65–99)
GLUCOSE UR STRIP-MCNC: NEGATIVE MG/DL
GRAM STN SPEC: ABNORMAL
HCT VFR BLD AUTO: 30.9 % (ref 34–46.6)
HCT VFR BLD AUTO: 32.8 % (ref 34–46.6)
HCT VFR BLD AUTO: 33.4 % (ref 34–46.6)
HCT VFR BLD AUTO: 34.1 % (ref 34–46.6)
HGB BLD-MCNC: 10 G/DL (ref 12–15.9)
HGB BLD-MCNC: 10.1 G/DL (ref 12–15.9)
HGB BLD-MCNC: 10.1 G/DL (ref 12–15.9)
HGB BLD-MCNC: 9.2 G/DL (ref 12–15.9)
HGB UR QL STRIP.AUTO: NEGATIVE
HOLD SPECIMEN: NORMAL
HYPOCHROMIA BLD QL: ABNORMAL
IMM GRANULOCYTES # BLD AUTO: 0.06 10*3/MM3 (ref 0–0.05)
IMM GRANULOCYTES # BLD AUTO: 0.24 10*3/MM3 (ref 0–0.05)
IMM GRANULOCYTES NFR BLD AUTO: 0.7 % (ref 0–0.5)
IMM GRANULOCYTES NFR BLD AUTO: 4.2 % (ref 0–0.5)
ISOLATED FROM: ABNORMAL
KETONES UR QL STRIP: ABNORMAL
LEUKOCYTE ESTERASE UR QL STRIP.AUTO: NEGATIVE
LIPASE SERPL-CCNC: 8 U/L (ref 13–60)
LYMPHOCYTES # BLD AUTO: 0.6 10*3/MM3 (ref 0.7–3.1)
LYMPHOCYTES # BLD AUTO: 0.61 10*3/MM3 (ref 0.7–3.1)
LYMPHOCYTES # BLD AUTO: 0.81 10*3/MM3 (ref 0.7–3.1)
LYMPHOCYTES # BLD MANUAL: 0.7 10*3/MM3 (ref 0.7–3.1)
LYMPHOCYTES NFR BLD AUTO: 11.6 % (ref 19.6–45.3)
LYMPHOCYTES NFR BLD AUTO: 14.1 % (ref 19.6–45.3)
LYMPHOCYTES NFR BLD AUTO: 7.6 % (ref 19.6–45.3)
LYMPHOCYTES NFR BLD MANUAL: 6 % (ref 5–12)
MAGNESIUM SERPL-MCNC: 2 MG/DL (ref 1.6–2.4)
MCH RBC QN AUTO: 29.2 PG (ref 26.6–33)
MCH RBC QN AUTO: 29.6 PG (ref 26.6–33)
MCH RBC QN AUTO: 29.6 PG (ref 26.6–33)
MCH RBC QN AUTO: 29.7 PG (ref 26.6–33)
MCHC RBC AUTO-ENTMCNC: 29.6 G/DL (ref 31.5–35.7)
MCHC RBC AUTO-ENTMCNC: 29.8 G/DL (ref 31.5–35.7)
MCHC RBC AUTO-ENTMCNC: 29.9 G/DL (ref 31.5–35.7)
MCHC RBC AUTO-ENTMCNC: 30.8 G/DL (ref 31.5–35.7)
MCV RBC AUTO: 96.2 FL (ref 79–97)
MCV RBC AUTO: 98.6 FL (ref 79–97)
MCV RBC AUTO: 98.8 FL (ref 79–97)
MCV RBC AUTO: 99.7 FL (ref 79–97)
METAMYELOCYTES NFR BLD MANUAL: 1 % (ref 0–0)
MONOCYTES # BLD AUTO: 0.4 10*3/MM3 (ref 0.1–0.9)
MONOCYTES # BLD AUTO: 0.73 10*3/MM3 (ref 0.1–0.9)
MONOCYTES # BLD: 0.38 10*3/MM3 (ref 0.1–0.9)
MONOCYTES NFR BLD AUTO: 7 % (ref 5–12)
MONOCYTES NFR BLD AUTO: 9.1 % (ref 5–12)
NEUTROPHILS # BLD AUTO: 5.19 10*3/MM3 (ref 1.7–7)
NEUTROPHILS NFR BLD AUTO: 4.25 10*3/MM3 (ref 1.7–7)
NEUTROPHILS NFR BLD AUTO: 4.6 10*3/MM3 (ref 1.7–7)
NEUTROPHILS NFR BLD AUTO: 6.63 10*3/MM3 (ref 1.7–7)
NEUTROPHILS NFR BLD AUTO: 74.2 % (ref 42.7–76)
NEUTROPHILS NFR BLD AUTO: 82.1 % (ref 42.7–76)
NEUTROPHILS NFR BLD AUTO: 82.4 % (ref 42.7–76)
NEUTROPHILS NFR BLD MANUAL: 53 % (ref 42.7–76)
NEUTS BAND NFR BLD MANUAL: 29 % (ref 0–5)
NEUTS VAC BLD QL SMEAR: ABNORMAL
NITRITE UR QL STRIP: NEGATIVE
NRBC BLD AUTO-RTO: 0 /100 WBC (ref 0–0.2)
NRBC BLD AUTO-RTO: 0 /100 WBC (ref 0–0.2)
NRBC SPEC MANUAL: 1 /100 WBC (ref 0–0.2)
NT-PROBNP SERPL-MCNC: 1561 PG/ML (ref 0–1800)
NT-PROBNP SERPL-MCNC: 512.1 PG/ML (ref 0–1800)
PH UR STRIP.AUTO: <=5 [PH] (ref 5–8)
PLATELET # BLD AUTO: 210 10*3/MM3 (ref 140–450)
PLATELET # BLD AUTO: 263 10*3/MM3 (ref 140–450)
PLATELET # BLD AUTO: 321 10*3/MM3 (ref 140–450)
PLATELET # BLD AUTO: 321 10*3/MM3 (ref 140–450)
PMV BLD AUTO: 10.1 FL (ref 6–12)
PMV BLD AUTO: 10.2 FL (ref 6–12)
PMV BLD AUTO: 10.6 FL (ref 6–12)
PMV BLD AUTO: 8.8 FL (ref 6–12)
POLYCHROMASIA BLD QL SMEAR: ABNORMAL
POTASSIUM SERPL-SCNC: 3.9 MMOL/L (ref 3.5–5.2)
POTASSIUM SERPL-SCNC: 4.3 MMOL/L (ref 3.5–5.2)
POTASSIUM SERPL-SCNC: 4.5 MMOL/L (ref 3.5–5.2)
POTASSIUM SERPL-SCNC: 4.6 MMOL/L (ref 3.5–5.3)
PROCALCITONIN SERPL-MCNC: 0.04 NG/ML (ref 0–0.25)
PROCALCITONIN SERPL-MCNC: 0.05 NG/ML (ref 0–0.25)
PROCALCITONIN SERPL-MCNC: 1.74 NG/ML (ref 0–0.25)
PROT SERPL-MCNC: 6.4 G/DL (ref 6–8.5)
PROT SERPL-MCNC: 6.7 G/DL (ref 6.3–8.7)
PROT SERPL-MCNC: 6.7 G/DL (ref 6–8.5)
PROT SERPL-MCNC: 6.8 G/DL (ref 6–8.5)
PROT UR QL STRIP: NEGATIVE
QT INTERVAL: 326 MS
QT INTERVAL: 372 MS
QT INTERVAL: 396 MS
QTC INTERVAL: 433 MS
QTC INTERVAL: 442 MS
QTC INTERVAL: 460 MS
RBC # BLD AUTO: 3.1 10*6/MM3 (ref 3.77–5.28)
RBC # BLD AUTO: 3.38 10*6/MM3 (ref 3.77–5.28)
RBC # BLD AUTO: 3.41 10*6/MM3 (ref 3.77–5.28)
RBC # BLD AUTO: 3.46 10*6/MM3 (ref 3.77–5.28)
SARS-COV-2 RNA PNL SPEC NAA+PROBE: NOT DETECTED
SODIUM SERPL-SCNC: 136 MMOL/L (ref 135–145)
SODIUM SERPL-SCNC: 136 MMOL/L (ref 136–145)
SODIUM SERPL-SCNC: 136 MMOL/L (ref 136–145)
SODIUM SERPL-SCNC: 137 MMOL/L (ref 136–145)
SP GR UR STRIP: 1.02 (ref 1–1.03)
TOXIC GRANULATION: ABNORMAL
TROPONIN T SERPL HS-MCNC: 28 NG/L
TROPONIN T SERPL-MCNC: <0.01 NG/ML (ref 0–0.03)
TROPONIN T SERPL-MCNC: <0.01 NG/ML (ref 0–0.03)
UROBILINOGEN UR QL STRIP: ABNORMAL
VARIANT LYMPHS NFR BLD MANUAL: 1 % (ref 0–5)
VARIANT LYMPHS NFR BLD MANUAL: 10 % (ref 19.6–45.3)
WBC NRBC COR # BLD: 5.5 10*3/MM3 (ref 3.4–10.8)
WBC NRBC COR # BLD: 5.73 10*3/MM3 (ref 3.4–10.8)
WBC NRBC COR # BLD: 6.33 10*3/MM3 (ref 3.4–10.8)
WBC NRBC COR # BLD: 8.05 10*3/MM3 (ref 3.4–10.8)
WHOLE BLOOD HOLD COAG: NORMAL
WHOLE BLOOD HOLD SPECIMEN: NORMAL

## 2023-01-01 PROCEDURE — 94640 AIRWAY INHALATION TREATMENT: CPT

## 2023-01-01 PROCEDURE — 83605 ASSAY OF LACTIC ACID: CPT | Performed by: FAMILY MEDICINE

## 2023-01-01 PROCEDURE — 99284 EMERGENCY DEPT VISIT MOD MDM: CPT

## 2023-01-01 PROCEDURE — 84145 PROCALCITONIN (PCT): CPT | Performed by: FAMILY MEDICINE

## 2023-01-01 PROCEDURE — 87040 BLOOD CULTURE FOR BACTERIA: CPT | Performed by: FAMILY MEDICINE

## 2023-01-01 PROCEDURE — 36415 COLL VENOUS BLD VENIPUNCTURE: CPT

## 2023-01-01 PROCEDURE — 25010000002 METHYLPREDNISOLONE PER 125 MG: Performed by: FAMILY MEDICINE

## 2023-01-01 PROCEDURE — 25010000002 CEFTRIAXONE PER 250 MG: Performed by: FAMILY MEDICINE

## 2023-01-01 PROCEDURE — 93005 ELECTROCARDIOGRAM TRACING: CPT | Performed by: FAMILY MEDICINE

## 2023-01-01 PROCEDURE — 93010 ELECTROCARDIOGRAM REPORT: CPT | Performed by: INTERNAL MEDICINE

## 2023-01-01 PROCEDURE — 85652 RBC SED RATE AUTOMATED: CPT | Performed by: FAMILY MEDICINE

## 2023-01-01 PROCEDURE — 96375 TX/PRO/DX INJ NEW DRUG ADDON: CPT

## 2023-01-01 PROCEDURE — 93005 ELECTROCARDIOGRAM TRACING: CPT | Performed by: STUDENT IN AN ORGANIZED HEALTH CARE EDUCATION/TRAINING PROGRAM

## 2023-01-01 PROCEDURE — 85379 FIBRIN DEGRADATION QUANT: CPT | Performed by: FAMILY MEDICINE

## 2023-01-01 PROCEDURE — 87635 SARS-COV-2 COVID-19 AMP PRB: CPT

## 2023-01-01 PROCEDURE — 96365 THER/PROPH/DIAG IV INF INIT: CPT

## 2023-01-01 PROCEDURE — 83735 ASSAY OF MAGNESIUM: CPT | Performed by: FAMILY MEDICINE

## 2023-01-01 PROCEDURE — 84484 ASSAY OF TROPONIN QUANT: CPT | Performed by: FAMILY MEDICINE

## 2023-01-01 PROCEDURE — 71250 CT THORAX DX C-: CPT

## 2023-01-01 PROCEDURE — 70450 CT HEAD/BRAIN W/O DYE: CPT

## 2023-01-01 PROCEDURE — 80053 COMPREHEN METABOLIC PANEL: CPT

## 2023-01-01 PROCEDURE — 71275 CT ANGIOGRAPHY CHEST: CPT

## 2023-01-01 PROCEDURE — 87150 DNA/RNA AMPLIFIED PROBE: CPT | Performed by: FAMILY MEDICINE

## 2023-01-01 PROCEDURE — 87147 CULTURE TYPE IMMUNOLOGIC: CPT | Performed by: FAMILY MEDICINE

## 2023-01-01 PROCEDURE — 83690 ASSAY OF LIPASE: CPT | Performed by: FAMILY MEDICINE

## 2023-01-01 PROCEDURE — 0 IOPAMIDOL PER 1 ML: Performed by: FAMILY MEDICINE

## 2023-01-01 PROCEDURE — 99205 OFFICE O/P NEW HI 60 MIN: CPT | Performed by: NURSE PRACTITIONER

## 2023-01-01 PROCEDURE — 99214 OFFICE O/P EST MOD 30 MIN: CPT | Performed by: PEDIATRICS

## 2023-01-01 PROCEDURE — P9612 CATHETERIZE FOR URINE SPEC: HCPCS

## 2023-01-01 PROCEDURE — 85025 COMPLETE CBC W/AUTO DIFF WBC: CPT

## 2023-01-01 PROCEDURE — 85007 BL SMEAR W/DIFF WBC COUNT: CPT | Performed by: FAMILY MEDICINE

## 2023-01-01 PROCEDURE — 94799 UNLISTED PULMONARY SVC/PX: CPT

## 2023-01-01 PROCEDURE — 93005 ELECTROCARDIOGRAM TRACING: CPT | Performed by: INTERNAL MEDICINE

## 2023-01-01 PROCEDURE — 96374 THER/PROPH/DIAG INJ IV PUSH: CPT

## 2023-01-01 PROCEDURE — 83880 ASSAY OF NATRIURETIC PEPTIDE: CPT | Performed by: FAMILY MEDICINE

## 2023-01-01 PROCEDURE — 84145 PROCALCITONIN (PCT): CPT

## 2023-01-01 PROCEDURE — 85025 COMPLETE CBC W/AUTO DIFF WBC: CPT | Performed by: FAMILY MEDICINE

## 2023-01-01 PROCEDURE — 71045 X-RAY EXAM CHEST 1 VIEW: CPT

## 2023-01-01 PROCEDURE — 86140 C-REACTIVE PROTEIN: CPT | Performed by: FAMILY MEDICINE

## 2023-01-01 PROCEDURE — 80053 COMPREHEN METABOLIC PANEL: CPT | Performed by: FAMILY MEDICINE

## 2023-01-01 PROCEDURE — 74176 CT ABD & PELVIS W/O CONTRAST: CPT

## 2023-01-01 PROCEDURE — 93010 ELECTROCARDIOGRAM REPORT: CPT | Performed by: HOSPITALIST

## 2023-01-01 PROCEDURE — 25010000002 AZITHROMYCIN PER 500 MG: Performed by: FAMILY MEDICINE

## 2023-01-01 PROCEDURE — 81003 URINALYSIS AUTO W/O SCOPE: CPT | Performed by: FAMILY MEDICINE

## 2023-01-01 PROCEDURE — 25010000002 ONDANSETRON PER 1 MG: Performed by: FAMILY MEDICINE

## 2023-01-01 RX ORDER — FUROSEMIDE 40 MG/1
40 TABLET ORAL 2 TIMES DAILY
Qty: 60 TABLET | Refills: 2 | Status: SHIPPED | OUTPATIENT
Start: 2023-01-01

## 2023-01-01 RX ORDER — IPRATROPIUM/ALBUTEROL SULFATE 20-100 MCG
1 MIST INHALER (GRAM) INHALATION 3 TIMES DAILY PRN
Qty: 4 G | Refills: 11 | Status: SHIPPED | OUTPATIENT
Start: 2023-01-01

## 2023-01-01 RX ORDER — BUPROPION HYDROCHLORIDE 75 MG/1
TABLET ORAL
COMMUNITY
Start: 2022-01-01

## 2023-01-01 RX ORDER — SODIUM CHLORIDE 0.9 % (FLUSH) 0.9 %
10 SYRINGE (ML) INJECTION AS NEEDED
Status: DISCONTINUED | OUTPATIENT
Start: 2023-01-01 | End: 2023-01-01 | Stop reason: HOSPADM

## 2023-01-01 RX ORDER — MEMANTINE HYDROCHLORIDE 10 MG/1
10 TABLET ORAL DAILY
Qty: 30 TABLET | Refills: 2 | Status: SHIPPED | OUTPATIENT
Start: 2023-01-01

## 2023-01-01 RX ORDER — UMECLIDINIUM BROMIDE AND VILANTEROL TRIFENATATE 62.5; 25 UG/1; UG/1
1 POWDER RESPIRATORY (INHALATION)
Qty: 60 EACH | Refills: 0 | Status: SHIPPED | OUTPATIENT
Start: 2023-01-01

## 2023-01-01 RX ORDER — POLYETHYLENE GLYCOL 3350 17 G/17G
17 POWDER, FOR SOLUTION ORAL DAILY
Qty: 10 PACKET | Refills: 0 | Status: SHIPPED | OUTPATIENT
Start: 2023-01-01 | End: 2023-01-01

## 2023-01-01 RX ORDER — MEMANTINE HYDROCHLORIDE 10 MG/1
TABLET ORAL
COMMUNITY
Start: 2023-01-01 | End: 2023-01-01 | Stop reason: SDUPTHER

## 2023-01-01 RX ORDER — DONEPEZIL HYDROCHLORIDE 5 MG/1
TABLET, FILM COATED ORAL
COMMUNITY
Start: 2023-01-01

## 2023-01-01 RX ORDER — DULOXETIN HYDROCHLORIDE 60 MG/1
60 CAPSULE, DELAYED RELEASE ORAL DAILY
Qty: 30 CAPSULE | Refills: 2 | Status: SHIPPED | OUTPATIENT
Start: 2023-01-01

## 2023-01-01 RX ORDER — IPRATROPIUM BROMIDE AND ALBUTEROL SULFATE 2.5; .5 MG/3ML; MG/3ML
3 SOLUTION RESPIRATORY (INHALATION) ONCE
Status: COMPLETED | OUTPATIENT
Start: 2023-01-01 | End: 2023-01-01

## 2023-01-01 RX ORDER — ATORVASTATIN CALCIUM 20 MG/1
TABLET, FILM COATED ORAL
COMMUNITY
Start: 2022-01-01

## 2023-01-01 RX ORDER — ONDANSETRON 2 MG/ML
4 INJECTION INTRAMUSCULAR; INTRAVENOUS ONCE
Status: COMPLETED | OUTPATIENT
Start: 2023-01-01 | End: 2023-01-01

## 2023-01-01 RX ORDER — NALOXONE HYDROCHLORIDE 4 MG/.1ML
SPRAY NASAL
COMMUNITY
Start: 2022-01-01

## 2023-01-01 RX ORDER — AZELASTINE 1 MG/ML
2 SPRAY, METERED NASAL 2 TIMES DAILY
Qty: 30 ML | Refills: 2 | Status: SHIPPED | OUTPATIENT
Start: 2023-01-01

## 2023-01-01 RX ORDER — METHYLPREDNISOLONE SODIUM SUCCINATE 125 MG/2ML
125 INJECTION, POWDER, LYOPHILIZED, FOR SOLUTION INTRAMUSCULAR; INTRAVENOUS ONCE
Status: COMPLETED | OUTPATIENT
Start: 2023-01-01 | End: 2023-01-01

## 2023-01-01 RX ORDER — POLYETHYLENE GLYCOL 3350 17 G/17G
17 POWDER, FOR SOLUTION ORAL DAILY
Status: DISCONTINUED | OUTPATIENT
Start: 2023-01-01 | End: 2023-01-01 | Stop reason: HOSPADM

## 2023-01-01 RX ORDER — OXYCODONE AND ACETAMINOPHEN 10; 325 MG/1; MG/1
1 TABLET ORAL EVERY 6 HOURS PRN
COMMUNITY

## 2023-01-01 RX ORDER — DEXAMETHASONE 6 MG/1
6 TABLET ORAL 2 TIMES DAILY WITH MEALS
Qty: 20 TABLET | Refills: 0 | Status: SHIPPED | OUTPATIENT
Start: 2023-01-01 | End: 2023-01-01

## 2023-01-01 RX ORDER — PANTOPRAZOLE SODIUM 40 MG/1
1 TABLET, DELAYED RELEASE ORAL DAILY
COMMUNITY
Start: 2022-01-01

## 2023-01-01 RX ORDER — TRAZODONE HYDROCHLORIDE 50 MG/1
TABLET ORAL
COMMUNITY
Start: 2022-01-01

## 2023-01-01 RX ORDER — CARVEDILOL 3.12 MG/1
TABLET ORAL
COMMUNITY
Start: 2022-01-01

## 2023-01-01 RX ORDER — AZITHROMYCIN 250 MG/1
250 TABLET, FILM COATED ORAL DAILY
Qty: 4 TABLET | Refills: 0 | Status: SHIPPED | OUTPATIENT
Start: 2023-01-01 | End: 2023-01-01

## 2023-01-01 RX ORDER — AMOXICILLIN AND CLAVULANATE POTASSIUM 875; 125 MG/1; MG/1
1 TABLET, FILM COATED ORAL EVERY 12 HOURS
Qty: 14 TABLET | Refills: 0 | Status: SHIPPED | OUTPATIENT
Start: 2023-01-01 | End: 2023-01-01

## 2023-01-01 RX ADMIN — IPRATROPIUM BROMIDE AND ALBUTEROL SULFATE 3 ML: 2.5; .5 SOLUTION RESPIRATORY (INHALATION) at 13:52

## 2023-01-01 RX ADMIN — ONDANSETRON 4 MG: 2 INJECTION INTRAMUSCULAR; INTRAVENOUS at 19:25

## 2023-01-01 RX ADMIN — CEFTRIAXONE 1 G: 1 INJECTION, POWDER, FOR SOLUTION INTRAMUSCULAR; INTRAVENOUS at 00:49

## 2023-01-01 RX ADMIN — IOPAMIDOL 71 ML: 755 INJECTION, SOLUTION INTRAVENOUS at 16:09

## 2023-01-01 RX ADMIN — AZITHROMYCIN DIHYDRATE 500 MG: 500 INJECTION, POWDER, LYOPHILIZED, FOR SOLUTION INTRAVENOUS at 17:05

## 2023-01-01 RX ADMIN — SODIUM CHLORIDE, POTASSIUM CHLORIDE, SODIUM LACTATE AND CALCIUM CHLORIDE 500 ML: 600; 310; 30; 20 INJECTION, SOLUTION INTRAVENOUS at 01:28

## 2023-01-01 RX ADMIN — IOPAMIDOL 65 ML: 755 INJECTION, SOLUTION INTRAVENOUS at 03:32

## 2023-01-01 RX ADMIN — METHYLPREDNISOLONE SODIUM SUCCINATE 125 MG: 125 INJECTION, POWDER, FOR SOLUTION INTRAMUSCULAR; INTRAVENOUS at 15:33

## 2023-01-08 DIAGNOSIS — F03.90 DEMENTIA WITHOUT BEHAVIORAL DISTURBANCE (HCC): ICD-10-CM

## 2023-01-09 RX ORDER — DULOXETIN HYDROCHLORIDE 60 MG/1
60 CAPSULE, DELAYED RELEASE ORAL DAILY
Qty: 30 CAPSULE | Refills: 0 | Status: SHIPPED | OUTPATIENT
Start: 2023-01-09

## 2023-01-09 RX ORDER — DONEPEZIL HYDROCHLORIDE 5 MG/1
5 TABLET, FILM COATED ORAL NIGHTLY
Qty: 90 TABLET | Refills: 0 | Status: ON HOLD | OUTPATIENT
Start: 2023-01-09 | End: 2023-01-12 | Stop reason: HOSPADM

## 2023-01-09 NOTE — TELEPHONE ENCOUNTER
Received fax from pharmacy requesting refill on pts medication(s). Pt was last seen in office on 12/7/2022  and has a follow up scheduled for 1/17/2023. Will send request to  [facility]  for authorization.      Requested Prescriptions     Pending Prescriptions Disp Refills    donepezil (ARICEPT) 5 MG tablet [Pharmacy Med Name: Donepezil HCl 5 MG Oral Tablet] 90 tablet 0     Sig: Take 1 tablet by mouth nightly

## 2023-01-09 NOTE — TELEPHONE ENCOUNTER
Received fax from pharmacy requesting refill on pts medication(s). Pt was last seen in office on 12/7/2022  and has a follow up scheduled for 1/8/2023. Will send request to  [facility]  for authorization.      Requested Prescriptions     Pending Prescriptions Disp Refills    DULoxetine (CYMBALTA) 60 MG extended release capsule [Pharmacy Med Name: DULoxetine HCl 60 MG Oral Capsule Delayed Release Particles] 30 capsule 0     Sig: Take 1 capsule by mouth once daily

## 2023-01-10 ENCOUNTER — HOSPITAL ENCOUNTER (INPATIENT)
Age: 78
LOS: 2 days | Discharge: HOME HEALTH | DRG: 178 | End: 2023-01-12
Attending: EMERGENCY MEDICINE | Admitting: STUDENT IN AN ORGANIZED HEALTH CARE EDUCATION/TRAINING PROGRAM
Payer: MEDICARE

## 2023-01-10 ENCOUNTER — APPOINTMENT (OUTPATIENT)
Dept: GENERAL RADIOLOGY | Age: 78
DRG: 178 | End: 2023-01-10
Payer: MEDICARE

## 2023-01-10 ENCOUNTER — APPOINTMENT (OUTPATIENT)
Dept: CT IMAGING | Age: 78
DRG: 178 | End: 2023-01-10
Payer: MEDICARE

## 2023-01-10 DIAGNOSIS — J44.1 COPD EXACERBATION (HCC): ICD-10-CM

## 2023-01-10 DIAGNOSIS — U07.1 COVID: Primary | ICD-10-CM

## 2023-01-10 LAB
ALBUMIN SERPL-MCNC: 3.6 G/DL (ref 3.5–5.2)
ALLENS TEST: ABNORMAL
ALP BLD-CCNC: 74 U/L (ref 35–104)
ALT SERPL-CCNC: 9 U/L (ref 5–33)
ANION GAP SERPL CALCULATED.3IONS-SCNC: 10 MMOL/L (ref 7–19)
APTT: 27.9 SEC (ref 26–36.2)
AST SERPL-CCNC: 16 U/L (ref 5–32)
BASE EXCESS ARTERIAL: 3 MMOL/L (ref -2–2)
BASOPHILS ABSOLUTE: 0 K/UL (ref 0–0.2)
BASOPHILS RELATIVE PERCENT: 0.2 % (ref 0–1)
BILIRUB SERPL-MCNC: <0.2 MG/DL (ref 0.2–1.2)
BUN BLDV-MCNC: 17 MG/DL (ref 8–23)
C-REACTIVE PROTEIN: 9.52 MG/DL (ref 0–0.5)
CALCIUM SERPL-MCNC: 8.9 MG/DL (ref 8.8–10.2)
CARBOXYHEMOGLOBIN ARTERIAL: 2.7 % (ref 0–5)
CHLORIDE BLD-SCNC: 100 MMOL/L (ref 98–111)
CO2: 27 MMOL/L (ref 22–29)
CREAT SERPL-MCNC: 0.9 MG/DL (ref 0.5–0.9)
D DIMER: 0.66 UG/ML FEU (ref 0–0.48)
EKG P AXIS: 76 DEGREES
EKG P-R INTERVAL: 134 MS
EKG Q-T INTERVAL: 334 MS
EKG QRS DURATION: 86 MS
EKG QTC CALCULATION (BAZETT): 402 MS
EKG T AXIS: 79 DEGREES
EOSINOPHILS ABSOLUTE: 0.1 K/UL (ref 0–0.6)
EOSINOPHILS RELATIVE PERCENT: 0.9 % (ref 0–5)
FERRITIN: 271.8 NG/ML (ref 13–150)
GFR SERPL CREATININE-BSD FRML MDRD: >60 ML/MIN/{1.73_M2}
GLUCOSE BLD-MCNC: 110 MG/DL (ref 74–109)
HCO3 ARTERIAL: 28.5 MMOL/L (ref 22–26)
HCT VFR BLD CALC: 31.3 % (ref 37–47)
HEMOGLOBIN, ART, EXTENDED: 9.3 G/DL (ref 12–16)
HEMOGLOBIN: 9.3 G/DL (ref 12–16)
IMMATURE GRANULOCYTES #: 0 K/UL
INR BLD: 0.95 (ref 0.88–1.18)
LACTIC ACID: 1.1 MMOL/L (ref 0.5–1.9)
LYMPHOCYTES ABSOLUTE: 0.9 K/UL (ref 1.1–4.5)
LYMPHOCYTES RELATIVE PERCENT: 10.7 % (ref 20–40)
MCH RBC QN AUTO: 30.5 PG (ref 27–31)
MCHC RBC AUTO-ENTMCNC: 29.7 G/DL (ref 33–37)
MCV RBC AUTO: 102.6 FL (ref 81–99)
METHEMOGLOBIN ARTERIAL: 1.2 %
MONOCYTES ABSOLUTE: 0.6 K/UL (ref 0–0.9)
MONOCYTES RELATIVE PERCENT: 7.2 % (ref 0–10)
NEUTROPHILS ABSOLUTE: 6.5 K/UL (ref 1.5–7.5)
NEUTROPHILS RELATIVE PERCENT: 80.5 % (ref 50–65)
O2 CONTENT ARTERIAL: 12.1 ML/DL
O2 DELIVERY DEVICE: ABNORMAL
O2 SAT, ARTERIAL: 91.9 %
O2 THERAPY: ABNORMAL
OXYGEN FLOW: 4
PCO2 ARTERIAL: 47 MMHG (ref 35–45)
PDW BLD-RTO: 14.7 % (ref 11.5–14.5)
PH ARTERIAL: 7.39 (ref 7.35–7.45)
PLATELET # BLD: 152 K/UL (ref 130–400)
PMV BLD AUTO: 10.7 FL (ref 9.4–12.3)
PO2 ARTERIAL: 61 MMHG (ref 80–100)
POTASSIUM SERPL-SCNC: 3.8 MMOL/L (ref 3.5–5)
POTASSIUM, WHOLE BLOOD: 3.8
PRO-BNP: 182 PG/ML (ref 0–1800)
PROCALCITONIN: 0.06 NG/ML (ref 0–0.09)
PROTHROMBIN TIME: 12.6 SEC (ref 12–14.6)
RAPID INFLUENZA  B AGN: NEGATIVE
RAPID INFLUENZA A AGN: NEGATIVE
RBC # BLD: 3.05 M/UL (ref 4.2–5.4)
SAMPLE SOURCE: ABNORMAL
SARS-COV-2, NAAT: DETECTED
SEDIMENTATION RATE, ERYTHROCYTE: 53 MM/HR (ref 0–25)
SODIUM BLD-SCNC: 137 MMOL/L (ref 136–145)
SPONT RATE(BPM): 16
TOTAL PROTEIN: 6.4 G/DL (ref 6.6–8.7)
TROPONIN: <0.01 NG/ML (ref 0–0.03)
WBC # BLD: 8 K/UL (ref 4.8–10.8)

## 2023-01-10 PROCEDURE — 36600 WITHDRAWAL OF ARTERIAL BLOOD: CPT

## 2023-01-10 PROCEDURE — 6360000002 HC RX W HCPCS: Performed by: EMERGENCY MEDICINE

## 2023-01-10 PROCEDURE — 6370000000 HC RX 637 (ALT 250 FOR IP): Performed by: NURSE PRACTITIONER

## 2023-01-10 PROCEDURE — 6360000002 HC RX W HCPCS: Performed by: NURSE PRACTITIONER

## 2023-01-10 PROCEDURE — 2500000003 HC RX 250 WO HCPCS: Performed by: EMERGENCY MEDICINE

## 2023-01-10 PROCEDURE — 94640 AIRWAY INHALATION TREATMENT: CPT

## 2023-01-10 PROCEDURE — 82803 BLOOD GASES ANY COMBINATION: CPT

## 2023-01-10 PROCEDURE — 84484 ASSAY OF TROPONIN QUANT: CPT

## 2023-01-10 PROCEDURE — 3E0333Z INTRODUCTION OF ANTI-INFLAMMATORY INTO PERIPHERAL VEIN, PERCUTANEOUS APPROACH: ICD-10-PCS | Performed by: HOSPITALIST

## 2023-01-10 PROCEDURE — 96374 THER/PROPH/DIAG INJ IV PUSH: CPT

## 2023-01-10 PROCEDURE — 71045 X-RAY EXAM CHEST 1 VIEW: CPT | Performed by: RADIOLOGY

## 2023-01-10 PROCEDURE — 71045 X-RAY EXAM CHEST 1 VIEW: CPT

## 2023-01-10 PROCEDURE — 2580000003 HC RX 258: Performed by: NURSE PRACTITIONER

## 2023-01-10 PROCEDURE — 93005 ELECTROCARDIOGRAM TRACING: CPT | Performed by: EMERGENCY MEDICINE

## 2023-01-10 PROCEDURE — 86140 C-REACTIVE PROTEIN: CPT

## 2023-01-10 PROCEDURE — 85610 PROTHROMBIN TIME: CPT

## 2023-01-10 PROCEDURE — 84145 PROCALCITONIN (PCT): CPT

## 2023-01-10 PROCEDURE — XW0DXM6 INTRODUCTION OF BARICITINIB INTO MOUTH AND PHARYNX, EXTERNAL APPROACH, NEW TECHNOLOGY GROUP 6: ICD-10-PCS | Performed by: HOSPITALIST

## 2023-01-10 PROCEDURE — 87804 INFLUENZA ASSAY W/OPTIC: CPT

## 2023-01-10 PROCEDURE — 80053 COMPREHEN METABOLIC PANEL: CPT

## 2023-01-10 PROCEDURE — 85025 COMPLETE CBC W/AUTO DIFF WBC: CPT

## 2023-01-10 PROCEDURE — 82728 ASSAY OF FERRITIN: CPT

## 2023-01-10 PROCEDURE — 93010 ELECTROCARDIOGRAM REPORT: CPT | Performed by: INTERNAL MEDICINE

## 2023-01-10 PROCEDURE — 99285 EMERGENCY DEPT VISIT HI MDM: CPT

## 2023-01-10 PROCEDURE — 1210000000 HC MED SURG R&B

## 2023-01-10 PROCEDURE — 71275 CT ANGIOGRAPHY CHEST: CPT | Performed by: RADIOLOGY

## 2023-01-10 PROCEDURE — 83880 ASSAY OF NATRIURETIC PEPTIDE: CPT

## 2023-01-10 PROCEDURE — 71275 CT ANGIOGRAPHY CHEST: CPT

## 2023-01-10 PROCEDURE — 6370000000 HC RX 637 (ALT 250 FOR IP): Performed by: EMERGENCY MEDICINE

## 2023-01-10 PROCEDURE — 85730 THROMBOPLASTIN TIME PARTIAL: CPT

## 2023-01-10 PROCEDURE — 87635 SARS-COV-2 COVID-19 AMP PRB: CPT

## 2023-01-10 PROCEDURE — 87040 BLOOD CULTURE FOR BACTERIA: CPT

## 2023-01-10 PROCEDURE — 85379 FIBRIN DEGRADATION QUANT: CPT

## 2023-01-10 PROCEDURE — 36415 COLL VENOUS BLD VENIPUNCTURE: CPT

## 2023-01-10 PROCEDURE — 2580000003 HC RX 258: Performed by: EMERGENCY MEDICINE

## 2023-01-10 PROCEDURE — 83605 ASSAY OF LACTIC ACID: CPT

## 2023-01-10 PROCEDURE — 85652 RBC SED RATE AUTOMATED: CPT

## 2023-01-10 RX ORDER — DONEPEZIL HYDROCHLORIDE 5 MG/1
5 TABLET, FILM COATED ORAL NIGHTLY
Status: DISCONTINUED | OUTPATIENT
Start: 2023-01-10 | End: 2023-01-12 | Stop reason: HOSPADM

## 2023-01-10 RX ORDER — ISOSORBIDE MONONITRATE 30 MG/1
30 TABLET, EXTENDED RELEASE ORAL DAILY
Status: DISCONTINUED | OUTPATIENT
Start: 2023-01-10 | End: 2023-01-12 | Stop reason: HOSPADM

## 2023-01-10 RX ORDER — SODIUM CHLORIDE 0.9 % (FLUSH) 0.9 %
5-40 SYRINGE (ML) INJECTION PRN
Status: DISCONTINUED | OUTPATIENT
Start: 2023-01-10 | End: 2023-01-12 | Stop reason: HOSPADM

## 2023-01-10 RX ORDER — DULOXETIN HYDROCHLORIDE 60 MG/1
60 CAPSULE, DELAYED RELEASE ORAL DAILY
Status: DISCONTINUED | OUTPATIENT
Start: 2023-01-10 | End: 2023-01-12 | Stop reason: HOSPADM

## 2023-01-10 RX ORDER — POLYETHYLENE GLYCOL 3350 17 G/17G
17 POWDER, FOR SOLUTION ORAL DAILY PRN
Status: DISCONTINUED | OUTPATIENT
Start: 2023-01-10 | End: 2023-01-12 | Stop reason: HOSPADM

## 2023-01-10 RX ORDER — NITROGLYCERIN 0.4 MG/1
0.4 TABLET SUBLINGUAL EVERY 5 MIN PRN
Status: DISCONTINUED | OUTPATIENT
Start: 2023-01-10 | End: 2023-01-12 | Stop reason: HOSPADM

## 2023-01-10 RX ORDER — ONDANSETRON 2 MG/ML
4 INJECTION INTRAMUSCULAR; INTRAVENOUS EVERY 6 HOURS PRN
Status: DISCONTINUED | OUTPATIENT
Start: 2023-01-10 | End: 2023-01-12 | Stop reason: HOSPADM

## 2023-01-10 RX ORDER — BUPROPION HYDROCHLORIDE 75 MG/1
75 TABLET ORAL 2 TIMES DAILY
Status: DISCONTINUED | OUTPATIENT
Start: 2023-01-10 | End: 2023-01-12 | Stop reason: HOSPADM

## 2023-01-10 RX ORDER — ALBUTEROL SULFATE 90 UG/1
2 AEROSOL, METERED RESPIRATORY (INHALATION)
Status: DISCONTINUED | OUTPATIENT
Start: 2023-01-10 | End: 2023-01-12 | Stop reason: HOSPADM

## 2023-01-10 RX ORDER — BUDESONIDE AND FORMOTEROL FUMARATE DIHYDRATE 160; 4.5 UG/1; UG/1
2 AEROSOL RESPIRATORY (INHALATION) 2 TIMES DAILY
Status: DISCONTINUED | OUTPATIENT
Start: 2023-01-10 | End: 2023-01-12 | Stop reason: HOSPADM

## 2023-01-10 RX ORDER — DEXAMETHASONE SODIUM PHOSPHATE 10 MG/ML
10 INJECTION, SOLUTION INTRAMUSCULAR; INTRAVENOUS DAILY
Status: DISCONTINUED | OUTPATIENT
Start: 2023-01-10 | End: 2023-01-11

## 2023-01-10 RX ORDER — ONDANSETRON 4 MG/1
4 TABLET, ORALLY DISINTEGRATING ORAL EVERY 8 HOURS PRN
Status: DISCONTINUED | OUTPATIENT
Start: 2023-01-10 | End: 2023-01-12 | Stop reason: HOSPADM

## 2023-01-10 RX ORDER — ENOXAPARIN SODIUM 100 MG/ML
40 INJECTION SUBCUTANEOUS DAILY
Status: DISCONTINUED | OUTPATIENT
Start: 2023-01-10 | End: 2023-01-12 | Stop reason: HOSPADM

## 2023-01-10 RX ORDER — CARVEDILOL 3.12 MG/1
3.12 TABLET ORAL 2 TIMES DAILY WITH MEALS
Status: DISCONTINUED | OUTPATIENT
Start: 2023-01-10 | End: 2023-01-12 | Stop reason: HOSPADM

## 2023-01-10 RX ORDER — IPRATROPIUM BROMIDE AND ALBUTEROL SULFATE 2.5; .5 MG/3ML; MG/3ML
1 SOLUTION RESPIRATORY (INHALATION)
Status: DISCONTINUED | OUTPATIENT
Start: 2023-01-10 | End: 2023-01-10 | Stop reason: SDUPTHER

## 2023-01-10 RX ORDER — ACETAMINOPHEN 325 MG/1
650 TABLET ORAL EVERY 6 HOURS PRN
Status: DISCONTINUED | OUTPATIENT
Start: 2023-01-10 | End: 2023-01-12 | Stop reason: HOSPADM

## 2023-01-10 RX ORDER — IPRATROPIUM BROMIDE AND ALBUTEROL SULFATE 2.5; .5 MG/3ML; MG/3ML
1 SOLUTION RESPIRATORY (INHALATION) ONCE
Status: COMPLETED | OUTPATIENT
Start: 2023-01-10 | End: 2023-01-10

## 2023-01-10 RX ORDER — MEMANTINE HYDROCHLORIDE 5 MG/1
10 TABLET ORAL 2 TIMES DAILY
Status: DISCONTINUED | OUTPATIENT
Start: 2023-01-10 | End: 2023-01-12 | Stop reason: HOSPADM

## 2023-01-10 RX ORDER — AZELASTINE HYDROCHLORIDE 0.5 MG/ML
1 SOLUTION/ DROPS OPHTHALMIC 2 TIMES DAILY
Status: DISCONTINUED | OUTPATIENT
Start: 2023-01-10 | End: 2023-01-10 | Stop reason: CLARIF

## 2023-01-10 RX ORDER — CETIRIZINE HYDROCHLORIDE 5 MG/1
5 TABLET ORAL DAILY
Status: DISCONTINUED | OUTPATIENT
Start: 2023-01-10 | End: 2023-01-12 | Stop reason: HOSPADM

## 2023-01-10 RX ORDER — CLONAZEPAM 0.5 MG/1
0.5 TABLET ORAL 3 TIMES DAILY PRN
Status: DISCONTINUED | OUTPATIENT
Start: 2023-01-10 | End: 2023-01-12 | Stop reason: HOSPADM

## 2023-01-10 RX ORDER — TROSPIUM CHLORIDE 20 MG/1
20 TABLET, FILM COATED ORAL
Status: DISCONTINUED | OUTPATIENT
Start: 2023-01-10 | End: 2023-01-12 | Stop reason: HOSPADM

## 2023-01-10 RX ORDER — ACETAMINOPHEN 650 MG/1
650 SUPPOSITORY RECTAL EVERY 6 HOURS PRN
Status: DISCONTINUED | OUTPATIENT
Start: 2023-01-10 | End: 2023-01-12 | Stop reason: HOSPADM

## 2023-01-10 RX ORDER — KETOTIFEN FUMARATE 0.35 MG/ML
1 SOLUTION/ DROPS OPHTHALMIC 2 TIMES DAILY
Status: DISCONTINUED | OUTPATIENT
Start: 2023-01-10 | End: 2023-01-12 | Stop reason: HOSPADM

## 2023-01-10 RX ORDER — SODIUM CHLORIDE 9 MG/ML
INJECTION, SOLUTION INTRAVENOUS PRN
Status: DISCONTINUED | OUTPATIENT
Start: 2023-01-10 | End: 2023-01-12 | Stop reason: HOSPADM

## 2023-01-10 RX ORDER — PANTOPRAZOLE SODIUM 40 MG/1
40 TABLET, DELAYED RELEASE ORAL DAILY
Status: DISCONTINUED | OUTPATIENT
Start: 2023-01-10 | End: 2023-01-12 | Stop reason: HOSPADM

## 2023-01-10 RX ORDER — SODIUM CHLORIDE 0.9 % (FLUSH) 0.9 %
5-40 SYRINGE (ML) INJECTION EVERY 12 HOURS SCHEDULED
Status: DISCONTINUED | OUTPATIENT
Start: 2023-01-10 | End: 2023-01-12 | Stop reason: HOSPADM

## 2023-01-10 RX ORDER — METHYLPREDNISOLONE SODIUM SUCCINATE 125 MG/2ML
125 INJECTION, POWDER, LYOPHILIZED, FOR SOLUTION INTRAMUSCULAR; INTRAVENOUS ONCE
Status: COMPLETED | OUTPATIENT
Start: 2023-01-10 | End: 2023-01-10

## 2023-01-10 RX ORDER — GUAIFENESIN 600 MG/1
600 TABLET, EXTENDED RELEASE ORAL 2 TIMES DAILY
Status: DISCONTINUED | OUTPATIENT
Start: 2023-01-10 | End: 2023-01-12 | Stop reason: HOSPADM

## 2023-01-10 RX ADMIN — DEXAMETHASONE SODIUM PHOSPHATE 10 MG: 10 INJECTION, SOLUTION INTRAMUSCULAR; INTRAVENOUS at 19:11

## 2023-01-10 RX ADMIN — BUDESONIDE AND FORMOTEROL FUMARATE DIHYDRATE 2 PUFF: 160; 4.5 AEROSOL RESPIRATORY (INHALATION) at 21:02

## 2023-01-10 RX ADMIN — AZITHROMYCIN MONOHYDRATE 500 MG: 500 INJECTION, POWDER, LYOPHILIZED, FOR SOLUTION INTRAVENOUS at 19:13

## 2023-01-10 RX ADMIN — SODIUM CHLORIDE, PRESERVATIVE FREE 10 ML: 5 INJECTION INTRAVENOUS at 20:56

## 2023-01-10 RX ADMIN — DULOXETINE HYDROCHLORIDE 60 MG: 60 CAPSULE, DELAYED RELEASE ORAL at 19:11

## 2023-01-10 RX ADMIN — KETOTIFEN FUMARATE 1 DROP: 0.35 SOLUTION/ DROPS OPHTHALMIC at 20:59

## 2023-01-10 RX ADMIN — TROSPIUM CHLORIDE 20 MG: 20 TABLET, FILM COATED ORAL at 19:10

## 2023-01-10 RX ADMIN — GUAIFENESIN 600 MG: 600 TABLET ORAL at 20:58

## 2023-01-10 RX ADMIN — IPRATROPIUM BROMIDE AND ALBUTEROL SULFATE 1 AMPULE: 2.5; .5 SOLUTION RESPIRATORY (INHALATION) at 11:50

## 2023-01-10 RX ADMIN — DOXYCYCLINE 100 MG: 100 INJECTION, POWDER, LYOPHILIZED, FOR SOLUTION INTRAVENOUS at 13:17

## 2023-01-10 RX ADMIN — METHYLPREDNISOLONE SODIUM SUCCINATE 125 MG: 125 INJECTION, POWDER, FOR SOLUTION INTRAMUSCULAR; INTRAVENOUS at 11:53

## 2023-01-10 RX ADMIN — BARICITINIB 4 MG: 2 TABLET, FILM COATED ORAL at 19:10

## 2023-01-10 RX ADMIN — MEMANTINE HYDROCHLORIDE 10 MG: 5 TABLET ORAL at 20:58

## 2023-01-10 RX ADMIN — CEFTRIAXONE 1000 MG: 1 INJECTION, POWDER, FOR SOLUTION INTRAMUSCULAR; INTRAVENOUS at 22:19

## 2023-01-10 RX ADMIN — BUPROPION HYDROCHLORIDE 75 MG: 75 TABLET, FILM COATED ORAL at 20:58

## 2023-01-10 RX ADMIN — ALBUTEROL SULFATE 2 PUFF: 90 AEROSOL, METERED RESPIRATORY (INHALATION) at 21:02

## 2023-01-10 RX ADMIN — PANTOPRAZOLE SODIUM 40 MG: 40 TABLET, DELAYED RELEASE ORAL at 19:10

## 2023-01-10 RX ADMIN — DONEPEZIL HYDROCHLORIDE 5 MG: 5 TABLET, FILM COATED ORAL at 20:58

## 2023-01-10 RX ADMIN — IPRATROPIUM BROMIDE 2 PUFF: 17 AEROSOL, METERED RESPIRATORY (INHALATION) at 21:02

## 2023-01-10 ASSESSMENT — ENCOUNTER SYMPTOMS
VOMITING: 0
NAUSEA: 0
SORE THROAT: 0
SHORTNESS OF BREATH: 1
ABDOMINAL PAIN: 0
COUGH: 1
BACK PAIN: 0
RHINORRHEA: 0
DIARRHEA: 0

## 2023-01-10 ASSESSMENT — PAIN DESCRIPTION - ORIENTATION: ORIENTATION: LOWER

## 2023-01-10 ASSESSMENT — PAIN SCALES - GENERAL: PAINLEVEL_OUTOF10: 10

## 2023-01-10 ASSESSMENT — LIFESTYLE VARIABLES
HOW OFTEN DO YOU HAVE A DRINK CONTAINING ALCOHOL: NEVER
HOW MANY STANDARD DRINKS CONTAINING ALCOHOL DO YOU HAVE ON A TYPICAL DAY: PATIENT DOES NOT DRINK

## 2023-01-10 ASSESSMENT — PAIN DESCRIPTION - LOCATION: LOCATION: BACK

## 2023-01-10 ASSESSMENT — PAIN DESCRIPTION - DESCRIPTORS: DESCRIPTORS: STABBING

## 2023-01-10 ASSESSMENT — PAIN - FUNCTIONAL ASSESSMENT: PAIN_FUNCTIONAL_ASSESSMENT: 0-10

## 2023-01-10 ASSESSMENT — PAIN DESCRIPTION - PAIN TYPE: TYPE: ACUTE PAIN

## 2023-01-10 ASSESSMENT — PAIN DESCRIPTION - FREQUENCY: FREQUENCY: CONTINUOUS

## 2023-01-10 NOTE — ED NOTES
Pt arrived to ED via EMS. Pt has hx of COPD and CHF. Pt has had worsening shortness of breath x2 days and has been coughing up yellow sputum. Per ems pt was wearing 4L of O2 when they arrived to pt's home. Pt's RA saturation on arrival to ED was 84%. Pt placed on 4L of oxygen in ED. Pt's O2 saturation 94% on 4L nasal cannula. Pt has a productive cough. Pt also c/o pain to lower back. Pt is axo x4.       Lang Rader RN  01/10/23 0840

## 2023-01-10 NOTE — ED PROVIDER NOTES
[facility]  eMERGENCY dEPARTMENT eNCOUnter      Pt Name: Sary Carpenter  MRN: [id number]  [place] 1945  Date of evaluation: 1/10/2023  Provider: Roro Griggs MD    CHIEF COMPLAINT       Chief Complaint   Patient presents with    Shortness of Breath     Per ems pt has hx of COPD and CHF. Pt has had worsening shortness of breath x2 days. Pt also has had productive cough. Per ems pt was on 4L upon arrival at 86%. Pt axo x4. Pt currently on 4L at 94%. HISTORY OF PRESENT ILLNESS   (Location/Symptom, Timing/Onset,Context/Setting, Quality, Duration, Modifying Factors, Severity)  Note limiting factors. Sary Carpenter is a 69 y.o. female who presents to the emergency department cough shortness of breath and wheezing. Patient has a history of COPD and CHF. She has been out of her medications but she cannot tell me for how long. She wears 4 L of oxygen at all times and her sat has been in the 80s. She was given a nebulizer on route with minimal improvement. Reports productive cough and fever    HPI    NursingNotes were reviewed. REVIEW OF SYSTEMS    (2-9 systems for level 4, 10 or more for level 5)     Review of Systems   Constitutional:  Positive for activity change, appetite change, fatigue and fever. Negative for chills. HENT:  Negative for rhinorrhea and sore throat. Respiratory:  Positive for cough and shortness of breath. Cardiovascular:  Negative for chest pain and leg swelling. Gastrointestinal:  Negative for abdominal pain, diarrhea, nausea and vomiting. Genitourinary:  Negative for difficulty urinating. Musculoskeletal:  Negative for back pain and neck pain. Skin:  Negative for rash. Neurological:  Negative for weakness and headaches. Psychiatric/Behavioral:  Negative for confusion. A complete review of systems was performed and is negative except as noted above in the HPI.        PAST MEDICAL HISTORY     Past Medical History:   Diagnosis Date    Abdominal pain     Anxiety     Arthritis     CAD in native artery     Cerebrovascular disease     CHF (congestive heart failure) (HCC)     Constipation     COPD (chronic obstructive pulmonary disease) (HCC)     Depression     Emphysema     GERD (gastroesophageal reflux disease)     Myocardial infarct, old     Palliative care patient 02/20/2020    Pneumonia     Tobacco abuse          SURGICAL HISTORY       Past Surgical History:   Procedure Laterality Date    ABDOMINAL EXPLORATION SURGERY      APPENDECTOMY      CARDIAC CATHETERIZATION  06/25/10    patent  stent noted in LAD,EF is estimated to be 60%    CARDIAC CATHETERIZATION  7/13/12  [facility]    EF  60%    CHOLECYSTECTOMY      COLONOSCOPY  10/2014    Dr Abdullahi He (CERVIX STATUS UNKNOWN)      UPPER GASTROINTESTINAL ENDOSCOPY  10/1/14    Dr Kathy Prieto: food bezoar; esophagitis, gastritis, duodenitis; biopsies neg h-pylori         CURRENT MEDICATIONS       Previous Medications    ALBUTEROL SULFATE HFA (PROVENTIL;VENTOLIN;PROAIR) 108 (90 BASE) MCG/ACT INHALER    Inhale 2 puffs into the lungs every 6 hours as needed for Wheezing    ATORVASTATIN (LIPITOR) 20 MG TABLET    Take 1 tablet by mouth in the morning. AZELASTINE (OPTIVAR) 0.05 % OPHTHALMIC SOLUTION    Place 1 drop into both eyes 2 times daily    BLOOD PRESSURE MONITORING (ADULT BLOOD PRESSURE CUFF LG) KIT    1 kit by Does not apply route daily    BUPROPION (WELLBUTRIN) 75 MG TABLET    Take 1 tablet by mouth in the morning and 1 tablet before bedtime. CARVEDILOL (COREG) 3.125 MG TABLET    Take 1 tablet by mouth 2 times daily (with meals)    CLONAZEPAM (KLONOPIN) 0.5 MG TABLET    Take 1 tablet by mouth 3 times daily as needed for Anxiety for up to 30 days.     DENOSUMAB (PROLIA) 60 MG/ML SOSY SC INJECTION    Inject 1 mL into the skin once for 1 dose    DICYCLOMINE (BENTYL) 10 MG CAPSULE    Take 1 capsule by mouth 4 times daily    DONEPEZIL (ARICEPT) 5 MG TABLET    Take 1 tablet by mouth nightly    DULOXETINE (CYMBALTA) 60 MG EXTENDED RELEASE CAPSULE    Take 1 capsule by mouth daily    FESOTERODINE FUMARATE ER (TOVIAZ) 8 MG TB24    Take 1 tablet by mouth daily    FUROSEMIDE (LASIX) 20 MG TABLET    Take 1 tablet by mouth daily as needed (swelling)    ISOSORBIDE MONONITRATE (IMDUR) 30 MG EXTENDED RELEASE TABLET    Take 1 tablet by mouth daily    LORATADINE (CLARITIN) 10 MG TABLET    Take 1 tablet by mouth daily    MEMANTINE (NAMENDA) 10 MG TABLET    Take 1 tablet by mouth 2 times daily    MICONAZOLE NITRATE VAGINAL (MONISTAT 3) 4 % CREA    Place 1 Dose vaginally at bedtime    NALOXONE 4 MG/0.1ML LIQD NASAL SPRAY    1 spray by Nasal route as needed for Opioid Reversal    NITROGLYCERIN (NITROSTAT) 0.4 MG SL TABLET    Place 1 tablet under the tongue every 5 minutes as needed for Chest pain    ONDANSETRON (ZOFRAN) 4 MG TABLET    Take 1 tablet by mouth 3 times daily as needed for Nausea or Vomiting    ONDANSETRON (ZOFRAN) 4 MG TABLET    Take 1 tablet by mouth 3 times daily as needed for Nausea or Vomiting    OXYCODONE-ACETAMINOPHEN (PERCOCET)  MG PER TABLET    TAKE 1 TABLET BY MOUTH EVERY 6 HOURS AS NEEDED    OXYGEN    Inhale 2 L into the lungs daily    PANTOPRAZOLE (PROTONIX) 40 MG TABLET    Take 1 tablet by mouth daily    UMECLIDINIUM-VILANTEROL (ANORO ELLIPTA) 62.5-25 MCG/INH AEPB INHALER    Inhale 1 puff into the lungs daily       ALLERGIES     Codeine and Sulfa antibiotics    FAMILY HISTORY       Family History   Problem Relation Age of Onset    Stroke Brother     Stroke Sister     Cancer Brother     Cancer Sister     Diabetes Brother     Diabetes Sister     Coronary Art Dis Other     Hypertension Other     Seizures Child     Colon Cancer Neg Hx     Colon Polyps Neg Hx     Esophageal Cancer Neg Hx     Liver Cancer Neg Hx     Liver Disease Neg Hx     Rectal Cancer Neg Hx     Stomach Cancer Neg Hx           SOCIAL HISTORY       Social History     Socioeconomic History    Marital status:  Spouse name: None    Number of children: None    Years of education: None    Highest education level: None   Tobacco Use    Smoking status: Every Day     Packs/day: 0.50     Years: 44.00     Pack years: 22.00     Types: Cigarettes     Start date: 1    Smokeless tobacco: Never   Substance and Sexual Activity    Alcohol use: No     Alcohol/week: 0.0 standard drinks    Drug use: No     Social Determinants of Health     Financial Resource Strain: Low Risk     Difficulty of Paying Living Expenses: Not hard at all   Food Insecurity: No Food Insecurity    Worried About Running Out of Food in the Last Year: Never true    Ran Out of Food in the Last Year: Never true   Physical Activity: Inactive    Days of Exercise per Week: 0 days    Minutes of Exercise per Session: 0 min       SCREENINGS    [place] Coma Scale  Eye Opening: Spontaneous  Best Verbal Response: Oriented  Best Motor Response: Obeys commands  [place] Coma Scale Score: 15        PHYSICAL EXAM    (up to 7 for level 4, 8 or more for level 5)     ED Triage Vitals   BP Temp Temp Source Heart Rate Resp SpO2 Height Weight   01/10/23 1104 01/10/23 1100 01/10/23 1100 01/10/23 1100 01/10/23 1100 01/10/23 1054 -- --   130/84 98.1 °F (36.7 °C) Oral 95 26 (!) 83 %         Physical Exam  Nursing note reviewed. Constitutional:       General: She is not in acute distress. Appearance: She is well-developed. She is ill-appearing. She is not diaphoretic. HENT:      Head: Normocephalic and atraumatic. Eyes:      Pupils: Pupils are equal, round, and reactive to light. Cardiovascular:      Rate and Rhythm: Normal rate and regular rhythm. Heart sounds: Normal heart sounds. Pulmonary:      Effort: Pulmonary effort is normal. No respiratory distress. Breath sounds: Wheezing and rhonchi present. Abdominal:      General: Bowel sounds are normal. There is no distension. Palpations: Abdomen is soft. Tenderness: There is no abdominal tenderness. Musculoskeletal:         General: Normal range of motion. Cervical back: Normal range of motion and neck supple. Skin:     General: Skin is warm and dry. Findings: No rash. Neurological:      Mental Status: She is alert and oriented to person, place, and time. Cranial Nerves: No cranial nerve deficit. Motor: No abnormal muscle tone. Coordination: Coordination normal.   Psychiatric:         Behavior: Behavior normal.       DIAGNOSTIC RESULTS     EKG: All EKG's are interpreted by the Emergency Department Physician who either signs or Co-signs this chart in the absence of a cardiologist.    Significant artifact present sinus rhythm rate 99 nonspecific ST waves similar to prior    RADIOLOGY:   Non-plain film images such as CT, Ultrasound and MRI are read by the radiologist. Lana Kelly images are visualized and preliminarily interpreted by the emergency physician with the below findings:        Interpretation per the Radiologist below, if available at the time of this note:    XR CHEST PORTABLE   Final Result   Chronic obstructive pulmonary disease. NO EVIDENCE OF AIRSPACE CONSOLIDATION OR PULMONARY VENOUS CONGESTION.             ED BEDSIDE ULTRASOUND:   Performed by ED Physician - none    LABS:  Labs Reviewed   COVID-19, RAPID - Abnormal; Notable for the following components:       Result Value    SARS-CoV-2, NAAT DETECTED (*)     All other components within normal limits    Narrative:     [place] Tammie tel. ,  Results called to Neida Palacios RN ER, 01/10/2023 12:11, by [facility]  Results called to [facility], 01/10/2023 12:11, by [facility]   BLOOD GAS, ARTERIAL - Abnormal; Notable for the following components:    pCO2, Arterial 47.0 (*)     pO2, Arterial 61.0 (*)     HCO3, Arterial 28.5 (*)     Base Excess, Arterial 3.0 (*)     Hemoglobin, Art, Extended 9.3 (*)     All other components within normal limits   CBC WITH AUTO DIFFERENTIAL - Abnormal; Notable for the following components:    RBC 3.05 (*)     Hemoglobin 9.3 (*)     Hematocrit 31.3 (*)     .6 (*)     MCHC 29.7 (*)     RDW 14.7 (*)     Neutrophils % 80.5 (*)     Lymphocytes % 10.7 (*)     Lymphocytes Absolute 0.9 (*)     All other components within normal limits   COMPREHENSIVE METABOLIC PANEL - Abnormal; Notable for the following components:    Glucose 110 (*)     Total Protein 6.4 (*)     All other components within normal limits   RAPID INFLUENZA A/B ANTIGENS   CULTURE, BLOOD 1   CULTURE, BLOOD 2   APTT   BRAIN NATRIURETIC PEPTIDE   LACTIC ACID   PROTIME-INR   TROPONIN   D-DIMER, QUANTITATIVE   PROCALCITONIN       All other labs were within normal range or not returned as of this dictation. EMERGENCY DEPARTMENT COURSE and DIFFERENTIALDIAGNOSIS/MDM:   Vitals:    Vitals:    01/10/23 1054 01/10/23 1100 01/10/23 1104   BP:   130/84   Pulse:  95    Resp:  26    Temp:  98.1 °F (36.7 °C)    TempSrc:  Oral    SpO2: (!) 83% 94%        MDM    Hypoxic with a sat of 84% on her home 4 L after an albuterol nebulizer with EMS. She has a cough productive of thick sputum. She has wheezing and tachypnea. Given steroids and albuterol for presumed COPD exacerbation given her lack of medications recently. She came back COVID-positive. Her chest x-ray was negative but will cover with antibiotics for COPD exacerbation. Added on a D-dimer. Discussed with hospitalist for admission. CONSULTS:  None    PROCEDURES:  Unless otherwise notedbelow, none     Procedures    FINAL IMPRESSION     1. COVID    2.  COPD exacerbation ([facility] [address]          DISPOSITION/PLAN   DISPOSITION Decision To Admit 01/10/2023 12:50:56 PM      PATIENT REFERRED TO:  @FUP@    DISCHARGE MEDICATIONS:  New Prescriptions    No medications on file          (Please note that portions of this note were completed with a voice recognition program.  Efforts were made to edit the dictations butoccasionally words are mis-transcribed.)    Jah Hong MD (electronically signed)  AttendingEmergency Physician          Berry Nyhan, MD  01/10/23 8220

## 2023-01-10 NOTE — H&P
[facility] Hospitalists      Hospitalist - History & Physical      PCP: NAVEEN Novak    Date of Admission: 1/10/2023    Date of Service: 1/10/2023    Chief Complaint:  Shortness of breath    History Of Present Illness: The patient is a 69 y.o. female with a PMH of CAD, CVA, CHF, COPD on home O2 3 L at baseline, emphysema, and dementia who presented to presented to [facility] ED on 1/10/2023 via EMS complaining of shortness of breath and increased sputum production with cough. EMS advised patient reported she had been short of breath for 2 days and had a productive cough. She reported a fever however is unable to give a numerical reading and complains of chills. Complained of congestion, rhinorrhea and sore throat. She denied any recent ill contacts. She does report that she continues to smoke. Further ED work-up revealed the patient to be hypoxic with an SPO2 percent of 83% upon arrival.  ABGs-pH 7.39, PCO2 47, PO2 61, HCO3 28.5. D-dimer mildly positive at 0.66. CTA pulmonary showed no acute PE there are emphysematous changes present. Chest x-ray consistent with COPD. Chemistries within normal limits. H&H 9.3/31.7. Troponin negative proBNP 182. COVID swab positive. Influenza swab negative. Patient was admitted to hospital medicine for acute COPD exacerbation and COVID-19.     Past Medical History:        Diagnosis Date    Abdominal pain     Anxiety     Arthritis     CAD in native artery     Cerebrovascular disease     CHF (congestive heart failure) (HCC)     Constipation     COPD (chronic obstructive pulmonary disease) (HCC)     Depression     Emphysema     GERD (gastroesophageal reflux disease)     Myocardial infarct, old     Palliative care patient 02/20/2020    Pneumonia     Tobacco abuse        Past Surgical History:        Procedure Laterality Date    ABDOMINAL EXPLORATION SURGERY      APPENDECTOMY      CARDIAC CATHETERIZATION  06/25/10    patent  stent noted in LAD,EF is estimated to be 60% CARDIAC CATHETERIZATION  7/13/12  [facility]DL    EF  60%    CHOLECYSTECTOMY      COLONOSCOPY  10/2014    Dr Shantell Dobson (CERVIX STATUS UNKNOWN)      UPPER GASTROINTESTINAL ENDOSCOPY  10/1/14    Dr Madeline aHmpton: food bezoar; esophagitis, gastritis, duodenitis; biopsies neg h-pylori       Home Medications:  Prior to Admission medications    Medication Sig Start Date End Date Taking? Authorizing Provider   DULoxetine (CYMBALTA) 60 MG extended release capsule Take 1 capsule by mouth daily 1/9/23   NAVEEN Fernando   donepezil (ARICEPT) 5 MG tablet Take 1 tablet by mouth nightly 1/9/23   NAVEEN Olson   ondansetron [facility] 4 MG tablet Take 1 tablet by mouth 3 times daily as needed for Nausea or Vomiting 12/27/22   Ade Guzmán PA-C   dicyclomine (BENTYL) 10 MG capsule Take 1 capsule by mouth 4 times daily 12/27/22   Ade Guzmán PA-C   albuterol sulfate HFA (PROVENTIL;VENTOLIN;PROAIR) 108 (90 Base) MCG/ACT inhaler Inhale 2 puffs into the lungs every 6 hours as needed for Wheezing 12/12/22   NAVEEN Olson   Blood Pressure Monitoring (ADULT BLOOD PRESSURE CUFF LG) KIT 1 kit by Does not apply route daily 12/9/22   NAVEEN Dior   clonazePAM (KLONOPIN) 0.5 MG tablet Take 1 tablet by mouth 3 times daily as needed for Anxiety for up to 30 days.  12/7/22 1/6/23  NAVEEN Dior   pantoprazole (PROTONIX) 40 MG tablet Take 1 tablet by mouth daily 12/1/22   NAVEEN Dior   ondansetron (ZOFRAN) 4 MG tablet Take 1 tablet by mouth 3 times daily as needed for Nausea or Vomiting 10/25/22   NAVEEN Dior   Fesoterodine Fumarate ER (TOVIAZ) 8 MG TB24 Take 1 tablet by mouth daily 10/25/22   NAVEEN Dior   isosorbide mononitrate (IMDUR) 30 MG extended release tablet Take 1 tablet by mouth daily 10/18/22   NAVEEN Dior   loratadine (CLARITIN) 10 MG tablet Take 1 tablet by mouth daily 10/12/22   NAVEEN Dior   naloxone 4 MG/0.1ML LIQD nasal spray 1 spray by Nasal route as needed for Opioid Reversal  Patient not taking: Reported on 12/7/2022 9/29/22   NAVEEN Dior   pregabalin (LYRICA) 75 MG capsule Take 75 mg by mouth 2 times daily. 9/19/22  Historical Provider, MD   azelastine (OPTIVAR) 0.05 % ophthalmic solution Place 1 drop into both eyes 2 times daily 8/25/22   NAVEEN Dior   carvedilol (COREG) 3.125 MG tablet Take 1 tablet by mouth 2 times daily (with meals) 8/25/22   NAVEEN Dior   memantine (NAMENDA) 10 MG tablet Take 1 tablet by mouth 2 times daily 8/25/22   NAVEEN Dior   atorvastatin (LIPITOR) 20 MG tablet Take 1 tablet by mouth in the morning. Patient not taking: Reported on 12/7/2022 8/2/22   NAVEEN Dior   oxyCODONE-acetaminophen (PERCOCET)  MG per tablet TAKE 1 TABLET BY MOUTH EVERY 6 HOURS AS NEEDED 7/3/22   Historical Provider, MD   buPROPion (WELLBUTRIN) 75 MG tablet Take 1 tablet by mouth in the morning and 1 tablet before bedtime.  7/26/22   NAVEEN Dior   furosemide (LASIX) 20 MG tablet Take 1 tablet by mouth daily as needed (swelling) 3/11/22   NAVEEN Dior   umeclidinium-vilanterol (ANORO ELLIPTA) 62.5-25 MCG/INH AEPB inhaler Inhale 1 puff into the lungs daily 3/11/22   NAVEEN iDor   MICONAZOLE NITRATE VAGINAL (MONISTAT 3) 4 % CREA Place 1 Dose vaginally at bedtime  Patient not taking: Reported on 12/7/2022 2/11/22   NAVEEN Dior   denosumab (PROLIA) 60 MG/ML SOSY SC injection Inject 1 mL into the skin once for 1 dose 10/25/21 10/25/22  BranNAVEEN Maravilla   nitroGLYCERIN (NITROSTAT) 0.4 MG SL tablet Place 1 tablet under the tongue every 5 minutes as needed for Chest pain 4/1/21   NAVEEN Dior   OXYGEN Inhale 2 L into the lungs daily  Patient taking differently: Inhale 4 L into the lungs daily 3/13/18   NAVEEN Dior       Allergies:    Codeine and Sulfa antibiotics    Social History:    The patient currently lives at home with family  Tobacco:   reports that she has been smoking cigarettes. She started smoking about 45 years ago. She has a 22.00 pack-year smoking history. She has never used smokeless tobacco.  Alcohol:   reports no history of alcohol use.  Illicit Drugs: denies    Family History:      Problem Relation Age of Onset    Stroke Brother     Stroke Sister     Cancer Brother     Cancer Sister     Diabetes Brother     Diabetes Sister     Coronary Art Dis Other     Hypertension Other     Seizures Child     Colon Cancer Neg Hx     Colon Polyps Neg Hx     Esophageal Cancer Neg Hx     Liver Cancer Neg Hx     Liver Disease Neg Hx     Rectal Cancer Neg Hx     Stomach Cancer Neg Hx        Review of Systems:   Review of Systems   Unable to perform ROS: Dementia      14 point review of systems is negative except as specifically addressed above.    Physical Examination:  /73   Pulse 80   Temp 98.1 °F (36.7 °C) (Temporal)   Resp 23   SpO2 97%   Physical Exam  Vitals and nursing note reviewed.   Constitutional:       General: She is not in acute distress.     Appearance: Normal appearance. She is ill-appearing. She is not toxic-appearing or diaphoretic.      Comments: Prematurely aged   HENT:      Head: Normocephalic and atraumatic.      Right Ear: External ear normal.      Left Ear: External ear normal.      Nose: Nose normal. No congestion or rhinorrhea.      Mouth/Throat:      Mouth: Mucous membranes are moist.      Pharynx: Oropharynx is clear.   Eyes:      General: No scleral icterus.     Extraocular Movements: Extraocular movements intact.      Conjunctiva/sclera: Conjunctivae normal.   Cardiovascular:      Rate and Rhythm: Normal rate and regular rhythm.      Pulses: Normal pulses.      Heart sounds: Normal heart sounds. No murmur heard.    No friction rub. No gallop.   Pulmonary:      Effort: Pulmonary effort is normal. Tachypnea present. No respiratory distress.      Breath sounds: Wheezing (expiratory throughout) and  rhonchi (inspiratory) present. No rales. Comments: Loose productive cough  Abdominal:      General: Abdomen is flat. Bowel sounds are normal. There is no distension. Palpations: Abdomen is soft. Tenderness: There is no abdominal tenderness. Musculoskeletal:         General: No swelling. Normal range of motion. Cervical back: Normal range of motion and neck supple. Right lower leg: No edema. Left lower leg: No edema. Skin:     General: Skin is warm and dry. Coloration: Skin is pale. Skin is not jaundiced. Findings: No erythema, lesion or rash. Neurological:      General: No focal deficit present. Mental Status: Mental status is at baseline. She is lethargic and disoriented. Cranial Nerves: No cranial nerve deficit. Sensory: No sensory deficit. Motor: Weakness (Diffuse) present. Psychiatric:         Mood and Affect: Mood normal.         Behavior: Behavior is slowed and withdrawn. Thought Content:  Thought content normal.         Judgment: Judgment normal.        Diagnostic Data:  CBC:  Recent Labs     01/10/23  1128   WBC 8.0   HGB 9.3*   HCT 31.3*        BMP:  Recent Labs     01/10/23  1128 01/10/23  1158     --    K 3.8 3.8     --    CO2 27  --    BUN 17  --    CREATININE 0.9  --    CALCIUM 8.9  --      Recent Labs     01/10/23  1128   AST 16   ALT 9   BILITOT <0.2   ALKPHOS 74     Coag Panel:   Recent Labs     01/10/23  1128   INR 0.95   PROTIME 12.6   APTT 27.9     Cardiac Enzymes:   Recent Labs     01/10/23  1128   TROPONINI <0.01     ABGs:  Lab Results   Component Value Date/Time    PHART 7.390 01/10/2023 11:58 AM    PO2ART 61.0 01/10/2023 11:58 AM    VEV6CGC 47.0 01/10/2023 11:58 AM     Urinalysis:  Lab Results   Component Value Date/Time    NITRU Negative 12/27/2022 06:56 PM    WBCUA 0 06/10/2022 10:43 PM    BACTERIA NEGATIVE 06/10/2022 10:43 PM    RBCUA 1 06/10/2022 10:43 PM    BLOODU Negative 12/27/2022 06:56 PM SPECGRAV >=1.045 12/27/2022 06:56 PM    GLUCOSEU Negative 12/27/2022 06:56 PM     A1C: No results for input(s): LABA1C in the last 72 hours. ABG:  Recent Labs     01/10/23  1158   PHART 7.390   BVF8MCZ 47.0*   PO2ART 61.0*   FCZ2LSQ 28.5*   BEART 3.0*   HGBAE 9.3*   G2MBAUYS 91.9   CARBOXHGBART 2.7       XR CHEST PORTABLE    Result Date: 1/10/2023  CHEST, ONE VIEW: CLINICAL HISTORY: Shortness of air COMPARISON : Prior study from 11/10/2022 is available. FINDINGS : The lungs are clear. There is Hyperaeration of lungs suggesting of chronic obstructive pulmonary disease. There is no evidence of pulmonary infiltrate or pleural effusion. The pulmonary vascularity is unremarkable. There is a left-sided Port-A-Cath with tip in superior vena cava. The cardiac, hilar and mediastinal silhouettes are satisfactory. The bony thorax demonstrates no gross abnormality. Chronic obstructive pulmonary disease. NO EVIDENCE OF AIRSPACE CONSOLIDATION OR PULMONARY VENOUS CONGESTION.       Assessment/Plan:  Principal Problem:    Acute exacerbation of chronic obstructive pulmonary disease (COPD)   -Admit to med/surg with tele-Full code   -Titrate O2 to keep O2 > than 90%-avoid excessive oxygenation   -Combivent Q4hrs WA   -Symbicort inhaler BID   -IV [place]   -Azithromycin  mg Q24   -Rocephin 1 gram Q24 hours   -Vitals per unit routine   -Mucinex 600 mg BID to assist with expectoration   -VTE prophylaxis     -I&O per unit routine   -Labs in the AM   -IS Q2 hours WA   -Continue to monitor with supportive medical management ([facility] [address]    Active Problems:    COVID-19   -Ferritin, ESR, and CRP   -Droplet + isolation   -Consult pharmacy for Baricitinib dosing  -VTE prophylaxis   -Vitals per unit routine   -I&O's per unit routine   -Regular diet   -Labs in the AM   -Up as tolerated   -Continue to monitor for medical/supportive care       CHF (congestive heart failure) (HCC)   -No evidence of acute exacerbation   -BNP-182   -continue home meds   -Strict I&O   -Daily weight      Palliative care patient   -Palliative care consulted-appreciate recommendations and treatment assistance      Dementia without behavioral disturbance ([facility] [address]   -Continue home meds    Resolved Problems:    * No resolved hospital problems.  *       Signed:  NAVEEN Pulido, 1/10/2023 7:12 PM

## 2023-01-11 LAB
ALBUMIN SERPL-MCNC: 3.5 G/DL (ref 3.5–5.2)
ALP BLD-CCNC: 74 U/L (ref 35–104)
ALT SERPL-CCNC: 8 U/L (ref 5–33)
ANION GAP SERPL CALCULATED.3IONS-SCNC: 12 MMOL/L (ref 7–19)
AST SERPL-CCNC: 14 U/L (ref 5–32)
BASOPHILS ABSOLUTE: 0 K/UL (ref 0–0.2)
BASOPHILS RELATIVE PERCENT: 0 % (ref 0–1)
BILIRUB SERPL-MCNC: <0.2 MG/DL (ref 0.2–1.2)
BILIRUBIN DIRECT: 0.1 MG/DL (ref 0–0.3)
BILIRUBIN, INDIRECT: 0.1 MG/DL (ref 0.1–1)
BUN BLDV-MCNC: 16 MG/DL (ref 8–23)
CALCIUM SERPL-MCNC: 8.8 MG/DL (ref 8.8–10.2)
CHLORIDE BLD-SCNC: 99 MMOL/L (ref 98–111)
CO2: 26 MMOL/L (ref 22–29)
CREAT SERPL-MCNC: 0.8 MG/DL (ref 0.5–0.9)
EOSINOPHILS ABSOLUTE: 0 K/UL (ref 0–0.6)
EOSINOPHILS RELATIVE PERCENT: 0 % (ref 0–5)
FOLATE: 12.9 NG/ML (ref 4.8–37.3)
GFR SERPL CREATININE-BSD FRML MDRD: >60 ML/MIN/{1.73_M2}
GLUCOSE BLD-MCNC: 137 MG/DL (ref 74–109)
HCT VFR BLD CALC: 28.8 % (ref 37–47)
HEMOGLOBIN: 8.9 G/DL (ref 12–16)
IMMATURE GRANULOCYTES #: 0 K/UL
IRON SATURATION: 7 % (ref 14–50)
IRON: 17 UG/DL (ref 37–145)
LYMPHOCYTES ABSOLUTE: 0.4 K/UL (ref 1.1–4.5)
LYMPHOCYTES RELATIVE PERCENT: 7.9 % (ref 20–40)
MAGNESIUM: 2.1 MG/DL (ref 1.6–2.4)
MCH RBC QN AUTO: 30.4 PG (ref 27–31)
MCHC RBC AUTO-ENTMCNC: 30.9 G/DL (ref 33–37)
MCV RBC AUTO: 98.3 FL (ref 81–99)
MONOCYTES ABSOLUTE: 0.1 K/UL (ref 0–0.9)
MONOCYTES RELATIVE PERCENT: 2 % (ref 0–10)
NEUTROPHILS ABSOLUTE: 4.5 K/UL (ref 1.5–7.5)
NEUTROPHILS RELATIVE PERCENT: 89.5 % (ref 50–65)
PDW BLD-RTO: 14.5 % (ref 11.5–14.5)
PLATELET # BLD: 129 K/UL (ref 130–400)
PMV BLD AUTO: 10.7 FL (ref 9.4–12.3)
POTASSIUM REFLEX MAGNESIUM: 4.2 MMOL/L (ref 3.5–5)
RBC # BLD: 2.93 M/UL (ref 4.2–5.4)
SODIUM BLD-SCNC: 137 MMOL/L (ref 136–145)
TOTAL IRON BINDING CAPACITY: 251 UG/DL (ref 250–400)
TOTAL PROTEIN: 5.9 G/DL (ref 6.6–8.7)
TSH SERPL DL<=0.05 MIU/L-ACNC: 0.64 UIU/ML (ref 0.27–4.2)
VITAMIN B-12: 673 PG/ML (ref 211–946)
VITAMIN D 25-HYDROXY: 51.1 NG/ML
WBC # BLD: 5.1 K/UL (ref 4.8–10.8)

## 2023-01-11 PROCEDURE — 80076 HEPATIC FUNCTION PANEL: CPT

## 2023-01-11 PROCEDURE — 82607 VITAMIN B-12: CPT

## 2023-01-11 PROCEDURE — 84443 ASSAY THYROID STIM HORMONE: CPT

## 2023-01-11 PROCEDURE — 83550 IRON BINDING TEST: CPT

## 2023-01-11 PROCEDURE — 83540 ASSAY OF IRON: CPT

## 2023-01-11 PROCEDURE — 99223 1ST HOSP IP/OBS HIGH 75: CPT

## 2023-01-11 PROCEDURE — 1210000000 HC MED SURG R&B

## 2023-01-11 PROCEDURE — 94150 VITAL CAPACITY TEST: CPT

## 2023-01-11 PROCEDURE — 36415 COLL VENOUS BLD VENIPUNCTURE: CPT

## 2023-01-11 PROCEDURE — 6360000002 HC RX W HCPCS: Performed by: NURSE PRACTITIONER

## 2023-01-11 PROCEDURE — 2580000003 HC RX 258: Performed by: NURSE PRACTITIONER

## 2023-01-11 PROCEDURE — 94760 N-INVAS EAR/PLS OXIMETRY 1: CPT

## 2023-01-11 PROCEDURE — 82746 ASSAY OF FOLIC ACID SERUM: CPT

## 2023-01-11 PROCEDURE — 6370000000 HC RX 637 (ALT 250 FOR IP): Performed by: NURSE PRACTITIONER

## 2023-01-11 PROCEDURE — 85025 COMPLETE CBC W/AUTO DIFF WBC: CPT

## 2023-01-11 PROCEDURE — 80048 BASIC METABOLIC PNL TOTAL CA: CPT

## 2023-01-11 PROCEDURE — 83735 ASSAY OF MAGNESIUM: CPT

## 2023-01-11 PROCEDURE — 6360000002 HC RX W HCPCS: Performed by: HOSPITALIST

## 2023-01-11 PROCEDURE — 82306 VITAMIN D 25 HYDROXY: CPT

## 2023-01-11 RX ORDER — METHYLPREDNISOLONE SODIUM SUCCINATE 40 MG/ML
40 INJECTION, POWDER, LYOPHILIZED, FOR SOLUTION INTRAMUSCULAR; INTRAVENOUS EVERY 6 HOURS
Status: DISCONTINUED | OUTPATIENT
Start: 2023-01-11 | End: 2023-01-12 | Stop reason: HOSPADM

## 2023-01-11 RX ADMIN — KETOTIFEN FUMARATE 1 DROP: 0.35 SOLUTION/ DROPS OPHTHALMIC at 20:37

## 2023-01-11 RX ADMIN — MEMANTINE HYDROCHLORIDE 10 MG: 5 TABLET ORAL at 20:32

## 2023-01-11 RX ADMIN — KETOTIFEN FUMARATE 1 DROP: 0.35 SOLUTION/ DROPS OPHTHALMIC at 10:37

## 2023-01-11 RX ADMIN — ISOSORBIDE MONONITRATE 30 MG: 30 TABLET, EXTENDED RELEASE ORAL at 10:32

## 2023-01-11 RX ADMIN — DULOXETINE HYDROCHLORIDE 60 MG: 60 CAPSULE, DELAYED RELEASE ORAL at 10:32

## 2023-01-11 RX ADMIN — METHYLPREDNISOLONE SODIUM SUCCINATE 40 MG: 40 INJECTION, POWDER, FOR SOLUTION INTRAMUSCULAR; INTRAVENOUS at 10:33

## 2023-01-11 RX ADMIN — IPRATROPIUM BROMIDE 2 PUFF: 17 AEROSOL, METERED RESPIRATORY (INHALATION) at 13:57

## 2023-01-11 RX ADMIN — IPRATROPIUM BROMIDE 2 PUFF: 17 AEROSOL, METERED RESPIRATORY (INHALATION) at 10:27

## 2023-01-11 RX ADMIN — BUDESONIDE AND FORMOTEROL FUMARATE DIHYDRATE 2 PUFF: 160; 4.5 AEROSOL RESPIRATORY (INHALATION) at 10:27

## 2023-01-11 RX ADMIN — POLYETHYLENE GLYCOL 3350 17 G: 17 POWDER, FOR SOLUTION ORAL at 10:27

## 2023-01-11 RX ADMIN — GUAIFENESIN 600 MG: 600 TABLET ORAL at 20:32

## 2023-01-11 RX ADMIN — ALBUTEROL SULFATE 2 PUFF: 90 AEROSOL, METERED RESPIRATORY (INHALATION) at 13:57

## 2023-01-11 RX ADMIN — SODIUM CHLORIDE, PRESERVATIVE FREE 10 ML: 5 INJECTION INTRAVENOUS at 10:36

## 2023-01-11 RX ADMIN — BUPROPION HYDROCHLORIDE 75 MG: 75 TABLET, FILM COATED ORAL at 10:32

## 2023-01-11 RX ADMIN — DONEPEZIL HYDROCHLORIDE 5 MG: 5 TABLET, FILM COATED ORAL at 20:32

## 2023-01-11 RX ADMIN — IPRATROPIUM BROMIDE 2 PUFF: 17 AEROSOL, METERED RESPIRATORY (INHALATION) at 17:52

## 2023-01-11 RX ADMIN — MEMANTINE HYDROCHLORIDE 10 MG: 5 TABLET ORAL at 10:33

## 2023-01-11 RX ADMIN — TROSPIUM CHLORIDE 20 MG: 20 TABLET, FILM COATED ORAL at 17:50

## 2023-01-11 RX ADMIN — ALBUTEROL SULFATE 2 PUFF: 90 AEROSOL, METERED RESPIRATORY (INHALATION) at 17:52

## 2023-01-11 RX ADMIN — CETIRIZINE HYDROCHLORIDE 5 MG: 5 TABLET ORAL at 10:32

## 2023-01-11 RX ADMIN — ACETAMINOPHEN 650 MG: 500 TABLET, FILM COATED ORAL at 10:28

## 2023-01-11 RX ADMIN — TROSPIUM CHLORIDE 20 MG: 20 TABLET, FILM COATED ORAL at 10:54

## 2023-01-11 RX ADMIN — METHYLPREDNISOLONE SODIUM SUCCINATE 40 MG: 40 INJECTION, POWDER, FOR SOLUTION INTRAMUSCULAR; INTRAVENOUS at 15:33

## 2023-01-11 RX ADMIN — CEFTRIAXONE 1000 MG: 1 INJECTION, POWDER, FOR SOLUTION INTRAMUSCULAR; INTRAVENOUS at 20:34

## 2023-01-11 RX ADMIN — METHYLPREDNISOLONE SODIUM SUCCINATE 40 MG: 40 INJECTION, POWDER, FOR SOLUTION INTRAMUSCULAR; INTRAVENOUS at 20:36

## 2023-01-11 RX ADMIN — SODIUM CHLORIDE, PRESERVATIVE FREE 10 ML: 5 INJECTION INTRAVENOUS at 20:37

## 2023-01-11 RX ADMIN — ACETAMINOPHEN 650 MG: 500 TABLET, FILM COATED ORAL at 17:50

## 2023-01-11 RX ADMIN — BUDESONIDE AND FORMOTEROL FUMARATE DIHYDRATE 2 PUFF: 160; 4.5 AEROSOL RESPIRATORY (INHALATION) at 20:30

## 2023-01-11 RX ADMIN — ENOXAPARIN SODIUM 40 MG: 100 INJECTION SUBCUTANEOUS at 10:31

## 2023-01-11 RX ADMIN — CARVEDILOL 3.12 MG: 3.12 TABLET, FILM COATED ORAL at 10:33

## 2023-01-11 RX ADMIN — AZITHROMYCIN MONOHYDRATE 500 MG: 500 INJECTION, POWDER, LYOPHILIZED, FOR SOLUTION INTRAVENOUS at 17:50

## 2023-01-11 RX ADMIN — CARVEDILOL 3.12 MG: 3.12 TABLET, FILM COATED ORAL at 17:51

## 2023-01-11 RX ADMIN — BUPROPION HYDROCHLORIDE 75 MG: 75 TABLET, FILM COATED ORAL at 20:32

## 2023-01-11 RX ADMIN — PANTOPRAZOLE SODIUM 40 MG: 40 TABLET, DELAYED RELEASE ORAL at 10:32

## 2023-01-11 RX ADMIN — ALBUTEROL SULFATE 2 PUFF: 90 AEROSOL, METERED RESPIRATORY (INHALATION) at 20:30

## 2023-01-11 RX ADMIN — IPRATROPIUM BROMIDE 2 PUFF: 17 AEROSOL, METERED RESPIRATORY (INHALATION) at 20:30

## 2023-01-11 RX ADMIN — BARICITINIB 4 MG: 2 TABLET, FILM COATED ORAL at 20:32

## 2023-01-11 RX ADMIN — ALBUTEROL SULFATE 2 PUFF: 90 AEROSOL, METERED RESPIRATORY (INHALATION) at 10:26

## 2023-01-11 RX ADMIN — GUAIFENESIN 600 MG: 600 TABLET ORAL at 10:32

## 2023-01-11 ASSESSMENT — PAIN SCALES - GENERAL: PAINLEVEL_OUTOF10: 6

## 2023-01-11 ASSESSMENT — PAIN DESCRIPTION - ORIENTATION: ORIENTATION: RIGHT;LEFT

## 2023-01-11 ASSESSMENT — PAIN DESCRIPTION - LOCATION: LOCATION: BACK;LEG

## 2023-01-11 NOTE — ACP (ADVANCE CARE PLANNING)
Advance Care Planning     Advance Care Planning (ACP) Physician/NP/PA (Provider) Conversation      Date of ACP Conversation: 1/11/2023    Conversation Conducted with:  Pt at bedside and her daughter over telephone    Health Care Decision Maker:    Current Designated Health Care Decision Maker:     Primary Decision Maker: Sarah Too - Child - [phone]    Primary Decision Maker: Gisell Bernard - Child - [phone]    Care Preferences:  Ventilation: \"If you were in your present state of health and suddenly became very ill and were unable to breathe on your own, what would your preference be about the use of a ventilator (breathing machine) if it was available to you? \"    NO    Resuscitation:  \"CPR works best to restart the heart when there is a sudden event, like a heart attack, in someone who is otherwise healthy. Unfortunately, CPR does not typically restart the heart for people who have serious health conditions or who are very sick. \"    \"In the event your heart stopped as a result of an underlying serious health condition, would you want attempts to be made to restart your heart (answer \"yes\" for attempt to resuscitate) or would you prefer a natural death (answer \"no\" for do not attempt to resuscitate)? \"   NO    Length of Voluntary ACP Conversation in minutes:  15 minutes during consult visit    NAVEEN Landrum - CNP

## 2023-01-11 NOTE — PROGRESS NOTES
Pharmacy Consult      Jeny Mcguire is a 77 y.o. female for whom pharmacy has been consulted to dose baricitinib.    Patient Active Problem List   Diagnosis    CHF (congestive heart failure) (HCC)    CAD in native artery    Chest pain    Chronic constipation    Nondiabetic gastroparesis    Generalized abdominal pain    Nausea & vomiting    Chronic narcotic dependence (HCC)    Frequency of urination    Gross hematuria    Urge incontinence    Palliative care patient    Acute exacerbation of chronic obstructive pulmonary disease (COPD) (HCC)    Moderate episode of recurrent major depressive disorder (HCC)    Respiratory failure (HCC)    Carotid stenosis, asymptomatic, left    Dementia without behavioral disturbance (HCC)    Anxiety    Arthritis    Cerebrovascular disease    Depression    GERD (gastroesophageal reflux disease)    Myocardial infarct, old    Tobacco abuse    O2 dependent    Dependence on nicotine from cigarettes    Tobacco abuse counseling    COVID-19       Allergies:  Codeine and Sulfa antibiotics     Ht/Wt:   Ht Readings from Last 1 Encounters:   12/07/22 5' 1\" (1.549 m)        Wt Readings from Last 1 Encounters:   12/27/22 137 lb (62.1 kg)         Does the patient meet all of the criteria for receiving baricitinib (see below)?Yes.  Has a daily CMP (or LFTs) and CBC with auto differential been ordered? Yes.  If either are not ordered, the pharmacist should enter CMP and CBC with auto differential daily (per pharmacy practice) under the physician authorizing baricitinib.    Recent Labs     01/10/23  1128   LABGLOM >60   LYMPHSABS 0.9*   NEUTROABS 6.5   ALT 9   AST 16           Assessment/Plan:    Start patient on baricitinib 4 mg daily for 14 days of total treatment or until hospital discharge, whichever is first. Pharmacy will continue to follow GFR, ALC, ANC and aminotransferases.    Thank you for the consult.     Electronically signed by Dylan Arias RPH on 1/10/2023 at 6:20 PM

## 2023-01-11 NOTE — CARE COORDINATION
Case Management Assessment  Initial Evaluation    Date/Time of Evaluation: 1/11/2023 9:34 AM  Assessment Completed by: Dayan Aguirre RN    If patient is discharged prior to next notation, then this note serves as note for discharge by case management. Patient Name: Iban Keenan                   YOB: 1945  Diagnosis: Acute exacerbation of chronic obstructive pulmonary disease (COPD) ([facility] [address] [J44.1]  COPD exacerbation ([facility] [address] [J44.1]  COVID [U07.1]                   Date / Time: 1/10/2023 10:53 AM    Patient Admission Status: Inpatient   Readmission Risk (Low < 19, Mod (19-27), High > 27): Readmission Risk Score: 19.3    Current PCP: Marylee Homes, APRN  PCP verified by CM? Yes (Patient has changed PCP to Dr. Marco Antonio Garrett. 1st appointment is not until Feb. 2023. Called Jose Miranda, to see if appointment can be sooner.)    Chart Reviewed: Yes      History Provided by: Child/Family - Met with patient. Patient soundly sleeping. Called patient's son, Rowena Glass. He answered several questions. He states that patient lives with his sister, Wilmer Fallon, and she can answer remaining questions. Bisi Cummings. All questions answered. Patient Orientation:      Patient Cognition:      Hospitalization in the last 30 days (Readmission):  No    If yes, Readmission Assessment in CM Navigator will be completed. Advance Directives:      Code Status: Full Code   Patient's Primary Decision Maker is: Legal Next of Kin (Patient's daughter, Wilmer Fallon, states that she and her brother, Rowena Glass, are botth POAs and are making decisions together)    Primary Decision Maker: Deanna Kelly - Child - [phone]    Secondary Decision Maker: Vasquez Matute Child - [phone]    Discharge Planning:    Patient lives with: Children Type of Home: House  Primary Care Giver: Family  Patient Support Systems include: Children   Current Financial resources:  Other (Comment) (patient's daughter completing PADD office application to obtain additional help)  Current community resources:    Current services prior to admission: Durable Medical Equipment            Current DME: Oxygen Therapy (Comment), Bedside Commode, Shower Chair, Walker, Wheelchair (Oxygen is at [employer], supplied by [employer])            Type of Home Care services:  Fluker, OT, PT, Nursing Services    ADLS  Prior functional level: Assistance with the following:, Bathing, Cooking, Housework, Shopping  Current functional level: Assistance with the following:, Bathing, Cooking, Housework, Shopping    PT AM-PAC:   /24  OT AM-PAC:   /24    Family can provide assistance at DC: Yes  Would you like Case Management to discuss the discharge plan with any other family members/significant others, and if so, who?  Yes (son and daughter)  Plans to Return to Present Housing: Yes  Other Identified Issues/Barriers to RETURNING to current housing: safe mobility  Potential Assistance needed at discharge: Durable Medical Equipment            Potential DME: Bedside Commode (for raised toilet seat)  Patient expects to discharge to: [address] [place] [address] for transportation at discharge: Family    Financial    Payor: [address] [place] [address],[address] / Plan: MEDICARE PART A AND B / Product Type: *No Product type* /     Does insurance require precert for SNF: No    Potential assistance Purchasing Medications:    Meds-to-Beds request:        [address] [facility],[address] Bob Haylee Joyner [facility] [place] [facility]  [address] [address]  Phone: [phone] Fax: [address] [phone] Gabriel Bertrand [phone]  [address] [place] [address]  Phone: [phone] Fax: [facility], [facility] [address] [facility]  [address] [address]  Phone: [phone] Fax: [phone]      Notes:    Factors facilitating achievement of predicted outcomes: Family support and Has needed Durable Medical Equipment at home    Barriers to discharge: compliance with equipment and medications    Additional Case Management Notes:   Patient lives at home with her daughter and plans to return home at discharge. Physical address is [address] in [place], [place]. Patient has a cane, walker, wheelchair, and shower chair. Patient prefers to use walker. Patient does not have a raised toiler seat. Current BSC is borrowed. A new BSC will be ordered from requested provider, [address]  Patient uses home oxygen at 4L, supplied by [address], [address] [phone]  F [phone]     Patient received the Covid vaccine, Pfizer. Patient's daughter is interested in receiving [address], but is not sure patient will be agreeable. Patient's daughter is working with the [employer] office to obtain additional help. Fernando Lujan needs to complete the application. Explained [employer] program.  Provided literature. Patient consented to participate in CHW program.  [facility]. The Plan for Transition of Care is related to the following treatment goals of Acute exacerbation of chronic obstructive pulmonary disease (COPD) ([facility] [address] [J44.1]  COPD exacerbation (HCC) [J44.1]  COVID [V66.9]    IF APPLICABLE: The Patient and/or patient representative Melchor Ruano and her family were provided with a choice of provider and agrees with the discharge plan. Freedom of choice list with basic dialogue that supports the patient's individualized plan of care/goals and shares the quality data associated with the providers was provided to: Patient Representative   Patient Representative Name: Vasyl Chambers and Fernando Lujan     The Patient and/or Patient Representative Agree with the Discharge Plan?  Yes    Mackenzie Tidwell RN  Case Management Department  Ph: [phone] Fax: [phone]

## 2023-01-11 NOTE — CONSULTS
Palliative Care Consult Note    1/11/2023 1:39 PM  Subjective:  Admit Date: 1/10/2023  PCP: Ernie Boykin DO    Date of Service: 1/11/2023    Reason for Consultation:  Goals of Care, Code Status, Family Support     History Obtained From: EMR/Patient and their Family    History Of Present Illness: The patient is a 69 y.o. female with PMH COPD on 3L baseline, CHF, CAD, CVA, dementia, and other comorbidities who presented to [facility] ED 1/10/2023 complaining of shortness of breath and increased sputum production with cough. EMS advised patient reported she had been short of breath for 2 days and had a productive cough. She reports a fever however is unable to give a numerical reading and complains of chills. Complains of congestion, rhinorrhea and sore throat. She denies any recent ill contacts. She does report that she continues to smoke. She presented with hypoxia on RA NC with O2 sat 84% and placed on 4L NC. ABGs pH 7.39, PCO2 47, PO2 61, HCO3 28.5. D-dimer mildly positive at 0.66. CTA pulmonary showed no acute PE there are emphysematous changes present. CXR consistent with COPD. Chemistries within normal limits. H&H 9.3/31.7. Troponin negative proBNP 182. COVID swab positive. Influenza swab negative. She was admitted under hospitalist services and initiated on IV abx, steroids, and nebs. Pharmacy consulted for Baricitinib dosing. Palliative care is consulted for goals of care, code status discussion, and symptom management.     Past Medical History:        Diagnosis Date    Abdominal pain     Anxiety     Arthritis     CAD in native artery     Cerebrovascular disease     CHF (congestive heart failure) (HCC)     Constipation     COPD (chronic obstructive pulmonary disease) (HCC)     Dementia without behavioral disturbance ([facility] [address] 3/11/2022    Depression     Emphysema     GERD (gastroesophageal reflux disease)     Myocardial infarct, old     Palliative care patient 02/20/2020    Pneumonia     Tobacco abuse Past Surgical History:        Procedure Laterality Date    ABDOMINAL EXPLORATION SURGERY      APPENDECTOMY      CARDIAC CATHETERIZATION  06/25/10    patent  stent noted in LAD,EF is estimated to be 60%    CARDIAC CATHETERIZATION  7/13/12  MDL    EF  60%    CHOLECYSTECTOMY      COLONOSCOPY  10/2014    Dr Shantell Dobson (CERVIX STATUS UNKNOWN)      UPPER GASTROINTESTINAL ENDOSCOPY  10/1/14    Dr Madeline Hampton: food bezoar; esophagitis, gastritis, duodenitis; biopsies neg h-pylori       Home Medications:  Prior to Admission medications    Medication Sig Start Date End Date Taking? Authorizing Provider   DULoxetine (CYMBALTA) 60 MG extended release capsule Take 1 capsule by mouth daily 1/9/23   NAVEEN Fernando   donepezil (ARICEPT) 5 MG tablet Take 1 tablet by mouth nightly 1/9/23   NAVEEN Olson   ondansetron [facility] 4 MG tablet Take 1 tablet by mouth 3 times daily as needed for Nausea or Vomiting 12/27/22   Ade Guzmán PA-C   dicyclomine (BENTYL) 10 MG capsule Take 1 capsule by mouth 4 times daily 12/27/22   Ade Guzmán PA-C   albuterol sulfate HFA (PROVENTIL;VENTOLIN;PROAIR) 108 (90 Base) MCG/ACT inhaler Inhale 2 puffs into the lungs every 6 hours as needed for Wheezing 12/12/22   NAVEEN Olson   Blood Pressure Monitoring (ADULT BLOOD PRESSURE CUFF LG) KIT 1 kit by Does not apply route daily 12/9/22   NAVEEN Dior   clonazePAM (KLONOPIN) 0.5 MG tablet Take 1 tablet by mouth 3 times daily as needed for Anxiety for up to 30 days.  12/7/22 1/6/23  NAVEEN Dior   pantoprazole (PROTONIX) 40 MG tablet Take 1 tablet by mouth daily 12/1/22   NAVEEN Diro   ondansetron (ZOFRAN) 4 MG tablet Take 1 tablet by mouth 3 times daily as needed for Nausea or Vomiting 10/25/22   NAVEEN Dior   Fesoterodine Fumarate ER (TOVIAZ) 8 MG TB24 Take 1 tablet by mouth daily 10/25/22   NAVEEN Dior   isosorbide mononitrate (IMDUR) 30 MG extended release tablet Take 1 tablet by mouth daily 10/18/22   NAVEEN Dior   loratadine (CLARITIN) 10 MG tablet Take 1 tablet by mouth daily 10/12/22   NAVEEN Dior   naloxone 4 MG/0.1ML LIQD nasal spray 1 spray by Nasal route as needed for Opioid Reversal  Patient not taking: Reported on 12/7/2022 9/29/22   NAVEEN Dior   pregabalin (LYRICA) 75 MG capsule Take 75 mg by mouth 2 times daily. 9/19/22  Historical Provider, MD   azelastine (OPTIVAR) 0.05 % ophthalmic solution Place 1 drop into both eyes 2 times daily 8/25/22   NAVEEN Dior   carvedilol (COREG) 3.125 MG tablet Take 1 tablet by mouth 2 times daily (with meals) 8/25/22   NAVEEN Dior   memantine (NAMENDA) 10 MG tablet Take 1 tablet by mouth 2 times daily 8/25/22   NAVEEN Dior   atorvastatin (LIPITOR) 20 MG tablet Take 1 tablet by mouth in the morning. Patient not taking: Reported on 12/7/2022 8/2/22   NAVEEN Dior   oxyCODONE-acetaminophen (PERCOCET)  MG per tablet TAKE 1 TABLET BY MOUTH EVERY 6 HOURS AS NEEDED 7/3/22   Historical Provider, MD   buPROPion (WELLBUTRIN) 75 MG tablet Take 1 tablet by mouth in the morning and 1 tablet before bedtime.  7/26/22   NAVEEN Dior   furosemide (LASIX) 20 MG tablet Take 1 tablet by mouth daily as needed (swelling) 3/11/22   NAVEEN Dior   umeclidinium-vilanterol (ANORO ELLIPTA) 62.5-25 MCG/INH AEPB inhaler Inhale 1 puff into the lungs daily 3/11/22   NAVEEN Dior   MICONAZOLE NITRATE VAGINAL (MONISTAT 3) 4 % CREA Place 1 Dose vaginally at bedtime  Patient not taking: Reported on 12/7/2022 2/11/22   NAVEEN Dior   denosumab (PROLIA) 60 MG/ML SOSY SC injection Inject 1 mL into the skin once for 1 dose 10/25/21 10/25/22  NAVEEN Soto   nitroGLYCERIN (NITROSTAT) 0.4 MG SL tablet Place 1 tablet under the tongue every 5 minutes as needed for Chest pain 4/1/21   NAVEEN Dior   OXYGEN Inhale 2 L into the lungs daily  Patient taking differently: Inhale 4 L into the lungs daily 3/13/18   NAVEEN Dior     Allergies:    Codeine and Sulfa antibiotics    Social History:    The patient currently lives at home with her daughter  Tobacco:   reports that she has been smoking cigarettes. She started smoking about 45 years ago. She has a 22.00 pack-year smoking history. She has never used smokeless tobacco.  Alcohol:   reports no history of alcohol use.   Illicit Drugs: None known    Family History:      Problem Relation Age of Onset    Stroke Brother     Stroke Sister     Cancer Brother     Cancer Sister     Diabetes Brother     Diabetes Sister     Coronary Art Dis Other     Hypertension Other     Seizures Child     Colon Cancer Neg Hx     Colon Polyps Neg Hx     Esophageal Cancer Neg Hx     Liver Cancer Neg Hx     Liver Disease Neg Hx     Rectal Cancer Neg Hx     Stomach Cancer Neg Hx      Review of Systems:   Constitutional / general:  Denies fever / chills / sweats / +fatigue +activity change +appetite change  Head:  Denies headache / neck stiffness / trauma / visual change  Eyes:  Denies blurry vision / acute visual change or loss / itching / redness  ENT: Denies sore throat / hoarseness / nasal drainage / ear pain  CV:  Denies chest pain / palpitations/ orthopnea   Pulmonary:  Denies hemoptysis / +cough +SOB +sputum  GI: Denies nausea / vomiting / abdominal pain / diarrhea / constipation  :  Denies dysuria / hesitancy / urgency / hematuria   Neuro: Denies paralysis / syncope / seizure / dysphagia / headache / paresthesias / +weakness  Musculoskeletal:  Denies joint stiffness / +pain  Vascular: Denies edema / claudication / varicosities  Heme / endocrine: Denies easy bruising / bleeding / excessive sweating / heat or cold intolerance  Psychiatric:  Denies depression / anxiety / insomnia / mood changes  Skin:  Denies new rashes / lesions / skin hair or nail changes    14 point review of systems is negative except as specifically addressed above. Physical Examination:  BP (!) 119/102   Pulse 72   Temp 97.7 °F (36.5 °C)   Resp 20   SpO2 97%     General appearance: 68 yo female, ill appearing, no acute distress  Head: Normocephalic, without obvious abnormality, atraumatic  Eyes: conjunctivae/corneas clear. PERRL, EOM's intact. Ears/Nose: normal external ears and nose  Neck/Throat: supple, symmetrical, trachea midline, throat without exudate    Pulmonary: scattered rhonchi with expiratory wheeze to auscultation bilaterally, NC in place, loose/weak cough  Cardiovascular: regular rate and rhythm, S1, S2 normal, no murmur  Gastrointestinal:soft, non-tender; non-distended, bowel sounds present  Musculoskeletal: No lower extremity edema,  No erythema, no tenderness to palpation  Skin: Warm, dry  Neurologic: Alert and oriented X4, generalized weakness, no focal deficits  Psychiatric: Calm and cooperative    Diagnostic Data:  CBC:  Recent Labs     01/10/23  1128 01/11/23  0129   WBC 8.0 5.1   HGB 9.3* 8.9*   HCT 31.3* 28.8*    129*     BMP:  Recent Labs     01/10/23  1128 01/10/23  1158 01/11/23  0129     --  137   K 3.8 3.8 4.2     --  99   CO2 27  --  26   BUN 17  --  16   CREATININE 0.9  --  0.8   CALCIUM 8.9  --  8.8     Recent Labs     01/10/23  1128 01/11/23  0129   AST 16 14   ALT 9 8   BILIDIR  --  0.1   BILITOT <0.2 <0.2   ALKPHOS 74 74     XR CHEST PORTABLE  Result Date: 1/10/2023  Chronic obstructive pulmonary disease. NO EVIDENCE OF AIRSPACE CONSOLIDATION OR PULMONARY VENOUS CONGESTION. CTA PULMONARY W CONTRAST  Result Date: 1/10/2023  No filling defect is seen of the pulmonary arteries to suggest a pulmonary embolism. There are emphysematous changes present. There are nodules and nodular airspace opacities present which will need interval follow-up within 2-3 months. This can be seen with infection and aspiration. These may also represent developing malignancy. This is most pronounced at the left upper lobe. Palliative Review of Advance Directives:     Surrogate Decision Maker: Sari Dubois and Joaquin Stockton, legal NOK    Durable Power of : Family reports POA documents and have requested these    Advanced Directives/Living Fabian: No    Out of hospital medical orders in place to reflect resuscitation status (MOLST/POLST): No    Information Sharing:  Patient's awareness of illness:  [] Terminal [] Life-Threatening [x] Serious [] Non life-threatening [] Not serious   [] Not discussed    Family awareness of illness:   [] Terminal [] Life-Threatening [x] Serious [] Nonlife-threatening [] Not serious   [] Not discussed    Palliative Performance Scale:  [x] 50% Mainly sit/lie  Extensive disease: Can't do any work  Considerable assistance  Normal/reduced intake  LOC full/confusion    ECOG:(3) Capable of limited self-care, confined to bed or chair > 50% of waking hours    CLINICAL PAIN ASSESSMENT:   Describes chronic leg pain but unable to rate at this time    Assessment/Plan:  Principal Problem:    Acute exacerbation of chronic obstructive pulmonary disease (COPD) ([facility] [address]  Active Problems:    COVID-19    CHF (congestive heart failure) ([facility] [address]    Palliative care patient    Dementia without behavioral disturbance ([facility] [address]  Resolved Problems:    * No resolved hospital problems. *       Visit Summary:  Chart reviewed, patient discussed with nursing staff. Reviewed health issues, work up and treatment plan as well as factors that lead to hospitalization. Ms. Emily Alberto is seen at bedside this morning with report obtained from RN. She is alert, oriented, and seems to be able to communicate her needs well. She complains of lower extremity pain which she reports is chronic but she cannot recall what medications she takes for pain at home. No family is present. Pt does report she is feeling some better than time of admission. Appears she is on 2L NC and tolerating.  Pt confirms that she lives with her daughter who helps care for her. She denies having any ACP documents and named her son as her HCS decision maker. Pt reports she is to be a DNR in the event of cardiac or respiratory arrest. I called her daughter, Christie Green, to update on pt status and plan of care. Christie Green reports she also has tested positive for COVID. She states that pt does live with her and that her and her brother both have POA documents for pt. I explained we would need a copy of these documents but either delivered to hospital or by email. Christie Green is under quarantine and unable to visit pt and reports she would not know how to email them. I explained that her and her brother would be legal NOK decision makers without copies of these documents as there seems to be confusion who actually has POA after discussing with SW. Magui Gregorio understanding and reports that her and her brother do work together to assist in decision making for pt. She also confirms pt is to be a DNR. Christie Green reports concern that her brother feels he needs to quit his job to help her care for pt at home when she is working with PAD office to try and get more assistance. She has also discussed with with SW and they are working on DME needs too. Christie Green [facility] at discharge if needed but does not feel pt will want additional people checking in on her. No additional needs reported at this time. Will follow. Candidate for SCOP: To be determined based on hospital course    Recommendations:     Palliative Care- [address] continue all current medical treatments and monitor for improvement. D/c planning based on hospital course. Code status- DNR   Acute COPD exacerbation- mgmt per hospitalist. IV abx, steroids, inhalers. Mucinex BID. Tolerating NC O2, on 3L baseline  COVID-19- pharmacy consulted for adjuvant therapy. Monitor labs. Supportive care. Dementia- home meds resumed per hospitalist. PT/OT when able  CHF- noted on hx, appears stable.  Last echo 10/29/20 EF 65-70%, additional workup per hospitalist    Thank you for consulting palliative care and allowing us to participate in the care of the patient.     CounselingTopics: Goals of care, Code Status, Disease process education, pt/family support                                    Total Time Spent with patient assessment, interview of independent historian/HCS, workup/treatment review, discussion with medical team, review of current and home medications, review of care everywhere and placement of orders/preparation of this note: 77 minutes    Electronically signed by NAVEEN Hood CNP on 1/11/2023 at 1:39 PM    (Please note that portions of this note were completed with a voice recognition program.  Efforts were made to edit the dictations but occasionally words are mis-transcribed.)

## 2023-01-12 VITALS
SYSTOLIC BLOOD PRESSURE: 123 MMHG | OXYGEN SATURATION: 94 % | HEART RATE: 77 BPM | DIASTOLIC BLOOD PRESSURE: 76 MMHG | RESPIRATION RATE: 18 BRPM | TEMPERATURE: 98.2 F

## 2023-01-12 LAB
ANION GAP SERPL CALCULATED.3IONS-SCNC: 14 MMOL/L (ref 7–19)
BASOPHILS ABSOLUTE: 0 K/UL (ref 0–0.2)
BASOPHILS RELATIVE PERCENT: 0 % (ref 0–1)
BUN BLDV-MCNC: 28 MG/DL (ref 8–23)
CALCIUM SERPL-MCNC: 9.4 MG/DL (ref 8.8–10.2)
CHLORIDE BLD-SCNC: 101 MMOL/L (ref 98–111)
CO2: 22 MMOL/L (ref 22–29)
CREAT SERPL-MCNC: 0.9 MG/DL (ref 0.5–0.9)
EOSINOPHILS ABSOLUTE: 0 K/UL (ref 0–0.6)
EOSINOPHILS RELATIVE PERCENT: 0 % (ref 0–5)
GFR SERPL CREATININE-BSD FRML MDRD: >60 ML/MIN/{1.73_M2}
GLUCOSE BLD-MCNC: 129 MG/DL (ref 74–109)
HCT VFR BLD CALC: 30 % (ref 37–47)
HEMOGLOBIN: 9.3 G/DL (ref 12–16)
IMMATURE GRANULOCYTES #: 0 K/UL
LYMPHOCYTES ABSOLUTE: 0.8 K/UL (ref 1.1–4.5)
LYMPHOCYTES RELATIVE PERCENT: 12 % (ref 20–40)
MCH RBC QN AUTO: 30.3 PG (ref 27–31)
MCHC RBC AUTO-ENTMCNC: 31 G/DL (ref 33–37)
MCV RBC AUTO: 97.7 FL (ref 81–99)
MONOCYTES ABSOLUTE: 0.2 K/UL (ref 0–0.9)
MONOCYTES RELATIVE PERCENT: 3.6 % (ref 0–10)
NEUTROPHILS ABSOLUTE: 5.3 K/UL (ref 1.5–7.5)
NEUTROPHILS RELATIVE PERCENT: 84.1 % (ref 50–65)
PDW BLD-RTO: 14.3 % (ref 11.5–14.5)
PLATELET # BLD: 168 K/UL (ref 130–400)
PMV BLD AUTO: 10.6 FL (ref 9.4–12.3)
POTASSIUM REFLEX MAGNESIUM: 4.7 MMOL/L (ref 3.5–5)
RBC # BLD: 3.07 M/UL (ref 4.2–5.4)
SODIUM BLD-SCNC: 137 MMOL/L (ref 136–145)
WBC # BLD: 6.3 K/UL (ref 4.8–10.8)

## 2023-01-12 PROCEDURE — 6370000000 HC RX 637 (ALT 250 FOR IP): Performed by: HOSPITALIST

## 2023-01-12 PROCEDURE — 80048 BASIC METABOLIC PNL TOTAL CA: CPT

## 2023-01-12 PROCEDURE — 2580000003 HC RX 258: Performed by: NURSE PRACTITIONER

## 2023-01-12 PROCEDURE — 2700000000 HC OXYGEN THERAPY PER DAY

## 2023-01-12 PROCEDURE — 94618 PULMONARY STRESS TESTING: CPT

## 2023-01-12 PROCEDURE — 94761 N-INVAS EAR/PLS OXIMETRY MLT: CPT

## 2023-01-12 PROCEDURE — 97530 THERAPEUTIC ACTIVITIES: CPT

## 2023-01-12 PROCEDURE — 85025 COMPLETE CBC W/AUTO DIFF WBC: CPT

## 2023-01-12 PROCEDURE — 6360000002 HC RX W HCPCS: Performed by: HOSPITALIST

## 2023-01-12 PROCEDURE — 36415 COLL VENOUS BLD VENIPUNCTURE: CPT

## 2023-01-12 PROCEDURE — 99231 SBSQ HOSP IP/OBS SF/LOW 25: CPT

## 2023-01-12 PROCEDURE — 94760 N-INVAS EAR/PLS OXIMETRY 1: CPT

## 2023-01-12 PROCEDURE — 6370000000 HC RX 637 (ALT 250 FOR IP): Performed by: NURSE PRACTITIONER

## 2023-01-12 PROCEDURE — 97165 OT EVAL LOW COMPLEX 30 MIN: CPT

## 2023-01-12 PROCEDURE — 97162 PT EVAL MOD COMPLEX 30 MIN: CPT

## 2023-01-12 PROCEDURE — 97116 GAIT TRAINING THERAPY: CPT

## 2023-01-12 PROCEDURE — 6360000002 HC RX W HCPCS: Performed by: NURSE PRACTITIONER

## 2023-01-12 RX ORDER — CLINDAMYCIN HYDROCHLORIDE 300 MG/1
300 CAPSULE ORAL EVERY 8 HOURS SCHEDULED
Status: DISCONTINUED | OUTPATIENT
Start: 2023-01-12 | End: 2023-01-12 | Stop reason: HOSPADM

## 2023-01-12 RX ORDER — GUAIFENESIN 600 MG/1
600 TABLET, EXTENDED RELEASE ORAL 2 TIMES DAILY
Qty: 10 TABLET | Refills: 0 | Status: SHIPPED | OUTPATIENT
Start: 2023-01-12 | End: 2023-01-17

## 2023-01-12 RX ORDER — DEXAMETHASONE 6 MG/1
6 TABLET ORAL
Qty: 5 TABLET | Refills: 0 | Status: SHIPPED | OUTPATIENT
Start: 2023-01-12 | End: 2023-01-17

## 2023-01-12 RX ORDER — GUAIFENESIN 600 MG/1
600 TABLET, EXTENDED RELEASE ORAL 2 TIMES DAILY
Qty: 60 TABLET | Refills: 0 | Status: SHIPPED | OUTPATIENT
Start: 2023-01-12 | End: 2023-01-12 | Stop reason: SDUPTHER

## 2023-01-12 RX ORDER — CLINDAMYCIN HYDROCHLORIDE 300 MG/1
300 CAPSULE ORAL EVERY 8 HOURS SCHEDULED
Qty: 14 CAPSULE | Refills: 0 | Status: SHIPPED | OUTPATIENT
Start: 2023-01-12 | End: 2023-01-17

## 2023-01-12 RX ORDER — BUDESONIDE AND FORMOTEROL FUMARATE DIHYDRATE 160; 4.5 UG/1; UG/1
2 AEROSOL RESPIRATORY (INHALATION) 2 TIMES DAILY
Qty: 10.2 G | Refills: 0 | Status: SHIPPED | OUTPATIENT
Start: 2023-01-12

## 2023-01-12 RX ADMIN — TROSPIUM CHLORIDE 20 MG: 20 TABLET, FILM COATED ORAL at 10:20

## 2023-01-12 RX ADMIN — ALBUTEROL SULFATE 2 PUFF: 90 AEROSOL, METERED RESPIRATORY (INHALATION) at 10:19

## 2023-01-12 RX ADMIN — GUAIFENESIN 600 MG: 600 TABLET ORAL at 10:19

## 2023-01-12 RX ADMIN — CARVEDILOL 3.12 MG: 3.12 TABLET, FILM COATED ORAL at 10:19

## 2023-01-12 RX ADMIN — PANTOPRAZOLE SODIUM 40 MG: 40 TABLET, DELAYED RELEASE ORAL at 10:19

## 2023-01-12 RX ADMIN — ENOXAPARIN SODIUM 40 MG: 100 INJECTION SUBCUTANEOUS at 10:20

## 2023-01-12 RX ADMIN — KETOTIFEN FUMARATE 1 DROP: 0.35 SOLUTION/ DROPS OPHTHALMIC at 10:23

## 2023-01-12 RX ADMIN — ALBUTEROL SULFATE 2 PUFF: 90 AEROSOL, METERED RESPIRATORY (INHALATION) at 15:17

## 2023-01-12 RX ADMIN — ISOSORBIDE MONONITRATE 30 MG: 30 TABLET, EXTENDED RELEASE ORAL at 10:20

## 2023-01-12 RX ADMIN — IPRATROPIUM BROMIDE 2 PUFF: 17 AEROSOL, METERED RESPIRATORY (INHALATION) at 10:19

## 2023-01-12 RX ADMIN — METHYLPREDNISOLONE SODIUM SUCCINATE 40 MG: 40 INJECTION, POWDER, FOR SOLUTION INTRAMUSCULAR; INTRAVENOUS at 10:19

## 2023-01-12 RX ADMIN — DULOXETINE HYDROCHLORIDE 60 MG: 60 CAPSULE, DELAYED RELEASE ORAL at 10:19

## 2023-01-12 RX ADMIN — CETIRIZINE HYDROCHLORIDE 5 MG: 5 TABLET ORAL at 10:20

## 2023-01-12 RX ADMIN — TROSPIUM CHLORIDE 20 MG: 20 TABLET, FILM COATED ORAL at 15:16

## 2023-01-12 RX ADMIN — CARVEDILOL 3.12 MG: 3.12 TABLET, FILM COATED ORAL at 16:47

## 2023-01-12 RX ADMIN — METHYLPREDNISOLONE SODIUM SUCCINATE 40 MG: 40 INJECTION, POWDER, FOR SOLUTION INTRAMUSCULAR; INTRAVENOUS at 15:16

## 2023-01-12 RX ADMIN — MEMANTINE HYDROCHLORIDE 10 MG: 5 TABLET ORAL at 10:19

## 2023-01-12 RX ADMIN — IPRATROPIUM BROMIDE 2 PUFF: 17 AEROSOL, METERED RESPIRATORY (INHALATION) at 15:17

## 2023-01-12 RX ADMIN — BUDESONIDE AND FORMOTEROL FUMARATE DIHYDRATE 2 PUFF: 160; 4.5 AEROSOL RESPIRATORY (INHALATION) at 10:19

## 2023-01-12 RX ADMIN — BUPROPION HYDROCHLORIDE 75 MG: 75 TABLET, FILM COATED ORAL at 10:19

## 2023-01-12 RX ADMIN — METHYLPREDNISOLONE SODIUM SUCCINATE 40 MG: 40 INJECTION, POWDER, FOR SOLUTION INTRAMUSCULAR; INTRAVENOUS at 03:09

## 2023-01-12 RX ADMIN — CLINDAMYCIN HYDROCHLORIDE 300 MG: 300 CAPSULE ORAL at 15:16

## 2023-01-12 RX ADMIN — CLINDAMYCIN HYDROCHLORIDE 300 MG: 300 CAPSULE ORAL at 10:19

## 2023-01-12 RX ADMIN — SODIUM CHLORIDE, PRESERVATIVE FREE 10 ML: 5 INJECTION INTRAVENOUS at 10:23

## 2023-01-12 NOTE — DISCHARGE SUMMARY
Zoya Cuellar  :  1945  MRN:  [id number]    Admit date:  1/10/2023  Discharge date:  2023    Discharging Physician:  Dr. Nadia Moore Directive: DNR    Consults: Kim URIAS     Primary Care Physician:  Nicolas Cat,     Discharge Diagnoses:  Principal Problem:    Acute exacerbation of chronic obstructive pulmonary disease (COPD) ([facility]  Active Problems:    COVID-19    CHF (congestive heart failure) ([facility]    Palliative care patient    Dementia without behavioral disturbance ([facility]  Resolved Problems:    * No resolved hospital problems. *      Portions of this note have been copied forward, however, changed to reflect the most current clinical status of this patient. Hospital Course: The patient is a 69 y.o. female with a PMH of CAD, CVA, CHF, COPD on home O2 3 L at baseline, emphysema, and dementia who presented to presented to [facility] ED on 1/10/2023 via EMS complaining of shortness of breath and increased sputum production with cough. EMS advised patient reported she had been short of breath for 2 days and had a productive cough. Complained of congestion, rhinorrhea and sore throat. She denied any recent ill contacts. She does report that she continues to smoke. Further ED work-up revealed the patient to be hypoxic with an SPO2 percent of 83% upon arrival.  ABGs-pH 7.39, PCO2 47, PO2 61, HCO3 28.5. D-dimer mildly positive at 0.66. CTA pulmonary showed no acute PE there are emphysematous changes present. Chest x-ray consistent with COPD. Chemistries within normal limits. H&H 9.3/31.7. The patient was admitted to hospital medicine for an acute an exacerbation of COPD and Covid-19. She underwent aggressive pulmonary toilet, initiated on IV antibiotics, given p.o. baricitinib, Combivent inhaler, and steroid inhalers with dexamethasone was ordered. Her breathing function significantly improved.   She has been weaned to 2 L nasal cannula. She is remained afebrile. Her chemistries and CBC have remained stable without significant abnormalities. She does voice interest in home health upon discharge. She is agreeable to home health for PT, OT, skilled nursing and home health aide and will be provided by [facility] home services and they will be notified of her discharge. Case management has ensured that the patient has medical equipment needed. She is on home O2 at baseline. She is medically stable for discharge. She is to follow-up with her PCP in 3 to 7 days for hospital follow-up. Proper quarantine protocol discussed with patient. She will be discharged home with home in stable condition. Significant Diagnostic Studies:   XR CHEST PORTABLE    Result Date: 1/10/2023  CHEST, ONE VIEW: CLINICAL HISTORY: Shortness of air COMPARISON : Prior study from 11/10/2022 is available. FINDINGS : The lungs are clear. There is Hyperaeration of lungs suggesting of chronic obstructive pulmonary disease. There is no evidence of pulmonary infiltrate or pleural effusion. The pulmonary vascularity is unremarkable. There is a left-sided Port-A-Cath with tip in superior vena cava. The cardiac, hilar and mediastinal silhouettes are satisfactory. The bony thorax demonstrates no gross abnormality. Chronic obstructive pulmonary disease. NO EVIDENCE OF AIRSPACE CONSOLIDATION OR PULMONARY VENOUS CONGESTION. CTA PULMONARY W CONTRAST    Result Date: 1/10/2023  STUDY: CTA CHEST REASON FOR EXAM: COVID infection, dyspnea TECHNIQUE: The examination was performed with the administration of intravenous contrast.Post-processing of the angiographic images was performed, with multiplanar reformation and 3D reconstruction.  Individualized dose optimization techniques were used for this CT. COMPARISON: None. ___________________________________ FINDINGS: No focal filling defect is seen within the visualized pulmonary arteries to suggest a pulmonary embolism. The main pulmonary artery is within normal limits of size. The ascending aorta is within normal limits of size. There is atherosclerotic calcification of aorta. There is suboptimal opacification of the thoracic aorta. There is suspected stenosis at the left subclavian artery by at least 50%. The heart size is within normal limits of size. No large pericardial effusion is seen. Coronary artery calcifications are seen. No suspicious mediastinal, axillary, or supraclavicular lymphadenopathy is seen. There is moderate to severe centrilobular and paraseptal emphysematous change. There is a nodular airspace opacities noted at the left upper lobe measuring at least 2.1 x 0.8 cm. This is best seen on image 28 of 146. There are nodular opacities seen of the right upper lobe, best seen on image 22 of 146 measuring up to 10 11 mm. There are also some nodular airspace opacities seen at the right lower lobe. There are subpleural groundglass airspace opacities present within the lung bases. The central tracheobronchial tree is patent. The thyroid gland is unremarkable. No discrete pleural effusion is seen. No pneumothorax is seen. No acute osseous abnormality is seen within the chest. The visualized portions of the hepatic parenchyma are mildly low in attenuation. .The gallbladder is surgically absent. No filling defect is seen of the pulmonary arteries to suggest a pulmonary embolism. There are emphysematous changes present. There are nodules and nodular airspace opacities present which will need interval follow-up within 2-3 months. This can be seen with infection and aspiration. These may also represent developing malignancy. This is most pronounced at the left upper lobe.       Pertinent Labs:   CBC:   Recent Labs     01/10/23  1128 01/11/23  0129 01/12/23  0921   WBC 8.0 5.1 6.3   HGB 9.3* 8.9* 9.3*    129* 168     BMP:    Recent Labs     01/10/23  1128 01/10/23  1158 01/11/23  0129 01/12/23  0921     --  137 137 K 3.8 3.8 4.2 4.7     --  99 101   CO2 27  --  26 22   BUN 17  --  16 28*   CREATININE 0.9  --  0.8 0.9   GLUCOSE 110*  --  137* 129*     INR:   Recent Labs     01/10/23  1128   INR 0.95       Physical Exam:  Vital Signs: /75   Pulse 80   Temp 98.2 °F (36.8 °C) (Axillary)   Resp 18   SpO2 94%   Physical Exam  Vitals and nursing note reviewed. Constitutional:       General: She is not in acute distress. Appearance: She is ill-appearing. She is not toxic-appearing or diaphoretic. HENT:      Head: Normocephalic and atraumatic. Right Ear: External ear normal.      Left Ear: External ear normal.      Nose: Nose normal. No congestion or rhinorrhea. Mouth/Throat:      Mouth: Mucous membranes are moist.      Pharynx: Oropharynx is clear. Eyes:      General: No scleral icterus. Extraocular Movements: Extraocular movements intact. Conjunctiva/sclera: Conjunctivae normal.   Cardiovascular:      Rate and Rhythm: Normal rate and regular rhythm. Pulses: Normal pulses. Heart sounds: Normal heart sounds. No murmur heard. No friction rub. No gallop. Pulmonary:      Effort: Pulmonary effort is normal. No respiratory distress. Breath sounds: Examination of the right-lower field reveals decreased breath sounds. Examination of the left-lower field reveals decreased breath sounds. Decreased breath sounds present. No wheezing, rhonchi or rales. Abdominal:      General: Abdomen is flat. Bowel sounds are normal. There is no distension. Palpations: Abdomen is soft. Tenderness: There is no abdominal tenderness. Musculoskeletal:         General: No swelling. Normal range of motion. Cervical back: Normal range of motion and neck supple. Right lower leg: No edema. Left lower leg: No edema. Skin:     General: Skin is warm and dry. Coloration: Skin is pale. Skin is not jaundiced. Findings: No erythema, lesion or rash.    Neurological: General: No focal deficit present. Mental Status: She is alert and oriented to person, place, and time. Mental status is at baseline. Cranial Nerves: No cranial nerve deficit. Sensory: No sensory deficit. Motor: Weakness (Diffuse, chronic) present. Psychiatric:         Mood and Affect: Affect is flat. Behavior: Behavior is withdrawn. Discharge Medications:         Medication List        START taking these medications      albuterol-ipratropium  MCG/ACT Aers inhaler  Commonly known as: COMBIVENT RESPIMAT  Inhale 1 puff into the lungs in the morning and 1 puff at noon and 1 puff in the evening and 1 puff before bedtime. budesonide-formoterol 160-4.5 MCG/ACT Aero  Commonly known as: SYMBICORT  Inhale 2 puffs into the lungs in the morning and 2 puffs in the evening. clindamycin 300 MG capsule  Commonly known as: CLEOCIN  Take 1 capsule by mouth every 8 hours for 14 doses     dexamethasone 6 MG tablet  Commonly known as: Decadron  Take 1 tablet by mouth daily (with breakfast) for 5 days     guaiFENesin 600 MG extended release tablet  Commonly known as: MUCINEX  Take 1 tablet by mouth 2 times daily for 5 days            CHANGE how you take these medications      ondansetron 4 MG tablet  Commonly known as: ZOFRAN  Take 1 tablet by mouth 3 times daily as needed for Nausea or Vomiting  What changed: Another medication with the same name was removed. Continue taking this medication, and follow the directions you see here. OXYGEN  Inhale 2 L into the lungs daily  What changed: how much to take            CONTINUE taking these medications      Adult Blood Pressure Cuff Lg Kit  1 kit by Does not apply route daily     azelastine 0.05 % ophthalmic solution  Commonly known as: OPTIVAR  Place 1 drop into both eyes 2 times daily     buPROPion 75 MG tablet  Commonly known as: WELLBUTRIN  Take 1 tablet by mouth in the morning and 1 tablet before bedtime.      carvedilol 3.125 MG tablet  Commonly known as: COREG  Take 1 tablet by mouth 2 times daily (with meals)     clonazePAM 0.5 MG tablet  Commonly known as: KlonoPIN  Take 1 tablet by mouth 3 times daily as needed for Anxiety for up to 30 days.      dicyclomine 10 MG capsule  Commonly known as: BENTYL  Take 1 capsule by mouth 4 times daily     DULoxetine 60 MG extended release capsule  Commonly known as: CYMBALTA  Take 1 capsule by mouth daily     Fesoterodine Fumarate ER 8 MG Tb24  Commonly known as: Toviaz  Take 1 tablet by mouth daily     furosemide 20 MG tablet  Commonly known as: LASIX  Take 1 tablet by mouth daily as needed (swelling)     isosorbide mononitrate 30 MG extended release tablet  Commonly known as: IMDUR  Take 1 tablet by mouth daily     loratadine 10 MG tablet  Commonly known as: Claritin  Take 1 tablet by mouth daily     memantine 10 MG tablet  Commonly known as: NAMENDA  Take 1 tablet by mouth 2 times daily     nitroGLYCERIN 0.4 MG SL tablet  Commonly known as: NITROSTAT  Place 1 tablet under the tongue every 5 minutes as needed for Chest pain     oxyCODONE-acetaminophen  MG per tablet  Commonly known as: PERCOCET     pantoprazole 40 MG tablet  Commonly known as: PROTONIX  Take 1 tablet by mouth daily     Prolia 60 MG/ML Sosy SC injection  Generic drug: denosumab  Inject 1 mL into the skin once for 1 dose            STOP taking these medications      albuterol sulfate  (90 Base) MCG/ACT inhaler  Commonly known as: PROVENTIL;VENTOLIN;PROAIR     Anoro Ellipta 62.5-25 MCG/ACT Aepb  Generic drug: Umeclidinium-Vilanterol     atorvastatin 20 MG tablet  Commonly known as: LIPITOR     donepezil 5 MG tablet  Commonly known as: ARICEPT     Monistat 3 4 % Crea  Generic drug: MICONAZOLE NITRATE VAGINAL     naloxone 4 MG/0.1ML Liqd nasal spray     pregabalin 75 MG capsule  Commonly known as: LYRICA               Where to Get Your Medications        These medications were sent to [facility] [address]AM[address]en. [address] [facility] [facility] [address] [place] [address] Favio Caicedo [address]      Phone: [phone]   albuterol-ipratropium  MCG/ACT Aers inhaler  budesonide-formoterol 160-4.5 MCG/ACT Aero  clindamycin 300 MG capsule  dexamethasone 6 MG tablet  guaiFENesin 600 MG extended release tablet         Discharge Instructions: Follow up with Adri Sherwood DO in 3-7 days for a hospital follow-up. Take medications as directed. Resume activity as tolerated. Diet: ADULT DIET; Regular; Safety Tray; Safety Tray (Disposables)     Disposition: Patient is Stableand will be discharged to Home with [address] [place] [address]. Time spent on discharge 39 minutes spent in assessing patient, reviewing medications, discussion with nursing, confirming safe discharge plan and preparation of discharge summary.     Rosa Dunn 1/12/2023 2:49 PM       Attestation Statement     I agree with the assessment and plan by mid level provider        Objective:   Vitals: /75   Pulse 80   Temp 98.2 °F (36.8 °C) (Axillary)   Resp 18   SpO2 94%         Assessment & Plan:    COPD AE  Covid infection   Chronic respiratory failure with hypoxia  Deconditioning -  for PT/OT    Renita Wise MD

## 2023-01-12 NOTE — PROGRESS NOTES
Occupational Therapy  Facility/Department: [facility] 4 ONCOLOGY UNIT  Occupational Therapy Initial Assessment    Name: Brooks Cody  : 1945  MRN: [id number]  Date of Service: 2023    Discharge Recommendations:             Patient Diagnosis(es): The primary encounter diagnosis was COVID. A diagnosis of COPD exacerbation ([facility] was also pertinent to this visit. Past Medical History:  has a past medical history of Abdominal pain, Anxiety, Arthritis, CAD in native artery, Cerebrovascular disease, CHF (congestive heart failure) ([address] Constipation, COPD (chronic obstructive pulmonary disease) ([address] Dementia without behavioral disturbance ([facility] Depression, Emphysema, GERD (gastroesophageal reflux disease), Myocardial infarct, old, Palliative care patient, Pneumonia, and Tobacco abuse. Past Surgical History:  has a past surgical history that includes Hysterectomy; Cholecystectomy; Appendectomy; Abdominal exploration surgery; Cardiac catheterization (06/25/10); Cardiac catheterization (12  MDL); Colonoscopy (10/2014); Upper gastrointestinal endoscopy (10/1/14); and fracture surgery (Left, ). Treatment Diagnosis: COPD exacerbation, COVID      Assessment   Performance deficits / Impairments: Decreased functional mobility ; Decreased ADL status; Decreased endurance;Decreased balance  Assessment: Will progress as tolerated  Treatment Diagnosis: COPD exacerbation, COVID  Prognosis: Good  Decision Making: Low Complexity  REQUIRES OT FOLLOW-UP: Yes  Activity Tolerance  Activity Tolerance: Patient Tolerated treatment well        Plan   Occupational Therapy Plan  Times Per Week: 3-5x/week  Times Per Day:  Once a day     Restrictions       Subjective   General  Chart Reviewed: Yes  Patient assessed for rehabilitation services?: Yes  Family / Caregiver Present: No  Diagnosis: Covid, COPD exacerbation  General Comment  Comments: Pt. up in recliner when OT arrived     Social/Functional History  Social/Functional History  Lives With: Daughter  Type of Home: House  Home Layout: One level  Home Access: Stairs to enter with rails  Entrance Stairs - Number of Steps: 3  Bathroom Shower/Tub: Tub/Shower unit  Bathroom Equipment: Shower chair  Home Equipment: [address]ira [address] Help From: Family  ADL Assistance: Needs assistance  Toileting: Independent  Ambulation Assistance: Independent  Transfer Assistance: Independent  Active : No  Patient's  Info: daughter       Objective   Heart Rate: 80  Heart Rate Source: Monitor  BP: 132/75  BP Location: Right upper arm  MAP (Calculated): 94  Resp: 18  SpO2: 94 %  O2 Device: Nasal cannula          Observation/Palpation  Observation: INCONTINENT OF URINE. GOWN CHANGED. BRIEF PLACED ON Pt. Safety Devices  Type of Devices: Gait belt;Call light within reach;Nurse notified; Left in chair;Chair alarm in place        AROM: Within functional limits  Strength:  Within functional limits  ADL  Feeding: Independent  Grooming: Supervision  UE Bathing: Supervision  LE Bathing: Minimal assistance  UE Dressing: Supervision  LE Dressing: Minimal assistance  Toileting: Minimal assistance     Activity Tolerance  Activity Tolerance: Patient tolerated treatment well     Transfers  Stand Step Transfers: Contact guard assistance  Sit to stand: Contact guard assistance  Transfer Comments: RW  Vision  Vision: Within Functional Limits  Hearing  Hearing: Within functional limits  Cognition  Overall Cognitive Status: WFL  Orientation  Overall Orientation Status: Within Functional Limits                                           G-Code     OutComes Score                                                  AM-PAC Score             Tinneti Score       Goals  Short Term Goals  Time Frame for Short Term Goals: 1 week  Short Term Goal 1: Supervision with toilet tfers  Short Term Goal 2: Supervision with LB dsg  Short Term Goal 3: Supervision with light ambulatory ADLs without excessive fatigue  Long Term Goals  Long Term Goal 1: Return to PLOF       Therapy Time   Individual Concurrent Group Co-treatment   Time In           Time Out           Minutes                   Ben Samano, OT

## 2023-01-12 NOTE — PROGRESS NOTES
[facility] Hospitalists      Patient:  Alen Díaz  YOB: 1945  Date of Service: 1/12/2023  MRN: [id number]   Acct: [de-identified]   Primary Care Physician: Wolf Garcia DO  Advance Directive: DNR  Admit Date: 1/10/2023       Hospital Day: 2  Portions of this note have been copied forward, however, changed to reflect the most current clinical status of this patient. CHIEF COMPLAINT shortness of breath    SUBJECTIVE:  She states that she feels much better today. SOA is improving. She has been weaned from O2 to 2 L. No overnight events or issues    CUMULATIVE HOSPITAL STAY:  The patient is a 69 y.o. female with a PMH of CAD, CVA, CHF, COPD on home O2 3 L at baseline, emphysema, and dementia who presented to presented to [facility] ED on 1/10/2023 via EMS complaining of shortness of breath and increased sputum production with cough. EMS advised patient reported she had been short of breath for 2 days and had a productive cough. She reported a fever however is unable to give a numerical reading and complains of chills. Complained of congestion, rhinorrhea and sore throat. She denied any recent ill contacts. She does report that she continues to smoke. Further ED work-up revealed the patient to be hypoxic with an SPO2 percent of 83% upon arrival.  ABGs-pH 7.39, PCO2 47, PO2 61, HCO3 28.5. D-dimer mildly positive at 0.66. CTA pulmonary showed no acute PE there are emphysematous changes present. Chest x-ray consistent with COPD. Chemistries within normal limits. H&H 9.3/31.7. Troponin negative proBNP 182. COVID swab positive. Influenza swab negative. Patient was admitted to hospital medicine for acute COPD exacerbation and COVID-19  Aggressive pulmonary toileting initiated. IV azithromycin and Rocephin initiated. Pharmacy dosing baricitinib. Dexamethasone daily ordered. Chemistries remain unchanged. CBC remained stable. Weaned to 2 L nasal cannula.     Review of Systems:   Review of Systems Unable to perform ROS: Dementia         Objective:   VITALS:  BP (!) 140/92   Pulse 87   Temp 98.1 °F (36.7 °C) (Temporal)   Resp 16   SpO2 93%   24HR INTAKE/OUTPUT:    Intake/Output Summary (Last 24 hours) at 1/12/2023 0602  Last data filed at 1/11/2023 1353  Gross per 24 hour   Intake 0 ml   Output --   Net 0 ml       Physical Exam  Vitals and nursing note reviewed. Constitutional:       General: She is not in acute distress. Appearance: Normal appearance. She is ill-appearing. She is not toxic-appearing or diaphoretic. HENT:      Head: Normocephalic and atraumatic. Right Ear: External ear normal.      Left Ear: External ear normal.      Nose: Nose normal. No congestion or rhinorrhea. Mouth/Throat:      Mouth: Mucous membranes are moist.      Pharynx: Oropharynx is clear. Eyes:      General: No scleral icterus. Extraocular Movements: Extraocular movements intact. Conjunctiva/sclera: Conjunctivae normal.   Cardiovascular:      Rate and Rhythm: Normal rate and regular rhythm. Pulses: Normal pulses. Heart sounds: Normal heart sounds. No murmur heard. No friction rub. No gallop. Pulmonary:      Effort: Pulmonary effort is normal. No respiratory distress. Breath sounds: Rhonchi (Scattered throughout) present. No wheezing or rales. Abdominal:      General: Abdomen is flat. Bowel sounds are normal. There is no distension. Palpations: Abdomen is soft. Tenderness: There is no abdominal tenderness. Musculoskeletal:         General: No swelling. Normal range of motion. Cervical back: Normal range of motion and neck supple. Right lower leg: No edema. Left lower leg: No edema. Skin:     General: Skin is warm and dry. Coloration: Skin is pale. Skin is not jaundiced. Findings: No erythema, lesion or rash. Neurological:      General: No focal deficit present. Mental Status: She is alert. Mental status is at baseline.  She is disoriented. Cranial Nerves: No cranial nerve deficit. Sensory: No sensory deficit. Motor: Weakness present. Psychiatric:         Mood and Affect: Affect is flat. Behavior: Behavior is withdrawn. Medications:      sodium chloride        methylPREDNISolone  40 mg IntraVENous Q6H    buPROPion  75 mg Oral BID    carvedilol  3.125 mg Oral BID WC    DULoxetine  60 mg Oral Daily    donepezil  5 mg Oral Nightly    trospium  20 mg Oral BID AC    isosorbide mononitrate  30 mg Oral Daily    cetirizine  5 mg Oral Daily    memantine  10 mg Oral BID    pantoprazole  40 mg Oral Daily    sodium chloride flush  5-40 mL IntraVENous 2 times per day    enoxaparin  40 mg SubCUTAneous Daily    azithromycin  500 mg IntraVENous Q24H    cefTRIAXone (ROCEPHIN) IV  1,000 mg IntraVENous Q24H    budesonide-formoterol  2 puff Inhalation BID    ketotifen  1 drop Both Eyes BID    albuterol sulfate HFA  2 puff Inhalation Q4H WA    And    ipratropium  2 puff Inhalation Q4H WA    baricitinib  4 mg Oral Daily    guaiFENesin  600 mg Oral BID     clonazePAM, nitroGLYCERIN, sodium chloride flush, sodium chloride, ondansetron **OR** ondansetron, polyethylene glycol, acetaminophen **OR** acetaminophen, iopamidol  ADULT DIET; Regular     Lab and other Data:     Recent Labs     01/10/23  1128 01/11/23  0129   WBC 8.0 5.1   HGB 9.3* 8.9*    129*     Recent Labs     01/10/23  1128 01/10/23  1158 01/11/23  0129     --  137   K 3.8 3.8 4.2     --  99   CO2 27  --  26   BUN 17  --  16   CREATININE 0.9  --  0.8   GLUCOSE 110*  --  137*     Recent Labs     01/10/23  1128 01/11/23  0129   AST 16 14   ALT 9 8   BILITOT <0.2 <0.2   ALKPHOS 74 74     Troponin T:   Recent Labs     01/10/23  1128   TROPONINI <0.01     Pro-BNP: No results for input(s): BNP in the last 72 hours.   INR:   Recent Labs     01/10/23  1128   INR 0.95     UA:No results for input(s): NITRITE, COLORU, PHUR, LABCAST, WBCUA, RBCUA, MUCUS, TRICHOMONAS, YEAST, BACTERIA, CLARITYU, SPECGRAV, LEUKOCYTESUR, UROBILINOGEN, BILIRUBINUR, BLOODU, GLUCOSEU, AMORPHOUS in the last 72 hours. Invalid input(s): Duana Spurling  A1C: No results for input(s): LABA1C in the last 72 hours. ABG:  Recent Labs     01/10/23  1158   PHART 7.390   KLV3ZWD 47.0*   PO2ART 61.0*   GUV2IVY 28.5*   BEART 3.0*   HGBAE 9.3*   S3LJXJXY 91.9   CARBOXHGBART 2.7       RAD:   XR CHEST PORTABLE    Result Date: 1/10/2023  Chronic obstructive pulmonary disease. NO EVIDENCE OF AIRSPACE CONSOLIDATION OR PULMONARY VENOUS CONGESTION. CTA PULMONARY W CONTRAST    Result Date: 1/10/2023  No filling defect is seen of the pulmonary arteries to suggest a pulmonary embolism. There are emphysematous changes present. There are nodules and nodular airspace opacities present which will need interval follow-up within 2-3 months. This can be seen with infection and aspiration. These may also represent developing malignancy. This is most pronounced at the left upper lobe. Assessment/Plan   Principal Problem:    Acute exacerbation of chronic obstructive pulmonary disease (COPD) ([facility])   Continue azithromycin IV   -Continue Rocephin IV   -Continue Mucinex 600 mg twice daily   -Aggressive pulmonary toileting   -Sputum culture   -Continue Symbicort   -Continue duo nebs   -Continue Decadron   Continue to titrate O2   -Monitor vital signs    Active Problems:    COVID-19       -Droplet + isolation   -Pharmacy dosing Baricitinib   -Dexamethasone   -Combivent inhalers   -Symbicort   -Supportive care      CHF (congestive heart failure) ([facility])    -No evidence of acute exacerbation    -continue home meds              -Strict I&O              -Daily weight      Palliative care patient   -Palliative care involved      Dementia without behavioral disturbance ([facility] [address]      -Continue home meds    Resolved Problems:    * No resolved hospital problems.  *      Antibiotic: Rocephin/Azithromycin     DVT Prophylaxis: Lovenox    GI prophylaxis:  Protonix    Disposition: TBD-possibly tomorrow if continued improvement    Rachel NAVEEN Killian, 1/12/2023 6:02 AM       Attestation Statement     I have independently seen and examined this patient and agree with the asesment and plan by mid level [facility] MEDICINE  - PROGRESS NOTE         Objective:   Vitals: /75   Pulse 80   Temp 98.2 °F (36.8 °C) (Axillary)   Resp 18   SpO2 94%   General appearance: alert, appears stated age and cooperative  Skin: Skin color, texture, turgor normal.   HEENT: Head: Normocephalic, no lesions, without obvious abnormality.   Neck: no adenopathy, no carotid bruit, no JVD, and supple, symmetrical, trachea midline  Lungs: wheezes bilaterally  Heart: regular rate and rhythm, S1, S2 normal, no murmur, click, rub or gallop  Abdomen: soft, non-tender; bowel sounds normal; no masses,  no organomegaly  Extremities: extremities normal, atraumatic, no cyanosis or edema  Lymphatic: No significant lymph node enlargement papable  Neurologic: Mental status: Alert, oriented, thought content appropriate      Assessment & Plan:    COPD AE- steroids, nebs, ab  Covid infection   Chronic respiratory failure with hypoxia  Deconditioning - pt ot     Francesco Guidry MD

## 2023-01-12 NOTE — PROGRESS NOTES
Physical Therapy  Facility/Department: [facility] 4 ONCOLOGY UNIT  Physical Therapy Initial Assessment    Name: Garfield Amezquita  : 1945  MRN: [id number]  Date of Service: 2023    Discharge Recommendations:  Continue to assess pending progress, Patient would benefit from continued therapy after discharge (IF Pt WERE TO [facility], SHE WOULD REQUIRE 24/7 ASSIST WITH ALL ADL'S AND MOBILITY. USE RW AND THE GT BELT. [address] ADVISED)          Patient Diagnosis(es): The primary encounter diagnosis was COVID. A diagnosis of COPD exacerbation ([facility] [address] was also pertinent to this visit. Past Medical History:  has a past medical history of Abdominal pain, Anxiety, Arthritis, CAD in native artery, Cerebrovascular disease, CHF (congestive heart failure) ([address] Constipation, COPD (chronic obstructive pulmonary disease) ([address] Dementia without behavioral disturbance ([facility] [address] Depression, Emphysema, GERD (gastroesophageal reflux disease), Myocardial infarct, old, Palliative care patient, Pneumonia, and Tobacco abuse. Past Surgical History:  has a past surgical history that includes Hysterectomy; Cholecystectomy; Appendectomy; Abdominal exploration surgery; Cardiac catheterization (06/25/10); Cardiac catheterization (12  MDL); Colonoscopy (10/2014); Upper gastrointestinal endoscopy (10/1/14); and fracture surgery (Left, 1966). Assessment   Body Structures, Functions, Activity Limitations Requiring Skilled Therapeutic Intervention: Decreased functional mobility ; Decreased strength;Decreased endurance;Decreased balance;Decreased posture  Assessment: pt WOULD BENEFIT FROM SKILLED PT TO ADDRESS HER MOBILITY/ENDURANCE DEFICITS.   Therapy Prognosis: Good  Decision Making: Medium Complexity  Requires PT Follow-Up: Yes  Activity Tolerance  Activity Tolerance: Patient tolerated treatment well     Plan   Physcial Therapy Plan  General Plan: 5-7 times per week  Therapy Duration: 2 Weeks  Current Treatment Recommendations: Strengthening, Balance training, Functional mobility training, Gait training, Safety education & training, Therapeutic activities, Positioning  Additional Comments: 02 AS INDICATED. Pt BECOMES SOA WITH ACTIVITY  Safety Devices  Type of Devices: Gait belt, Call light within reach, Nurse notified, Left in chair, Chair alarm in place     Restrictions        Subjective   General  Patient assessed for rehabilitation services?: Yes  Diagnosis: COVID, COPD EXAC. Follows Commands: Within Functional Limits  Subjective  Subjective: pt Bina MEDICALLY STABLE AND RECEIVE ASSIST FROM HER DAUGHTER. DENIES PAIN. REPORTS SHE USES 02 AT HOME PRN. Social/Functional History  Social/Functional History  Lives With: Daughter  Type of Home: House  Home Layout: One level  Home Access: Stairs to enter with rails  Entrance Stairs - Number of Steps: 3  Bathroom Shower/Tub: Tub/Shower unit  Bathroom Equipment: Shower chair  Home Equipment: RAMON [facility], [place] [address] Help From: Family  ADL Assistance: Needs assistance  Toileting: Independent  Ambulation Assistance: Independent  Transfer Assistance: Independent  Active : No  Patient's  Info: daughter  Vision/Hearing       Cognition         Objective   Heart Rate: 80  Heart Rate Source: Monitor  BP: 132/75  BP Location: Right upper arm  MAP (Calculated): 94  Resp: 18  SpO2: 94 %  O2 Device: Nasal cannula     Observation/Palpation  Observation: INCONTINENT OF URINE. GOWN CHANGED. BRIEF PLACED ON Pt. Gross Assessment  AROM: Within functional limits  PROM: Within functional limits  Strength: Within functional limits                    Bed mobility  Supine to Sit: Stand by assistance  Transfers  Sit to Stand: Contact guard assistance  Stand to Sit: Contact guard assistance  Ambulation  Surface: Level tile  Device: No Device  Assistance: Minimal assistance; Moderate assistance  Quality of Gait: UNSTEADY  Gait Deviations: Slow Fatoumata;Decreased step length;Decreased step height;Shuffles  Distance: 10 FT  Comments: A RW WOULD IMPROVE STABILITY WITH AMBULATION  More Ambulation?: Yes  Ambulation 2  Surface - 2: level tile  Device 2: Rolling Walker  Other Apparatus 2: O2  Assistance 2: Contact guard assistance  Quality of Gait 2: FLEXED POSTURE. MILDLY UNSTEADY  Gait Deviations: Slow Fatoumata; Shuffles  Distance: 25 FT  Comments: RT PRESENT TO ASSESS FOR NEED FOR HOME 02. STATES Pt WILL REQUIRE USE OF 02 DURING REST AND WITH ACTIVITY.                    Goals  Short Term Goals  Time Frame for Short Term Goals: 2 WKS  Short Term Goal 1:  FT WITH RW, SBA       Education  Patient Education  Education Given To: Patient  Education Provided: Role of Therapy;Plan of Care  Education Method: Verbal  Barriers to Learning: None  Education Outcome: Verbalized understanding      Therapy Time   Individual Concurrent Group Co-treatment   Time In           Time Out           Minutes                   Shirin North PT   Electronically signed by Shirin North PT on 1/12/2023 at 3:25 PM

## 2023-01-12 NOTE — PROGRESS NOTES
Palliative Care Progress Note  1/12/2023 8:56 AM    Patient:  Imtiaz Powers  YOB: 1945  Primary Care Physician: Yamilet Hurtado DO  Advance Directive: DNR  Admit Date: 1/10/2023       Hospital Day: 2  Portions of this note have been copied forward, however, changed to reflect the most current clinical status of this patient. CHIEF COMPLAINT/REASON FOR CONSULTATION Goals of care, family support, Code status, symptom management     SUBJECTIVE:  Ms. Janet Nino is resting comfortably in bed. No new complaints. Tolerating NC O2. Hopeful to d/c home today    HPI: The patient is a 69 y.o. female with PMH COPD on 3L baseline, CHF, CAD, CVA, dementia, and other comorbidities who presented to [facility] ED 1/10/2023 complaining of shortness of breath and increased sputum production with cough. EMS advised patient reported she had been short of breath for 2 days and had a productive cough. She reports a fever however is unable to give a numerical reading and complains of chills. Complains of congestion, rhinorrhea and sore throat. She denies any recent ill contacts. She does report that she continues to smoke. She presented with hypoxia on RA NC with O2 sat 84% and placed on 4L NC. ABGs pH 7.39, PCO2 47, PO2 61, HCO3 28.5. D-dimer mildly positive at 0.66. CTA pulmonary showed no acute PE there are emphysematous changes present. CXR consistent with COPD. Chemistries within normal limits. H&H 9.3/31.7. Troponin negative proBNP 182. COVID swab positive. Influenza swab negative. She was admitted under hospitalist services and initiated on IV abx, steroids, and nebs. Pharmacy consulted for Baricitinib dosing. Palliative care is consulted for goals of care, code status discussion, and symptom management. Review of Systems:   14 point review of systems is negative except as specifically addressed above.     Objective:   VITALS:  /75   Pulse 80   Temp 98.2 °F (36.8 °C) (Axillary)   Resp 18   SpO2 94% 24HR INTAKE/OUTPUT:    Intake/Output Summary (Last 24 hours) at 1/12/2023 0856  Last data filed at 1/11/2023 1353  Gross per 24 hour   Intake 0 ml   Output --   Net 0 ml     General appearance: 70 yo female, ill appearing, NAD  Head: Normocephalic, without obvious abnormality, atraumatic  Eyes: conjunctivae/corneas clear. PERRL, EOM's intact. Ears/Nose: normal external ears and nose  Neck/Throat: supple, symmetrical, trachea midline  Pulmonary: soft scattered rhonchi with expiratory wheeze to auscultation bilaterally, NC in place, loose/weak cough  Cardiovascular: regularrate and rhythm, S1, S2 normal, no murmur  Gastrointestinal:soft, non-tender; non-distended, bowel sounds present  Musculoskeletal: No lower extremity edema,  No erythema, no tenderness to palpation  Skin: Warm, dry  Neurologic: Alert and oriented X4, generalized weakness, no focal deficits  Psychiatric: Calm and cooperative    Medications:      sodium chloride        clindamycin  300 mg Oral 3 times per day    methylPREDNISolone  40 mg IntraVENous Q6H    buPROPion  75 mg Oral BID    carvedilol  3.125 mg Oral BID WC    DULoxetine  60 mg Oral Daily    donepezil  5 mg Oral Nightly    trospium  20 mg Oral BID AC    isosorbide mononitrate  30 mg Oral Daily    cetirizine  5 mg Oral Daily    memantine  10 mg Oral BID    pantoprazole  40 mg Oral Daily    sodium chloride flush  5-40 mL IntraVENous 2 times per day    enoxaparin  40 mg SubCUTAneous Daily    budesonide-formoterol  2 puff Inhalation BID    ketotifen  1 drop Both Eyes BID    albuterol sulfate HFA  2 puff Inhalation Q4H WA    And    ipratropium  2 puff Inhalation Q4H WA    baricitinib  4 mg Oral Daily    guaiFENesin  600 mg Oral BID     clonazePAM, nitroGLYCERIN, sodium chloride flush, sodium chloride, ondansetron **OR** ondansetron, polyethylene glycol, acetaminophen **OR** acetaminophen, iopamidol  ADULT DIET;  Regular     Lab and other Data:     Recent Labs     01/10/23  1128 01/11/23  0129 WBC 8.0 5.1   HGB 9.3* 8.9*    129*     Recent Labs     01/10/23  1128 01/10/23  1158 01/11/23  0129     --  137   K 3.8 3.8 4.2     --  99   CO2 27  --  26   BUN 17  --  16   CREATININE 0.9  --  0.8   GLUCOSE 110*  --  137*     Recent Labs     01/10/23  1128 01/11/23  0129   AST 16 14   ALT 9 8   BILITOT <0.2 <0.2   ALKPHOS 74 74       RAD:   XR CHEST PORTABLE  Result Date: 1/10/2023  Chronic obstructive pulmonary disease. NO EVIDENCE OF AIRSPACE CONSOLIDATION OR PULMONARY VENOUS CONGESTION. CTA PULMONARY W CONTRAST  Result Date: 1/10/2023  No filling defect is seen of the pulmonary arteries to suggest a pulmonary embolism. There are emphysematous changes present. There are nodules and nodular airspace opacities present which will need interval follow-up within 2-3 months. This can be seen with infection and aspiration. These may also represent developing malignancy. This is most pronounced at the left upper lobe. Palliative Performance Scale:  [x] 50% Mainly sit/lie  Extensive disease: Can't do any work  Considerable assistance  Normal/reduced intake  LOC full/confusion    ECOG:(3) Capable of limited self-care, confined to bed or chair > 50% of waking hours    CLINICAL PAIN ASSESSMENT:   Score 1-10 (if verbal):  0      Assessment/Plan   Principal Problem:    Acute exacerbation of chronic obstructive pulmonary disease (COPD) ([address]  Active Problems:    COVID-19    CHF (congestive heart failure) ([facility] [address]    Palliative care patient    Dementia without behavioral disturbance ([address]  Resolved Problems:    * No resolved hospital problems. *      Visit Summary:  Chart reviewed, patient discussed with nursing staff. Reviewed health issues, work up and treatment plan as well as factors that lead to hospitalization. Ms. Radha Peters is seen at bedside this morning and report obtained from RN with no acute events overnight. Plans for potential d/c home today. PT/OT evals pending.  She remains agreeable to [facility] at discharge. Attempted to call her daughter, Valentin March, with no answer. Will follow. Candidate for SCOP: To be determined based on hospital course     Recommendations:      Palliative Care- [address] continue all current medical treatments and monitor for improvement. D/c planning based on hospital course. Code status- DNR   Acute COPD exacerbation- mgmt per hospitalist. IV abx, steroids, inhalers. Mucinex BID. Tolerating NC O2, on 3L baseline  COVID-19- pharmacy consulted for adjuvant therapy. Monitor labs. Supportive care. Dementia- home meds resumed per hospitalist. PT/OT when able  CHF- noted on hx, appears stable. Last echo 10/29/20 EF 65-70%, additional workup per hospitalist       Thank you for consulting Palliative Care and allowing us to participate in the care of this patient.      Total Time Spent with patient assessment, interview of independent historian/HCS, workup/treatment review, discussion with medical team, review of current and home medications, and placement of orders/preparation of this note: 26 minutes                               Electronically signed by NAVEEN Velasquez CNP on 1/12/2023 at 8:56 AM    (Please note that portions of this note were completed with a voice recognition program.  Babak Diez made to edit the dictations but occasionally words are mis-transcribed.)

## 2023-01-12 NOTE — DISCHARGE INSTRUCTIONS
There are nodules and nodular airspace opacities present on CT chest,  follow-up within 2-3 months with your primary doctor.

## 2023-01-12 NOTE — CARE COORDINATION
Spoke with patient regarding MD orders for [facility] services. Patient agreeable and has chosen [facility]. Referral Faxed. [address] [place] [address] [phone]. [phone]. Please notify [address] [place] [address] when patient discharges and fax DC Summary,  DC med list and any new [facility] orders. The Patient and/or patient representative was provided with a choice of provider and agrees   with the discharge plan. [x] Yes [] No    Freedom of choice list was provided with basic dialogue that supports the patient's individualized plan of care/goals, treatment preferences and shares the quality data associated with the providers.  [x] Yes [] No  Electronically signed by Tucker Valadez on 1/12/2023 at 11:00 AM

## 2023-01-12 NOTE — PROGRESS NOTES
Pulse ox on room air at rest 93%  Pulse ox on room air with walk 86%  Placed on 2lpm with walk and pulse ox at 93%  2lpm at rest 95%  Pt stated she has oxygen at home

## 2023-01-12 NOTE — PLAN OF CARE
Problem: Pain  Goal: Verbalizes/displays adequate comfort level or baseline comfort level  1/12/2023 1553 by Andrew Thayer RN  Outcome: Completed  1/12/2023 1553 by Andrew Thayer RN  Outcome: Progressing     Problem: Chronic Conditions and Co-morbidities  Goal: Patient's chronic conditions and co-morbidity symptoms are monitored and maintained or improved  1/12/2023 1553 by Andrew Thayer RN  Outcome: Completed  1/12/2023 1553 by Andrew Thayer RN  Outcome: Progressing     Problem: Skin/Tissue Integrity  Goal: Absence of new skin breakdown  Description: 1. Monitor for areas of redness and/or skin breakdown  2. Assess vascular access sites hourly  3. Every 4-6 hours minimum:  Change oxygen saturation probe site  4. Every 4-6 hours:  If on nasal continuous positive airway pressure, respiratory therapy assess nares and determine need for appliance change or resting period.   1/12/2023 1553 by Andrew Thayer RN  Outcome: Completed  1/12/2023 1553 by Andrew Thayer RN  Outcome: Progressing     Problem: Safety - Adult  Goal: Free from fall injury  1/12/2023 1553 by Andrew Thayer RN  Outcome: Completed  1/12/2023 1553 by Andrew Thayer RN  Outcome: Progressing

## 2023-01-13 ENCOUNTER — TELEPHONE (OUTPATIENT)
Dept: PRIMARY CARE CLINIC | Age: 78
End: 2023-01-13

## 2023-01-13 NOTE — TELEPHONE ENCOUNTER
Care Transitions Initial Follow Up Call    Outreach made within 2 business days of discharge: Yes    Patient: Angie Norton Patient : 1945   MRN: [id number]  Reason for Admission: There are no discharge diagnoses documented for the most recent discharge. Discharge Date: 23       Spoke with: no one.  LMTCB    Discharge department/facility: [address] [place] [address]        Scheduled appointment with PCP within 7-14 days    Follow Up  Future Appointments   Date Time Provider Abelardo Singleton   2023  2:30 PM NAVEEN Warren [facility], [place]

## 2023-01-14 NOTE — ED PROVIDER NOTES
Subjective   History of Present Illness  This is a 77-year-old female who has tested positive for COVID-19 yesterday.  Patient was seen at an outside hospital emergency room and discharged home.  Patient states that she does continue to feel short of breath.  Patient is oxygen dependent.  Patient used 2 L of oxygen at home.  Patient currently is on 2 L of oxygen here in the emergency room.  Patient has not had to increase her oxygen use.  Patient also does continue to have a cough.  Patient denies any other symptoms at this time.        Review of Systems   Respiratory: Positive for cough and shortness of breath.    All other systems reviewed and are negative.      Past Medical History:   Diagnosis Date   • Anemia    • Arthritis    • Asthma    • CAD (coronary artery disease)    • CHF (congestive heart failure) (CMS/[facility])    • COPD (chronic obstructive pulmonary disease) (CMS/[facility])    • Depression    • Heart disease    • MI (myocardial infarction) (CMS/[facility])        Allergies   Allergen Reactions   • Codeine    • Sulfa Antibiotics        Past Surgical History:   Procedure Laterality Date   • APPENDECTOMY     • CARDIAC CATHETERIZATION      EF%60   • CHOLECYSTECTOMY     • COLONOSCOPY  04/15/2011   • COLONOSCOPY  10/20/2014    Dr Villalobos   • ENDOSCOPY  10/01/2014    Dr Villalobos   • ENDOSCOPY N/A 7/24/2017    Procedure: ESOPHAGOGASTRODUODENOSCOPY WITH ANESTHESIA;  Surgeon: Delvin Moon DO;  Location: [facility] ENDOSCOPY;  Service:    • EXPLORATORY LAPAROTOMY      abdomen   • HYSTERECTOMY         Family History   Problem Relation Age of Onset   • Colon polyps Neg Hx    • Esophageal cancer Neg Hx        Social History     Socioeconomic History   • Marital status: Single   Tobacco Use   • Smoking status: Every Day     Packs/day: 1.00     Years: 36.00     Pack years: 36.00     Types: Cigarettes   • Smokeless tobacco: Never   Substance and Sexual Activity   • Alcohol use: No   • Drug use: No           Objective   Physical  Exam  Vitals reviewed.   Constitutional:       Appearance: She is well-developed.   HENT:      Head: Normocephalic and atraumatic.      Mouth/Throat:      Mouth: Mucous membranes are moist.   Eyes:      Extraocular Movements: Extraocular movements intact.      Pupils: Pupils are equal, round, and reactive to light.   Cardiovascular:      Rate and Rhythm: Normal rate and regular rhythm.   Pulmonary:      Effort: Pulmonary effort is normal.      Breath sounds: Rhonchi present. No wheezing.   Abdominal:      General: Bowel sounds are normal.      Palpations: Abdomen is soft.      Tenderness: There is no abdominal tenderness.   Musculoskeletal:      Right lower leg: No tenderness. No edema.      Left lower leg: No tenderness. No edema.   Skin:     General: Skin is warm and dry.   Neurological:      General: No focal deficit present.      Mental Status: She is alert.   Psychiatric:         Mood and Affect: Mood normal.         Behavior: Behavior normal.         Procedures           ED Course  ED Course as of 01/14/23 1657   Sat Jan 14, 2023   1337 EKG rate 85  Normal sinus rhythm  No ST changes [RP]      ED Course User Index  [RP] Brandyn Colon MD                                           Medical Decision Making  77-year-old female with COVID-19.  Patient does have COVID-pneumonia on her x-ray.  Patient will be given IV azithromycin 500 mg here in the emergency room.  Patient will be discharged home with 250 mg 4-day course of azithromycin to complete the course.  Patient also will be discharged home with Decadron.  Patient is in no respiratory distress.  Patient does use 2 L of oxygen at home.  Patient has not requiring any increased oxygen.  Patient's daughter had spoken to over the phone.  She has stated that the patient has not had a bowel movement in 4 days.  Patient will be given MiraLAX for her constipation.  Patient also was advised to increase her water intake as well as fiber in her diet.  Patient will be  advised to return to the emergency room with new or worsening symptoms.  Patient's daughter verbalized understanding was agreeable to plan as discussed.    Constipation, unspecified constipation type: acute illness or injury  COVID-19: acute illness or injury  Pneumonia due to COVID-19 virus: acute illness or injury  Amount and/or Complexity of Data Reviewed  Labs: ordered. Decision-making details documented in ED Course.  Radiology: ordered. Decision-making details documented in ED Course.      Risk  Prescription drug management.          Final diagnoses:   COVID-19   Pneumonia due to COVID-19 virus   Constipation, unspecified constipation type       ED Disposition  ED Disposition     ED Disposition   Discharge    Condition   Stable    Comment   --             Agata Jones, APRN  [address] [facility] [address] [phone]    Call       [facility] Emergency Department  [address] [place] [id number] [phone]    As needed, If symptoms worsen         Medication List      New Prescriptions    azithromycin 250 MG tablet  Commonly known as: ZITHROMAX  Take 1 tablet by mouth Daily for 4 days.  Start taking on: January 15, 2023     dexamethasone 6 MG tablet  Commonly known as: DECADRON  Take 1 tablet by mouth 2 (Two) Times a Day With Meals for 10 days.  Start taking on: January 15, 2023     polyethylene glycol 17 g packet  Commonly known as: MIRALAX  Take 17 g by mouth Daily for 10 days.           Where to Get Your Medications      These medications were sent to [facility] [address] [place] [address] - [address] - [phone] [facility] [phone]   [address] [facility] [address]    Phone: [phone]   · azithromycin 250 MG tablet  · dexamethasone 6 MG tablet  · polyethylene glycol 17 g packet          Brandyn Colon MD  01/14/23 3558

## 2023-01-15 LAB
BLOOD CULTURE, ROUTINE: NORMAL
CULTURE, BLOOD 2: NORMAL

## 2023-01-16 ENCOUNTER — TELEPHONE (OUTPATIENT)
Dept: FAMILY MEDICINE CLINIC | Age: 78
End: 2023-01-16

## 2023-01-16 NOTE — TELEPHONE ENCOUNTER
Patient's chart now shows Dr. Julianna Renee is her PCP  Is patient still seeing us? Or has she changed PCP?

## 2023-01-16 NOTE — TELEPHONE ENCOUNTER
Yessica Villanueva from [facility] home care/therapy called requesting an order for patient to have bedside commode sent to [facility] home [facility]. Also to let us know that they are putting home health aide twice a week to help with baths. Ok to order? Shows that patients pcp was changed to dr Galina Chew?

## 2023-01-16 NOTE — TELEPHONE ENCOUNTER
Called and spoke with pts daughter just now. She has an appt with  tomorrow and will be seeing him from now on.

## 2023-01-17 NOTE — PROGRESS NOTES
"Chief Complaint  Establish Care (ER F/U due to Shortness of Breath)    Madai Obrien presents to [facility]  History of Present Illness  Establish Care ER F/U due to Shortness of Breath          Objective   Vital Signs:  /69   Pulse 84   Temp 98.4 °F (36.9 °C)   Ht 157.5 cm (62\")   Wt 59 kg (130 lb)   SpO2 90%   BMI 23.78 kg/m²   Estimated body mass index is 23.78 kg/m² as calculated from the following:    Height as of this encounter: 157.5 cm (62\").    Weight as of this encounter: 59 kg (130 lb).       BMI is within normal parameters. No other follow-up for BMI required.      Physical Exam  Constitutional:       Appearance: Normal appearance. She is well-developed.   HENT:      Head: Normocephalic and atraumatic.      Right Ear: External ear normal.      Left Ear: External ear normal.      Nose: Nose normal. No nasal tenderness or congestion.      Mouth/Throat:      Lips: Pink. No lesions.      Mouth: Mucous membranes are moist. No oral lesions.      Dentition: Normal dentition.      Pharynx: Oropharynx is clear. No pharyngeal swelling, oropharyngeal exudate or posterior oropharyngeal erythema.   Eyes:      General: Lids are normal. Vision grossly intact. No scleral icterus.        Right eye: No discharge.         Left eye: No discharge.      Extraocular Movements: Extraocular movements intact.      Conjunctiva/sclera: Conjunctivae normal.      Right eye: Right conjunctiva is not injected.      Left eye: Left conjunctiva is not injected.      Pupils: Pupils are equal, round, and reactive to light.   Cardiovascular:      Rate and Rhythm: Normal rate and regular rhythm.      Heart sounds: Normal heart sounds. No murmur heard.    No gallop.   Pulmonary:      Effort: Pulmonary effort is normal.      Breath sounds: Normal air entry. Rhonchi present. No wheezing or rales.   Musculoskeletal:         General: No tenderness or deformity. Normal range of motion. "      Cervical back: Full passive range of motion without pain, normal range of motion and neck supple.      Right lower leg: No edema.      Left lower leg: No edema.   Skin:     General: Skin is warm and dry.      Coloration: Skin is not jaundiced.      Findings: No rash.   Neurological:      Mental Status: She is alert and oriented to person, place, and time.      Sensory: Sensation is intact.      Motor: Motor function is intact.      Coordination: Coordination is intact.      Gait: Gait is intact.   Psychiatric:         Attention and Perception: Attention normal.         Mood and Affect: Mood and affect normal.         Behavior: Behavior is not hyperactive. Behavior is cooperative.         Thought Content: Thought content normal.         Judgment: Judgment normal.        Result Review :  The following data was reviewed by: AMANDA Ley on 01/17/2023:  CT Angiogram Chest (01/14/2023 16:06)  XR Chest 1 View (01/14/2023 14:31)  CBC & Differential (01/14/2023 13:52)  Comprehensive Metabolic Panel (01/14/2023 13:52)  D-dimer, Quantitative (01/14/2023 13:52)  Troponin (01/14/2023 13:52)  BNP (01/14/2023 13:52)  Blood Culture - Blood, Arm, Left (01/14/2023 13:52)  Lactic Acid, Plasma (01/14/2023 13:52)  Procalcitonin (01/14/2023 13:52)  Sedimentation Rate (01/14/2023 13:52)  C-reactive Protein (01/14/2023 13:52)  Magnesium (01/14/2023 13:52)  Blood Culture - Blood, Arm, Left (01/14/2023 15:33)  CT Chest Without Contrast (01/17/2023 14:59)  CBC Auto Differential (01/17/2023 14:48)  Procalcitonin (01/17/2023 14:48)  Comprehensive metabolic panel (01/17/2023 14:48)  CBC w AUTO Differential (01/17/2023 14:48)  Urinalysis With Culture If Indicated - Urine, Clean Catch (01/17/2023 14:41)               Assessment and Plan   Diagnoses and all orders for this visit:    1. Pneumonia due to COVID-19 virus (Primary)    2. Shortness of breath  -     CBC w AUTO Differential; Future  -     Comprehensive metabolic panel;  Future  -     Urinalysis With Culture If Indicated - Urine, Clean Catch; Future  -     Procalcitonin; Future  -     CT Chest Without Contrast; Future    3. BMI 23.0-23.9, adult      Pt here today for an ER follow up and to establish care. Pt was seen in ER on 1/14/23 and diagnosed with covid pneumonia. IV abx and steroids were given in ER.  She was sent home with a prescription for azithromycin and Decadron.  She used 2 L of oxygen at home prior to this ER visit and did not require an increase in the oxygen during this.  She was also given MiraLAX due to not having a bowel movement for 4 days.  Her caregiver states that she has not been eating or drinking very much at all since then.  She is also not urinating and has not had a bowel movement.  She states she has been unable to tolerate taking MiraLAX.  She does report having shortness of breath on 2 L of oxygen.  O2 at 90% in office today.  She is wearing a mask instead of nasal cannula, due to patient feeling that she breathes better with the mask.  She feels weaker than she did when she was in the ER. Caregiver states she is currently a pt through [facility] and would like to continue. She states she is needing to switch her PCP to Dr. Head.     Plan:    1.  Repeat CT chest: CT comparable to imaging on 1/4/2023.  2.  Labs ordered: CMP shows GFR and BUN.  CBC comparable to last labs.  Procalcitonin pending.  Unable to collect urinalysis.  3.  Recommended for patient to follow-up in ER at this time due to low oxygen level, worsening labs, and increased weakness.  4.  Will follow-up once she has been discharged from the hospital/ER and can get home health set up at that time.               Follow Up   No follow-ups on file.  Patient was given instructions and counseling regarding her condition or for health maintenance advice. Please see specific information pulled into the AVS if appropriate.

## 2023-01-18 NOTE — ED PROVIDER NOTES
Subjective   History of Present Illness  This is a 77-year-old female with past medical history of COPD who is oxygen dependent.  Patient states that she is on 2 L of oxygen via nasal cannula.  Patient states that she continues to have shortness of breath.  Patient is not requiring any more oxygen than she normally uses.  Patient is continue take her steroids and azithromycin.  Patient states that she does have an upset stomach and also does have a headache now.        Review of Systems   Respiratory: Positive for cough and shortness of breath.    Gastrointestinal: Positive for nausea.   Neurological: Positive for headaches.   All other systems reviewed and are negative.      Past Medical History:   Diagnosis Date   • Anemia    • Arthritis    • Asthma    • CAD (coronary artery disease)    • CHF (congestive heart failure) ([facility])    • COPD (chronic obstructive pulmonary disease) ([facility])    • Depression    • Heart disease    • MI (myocardial infarction) ([facility])        Allergies   Allergen Reactions   • Codeine    • Sulfa Antibiotics        Past Surgical History:   Procedure Laterality Date   • APPENDECTOMY     • CARDIAC CATHETERIZATION      EF%60   • CHOLECYSTECTOMY     • COLONOSCOPY  04/15/2011   • COLONOSCOPY  10/20/2014    Dr Villalobos   • ENDOSCOPY  10/01/2014    Dr Villalobos   • ENDOSCOPY N/A 7/24/2017    Procedure: ESOPHAGOGASTRODUODENOSCOPY WITH ANESTHESIA;  Surgeon: Delvin Moon DO;  Location: [facility] ENDOSCOPY;  Service:    • EXPLORATORY LAPAROTOMY      abdomen   • HYSTERECTOMY         Family History   Problem Relation Age of Onset   • Colon polyps Neg Hx    • Esophageal cancer Neg Hx        Social History     Socioeconomic History   • Marital status: Single   Tobacco Use   • Smoking status: Every Day     Packs/day: 1.00     Years: 36.00     Pack years: 36.00     Types: Cigarettes   • Smokeless tobacco: Never   Substance and Sexual Activity   • Alcohol use: No   • Drug use: No           Objective   Physical  Exam  Vitals and nursing note reviewed.   Constitutional:       General: She is not in acute distress.     Appearance: She is well-developed. She is not ill-appearing or toxic-appearing.   HENT:      Head: Normocephalic and atraumatic.   Cardiovascular:      Rate and Rhythm: Normal rate and regular rhythm.   Pulmonary:      Effort: Pulmonary effort is normal.      Breath sounds: Normal breath sounds.   Abdominal:      General: Bowel sounds are normal.      Palpations: Abdomen is soft.   Musculoskeletal:      Cervical back: Normal range of motion and neck supple.      Right lower leg: No tenderness. No edema.      Left lower leg: No tenderness. No edema.   Skin:     General: Skin is warm and dry.   Neurological:      General: No focal deficit present.      Mental Status: She is alert and oriented to person, place, and time.      Cranial Nerves: No cranial nerve deficit.   Psychiatric:         Mood and Affect: Mood normal.         Behavior: Behavior normal.         Procedures           ED Course  ED Course as of 01/17/23 2020 Tue Jan 17, 2023 2000 EKG rate 81  Normal sinus rhythm  Nonspecific ST changes  Artifact present [RP]      ED Course User Index  [RP] Brandyn Colon MD                                           Lab Results (last 24 hours)     Procedure Component Value Units Date/Time    CBC w AUTO Differential [[id number]]  (Abnormal) Collected: 01/17/23 1448    Specimen: Blood Updated: 01/17/23 1521    Narrative:      The following orders were created for panel order CBC w AUTO Differential.  Procedure                               Abnormality         Status                     ---------                               -----------         ------                     CBC Auto Differential[[id number]]        Abnormal            Final result                 Please view results for these tests on the individual orders.    Comprehensive metabolic panel [[id number]]  (Abnormal) Collected: 01/17/23 1448    Specimen: Blood  Updated: 01/17/23 1559     Glucose 116 mg/dL      BUN 46 mg/dL      Creatinine 1.25 mg/dL      Sodium 136 mmol/L      Potassium 4.6 mmol/L      Chloride 94 mmol/L      CO2 33.0 mmol/L      Calcium 9.2 mg/dL      Total Protein 6.7 g/dL      Albumin 3.9 g/dL      ALT (SGPT) 15 U/L      AST (SGOT) 21 U/L      Alkaline Phosphatase 61 U/L      Total Bilirubin 0.3 mg/dL      Globulin 2.8 gm/dL      A/G Ratio 1.4 g/dL      BUN/Creatinine Ratio 36.8     Anion Gap 9.0 mmol/L      eGFR 44.8 mL/min/1.73     Narrative:      GFR Normal >60  Chronic Kidney Disease <60  Kidney Failure <15    The GFR formula is only valid for adults with stable renal function between ages 18 and 70.    Procalcitonin [[id number]] Collected: 01/17/23 1448    Specimen: Blood Updated: 01/17/23 1448    CBC Auto Differential [[id number]]  (Abnormal) Collected: 01/17/23 1448    Specimen: Blood Updated: 01/17/23 1521     WBC 5.50 10*3/mm3      RBC 3.41 10*6/mm3      Hemoglobin 10.1 g/dL      Hematocrit 32.8 %      MCV 96.2 fL      MCH 29.6 pg      MCHC 30.8 g/dL      RDW 13.8 %      MPV 8.8 fL      Platelets 321 10*3/mm3      Neutrophil % 82.1 %      Lymphocyte % 11.6 %      Auto Mixed Cells % 6.3 %      Neutrophils, Absolute 4.60 10*3/mm3      Lymphocytes, Absolute 0.60 10*3/mm3      Auto Mixed Cells # 0.30 10*3/mm3     CBC & Differential [[id number]]  (Abnormal) Collected: 01/17/23 1922    Specimen: Blood Updated: 01/17/23 1935    Narrative:      The following orders were created for panel order CBC & Differential.  Procedure                               Abnormality         Status                     ---------                               -----------         ------                     CBC Auto Differential[[id number]]        Abnormal            Final result                 Please view results for these tests on the individual orders.    Comprehensive Metabolic Panel [[id number]]  (Abnormal) Collected: 01/17/23 1922    Specimen: Blood Updated: 01/17/23 2009      Glucose 124 mg/dL      BUN 44 mg/dL      Creatinine 1.52 mg/dL      Sodium 136 mmol/L      Potassium 4.3 mmol/L      Comment: Slight hemolysis detected by analyzer. Results may be affected.        Chloride 93 mmol/L      CO2 31.0 mmol/L      Calcium 9.1 mg/dL      Total Protein 6.4 g/dL      Albumin 3.7 g/dL      ALT (SGPT) 9 U/L      AST (SGOT) 16 U/L      Comment: Slight hemolysis detected by analyzer. Results may be affected.        Alkaline Phosphatase 59 U/L      Total Bilirubin 0.2 mg/dL      Globulin 2.7 gm/dL      A/G Ratio 1.4 g/dL      BUN/Creatinine Ratio 28.9     Anion Gap 12.0 mmol/L      eGFR 35.4 mL/min/1.73     Narrative:      GFR Normal >60  Chronic Kidney Disease <60  Kidney Failure <15    The GFR formula is only valid for adults with stable renal function between ages 18 and 70.    Troponin [[id number]]  (Normal) Collected: 01/17/23 1922    Specimen: Blood Updated: 01/17/23 1955     Troponin T <0.010 ng/mL     Narrative:      Troponin T Reference Range:  <= 0.03 ng/mL-   Negative for AMI  >0.03 ng/mL-     Abnormal for myocardial necrosis.  Clinicians would have to utilize clinical acumen, EKG, Troponin and serial changes to determine if it is an Acute Myocardial Infarction or myocardial injury due to an underlying chronic condition.       Results may be falsely decreased if patient taking Biotin.      CBC Auto Differential [[id number]]  (Abnormal) Collected: 01/17/23 1922    Specimen: Blood Updated: 01/17/23 1935     WBC 5.73 10*3/mm3      RBC 3.38 10*6/mm3      Hemoglobin 10.0 g/dL      Hematocrit 33.4 %      MCV 98.8 fL      MCH 29.6 pg      MCHC 29.9 g/dL      RDW 14.4 %      RDW-SD 51.8 fl      MPV 10.1 fL      Platelets 263 10*3/mm3      Neutrophil % 74.2 %      Lymphocyte % 14.1 %      Monocyte % 7.0 %      Eosinophil % 0.2 %      Basophil % 0.3 %      Immature Grans % 4.2 %      Neutrophils, Absolute 4.25 10*3/mm3      Lymphocytes, Absolute 0.81 10*3/mm3      Monocytes, Absolute 0.40  10*3/mm3      Eosinophils, Absolute 0.01 10*3/mm3      Basophils, Absolute 0.02 10*3/mm3      Immature Grans, Absolute 0.24 10*3/mm3      nRBC 0.0 /100 WBC           XR Chest 1 View   Final Result   1. Right-sided rotation of the patient. Emphysema with chronic   interstitial lung change. No focal consolidation.   This report was finalized on 01/17/2023 19:23 by Dr. Ly Llamas MD.            Medical Decision Making  This is a 77-year-old female who has a past medical history of O2 dependent COPD.  Is in no respiratory distress.  Patient is only using 2 L of oxygen via nasal cannula.  Patient typically uses 2 L of oxygen via nasal cannula at home.  Patient has tested positive for COVID-19.  Patient does have COVID-19 pneumonia.  Patient is taking her antibiotics.  Patient is taking her steroids at home.  Patient does have a slight acute kidney injury.  I discussed with patient that she does need to increase her hydration at home.  Patient states that she drinks about 14 ounces of water a day only.  Patient states that she will increase her hydration at home.  Patient otherwise has no other acute symptoms at this time.  Patient will be discharged home in a stable condition.  Patient was advised to return to the emergency room with new or worsening symptoms.  Patient verbalized understanding was agreeable to plan as discussed.    Amount and/or Complexity of Data Reviewed  Labs: ordered. Decision-making details documented in ED Course.  Radiology: ordered. Decision-making details documented in ED Course.  ECG/medicine tests: ordered. Decision-making details documented in ED Course.      Risk  Prescription drug management.          Final diagnoses:   Pneumonia due to COVID-19 virus   Generalized headache   Nausea   Acute kidney injury (HCC)       ED Disposition  ED Disposition     ED Disposition   Discharge    Condition   Stable    Comment   --             Agata Jones, APRN  [address] [facility]  [address]  [phone]    Call       [facility] Emergency Department  [facility] [facility] [address] [phone]    As needed, If symptoms worsen         Medication List      No changes were made to your prescriptions during this visit.          Brandyn Colon MD  01/17/23 2020

## 2023-01-18 NOTE — TELEPHONE ENCOUNTER
Patient's daughter called in regards to her mother. States she was seen here yesterday. They took her to ED last night and states they did nothing for her. Encounter is in her chart. She wanted office to know that patient's son, Anand Obrien was taking her to [place] today for further evaluation.

## 2023-01-18 NOTE — TELEPHONE ENCOUNTER
Caller: Stephanie Dunne    Relationship: Emergency Contact    Best call back number: [phone]    Who are you requesting to speak with (clinical staff, provider,  specific staff member): CLINICAL    What was the call regarding: PATIENTS DAUGHTER WOULD LIKE TO LET DR. MAGAÑA KNOW PATIENT IS CURRENTLY ON HER WAY TO [place].     Do you require a callback: IF NEEDED

## 2023-01-19 NOTE — TELEPHONE ENCOUNTER
Caller: Stephanie Dunne    Relationship: Emergency Contact    Best call back number: [phone]    What is the best time to reach you: ANY    Who are you requesting to speak with (clinical staff, provider,  specific staff member): CLINICAL    What was the call regarding:     PATIENT'S DAUGHTER WOULD LIKE TO DISCUSS GETTING PATIENT HOME HEALTH AND A HOSPITAL BED.     Do you require a callback: YES

## 2023-01-19 NOTE — TELEPHONE ENCOUNTER
Contacted pt's daughter back, on hippa, verified name and . Advised of below per provider. Vu and thanked staff. Spoke with provider who is placing orders.

## 2023-01-19 NOTE — TELEPHONE ENCOUNTER
Pt was seen 1/17/23 by AMANDA Goldsmith. Routing below request to provider to advise if with recent OV or if needs to be seen.

## 2023-01-21 NOTE — TELEPHONE ENCOUNTER
"Caller states she spoke with someone in Dr. Head's office about getting an order for a Hospital bed, BP cuff and shower attachment. Also needs order for HH visits. States she spoke with someone in the office on Thursday about this. VM message left for Nesha Cast to call me back about this request  1409 Nesha called back and states she will take a look at the chart and see who the order was sent to. 1419 Called Ms. Dunne back and let her know that Nesha Cast is checking into see who this order was sent to.   Reason for Disposition  • [1] Caller requesting NON-URGENT health information AND [2] PCP's office is the best resource    Additional Information  • Negative: [1] Caller is not with the adult (patient) AND [2] reporting urgent symptoms  • Negative: Lab result questions  • Negative: Medication questions  • Negative: Caller can't be reached by phone  • Negative: Caller has already spoken to PCP or another triager  • Negative: RN needs further essential information from caller in order to complete triage  • Negative: Requesting regular office appointment    Answer Assessment - Initial Assessment Questions  1. REASON FOR CALL or QUESTION: \"What is your reason for calling today?\" or \"How can I best help you?\" or \"What question do you have that I can help answer?\"      Caller asking for order for Hospital bed be sent to [facility] also needs order for BP cuff and shower attachment. States her mother is being released from [place] tomorrow.    Protocols used: INFORMATION ONLY CALL - NO TRIAGE-ADULT-      "

## 2023-01-26 NOTE — TELEPHONE ENCOUNTER
Contacted pt's daughter back, on hippa, verified pt name and . Advised could address at appt tomorrow. Vu and thanked staff.

## 2023-01-26 NOTE — TELEPHONE ENCOUNTER
Caller: DunneStephanie    Relationship: Emergency Contact    Best call back number: [phone]    What medication are you requesting:  TRAZODONE    What are your current symptoms:  TROUBLE SLEEPING     How long have you been experiencing symptoms:  FOR MANY YEARS     Have you had these symptoms before:    [x] Yes  [] No    Have you been treated for these symptoms before:   [x] Yes  [] No    If a prescription is needed, what is your preferred pharmacy and phone number:      [id number] - [place] - [address] - [phone]  - [phone]   [phone]     Additional notes:  NEW MEDICATION REQUEST FOR TRAZODONE

## 2023-01-27 NOTE — PROGRESS NOTES
Pt was not seen d/t not being able to connect on telehealth. Daughter states she will make an appointment with Dr. Head in office.       Chief Complaint  No chief complaint on file.    Subjective    History of Present Illness      Patient presents to [facility] for   History of Present Illness     Review of Systems   Constitutional: Negative.    HENT: Negative.    Eyes: Negative.    Respiratory: Negative.    Cardiovascular: Negative.    Gastrointestinal: Negative.    Endocrine: Negative.    Genitourinary: Negative.    Musculoskeletal: Negative.    Skin: Negative.    Allergic/Immunologic: Negative.    Neurological: Negative.    Hematological: Negative.    Psychiatric/Behavioral: Negative.        I have reviewed and agree with the HPI and ROS information as above.  AMANDA Ley     Objective   Vital Signs:   There were no vitals taken for this visit.    BMI is within normal parameters. No other follow-up for BMI required.      Physical Exam  Vitals and nursing note reviewed.   Constitutional:       Appearance: Normal appearance. She is well-developed.   HENT:      Head: Normocephalic and atraumatic.      Mouth/Throat:      Lips: Pink. No lesions.   Eyes:      General: Lids are normal. Vision grossly intact.      Conjunctiva/sclera: Conjunctivae normal.      Right eye: Right conjunctiva is not injected.      Left eye: Left conjunctiva is not injected.   Pulmonary:      Effort: Pulmonary effort is normal.   Musculoskeletal:         General: Normal range of motion.      Cervical back: Full passive range of motion without pain, normal range of motion and neck supple.   Skin:     General: Skin is dry.   Neurological:      Mental Status: She is alert and oriented to person, place, and time.      Motor: Motor function is intact.   Psychiatric:         Mood and Affect: Mood and affect normal.         Judgment: Judgment normal.          BASIM-7:      PHQ-2 Depression Screening  Little  interest or pleasure in doing things?     Feeling down, depressed, or hopeless?     PHQ-2 Total Score       PHQ-9 Depression Screening  Little interest or pleasure in doing things?     Feeling down, depressed, or hopeless?     Trouble falling or staying asleep, or sleeping too much?     Feeling tired or having little energy?     Poor appetite or overeating?     Feeling bad about yourself - or that you are a failure or have let yourself or your family down?     Trouble concentrating on things, such as reading the newspaper or watching television?     Moving or speaking so slowly that other people could have noticed? Or the opposite - being so fidgety or restless that you have been moving around a lot more than usual?     Thoughts that you would be better off dead, or of hurting yourself in some way?     PHQ-9 Total Score     If you checked off any problems, how difficult have these problems made it for you to do your work, take care of things at home, or get along with other people?        Result Review  Data Reviewed:   The following data was reviewed by: AMANDA Ley on 01/27/2023:             Assessment and Plan      Diagnoses and all orders for this visit:    1. ERRONEOUS ENCOUNTER--DISREGARD (Primary)          Follow Up   No follow-ups on file.  Patient was given instructions and counseling regarding her condition or for health maintenance advice. Please see specific information pulled into the AVS if appropriate.

## 2023-01-30 PROBLEM — G89.29 CHRONIC BILATERAL LOW BACK PAIN WITHOUT SCIATICA: Status: ACTIVE | Noted: 2023-01-01

## 2023-01-30 PROBLEM — J44.0 COPD WITH LOWER RESPIRATORY INFECTION (HCC): Status: ACTIVE | Noted: 2023-01-01

## 2023-01-30 PROBLEM — F11.282 OPIOID DEPENDENCE WITH OPIOID-INDUCED SLEEP DISORDER (HCC): Status: ACTIVE | Noted: 2023-01-01

## 2023-01-30 PROBLEM — F17.210 TOBACCO DEPENDENCE DUE TO CIGARETTES: Status: ACTIVE | Noted: 2023-01-01

## 2023-01-30 PROBLEM — J12.82 PNEUMONIA DUE TO COVID-19 VIRUS: Status: ACTIVE | Noted: 2023-01-01

## 2023-01-30 PROBLEM — M54.50 CHRONIC BILATERAL LOW BACK PAIN WITHOUT SCIATICA: Status: ACTIVE | Noted: 2023-01-01

## 2023-01-30 PROBLEM — U07.1 PNEUMONIA DUE TO COVID-19 VIRUS: Status: ACTIVE | Noted: 2023-01-01

## 2023-01-30 NOTE — PROGRESS NOTES
"Chief Complaint  Follow-up (Daughter states that she is here for FU from [place], she is not sleeping good (tossing and turning), back pain and med refill. )    Subjective        Christ Obrien presents to [facility]  History of Present Illness  Follow-up Daughter states that she is here for FU from [place] for 8 days due to covid, she is not sleeping good (tossing and turning), back pain and med refill.           Objective   Vital Signs:  Temp 97.9 °F (36.6 °C) (Temporal)   Resp 18   Ht 157.5 cm (62\")   Wt 59 kg (130 lb)   SpO2 93%   BMI 23.78 kg/m²   Estimated body mass index is 23.78 kg/m² as calculated from the following:    Height as of this encounter: 157.5 cm (62\").    Weight as of this encounter: 59 kg (130 lb).       BMI is within normal parameters. No other follow-up for BMI required.      Physical Exam  Constitutional:       General: She is not in acute distress.     Appearance: Normal appearance. She is normal weight. She is ill-appearing.   Cardiovascular:      Rate and Rhythm: Normal rate and regular rhythm.      Heart sounds: Normal heart sounds.   Pulmonary:      Effort: Pulmonary effort is normal.      Breath sounds: Wheezing, rhonchi and rales present.      Comments: Has port. o2  Neurological:      Mental Status: She is alert.   Psychiatric:         Mood and Affect: Mood normal.         Behavior: Behavior normal.        Result Review :                   Assessment and Plan   Diagnoses and all orders for this visit:    1. Pneumonia due to COVID-19 virus (Primary)  Assessment & Plan:  Was admitted to [place] for a week      2. COPD with lower respiratory infection (HCC)  Assessment & Plan:  COPD is improving with treatment.  Continue current medications.  Due to covid made worse          3. Chronic bilateral low back pain without sciatica  Assessment & Plan:  Is geeting long term opiods  We discussed switching to non opiod      4. Opioid dependence with " opioid-induced sleep disorder (HCC)  Assessment & Plan:  Will discuss it stopping the opiods.      5. Tobacco dependence due to cigarettes  Assessment & Plan:  Tobacco use is improving with treatment.  Smoking cessation counseling was provided.  Tobacco use will be reassessed in 4 weeks.             Follow Up   No follow-ups on file.  Patient was given instructions and counseling regarding her condition or for health maintenance advice. Please see specific information pulled into the AVS if appropriate.

## 2023-01-30 NOTE — TELEPHONE ENCOUNTER
Called daughter and informed of COVID test was negative. BENEDICTO. She states she needs inhalers refilled from [facility]. Routed to provider.

## 2023-01-31 NOTE — ADDENDUM NOTE
Addended by: EMIL FRAZIER on: 1/31/2023 08:36 AM     Modules accepted: Orders    
Addended by: VIBHA SUAREZ on: 1/30/2023 04:33 PM     Modules accepted: Orders    
cardiovascular

## 2023-02-07 ENCOUNTER — TELEPHONE (OUTPATIENT)
Dept: FAMILY MEDICINE CLINIC | Age: 78
End: 2023-02-07

## 2023-02-07 NOTE — TELEPHONE ENCOUNTER
Home health called in regards of Boby Smith. She said family members called concerned about her health because her daughter who is taking care of her is not mentally stable. They called for consent to send  to home to evaluate. I did give permission.

## 2023-02-08 ENCOUNTER — TELEPHONE (OUTPATIENT)
Dept: FAMILY MEDICINE CLINIC | Age: 78
End: 2023-02-08

## 2023-02-08 NOTE — TELEPHONE ENCOUNTER
Home health  called and left a VM stating pt was needed to be moved out of daughters care but other family was able to resolve this and pt will be going to sons home for permanent care and housing. I thought they changed providers?

## 2023-02-09 ENCOUNTER — TELEPHONE (OUTPATIENT)
Dept: FAMILY MEDICINE CLINIC | Age: 78
End: 2023-02-09

## 2023-02-09 NOTE — TELEPHONE ENCOUNTER
[facility] is requesting a prescritption for an o2 face mask be sent to [facility].  The patient is a mouth breather and o2 is low 90s at rest  Fax:[phone]

## 2023-02-09 NOTE — TELEPHONE ENCOUNTER
Caller: TASHA    Relationship to patient: [facility]     Best call back number:     Patient is needing:  TASHA IS NEEDING VERIFY THAT DR FRAZIER WILL BE SIGNING OFF ON HOME HEALTH ORDERS FOR PATIENT

## 2023-02-10 NOTE — TELEPHONE ENCOUNTER
Caller: HARESH    Relationship to patient: Home Health    Best call back number: [phone]    Patient is needing:   HARESH WITH [facility] CALLED AND ADVISED THAT PATIENT JUST MOVED IN WITH [facility] AND PATIENT WOULD NEED A HOSPITAL BED FROM [facility]     THEY WILL DO OCCUPATIONAL THERAPY WITH PATIENT 2XS PER [facility] FOR 4 WKS

## 2023-02-11 NOTE — ED NOTES
"Pt's grandson states \"I think that episode earlier was because of going from my mom's care to me. She has been upset with my mom.\" Ellen BHATTI notified.  "

## 2023-02-11 NOTE — ED NOTES
"Upon returning from CT scan, CT tech said she noticed a white pill in pt's mouth. This nurse entered room and asked pt's grandson if he gave her a pill, he stated \"Yeah a pain pill, it's time for one. She hasn't had one in forever.\" This nurse instructed grandson importance of not providing pt with medications and to ask nursing staff next time. Ellen BHATTI notified.  "

## 2023-02-11 NOTE — ED PROVIDER NOTES
Subjective   History of Present Illness  Devenney 7-year-old female brought to the emergency room by her grandson.  Patient lives with her grandson has recently moved from her daughter's place to his place.  He said he came back home this afternoon and patient was not acting as energetic.  She did not eat her dinner and he is worried patient is sick.  Patient is 77-year-old patient with possible early dementia, coronary artery disease congestive heart failure COPD history of anemia arthritis and myocardial infarction.  Patient during my exam was unable to give me much of a history.  She does says she does not feel well.  Vital signs are all within normal.        Review of Systems   Unable to perform ROS: Age       Past Medical History:   Diagnosis Date   • Anemia    • Arthritis    • Asthma    • CAD (coronary artery disease)    • CHF (congestive heart failure) ([facility])    • COPD (chronic obstructive pulmonary disease) ([facility])    • Depression    • Heart disease    • MI (myocardial infarction) ([facility])        Allergies   Allergen Reactions   • Codeine    • Sulfa Antibiotics        Past Surgical History:   Procedure Laterality Date   • APPENDECTOMY     • CARDIAC CATHETERIZATION      EF%60   • CHOLECYSTECTOMY     • COLONOSCOPY  04/15/2011   • COLONOSCOPY  10/20/2014    Dr Villalobos   • ENDOSCOPY  10/01/2014    Dr Villalobos   • ENDOSCOPY N/A 7/24/2017    Procedure: ESOPHAGOGASTRODUODENOSCOPY WITH ANESTHESIA;  Surgeon: Delvin Moon DO;  Location: [facility] ENDOSCOPY;  Service:    • EXPLORATORY LAPAROTOMY      abdomen   • HYSTERECTOMY         Family History   Problem Relation Age of Onset   • Colon polyps Neg Hx    • Esophageal cancer Neg Hx        Social History     Socioeconomic History   • Marital status: Single   Tobacco Use   • Smoking status: Every Day     Packs/day: 1.00     Years: 36.00     Pack years: 36.00     Types: Cigarettes   • Smokeless tobacco: Never   Substance and Sexual Activity   • Alcohol use: No   • Drug use: No            Objective   Physical Exam  Vitals reviewed.   Constitutional:       General: She is not in acute distress.     Appearance: She is normal weight. She is not ill-appearing or toxic-appearing.   HENT:      Head: Normocephalic and atraumatic.   Cardiovascular:      Rate and Rhythm: Normal rate and regular rhythm.   Pulmonary:      Effort: Pulmonary effort is normal.      Breath sounds: Normal breath sounds.   Abdominal:      General: Bowel sounds are normal.      Palpations: Abdomen is soft.   Skin:     General: Skin is warm and dry.      Capillary Refill: Capillary refill takes less than 2 seconds.   Neurological:      General: No focal deficit present.      Mental Status: She is alert and oriented to person, place, and time.   Psychiatric:         Mood and Affect: Mood normal.         Procedures           ED Course  ED Course as of 02/11/23 0620   Sat Feb 11, 2023   0026 Patient procalcitonin is elevated, we will start patient on IV antibiotics.  With Rocephin ordered initially. [MA]   0157 Discussed with family, will obtain a CTA chest and a CT brain.  Patient is a little bit more cognitive says she does not feel well. [MA]   0254 Are currently pending CT of patient's head and chest. [MA]   0321 CT Head Without Contrast [MA]   0322 It appears on CT abdomen pelvis patient may have aspiration pneumonia. [MA]   0430 As informed by the nurse the grandson was giving the patient oral pain meds.  These were given to her without our knowledge. [MA]   0518 Attempted to update the family in regards to patient, they are both asleep which is pending radiology results. [MA]   0615 IMPRESSION:  1. No pulmonary emboli.  2. Mild atherosclerosis of the thoracic aorta with mild coronary  calcification.  3. Moderate emphysema. Similar biapical pleural nodular pulmonary  opacities with increasing right lower lobe opacities suspicious for  pneumonia. No pneumothorax. Probable reactive intrathoracic  lymphadenopathy. [MA]   0615  "IMPRESSION:  1. Study degraded by patient motion artifact. Moderate generalized  volume loss with chronic small vessel ischemia. No convincing acute  intracranial process.  2. Mucosal thickening versus air-fluid level within the left sphenoid  sinus suggesting underlying sinusitis. Probable small right mastoid  effusion. [MA]      ED Course User Index  [MA] Cruz Hughes MD      Labs Reviewed   COMPREHENSIVE METABOLIC PANEL - Abnormal; Notable for the following components:       Result Value    Glucose 115 (*)     BUN 51 (*)     Creatinine 1.77 (*)     Chloride 93 (*)     CO2 31.0 (*)     Albumin 3.2 (*)     BUN/Creatinine Ratio 28.8 (*)     eGFR 29.5 (*)     All other components within normal limits    Narrative:     GFR Normal >60  Chronic Kidney Disease <60  Kidney Failure <15    The GFR formula is only valid for adults with stable renal function between ages 18 and 70.   D-DIMER, QUANTITATIVE - Abnormal; Notable for the following components:    D-Dimer, Quantitative 1.51 (*)     All other components within normal limits    Narrative:     According to the assay 's published package insert, a normal (<0.50 MCGFEU/mL) D-dimer result in conjunction with a non-high clinical probability assessment, excludes deep vein thrombosis (DVT) and pulmonary embolism (PE) with high sensitivity.    D-dimer values increase with age and this can make VTE exclusion of an older population difficult. To address this, the [employer], based on best available evidence and recent guidelines, recommends that clinicians use age-adjusted D-dimer thresholds in patients greater than 50 years of age with: a) a low probability of PE who do not meet all Pulmonary Embolism Rule Out Criteria, or b) in those with intermediate probability of PE.   The formula for an age-adjusted D-dimer cut-off is \"age/100\".  For example, a 60 year old patient would have an age-adjusted cut-off of 0.60 MCGFEU/mL and an 80 year old " "0.80 MCGFEU/mL.   SINGLE HSTROPONIN T - Abnormal; Notable for the following components:    HS Troponin T 28 (*)     All other components within normal limits    Narrative:     High Sensitive Troponin T Reference Range:  <10.0 ng/L- Negative Female for AMI  <15.0 ng/L- Negative Male for AMI  >=10 - Abnormal Female indicating possible myocardial injury.  >=15 - Abnormal Male indicating possible myocardial injury.   Clinicians would have to utilize clinical acumen, EKG, Troponin, and serial changes to determine if it is an Acute Myocardial Infarction or myocardial injury due to an underlying chronic condition.        URINALYSIS W/ MICROSCOPIC IF INDICATED (NO CULTURE) - Abnormal; Notable for the following components:    Color, UA Dark Yellow (*)     Ketones, UA Trace (*)     All other components within normal limits    Narrative:     Urine microscopic not indicated.   LIPASE - Abnormal; Notable for the following components:    Lipase 8 (*)     All other components within normal limits   PROCALCITONIN - Abnormal; Notable for the following components:    Procalcitonin 1.74 (*)     All other components within normal limits    Narrative:     As a Marker for Sepsis (Non-Neonates):    1. <0.5 ng/mL represents a low risk of severe sepsis and/or septic shock.  2. >2 ng/mL represents a high risk of severe sepsis and/or septic shock.    As a Marker for Lower Respiratory Tract Infections that require antibiotic therapy:    PCT on Admission    Antibiotic Therapy       6-12 Hrs later    >0.5                Strongly Recommended  >0.25 - <0.5        Recommended   0.1 - 0.25          Discouraged              Remeasure/reassess PCT  <0.1                Strongly Discouraged     Remeasure/reassess PCT    As 28 day mortality risk marker: \"Change in Procalcitonin Result\" (>80% or <=80%) if Day 0 (or Day 1) and Day 4 values are available. Refer to http://www.[facility]-pct-calculator.com    Change in PCT <=80%  A decrease of PCT levels below or " equal to 80% defines a positive change in PCT test result representing a higher risk for 28-day all-cause mortality of patients diagnosed with severe sepsis for septic shock.    Change in PCT >80%  A decrease of PCT levels of more than 80% defines a negative change in PCT result representing a lower risk for 28-day all-cause mortality of patients diagnosed with severe sepsis or septic shock.      CBC WITH AUTO DIFFERENTIAL - Abnormal; Notable for the following components:    RBC 3.10 (*)     Hemoglobin 9.2 (*)     Hematocrit 30.9 (*)     MCV 99.7 (*)     MCHC 29.8 (*)     RDW 15.8 (*)     RDW-SD 57.2 (*)     All other components within normal limits   MANUAL DIFFERENTIAL - Abnormal; Notable for the following components:    Lymphocyte % 10.0 (*)     Bands %  29.0 (*)     Metamyelocyte % 1.0 (*)     nRBC 1.0 (*)     All other components within normal limits   BNP (IN-HOUSE) - Normal    Narrative:     Among patients with dyspnea, NT-proBNP is highly sensitive for the detection of acute congestive heart failure. In addition NT-proBNP of <300 pg/ml effectively rules out acute congestive heart failure with 99% negative predictive value.    Results may be falsely decreased if patient taking Biotin.     LACTIC ACID, PLASMA - Normal   BLOOD CULTURE   BLOOD CULTURE   RAINBOW DRAW    Narrative:     The following orders were created for panel order [place] Draw.  Procedure                               Abnormality         Status                     ---------                               -----------         ------                     Green Top (Gel)[[id number]]                                  Final result               Lavender Top[[id number]]                                     Final result               [place] Blood Culture Bashir...[[id number]]                      Final result               Gray Top[[id number]]                                         Final result               Light Blue Top[[id number]]                                    Final result                 Please view results for these tests on the individual orders.   GREEN TOP   LAVENDER TOP   RAINBOW BLOOD CULTURE BOTTLES - 1 SET   GRAY TOP   LIGHT BLUE TOP   CBC AND DIFFERENTIAL    Narrative:     The following orders were created for panel order CBC & Differential.  Procedure                               Abnormality         Status                     ---------                               -----------         ------                     CBC Auto Differential[[id number]]        Abnormal            Final result                 Please view results for these tests on the individual orders.          Labs Reviewed   COMPREHENSIVE METABOLIC PANEL - Abnormal; Notable for the following components:       Result Value    Glucose 115 (*)     BUN 51 (*)     Creatinine 1.77 (*)     Chloride 93 (*)     CO2 31.0 (*)     Albumin 3.2 (*)     BUN/Creatinine Ratio 28.8 (*)     eGFR 29.5 (*)     All other components within normal limits    Narrative:     GFR Normal >60  Chronic Kidney Disease <60  Kidney Failure <15    The GFR formula is only valid for adults with stable renal function between ages 18 and 70.   D-DIMER, QUANTITATIVE - Abnormal; Notable for the following components:    D-Dimer, Quantitative 1.51 (*)     All other components within normal limits    Narrative:     According to the assay 's published package insert, a normal (<0.50 MCGFEU/mL) D-dimer result in conjunction with a non-high clinical probability assessment, excludes deep vein thrombosis (DVT) and pulmonary embolism (PE) with high sensitivity.    D-dimer values increase with age and this can make VTE exclusion of an older population difficult. To address this, the American [employer], based on best available evidence and recent guidelines, recommends that clinicians use age-adjusted D-dimer thresholds in patients greater than 50 years of age with: a) a low probability of PE who do not meet all Pulmonary  "Embolism Rule Out Criteria, or b) in those with intermediate probability of PE.   The formula for an age-adjusted D-dimer cut-off is \"age/100\".  For example, a 60 year old patient would have an age-adjusted cut-off of 0.60 MCGFEU/mL and an 80 year old 0.80 MCGFEU/mL.   SINGLE HSTROPONIN T - Abnormal; Notable for the following components:    HS Troponin T 28 (*)     All other components within normal limits    Narrative:     High Sensitive Troponin T Reference Range:  <10.0 ng/L- Negative Female for AMI  <15.0 ng/L- Negative Male for AMI  >=10 - Abnormal Female indicating possible myocardial injury.  >=15 - Abnormal Male indicating possible myocardial injury.   Clinicians would have to utilize clinical acumen, EKG, Troponin, and serial changes to determine if it is an Acute Myocardial Infarction or myocardial injury due to an underlying chronic condition.        URINALYSIS W/ MICROSCOPIC IF INDICATED (NO CULTURE) - Abnormal; Notable for the following components:    Color, UA Dark Yellow (*)     Ketones, UA Trace (*)     All other components within normal limits    Narrative:     Urine microscopic not indicated.   LIPASE - Abnormal; Notable for the following components:    Lipase 8 (*)     All other components within normal limits   PROCALCITONIN - Abnormal; Notable for the following components:    Procalcitonin 1.74 (*)     All other components within normal limits    Narrative:     As a Marker for Sepsis (Non-Neonates):    1. <0.5 ng/mL represents a low risk of severe sepsis and/or septic shock.  2. >2 ng/mL represents a high risk of severe sepsis and/or septic shock.    As a Marker for Lower Respiratory Tract Infections that require antibiotic therapy:    PCT on Admission    Antibiotic Therapy       6-12 Hrs later    >0.5                Strongly Recommended  >0.25 - <0.5        Recommended   0.1 - 0.25          Discouraged              Remeasure/reassess PCT  <0.1                Strongly Discouraged     " "Remeasure/reassess PCT    As 28 day mortality risk marker: \"Change in Procalcitonin Result\" (>80% or <=80%) if Day 0 (or Day 1) and Day 4 values are available. Refer to http://www.[facility]-pct-calculator.com    Change in PCT <=80%  A decrease of PCT levels below or equal to 80% defines a positive change in PCT test result representing a higher risk for 28-day all-cause mortality of patients diagnosed with severe sepsis for septic shock.    Change in PCT >80%  A decrease of PCT levels of more than 80% defines a negative change in PCT result representing a lower risk for 28-day all-cause mortality of patients diagnosed with severe sepsis or septic shock.      CBC WITH AUTO DIFFERENTIAL - Abnormal; Notable for the following components:    RBC 3.10 (*)     Hemoglobin 9.2 (*)     Hematocrit 30.9 (*)     MCV 99.7 (*)     MCHC 29.8 (*)     RDW 15.8 (*)     RDW-SD 57.2 (*)     All other components within normal limits   MANUAL DIFFERENTIAL - Abnormal; Notable for the following components:    Lymphocyte % 10.0 (*)     Bands %  29.0 (*)     Metamyelocyte % 1.0 (*)     nRBC 1.0 (*)     All other components within normal limits   BNP (IN-HOUSE) - Normal    Narrative:     Among patients with dyspnea, NT-proBNP is highly sensitive for the detection of acute congestive heart failure. In addition NT-proBNP of <300 pg/ml effectively rules out acute congestive heart failure with 99% negative predictive value.    Results may be falsely decreased if patient taking Biotin.     LACTIC ACID, PLASMA - Normal   BLOOD CULTURE   BLOOD CULTURE   RAINBOW DRAW    Narrative:     The following orders were created for panel order [place] Draw.  Procedure                               Abnormality         Status                     ---------                               -----------         ------                     Green Top (Gel)[[id number]]                                  Final result               Lavender Top[[id number]]                            "          Final result               [place] Blood Culture Bashir...[[id number]]                      Final result               Gray Top[[id number]]                                         Final result               Light Blue Top[[id number]]                                   Final result                 Please view results for these tests on the individual orders.   GREEN TOP   LAVENDER TOP   RAINBOW BLOOD CULTURE BOTTLES - 1 SET   GRAY TOP   LIGHT BLUE TOP   CBC AND DIFFERENTIAL    Narrative:     The following orders were created for panel order CBC & Differential.  Procedure                               Abnormality         Status                     ---------                               -----------         ------                     CBC Auto Differential[[id number]]        Abnormal            Final result                 Please view results for these tests on the individual orders.                                   Medical Decision Making  Patient has undergone extensive evaluation ruling out sepsis or urinary tract infection.  In the emergency room patient was given a dose of IV ceftriaxone, and IV fluids.  Patient slept most of the night was comfortable son at bedside.  We underwent extensive evaluation with CT head which was negative, CT chest which was negative, CT abdomen pelvis with did not show any major abnormalities.  Patient appears to maybe have a early sinusitis along with possibly aspiration pneumonitis.    Moderate emphysema. Similar biapical pleural nodular pulmonary opacities with increasing right lower lobe opacities suspicious for pneumonia. No pneumothorax. Probable reactive intrathoracic lymphadenopathy.    We will start patient on oral antibiotics with Augmentin.  Patient will be discharged home to follow-up with her primary care physician.  Patient's family updated.    Anemia, unspecified type: acute illness or injury  Aspiration pneumonitis (HCC): acute illness or injury  Amount and/or  Complexity of Data Reviewed  Labs: ordered.  Radiology: ordered. Decision-making details documented in ED Course.  ECG/medicine tests: ordered.      Risk  Prescription drug management.  Decision regarding hospitalization.          Final diagnoses:   Aspiration pneumonitis (HCC)   Anemia, unspecified type       ED Disposition  ED Disposition     ED Disposition   Discharge    Condition   Stable    Comment   --             Bladimir Head MD  [address]  [place]  [facility] [id number] [phone]               Medication List      New Prescriptions    amoxicillin-clavulanate 875-125 MG per tablet  Commonly known as: AUGMENTIN  Take 1 tablet by mouth Every 12 (Twelve) Hours for 7 days.           Where to Get Your Medications      These medications were sent to [facility]/pharmacy #[id number] - [place], [place] - [address] - [phone]  - [phone] FX  [address] [place] [address]    Phone: [phone]   · amoxicillin-clavulanate 875-125 MG per tablet          Cruz Hughes MD  02/11/23 0677

## 2023-02-14 NOTE — TELEPHONE ENCOUNTER
Contacted [facility] at home health back and advised would do so and fax to below per Dr. Head. Placed order. Faxed to below.

## 2023-02-22 ENCOUNTER — TELEPHONE (OUTPATIENT)
Dept: FAMILY MEDICINE CLINIC | Age: 78
End: 2023-02-22

## 2023-02-22 NOTE — TELEPHONE ENCOUNTER
----- Message from Inga Renner sent at 2/22/2023  8:48 AM CST -----  Subject: Message to Provider    QUESTIONS  Information for Provider? Devora Rai with [employer] needs to   reach this patient regarding supplies. Could you please help with this? Please call Devora Rai.  ---------------------------------------------------------------------------  --------------  [address] [employer]  [phone]; OK to leave message on voicemail  ---------------------------------------------------------------------------  --------------  SCRIPT ANSWERS  Relationship to Patient? Third Party  Third Party Type? Durable Medical Equipment? Representative Name?  Devora Rai

## 2023-02-24 NOTE — TELEPHONE ENCOUNTER
NURSE WITH [facility] CALLED IN TO LET PROVIDER KNOW PATIENT WAS DISCHARGED FROM HOME HEALTH CARE TODAY     GOOD CALL BACK   [phone]

## 2023-03-17 NOTE — TELEPHONE ENCOUNTER
Pt overdue for annual wellness. Attempted to contact pt, no answer, left vm requesting call back to office.

## 2023-03-17 NOTE — TELEPHONE ENCOUNTER
100.4  Run down   Frequency    UTI resolved at this time.   No L est, no nitrates.   No need for abx at this time.   As discuss in clinic, if she develops any fever, any urinary frequency, she can call or go to  for repeat testing. But again, at this time, uti resolved.   Antibiotics are not harmless, as they wipe out good bacteria as well as UTI. They are used when infection is active, but not indicated if no active infection at this time.     Ирина with [facility] called, they will be seeing pt 2-3 x a week for 3 weeks

## (undated) DEVICE — SENSR O2 OXIMAX FNGR A/ 18IN NONSTR

## (undated) DEVICE — CONMED SCOPE SAVER BITE BLOCK, 20X27 MM: Brand: SCOPE SAVER

## (undated) DEVICE — ENDOGATOR AUXILIARY WATER JET CONNECTOR: Brand: ENDOGATOR

## (undated) DEVICE — TBG SMPL FLTR LINE NASL 02/C02 A/ BX/100

## (undated) DEVICE — CUFF,BP,DISP,1 TUBE,ADULT,HP: Brand: MEDLINE

## (undated) DEVICE — Device: Brand: DEFENDO AIR/WATER/SUCTION AND BIOPSY VALVE

## (undated) DEVICE — FRCP BX RADJAW4 NDL 2.8 240 STD OG

## (undated) DEVICE — THE CHANNEL CLEANING BRUSH IS A NYLON FLEXI BRUSH ATTACHED TO A FLEXIBLE PLASTIC SHEATH DESIGNED TO SAFELY REMOVE DEBRIS FROM FLEXIBLE ENDOSCOPES.